# Patient Record
Sex: MALE | Race: WHITE | NOT HISPANIC OR LATINO | Employment: FULL TIME | URBAN - METROPOLITAN AREA
[De-identification: names, ages, dates, MRNs, and addresses within clinical notes are randomized per-mention and may not be internally consistent; named-entity substitution may affect disease eponyms.]

---

## 2023-10-10 ENCOUNTER — HOSPITAL ENCOUNTER (INPATIENT)
Facility: HOSPITAL | Age: 43
LOS: 3 days | Discharge: HOME | DRG: 683 | End: 2023-10-13
Attending: INTERNAL MEDICINE | Admitting: INTERNAL MEDICINE
Payer: COMMERCIAL

## 2023-10-10 DIAGNOSIS — N17.9 AKI (ACUTE KIDNEY INJURY): ICD-10-CM

## 2023-10-10 DIAGNOSIS — R07.9 CHEST PAIN: ICD-10-CM

## 2023-10-10 DIAGNOSIS — R10.11 ABDOMINAL PAIN, RIGHT UPPER QUADRANT: Primary | ICD-10-CM

## 2023-10-10 PROBLEM — R74.01 TRANSAMINITIS: Status: ACTIVE | Noted: 2023-10-10

## 2023-10-10 PROBLEM — E83.52 HYPERCALCEMIA: Status: ACTIVE | Noted: 2023-10-10

## 2023-10-10 LAB
ALBUMIN SERPL-MCNC: 3.5 G/DL (ref 3.5–5)
ALBUMIN/GLOB SERPL: 1 RATIO (ref 1.1–2)
ALP SERPL-CCNC: 86 UNIT/L (ref 40–150)
ALT SERPL-CCNC: 79 UNIT/L (ref 0–55)
AMYLASE SERPL-CCNC: 30 UNIT/L (ref 25–125)
APPEARANCE UR: CLEAR
AST SERPL-CCNC: 43 UNIT/L (ref 5–34)
BACTERIA #/AREA URNS AUTO: NORMAL /HPF
BASOPHILS # BLD AUTO: 0.06 X10(3)/MCL
BASOPHILS NFR BLD AUTO: 0.5 %
BILIRUB SERPL-MCNC: 1.2 MG/DL
BILIRUB UR QL STRIP.AUTO: NEGATIVE
BUN SERPL-MCNC: 47 MG/DL (ref 8.9–20.6)
CALCIUM SERPL-MCNC: 18.3 MG/DL (ref 8.4–10.2)
CHLORIDE SERPL-SCNC: 88 MMOL/L (ref 98–107)
CO2 SERPL-SCNC: 34 MMOL/L (ref 22–29)
COLOR UR AUTO: YELLOW
CREAT SERPL-MCNC: 3.36 MG/DL (ref 0.73–1.18)
EOSINOPHIL # BLD AUTO: 1.6 X10(3)/MCL (ref 0–0.9)
EOSINOPHIL NFR BLD AUTO: 13.1 %
ERYTHROCYTE [DISTWIDTH] IN BLOOD BY AUTOMATED COUNT: 13 % (ref 11.5–17)
GFR SERPLBLD CREATININE-BSD FMLA CKD-EPI: 22 MLS/MIN/1.73/M2
GLOBULIN SER-MCNC: 3.4 GM/DL (ref 2.4–3.5)
GLUCOSE SERPL-MCNC: 154 MG/DL (ref 74–100)
GLUCOSE UR QL STRIP.AUTO: NEGATIVE
HCT VFR BLD AUTO: 38.4 % (ref 42–52)
HGB BLD-MCNC: 13.6 G/DL (ref 14–18)
IMM GRANULOCYTES # BLD AUTO: 0.21 X10(3)/MCL (ref 0–0.04)
IMM GRANULOCYTES NFR BLD AUTO: 1.7 %
KETONES UR QL STRIP.AUTO: NEGATIVE
LEUKOCYTE ESTERASE UR QL STRIP.AUTO: NEGATIVE
LIPASE SERPL-CCNC: 15 U/L
LYMPHOCYTES # BLD AUTO: 1.91 X10(3)/MCL (ref 0.6–4.6)
LYMPHOCYTES NFR BLD AUTO: 15.7 %
MAGNESIUM SERPL-MCNC: 2.2 MG/DL (ref 1.6–2.6)
MCH RBC QN AUTO: 31.8 PG (ref 27–31)
MCHC RBC AUTO-ENTMCNC: 35.4 G/DL (ref 33–36)
MCV RBC AUTO: 89.7 FL (ref 80–94)
MONOCYTES # BLD AUTO: 0.97 X10(3)/MCL (ref 0.1–1.3)
MONOCYTES NFR BLD AUTO: 8 %
NEUTROPHILS # BLD AUTO: 7.44 X10(3)/MCL (ref 2.1–9.2)
NEUTROPHILS NFR BLD AUTO: 61 %
NITRITE UR QL STRIP.AUTO: NEGATIVE
PH UR STRIP.AUTO: 6 [PH]
PHOSPHATE SERPL-MCNC: 5.3 MG/DL (ref 2.3–4.7)
PLATELET # BLD AUTO: 174 X10(3)/MCL (ref 130–400)
PMV BLD AUTO: 9.6 FL (ref 7.4–10.4)
POTASSIUM SERPL-SCNC: 3.9 MMOL/L (ref 3.5–5.1)
PROT SERPL-MCNC: 6.9 GM/DL (ref 6.4–8.3)
PROT UR QL STRIP.AUTO: ABNORMAL
RBC # BLD AUTO: 4.28 X10(6)/MCL (ref 4.7–6.1)
RBC #/AREA URNS AUTO: NORMAL /HPF
RBC UR QL AUTO: ABNORMAL
SODIUM SERPL-SCNC: 136 MMOL/L (ref 136–145)
SP GR UR STRIP.AUTO: 1.01 (ref 1–1.03)
SQUAMOUS #/AREA URNS AUTO: NORMAL /HPF
TSH SERPL-ACNC: 0.72 UIU/ML (ref 0.35–4.94)
UROBILINOGEN UR STRIP-ACNC: 0.2
WBC # SPEC AUTO: 12.19 X10(3)/MCL (ref 4.5–11.5)
WBC #/AREA URNS AUTO: NORMAL /HPF

## 2023-10-10 PROCEDURE — 93005 ELECTROCARDIOGRAM TRACING: CPT

## 2023-10-10 PROCEDURE — 80053 COMPREHEN METABOLIC PANEL: CPT | Performed by: INTERNAL MEDICINE

## 2023-10-10 PROCEDURE — 85025 COMPLETE CBC W/AUTO DIFF WBC: CPT | Performed by: INTERNAL MEDICINE

## 2023-10-10 PROCEDURE — 86334 IMMUNOFIX E-PHORESIS SERUM: CPT | Performed by: INTERNAL MEDICINE

## 2023-10-10 PROCEDURE — 83970 ASSAY OF PARATHORMONE: CPT | Performed by: INTERNAL MEDICINE

## 2023-10-10 PROCEDURE — 84443 ASSAY THYROID STIM HORMONE: CPT | Performed by: INTERNAL MEDICINE

## 2023-10-10 PROCEDURE — 25000003 PHARM REV CODE 250: Performed by: INTERNAL MEDICINE

## 2023-10-10 PROCEDURE — 82330 ASSAY OF CALCIUM: CPT | Performed by: INTERNAL MEDICINE

## 2023-10-10 PROCEDURE — 84100 ASSAY OF PHOSPHORUS: CPT | Performed by: INTERNAL MEDICINE

## 2023-10-10 PROCEDURE — 83690 ASSAY OF LIPASE: CPT | Performed by: INTERNAL MEDICINE

## 2023-10-10 PROCEDURE — 0077U IG PARAPROTEIN QUAL BLD/UR: CPT | Performed by: INTERNAL MEDICINE

## 2023-10-10 PROCEDURE — 93010 ELECTROCARDIOGRAM REPORT: CPT | Mod: ,,, | Performed by: INTERNAL MEDICINE

## 2023-10-10 PROCEDURE — 11000001 HC ACUTE MED/SURG PRIVATE ROOM

## 2023-10-10 PROCEDURE — 81001 URINALYSIS AUTO W/SCOPE: CPT | Performed by: INTERNAL MEDICINE

## 2023-10-10 PROCEDURE — 82150 ASSAY OF AMYLASE: CPT | Performed by: INTERNAL MEDICINE

## 2023-10-10 PROCEDURE — 63600175 PHARM REV CODE 636 W HCPCS: Performed by: INTERNAL MEDICINE

## 2023-10-10 PROCEDURE — 83735 ASSAY OF MAGNESIUM: CPT | Performed by: INTERNAL MEDICINE

## 2023-10-10 PROCEDURE — 93010 EKG 12-LEAD: ICD-10-PCS | Mod: ,,, | Performed by: INTERNAL MEDICINE

## 2023-10-10 PROCEDURE — 84165 PROTEIN E-PHORESIS SERUM: CPT | Performed by: INTERNAL MEDICINE

## 2023-10-10 RX ORDER — NALOXONE HCL 0.4 MG/ML
0.02 VIAL (ML) INJECTION
Status: DISCONTINUED | OUTPATIENT
Start: 2023-10-10 | End: 2023-10-13 | Stop reason: HOSPADM

## 2023-10-10 RX ORDER — ONDANSETRON 2 MG/ML
4 INJECTION INTRAMUSCULAR; INTRAVENOUS EVERY 8 HOURS PRN
Status: DISCONTINUED | OUTPATIENT
Start: 2023-10-10 | End: 2023-10-13 | Stop reason: HOSPADM

## 2023-10-10 RX ORDER — SODIUM CHLORIDE 9 MG/ML
INJECTION, SOLUTION INTRAVENOUS CONTINUOUS
Status: DISCONTINUED | OUTPATIENT
Start: 2023-10-11 | End: 2023-10-13 | Stop reason: HOSPADM

## 2023-10-10 RX ORDER — IBUPROFEN 200 MG
16 TABLET ORAL
Status: DISCONTINUED | OUTPATIENT
Start: 2023-10-10 | End: 2023-10-13 | Stop reason: HOSPADM

## 2023-10-10 RX ORDER — SODIUM CHLORIDE 0.9 % (FLUSH) 0.9 %
10 SYRINGE (ML) INJECTION EVERY 12 HOURS PRN
Status: DISCONTINUED | OUTPATIENT
Start: 2023-10-10 | End: 2023-10-13 | Stop reason: HOSPADM

## 2023-10-10 RX ORDER — LORAZEPAM 2 MG/ML
0.5 INJECTION INTRAMUSCULAR EVERY 6 HOURS PRN
Status: DISCONTINUED | OUTPATIENT
Start: 2023-10-10 | End: 2023-10-11

## 2023-10-10 RX ORDER — SODIUM CHLORIDE 9 MG/ML
INJECTION, SOLUTION INTRAVENOUS ONCE
Status: COMPLETED | OUTPATIENT
Start: 2023-10-10 | End: 2023-10-10

## 2023-10-10 RX ORDER — DOCUSATE SODIUM 100 MG/1
100 CAPSULE, LIQUID FILLED ORAL 2 TIMES DAILY
Status: ON HOLD | COMMUNITY
End: 2023-10-13 | Stop reason: HOSPADM

## 2023-10-10 RX ORDER — ENOXAPARIN SODIUM 100 MG/ML
30 INJECTION SUBCUTANEOUS EVERY 24 HOURS
Status: DISCONTINUED | OUTPATIENT
Start: 2023-10-11 | End: 2023-10-13 | Stop reason: HOSPADM

## 2023-10-10 RX ORDER — CLONIDINE HYDROCHLORIDE 0.1 MG/1
0.1 TABLET ORAL EVERY 8 HOURS PRN
Status: DISCONTINUED | OUTPATIENT
Start: 2023-10-11 | End: 2023-10-13 | Stop reason: HOSPADM

## 2023-10-10 RX ORDER — GLUCAGON 1 MG
1 KIT INJECTION
Status: DISCONTINUED | OUTPATIENT
Start: 2023-10-10 | End: 2023-10-13 | Stop reason: HOSPADM

## 2023-10-10 RX ORDER — DEXTROAMPHETAMINE SACCHARATE, AMPHETAMINE ASPARTATE, DEXTROAMPHETAMINE SULFATE AND AMPHETAMINE SULFATE 5; 5; 5; 5 MG/1; MG/1; MG/1; MG/1
20 TABLET ORAL 3 TIMES DAILY PRN
Status: ON HOLD | COMMUNITY
Start: 2023-09-09 | End: 2023-10-13 | Stop reason: HOSPADM

## 2023-10-10 RX ORDER — DESVENLAFAXINE SUCCINATE 50 MG/1
50 TABLET, EXTENDED RELEASE ORAL DAILY
Status: ON HOLD | COMMUNITY
End: 2023-10-13 | Stop reason: HOSPADM

## 2023-10-10 RX ORDER — SODIUM CHLORIDE 9 MG/ML
INJECTION, SOLUTION INTRAVENOUS CONTINUOUS
Status: DISCONTINUED | OUTPATIENT
Start: 2023-10-10 | End: 2023-10-10

## 2023-10-10 RX ORDER — HYDRALAZINE HYDROCHLORIDE 20 MG/ML
10 INJECTION INTRAMUSCULAR; INTRAVENOUS EVERY 6 HOURS PRN
Status: DISCONTINUED | OUTPATIENT
Start: 2023-10-11 | End: 2023-10-13 | Stop reason: HOSPADM

## 2023-10-10 RX ORDER — IBUPROFEN 200 MG
24 TABLET ORAL
Status: DISCONTINUED | OUTPATIENT
Start: 2023-10-10 | End: 2023-10-13 | Stop reason: HOSPADM

## 2023-10-10 RX ORDER — SULFAMETHOXAZOLE AND TRIMETHOPRIM 800; 160 MG/1; MG/1
TABLET ORAL 2 TIMES DAILY
Status: ON HOLD | COMMUNITY
Start: 2023-10-09 | End: 2023-10-13 | Stop reason: HOSPADM

## 2023-10-10 RX ORDER — PANTOPRAZOLE SODIUM 40 MG/1
40 TABLET, DELAYED RELEASE ORAL DAILY
Status: DISCONTINUED | OUTPATIENT
Start: 2023-10-11 | End: 2023-10-13 | Stop reason: HOSPADM

## 2023-10-10 RX ADMIN — SODIUM CHLORIDE 1000 ML: 9 INJECTION, SOLUTION INTRAVENOUS at 10:10

## 2023-10-10 RX ADMIN — CLONIDINE HYDROCHLORIDE 0.1 MG: 0.1 TABLET ORAL at 11:10

## 2023-10-10 RX ADMIN — SODIUM CHLORIDE: 9 INJECTION, SOLUTION INTRAVENOUS at 11:10

## 2023-10-10 RX ADMIN — LORAZEPAM 0.5 MG: 2 INJECTION INTRAMUSCULAR; INTRAVENOUS at 11:10

## 2023-10-11 LAB
ANION GAP SERPL CALC-SCNC: 8 MEQ/L
BUN SERPL-MCNC: 48 MG/DL (ref 8.9–20.6)
CALCIUM SERPL-MCNC: 17 MG/DL (ref 8.4–10.2)
CHLORIDE SERPL-SCNC: 91 MMOL/L (ref 98–107)
CO2 SERPL-SCNC: 35 MMOL/L (ref 22–29)
CREAT SERPL-MCNC: 3.42 MG/DL (ref 0.73–1.18)
CREAT/UREA NIT SERPL: 14
GFR SERPLBLD CREATININE-BSD FMLA CKD-EPI: 22 MLS/MIN/1.73/M2
GLUCOSE SERPL-MCNC: 101 MG/DL (ref 74–100)
MAGNESIUM SERPL-MCNC: 2 MG/DL (ref 1.6–2.6)
PHOSPHATE SERPL-MCNC: 5.6 MG/DL (ref 2.3–4.7)
POTASSIUM SERPL-SCNC: 3.9 MMOL/L (ref 3.5–5.1)
PTH-INTACT SERPL-MCNC: 8.2 PG/ML (ref 8.7–77)
SODIUM SERPL-SCNC: 134 MMOL/L (ref 136–145)

## 2023-10-11 PROCEDURE — 25000003 PHARM REV CODE 250: Performed by: INTERNAL MEDICINE

## 2023-10-11 PROCEDURE — 63600175 PHARM REV CODE 636 W HCPCS: Performed by: INTERNAL MEDICINE

## 2023-10-11 PROCEDURE — 83735 ASSAY OF MAGNESIUM: CPT | Performed by: INTERNAL MEDICINE

## 2023-10-11 PROCEDURE — 82570 ASSAY OF URINE CREATININE: CPT | Performed by: INTERNAL MEDICINE

## 2023-10-11 PROCEDURE — 11000001 HC ACUTE MED/SURG PRIVATE ROOM

## 2023-10-11 PROCEDURE — 94761 N-INVAS EAR/PLS OXIMETRY MLT: CPT

## 2023-10-11 PROCEDURE — 84156 ASSAY OF PROTEIN URINE: CPT | Performed by: INTERNAL MEDICINE

## 2023-10-11 PROCEDURE — 80048 BASIC METABOLIC PNL TOTAL CA: CPT | Performed by: INTERNAL MEDICINE

## 2023-10-11 PROCEDURE — 84100 ASSAY OF PHOSPHORUS: CPT | Performed by: INTERNAL MEDICINE

## 2023-10-11 RX ORDER — ALPRAZOLAM 0.5 MG/1
0.5 TABLET ORAL EVERY 6 HOURS PRN
Status: DISCONTINUED | OUTPATIENT
Start: 2023-10-11 | End: 2023-10-13 | Stop reason: HOSPADM

## 2023-10-11 RX ORDER — ZOLPIDEM TARTRATE 5 MG/1
10 TABLET ORAL NIGHTLY
Status: DISCONTINUED | OUTPATIENT
Start: 2023-10-11 | End: 2023-10-13 | Stop reason: HOSPADM

## 2023-10-11 RX ORDER — ACETAMINOPHEN 325 MG/1
650 TABLET ORAL EVERY 6 HOURS PRN
Status: DISCONTINUED | OUTPATIENT
Start: 2023-10-11 | End: 2023-10-13 | Stop reason: HOSPADM

## 2023-10-11 RX ADMIN — PAMIDRONATE DISODIUM 60 MG: 3 INJECTION INTRAVENOUS at 10:10

## 2023-10-11 RX ADMIN — ZOLPIDEM TARTRATE 10 MG: 5 TABLET ORAL at 09:10

## 2023-10-11 RX ADMIN — PANTOPRAZOLE SODIUM 40 MG: 40 TABLET, DELAYED RELEASE ORAL at 10:10

## 2023-10-11 RX ADMIN — HYDRALAZINE HYDROCHLORIDE 10 MG: 20 INJECTION, SOLUTION INTRAMUSCULAR; INTRAVENOUS at 04:10

## 2023-10-11 RX ADMIN — SODIUM CHLORIDE: 9 INJECTION, SOLUTION INTRAVENOUS at 09:10

## 2023-10-11 RX ADMIN — SODIUM CHLORIDE: 9 INJECTION, SOLUTION INTRAVENOUS at 04:10

## 2023-10-11 RX ADMIN — CLONIDINE HYDROCHLORIDE 0.1 MG: 0.1 TABLET ORAL at 10:10

## 2023-10-11 RX ADMIN — ENOXAPARIN SODIUM 30 MG: 30 INJECTION SUBCUTANEOUS at 04:10

## 2023-10-11 RX ADMIN — ACETAMINOPHEN 650 MG: 325 TABLET, FILM COATED ORAL at 12:10

## 2023-10-11 RX ADMIN — SODIUM CHLORIDE: 9 INJECTION, SOLUTION INTRAVENOUS at 02:10

## 2023-10-11 NOTE — PLAN OF CARE
Problem: Adult Inpatient Plan of Care  Goal: Plan of Care Review  Outcome: Ongoing, Progressing  Goal: Patient-Specific Goal (Individualized)  Outcome: Ongoing, Progressing  Goal: Absence of Hospital-Acquired Illness or Injury  Outcome: Ongoing, Progressing  Goal: Optimal Comfort and Wellbeing  Outcome: Ongoing, Progressing  Goal: Readiness for Transition of Care  Outcome: Ongoing, Progressing     Problem: Fluid and Electrolyte Imbalance (Acute Kidney Injury/Impairment)  Goal: Fluid and Electrolyte Balance  Outcome: Ongoing, Progressing     Problem: Oral Intake Inadequate (Acute Kidney Injury/Impairment)  Goal: Optimal Nutrition Intake  Outcome: Ongoing, Progressing     Problem: Renal Function Impairment (Acute Kidney Injury/Impairment)  Goal: Effective Renal Function  Outcome: Ongoing, Progressing     Problem: Confusion Acute  Goal: Optimal Cognitive Function  Outcome: Ongoing, Progressing     Problem: Electrolyte Imbalance  Goal: Electrolyte Balance  Outcome: Ongoing, Progressing     Problem: Activity and Energy Impairment (Anxiety Signs/Symptoms)  Goal: Optimized Energy Level (Anxiety Signs/Symptoms)  Outcome: Ongoing, Progressing     Problem: Cognitive Impairment (Anxiety Signs/Symptoms)  Goal: Optimized Cognitive Function (Anxiety Signs/Symptoms)  Outcome: Ongoing, Progressing     Problem: Mood Impairment (Anxiety Signs/Symptoms)  Goal: Improved Mood Symptoms (Anxiety Signs/Symptoms)  Outcome: Ongoing, Progressing     Problem: Sleep Impairment (Anxiety Signs/Symptoms)  Goal: Improved Sleep (Anxiety Signs/Symptoms)  Outcome: Ongoing, Progressing     Problem: Social, Occupational or Functional Impairment (Anxiety Signs/Symptoms)  Goal: Enhanced Social, Occupational or Functional Skills (Anxiety Signs/Symptoms)  Outcome: Ongoing, Progressing     Problem: Somatic Disturbance (Anxiety Signs/Symptoms)  Goal: Improved Somatic Symptoms (Anxiety Signs/Symptoms)  Outcome: Ongoing, Progressing     Problem: Behavioral  Health Comorbidity  Goal: Maintenance of Behavioral Health Symptom Control  Outcome: Ongoing, Progressing

## 2023-10-11 NOTE — PLAN OF CARE
Problem: Adult Inpatient Plan of Care  Goal: Plan of Care Review  10/11/2023 1152 by Julissa Kenney RN  Outcome: Ongoing, Progressing  10/11/2023 0734 by Julissa Kenney RN  Outcome: Ongoing, Progressing  Goal: Patient-Specific Goal (Individualized)  10/11/2023 1152 by Julissa Kenney RN  Outcome: Ongoing, Progressing  10/11/2023 0734 by Julissa Kenney RN  Outcome: Ongoing, Progressing  Goal: Absence of Hospital-Acquired Illness or Injury  10/11/2023 1152 by Julissa Kenney RN  Outcome: Ongoing, Progressing  10/11/2023 0734 by Julissa Kenney RN  Outcome: Ongoing, Progressing  Goal: Optimal Comfort and Wellbeing  10/11/2023 1152 by Julissa Kenney RN  Outcome: Ongoing, Progressing  10/11/2023 0734 by Julissa Kenney RN  Outcome: Ongoing, Progressing  Goal: Readiness for Transition of Care  10/11/2023 1152 by Julissa Kenney RN  Outcome: Ongoing, Progressing  10/11/2023 0734 by Julissa Kenney RN  Outcome: Ongoing, Progressing     Problem: Fluid and Electrolyte Imbalance (Acute Kidney Injury/Impairment)  Goal: Fluid and Electrolyte Balance  10/11/2023 1152 by Julissa Kenney RN  Outcome: Ongoing, Progressing  10/11/2023 0734 by Julissa Kenney RN  Outcome: Ongoing, Progressing     Problem: Oral Intake Inadequate (Acute Kidney Injury/Impairment)  Goal: Optimal Nutrition Intake  10/11/2023 1152 by Julissa Kenney RN  Outcome: Ongoing, Progressing  10/11/2023 0734 by Julissa Kenney RN  Outcome: Ongoing, Progressing     Problem: Renal Function Impairment (Acute Kidney Injury/Impairment)  Goal: Effective Renal Function  10/11/2023 1152 by Julissa Kenney RN  Outcome: Ongoing, Progressing  10/11/2023 0734 by Julissa Kenney RN  Outcome: Ongoing, Progressing     Problem: Confusion Acute  Goal: Optimal Cognitive Function  10/11/2023 1152 by Julissa Kenney RN  Outcome: Ongoing, Progressing  10/11/2023 0734 by Julissa Kenney RN  Outcome: Ongoing, Progressing      Problem: Electrolyte Imbalance  Goal: Electrolyte Balance  10/11/2023 1152 by Julissa Kenney RN  Outcome: Ongoing, Progressing  10/11/2023 0734 by Julissa Kenney RN  Outcome: Ongoing, Progressing     Problem: Activity and Energy Impairment (Anxiety Signs/Symptoms)  Goal: Optimized Energy Level (Anxiety Signs/Symptoms)  10/11/2023 1152 by Julissa Kenney RN  Outcome: Ongoing, Progressing  10/11/2023 0734 by Julissa Kenney RN  Outcome: Ongoing, Progressing     Problem: Cognitive Impairment (Anxiety Signs/Symptoms)  Goal: Optimized Cognitive Function (Anxiety Signs/Symptoms)  10/11/2023 1152 by Julissa Kenney RN  Outcome: Ongoing, Progressing  10/11/2023 0734 by Julissa Kenney RN  Outcome: Ongoing, Progressing     Problem: Mood Impairment (Anxiety Signs/Symptoms)  Goal: Improved Mood Symptoms (Anxiety Signs/Symptoms)  10/11/2023 1152 by Julissa Kenney RN  Outcome: Ongoing, Progressing  10/11/2023 0734 by Julissa Kenney RN  Outcome: Ongoing, Progressing     Problem: Sleep Impairment (Anxiety Signs/Symptoms)  Goal: Improved Sleep (Anxiety Signs/Symptoms)  10/11/2023 1152 by Julissa Kenney RN  Outcome: Ongoing, Progressing  10/11/2023 0734 by Julissa Kenney RN  Outcome: Ongoing, Progressing     Problem: Social, Occupational or Functional Impairment (Anxiety Signs/Symptoms)  Goal: Enhanced Social, Occupational or Functional Skills (Anxiety Signs/Symptoms)  10/11/2023 1152 by Julissa Kenney RN  Outcome: Ongoing, Progressing  10/11/2023 0734 by Julissa Kenney RN  Outcome: Ongoing, Progressing     Problem: Somatic Disturbance (Anxiety Signs/Symptoms)  Goal: Improved Somatic Symptoms (Anxiety Signs/Symptoms)  10/11/2023 1152 by Julissa Kenney RN  Outcome: Ongoing, Progressing  10/11/2023 0734 by Julissa Kenney RN  Outcome: Ongoing, Progressing     Problem: Behavioral Health Comorbidity  Goal: Maintenance of Behavioral Health Symptom Control  10/11/2023 1152 by Anne Marie  BETSY Costa  Outcome: Ongoing, Progressing  10/11/2023 0734 by Julissa Kenney RN  Outcome: Ongoing, Progressing

## 2023-10-11 NOTE — H&P
[facility]                     [address] [address]    PATIENT NAME:       LAMBERTO MO   YOB: 1980  CSN:                [id number]   MRN:                [id number]  ADMIT DATE:         10/10/2023 21:18:00  PHYSICIAN:          Eric Kohli MD                        HISTORY AND PHYSICAL      CODE STATUS:  Full code.    CHIEF COMPLAINT:  The patient was made a direct admit for abnormal labs of acute   kidney injury along with nausea and severe hypercalcemia.    HISTORY OF PRESENT ILLNESS:  Mr. Jim Freeman is a very pleasant 43-year-old   gentleman, who has been in excellent state of health.  In fact, he did his   annual blood work in August and it was absolutely normal.  There were no   abnormalities.  He was not started on any medications and he does his blood work   annually for me.  The only medication he is on for his ADHD is Adderall p.r.n.   when he is at work and he was recently started on Cymbalta for anxiety, which   has been battling ever since he was an adolescent, but the Cymbalta did not suit   him, so he stopped that and was not on any other medication.  The patient had   called the office on Wednesday when I was out of the nursing homes that he was   not feeling well and he went to the urgent care clinic in his town yesterday and   labs were drawn because he was having some nausea, extreme tiredness and   weakness, getting worse over the last 7 days, but for last 3 days, it has been   accelerating.  Some numbness in the lips.  Some visual scotomas, which were   colorful basically red and green.  He also noted that he was increasingly   thirsty.  Also noted that he was having increased amount of urination, but had   no fever.  He has been having ongoing pain in his right upper quadrant, which he   thinks is a pulled muscle, which has been there for last 1 month or so, but his   main complaint,  which brought him to the urgent care was that he was extremely   weak, could not get out of his bed, could not think of going to work and could   not pinpoint the cause.  They had arranged ultrasound of the gallbladder,   thinking it is the right upper quadrant pathology, but also had drawn a blood   work, which the report was faxed to me upon wife's request, which I saw late in   the evening today and his calcium was 18.  His BUN and creatinine have tripled   and as a result, I called the patient, there is only 1 bed available, secured   the bed for his treatment.  He also said that he is not feeling well and is   having nausea.  On repeated history, the patient cannot add of any other   specific thing.  He has not vomited.  He has nor diarrhea.  No fever.  No other   system specific complaints.    PAST MEDICAL HISTORY:  Only significant for ADHD and anxiety.  For ADHD, he   takes Adderall for anxiety.  He does not take anything yet.    PAST SURGICAL HISTORY:  Tonsillectomy.    SOCIAL HISTORY:  He is .  He has 2 children.  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  Does not use any illicit drugs.  Does   occasionally drink when he is off.    FAMILY HISTORY:  Pertinent for mother and father being 65 or 66.  They are fine.    He has 1 brother, who is fine.    ALLERGIES:  NO KNOWN DRUG ALLERGIES.     MEDICATIONS:  As mentioned Adderall.    REVIEW OF SYSTEMS:  As mentioned in history of present illness.    PHYSICAL EXAMINATION:  GENERAL:  The patient is alert and oriented x3.  VITAL SIGNS:  Are as follows; blood pressure 187/108, temperature 98.4, O2 sat   95%, pulse is 101.  HEENT:  Normocephalic.  Pupils bilaterally react to light, equal size.  Mild   conjunctival pallor.  No scleral icterus.  Trachea is in midline.  CHEST:  Good bilateral air entry.  CVS:  First and second heart sounds are heard normally without any murmurs.   ABDOMEN:  He has tenderness in the right upper quadrant  and there is some   guarding, so the liver cannot be palpated properly.  EXTREMITIES:  Devoid of any edema, cyanosis, or clubbing.    LABS AND INVESTIGATIONS:  The labs investigations, which are done and just   partially available are as follows:  WBC 12.19, hemoglobin 13.6, hematocrit   38.4, platelet count 174.  Eosinophilia is 1.60.  Sodium 136, potassium 3.9,   chloride 88, bicarb 34, BUN 47, creatinine 3.36, GFR of 22, glucose 154, calcium   18.3, phosphorus 5.3, magnesium 2.2, total protein 6.9, albumin 3.5,   albumin/globulin ratio 1.0, AST 43, ALT 79, globulin of 7.3.  UA, protein 1+,   occult blood 2+.  No signs of infection.  Chest x-ray looks essentially devoid   of any infiltrate.  Costophrenic angles are well seen.  Cardiac silhouette seems   to be normal.  Bony thorax seems to be intact.  Gastric bubble under the   diaphragm.  EKG normal sinus rhythm.    IMPRESSION:    1. Generalized weakness and fatigue, polydipsia and polyuria.  2. New onset hypercalcemia.  3. Acute kidney injury with a GFR of 22.  4. Mild transaminitis.  5. Right upper quadrant pain under investigation.  6. History of attention deficit hyperactivity disorder.  7. Nausea.  8. Diastolic and systolic hypertension reactionary versus episodic.    PLAN:  Admit the patient for a bolus of normal saline.  Continue with IV fluids   with normal saline.  Ultrasound of the abdomen in the morning of  liver and   gallbladder.  Order amylase and lipase.  Order serum parathyroid hormone,   ionized calcium, total calcium.  Consult Dr. Brown.  Repeat the labs in the   morning.  Avoid any nephrotoxins drugs for isolated hypertension.  Chest x-ray   official report to be followed urine and serum electrophoresis.  24-hour urine   for protein, creatinine, renal ultrasound, skeletal survey x-ray.  Zofran for   nausea and vomiting.  Labetalol or hydralazine for blood pressure.  Mild   benzodiazepine for restless sleep.  DVT prophylaxis with bilateral  with low-dose   Lovenox.  GI prophylaxis with proton pump inhibitors.    Pleasure taking care of Mr. Jim Freeman during his stay at [facility].        ______________________________  Eric Kohli MD    SS/AQS  DD:  10/10/2023  Time:  10:56PM  DT:  10/10/2023  Time:  11:55PM  Job #:  [id number]    cc:   Ty Brown MD        HISTORY AND PHYSICAL

## 2023-10-11 NOTE — NURSING
Spoke with Johnathan  in lab via phone about ordering a Bence-Ji Protein (Urine), I was unable to order it on the floor and she will look into finding the order code for me.  
none

## 2023-10-12 LAB
ALBUMIN SERPL-MCNC: 2.9 G/DL (ref 3.5–5)
ALBUMIN/GLOB SERPL: 0.9 RATIO (ref 1.1–2)
ALP SERPL-CCNC: 69 UNIT/L (ref 40–150)
ALT SERPL-CCNC: 78 UNIT/L (ref 0–55)
AST SERPL-CCNC: 57 UNIT/L (ref 5–34)
BILIRUB SERPL-MCNC: 0.9 MG/DL
BUN SERPL-MCNC: 64 MG/DL (ref 8.9–20.6)
CALCIUM SERPL-MCNC: 16.4 MG/DL (ref 8.4–10.2)
CHLORIDE SERPL-SCNC: 99 MMOL/L (ref 98–107)
CO2 SERPL-SCNC: 31 MMOL/L (ref 22–29)
CREAT 24H UR-MCNC: 1424.8 MG/DAY (ref 710–1650)
CREAT SERPL-MCNC: 4.59 MG/DL (ref 0.73–1.18)
CREAT UR-MCNC: 27.4 MG/DL (ref 63–166)
DEPRECATED CALCIDIOL+CALCIFEROL SERPL-MC: 53.5 NG/ML (ref 30–80)
GFR SERPLBLD CREATININE-BSD FMLA CKD-EPI: 15 MLS/MIN/1.73/M2
GLOBULIN SER-MCNC: 3.2 GM/DL (ref 2.4–3.5)
GLUCOSE SERPL-MCNC: 98 MG/DL (ref 74–100)
POTASSIUM SERPL-SCNC: 4.4 MMOL/L (ref 3.5–5.1)
PROT 24H UR-MCNC: 5798 MG/24 H (ref 0–165)
PROT SERPL-MCNC: 6.1 GM/DL (ref 6.4–8.3)
PROT UR STRIP-MCNC: 111.5 MG/DL
SODIUM SERPL-SCNC: 139 MMOL/L (ref 136–145)
TOTAL VOLUME  (OHS): 5200 ML
TOTAL VOLUME  (OHS): 5200 ML

## 2023-10-12 PROCEDURE — 94761 N-INVAS EAR/PLS OXIMETRY MLT: CPT

## 2023-10-12 PROCEDURE — 86146 BETA-2 GLYCOPROTEIN ANTIBODY: CPT | Performed by: STUDENT IN AN ORGANIZED HEALTH CARE EDUCATION/TRAINING PROGRAM

## 2023-10-12 PROCEDURE — 82652 VIT D 1 25-DIHYDROXY: CPT | Performed by: INTERNAL MEDICINE

## 2023-10-12 PROCEDURE — 82397 CHEMILUMINESCENT ASSAY: CPT | Performed by: INTERNAL MEDICINE

## 2023-10-12 PROCEDURE — 82306 VITAMIN D 25 HYDROXY: CPT | Performed by: INTERNAL MEDICINE

## 2023-10-12 PROCEDURE — 99223 1ST HOSP IP/OBS HIGH 75: CPT | Mod: ,,, | Performed by: STUDENT IN AN ORGANIZED HEALTH CARE EDUCATION/TRAINING PROGRAM

## 2023-10-12 PROCEDURE — 80053 COMPREHEN METABOLIC PANEL: CPT | Performed by: INTERNAL MEDICINE

## 2023-10-12 PROCEDURE — 25000003 PHARM REV CODE 250: Performed by: INTERNAL MEDICINE

## 2023-10-12 PROCEDURE — 99223 PR INITIAL HOSPITAL CARE,LEVL III: ICD-10-PCS | Mod: ,,, | Performed by: STUDENT IN AN ORGANIZED HEALTH CARE EDUCATION/TRAINING PROGRAM

## 2023-10-12 PROCEDURE — 11000001 HC ACUTE MED/SURG PRIVATE ROOM

## 2023-10-12 PROCEDURE — 63600175 PHARM REV CODE 636 W HCPCS: Performed by: INTERNAL MEDICINE

## 2023-10-12 RX ADMIN — SODIUM CHLORIDE: 9 INJECTION, SOLUTION INTRAVENOUS at 08:10

## 2023-10-12 RX ADMIN — ENOXAPARIN SODIUM 30 MG: 30 INJECTION SUBCUTANEOUS at 05:10

## 2023-10-12 RX ADMIN — SODIUM CHLORIDE: 9 INJECTION, SOLUTION INTRAVENOUS at 09:10

## 2023-10-12 RX ADMIN — PANTOPRAZOLE SODIUM 40 MG: 40 TABLET, DELAYED RELEASE ORAL at 09:10

## 2023-10-12 RX ADMIN — ACETAMINOPHEN 650 MG: 325 TABLET, FILM COATED ORAL at 08:10

## 2023-10-12 RX ADMIN — ALPRAZOLAM 0.5 MG: 0.5 TABLET ORAL at 12:10

## 2023-10-12 RX ADMIN — ALPRAZOLAM 0.5 MG: 0.5 TABLET ORAL at 08:10

## 2023-10-12 NOTE — PROGRESS NOTES
[facility]  [address] [place] [address]  Phone: [phone]    Department of Nephrology  Progress Note      PATIENT NAME: Pete Fernandez  MRN: [id number]  TODAY'S DATE: 10/12/2023  ADMIT DATE: 10/10/2023    SUBJECTIVE     PRINCIPLE PROBLEM: DOUG (acute kidney injury)    INTERVAL HISTORY:    10/12/2023  Interval history reviewed.  Patient is right upper quadrant ultrasound was abnormal with a positive Arteaga sign as as predicted.  He is undergoing a HIDA scan today.  It seems at the other plan is a whole-body nuclear med study.  It was noted that he is multiple 5 mm deficits in the calvarium.  In addition, he has nephrotic range proteinuria.  The protein electrophoresis of his blood in his urine is pending.  He did receive pamidronate yesterday his calcium is trending down.  However, significant renal dysfunction present nonoliguric at this point, we will need to monitor this closely.    Review of patient's allergies indicates:  No Known Allergies    ROS    OBJECTIVE     VITAL SIGNS (Most Recent)  Temp: 98.3 °F (36.8 °C) (10/12/23 1158)  Pulse: (!) 112 (10/12/23 1158)  Resp: 20 (10/12/23 1158)  BP: (!) 149/87 (10/12/23 1158)  SpO2: (!) 94 % (10/12/23 1158)    VENTILATION STATUS  Resp: 20 (10/12/23 1158)  SpO2: (!) 94 % (10/12/23 1158)           I & O (Last 24H):  Intake/Output Summary (Last 24 hours) at 10/12/2023 1242  Last data filed at 10/12/2023 1000  Gross per 24 hour   Intake 1620 ml   Output 2200 ml   Net -580 ml       WEIGHTS  Wt Readings from Last 3 Encounters:   No data found for Wt       Physical Exam    SCHEDULED MEDS:   enoxparin  30 mg Subcutaneous Q24H (prophylaxis, 1700)    pantoprazole  40 mg Oral Daily    zolpidem  10 mg Oral QHS       CONTINUOUS INFUSIONS:   sodium chloride 0.9% 150 mL/hr at 10/12/23 0807       PRN MEDS:acetaminophen, ALPRAZolam, cloNIDine, dextrose 10%, dextrose 10%, glucagon (human recombinant), glucose, glucose, hydrALAZINE, naloxone,  "ondansetron, sodium chloride 0.9%    LABS AND DIAGNOSTICS     CBC LAST 3 DAYS  Recent Labs   Lab 10/10/23  2149   WBC 12.19*   RBC 4.28*   HGB 13.6*   HCT 38.4*   MCV 89.7   MCH 31.8*   MCHC 35.4   RDW 13.0      MPV 9.6       COAGULATION LAST 3 DAYS  No results for input(s): "LABPT", "INR", "APTT" in the last 168 hours.    CHEMISTRY LAST 3 DAYS  Recent Labs   Lab 10/10/23  2149 10/11/23  0415 10/12/23  0940    134* 139   K 3.9 3.9 4.4   CO2 34* 35* 31*   BUN 47.0* 48.0* 64.0*   CREATININE 3.36* 3.42* 4.59*   CALCIUM 18.3* 17.0* 16.4*   MG 2.20 2.00  --    ALBUMIN 3.5  --  2.9*   ALKPHOS 86  --  69   ALT 79*  --  78*   AST 43*  --  57*   BILITOT 1.2  --  0.9       CARDIAC PROFILE LAST 3 DAYS  No results for input(s): "BNP", "CPK", "CPKMB", "LDH", "TROPONINI" in the last 168 hours.    ENDOCRINE LAST 3 DAYS  Recent Labs   Lab 10/10/23  2149   TSH 0.723       LAST ARTERIAL BLOOD GAS  ABG  No results for input(s): "PH", "PO2", "PCO2", "HCO3", "BE" in the last 168 hours.    LAST 7 DAYS MICROBIOLOGY   Microbiology Results (last 7 days)       ** No results found for the last 168 hours. **            MOST RECENT IMAGING  US Abdomen Complete  Narrative: EXAMINATION:  US ABDOMEN COMPLETE    CLINICAL HISTORY:  nausea michele;, .    COMPARISON:  None available    FINDINGS:  Multiple sonographic images reveal the pancreas to be suboptimally visualized. The visualized portions of the aorta and inferior vena cava are normal in size. The gallbladder demonstrates a prominent amount of biliary sludge.  The gallbladder wall is upper normal in thickness..  Arteaga sign is positive.  The  common bile duct is not dilated. The liver is normal in size. Hepatic parenchyma is grossly within normal limits. No free fluid collection is seen. The kidneys are relatively symmetric in size. There is increased echogenicity of the kidneys bilaterally.  No hydronephrosis is identified. The spleen is normal in size.  Impression: 1. Suboptimal " visualization of the pancreas  2. Biliary sludge gallbladder wall upper normal in thickness and positive sonographic Arteaga sign.  A hepatobiliary scan would allow further evaluation  3. Increased echogenicity of the kidneys bilaterally which is a nonspecific finding.  Clinical correlation is indicated    Electronically signed by: Isidro Watts  Date:    10/11/2023  Time:    14:59  XR Metastatic Survey  Narrative: EXAMINATION:  SKELETAL SURVEY:    CLINICAL HISTORY:  , hypercalciemia;    TECHNIQUE:  AP and lateral views were obtained of the axial skeleton as well as limited views of the left lower extremity and right upper extremity.    COMPARISON:  None available    FINDINGS:  Multiple images obtained of the axial skeleton and extremities.  Multiple small round low-attenuation foci are seen within the skull measuring up the 5 mm a too numerous to count.  Mild degenerative changes are noted to the spine.  Mild arthritic changes are evident at the acromioclavicular joints bilaterally.  No fracture or dislocation is seen.  Bony structures are mildly osteopenic.  Impression: 1. Scattered small defects at the calvarium measuring up to 5 mm too numerous to count.  2. Degene mild rative changes at the spine and extremities  3. MR exam and bone scan would allow further evaluation.    Electronically signed by: Isidro Watts  Date:    10/11/2023  Time:    10:44  CT Head Without Contrast  Narrative: EXAMINATION:  CT HEAD WITHOUT CONTRAST    CLINICAL HISTORY:  Mental status change, unknown cause;, .    TECHNIQUE:  PATIENT RADIATION DOSE: DLP(mGycm) 857    As per PQRS measures, all CT scans at this facility used dose modulation, iterative reconstruction, and/or weight based dose adjustment when appropriate to reduce radiation dose to as low as reasonably achievable.    COMPARISON:  None available.    FINDINGS:  Serial axial images were obtained of the head without the administration of IV contrast. Both brain and bone  parenchymal windows were obtained.  Additional coronal and sagittal reconstructions were obtained.  Ventricles, cisterns, and sulci are within normal limits.  There is no evidence of intracranial hemorrhage, midline shift, mass effect, or abnormal extra-axial fluid collections.  Cerebellar tonsils extend caudally to the level of foramen magnum.  There is mild scattered mucosal thickening at the ethmoid sinuses.  Mastoid air cells are aerated bilaterally.  External auditory canals are grossly patent  Impression: 1. No acute intracranial abnormality identified  2. Mild mucosal thickening at the ethmoid sinuses.    Electronically signed by: Isidro Watts  Date:    10/11/2023  Time:    10:33  X-Ray Chest 1 View  Narrative: EXAMINATION:  XR CHEST 1 VIEW    CLINICAL HISTORY:  doug;, .    COMPARISON:  None available    FINDINGS:  An AP view or more reveals the heart to be normal in size.  The trachea is midline.  No infiltrate or effusion is seen.  Bony structures appear grossly intact.  Impression: 1. No active cardiopulmonary disease identified    Electronically signed by: Isidro Watts  Date:    10/11/2023  Time:    08:40      LASTECHO  No results found for this or any previous visit.      CURRENT/PREVIOUS VISIT EKG  Results for orders placed or performed during the hospital encounter of 10/10/23   EKG 12-lead    Collection Time: 10/10/23 10:04 PM    Narrative    Test Reason : R07.9,    Vent. Rate : 093 BPM     Atrial Rate : 093 BPM     P-R Int : 182 ms          QRS Dur : 094 ms      QT Int : 334 ms       P-R-T Axes : 072 014 049 degrees     QTc Int : 415 ms    Normal sinus rhythm  Nonspecific T wave abnormality  Abnormal ECG  No previous ECGs available  Confirmed by Dougie Wylie MD ([id number]) on 10/11/2023 9:01:59 PM    Referred By: HEVER DIAZ           Confirmed By:Dougie Wylie MD       ASSESSMENT/PLAN:     Active Hospital Problems    Diagnosis    *DOUG (acute kidney injury)    Hypercalcemia    Transaminitis        ASSESSMENT & PLAN:   Abdominal pain probable caused by acute cholecystitis.  Severe hypercalcemia, most of the workup is pending and as stated before he does have a normal globulin fraction, total protein level fraction but a low albumin to globulin ratio.  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      RECOMMENDATIONS:  Continued avoidance of nephrotoxins ACEs ARB use.  We will need to obtain the results of the quantitative and qualitative studies of the blood in the urine and go from there.        Ty Brown MD  Department of Nephrology  Date of Service: 10/12/2023  12:42 PM

## 2023-10-12 NOTE — CONSULTS
[facility]  [address] [place] [address]  Phone: [phone]    Department of Nephrology  Consult Note      PATIENT NAME: Pete Fernandez    MRN: [id number]  TODAY'S DATE: 10/11/2023  ADMIT DATE: 10/10/2023                          CONSULT REQUESTED BY: Eric Kohli MD    SUBJECTIVE     PRINCIPAL PROBLEM: DOUG (acute kidney injury)      REASON FOR CONSULT:  For DOUG and hypercalcemia.      HPI:  Patient is a 43-year-old  male who began with symptoms proximally 2 weeks ago which worsened and eventually presented to the emergency room where it was found that he had a serum calcium level of close to 19.  In addition he had been having some nausea and vomiting and some right upper quadrant pain worse with fatty foods.  He denies use hydrochlorothiazide or any calcium supplements.  It is noted that he has a suppressed parathyroid hormone.        Review of patient's allergies indicates:  No Known Allergies    Past Medical History:   Diagnosis Date    Anxiety disorder, unspecified      Past Surgical History:   Procedure Laterality Date    TONSILLECTOMY       Social History     Tobacco Use    Smoking status: Never    Smokeless tobacco: Never   Substance Use Topics    Alcohol use: Yes    Drug use: Never        Review of Systems   Constitutional:  Negative for fever.   HENT: Negative.     Respiratory: Negative.     Gastrointestinal:  Positive for abdominal pain, nausea and vomiting.   Endocrine: Positive for polydipsia and polyuria.   All other systems reviewed and are negative.      OBJECTIVE     VITAL SIGNS (Most Recent)  Temp: 98.3 °F (36.8 °C) (10/12/23 1158)  Pulse: (!) 112 (10/12/23 1158)  Resp: 20 (10/12/23 1158)  BP: (!) 149/87 (10/12/23 1158)  SpO2: (!) 94 % (10/12/23 1158)    VENTILATION STATUS  Resp: 20 (10/12/23 1158)  SpO2: (!) 94 % (10/12/23 1158)           I & O (Last 24H):  Intake/Output Summary (Last 24 hours) at 10/12/2023 1205  Last data filed at 10/12/2023  1000  Gross per 24 hour   Intake 1860 ml   Output 2200 ml   Net -340 ml       WEIGHTS  Wt Readings from Last 3 Encounters:   No data found for Wt       Physical Exam  Constitutional:       Appearance: He is ill-appearing. He is not toxic-appearing.   HENT:      Head: Normocephalic and atraumatic.      Mouth/Throat:      Mouth: Mucous membranes are moist.   Eyes:      Extraocular Movements: Extraocular movements intact.      Pupils: Pupils are equal, round, and reactive to light.      Comments: Conjunctiva heavily injected bilaterally   Neck:      Vascular: No carotid bruit.   Cardiovascular:      Rate and Rhythm: Normal rate and regular rhythm.      Heart sounds: No murmur heard.     No gallop.   Pulmonary:      Effort: Pulmonary effort is normal.      Breath sounds: Normal breath sounds.   Abdominal:      Tenderness: There is guarding.      Comments: Right upper quadrant pain present   Musculoskeletal:      Cervical back: No rigidity.      Right lower leg: No edema.      Left lower leg: No edema.   Skin:     Capillary Refill: Capillary refill takes less than 2 seconds.   Neurological:      General: No focal deficit present.      Mental Status: He is alert.   Psychiatric:         Mood and Affect: Mood normal.         HOME MEDICATIONS:  No current facility-administered medications on file prior to encounter.     Current Outpatient Medications on File Prior to Encounter   Medication Sig Dispense Refill    desvenlafaxine succinate (PRISTIQ) 50 MG Tb24 Take 50 mg by mouth once daily.      dextroamphetamine-amphetamine (ADDERALL) 20 mg tablet Take 20 mg by mouth 3 (three) times daily as needed.      docusate sodium (COLACE) 100 MG capsule Take 100 mg by mouth 2 (two) times daily.      sulfamethoxazole-trimethoprim 800-160mg (BACTRIM DS) 800-160 mg Tab Take by mouth 2 (two) times daily.         SCHEDULED MEDS:   enoxparin  30 mg Subcutaneous Q24H (prophylaxis, 1700)    pantoprazole  40 mg Oral Daily    zolpidem  10 mg  "Oral QHS       CONTINUOUS INFUSIONS:   sodium chloride 0.9% 150 mL/hr at 10/12/23 0807       PRN MEDS:acetaminophen, ALPRAZolam, cloNIDine, dextrose 10%, dextrose 10%, glucagon (human recombinant), glucose, glucose, hydrALAZINE, naloxone, ondansetron, sodium chloride 0.9%    LABS AND DIAGNOSTICS     CBC LAST 3 DAYS  Recent Labs   Lab 10/10/23  2149   WBC 12.19*   RBC 4.28*   HGB 13.6*   HCT 38.4*   MCV 89.7   MCH 31.8*   MCHC 35.4   RDW 13.0      MPV 9.6       COAGULATION LAST 3 DAYS  No results for input(s): "LABPT", "INR", "APTT" in the last 168 hours.    CHEMISTRY LAST 3 DAYS  Recent Labs   Lab 10/10/23  2149 10/11/23  0415 10/12/23  0940    134* 139   K 3.9 3.9 4.4   CO2 34* 35* 31*   BUN 47.0* 48.0* 64.0*   CREATININE 3.36* 3.42* 4.59*   CALCIUM 18.3* 17.0* 16.4*   MG 2.20 2.00  --    ALBUMIN 3.5  --  2.9*   ALKPHOS 86  --  69   ALT 79*  --  78*   AST 43*  --  57*   BILITOT 1.2  --  0.9       CARDIAC PROFILE LAST 3 DAYS  No results for input(s): "BNP", "CPK", "CPKMB", "LDH", "TROPONINI" in the last 168 hours.    ENDOCRINE LAST 3 DAYS  Recent Labs   Lab 10/10/23  2149   TSH 0.723       LAST ARTERIAL BLOOD GAS  ABG  No results for input(s): "PH", "PO2", "PCO2", "HCO3", "BE" in the last 168 hours.    LAST 7 DAYS MICROBIOLOGY   Microbiology Results (last 7 days)       ** No results found for the last 168 hours. **            MOST RECENT IMAGING  US Abdomen Complete  Narrative: EXAMINATION:  US ABDOMEN COMPLETE    CLINICAL HISTORY:  nausea michele;, .    COMPARISON:  None available    FINDINGS:  Multiple sonographic images reveal the pancreas to be suboptimally visualized. The visualized portions of the aorta and inferior vena cava are normal in size. The gallbladder demonstrates a prominent amount of biliary sludge.  The gallbladder wall is upper normal in thickness..  Arteaga sign is positive.  The  common bile duct is not dilated. The liver is normal in size. Hepatic parenchyma is grossly within normal " limits. No free fluid collection is seen. The kidneys are relatively symmetric in size. There is increased echogenicity of the kidneys bilaterally.  No hydronephrosis is identified. The spleen is normal in size.  Impression: 1. Suboptimal visualization of the pancreas  2. Biliary sludge gallbladder wall upper normal in thickness and positive sonographic Arteaga sign.  A hepatobiliary scan would allow further evaluation  3. Increased echogenicity of the kidneys bilaterally which is a nonspecific finding.  Clinical correlation is indicated    Electronically signed by: Isidro Watts  Date:    10/11/2023  Time:    14:59  XR Metastatic Survey  Narrative: EXAMINATION:  SKELETAL SURVEY:    CLINICAL HISTORY:  , hypercalciemia;    TECHNIQUE:  AP and lateral views were obtained of the axial skeleton as well as limited views of the left lower extremity and right upper extremity.    COMPARISON:  None available    FINDINGS:  Multiple images obtained of the axial skeleton and extremities.  Multiple small round low-attenuation foci are seen within the skull measuring up the 5 mm a too numerous to count.  Mild degenerative changes are noted to the spine.  Mild arthritic changes are evident at the acromioclavicular joints bilaterally.  No fracture or dislocation is seen.  Bony structures are mildly osteopenic.  Impression: 1. Scattered small defects at the calvarium measuring up to 5 mm too numerous to count.  2. Degene mild rative changes at the spine and extremities  3. MR exam and bone scan would allow further evaluation.    Electronically signed by: Isidro Watts  Date:    10/11/2023  Time:    10:44  CT Head Without Contrast  Narrative: EXAMINATION:  CT HEAD WITHOUT CONTRAST    CLINICAL HISTORY:  Mental status change, unknown cause;, .    TECHNIQUE:  PATIENT RADIATION DOSE: DLP(mGycm) 857    As per PQRS measures, all CT scans at this facility used dose modulation, iterative reconstruction, and/or weight based dose adjustment when  appropriate to reduce radiation dose to as low as reasonably achievable.    COMPARISON:  None available.    FINDINGS:  Serial axial images were obtained of the head without the administration of IV contrast. Both brain and bone parenchymal windows were obtained.  Additional coronal and sagittal reconstructions were obtained.  Ventricles, cisterns, and sulci are within normal limits.  There is no evidence of intracranial hemorrhage, midline shift, mass effect, or abnormal extra-axial fluid collections.  Cerebellar tonsils extend caudally to the level of foramen magnum.  There is mild scattered mucosal thickening at the ethmoid sinuses.  Mastoid air cells are aerated bilaterally.  External auditory canals are grossly patent  Impression: 1. No acute intracranial abnormality identified  2. Mild mucosal thickening at the ethmoid sinuses.    Electronically signed by: Isidro Watts  Date:    10/11/2023  Time:    10:33  X-Ray Chest 1 View  Narrative: EXAMINATION:  XR CHEST 1 VIEW    CLINICAL HISTORY:  michele;, .    COMPARISON:  None available    FINDINGS:  An AP view or more reveals the heart to be normal in size.  The trachea is midline.  No infiltrate or effusion is seen.  Bony structures appear grossly intact.  Impression: 1. No active cardiopulmonary disease identified    Electronically signed by: Isidro Watts  Date:    10/11/2023  Time:    08:40      ECHOCARDIOGRAM RESULTS (last 5)  No results found for this or any previous visit.      CURRENT/PREVIOUS VISIT EKG  Results for orders placed or performed during the hospital encounter of 10/10/23   EKG 12-lead    Collection Time: 10/10/23 10:04 PM    Narrative    Test Reason : R07.9,    Vent. Rate : 093 BPM     Atrial Rate : 093 BPM     P-R Int : 182 ms          QRS Dur : 094 ms      QT Int : 334 ms       P-R-T Axes : 072 014 049 degrees     QTc Int : 415 ms    Normal sinus rhythm  Nonspecific T wave abnormality  Abnormal ECG  No previous ECGs available  Confirmed by Inessa  Dougie MAYA ([id number]) on 10/11/2023 9:01:59 PM    Referred By: HEVER DIAZ           Confirmed By:Dougie Wylie MD           ASSESSMENT/PLAN:     Active Hospital Problems    Diagnosis    *DOUG (acute kidney injury)    Hypercalcemia    Transaminitis       ASSESSMENT & PLAN:   43-year-old admitted with nausea vomiting severe hypercalcemia and DOUG.  He is not anemic but he might be volume contracted he does not have an elevated globulin level.  However, we should do a blood cell dyscrasia workup.  It is noted that he has a skeletal series ordered and some labs are pending.  Would like to do quantitative immunoglobulins as well as serum kappa and lambda levels urine protein electrophoresis for the same      RECOMMENDATIONS:  .  Avoidance of nephrotoxins given the elevation of calcium would like to give him pamidronate 60 mg over 2 hours time will and begin entire workup.        Ty Brown MD  Department of Nephrology  Date of Service: 10/11/2023  10:AM

## 2023-10-12 NOTE — PROGRESS NOTES
Inpatient Nutrition Assessment    Admit Date: 10/10/2023   Total duration of encounter: 2 days     Nutrition Recommendation/Prescription     Pt will likely benefit from a Renal, Non-Dialysis Diet when able to lift NPO status and advance diet secondary to abnormal Renal indices.   Pt will likely need an Oral Nutrition supplement when able to lift NPO status.   Obtain recent weight. No recent weight in chart. Notified nursing.   Monitor NPO status, weight, and labs.       RD following and available as needed. Thank you.     Communication of Recommendations: reviewed with nurse and EMR.    Nutrition Assessment     Malnutrition Assessment/Nutrition-Focused Physical Exam    Malnutrition in the context of acute illness or injury  Degree of Malnutrition: unable to complete  Energy Intake: unable to obtain  Interpretation of Weight Loss: unable to obtain No weights in chart. Pt states UBW of 240# (109 kg)  Body Fat:does not meet criteria  Area of Body Fat Loss: orbital region   Muscle Mass Loss: mild depletion  Area of Muscle Mass Loss: unable to obtain  Fluid Accumulation: unable to obtain  Edema: unable to obtain   Reduced  Strength: unable to obtain  A minimum of two characteristics is recommended for diagnosis of either severe or non-severe malnutrition.    Chart Review    Reason Seen: continuous nutrition monitoring and malnutrition screening tool (MST) for decreased appetite.     Malnutrition Screening Tool Results   Have you recently lost weight without trying?: No  Have you been eating poorly because of a decreased appetite?: Yes   MST Score: 1   Diagnosis:   *DOUG (acute kidney injury)    Hypercalcemia    Transaminitis       Relevant Medical History:   Anxiety.     Nutrition-Related Medications:   IV Fluids: 0.9%NaCl@150mL/hr.   Protonix.       Calorie Containing IV Medications: no significant kcals from medications at this time    Nutrition-Related Labs:  10/12: CO2 31(H); BUN/Crea 64.0/4.59(H); GFR 15(L); Ca+  "16.4(H); Alb 2.9(L); AST/ALT 57/78(H);   10/11: Phos 5.6(L).   10/10: WBC 12.19(H); H/H 13.6/38.4(L).     Diet/PN Order: Diet NPO  Oral Supplement Order: none  Tube Feeding Order: none  Appetite/Oral Intake: NPO/NPO  Factors Affecting Nutritional Intake: abdominal pain, decreased appetite, nausea, and NPO  Food/Mu-ism/Cultural Preferences: none reported  Food Allergies: none reported    Wound(s):       Last Bowel Movement: 10/11/23    Comments    10/12: Pt admitted for abnormal labs with a calcium which had doubled within the last 2 months. Also reports decreased appetite, along with polyuria, polydipsia, and extreme fatigue and malaise. Currently NPO for testing. HIDA Scan planned for tomorrow. No weight in EMR. Notified nursing and nursing will obtain recent weight. Pt is very ill appearing. He tells me he usually weighs around 240# (109 kg). C/O thirst and asking for water. States he is still nauseated and has been urinating a lot today. Nursing will see if pt is allowed to have water. Labs and meds reviewed. Ca+ remains elevated. Renal indices worsening from admit. Nephrologist following. Will continue to monitor during stay.     Anthropometrics    Height: 5' 9.02" (175.3 cm) Height Method: Stated  Last         BMI Classification: not applicable        Ideal Body Weight (IBW), Male: 160.12 lb                       Usual Body Weight (UBW), k kg  % Usual Body Weight: 0     Usual Weight Provided By: patient    Wt Readings from Last 5 Encounters:   No data found for Wt     Weight Change(s) Since Admission:  Admit    10/12: No recorded weight in chart. Pt reports UBW of 240# (109 kg).     Estimated Needs    Weight Used For Calorie Calculations: 73 kg (160 lb 15 oz) (No recent weight in chart at this time. IBW used to estimate needs.)  Energy Calorie Requirements (kcal): 1900 kcals/day (SF 1.2).  Energy Need Method: [facility]  Weight Used For Protein Calculations: 73 kg (160 lb 15 oz)  Protein " Requirements: 50 g/day (0.7 g/kg/IBW).  Fluid Requirements (mL): 1900 mL/day (1mL/kcal).  Temp (24hrs), Av.8 °F (37.1 °C), Min:97.4 °F (36.3 °C), Max:102.2 °F (39 °C)       Enteral Nutrition    Patient not receiving enteral nutrition at this time.    Parenteral Nutrition    Patient not receiving parenteral nutrition support at this time.    Evaluation of Received Nutrient Intake    Calories: not meeting estimated needs  Protein: not meeting estimated needs    Patient Education    Not applicable.    Nutrition Diagnosis     PES: Altered nutrition-related laboratory values related to unknown etiology as evidenced by Ca+ 16.4(H); BUN/Crea 64.0/4.59(H); GFR15(L);Phos 5.6(L). (new)    Interventions/Goals     Intervention(s): collaboration with other providers  Goal:  Meet >/=75% Estimated Energy Needs by D/C.  (new)    Monitoring & Evaluation     Dietitian will monitor energy intake, weight, weight change, electrolyte/renal panel, glucose/endocrine profile, gastrointestinal profile, and NPO status .  Nutrition Risk/Follow-Up: high (follow-up in 1-4 days)   Please consult if re-assessment needed sooner.

## 2023-10-12 NOTE — PROGRESS NOTES
[facility]                     [address] [address]    PATIENT NAME:       LAMBERTO MO   YOB: 1980  CSN:                [id number]   MRN:                [id number]  ADMIT DATE:         10/10/2023 21:18:00  PHYSICIAN:          Eric Kohli MD                            PROGRESS NOTE    DATE:      CODE STATUS:  Full code.    SUBJECTIVE:  The patient was admitted yesterday for abnormal lab with a calcium   which had doubled within the last 2 months from normal of 8-19.  He also had   polyuria, polydipsia, and extreme fatigue and malaise.  Dr. Brown has been   consulted, and he got a dose of 60 mg of pamidronate, which is Aredia.  The   parathyroid level was normal, so it is not hyperparathyroidism which is the   cause.  Of all the radioimaging done, the right upper quadrant pain was   pertinent for Arteaga sign being positive on the ultrasound, but no   cholelithiasis or signs of cholecystitis.  HIDA scan is due tomorrow.  The   patient's calcaneum showed that he has small less than 5-mm multiple translucent   areas for which an MRI of the head will be done tomorrow.  Urine and serum   electrophoresis is in progress.  Bence-Ji protein has been ordered.  I have   also ordered an MRI of the head as recommended by the radiologist, as well as a   nuclear bone scan of the whole body.  The patient had complaints of insomnia.    Zolpidem 10 mg will be started.  Xanax 0.5 mg p.r.n. q.6 hours has been added   for anxiety.    OBJECTIVE:  VITAL SIGNS:  As far as patient's vitals are concerned, they are as   follows; 160/87 blood pressure, 98.3 temperature, 97% oxygen saturation, 86 is   the pulse.  HEENT:  Head is normocephalic.  Pupils are of equal size.  Mild conjunctival   pallor.  No scleral icterus.  Trachea is midline.  CHEST:  Good bilateral air entry.  CVS:  First and second heart sounds are heard normally  without any murmurs.  ABDOMEN:  Soft, nontender.  EXTREMITIES:  Devoid of edema, cyanosis, or clubbing.    LABS AND INVESTIGATIONS:  Sodium 134, potassium 3.9, chloride 91, bicarb 48,   creatinine for 3.42, calcium 17.0, phosphorus 5.6, magnesium 2.0.  Yesterday,   the calcium was 18.3, parathyroid level is low at 8.2.  TSH is normal at 0.723.    CAT scan of the head without contrast:  No acute intracranial abnormality.  Mild   mucosal thickening of the ethmoid sinuses.    Chest x-ray:  Showed no active cardiopulmonary disease.    Metastatic bone survey showed scattered small defects at the calvarium measuring   up to 5 mm, too numerous to count degenerative mild native changes of the spine   and extremities.  MR examination and bone scan would allow further evaluation.    Ultrasound of the abdomen showed biliary sludge, gallbladder wall upper normal   thickness and positive sonographic Arteaga sign.  A HIDA scan would be of further   evaluation.  Increased echogenicity of the kidneys bilaterally which is   nonspecific.    IMPRESSION:    1. Hypercalcemia, not secondary to hyperparathyroidism, and an abnormal   calvarium for scattered small defects up to 5 mm.  MRI of the head awaited.  2. Multiple myeloma, strong differential diagnosis.  3. Mild hypertension, most likely secondary to the IV infusion of normal saline.  4. Generalized fatigue and weakness, slightly better but still continues.  5. Abnormal ultrasound for positive Arteaga's sign.  HIDA scan awaited tomorrow.    PLAN:  To continue the present line of treatment.  Follow the levels of calcium.    The patient got a dose of pamidronate.  We will continue to monitor the blood   pressure.  If needed, we will add the calcium-channel blocker.  Follow the   results of the MRI of the skull, whole-body nuclear scan, as well as HIDA scan   tomorrow.  I will also place a Hematology/Oncology consult.  Bence-Ji protein   has been ordered.  Await for the results of  protein and urine electrophoresis.    Pleasure taking care of Pete Fernandez during his stay at [facility] on the 3rd floor.        ______________________________  MD LAZARO Weller/MARKUS  DD:  10/11/2023  Time:  08:42PM  DT:  10/11/2023  Time:  10:05PM  Job #:  [id number]      PROGRESS NOTE

## 2023-10-12 NOTE — CONSULTS
[facility]- Oncology Acute  Hematology/Oncology  Consult Note    Patient Name: Pete Fernandez  MRN: [id number]  Admission Date: 10/10/2023  Hospital Length of Stay: 2 days  Attending Provider: Eric Kohli MD  Consulting Provider: Elizabeth K Lejeune, MD  Principal Problem:DOUG (acute kidney injury)    Inpatient consult to Hematology/Oncology  Consult performed by: Lejeune, Elizabeth Kennedy, MD  Consult ordered by: Eric Kohli MD        Subjective:     HPI:   44yo M presented to [facility] on 10/11 for hypercalcemia and extreme weakness. Labs notable for new severe DOUG, transaminitis, and Ca of 18. He was admitted and started on IVF resuscitation.  Hypercalcemia workup was initiated, notable for PTH suppressed at 8.2.   Xray Survey was done which showed small 5mm calvarium defects. Follow up NM Bone scan and MRI Brain with no evidence of lytic lesions. Xray survey is known for high false positive rates, especially to the calvarium. Oncology has been consulted for further evaluation. An SPEP in currently in process.     Oncology Treatment Plan:   [No matching plan found]    Medications:  Continuous Infusions:   sodium chloride 0.9% 150 mL/hr at 10/12/23 0807     Scheduled Meds:   enoxparin  30 mg Subcutaneous Q24H (prophylaxis, 1700)    pantoprazole  40 mg Oral Daily    zolpidem  10 mg Oral QHS     PRN Meds:acetaminophen, ALPRAZolam, cloNIDine, dextrose 10%, dextrose 10%, glucagon (human recombinant), glucose, glucose, hydrALAZINE, naloxone, ondansetron, sodium chloride 0.9%     Review of patient's allergies indicates:  No Known Allergies     Past Medical History:   Diagnosis Date    Anxiety disorder, unspecified      Past Surgical History:   Procedure Laterality Date    TONSILLECTOMY       Family History       Problem Relation (Age of Onset)    Breast cancer Mother    Pancreatic cancer Father          Tobacco Use    Smoking status: Never    Smokeless tobacco: Never   Substance and Sexual Activity     Alcohol use: Yes    Drug use: Never    Sexual activity: Not on file       Review of Systems   Constitutional:  Positive for activity change and fatigue. Negative for chills.   HENT:  Negative for sore throat.    Eyes:  Negative for visual disturbance.   Respiratory:  Negative for shortness of breath.    Cardiovascular:  Negative for chest pain.   Gastrointestinal:  Positive for abdominal pain, constipation and nausea. Negative for diarrhea and vomiting.   Endocrine: Negative for polyuria.   Genitourinary:  Negative for dysuria.   Musculoskeletal:  Negative for back pain.   Skin:  Negative for wound.   Neurological:  Negative for headaches.   Hematological:  Does not bruise/bleed easily.   Psychiatric/Behavioral:  Negative for confusion.      Objective:     Vital Signs (Most Recent):  Temp: 98.3 °F (36.8 °C) (10/12/23 1158)  Pulse: (!) 112 (10/12/23 1158)  Resp: 20 (10/12/23 1158)  BP: (!) 149/87 (10/12/23 1158)  SpO2: (!) 94 % (10/12/23 1158) Vital Signs (24h Range):  Temp:  [97.4 °F (36.3 °C)-102.2 °F (39 °C)] 98.3 °F (36.8 °C)  Pulse:  [] 112  Resp:  [18-20] 20  SpO2:  [92 %-97 %] 94 %  BP: (149-163)/(85-93) 149/87        There is no height or weight on file to calculate BMI.  There is no height or weight on file to calculate BSA.      Intake/Output Summary (Last 24 hours) at 10/12/2023 1538  Last data filed at 10/12/2023 1000  Gross per 24 hour   Intake 1620 ml   Output 2200 ml   Net -580 ml       Physical Exam  Constitutional:       General: He is not in acute distress.     Appearance: Normal appearance. He is not ill-appearing.   HENT:      Head: Normocephalic and atraumatic.      Nose: Nose normal.      Mouth/Throat:      Mouth: Mucous membranes are moist.      Pharynx: Oropharynx is clear.   Eyes:      Extraocular Movements: Extraocular movements intact.      Conjunctiva/sclera: Conjunctivae normal.      Pupils: Pupils are equal, round, and reactive to light.   Cardiovascular:      Rate and Rhythm:  Normal rate and regular rhythm.      Pulses: Normal pulses.      Heart sounds: Normal heart sounds. No murmur heard.  Pulmonary:      Effort: Pulmonary effort is normal. No respiratory distress.      Breath sounds: Normal breath sounds.   Abdominal:      General: There is no distension.      Palpations: Abdomen is soft.      Tenderness: There is no abdominal tenderness.   Musculoskeletal:         General: Normal range of motion.      Cervical back: Normal range of motion and neck supple.      Right lower leg: No edema.      Left lower leg: No edema.   Lymphadenopathy:      Cervical: No cervical adenopathy.   Skin:     General: Skin is warm and dry.   Neurological:      General: No focal deficit present.      Mental Status: He is alert and oriented to person, place, and time.         Significant Labs:   CBC:   Recent Labs   Lab 10/10/23  2149   WBC 12.19*   HGB 13.6*   HCT 38.4*       and CMP:   Recent Labs   Lab 10/10/23  2149 10/11/23  0415 10/12/23  0940    134* 139   K 3.9 3.9 4.4   CO2 34* 35* 31*   BUN 47.0* 48.0* 64.0*   CREATININE 3.36* 3.42* 4.59*   CALCIUM 18.3* 17.0* 16.4*   ALBUMIN 3.5  --  2.9*   BILITOT 1.2  --  0.9   ALKPHOS 86  --  69   AST 43*  --  57*   ALT 79*  --  78*       Diagnostic Results:    10/11/23 Xray Metastatic Survey  1. Scattered small defects at the calvarium measuring up to 5 mm too numerous to count.  2. Degene mild rative changes at the spine and extremities  3. MR exam and bone scan would allow further evaluation.    10/12/23 MRI Brain  1. Motion artifact  2. Mild scattered mucosal thickening throughout the paranasal sinuses  3. Somewhat limited evaluation of brain metastases without the administration of IV contrast  4. Otherwise unremarkable MR exam of the head without the administration of IV contrast    10/12/23 NM Bone Scan  1. Abnormal intense increased activity throughout the lungs of uncertain etiology.  This may related to hyperparathyroidism, hematologic  malignancy, and or metabolic abnormality such as renal failure, vasculitis, or pulmonary infection.  Chest x-ray 10/10/2023 reveals the lungs to be relatively clear and well aerated.  2. Suspect scattered mild degenerative changes throughout the joint spaces  3. Increased activity nasal maxillary region suggesting mucoperiosteal disease  4. Mild activity at the stomach suggesting free pertechnetate  Assessment/Plan:     Hypercalcemia  Severe DOUG  Possible calvarium lesions on Xray    - Recommend calcitonin administration for Hyperca. Initial dose 4u/kg.   - Continue aggressive IVF administration, watching for signs of volume overload  - CT CAP w/contrast when renal function allows  - Follow up pending multiple myeloma workup, I have added further labs for completeness. It would be strange for such drastic change in labs in <2 months to be due to MM but will be still appropriate to rule this out.     Thank you for your consult. Should the patient need further oncology evaluation emergently he will need to be transferred to [facility]. Otherwise we are available for call recommendations throughout the weekend and my partner Dr. Villar will be here on Tuesday.     Elizabeth K Lejeune, MD  Hematology/Oncology  [facility] - Oncology Acute

## 2023-10-12 NOTE — PLAN OF CARE
Problem: Adult Inpatient Plan of Care  Goal: Plan of Care Review  Outcome: Ongoing, Progressing  Flowsheets (Taken 10/12/2023 1813)  Plan of Care Reviewed With: patient  Goal: Patient-Specific Goal (Individualized)  Outcome: Ongoing, Progressing  Goal: Absence of Hospital-Acquired Illness or Injury  Outcome: Ongoing, Progressing  Intervention: Identify and Manage Fall Risk  Flowsheets (Taken 10/12/2023 1813)  Safety Promotion/Fall Prevention:   assistive device/personal item within reach   in recliner, wheels locked   high risk medications identified   lighting adjusted   medications reviewed   muscle strengthening facilitated   nonskid shoes/socks when out of bed   side rails raised x 2  Intervention: Prevent Skin Injury  Flowsheets (Taken 10/12/2023 1813)  Body Position:   position changed independently   position maintained  Skin Protection:   adhesive use limited   incontinence pads utilized  Intervention: Prevent and Manage VTE (Venous Thromboembolism) Risk  Flowsheets (Taken 10/12/2023 1813)  Activity Management:   Ambulated in room - L4   Ambulated to bathroom - L4  VTE Prevention/Management:   remove, assess skin, and reapply sequential compression device   ambulation promoted   ROM (active) performed  Range of Motion: active ROM (range of motion) encouraged  Intervention: Prevent Infection  Flowsheets (Taken 10/12/2023 1813)  Infection Prevention:   cohorting utilized   environmental surveillance performed   equipment surfaces disinfected   hand hygiene promoted   personal protective equipment utilized   rest/sleep promoted   single patient room provided  Goal: Optimal Comfort and Wellbeing  Outcome: Ongoing, Progressing  Intervention: Monitor Pain and Promote Comfort  Flowsheets (Taken 10/12/2023 1813)  Pain Management Interventions:   care clustered   prescribed exercises encouraged   quiet environment facilitated   relaxation techniques promoted  Intervention: Provide Person-Centered Care  Flowsheets  (Taken 10/12/2023 1813)  Trust Relationship/Rapport:   care explained   questions encouraged   thoughts/feelings acknowledged  Goal: Readiness for Transition of Care  Outcome: Ongoing, Progressing     Problem: Fluid and Electrolyte Imbalance (Acute Kidney Injury/Impairment)  Goal: Fluid and Electrolyte Balance  Outcome: Ongoing, Progressing  Intervention: Monitor and Manage Fluid and Electrolyte Balance  Flowsheets (Taken 10/12/2023 1813)  Fluid/Electrolyte Management: fluids provided     Problem: Oral Intake Inadequate (Acute Kidney Injury/Impairment)  Goal: Optimal Nutrition Intake  Outcome: Ongoing, Progressing  Intervention: Promote and Optimize Nutrition  Flowsheets (Taken 10/12/2023 1813)  Oral Nutrition Promotion: rest periods promoted     Problem: Renal Function Impairment (Acute Kidney Injury/Impairment)  Goal: Effective Renal Function  Outcome: Ongoing, Progressing  Intervention: Monitor and Support Renal Function  Flowsheets (Taken 10/12/2023 1813)  Medication Review/Management: medications reviewed     Problem: Confusion Acute  Goal: Optimal Cognitive Function  Outcome: Ongoing, Progressing  Intervention: Minimize Contributing Factors  Flowsheets (Taken 10/12/2023 1813)  Sensory Stimulation Regulation:   care clustered   television on     Problem: Electrolyte Imbalance  Goal: Electrolyte Balance  Outcome: Ongoing, Progressing  Intervention: Monitor and Manage Electrolyte Imbalance  Flowsheets (Taken 10/12/2023 1813)  Fluid/Electrolyte Management: fluids provided     Problem: Activity and Energy Impairment (Anxiety Signs/Symptoms)  Goal: Optimized Energy Level (Anxiety Signs/Symptoms)  Outcome: Ongoing, Progressing  Flowsheets (Taken 10/12/2023 1813)  Mutually Determined Action Steps (Optimized Energy Level): grooms self without prompting  Intervention: Optimize Energy Level  Flowsheets (Taken 10/12/2023 1813)  Activity (Behavioral Health):   sitting, edge of bed   up ad scar  Diversional Activity:  television  Patient Performed Hygiene: bath, complete     Problem: Cognitive Impairment (Anxiety Signs/Symptoms)  Goal: Optimized Cognitive Function (Anxiety Signs/Symptoms)  Outcome: Ongoing, Progressing     Problem: Mood Impairment (Anxiety Signs/Symptoms)  Goal: Improved Mood Symptoms (Anxiety Signs/Symptoms)  Outcome: Ongoing, Progressing  Flowsheets (Taken 10/12/2023 1813)  Mutually Determined Action Steps (Improved Mood Symptoms): adheres to medication regimen  Intervention: Optimize Emotion and Mood  Flowsheets (Taken 10/12/2023 1813)  Supportive Measures:   relaxation techniques promoted   self-reflection promoted   self-responsibility promoted   self-care encouraged     Problem: Sleep Impairment (Anxiety Signs/Symptoms)  Goal: Improved Sleep (Anxiety Signs/Symptoms)  Outcome: Ongoing, Progressing  Intervention: Promote Healthy Sleep Hygiene  Flowsheets (Taken 10/12/2023 1813)  Sleep Hygiene Promotion:   noise level reduced   regular sleep pattern promoted   relaxation techniques promoted     Problem: Social, Occupational or Functional Impairment (Anxiety Signs/Symptoms)  Goal: Enhanced Social, Occupational or Functional Skills (Anxiety Signs/Symptoms)  Outcome: Ongoing, Progressing  Intervention: Promote Social, Occupational and Functional Ability  Flowsheets (Taken 10/12/2023 1813)  Social Functional Ability Promotion: self-expression encouraged  Trust Relationship/Rapport:   care explained   questions encouraged   thoughts/feelings acknowledged     Problem: Somatic Disturbance (Anxiety Signs/Symptoms)  Goal: Improved Somatic Symptoms (Anxiety Signs/Symptoms)  Outcome: Ongoing, Progressing  Flowsheets (Taken 10/12/2023 1813)  Mutually Determined Action Steps (Improved Somatic Symptoms): uses breathing techniques     Problem: Behavioral Health Comorbidity  Goal: Maintenance of Behavioral Health Symptom Control  Outcome: Ongoing, Progressing  Intervention: Maintain Behavioral Health Symptom  Control  Flowsheets (Taken 10/12/2023 1813)  Medication Review/Management: medications reviewed

## 2023-10-13 ENCOUNTER — HOSPITAL ENCOUNTER (INPATIENT)
Facility: HOSPITAL | Age: 43
LOS: 8 days | Discharge: HOME | DRG: 640 | End: 2023-10-21
Attending: INTERNAL MEDICINE | Admitting: INTERNAL MEDICINE
Payer: COMMERCIAL

## 2023-10-13 VITALS
HEART RATE: 97 BPM | BODY MASS INDEX: 35.64 KG/M2 | SYSTOLIC BLOOD PRESSURE: 142 MMHG | OXYGEN SATURATION: 91 % | WEIGHT: 240.63 LBS | RESPIRATION RATE: 18 BRPM | TEMPERATURE: 98 F | HEIGHT: 69 IN | DIASTOLIC BLOOD PRESSURE: 90 MMHG

## 2023-10-13 DIAGNOSIS — C90.00 MULTIPLE MYELOMA, REMISSION STATUS UNSPECIFIED: ICD-10-CM

## 2023-10-13 DIAGNOSIS — E83.52 HYPERCALCEMIA: Primary | ICD-10-CM

## 2023-10-13 LAB
ALBUMIN SERPL-MCNC: 2.6 G/DL (ref 3.5–5)
ALBUMIN/GLOB SERPL: 0.8 RATIO (ref 1.1–2)
ALP SERPL-CCNC: 60 UNIT/L (ref 40–150)
ALT SERPL-CCNC: 88 UNIT/L (ref 0–55)
AST SERPL-CCNC: 67 UNIT/L (ref 5–34)
B2 MICROGLOB SERPL-MCNC: 18.27 MG/L (ref 0.97–2.64)
BILIRUB SERPL-MCNC: 0.9 MG/DL
BUN SERPL-MCNC: 70 MG/DL (ref 8.9–20.6)
CA-I ADJ PH7.4 SERPL ISE-MCNC: NORMAL MG/DL
CALCIUM SERPL-MCNC: 14.3 MG/DL (ref 8.4–10.2)
CHLORIDE SERPL-SCNC: 96 MMOL/L (ref 98–107)
CO2 SERPL-SCNC: 29 MMOL/L (ref 22–29)
CREAT SERPL-MCNC: 4.93 MG/DL (ref 0.73–1.18)
GFR SERPLBLD CREATININE-BSD FMLA CKD-EPI: 14 MLS/MIN/1.73/M2
GLOBULIN SER-MCNC: 3.1 GM/DL (ref 2.4–3.5)
GLUCOSE SERPL-MCNC: 102 MG/DL (ref 74–100)
MAGNESIUM SERPL-MCNC: 2 MG/DL (ref 1.6–2.6)
PH SERPL: NORMAL [PH]
PHOSPHATE SERPL-MCNC: 4 MG/DL (ref 2.3–4.7)
POTASSIUM SERPL-SCNC: 4.1 MMOL/L (ref 3.5–5.1)
PROT SERPL-MCNC: 5.7 GM/DL (ref 6.4–8.3)
PTH RELATED PROT SERPL-SCNC: NORMAL PMOL/L
SODIUM SERPL-SCNC: 134 MMOL/L (ref 136–145)

## 2023-10-13 PROCEDURE — 94761 N-INVAS EAR/PLS OXIMETRY MLT: CPT

## 2023-10-13 PROCEDURE — 63600175 PHARM REV CODE 636 W HCPCS: Performed by: INTERNAL MEDICINE

## 2023-10-13 PROCEDURE — 80053 COMPREHEN METABOLIC PANEL: CPT | Performed by: INTERNAL MEDICINE

## 2023-10-13 PROCEDURE — 82330 ASSAY OF CALCIUM: CPT | Performed by: INTERNAL MEDICINE

## 2023-10-13 PROCEDURE — 21400001 HC TELEMETRY ROOM

## 2023-10-13 PROCEDURE — 83735 ASSAY OF MAGNESIUM: CPT | Performed by: INTERNAL MEDICINE

## 2023-10-13 PROCEDURE — 25000003 PHARM REV CODE 250: Performed by: INTERNAL MEDICINE

## 2023-10-13 PROCEDURE — 84100 ASSAY OF PHOSPHORUS: CPT | Performed by: INTERNAL MEDICINE

## 2023-10-13 PROCEDURE — 11000001 HC ACUTE MED/SURG PRIVATE ROOM

## 2023-10-13 PROCEDURE — 63600175 PHARM REV CODE 636 W HCPCS: Mod: JG | Performed by: INTERNAL MEDICINE

## 2023-10-13 RX ORDER — ALPRAZOLAM 0.5 MG/1
0.5 TABLET ORAL EVERY 6 HOURS PRN
Status: DISCONTINUED | OUTPATIENT
Start: 2023-10-13 | End: 2023-10-15

## 2023-10-13 RX ORDER — ENOXAPARIN SODIUM 100 MG/ML
30 INJECTION SUBCUTANEOUS EVERY 24 HOURS
Status: DISCONTINUED | OUTPATIENT
Start: 2023-10-14 | End: 2023-10-17

## 2023-10-13 RX ORDER — TALC
6 POWDER (GRAM) TOPICAL NIGHTLY PRN
Status: DISCONTINUED | OUTPATIENT
Start: 2023-10-13 | End: 2023-10-21 | Stop reason: HOSPADM

## 2023-10-13 RX ORDER — SODIUM CHLORIDE 0.9 % (FLUSH) 0.9 %
10 SYRINGE (ML) INJECTION
Status: DISCONTINUED | OUTPATIENT
Start: 2023-10-13 | End: 2023-10-21 | Stop reason: HOSPADM

## 2023-10-13 RX ORDER — SODIUM CHLORIDE 9 MG/ML
INJECTION, SOLUTION INTRAVENOUS CONTINUOUS
Status: DISCONTINUED | OUTPATIENT
Start: 2023-10-13 | End: 2023-10-21 | Stop reason: HOSPADM

## 2023-10-13 RX ORDER — PANTOPRAZOLE SODIUM 40 MG/1
40 TABLET, DELAYED RELEASE ORAL DAILY
Status: DISCONTINUED | OUTPATIENT
Start: 2023-10-14 | End: 2023-10-21 | Stop reason: HOSPADM

## 2023-10-13 RX ORDER — ZOLPIDEM TARTRATE 5 MG/1
10 TABLET ORAL NIGHTLY
Status: DISCONTINUED | OUTPATIENT
Start: 2023-10-13 | End: 2023-10-21 | Stop reason: HOSPADM

## 2023-10-13 RX ORDER — ACETAMINOPHEN 325 MG/1
650 TABLET ORAL EVERY 6 HOURS PRN
Status: DISCONTINUED | OUTPATIENT
Start: 2023-10-13 | End: 2023-10-21 | Stop reason: HOSPADM

## 2023-10-13 RX ORDER — CALCITONIN SALMON 200 [USP'U]/ML
4 INJECTION, SOLUTION INTRAMUSCULAR; SUBCUTANEOUS EVERY 12 HOURS
Status: COMPLETED | OUTPATIENT
Start: 2023-10-13 | End: 2023-10-14

## 2023-10-13 RX ORDER — CLONIDINE HYDROCHLORIDE 0.1 MG/1
0.1 TABLET ORAL EVERY 8 HOURS PRN
Status: DISCONTINUED | OUTPATIENT
Start: 2023-10-13 | End: 2023-10-21 | Stop reason: HOSPADM

## 2023-10-13 RX ADMIN — CALCITONIN SALMON 438 UNITS: 200 INJECTION, SOLUTION INTRAMUSCULAR; SUBCUTANEOUS at 11:10

## 2023-10-13 RX ADMIN — CLONIDINE HYDROCHLORIDE 0.1 MG: 0.1 TABLET ORAL at 10:10

## 2023-10-13 RX ADMIN — SODIUM CHLORIDE: 9 INJECTION, SOLUTION INTRAVENOUS at 10:10

## 2023-10-13 RX ADMIN — Medication 6 MG: at 10:10

## 2023-10-13 RX ADMIN — SODIUM CHLORIDE: 9 INJECTION, SOLUTION INTRAVENOUS at 06:10

## 2023-10-13 RX ADMIN — PANTOPRAZOLE SODIUM 40 MG: 40 TABLET, DELAYED RELEASE ORAL at 08:10

## 2023-10-13 RX ADMIN — ENOXAPARIN SODIUM 30 MG: 30 INJECTION SUBCUTANEOUS at 05:10

## 2023-10-13 RX ADMIN — ACETAMINOPHEN 650 MG: 325 TABLET, FILM COATED ORAL at 10:10

## 2023-10-13 RX ADMIN — SODIUM CHLORIDE: 9 INJECTION, SOLUTION INTRAVENOUS at 12:10

## 2023-10-13 RX ADMIN — ACETAMINOPHEN 650 MG: 325 TABLET, FILM COATED ORAL at 08:10

## 2023-10-13 RX ADMIN — ACETAMINOPHEN 650 MG: 325 TABLET, FILM COATED ORAL at 02:10

## 2023-10-13 NOTE — PLAN OF CARE
Problem: Adult Inpatient Plan of Care  Goal: Plan of Care Review  10/13/2023 0054 by Cherri Sparks RN  Outcome: Ongoing, Progressing  10/13/2023 0052 by Cherri Sparks RN  Outcome: Ongoing, Progressing  Goal: Patient-Specific Goal (Individualized)  10/13/2023 0054 by Cherri Sparks RN  Outcome: Ongoing, Progressing  10/13/2023 0052 by Cherri Sparks RN  Outcome: Ongoing, Progressing  Goal: Absence of Hospital-Acquired Illness or Injury  10/13/2023 0054 by Cherri Sparks RN  Outcome: Ongoing, Progressing  10/13/2023 0052 by Cherri Sparks RN  Outcome: Ongoing, Progressing  Goal: Optimal Comfort and Wellbeing  10/13/2023 0054 by Cherri Sparks RN  Outcome: Ongoing, Progressing  10/13/2023 0052 by Cherri Sparks RN  Outcome: Ongoing, Progressing  Goal: Readiness for Transition of Care  10/13/2023 0054 by Cherri Sparks RN  Outcome: Ongoing, Progressing  10/13/2023 0052 by Cherri Sparks RN  Outcome: Ongoing, Progressing     Problem: Fluid and Electrolyte Imbalance (Acute Kidney Injury/Impairment)  Goal: Fluid and Electrolyte Balance  10/13/2023 0054 by Cherri Sparks RN  Outcome: Ongoing, Progressing  10/13/2023 0052 by Cherri Sparks RN  Outcome: Ongoing, Progressing     Problem: Oral Intake Inadequate (Acute Kidney Injury/Impairment)  Goal: Optimal Nutrition Intake  10/13/2023 0054 by Cherri Sparks RN  Outcome: Ongoing, Progressing  10/13/2023 0052 by Cherri Sparks RN  Outcome: Ongoing, Progressing     Problem: Renal Function Impairment (Acute Kidney Injury/Impairment)  Goal: Effective Renal Function  10/13/2023 0054 by Cherri Sparks RN  Outcome: Ongoing, Progressing  10/13/2023 0052 by Cherri Sparks RN  Outcome: Ongoing, Progressing

## 2023-10-13 NOTE — PLAN OF CARE
Problem: Electrolyte Imbalance  Goal: Electrolyte Balance  Outcome: Ongoing, Progressing     Problem: Mood Impairment (Anxiety Signs/Symptoms)  Goal: Improved Mood Symptoms (Anxiety Signs/Symptoms)  Outcome: Ongoing, Progressing     Problem: Sleep Impairment (Anxiety Signs/Symptoms)  Goal: Improved Sleep (Anxiety Signs/Symptoms)  Outcome: Ongoing, Progressing

## 2023-10-13 NOTE — PLAN OF CARE
10/13/23 1239   Discharge Assessment   Assessment Type Discharge Planning Assessment   Source of Information patient   People in Home significant other   Do you expect to return to your current living situation? Yes   Do you have help at home or someone to help you manage your care at home? Yes   Prior to hospitilization cognitive status: Alert/Oriented;No Deficits   Current cognitive status: Alert/Oriented;No Deficits   Equipment Currently Used at Home none   Do you currently have service(s) that help you manage your care at home? No   Do you take prescription medications? Yes   Do you have prescription coverage? Yes   Do you have any problems affording any of your prescribed medications? No   Is the patient taking medications as prescribed? yes   How do you get to doctors appointments? family or friend will provide   Are you on dialysis? No   Do you take coumadin? No   DME Needed Upon Discharge  none   Discharge Plan discussed with: Patient   Transition of Care Barriers None   Discharge Plan A Home with family   Discharge Plan B Home with family   Physical Activity   On average, how many days per week do you engage in moderate to strenuous exercise (like a brisk walk)? Patient refu   On average, how many minutes do you engage in exercise at this level? Patient refu   Financial Resource Strain   How hard is it for you to pay for the very basics like food, housing, medical care, and heating? Patient refu   Housing Stability   In the last 12 months, was there a time when you were not able to pay the mortgage or rent on time? [place]   In the last 12 months, was there a time when you did not have a steady place to sleep or slept in a shelter (including now)? [place]   Transportation Needs   In the past 12 months, has lack of transportation kept you from medical appointments or from getting medications? Patient refu   In the past 12 months, has lack of transportation kept you from meetings, work, or from getting things needed  for daily living? Patient refu   Food Insecurity   Within the past 12 months, you worried that your food would run out before you got the money to buy more. Patient refu   Within the past 12 months, the food you bought just didn't last and you didn't have money to get more. Patient refu   Stress   Do you feel stress - tense, restless, nervous, or anxious, or unable to sleep at night because your mind is troubled all the time - these days? Patient refu   Social Connections   In a typical week, how many times do you talk on the phone with family, friends, or neighbors? Patient refu   How often do you get together with friends or relatives? Patient refu   How often do you attend Muslim or Rastafarian services? Patient refu   Do you belong to any clubs or organizations such as Muslim groups, unions, fraternal or athletic groups, or school groups? Patient refu   How often do you attend meetings of the clubs or organizations you belong to? Patient refu   Are you , , , , never , or living with a partner? Patient refu   Alcohol Use   Q1: How often do you have a drink containing alcohol? Patient refu   Q2: How many drinks containing alcohol do you have on a typical day when you are drinking? Patient refu   Q3: How often do you have six or more drinks on one occasion? Patient refu

## 2023-10-13 NOTE — PROGRESS NOTES
[facility]                     [address] [id number]    PATIENT NAME:       LAMBERTO MO   YOB: 1980  CSN:                [id number]   MRN:                [id number]  ADMIT DATE:         10/10/2023 21:18:00  PHYSICIAN:          Eric Kohli MD                            PROGRESS NOTE    DATE:      CODE STATUS:  Full code.    SUBJECTIVE:  The patient was visited in the room and wife did complain to me   that the patient has some discoloration of the upper teeth.  They are yellowish   in color, not all of them, but 1 or 2, but both the times when I have gone, the   patient was sleeping.  I got most of the information from the patient's wife.    He has not had any specific complaint.  Dr. LeJeune, the heme-oncologist has   seen the patient, so also Dr. Brown and recommendations are being followed.    They have added some more blood tests to be done to complete the workup for   multiple myeloma.  The results of the electrophoresis are awaited.  The MRI of   the head was negative for the lesion, which was seen on the skeletal survey in   the calvarium, which is a welcome news, but his protein in the urine was more   than 5 g.  The calcitonin has been added for the hypercalcemia and besides that   there have been no changes.  I have informed about all this to the patient's   wife, Ms. Tellez.    OBJECTIVE:  VITAL SIGNS:  As far as his vitals are concerned, they are as   follows:  Blood pressure 132/77, 97.6 temperature, 93% O2 sat, 94 pulse.  HEENT:  Head normocephalic.  Pupils bilaterally by in size.  Mild conjunctival   pallor.  No scleral icterus.  Trachea is midline.  CHEST:  Good bilateral air entry.  CVS:  First and second heart sounds are heard normally without any murmurs.  ABDOMEN:  Soft.  There is mild tenderness in the right upper quadrant.  EXTREMITIES:  Devoid of edema, cyanosis, or  clubbing.    LABS AND INVESTIGATIONS:  Sodium 139, potassium 99, bicarb 31, BUN 64,   creatinine 4.59, calcium 16.4, total protein 6.1, albumin 2.9, AST 57, ALT 78,   globulin 3.2, vitamin D 53.5.  GFR of 15.    IMPRESSION:    1. Hypercalcemia under investigation, till now workup revealing massive   proteinuria.  2. Acute kidney injury secondary to above.  3. Transaminitis with ultrasound of the gallbladder, positive for sludge and   positive Arteaga sign.    PLAN:  To continue to follow the Oncology and Nephrology recommendations.  The   patient's creatinine has gone up from 3.36 to 4.59, BUN from 47 to 64 in spite   of hydration.  Calcitonin has been added for hypercalcemia.  We will wait for   the results of the electrophoresis and immunofixation.    Pleasure taking care of Mr. Pete Fernandez during his stay at [facility] on the 3rd floor.  I thank Dr. Elizabeth LeJeune, as well as   Dr. Ty Brown.        ______________________________  Eric Kohli MD    SS/AQS  DD:  10/12/2023  Time:  08:52PM  DT:  10/12/2023  Time:  10:08PM  Job #:  [id number]      PROGRESS NOTE

## 2023-10-13 NOTE — PROGRESS NOTES
[facility]  [address] [place] [address]  Phone: [phone]    Department of Nephrology  Progress Note      PATIENT NAME: Pete Fernandez  MRN: [id number]  TODAY'S DATE: 10/13/2023  ADMIT DATE: 10/10/2023    SUBJECTIVE     PRINCIPLE PROBLEM: DOUG (acute kidney injury)    INTERVAL HISTORY:    10/13/2023  Interval history noted, this is a 43-year-old  male who was admitted with abdominal pain and was noted to have severe hypercalcemia with a low parathyroid hormone.  Therefore, workup began with showing nephrotic range proteinuria with normal total protein in his serum as well as normal globulins.  It was still felt that patient can have a paraproteinemia.  His hypercalcemia is improving with a single dose of 60 mg per midnight, calcitonin is not available here it would be adjunctive therapy as ongoing doses of calcitonin do not do much to further lower serum calcium levels.  Apparently patient is being transferred ; however, I am unclear on the reason why he is being transferred    Review of patient's allergies indicates:  No Known Allergies    Review of Systems   Respiratory: Negative.     Cardiovascular: Negative.    Gastrointestinal:  Positive for abdominal pain and diarrhea.   Genitourinary:         Patient has polyuria   Musculoskeletal: Negative.    Neurological: Negative.    Psychiatric/Behavioral: Negative.         OBJECTIVE     VITAL SIGNS (Most Recent)  Temp: 98.4 °F (36.9 °C) (10/13/23 1226)  Pulse: 92 (10/13/23 1226)  Resp: 18 (10/13/23 1226)  BP: (!) 142/84 (10/13/23 1226)  SpO2: (!) 92 % (10/13/23 1226)    VENTILATION STATUS  Resp: 18 (10/13/23 1226)  SpO2: (!) 92 % (10/13/23 1226)           I & O (Last 24H):  Intake/Output Summary (Last 24 hours) at 10/13/2023 1427  Last data filed at 10/13/2023 1232  Gross per 24 hour   Intake 600 ml   Output 3125 ml   Net -2525 ml       WEIGHTS  Wt Readings from Last 3 Encounters:   10/12/23 2100 109.1 kg (240 lb 9.6 oz)  "      Physical Exam  Cardiovascular:      Rate and Rhythm: Normal rate and regular rhythm.      Heart sounds: Normal heart sounds. No murmur heard.  Pulmonary:      Effort: Pulmonary effort is normal.      Breath sounds: Normal breath sounds.   Abdominal:      General: There is no distension.      Tenderness: There is abdominal tenderness.   Musculoskeletal:      Right lower leg: No edema.      Left lower leg: No edema.   Skin:     Capillary Refill: Capillary refill takes less than 2 seconds.   Neurological:      Mental Status: He is alert.   Psychiatric:         Mood and Affect: Mood normal.         SCHEDULED MEDS:   enoxparin  30 mg Subcutaneous Q24H (prophylaxis, 1700)    pantoprazole  40 mg Oral Daily    zolpidem  10 mg Oral QHS       CONTINUOUS INFUSIONS:   sodium chloride 0.9% 150 mL/hr at 10/13/23 1257       PRN MEDS:acetaminophen, ALPRAZolam, cloNIDine, dextrose 10%, dextrose 10%, glucagon (human recombinant), glucose, glucose, hydrALAZINE, naloxone, ondansetron, sodium chloride 0.9%    LABS AND DIAGNOSTICS     CBC LAST 3 DAYS  Recent Labs   Lab 10/10/23  2149   WBC 12.19*   RBC 4.28*   HGB 13.6*   HCT 38.4*   MCV 89.7   MCH 31.8*   MCHC 35.4   RDW 13.0      MPV 9.6       COAGULATION LAST 3 DAYS  No results for input(s): "LABPT", "INR", "APTT" in the last 168 hours.    CHEMISTRY LAST 3 DAYS  Recent Labs   Lab 10/10/23  2149 10/11/23  0415 10/12/23  0940 10/13/23  0443    134* 139 134*   K 3.9 3.9 4.4 4.1   CO2 34* 35* 31* 29   BUN 47.0* 48.0* 64.0* 70.0*   CREATININE 3.36* 3.42* 4.59* 4.93*   CALCIUM 18.3* 17.0* 16.4* 14.3*   PH TNP  --   --   --    MG 2.20 2.00  --  2.00   ALBUMIN 3.5  --  2.9* 2.6*   ALKPHOS 86  --  69 60   ALT 79*  --  78* 88*   AST 43*  --  57* 67*   BILITOT 1.2  --  0.9 0.9       CARDIAC PROFILE LAST 3 DAYS  No results for input(s): "BNP", "CPK", "CPKMB", "LDH", "TROPONINI" in the last 168 hours.    ENDOCRINE LAST 3 DAYS  Recent Labs   Lab 10/10/23  2149   TSH 0.723 "       LAST ARTERIAL BLOOD GAS  ABG  Recent Labs   Lab 10/10/23  2149   PH TNP       LAST 7 DAYS MICROBIOLOGY   Microbiology Results (last 7 days)       ** No results found for the last 168 hours. **            MOST RECENT IMAGING  MRI Brain Without Contrast  Narrative: EXAMINATION:  MRI BRAIN WITH CONTRAST:    CLINICAL HISTORY:  , Brain metastases suspected;    COMPARISON:  None available    FINDINGS:  Serial axial,coronal, and sagittal 1.5 Fide images were obtained of the brain using T1 and T2- weighted techniques the administration of IV contrast. Ventricles, cisterns, and sulci are within normal limits.  There is no evidence of midline shift, mass effect, or abnormal extra-axial fluid collections.  Mild motion artifact is seen.  Flow void is noted to the superior sagittal sinus and visualized intracranial vessels on T2 sequence imaging.  There is mild scattered mucosal thickening at the paranasal sinuses.  Mastoid air cells demonstrate mild fluid signal intensity bilaterally.  Orbital globes are symmetric in size and shape.  Cerebellar tonsils extend caudally to the level of foramen magnum.  No significant abnormal signal intensity is identified on diffusion weighted imaging to indicate acute ischemic changes no significant abnormal signal intensity is identified on FLAIR sequence imaging.  Impression: 1. Motion artifact  2. Mild scattered mucosal thickening throughout the paranasal sinuses  3. Somewhat limited evaluation of brain metastases without the administration of IV contrast  4. Otherwise unremarkable MR exam of the head without the administration of IV contrast    Electronically signed by: Isidro Watts  Date:    10/12/2023  Time:    15:11  NM Bone Scan Whole Body  Narrative: EXAMINATION:  NM BONE SCAN WHOLE BODY    CLINICAL HISTORY:  Bone lesion, skull, incidental;, .    TECHNIQUE:  22.8 mCi of Tc-99m MDP was administered intravenously. After appropriate delay, whole body bone scan images were  obtained.    COMPARISON:  None available    FINDINGS:  Activity is identified within the kidneys bilaterally and within the bladder. There is diffuse intense increased activity throughout the lungs bilaterally.  There is mild increased activity evident at the shoulders, sternoclavicular joints, hips, knees, sacroiliac joints, ankles, and elbows bilaterally.  Increased activity is evident the nasal maxillary region.  There is faint activity identified at the presumed stomach.  Impression: 1. Abnormal intense increased activity throughout the lungs of uncertain etiology.  This may related to hyperparathyroidism, hematologic malignancy, and or metabolic abnormality such as renal failure, vasculitis, or pulmonary infection.  Chest x-ray 10/10/2023 reveals the lungs to be relatively clear and well aerated.  2. Suspect scattered mild degenerative changes throughout the joint spaces  3. Increased activity nasal maxillary region suggesting mucoperiosteal disease  4. Mild activity at the stomach suggesting free pertechnetate    Electronically signed by: Isidro Watts  Date:    10/12/2023  Time:    13:52      [facility]  No results found for this or any previous visit.      CURRENT/PREVIOUS VISIT EKG  Results for orders placed or performed during the hospital encounter of 10/10/23   EKG 12-lead    Collection Time: 10/10/23 10:04 PM    Narrative    Test Reason : R07.9,    Vent. Rate : 093 BPM     Atrial Rate : 093 BPM     P-R Int : 182 ms          QRS Dur : 094 ms      QT Int : 334 ms       P-R-T Axes : 072 014 049 degrees     QTc Int : 415 ms    Normal sinus rhythm  Nonspecific T wave abnormality  Abnormal ECG  No previous ECGs available  Confirmed by Dougie Wylie MD ([id number]) on 10/11/2023 9:01:59 PM    Referred By: HEVER DIAZ           Confirmed By:Dougie Wylie MD       ASSESSMENT/PLAN:     Active Hospital Problems    Diagnosis    *DOUG (acute kidney injury)    Hypercalcemia    Transaminitis       ASSESSMENT & PLAN:    43-year-old  male admitted with abdominal pain with a likely related to acute cholecystitis as well as severe hypercalcemia which is treated with pamidronate and IV fluids which is improving.  DOUG related to a possible paraproteinemia into cholecystitis.  Apparently patient is being transferred once a bed is open.      RECOMMENDATIONS:  We will continue to monitor serial renal panels obtain a UA today and tomorrow.  Recommend continued avoidance of nephrotoxins ACE inhibitors ARB use IV contrast and NSAIDs etc..  Renally dose all medicines.        Ty Brown MD  Department of Nephrology  Date of Service: 10/13/2023  2:27 PM

## 2023-10-14 LAB
1,25(OH)2D SERPL-MCNC: 10 PG/ML (ref 18–64)
ALBUMIN SERPL-MCNC: 2.7 G/DL (ref 3.5–5)
ALBUMIN/GLOB SERPL: 1 RATIO (ref 1.1–2)
ALP SERPL-CCNC: 68 UNIT/L (ref 40–150)
ALT SERPL-CCNC: 79 UNIT/L (ref 0–55)
AST SERPL-CCNC: 44 UNIT/L (ref 5–34)
BASOPHILS # BLD AUTO: 0.04 X10(3)/MCL
BASOPHILS NFR BLD AUTO: 0.4 %
BILIRUB SERPL-MCNC: 0.7 MG/DL
BUN SERPL-MCNC: 61.6 MG/DL (ref 8.9–20.6)
CA-I ADJ PH7.4 SERPL ISE-MCNC: 8 MG/DL (ref 4.57–5.43)
CALCIUM SERPL-MCNC: 12.4 MG/DL (ref 8.4–10.2)
CHLORIDE SERPL-SCNC: 103 MMOL/L (ref 98–107)
CK SERPL-CCNC: 60 U/L (ref 30–200)
CO2 SERPL-SCNC: 23 MMOL/L (ref 22–29)
CREAT SERPL-MCNC: 4.75 MG/DL (ref 0.73–1.18)
EOSINOPHIL # BLD AUTO: 0.67 X10(3)/MCL (ref 0–0.9)
EOSINOPHIL NFR BLD AUTO: 7.4 %
ERYTHROCYTE [DISTWIDTH] IN BLOOD BY AUTOMATED COUNT: 13.7 % (ref 11.5–17)
GFR SERPLBLD CREATININE-BSD FMLA CKD-EPI: 15 MLS/MIN/1.73/M2
GLOBULIN SER-MCNC: 2.6 GM/DL (ref 2.4–3.5)
GLUCOSE SERPL-MCNC: 116 MG/DL (ref 74–100)
HCT VFR BLD AUTO: 30.3 % (ref 42–52)
HGB BLD-MCNC: 10.6 G/DL (ref 14–18)
IMM GRANULOCYTES # BLD AUTO: 0.16 X10(3)/MCL (ref 0–0.04)
IMM GRANULOCYTES NFR BLD AUTO: 1.8 %
LYMPHOCYTES # BLD AUTO: 1.52 X10(3)/MCL (ref 0.6–4.6)
LYMPHOCYTES NFR BLD AUTO: 16.9 %
MAGNESIUM SERPL-MCNC: 2.1 MG/DL (ref 1.6–2.6)
MCH RBC QN AUTO: 31.9 PG (ref 27–31)
MCHC RBC AUTO-ENTMCNC: 35 G/DL (ref 33–36)
MCV RBC AUTO: 91.3 FL (ref 80–94)
MONOCYTES # BLD AUTO: 0.61 X10(3)/MCL (ref 0.1–1.3)
MONOCYTES NFR BLD AUTO: 6.8 %
NEUTROPHILS # BLD AUTO: 6 X10(3)/MCL (ref 2.1–9.2)
NEUTROPHILS NFR BLD AUTO: 66.7 %
NRBC BLD AUTO-RTO: 0 %
PHOSPHATE SERPL-MCNC: 3.7 MG/DL (ref 2.3–4.7)
PLATELET # BLD AUTO: 131 X10(3)/MCL (ref 130–400)
PMV BLD AUTO: 10 FL (ref 7.4–10.4)
POTASSIUM SERPL-SCNC: 3.7 MMOL/L (ref 3.5–5.1)
PROT SERPL-MCNC: 5.3 GM/DL (ref 6.4–8.3)
RBC # BLD AUTO: 3.32 X10(6)/MCL (ref 4.7–6.1)
SODIUM SERPL-SCNC: 137 MMOL/L (ref 136–145)
WBC # SPEC AUTO: 9 X10(3)/MCL (ref 4.5–11.5)

## 2023-10-14 PROCEDURE — 85025 COMPLETE CBC W/AUTO DIFF WBC: CPT | Performed by: INTERNAL MEDICINE

## 2023-10-14 PROCEDURE — 63600175 PHARM REV CODE 636 W HCPCS: Performed by: INTERNAL MEDICINE

## 2023-10-14 PROCEDURE — 99222 1ST HOSP IP/OBS MODERATE 55: CPT | Mod: ,,, | Performed by: STUDENT IN AN ORGANIZED HEALTH CARE EDUCATION/TRAINING PROGRAM

## 2023-10-14 PROCEDURE — 83735 ASSAY OF MAGNESIUM: CPT | Performed by: INTERNAL MEDICINE

## 2023-10-14 PROCEDURE — 84100 ASSAY OF PHOSPHORUS: CPT | Performed by: INTERNAL MEDICINE

## 2023-10-14 PROCEDURE — 80053 COMPREHEN METABOLIC PANEL: CPT | Performed by: INTERNAL MEDICINE

## 2023-10-14 PROCEDURE — 21400001 HC TELEMETRY ROOM

## 2023-10-14 PROCEDURE — 99222 PR INITIAL HOSPITAL CARE,LEVL II: ICD-10-PCS | Mod: ,,, | Performed by: STUDENT IN AN ORGANIZED HEALTH CARE EDUCATION/TRAINING PROGRAM

## 2023-10-14 PROCEDURE — 25000003 PHARM REV CODE 250: Performed by: INTERNAL MEDICINE

## 2023-10-14 PROCEDURE — 11000001 HC ACUTE MED/SURG PRIVATE ROOM

## 2023-10-14 PROCEDURE — 82550 ASSAY OF CK (CPK): CPT | Performed by: INTERNAL MEDICINE

## 2023-10-14 PROCEDURE — 84166 PROTEIN E-PHORESIS/URINE/CSF: CPT | Performed by: INTERNAL MEDICINE

## 2023-10-14 RX ORDER — FUROSEMIDE 10 MG/ML
20 INJECTION INTRAMUSCULAR; INTRAVENOUS DAILY
Status: DISCONTINUED | OUTPATIENT
Start: 2023-10-14 | End: 2023-10-17

## 2023-10-14 RX ADMIN — ACETAMINOPHEN 650 MG: 325 TABLET, FILM COATED ORAL at 05:10

## 2023-10-14 RX ADMIN — ACETAMINOPHEN 650 MG: 325 TABLET, FILM COATED ORAL at 10:10

## 2023-10-14 RX ADMIN — Medication 6 MG: at 09:10

## 2023-10-14 RX ADMIN — FUROSEMIDE 20 MG: 10 INJECTION, SOLUTION INTRAMUSCULAR; INTRAVENOUS at 01:10

## 2023-10-14 RX ADMIN — ENOXAPARIN SODIUM 30 MG: 30 INJECTION SUBCUTANEOUS at 04:10

## 2023-10-14 RX ADMIN — SODIUM CHLORIDE: 9 INJECTION, SOLUTION INTRAVENOUS at 12:10

## 2023-10-14 RX ADMIN — CALCITONIN SALMON 438 UNITS: 200 INJECTION, SOLUTION INTRAMUSCULAR; SUBCUTANEOUS at 10:10

## 2023-10-14 RX ADMIN — SODIUM CHLORIDE: 9 INJECTION, SOLUTION INTRAVENOUS at 04:10

## 2023-10-14 RX ADMIN — PANTOPRAZOLE SODIUM 40 MG: 40 TABLET, DELAYED RELEASE ORAL at 10:10

## 2023-10-14 RX ADMIN — SODIUM CHLORIDE: 9 INJECTION, SOLUTION INTRAVENOUS at 09:10

## 2023-10-14 NOTE — H&P
[facility] Hospital Medicine History & Physical Examination       Patient Name: Pete Fernandez  MRN: [id number]  Patient Class: IP- Inpatient   Admission Date: 10/13/2023  7:31 PM  Length of Stay: 0  Admitting Service: Hospital Medicine   Attending Physician: Aristides Ricci MD   Primary Care Provider: No, Primary Doctor  History source: EMR, patient and/or patient's family    CHIEF COMPLAINT   Weakness    HISTORY OF PRESENT ILLNESS:   Patient is a 43-year-old male with a history of ADHD who presented to [facility] on 10/10/2023 with complaints of severe weakness, nausea that was progressively worsening over a week.  He also reported numbness around his lips and urinary frequency.  He was found to have severe hypercalcemia and acute renal failure on arrival with reported labs being normal prior in August, and was given a dose of pamidronate with a steady down trend of his serum calcium since.  PTH was appropriately suppressed, vitamin-D with within normal limits.    He was seen by both Nephrology and Oncology and ultimately found to have calvarium lesions on x-ray, diagnosed with a paraproteinemia and a workup for multiple myeloma was initiated.  His serum beta 2 microglobulin has come back elevated.  He was found to have nephrotic range proteinuria during that admission as well but remained nonoliguric with close monitoring of his renal function by Nephrology. Because there was no calcitonin available at the prior facility he is transferred here to receive calcitonin for hypercalcemia.  Of note he had an ultrasound done which showed biliary sludge upper limits of normal thickness with a positive Arteaga's so there was a tentative plan to get a HIDA scan to exclude cholecystitis.    PAST MEDICAL HISTORY:   ADHD    PAST SURGICAL HISTORY:   Tonsillectomy    ALLERGIES:   Patient has no known allergies.    FAMILY HISTORY:   Reviewed and non-contributory     SOCIAL HISTORY:  "    Social History     Tobacco Use    Smoking status: Never    Smokeless tobacco: Never   Substance Use Topics    Alcohol use: Yes        HOME MEDICATIONS:     Prior to Admission medications    Medication Sig Start Date End Date Taking? Authorizing Provider   desvenlafaxine succinate (PRISTIQ) 50 MG Tb24 Take 50 mg by mouth once daily.  10/13/23  Provider, Historical   dextroamphetamine-amphetamine (ADDERALL) 20 mg tablet Take 20 mg by mouth 3 (three) times daily as needed. 9/9/23 10/13/23  Provider, Historical   docusate sodium (COLACE) 100 MG capsule Take 100 mg by mouth 2 (two) times daily.  10/13/23  Provider, Historical   sulfamethoxazole-trimethoprim 800-160mg (BACTRIM DS) 800-160 mg Tab Take by mouth 2 (two) times daily. 10/9/23 10/13/23  Provider, Historical       REVIEW OF SYSTEMS:   Except as documented, all other systems reviewed and negative     PHYSICAL EXAM:   T     BP     P     RR     O2    GENERAL: awake, alert, oriented and in no acute distress, non-toxic appearing   HEENT: normocephalic atraumatic   NECK: supple   LUNGS: Clear bilaterally, no wheezing or rales, no accessory muscle use   CVS: Regular rate and rhythm, normal peripheral perfusion  ABD: Soft, non-tender, non-distended, bowel sounds present  EXTREMITIES: no clubbing or cyanosis  SKIN: Warm, dry.   NEURO: alert and oriented, grossly without focal deficits   PSYCHIATRIC: Cooperative    LABS AND IMAGING:     Recent Labs     10/10/23  2149   WBC 12.19*   RBC 4.28*   HGB 13.6*   HCT 38.4*   MCV 89.7   MCH 31.8*   MCHC 35.4   RDW 13.0        No results for input(s): "LACTIC" in the last 72 hours.  No results for input(s): "INR", "APTT", "D-DIMER" in the last 72 hours.  No results for input(s): "HGBA1C", "CHOL", "TRIG", "LDL", "VLDL", "HDL" in the last 72 hours.   Recent Labs     10/10/23  2149 10/10/23  2252 10/11/23  0415 10/12/23  0940 10/13/23  0443     --  134* 139 134*   K 3.9  --  3.9 4.4 4.1   CHLORIDE 88*  --  91* 99 96* " "  CO2 34*  --  35* 31* 29   BUN 47.0*  --  48.0* 64.0* 70.0*   CREATININE 3.36*  --  3.42* 4.59* 4.93*   GLUCOSE 154*  --  101* 98 102*   CALCIUM 18.3*  --  17.0* 16.4* 14.3*   MG 2.20  --  2.00  --  2.00   PHOS 5.3*  --  5.6*  --  4.0   ALBUMIN 3.5  --   --  2.9* 2.6*   GLOBULIN 3.4  --   --  3.2 3.1   ALKPHOS 86  --   --  69 60   ALT 79*  --   --  78* 88*   AST 43*  --   --  57* 67*   BILITOT 1.2  --   --  0.9 0.9   LIPASE  --  15  --   --   --    TSH 0.723  --   --   --   --      No results for input(s): "BNP", "CPK", "TROPONINI" in the last 72 hours.       MRI Brain Without Contrast  Narrative: EXAMINATION:  MRI BRAIN WITH CONTRAST:    CLINICAL HISTORY:  , Brain metastases suspected;    COMPARISON:  None available    FINDINGS:  Serial axial,coronal, and sagittal 1.5 Fide images were obtained of the brain using T1 and T2- weighted techniques the administration of IV contrast. Ventricles, cisterns, and sulci are within normal limits.  There is no evidence of midline shift, mass effect, or abnormal extra-axial fluid collections.  Mild motion artifact is seen.  Flow void is noted to the superior sagittal sinus and visualized intracranial vessels on T2 sequence imaging.  There is mild scattered mucosal thickening at the paranasal sinuses.  Mastoid air cells demonstrate mild fluid signal intensity bilaterally.  Orbital globes are symmetric in size and shape.  Cerebellar tonsils extend caudally to the level of foramen magnum.  No significant abnormal signal intensity is identified on diffusion weighted imaging to indicate acute ischemic changes no significant abnormal signal intensity is identified on FLAIR sequence imaging.  Impression: 1. Motion artifact  2. Mild scattered mucosal thickening throughout the paranasal sinuses  3. Somewhat limited evaluation of brain metastases without the administration of IV contrast  4. Otherwise unremarkable MR exam of the head without the administration of IV " contrast    Electronically signed by: Isidro Watts  Date:    10/12/2023  Time:    15:11  NM Bone Scan Whole Body  Narrative: EXAMINATION:  NM BONE SCAN WHOLE BODY    CLINICAL HISTORY:  Bone lesion, skull, incidental;, .    TECHNIQUE:  22.8 mCi of Tc-99m MDP was administered intravenously. After appropriate delay, whole body bone scan images were obtained.    COMPARISON:  None available    FINDINGS:  Activity is identified within the kidneys bilaterally and within the bladder. There is diffuse intense increased activity throughout the lungs bilaterally.  There is mild increased activity evident at the shoulders, sternoclavicular joints, hips, knees, sacroiliac joints, ankles, and elbows bilaterally.  Increased activity is evident the nasal maxillary region.  There is faint activity identified at the presumed stomach.  Impression: 1. Abnormal intense increased activity throughout the lungs of uncertain etiology.  This may related to hyperparathyroidism, hematologic malignancy, and or metabolic abnormality such as renal failure, vasculitis, or pulmonary infection.  Chest x-ray 10/10/2023 reveals the lungs to be relatively clear and well aerated.  2. Suspect scattered mild degenerative changes throughout the joint spaces  3. Increased activity nasal maxillary region suggesting mucoperiosteal disease  4. Mild activity at the stomach suggesting free pertechnetate    Electronically signed by: Isidro Watts  Date:    10/12/2023  Time:    13:52      ASSESSMENT & PLAN:   Hypercalcemia likely from multiple myeloma   Acute renal failure with nephrotic range proteinuria, nonoliguric  ?Cholecystitis on ultrasound    - administer calcitonin, monitor calcium levels  - continue volume expansion with normal saline  - consultation to Nephrology and Oncology  - HIDA scan to exclude cholecystitis (clinically absent)   - CT chest abdomen pelvis, unfortunately this will be without contrast    DVT prophylaxis: lovenox  Code status: full      If patient was admitted under observational status it is with my approval/permission.     At least 55 min was spent on this history and physical.  Time seen: 11PM 10/13/23  Aristides Ricci MD

## 2023-10-14 NOTE — PLAN OF CARE
Problem: Adult Inpatient Plan of Care  Goal: Plan of Care Review  Outcome: Ongoing, Progressing  Flowsheets (Taken 10/14/2023 0254)  Plan of Care Reviewed With:   patient   spouse  Goal: Patient-Specific Goal (Individualized)  Outcome: Ongoing, Progressing  Goal: Absence of Hospital-Acquired Illness or Injury  Outcome: Ongoing, Progressing  Intervention: Identify and Manage Fall Risk  Flowsheets (Taken 10/14/2023 0254)  Safety Promotion/Fall Prevention:   assistive device/personal item within reach   medications reviewed   nonskid shoes/socks when out of bed  Intervention: Prevent Skin Injury  Flowsheets (Taken 10/14/2023 0254)  Body Position:   position changed independently   supine  Skin Protection:   adhesive use limited   transparent dressing maintained   tubing/devices free from skin contact  Intervention: Prevent and Manage VTE (Venous Thromboembolism) Risk  Flowsheets (Taken 10/14/2023 0254)  Activity Management: Ambulated to bathroom - L4  VTE Prevention/Management:   bleeding risk assessed   dorsiflexion/plantar flexion performed   fluids promoted   ROM (active) performed   ROM (passive) performed  Range of Motion:   active ROM (range of motion) encouraged   ROM (range of motion) performed  Intervention: Prevent Infection  Flowsheets (Taken 10/14/2023 0254)  Infection Prevention:   rest/sleep promoted   single patient room provided   hand hygiene promoted  Goal: Optimal Comfort and Wellbeing  Outcome: Ongoing, Progressing  Intervention: Monitor Pain and Promote Comfort  Flowsheets (Taken 10/14/2023 0254)  Pain Management Interventions:   pain management plan reviewed with patient/caregiver   medication offered   care clustered  Intervention: Provide Person-Centered Care  Flowsheets (Taken 10/14/2023 0254)  Trust Relationship/Rapport:   questions encouraged   care explained   choices provided   reassurance provided   thoughts/feelings acknowledged   emotional support provided   empathic listening  provided   questions answered  Goal: Readiness for Transition of Care  Outcome: Ongoing, Progressing     Problem: Fluid and Electrolyte Imbalance (Acute Kidney Injury/Impairment)  Goal: Fluid and Electrolyte Balance  Outcome: Ongoing, Progressing

## 2023-10-14 NOTE — CONSULTS
[facility] Acute  Hematology/Oncology  Consult Note    Patient Name: Pete Fernandez  MRN: [id number]  Admission Date: 10/13/2023  Hospital Length of Stay: 1 days  Attending Provider: Conor Patel MD  Consulting Provider: Elizabeth K Lejeune, MD  Principal Problem:Hypercalcemia    Consults  Subjective:     HPI:   42yo M presented to [facility] on 10/11 for hypercalcemia and extreme weakness. Labs notable for new severe DUOG, transaminitis, and Ca of 18. He was admitted and started on IVF resuscitation.  Hypercalcemia workup was initiated, notable for PTH suppressed at 8.2.   Xray Survey was done which showed small 5mm calvarium defects. Follow up NM Bone scan and MRI Brain with no evidence of lytic lesions. Xray survey is known for high false positive rates, especially to the calvarium. Oncology was been consulted for further evaluation. An SPEP in currently in process.     Interval History  I recommended transfer of patient to [place] due to higher level of care for his hyperCa. He is currently waiting on calcitonin injection. SPEP remains in process.     Oncology Treatment Plan:   [No matching plan found]    Medications:  Continuous Infusions:   sodium chloride 0.9% 150 mL/hr at 10/14/23 0803     Scheduled Meds:   enoxparin  30 mg Subcutaneous Q24H (prophylaxis, 1700)    pantoprazole  40 mg Oral Daily    zolpidem  10 mg Oral QHS     PRN Meds:acetaminophen, ALPRAZolam, cloNIDine, melatonin, sodium chloride 0.9%     Review of patient's allergies indicates:  Not on File     Past Medical History:   Diagnosis Date    Anxiety disorder, unspecified     Hypercalcemia      Past Surgical History:   Procedure Laterality Date    TONSILLECTOMY       Family History       Problem Relation (Age of Onset)    Breast cancer Mother    Pancreatic cancer Father          Tobacco Use    Smoking status: Never    Smokeless tobacco: Never   Substance and Sexual Activity    Alcohol use: Yes    Drug use: Never    Sexual  activity: Not on file       Review of Systems   Constitutional:  Positive for activity change and fatigue. Negative for chills.   HENT:  Negative for sore throat.    Eyes:  Negative for visual disturbance.   Respiratory:  Negative for shortness of breath.    Cardiovascular:  Negative for chest pain.   Gastrointestinal:  Positive for abdominal pain, constipation and nausea. Negative for diarrhea and vomiting.   Endocrine: Negative for polyuria.   Genitourinary:  Negative for dysuria.   Musculoskeletal:  Negative for back pain.   Skin:  Negative for wound.   Neurological:  Negative for headaches.   Hematological:  Does not bruise/bleed easily.   Psychiatric/Behavioral:  Negative for confusion.      Objective:     Vital Signs (Most Recent):  Temp: 98.5 °F (36.9 °C) (10/14/23 0806)  Pulse: 96 (10/14/23 0806)  Resp: 18 (10/14/23 0641)  BP: (!) 148/88 (10/14/23 0806)  SpO2: (!) 90 % (10/14/23 0806) Vital Signs (24h Range):  Temp:  [97.8 °F (36.6 °C)-98.7 °F (37.1 °C)] 98.5 °F (36.9 °C)  Pulse:  [] 96  Resp:  [18-20] 18  SpO2:  [90 %-94 %] 90 %  BP: (142-152)/(84-90) 148/88     Weight: 109.7 kg (241 lb 12.8 oz)  Body mass index is 35.71 kg/m².  Body surface area is 2.31 meters squared.      Intake/Output Summary (Last 24 hours) at 10/14/2023 1149  Last data filed at 10/14/2023 0803  Gross per 24 hour   Intake 1444.56 ml   Output --   Net 1444.56 ml         Physical Exam  Constitutional:       General: He is not in acute distress.     Appearance: Normal appearance. He is not ill-appearing.   HENT:      Head: Normocephalic and atraumatic.      Nose: Nose normal.      Mouth/Throat:      Mouth: Mucous membranes are moist.      Pharynx: Oropharynx is clear.   Eyes:      Extraocular Movements: Extraocular movements intact.      Conjunctiva/sclera: Conjunctivae normal.      Pupils: Pupils are equal, round, and reactive to light.   Cardiovascular:      Rate and Rhythm: Normal rate and regular rhythm.      Pulses: Normal  pulses.      Heart sounds: Normal heart sounds. No murmur heard.  Pulmonary:      Effort: Pulmonary effort is normal. No respiratory distress.      Breath sounds: Normal breath sounds.   Abdominal:      General: There is no distension.      Palpations: Abdomen is soft.      Tenderness: There is no abdominal tenderness.   Musculoskeletal:         General: Normal range of motion.      Cervical back: Normal range of motion and neck supple.      Right lower leg: No edema.      Left lower leg: No edema.   Lymphadenopathy:      Cervical: No cervical adenopathy.   Skin:     General: Skin is warm and dry.   Neurological:      General: No focal deficit present.      Mental Status: He is alert and oriented to person, place, and time.         Significant Labs:   CBC:   Recent Labs   Lab 10/14/23  0452   WBC 9.00   HGB 10.6*   HCT 30.3*         and CMP:   Recent Labs   Lab 10/13/23  0443 10/14/23  0452   * 137   K 4.1 3.7   CO2 29 23   BUN 70.0* 61.6*   CREATININE 4.93* 4.75*   CALCIUM 14.3* 12.4*   ALBUMIN 2.6* 2.7*   BILITOT 0.9 0.7   ALKPHOS 60 68   AST 67* 44*   ALT 88* 79*         Diagnostic Results:    10/11/23 Xray Metastatic Survey  1. Scattered small defects at the calvarium measuring up to 5 mm too numerous to count.  2. Degene mild rative changes at the spine and extremities  3. MR exam and bone scan would allow further evaluation.    10/12/23 MRI Brain  1. Motion artifact  2. Mild scattered mucosal thickening throughout the paranasal sinuses  3. Somewhat limited evaluation of brain metastases without the administration of IV contrast  4. Otherwise unremarkable MR exam of the head without the administration of IV contrast    10/12/23 NM Bone Scan  1. Abnormal intense increased activity throughout the lungs of uncertain etiology.  This may related to hyperparathyroidism, hematologic malignancy, and or metabolic abnormality such as renal failure, vasculitis, or pulmonary infection.  Chest x-ray 10/10/2023  reveals the lungs to be relatively clear and well aerated.  2. Suspect scattered mild degenerative changes throughout the joint spaces  3. Increased activity nasal maxillary region suggesting mucoperiosteal disease  4. Mild activity at the stomach suggesting free pertechnetate  Assessment/Plan:     Hypercalcemia  Severe DOUG  Possible calvarium lesions on Xray    - Recommend calcitonin administration for Hyperca. Initial dose 4u/kg.   - Continue aggressive IVF administration, watching for signs of volume overload  - CT CAP w/contrast when renal function allows  - Follow up pending multiple myeloma workup, I have added further labs for completeness. It would be strange for such drastic change in labs in <2 months to be due to MM but will be still appropriate to rule this out. Again I do not suspect he has MM but spep is in process.     Nothing further to add at this time. HyperCa management per [facility]. Will follow up once SPEP is done.      Elizabeth K Lejeune, MD  Hematology/Oncology  [facility]

## 2023-10-14 NOTE — DISCHARGE SUMMARY
[facility]                     [address] [address]    PATIENT NAME:       LAMBERTO MO   YOB: 1980  CSN:                [id number]   MRN:                [id number]  ADMIT DATE:         10/10/2023 21:18:00  PHYSICIAN:          Eric Kohli MD                          DISCHARGE SUMMARY    DATE OF DISCHARGE:  10/13/2023 18:41:00    CODE STATUS:  Full code.    The patient is being discharged and transferred to [facility] for further treatment of his hypercalcemia after I have spoken to   Dr. Elizabeth LeJeune, the Heme-oncologist of more resources and consults   possible at [facility].    DISCHARGE DIAGNOSIS:    1. Generalized weakness, fatigue, polydipsia and polyuria secondary to new onset   hypercalcemia under extensive workup, whichever was done in the hospital here   revealing cause not to be hyperparathyroidism as parathyroid levels are low,   suspicious for other causes of hypercalcemia mainly pertaining to multiple   myeloma being one of the differential diagnosis or other etiologies.  2. Acute kidney injury secondary to above.  3. Mild transaminitis with ultrasound of the gallbladder showing sludge.  HIDA   scan awaited to be done on Monday as patient had a complete whole body bone scan   and have to wait till Monday for valid HIDA scan.  Right upper quadrant pain   under investigation as mentioned above.  4. History of attention deficit hyperactivity disorder.  5. Nausea now resolved.  6. Systolic hypertension, most likely secondary to rapid and voluminous   hydration.  7. Acute kidney injury secondary to hypercalcemia status post nephrology   consult.  The patient given a dose of pamidronate, hypercalcemia responding   after 72 hours.  Serum calcium from 19 down to 16.  8. Massive proteinuria with 24-hour urine protein more than 5 g.  Further   investigations of  proteinuria in the form of serum and serum electrophoresis,   immunofixation as well as urine electrophoresis and immunofixation awaited.  9. Abnormal metastatic skeletal x-ray showing calvarium having multiple less   than 5 mm lesions, but CAT scan of the head and MRI not confirming it most   likely a false reading.    HOSPITAL COURSE:  Mr. Pete Fernandez is a very pleasant 43-year-old gentleman who   is very healthy.  He is only on Adderall on and off at work for his ADHD and was   recently seeking some medication for anxiety, was admitted as a direct admit   when on a clinic in the [facility], his calcium was found to be   at 18.  He had a normal calcium value of 8 in August for his annual blood   workup.  The patient was contacted.  He was feeling bad, was unable to walk for   last couple of days, and I called him and was having extreme weakness, lethargy,   malaise, nausea along with polyuria, polydipsia and some numbness of the lower   lips along with some scotomas.  Immediately, the patient was started hydration.    Nephrology consult was placed.  24-hour urine studies were initiated as well as   the electrophoresis.  Metastatic skeletal x-ray was also ordered and for the   right upper quadrant pain, we also ordered an ultrasound of the gallbladder.    CAT scan of the head was negative for any acute pathology.  The skeletal x-ray   showed that the patient might have some less than 5 mm lesions in the scalp,   which might have given the appearance of a mottled calvarium, but CAT scan and   MRI did not confirm it.  CAT scan of the head and MRI of the head were negative   for any acute pathology.  The patient's gallbladder ultrasound showed that he   had sludge, but no stones.  HIDA scan is awaited and it will be done on Monday.    Because of the hypercalcemia, which was still rising in spite of the patient   getting one dose of pamidronate and impressive hydration in the face of massive    proteinuria and the serum parathyroid level to be suppressed because of the   hypercalcemia from some other source or etiology.  Heme-Oncology consult was   placed.  Dr. Elizabeth LeJeune saw the patient and today she wanted to do   calcitonin at 4 units/kg body weight.  Unfortunately, it is not available.  The   transfer was arranged to [facility].  She was of the opinion that his condition   as well as the investigations will be better taken care of in a tertiary center   for as there are more resources.  I spoke to the patient's wife and patient and   they are in agreement with it.  Transport has been arranged.  Dr. Jesus Lopez is   the accepting physician at [facility].  I presented   the case to two physicians there in the [facility] and the   patient was transferred today in the evening to be at the [facility] for   further management of his hypercalcemia, which is not due to   hyperparathyroidism.  During the hospital stay, DVT prophylaxis was given with   Lovenox, GI prophylaxis with proton pump inhibitors.  Ambien was given for   sleep.  Nausea was controlled with Zofran.  Tylenol was given was given for   headache.    LABS AND INVESTIGATIONS:  During the hospital stay are as follows.  The urine   study shows creatinine of urine to be at 27.4, total urine protein 111.5,   24-hour urine protein 5798 nearly 6000.  UA was unremarkable other than for   protein 2+, and occult blood 2+.  WBC 12.9, hemoglobin 13.6, hematocrit 38.4,   MCV 89.7, eosinophils 1.60.  Sodium 134, potassium 4.1, chloride 96, bicarb 29,   BUN 70, creatinine 4.93, GFR of 14, glucose 102, calcium is down from 18.3 to   14.3, albumin 2.6, AST 67, ALT 88.  Beta 2 microglobulin 18.270, the normal   range is 0.9 to 2.6.  Vitamin D 53.5, PTH low at 8.2.  TSH appropriate at 0.723.    EKG showed normal sinus rhythm.  Ultrasound of the gallbladder showed Aretaga   sign is positive.  Biliary sludge is  present.  Gallbladder wall upper normal in   thickness with positive Arteaga sign.  HIDA scan is recommended.  Nuclear body   scan showed abnormal intense increased activity throughout the lungs of   uncertain etiology.  This may be related to hyperparathyroidism, hematological   malignancy and metabolic abnormality such as renal failure, vasculitis or   pulmonary infection.  Suspect scattered mild degenerative changes throughout the   joint spaces.  Increase activity in the nasal maxillary region suggesting   mucoperiosteal disease.  Chest x-ray unremarkable.  Metastatic bone survey   scattered small defects at the calvarium measuring up to 5 mm, too numerous to   count.  CAT scan of the head negative.  MRI of the head negative.    It was indeed a pleasure taking care of Mr. Pete Fernandez during his stay at   [facility].        ______________________________  Eric Kohli MD    SS/AQS  DD:  10/13/2023  Time:  07:54PM  DT:  10/13/2023  Time:  08:59PM  Job #:  [id number]    cc:   Dwayne J Bergeaux, MD Jay Patel, MD Elizabeth LeJeune, MD        DISCHARGE SUMMARY

## 2023-10-14 NOTE — NURSING
Nurses Note -- 4 Eyes      10/13/2023  2030      Skin assessed during: Transfer      [x] No Altered Skin Integrity Present    []Prevention Measures Documented      [] Yes- Altered Skin Integrity Present or Discovered   [] LDA Added if Not in Epic (Describe Wound)   [] New Altered Skin Integrity was Present on Admit and Documented in LDA   [] Wound Image Taken    Wound Care Consulted? No    Attending Nurse:  Eboni Rhodes RN    Second RN/Staff Member:     Tanya Connell RN

## 2023-10-14 NOTE — PROGRESS NOTES
[facility]  Hospital Medicine Progress Note        Chief Complaint: Inpatient follow-up on acute kidney injury and hypercalcemia    HPI:   Patient is a 43-year-old male with a history of ADHD who presented to [facility] on 10/10/2023 with complaints of severe weakness nausea that was progressively worsening over a week.  He also reported numbness around his lips and urinary frequency.  He was found to have severe hypercalcemia and acute renal failure on arrival with reported labs being normal prior in August, and was given a dose of pamidronate with a steady down trend of his serum calcium since.  Parathyroid hormone was appropriately suppressed, vitamin-D with within normal limits.     He was seen by both Nephrology and Oncology and ultimately found to have calvarium lesions on x-ray patient was diagnosed with a paraproteinemia and a workup for multiple myeloma was initiated.  His serum beta 2 microglobulin has come back elevated.  He was found to have nephrotic range proteinuria during that admission as well but remained nonoliguric with close monitoring of his renal function by Nephrology. Because there was no calcitonin available at the prior facility he is transferred here to receive calcitonin for hypercalcemia.  Of note he had an ultrasound done which showed biliary sludge upper limits of normal thickness with a positive Arteaga's so there was a tentative plan to get a HIDA scan to exclude cholecystitis.    Interval Hx:   Patient is resting comfortably no acute complaints.  Patient is afebrile, on room air, and hemodynamically stable.  Case was discussed with patient's wife at bedside.    Objective/physical exam:  General: in no acute distress  HENT: normocephalic, atraumatic  Eye: PERRL, EOMI, clear conjunctiva  Neck: full ROM, no thyromegaly, no JVD  Respiratory: clear to auscultation bilaterally  Cardiovascular: regular rate and rhythm  Gastrointestinal:  non-distended, positive bowel sounds, non-tender  Musculoskeletal: no gross deformity  Integumentary: warm, dry, intact, no rashes  Neurological: cranial nerves grossly intact, no focal neurological deficit  Psychiatric: cooperative, flat affect, anxious appearing      VITAL SIGNS: 24 HRS MIN & MAX LAST   Temp  Min: 97.8 °F (36.6 °C)  Max: 98.9 °F (37.2 °C) 98.9 °F (37.2 °C)   BP  Min: 142/90  Max: 152/88 (!) 148/84   Pulse  Min: 91  Max: 100  91   Resp  Min: 18  Max: 20 18   SpO2  Min: 90 %  Max: 94 % (!) 93 %     I have reviewed the following labs:  Recent Labs   Lab 10/10/23  2149 10/14/23  0452   WBC 12.19* 9.00   RBC 4.28* 3.32*   HGB 13.6* 10.6*   HCT 38.4* 30.3*   MCV 89.7 91.3   MCH 31.8* 31.9*   MCHC 35.4 35.0   RDW 13.0 13.7    131   MPV 9.6 10.0     Recent Labs   Lab 10/10/23  2149 10/11/23  0415 10/12/23  0940 10/13/23  0443 10/14/23  0452    134* 139 134* 137   K 3.9 3.9 4.4 4.1 3.7   CO2 34* 35* 31* 29 23   BUN 47.0* 48.0* 64.0* 70.0* 61.6*   CREATININE 3.36* 3.42* 4.59* 4.93* 4.75*   CALCIUM 18.3* 17.0* 16.4* 14.3* 12.4*   PH TNP  --   --   --   --    MG 2.20 2.00  --  2.00 2.10   ALBUMIN 3.5  --  2.9* 2.6* 2.7*   ALKPHOS 86  --  69 60 68   ALT 79*  --  78* 88* 79*   AST 43*  --  57* 67* 44*   BILITOT 1.2  --  0.9 0.9 0.7     Microbiology Results (last 7 days)       ** No results found for the last 168 hours. **             See below for Radiology    Scheduled Med:   enoxparin  30 mg Subcutaneous Q24H (prophylaxis, 1700)    furosemide (LASIX) injection  20 mg Intravenous Daily    pantoprazole  40 mg Oral Daily    zolpidem  10 mg Oral QHS      Continuous Infusions:   sodium chloride 0.9% 150 mL/hr at 10/14/23 1244      PRN Meds:  acetaminophen, ALPRAZolam, cloNIDine, melatonin, sodium chloride 0.9%     Assessment/Plan:  Critical hypercalcemia  Acute kidney injury with nephrotic range proteinuria  Widespread osseous  Anemia of chronic disease  Anxiety disorder      Plan:   Nephrology  recommended intravenous hydration and Lasix  Medical Oncology recommended calcitonin and intravenous hydration.  They also recommended a CT of the thorax, abdomen and pelvis which was performed today and revealed widespread osseous findings suggestive of extensive metastatic skeletal to include expansile destructive soft tissue component of the anterior right seventh rib.  Enlarged intrathoracic lymph nodes could also be metastatic etiology  Patient has a HIDA scan slated for Monday      Critical Care Diagnosis:  Critical hypercalcemia  Critical Care Interventions: Hands-on evaluation, review of labs, radiographs, medical records, and discussion with the patient and medical staff in order to assess and manage the high probability of imminent or life-threatening deterioration of cardio-respiratory status requiring vasopressor support and/or intubation and mechanical ventilation.  Critical Care Time Spent: 35 minutes      VTE prophylaxis:  Lovenox    Patient condition:  Stable    Anticipated discharge and Disposition:  TBD      All diagnosis and differential diagnosis have been reviewed; assessment and plan has been documented; I have personally reviewed the labs and test results that are presently available; I have reviewed the patients medication list; I have reviewed the consulting providers response and recommendations. I have reviewed or attempted to review medical records based upon their availability    All of the patient's questions have been  addressed and answered. Patient's is agreeable to the above stated plan. I will continue to monitor closely and make adjustments to medical management as needed.  _____________________________________________________________________      Radiology:  I have personally reviewed the following imaging and agree with the radiologist.     CT Chest Abdomen Pelvis Without Contrast (XPD)  EXAMINATION  CT CHEST ABDOMEN PELVIS WITHOUT CONTRAST(XPD)    CLINICAL HISTORY  Metastatic  disease evaluation;  hypercalcemia    TECHNIQUE  Non-contrast helical-acquisition CT images were obtained and multiplanar reformats accomplished by a CT technologist at a separate workstation, pushed to PACS for physician review.  Enteric contrast was not utilized.    COMPARISON  None available at the time of initial interpretation.    FINDINGS  Images were reviewed in soft tissue, lung, and bone windows.    Exam quality: Inherently limited vascular and soft tissue assessment due to utilization of non-contrast protocol.  Quality further limited by patient movement throughout image acquisition with resulting widespread artifact particularly through the abdomen.    Lines/tubes: none visualized    Cardiovascular: Normal heart chamber size. No pericardial effusion.  Normal vessel caliber and contour through the chest, abdomen, and pelvis.    Lungs/Pleura: Central airways are widely patent.  No organized airspace consolidation or focal pulmonary lesion is identified.  However, there is diffuse ill-defined ground-glass attenuation throughout both lungs of overall nonspecific appearance.  Small layering bilateral pleural effusions are also evident.  No evidence of loculated component or pleural thickening identified.  There is no pneumothorax.    Abdominal Solid Organs: Unremarkable appearance for limited non-contrast CT assessment.  No evidence of acute abnormality appreciated.    Gallbladder/Biliary: No acute process, calcified stone, or evidence of biliary obstruction.    Gastrointestinal: No evidence of acute esophageal or abdominal hollow viscus injury. No active inflammatory process.    Pelvic Organs: No focal abnormality of the urinary bladder or adjacent structures.    Peritoneal Cavity/Retroperitoneum: No free fluid or air, and no drainable collections.    Musculoskeletal: No acute or focal subcutaneous or muscular abnormality.  There is no evidence of acute osseous displacement.  However, widespread heterogeneity  and innumerable lucent foci are appreciated throughout the spinal column, as well as the bony thorax and pelvis.  A more pronounced lytic lesion that is poorly marginated is noted at the inferior right posterolateral T11 vertebral body measuring 2.3 cm x 1.3 cm with overlying cortical disruption (series 3, image 96).  An additional focal expansile soft tissue density lesion is present at the right anterolateral 7th ribs with overlying cortical destruction and dimensions of approximately 3.6 cm x 2.3 cm (series 2, image 89).  Multiple nondisplaced, likely subacute/chronic right rib fractures also incidentally identified.    Other findings: Scattered mediastinal lymph nodes are enlarged.  For example, right posterolateral upper paratracheal node measures 1.4 cm short axis (series 2, image 19).  Subcarinal node short axis dimension 1.4 cm (image 53).  No necrotic adenopathy is identified.  The thyroid is unremarkable.    IMPRESSION  1. Widespread osseous findings suggestive of extensive metastatic skeletal disease, to include expansile destructive soft tissue component at the anterior right 7th rib.  2. Enlarged intrathoracic lymph nodes could also be of metastatic etiology.  3. Diffuse ground-glass attenuation through both lungs is nonspecific, could be secondary to atypical infectious or inflammatory process.  However, possibility of metastatic involvement cannot be excluded.  4. Additional details discussed above.  ==========    This report was flagged in Epic as abnormal.    RADIATION DOSE  Automated tube current modulation, weight-based exposure dosing, and/or iterative reconstruction technique utilized to reach lowest reasonably achievable exposure rate.    DLP: 2209 mGy*cm    Electronically signed by: Aly Russell  Date:    10/14/2023  Time:    11:37      Conor Patel MD   10/14/2023

## 2023-10-15 LAB
ALBUMIN SERPL-MCNC: 2.6 G/DL (ref 3.5–5)
BUN SERPL-MCNC: 54.8 MG/DL (ref 8.9–20.6)
CALCIUM SERPL-MCNC: 12.2 MG/DL (ref 8.4–10.2)
CHLORIDE SERPL-SCNC: 107 MMOL/L (ref 98–107)
CO2 SERPL-SCNC: 22 MMOL/L (ref 22–29)
CORTIS SERPL-SCNC: 13.7 UG/DL
CREAT SERPL-MCNC: 4.23 MG/DL (ref 0.73–1.18)
GFR SERPLBLD CREATININE-BSD FMLA CKD-EPI: 17 MLS/MIN/1.73/M2
GLUCOSE SERPL-MCNC: 102 MG/DL (ref 74–100)
PHOSPHATE SERPL-MCNC: 3.3 MG/DL (ref 2.3–4.7)
POTASSIUM SERPL-SCNC: 3.3 MMOL/L (ref 3.5–5.1)
PSA SERPL-MCNC: 0.47 NG/ML
SODIUM SERPL-SCNC: 139 MMOL/L (ref 136–145)

## 2023-10-15 PROCEDURE — 80069 RENAL FUNCTION PANEL: CPT | Performed by: INTERNAL MEDICINE

## 2023-10-15 PROCEDURE — 63600175 PHARM REV CODE 636 W HCPCS: Performed by: INTERNAL MEDICINE

## 2023-10-15 PROCEDURE — 82533 TOTAL CORTISOL: CPT | Performed by: INTERNAL MEDICINE

## 2023-10-15 PROCEDURE — 63600175 PHARM REV CODE 636 W HCPCS: Mod: JG | Performed by: INTERNAL MEDICINE

## 2023-10-15 PROCEDURE — 21400001 HC TELEMETRY ROOM

## 2023-10-15 PROCEDURE — 84153 ASSAY OF PSA TOTAL: CPT | Performed by: STUDENT IN AN ORGANIZED HEALTH CARE EDUCATION/TRAINING PROGRAM

## 2023-10-15 PROCEDURE — 25000003 PHARM REV CODE 250: Performed by: INTERNAL MEDICINE

## 2023-10-15 PROCEDURE — 99232 PR SUBSEQUENT HOSPITAL CARE,LEVL II: ICD-10-PCS | Mod: ,,, | Performed by: STUDENT IN AN ORGANIZED HEALTH CARE EDUCATION/TRAINING PROGRAM

## 2023-10-15 PROCEDURE — 99232 SBSQ HOSP IP/OBS MODERATE 35: CPT | Mod: ,,, | Performed by: STUDENT IN AN ORGANIZED HEALTH CARE EDUCATION/TRAINING PROGRAM

## 2023-10-15 RX ORDER — CALCITONIN SALMON 200 [USP'U]/ML
400 INJECTION, SOLUTION INTRAMUSCULAR; SUBCUTANEOUS 2 TIMES DAILY
Status: COMPLETED | OUTPATIENT
Start: 2023-10-15 | End: 2023-10-16

## 2023-10-15 RX ORDER — BUTALBITAL, ACETAMINOPHEN AND CAFFEINE 50; 325; 40 MG/1; MG/1; MG/1
1 TABLET ORAL EVERY 4 HOURS PRN
Status: DISCONTINUED | OUTPATIENT
Start: 2023-10-15 | End: 2023-10-21 | Stop reason: HOSPADM

## 2023-10-15 RX ORDER — POTASSIUM CHLORIDE 20 MEQ/1
40 TABLET, EXTENDED RELEASE ORAL ONCE
Status: COMPLETED | OUTPATIENT
Start: 2023-10-15 | End: 2023-10-15

## 2023-10-15 RX ORDER — FOLIC ACID 1 MG/1
1 TABLET ORAL DAILY
Status: DISCONTINUED | OUTPATIENT
Start: 2023-10-16 | End: 2023-10-21 | Stop reason: HOSPADM

## 2023-10-15 RX ORDER — LORAZEPAM 1 MG/1
1 TABLET ORAL EVERY 4 HOURS PRN
Status: DISCONTINUED | OUTPATIENT
Start: 2023-10-15 | End: 2023-10-21 | Stop reason: HOSPADM

## 2023-10-15 RX ORDER — CHLORDIAZEPOXIDE HYDROCHLORIDE 5 MG/1
5 CAPSULE, GELATIN COATED ORAL 2 TIMES DAILY
Status: DISCONTINUED | OUTPATIENT
Start: 2023-10-15 | End: 2023-10-21

## 2023-10-15 RX ORDER — ONDANSETRON 2 MG/ML
4 INJECTION INTRAMUSCULAR; INTRAVENOUS EVERY 4 HOURS PRN
Status: DISCONTINUED | OUTPATIENT
Start: 2023-10-15 | End: 2023-10-21 | Stop reason: HOSPADM

## 2023-10-15 RX ORDER — THIAMINE HCL 100 MG
100 TABLET ORAL DAILY
Status: DISCONTINUED | OUTPATIENT
Start: 2023-10-15 | End: 2023-10-21 | Stop reason: HOSPADM

## 2023-10-15 RX ADMIN — POTASSIUM CHLORIDE 40 MEQ: 1500 TABLET, EXTENDED RELEASE ORAL at 08:10

## 2023-10-15 RX ADMIN — BUTALBITAL, ACETAMINOPHEN, AND CAFFEINE 1 TABLET: 325; 50; 40 TABLET ORAL at 07:10

## 2023-10-15 RX ADMIN — ONDANSETRON 4 MG: 2 INJECTION INTRAMUSCULAR; INTRAVENOUS at 01:10

## 2023-10-15 RX ADMIN — ACETAMINOPHEN 650 MG: 325 TABLET, FILM COATED ORAL at 08:10

## 2023-10-15 RX ADMIN — CALCITONIN SALMON 400 UNITS: 200 INJECTION, SOLUTION INTRAMUSCULAR; SUBCUTANEOUS at 09:10

## 2023-10-15 RX ADMIN — ENOXAPARIN SODIUM 30 MG: 30 INJECTION SUBCUTANEOUS at 05:10

## 2023-10-15 RX ADMIN — SODIUM CHLORIDE: 9 INJECTION, SOLUTION INTRAVENOUS at 04:10

## 2023-10-15 RX ADMIN — PANTOPRAZOLE SODIUM 40 MG: 40 TABLET, DELAYED RELEASE ORAL at 08:10

## 2023-10-15 RX ADMIN — FUROSEMIDE 20 MG: 10 INJECTION, SOLUTION INTRAMUSCULAR; INTRAVENOUS at 08:10

## 2023-10-15 RX ADMIN — SODIUM CHLORIDE: 9 INJECTION, SOLUTION INTRAVENOUS at 03:10

## 2023-10-15 RX ADMIN — ZOLPIDEM TARTRATE 10 MG: 5 TABLET ORAL at 08:10

## 2023-10-15 RX ADMIN — SODIUM CHLORIDE: 9 INJECTION, SOLUTION INTRAVENOUS at 10:10

## 2023-10-15 RX ADMIN — THIAMINE HCL TAB 100 MG 100 MG: 100 TAB at 11:10

## 2023-10-15 RX ADMIN — CHLORDIAZEPOXIDE HYDROCHLORIDE 5 MG: 5 CAPSULE ORAL at 11:10

## 2023-10-15 RX ADMIN — SODIUM CHLORIDE: 9 INJECTION, SOLUTION INTRAVENOUS at 11:10

## 2023-10-15 RX ADMIN — BUTALBITAL, ACETAMINOPHEN, AND CAFFEINE 1 TABLET: 325; 50; 40 TABLET ORAL at 01:10

## 2023-10-15 NOTE — PLAN OF CARE
Problem: Adult Inpatient Plan of Care  Goal: Plan of Care Review  Outcome: Ongoing, Progressing  Goal: Patient-Specific Goal (Individualized)  Outcome: Ongoing, Progressing  Goal: Absence of Hospital-Acquired Illness or Injury  Outcome: Ongoing, Progressing  Goal: Optimal Comfort and Wellbeing  Outcome: Ongoing, Progressing  Goal: Readiness for Transition of Care  Outcome: Ongoing, Progressing     Problem: Fluid and Electrolyte Imbalance (Acute Kidney Injury/Impairment)  Goal: Fluid and Electrolyte Balance  Outcome: Ongoing, Progressing     Problem: Oral Intake Inadequate (Acute Kidney Injury/Impairment)  Goal: Optimal Nutrition Intake  Outcome: Ongoing, Progressing     Problem: Renal Function Impairment (Acute Kidney Injury/Impairment)  Goal: Effective Renal Function  Outcome: Ongoing, Progressing     Problem: Pain Acute  Goal: Acceptable Pain Control and Functional Ability  Outcome: Ongoing, Progressing     Problem: Fatigue  Goal: Improved Activity Tolerance  Outcome: Ongoing, Progressing

## 2023-10-15 NOTE — PROGRESS NOTES
[facility]  Hematology/Oncology  Consult Note    Patient Name: Pete Fernandez  MRN: [id number]  Admission Date: 10/13/2023  Hospital Length of Stay: 2 days  Attending Provider: Conor Patel MD  Consulting Provider: Elizabeth K Lejeune, MD  Principal Problem:Hypercalcemia    Consults  Subjective:     HPI:   44yo M presented to [facility] on 10/11 for hypercalcemia and extreme weakness. Labs notable for new severe DOUG, transaminitis, and Ca of 18. He was admitted and started on IVF resuscitation.  Hypercalcemia workup was initiated, notable for PTH suppressed at 8.2.   Xray Survey was done which showed small 5mm calvarium defects. Follow up NM Bone scan and MRI Brain with no evidence of lytic lesions. Xray survey is known for high false positive rates, especially to the calvarium. Oncology was been consulted for further evaluation. An SPEP in currently in process.     Interval History  Patient seen and examined this morning.   I discussed his CT findings and the concern for metastatic osseous disease.   He has a family history of his dad with prostate cancer in his 40's/50's and mom with breast cancer in her 40/50's.   He is unable to feel comfortable in hospital bed.   He has no smoking history    Oncology Treatment Plan:   [No matching plan found]    Medications:  Continuous Infusions:   sodium chloride 0.9% 150 mL/hr at 10/15/23 0356     Scheduled Meds:   enoxparin  30 mg Subcutaneous Q24H (prophylaxis, 1700)    furosemide (LASIX) injection  20 mg Intravenous Daily    pantoprazole  40 mg Oral Daily    zolpidem  10 mg Oral QHS     PRN Meds:acetaminophen, ALPRAZolam, cloNIDine, melatonin, sodium chloride 0.9%     Review of patient's allergies indicates:  Not on File     Past Medical History:   Diagnosis Date    Anxiety disorder, unspecified     Hypercalcemia      Past Surgical History:   Procedure Laterality Date    TONSILLECTOMY       Family History       Problem Relation (Age of Onset)     Breast cancer Mother    Pancreatic cancer Father          Tobacco Use    Smoking status: Never    Smokeless tobacco: Never   Substance and Sexual Activity    Alcohol use: Yes    Drug use: Never    Sexual activity: Not on file       Review of Systems   Constitutional:  Positive for activity change and fatigue. Negative for chills.   HENT:  Negative for sore throat.    Eyes:  Negative for visual disturbance.   Respiratory:  Negative for shortness of breath.    Cardiovascular:  Negative for chest pain.   Gastrointestinal:  Positive for abdominal pain, constipation and nausea. Negative for diarrhea and vomiting.   Endocrine: Negative for polyuria.   Genitourinary:  Negative for dysuria.   Musculoskeletal:  Negative for back pain.   Skin:  Negative for wound.   Neurological:  Negative for headaches.   Hematological:  Does not bruise/bleed easily.   Psychiatric/Behavioral:  Negative for confusion.      Objective:     Vital Signs (Most Recent):  Temp: 97.7 °F (36.5 °C) (10/15/23 0359)  Pulse: 86 (10/15/23 0359)  Resp: 20 (10/15/23 0359)  BP: (!) 144/87 (10/15/23 0359)  SpO2: (!) 93 % (10/15/23 0359) Vital Signs (24h Range):  Temp:  [97.7 °F (36.5 °C)-98.9 °F (37.2 °C)] 97.7 °F (36.5 °C)  Pulse:  [84-91] 86  Resp:  [18-20] 20  SpO2:  [93 %-95 %] 93 %  BP: (139-150)/(77-90) 144/87     Weight: 109.7 kg (241 lb 12.8 oz)  Body mass index is 35.71 kg/m².  Body surface area is 2.31 meters squared.      Intake/Output Summary (Last 24 hours) at 10/15/2023 0835  Last data filed at 10/14/2023 1819  Gross per 24 hour   Intake 1161.29 ml   Output --   Net 1161.29 ml         Physical Exam  Constitutional:       General: He is not in acute distress.     Appearance: Normal appearance. He is not ill-appearing.   HENT:      Head: Normocephalic and atraumatic.      Nose: Nose normal.      Mouth/Throat:      Mouth: Mucous membranes are moist.      Pharynx: Oropharynx is clear.   Eyes:      Extraocular Movements: Extraocular movements  intact.      Conjunctiva/sclera: Conjunctivae normal.      Pupils: Pupils are equal, round, and reactive to light.   Cardiovascular:      Rate and Rhythm: Normal rate and regular rhythm.      Pulses: Normal pulses.      Heart sounds: Normal heart sounds. No murmur heard.  Pulmonary:      Effort: Pulmonary effort is normal. No respiratory distress.      Breath sounds: Normal breath sounds.   Abdominal:      General: There is no distension.      Palpations: Abdomen is soft.      Tenderness: There is no abdominal tenderness.   Musculoskeletal:         General: Normal range of motion.      Cervical back: Normal range of motion and neck supple.      Right lower leg: No edema.      Left lower leg: No edema.   Lymphadenopathy:      Cervical: No cervical adenopathy.   Skin:     General: Skin is warm and dry.   Neurological:      General: No focal deficit present.      Mental Status: He is alert and oriented to person, place, and time.         Significant Labs:   CBC:   Recent Labs   Lab 10/14/23  0452   WBC 9.00   HGB 10.6*   HCT 30.3*         and CMP:   Recent Labs   Lab 10/14/23  0452 10/15/23  0407    139   K 3.7 3.3*   CO2 23 22   BUN 61.6* 54.8*   CREATININE 4.75* 4.23*   CALCIUM 12.4* 12.2*   ALBUMIN 2.7* 2.6*   BILITOT 0.7  --    ALKPHOS 68  --    AST 44*  --    ALT 79*  --          Diagnostic Results:    10/11/23 Xray Metastatic Survey  1. Scattered small defects at the calvarium measuring up to 5 mm too numerous to count.  2. Degene mild rative changes at the spine and extremities  3. MR exam and bone scan would allow further evaluation.    10/12/23 MRI Brain  1. Motion artifact  2. Mild scattered mucosal thickening throughout the paranasal sinuses  3. Somewhat limited evaluation of brain metastases without the administration of IV contrast  4. Otherwise unremarkable MR exam of the head without the administration of IV contrast    10/12/23 NM Bone Scan  1. Abnormal intense increased activity throughout  the lungs of uncertain etiology.  This may related to hyperparathyroidism, hematologic malignancy, and or metabolic abnormality such as renal failure, vasculitis, or pulmonary infection.  Chest x-ray 10/10/2023 reveals the lungs to be relatively clear and well aerated.  2. Suspect scattered mild degenerative changes throughout the joint spaces  3. Increased activity nasal maxillary region suggesting mucoperiosteal disease  4. Mild activity at the stomach suggesting free pertechnetate  Assessment/Plan:     Hypercalcemia  Severe DOUG  Possible calvarium lesions on Xray    - Recommend calcitonin administration for Hyperca. Second dose 8u/kg.   - Continue aggressive IVF administration, watching for signs of volume overload  - Follow up pending multiple myeloma workup, I have added further labs for completeness. It would be strange for such drastic change in labs in <2 months to be due to MM but will be still appropriate to rule this out.   - Order PSA to rule out metastatic prostate cancer given family history and osseous findings      Elizabeth K Lejeune, MD  Hematology/Oncology  [facility]

## 2023-10-15 NOTE — PLAN OF CARE
Problem: Adult Inpatient Plan of Care  Goal: Plan of Care Review  Outcome: Ongoing, Progressing  Flowsheets (Taken 10/14/2023 2009)  Plan of Care Reviewed With:   patient   spouse  Goal: Patient-Specific Goal (Individualized)  Outcome: Ongoing, Progressing  Goal: Absence of Hospital-Acquired Illness or Injury  Outcome: Ongoing, Progressing  Intervention: Identify and Manage Fall Risk  Flowsheets (Taken 10/14/2023 2009)  Safety Promotion/Fall Prevention:   assistive device/personal item within reach   medications reviewed   nonskid shoes/socks when out of bed   commode/urinal/bedpan at bedside  Intervention: Prevent Skin Injury  Flowsheets (Taken 10/14/2023 2009)  Body Position:   position changed independently   supine  Skin Protection:   adhesive use limited   transparent dressing maintained  Intervention: Prevent and Manage VTE (Venous Thromboembolism) Risk  Flowsheets (Taken 10/14/2023 2009)  Activity Management:   Arm raise - L1   Ambulated to bathroom - L4  VTE Prevention/Management:   remove, assess skin, and reapply sequential compression device   bleeding risk assessed   ROM (active) performed   ROM (passive) performed   dorsiflexion/plantar flexion performed   intravenous hydration   fluids promoted  Range of Motion:   ROM (range of motion) performed   active ROM (range of motion) encouraged  Intervention: Prevent Infection  Flowsheets (Taken 10/14/2023 2009)  Infection Prevention:   single patient room provided   rest/sleep promoted   hand hygiene promoted  Goal: Optimal Comfort and Wellbeing  Outcome: Ongoing, Progressing  Intervention: Monitor Pain and Promote Comfort  Flowsheets (Taken 10/14/2023 2009)  Pain Management Interventions:   pain management plan reviewed with patient/caregiver   care clustered   medication offered  Intervention: Provide Person-Centered Care  Flowsheets (Taken 10/14/2023 2009)  Trust Relationship/Rapport:   questions encouraged   care explained   choices provided    reassurance provided   thoughts/feelings acknowledged   emotional support provided   empathic listening provided   questions answered  Goal: Readiness for Transition of Care  Outcome: Ongoing, Progressing

## 2023-10-15 NOTE — PROGRESS NOTES
"Progress Note  Nephrology    Admit Date: 10/13/2023   LOS: 2 days     SUBJECTIVE:     Follow-up For:  DOUG / Hypercalcemia    Interval History:  44 Y/O male with history of Anxiety disorder , admitted to [facility] due to severe hypercalcemia   Received calcitonin and IV fluids and also pamiodronate    Serum calcium decreased from 18 to 12  Transferred here for oncology eval and threatment     Review of Systems:  Constitutional: No fever or chills  Respiratory: No cough or shortness of breath  Cardiovascular: No chest pain or palpitations  Gastrointestinal: No nausea or vomiting  Neurological: No confusion or weakness    OBJECTIVE:     Vital Signs Range (Last 24H):  Vitals:    10/15/23 1228   BP: (!) 153/90   Pulse: 84   Resp:    Temp: 97.9 °F (36.6 °C)       Temp:  [97.7 °F (36.5 °C)-98.3 °F (36.8 °C)]   Pulse:  [80-89]   Resp:  [18-20]   BP: (139-153)/(77-90)   SpO2:  [93 %-96 %]     I & O (Last 24H):  Intake/Output Summary (Last 24 hours) at 10/15/2023 1232  Last data filed at 10/15/2023 0843  Gross per 24 hour   Intake 3245.97 ml   Output 1900 ml   Net 1345.97 ml       Physical Exam:  Alert , oriented , in NAD  Head   normal   Neck   supple   Chest   symmetric   Lungs   clear   Heart   RRR  Abdomen   soft   Ext   no edema    Laboratory Data:    Recent Labs   Lab 10/15/23  0407      K 3.3*   CO2 22   BUN 54.8*   CREATININE 4.23*   GLUCOSE 102*   CALCIUM 12.2*   PHOS 3.3     Lab Results   Component Value Date    PTH 8.2 (L) 10/10/2023    CALCIUM 12.2 (HH) 10/15/2023    PHOS 3.3 10/15/2023     No results found for: "IRON", "TIBC", "FERRITIN", "SATURATEDIRO"    Medications:  Medication list was reviewed and changes noted under Assessment/Plan.    Diagnostic Results:    Reviewed most recent CT/US/Echo/MRI    ASSESSMENT/PLAN:   1   DOUG  creatinin slowely improving  2   Hypercalcemia of maluignancy  3   Bone mets  source  ?  4   proteinuria  5.3 grams  5   Anxiety     Plan   calcitonin again             May " need to add Zometa  if calcitonin not helped

## 2023-10-16 LAB
ANION GAP SERPL CALC-SCNC: 8 MEQ/L
BASOPHILS # BLD AUTO: 0.05 X10(3)/MCL
BASOPHILS NFR BLD AUTO: 0.4 %
BUN SERPL-MCNC: 46.4 MG/DL (ref 8.9–20.6)
CALCIUM SERPL-MCNC: 11.4 MG/DL (ref 8.4–10.2)
CHLORIDE SERPL-SCNC: 111 MMOL/L (ref 98–107)
CO2 SERPL-SCNC: 20 MMOL/L (ref 22–29)
CREAT SERPL-MCNC: 3.67 MG/DL (ref 0.73–1.18)
CREAT/UREA NIT SERPL: 13
EOSINOPHIL # BLD AUTO: 1 X10(3)/MCL (ref 0–0.9)
EOSINOPHIL NFR BLD AUTO: 8.6 %
ERYTHROCYTE [DISTWIDTH] IN BLOOD BY AUTOMATED COUNT: 13.7 % (ref 11.5–17)
GFR SERPLBLD CREATININE-BSD FMLA CKD-EPI: 20 MLS/MIN/1.73/M2
GLUCOSE SERPL-MCNC: 105 MG/DL (ref 74–100)
HCT VFR BLD AUTO: 30.1 % (ref 42–52)
HGB BLD-MCNC: 10.3 G/DL (ref 14–18)
IMM GRANULOCYTES # BLD AUTO: 0.31 X10(3)/MCL (ref 0–0.04)
IMM GRANULOCYTES NFR BLD AUTO: 2.7 %
LYMPHOCYTES # BLD AUTO: 1.79 X10(3)/MCL (ref 0.6–4.6)
LYMPHOCYTES NFR BLD AUTO: 15.3 %
MAGNESIUM SERPL-MCNC: 2 MG/DL (ref 1.6–2.6)
MAYO GENERIC ORDERABLE RESULT: ABNORMAL
MCH RBC QN AUTO: 31.7 PG (ref 27–31)
MCHC RBC AUTO-ENTMCNC: 34.2 G/DL (ref 33–36)
MCV RBC AUTO: 92.6 FL (ref 80–94)
MONOCYTES # BLD AUTO: 0.67 X10(3)/MCL (ref 0.1–1.3)
MONOCYTES NFR BLD AUTO: 5.7 %
NEUTROPHILS # BLD AUTO: 7.87 X10(3)/MCL (ref 2.1–9.2)
NEUTROPHILS NFR BLD AUTO: 67.3 %
NRBC BLD AUTO-RTO: 0 %
PHOSPHATE SERPL-MCNC: 3.6 MG/DL (ref 2.3–4.7)
PLATELET # BLD AUTO: 174 X10(3)/MCL (ref 130–400)
PMV BLD AUTO: 9.5 FL (ref 7.4–10.4)
POTASSIUM SERPL-SCNC: 3.6 MMOL/L (ref 3.5–5.1)
RBC # BLD AUTO: 3.25 X10(6)/MCL (ref 4.7–6.1)
SODIUM SERPL-SCNC: 139 MMOL/L (ref 136–145)
WBC # SPEC AUTO: 11.69 X10(3)/MCL (ref 4.5–11.5)

## 2023-10-16 PROCEDURE — 83735 ASSAY OF MAGNESIUM: CPT | Performed by: INTERNAL MEDICINE

## 2023-10-16 PROCEDURE — 84166 PROTEIN E-PHORESIS/URINE/CSF: CPT | Mod: 59

## 2023-10-16 PROCEDURE — 21400001 HC TELEMETRY ROOM

## 2023-10-16 PROCEDURE — 84166 PROTEIN E-PHORESIS/URINE/CSF: CPT | Mod: 59 | Performed by: STUDENT IN AN ORGANIZED HEALTH CARE EDUCATION/TRAINING PROGRAM

## 2023-10-16 PROCEDURE — 25000003 PHARM REV CODE 250: Performed by: INTERNAL MEDICINE

## 2023-10-16 PROCEDURE — 80048 BASIC METABOLIC PNL TOTAL CA: CPT | Performed by: INTERNAL MEDICINE

## 2023-10-16 PROCEDURE — 63600175 PHARM REV CODE 636 W HCPCS: Mod: JG | Performed by: INTERNAL MEDICINE

## 2023-10-16 PROCEDURE — 63600175 PHARM REV CODE 636 W HCPCS: Performed by: INTERNAL MEDICINE

## 2023-10-16 PROCEDURE — 84100 ASSAY OF PHOSPHORUS: CPT | Performed by: INTERNAL MEDICINE

## 2023-10-16 PROCEDURE — 85025 COMPLETE CBC W/AUTO DIFF WBC: CPT | Performed by: INTERNAL MEDICINE

## 2023-10-16 PROCEDURE — 84156 ASSAY OF PROTEIN URINE: CPT

## 2023-10-16 RX ADMIN — SODIUM CHLORIDE: 9 INJECTION, SOLUTION INTRAVENOUS at 06:10

## 2023-10-16 RX ADMIN — PANTOPRAZOLE SODIUM 40 MG: 40 TABLET, DELAYED RELEASE ORAL at 12:10

## 2023-10-16 RX ADMIN — ENOXAPARIN SODIUM 30 MG: 30 INJECTION SUBCUTANEOUS at 07:10

## 2023-10-16 RX ADMIN — BUTALBITAL, ACETAMINOPHEN, AND CAFFEINE 1 TABLET: 325; 50; 40 TABLET ORAL at 03:10

## 2023-10-16 RX ADMIN — SODIUM CHLORIDE: 9 INJECTION, SOLUTION INTRAVENOUS at 07:10

## 2023-10-16 RX ADMIN — CHLORDIAZEPOXIDE HYDROCHLORIDE 5 MG: 5 CAPSULE ORAL at 12:10

## 2023-10-16 RX ADMIN — THERA TABS 1 TABLET: TAB at 12:10

## 2023-10-16 RX ADMIN — THIAMINE HCL TAB 100 MG 100 MG: 100 TAB at 12:10

## 2023-10-16 RX ADMIN — FOLIC ACID 1 MG: 1 TABLET ORAL at 12:10

## 2023-10-16 RX ADMIN — CHLORDIAZEPOXIDE HYDROCHLORIDE 5 MG: 5 CAPSULE ORAL at 08:10

## 2023-10-16 RX ADMIN — FUROSEMIDE 20 MG: 10 INJECTION, SOLUTION INTRAMUSCULAR; INTRAVENOUS at 12:10

## 2023-10-16 RX ADMIN — CALCITONIN SALMON 400 UNITS: 200 INJECTION, SOLUTION INTRAMUSCULAR; SUBCUTANEOUS at 12:10

## 2023-10-16 RX ADMIN — ZOLPIDEM TARTRATE 10 MG: 5 TABLET ORAL at 08:10

## 2023-10-16 RX ADMIN — ONDANSETRON 4 MG: 2 INJECTION INTRAMUSCULAR; INTRAVENOUS at 03:10

## 2023-10-16 RX ADMIN — BUTALBITAL, ACETAMINOPHEN, AND CAFFEINE 1 TABLET: 325; 50; 40 TABLET ORAL at 08:10

## 2023-10-16 RX ADMIN — LORAZEPAM 1 MG: 1 TABLET ORAL at 08:10

## 2023-10-16 NOTE — PLAN OF CARE
10/16/23 1254   Discharge Assessment   Assessment Type Discharge Planning Assessment   Confirmed/corrected address, phone number and insurance Yes   Confirmed Demographics Correct on Facesheet   Source of Information patient;family   When was your last doctors appointment?   (Patient reports last week.)   Does patient/caregiver understand observation status Yes   Communicated DAVID with patient/caregiver Yes   Reason For Admission Hypercalcemia   People in Home spouse   Do you expect to return to your current living situation? Yes   Do you have help at home or someone to help you manage your care at home? Yes   Who are your caregiver(s) and their phone number(s)? Spouse: Rosalinda Fernandez: [phone]   Prior to hospitilization cognitive status: Unable to Assess   Current cognitive status: Alert/Oriented   Home Accessibility wheelchair accessible   Home Layout Able to live on 1st floor   Equipment Currently Used at Home none   Readmission within 30 days? No   Patient currently being followed by outpatient case management? No   Do you currently have service(s) that help you manage your care at home? No   Do you take prescription medications? Yes   Do you have prescription coverage? Yes   Coverage BCBS   Do you have any problems affording any of your prescribed medications? No   Is the patient taking medications as prescribed? yes   Who is going to help you get home at discharge? Spouse   How do you get to doctors appointments? car, drives self   Are you on dialysis? No   Do you take coumadin? No   DME Needed Upon Discharge  none   Discharge Plan discussed with: Patient   Transition of Care Barriers None   Discharge Plan A Home with family   Discharge Plan B Home   OTHER   Name(s) of People in Home Patient resides with his wife at home.       Patient's PCP is Kamilla Donaldson in [place], [place].  Patient is .  No barriers to discharge at this time.

## 2023-10-16 NOTE — PROGRESS NOTES
"Inpatient Nutrition Evaluation    Admit Date: 10/13/2023   Total duration of encounter: 2 days    Nutrition Recommendation/Prescription     Regular diet as tolerated    Boost TID. 240 kcals and 10 g protein per serving.     Consider thiamine supplementation and MVI.     Nutrition Assessment     Chart Review    Reason Seen: malnutrition screening tool (MST)    Malnutrition Screening Tool Results   Have you recently lost weight without trying?: Unsure  Have you been eating poorly because of a decreased appetite?: No   MST Score: 2     Diagnosis:  Active Problem List with Overview Notes    Diagnosis Date Noted    DOUG (acute kidney injury) 10/10/2023    Hypercalcemia 10/10/2023    Transaminitis 10/10/2023        Relevant Medical History:   Past Medical History:   Diagnosis Date    Anxiety disorder, unspecified     Hypercalcemia      Past Surgical History:   Procedure Laterality Date    TONSILLECTOMY       Review of patient's allergies indicates:  Not on File     Nutrition-Related Medications:    calcitonin  400 Units Subcutaneous BID    enoxparin  30 mg Subcutaneous Q24H (prophylaxis, 1700)    furosemide (LASIX) injection  20 mg Intravenous Daily    pantoprazole  40 mg Oral Daily    zolpidem  10 mg Oral QHS       sodium chloride 0.9% 150 mL/hr at 10/15/23 1650      Calorie Containing IV Medications: no significant kcals from medications at this time    Nutrition-Related Labs:  No results found for: "HGBA1C"  10/14/2023: Magnesium Level 2.10 mg/dL (Ref range: 1.60 - 2.60 mg/dL)  10/15/2023: Phosphorus Level 3.3 mg/dL (Ref range: 2.3 - 4.7 mg/dL); Potassium Level 3.3 mmol/L (L; Ref range: 3.5 - 5.1 mmol/L); Sodium Level 139 mmol/L (Ref range: 136 - 145 mmol/L)   Recent Labs   Lab 10/10/23  2149 10/14/23  0452   WBC 12.19* 9.00   RBC 4.28* 3.32*   HGB 13.6* 10.6*   HCT 38.4* 30.3*   MCV 89.7 91.3   MCH 31.8* 31.9*   MCHC 35.4 35.0     Recent Labs   Lab 10/10/23  2149 10/11/23  0415 10/12/23  0940 10/13/23  0443 " "10/14/23  0452 10/15/23  0407    134* 139 134* 137 139   K 3.9 3.9 4.4 4.1 3.7 3.3*   CO2 34* 35* 31* 29 23 22   BUN 47.0* 48.0* 64.0* 70.0* 61.6* 54.8*   CREATININE 3.36* 3.42* 4.59* 4.93* 4.75* 4.23*   CALCIUM 18.3* 17.0* 16.4* 14.3* 12.4* 12.2*   PH TNP  --   --   --   --   --    MG 2.20 2.00  --  2.00 2.10  --    ALBUMIN 3.5  --  2.9* 2.6* 2.7* 2.6*   ALKPHOS 86  --  69 60 68  --    ALT 79*  --  78* 88* 79*  --    AST 43*  --  57* 67* 44*  --    BILITOT 1.2  --  0.9 0.9 0.7  --        Diet Order: DIET RENAL NON-DIALYSIS  Oral Supplement Order: none  Appetite/Oral Intake: fair/50-75% of meals  Factors Affecting Nutritional Intake: decreased appetite  Food/Latter day/Cultural Preferences: none reported  Food Allergies: none reported       Wound(s):   n/a    Comments    10/15/23: Pt reports fair appetite, somewhat decreased compared to normal but still with pretty good po intake, weight is steady/consistent w/ UBW , experiencing mild intermittent nausea, no other GI complaints, normal BM today, pt divulged alcohol consumption habits - [place], discussed with physician in case medically relevant, cessation encouraged, pt amenable, sounds like he is in the contemplation stage. Encouraged healthy diet, good po intake, he is open to vanilla ONS.     Anthropometrics    Height: 5' 9" (175.3 cm) Height Method: Stated  Last Weight: 109.7 kg (241 lb 12.8 oz) (10/13/23 2100) Weight Method: Standard Scale  BMI (Calculated): 35.7  BMI Classification: obese grade II (BMI 35-39.9)        Ideal Body Weight (IBW), Male: 160 lb     % Ideal Body Weight, Male (lb): 151.13 %                          Usual Weight Provided By: patient 240 lbs    Wt Readings from Last 5 Encounters:   10/13/23 109.7 kg (241 lb 12.8 oz)   10/12/23 109.1 kg (240 lb 9.6 oz)     Weight Change(s) Since Admission:  Admit Weight: 109.7 kg (241 lb 12.8 oz) (10/13/23 2030)      Patient Education    Education Provided: general healthy " diet  Teaching Method: explanation  Comprehension: verbalizes understanding  Barriers to Learning: none evident  Expected Compliance: good  Comments: All questions were answered and dietitian's contact information was provided.     Monitoring & Evaluation     Dietitian will monitor food and beverage intake, weight, electrolyte/renal panel, glucose/endocrine profile, and gastrointestinal profile.  Nutrition Risk/Follow-Up: low (follow-up in 5-7 days)  Patients assigned 'low nutrition risk' status do not qualify for a full nutritional assessment but will be monitored and re-evaluated in a 5-7 day time period. Please consult if re-evaluation needed sooner.

## 2023-10-16 NOTE — PLAN OF CARE
Problem: Adult Inpatient Plan of Care  Goal: Plan of Care Review  Outcome: Ongoing, Progressing  Flowsheets (Taken 10/15/2023 1928)  Plan of Care Reviewed With: patient  Goal: Patient-Specific Goal (Individualized)  Outcome: Ongoing, Progressing  Flowsheets (Taken 10/15/2023 1928)  Individualized Care Needs: monitor labs., ivf, manage pain  Goal: Absence of Hospital-Acquired Illness or Injury  Outcome: Ongoing, Progressing  Intervention: Prevent and Manage VTE (Venous Thromboembolism) Risk  Flowsheets (Taken 10/15/2023 1928)  Activity Management: Ambulated to bathroom - L4  VTE Prevention/Management: (lovenox)   intravenous hydration   other (see comments)  Intervention: Prevent Infection  Flowsheets (Taken 10/15/2023 1928)  Infection Prevention:   rest/sleep promoted   hand hygiene promoted   single patient room provided  Goal: Optimal Comfort and Wellbeing  Outcome: Ongoing, Progressing  Intervention: Monitor Pain and Promote Comfort  Flowsheets (Taken 10/15/2023 1928)  Pain Management Interventions:   medication offered   care clustered   quiet environment facilitated  Intervention: Provide Person-Centered Care  Flowsheets (Taken 10/15/2023 1928)  Trust Relationship/Rapport:   care explained   questions encouraged   choices provided   questions answered   thoughts/feelings acknowledged

## 2023-10-16 NOTE — PROGRESS NOTES
[facility]  Hospital Medicine Progress Note        Chief Complaint: Inpatient Follow-up for     HPI:   Patient is a 43-year-old male with a history of ADHD who presented to [facility] on 10/10/2023 with complaints of severe weakness, nausea that was progressively worsening over a week.  He also reported numbness around his lips and urinary frequency without dysuria. He was found to have severe hypercalcemia and acute renal failure on arrival with reported labs being normal prior in August, and was given a dose of pamidronate with a steady down trend of his serum calcium since.  Parathyroid hormone was appropriately suppressed, vitamin-D with within normal limits.     He was seen by both Nephrology and Oncology and ultimately found to have calvarium lesions on x-ray patient was diagnosed with a paraproteinemia and a workup for multiple myeloma was initiated.  His serum beta 2 microglobulin has come back elevated.  He was found to have nephrotic range proteinuria during that admission as well but remained nonoliguric with close monitoring of his renal function by Nephrology. Because there was no calcitonin available at the prior facility he is transferred here to receive calcitonin for hypercalcemia.  Of note he had an ultrasound done which showed biliary sludge upper limits of normal thickness with a positive Arteaga's so there was a tentative plan to get a HIDA scan to exclude cholecystitis.     CT chest , abd, pelvis done in this facility showed widespread osseous findings suggestive of extensive metastatic skeletal disease including  expansile destructive soft tissue component at the anterior right 7th rib. Diffuse ground-glass attenuation through both lungs suggestive of infection vs inflammatory process vs metastatic involvement.  HIDA scan was unremarkable.    Interval Hx:   Blood pressure elevated.  Afebrile. Labs today showing improving calcium, elevated BUN and serum  creatinine with leukocytosis, stable platelets, metabolic acidosis. M spike noted in gamma fraction      Objective/physical exam:  Vitals:    10/15/23 2000 10/15/23 2338 10/16/23 0400 10/16/23 0812   BP: (!) 158/85 (!) 144/88 (!) 150/85 (!) 162/95   Pulse: 83 92 93 94   Resp: 18 18 18 20   Temp: 98.1 °F (36.7 °C) 98.3 °F (36.8 °C) 97.7 °F (36.5 °C) 98.5 °F (36.9 °C)   TempSrc:    Oral   SpO2: 97% 95% (!) 94% 95%   Weight:       Height:         General: In no acute distress, afebrile  Respiratory: Clear to auscultation bilaterally  Cardiovascular: S1, S2, no appreciable murmur  Abdomen: Soft, nontender, BS +  MSK: Warm, no lower extremity edema, no clubbing or cyanosis  Neurologic: Alert and oriented x4, moving all extremities with good strength     Lab Results   Component Value Date     10/16/2023    K 3.6 10/16/2023    CO2 20 (L) 10/16/2023    BUN 46.4 (H) 10/16/2023    CREATININE 3.67 (H) 10/16/2023    CALCIUM 11.4 (H) 10/16/2023      Lab Results   Component Value Date    ALT 79 (H) 10/14/2023    AST 44 (H) 10/14/2023    ALKPHOS 68 10/14/2023    BILITOT 0.7 10/14/2023      Lab Results   Component Value Date    WBC 11.69 (H) 10/16/2023    HGB 10.3 (L) 10/16/2023    HCT 30.1 (L) 10/16/2023    MCV 92.6 10/16/2023     10/16/2023           Medications:   calcitonin  400 Units Subcutaneous BID    chlordiazepoxide  5 mg Oral BID    enoxparin  30 mg Subcutaneous Q24H (prophylaxis, 1700)    folic acid  1 mg Oral Daily    furosemide (LASIX) injection  20 mg Intravenous Daily    multivitamin  1 tablet Oral Daily    pantoprazole  40 mg Oral Daily    thiamine  100 mg Oral Daily    zolpidem  10 mg Oral QHS      acetaminophen, butalbital-acetaminophen-caffeine -40 mg, cloNIDine, LORazepam, melatonin, ondansetron, sodium chloride 0.9%     Assessment/Plan:    Hypercalcemia malignancy -improving  DOUG due to above-improving  Possible multiple myeloma  Metastatic skeletal disease with  expansile destructive soft  tissue component at the anterior right 7th rib  Anemia of chronic disease   Regular alcohol use while off work ( work [place] 1 week on , 1 week off)  Obesity, BMI 35.7    Plan:  -calcium improving.  continue calcitonin with diuresis and IV hydration.  Nephrology following  -Oncology following  -counseled on alcohol cessation.  Continue vitamin supplementation for supportive care with librium    Lovenox        Michele Jurado MD

## 2023-10-16 NOTE — PROGRESS NOTES
[facility]  Hospital Medicine Progress Note        Chief Complaint: Inpatient Follow-up for acute kidney injury and hypercalcemia    HPI:   Patient is a 43-year-old male with a history of ADHD who presented to [facility] on 10/10/2023 with complaints of severe weakness, nausea that was progressively worsening over a week.  He also reported numbness around his lips and urinary frequency without dysuria. He was found to have severe hypercalcemia and acute renal failure on arrival with reported labs being normal prior in August, and was given a dose of pamidronate with a steady down trend of his serum calcium since.  Parathyroid hormone was appropriately suppressed, vitamin-D with within normal limits.     He was seen by both Nephrology and Oncology and ultimately found to have calvarium lesions on x-ray patient was diagnosed with a paraproteinemia and a workup for multiple myeloma was initiated.  His serum beta 2 microglobulin has come back elevated.  He was found to have nephrotic range proteinuria during that admission as well but remained nonoliguric with close monitoring of his renal function by Nephrology. Because there was no calcitonin available at the prior facility he is transferred here to receive calcitonin for hypercalcemia.  Of note he had an ultrasound done which showed biliary sludge upper limits of normal thickness with a positive Arteaga's so there was a tentative plan to get a HIDA scan to exclude cholecystitis.    CT chest , abd, pelvis done in this facility showed widespread osseous findings suggestive of extensive metastatic skeletal disease including  expansile destructive soft tissue component at the anterior right 7th rib. Diffuse ground-glass attenuation through both lungs suggestive of infection vs inflammatory process vs metastatic involvement.        Interval Hx:   Pt c/o headache today with nausea   No vomiting . Denies RUQ pain   Wife , step father  and brother at bedside     Labs today K 3.3 ( replaced), Cr 4.2>4.7, Ca 12.2 , PSA 0.4    Case was discussed with patient's nurse and  on the floor.    Objective/physical exam:  General: In no acute distress, afebrile  Chest: Clear to auscultation bilaterally  Heart: RRR, +S1, S2, no appreciable murmur  Abdomen: Soft, nontender, BS +  MSK: Warm, no lower extremity edema, no clubbing or cyanosis  Neurologic: Alert and oriented x4, Cranial nerve II-XII intact, Strength 5/5 in all 4 extremities    VITAL SIGNS: 24 HRS MIN & MAX LAST   Temp  Min: 97.5 °F (36.4 °C)  Max: 98.3 °F (36.8 °C) 98.3 °F (36.8 °C)   BP  Min: 144/88  Max: 158/85 (!) 144/88   Pulse  Min: 80  Max: 92  92   Resp  Min: 18  Max: 20 18   SpO2  Min: 93 %  Max: 97 % 95 %     I have reviewed the following labs:  Recent Labs   Lab 10/10/23  2149 10/14/23  0452   WBC 12.19* 9.00   RBC 4.28* 3.32*   HGB 13.6* 10.6*   HCT 38.4* 30.3*   MCV 89.7 91.3   MCH 31.8* 31.9*   MCHC 35.4 35.0   RDW 13.0 13.7    131   MPV 9.6 10.0     Recent Labs   Lab 10/10/23  2149 10/11/23  0415 10/12/23  0940 10/13/23  0443 10/14/23  0452 10/15/23  0407    134* 139 134* 137 139   K 3.9 3.9 4.4 4.1 3.7 3.3*   CO2 34* 35* 31* 29 23 22   BUN 47.0* 48.0* 64.0* 70.0* 61.6* 54.8*   CREATININE 3.36* 3.42* 4.59* 4.93* 4.75* 4.23*   CALCIUM 18.3* 17.0* 16.4* 14.3* 12.4* 12.2*   PH TNP  --   --   --   --   --    MG 2.20 2.00  --  2.00 2.10  --    ALBUMIN 3.5  --  2.9* 2.6* 2.7* 2.6*   ALKPHOS 86  --  69 60 68  --    ALT 79*  --  78* 88* 79*  --    AST 43*  --  57* 67* 44*  --    BILITOT 1.2  --  0.9 0.9 0.7  --      Microbiology Results (last 7 days)       ** No results found for the last 168 hours. **             See below for Radiology    Scheduled Med:   calcitonin  400 Units Subcutaneous BID    chlordiazepoxide  5 mg Oral BID    enoxparin  30 mg Subcutaneous Q24H (prophylaxis, 1700)    [START ON 10/16/2023] folic acid  1 mg Oral Daily    furosemide (LASIX)  injection  20 mg Intravenous Daily    [START ON 10/16/2023] multivitamin  1 tablet Oral Daily    pantoprazole  40 mg Oral Daily    thiamine  100 mg Oral Daily    zolpidem  10 mg Oral QHS      Continuous Infusions:   sodium chloride 0.9% 150 mL/hr at 10/15/23 2335      PRN Meds:  acetaminophen, butalbital-acetaminophen-caffeine -40 mg, cloNIDine, LORazepam, melatonin, ondansetron, sodium chloride 0.9%     Assessment/Plan:  Hypercalcemia of Malignancy   Acute kidney injury with nephrotic range proteinuria   Metastatic skeletal disease with  expansile destructive soft tissue component at the anterior right 7th rib  Anemia of chronic disease   Regular alcohol use while off work ( work [place] 1 week on , 1 week off)  Obesity, BMI 35.7    Plan-   Nephrology is assisting with management of hypercalcemia with IVF, Lasix, and Calcitonin   Oncology is assisting with workup for malignancy including multiple myeloma   Continue supportive care   Fioricet for headaches   Pt admits to drinking  12-pack of beer per day on days when he is in from [place] (he works 7 on and 7 off). He said sometimes he drinks a case (24 beers) in a day. Denies dependency   Will start CIWA scale , Thiamine, MVI, folic acid and Librium       VTE prophylaxis: Lovenox    Patient condition:  Fair    Anticipated discharge and Disposition:     Home with family     All diagnosis and differential diagnosis have been reviewed; assessment and plan has been documented; I have personally reviewed the labs and test results that are presently available; I have reviewed the patients medication list; I have reviewed the consulting providers response and recommendations. I have reviewed or attempted to review medical records based upon their availability    All of the patient's questions have been  addressed and answered. Patient's is agreeable to the above stated plan. I will continue to monitor closely and make adjustments to medical management as  needed.      Lily Coppola MD   10/15/2023

## 2023-10-17 ENCOUNTER — PATIENT OUTREACH (OUTPATIENT)
Dept: ADMINISTRATIVE | Facility: CLINIC | Age: 43
End: 2023-10-17
Payer: COMMERCIAL

## 2023-10-17 DIAGNOSIS — C90.00 MULTIPLE MYELOMA, REMISSION STATUS UNSPECIFIED: Primary | ICD-10-CM

## 2023-10-17 LAB
ALBUMIN % SPEP (OHS): 50.43 (ref 48.1–59.5)
ALBUMIN SERPL BCP-MCNC: 3.2 G/DL
ALBUMIN SERPL-MCNC: 2.8 G/DL (ref 3.5–5)
ALBUMIN/GLOB SERPL: 1 RATIO
ALBUMIN/GLOB SERPL: 1 RATIO (ref 1.1–2)
ALP SERPL-CCNC: 123 UNIT/L (ref 40–150)
ALPHA 1 GLOB (OHS): 0.4 GM/DL (ref 0–0.4)
ALPHA 1 GLOB% (OHS): 6.4 (ref 2.3–4.9)
ALPHA 2 GLOB % (OHS): 13.99 (ref 6.9–13)
ALPHA 2 GLOB (OHS): 0.88 GM/DL (ref 0.4–1)
ALT SERPL-CCNC: 124 UNIT/L (ref 0–55)
AST SERPL-CCNC: 93 UNIT/L (ref 5–34)
BASOPHILS # BLD AUTO: 0.06 X10(3)/MCL
BASOPHILS NFR BLD AUTO: 0.5 %
BETA GLOB (OHS): 1.2 GM/DL (ref 0.7–1.3)
BETA GLOB% (OHS): 19.05 (ref 13.8–19.7)
BILIRUB SERPL-MCNC: 0.6 MG/DL
BUN SERPL-MCNC: 40 MG/DL (ref 8.9–20.6)
CALCIUM SERPL-MCNC: 11 MG/DL (ref 8.4–10.2)
CHLORIDE SERPL-SCNC: 110 MMOL/L (ref 98–107)
CO2 SERPL-SCNC: 21 MMOL/L (ref 22–29)
CREAT SERPL-MCNC: 3.47 MG/DL (ref 0.73–1.18)
EOSINOPHIL # BLD AUTO: 0.86 X10(3)/MCL (ref 0–0.9)
EOSINOPHIL NFR BLD AUTO: 7.1 %
ERYTHROCYTE [DISTWIDTH] IN BLOOD BY AUTOMATED COUNT: 13.7 % (ref 11.5–17)
GAMMA GLOBULIN % (OHS): 10.13 (ref 10.1–21.9)
GAMMA GLOBULIN (OHS): 0.64 GM/DL (ref 0.4–1.8)
GFR SERPLBLD CREATININE-BSD FMLA CKD-EPI: 21 MLS/MIN/1.73/M2
GLOBULIN SER-MCNC: 2.8 GM/DL (ref 2.4–3.5)
GLOBULIN SER-MCNC: 3.1 GM/DL
GLUCOSE SERPL-MCNC: 97 MG/DL (ref 74–100)
HCT VFR BLD AUTO: 31.5 % (ref 42–52)
HGB BLD-MCNC: 10.7 G/DL (ref 14–18)
IGA SERPL-MCNC: 158 MG/DL (ref 63–484)
IGG SERPL-MCNC: 381 MG/DL (ref 540–1822)
IGM SERPL-MCNC: 16 MG/DL (ref 22–240)
IMM GRANULOCYTES # BLD AUTO: 0.35 X10(3)/MCL (ref 0–0.04)
IMM GRANULOCYTES NFR BLD AUTO: 2.9 %
LYMPHOCYTES # BLD AUTO: 2.41 X10(3)/MCL (ref 0.6–4.6)
LYMPHOCYTES NFR BLD AUTO: 19.9 %
M FLAG M-PROTEIN ISOTYPE MS, RANDOM, U: POSITIVE
M M-PROTEIN ISOTYPE MS, RANDOM, U: ABNORMAL
M SPIKE % (OHS): 1.47
M SPIKE (OHS): 0.09 GM/DL
MAGNESIUM SERPL-MCNC: 1.9 MG/DL (ref 1.6–2.6)
MCH RBC QN AUTO: 31.8 PG (ref 27–31)
MCHC RBC AUTO-ENTMCNC: 34 G/DL (ref 33–36)
MCV RBC AUTO: 93.5 FL (ref 80–94)
MONOCYTES # BLD AUTO: 0.68 X10(3)/MCL (ref 0.1–1.3)
MONOCYTES NFR BLD AUTO: 5.6 %
NEUTROPHILS # BLD AUTO: 7.76 X10(3)/MCL (ref 2.1–9.2)
NEUTROPHILS NFR BLD AUTO: 64 %
NRBC BLD AUTO-RTO: 0 %
PATH REV: NORMAL
PHOSPHATE SERPL-MCNC: 3.6 MG/DL (ref 2.3–4.7)
PLATELET # BLD AUTO: 206 X10(3)/MCL (ref 130–400)
PMV BLD AUTO: 9.9 FL (ref 7.4–10.4)
POTASSIUM SERPL-SCNC: 3.5 MMOL/L (ref 3.5–5.1)
PROT SERPL-MCNC: 5.6 GM/DL (ref 6.4–8.3)
PROT SERPL-MCNC: 6.3 GM/DL (ref 6.4–8.3)
RBC # BLD AUTO: 3.37 X10(6)/MCL (ref 4.7–6.1)
SODIUM SERPL-SCNC: 140 MMOL/L (ref 136–145)
WBC # SPEC AUTO: 12.12 X10(3)/MCL (ref 4.5–11.5)

## 2023-10-17 PROCEDURE — 63600175 PHARM REV CODE 636 W HCPCS: Performed by: INTERNAL MEDICINE

## 2023-10-17 PROCEDURE — 21400001 HC TELEMETRY ROOM

## 2023-10-17 PROCEDURE — 25000003 PHARM REV CODE 250: Performed by: INTERNAL MEDICINE

## 2023-10-17 PROCEDURE — 85025 COMPLETE CBC W/AUTO DIFF WBC: CPT | Performed by: INTERNAL MEDICINE

## 2023-10-17 PROCEDURE — 82784 ASSAY IGA/IGD/IGG/IGM EACH: CPT | Performed by: STUDENT IN AN ORGANIZED HEALTH CARE EDUCATION/TRAINING PROGRAM

## 2023-10-17 PROCEDURE — 99233 PR SUBSEQUENT HOSPITAL CARE,LEVL III: ICD-10-PCS | Mod: ,,, | Performed by: STUDENT IN AN ORGANIZED HEALTH CARE EDUCATION/TRAINING PROGRAM

## 2023-10-17 PROCEDURE — 99233 SBSQ HOSP IP/OBS HIGH 50: CPT | Mod: ,,, | Performed by: STUDENT IN AN ORGANIZED HEALTH CARE EDUCATION/TRAINING PROGRAM

## 2023-10-17 PROCEDURE — 86334 IMMUNOFIX E-PHORESIS SERUM: CPT | Performed by: STUDENT IN AN ORGANIZED HEALTH CARE EDUCATION/TRAINING PROGRAM

## 2023-10-17 PROCEDURE — 84165 PROTEIN E-PHORESIS SERUM: CPT | Performed by: STUDENT IN AN ORGANIZED HEALTH CARE EDUCATION/TRAINING PROGRAM

## 2023-10-17 PROCEDURE — 83521 IG LIGHT CHAINS FREE EACH: CPT | Performed by: STUDENT IN AN ORGANIZED HEALTH CARE EDUCATION/TRAINING PROGRAM

## 2023-10-17 PROCEDURE — 84100 ASSAY OF PHOSPHORUS: CPT | Performed by: INTERNAL MEDICINE

## 2023-10-17 PROCEDURE — 83735 ASSAY OF MAGNESIUM: CPT | Performed by: INTERNAL MEDICINE

## 2023-10-17 PROCEDURE — 80053 COMPREHEN METABOLIC PANEL: CPT | Performed by: INTERNAL MEDICINE

## 2023-10-17 RX ORDER — POLYETHYLENE GLYCOL 3350 17 G/17G
17 POWDER, FOR SOLUTION ORAL 2 TIMES DAILY PRN
Status: DISCONTINUED | OUTPATIENT
Start: 2023-10-17 | End: 2023-10-21 | Stop reason: HOSPADM

## 2023-10-17 RX ADMIN — BUTALBITAL, ACETAMINOPHEN, AND CAFFEINE 1 TABLET: 325; 50; 40 TABLET ORAL at 03:10

## 2023-10-17 RX ADMIN — SODIUM CHLORIDE: 9 INJECTION, SOLUTION INTRAVENOUS at 09:10

## 2023-10-17 RX ADMIN — THIAMINE HCL TAB 100 MG 100 MG: 100 TAB at 09:10

## 2023-10-17 RX ADMIN — SODIUM CHLORIDE: 9 INJECTION, SOLUTION INTRAVENOUS at 03:10

## 2023-10-17 RX ADMIN — BUTALBITAL, ACETAMINOPHEN, AND CAFFEINE 1 TABLET: 325; 50; 40 TABLET ORAL at 10:10

## 2023-10-17 RX ADMIN — FOLIC ACID 1 MG: 1 TABLET ORAL at 09:10

## 2023-10-17 RX ADMIN — ZOLPIDEM TARTRATE 10 MG: 5 TABLET ORAL at 08:10

## 2023-10-17 RX ADMIN — CHLORDIAZEPOXIDE HYDROCHLORIDE 5 MG: 5 CAPSULE ORAL at 08:10

## 2023-10-17 RX ADMIN — SODIUM CHLORIDE: 9 INJECTION, SOLUTION INTRAVENOUS at 11:10

## 2023-10-17 RX ADMIN — CHLORDIAZEPOXIDE HYDROCHLORIDE 5 MG: 5 CAPSULE ORAL at 09:10

## 2023-10-17 RX ADMIN — FUROSEMIDE 20 MG: 10 INJECTION, SOLUTION INTRAMUSCULAR; INTRAVENOUS at 09:10

## 2023-10-17 RX ADMIN — PANTOPRAZOLE SODIUM 40 MG: 40 TABLET, DELAYED RELEASE ORAL at 09:10

## 2023-10-17 RX ADMIN — POLYETHYLENE GLYCOL 3350 17 G: 17 POWDER, FOR SOLUTION ORAL at 02:10

## 2023-10-17 RX ADMIN — ZOLEDRONIC ACID 3 MG: 4 INJECTION, SOLUTION, CONCENTRATE INTRAVENOUS at 08:10

## 2023-10-17 RX ADMIN — SODIUM CHLORIDE: 9 INJECTION, SOLUTION INTRAVENOUS at 02:10

## 2023-10-17 NOTE — PROGRESS NOTES
Renal on call    Initial 10/14/23 HPI: 42 Y/O male admitted to outside facility due to severe hypercalcemia of 18 and DOUG with creatinin of 3.3. Work up for hypercalcemia started Tsh , vit D , PTH , were normal. Was started on calcitonin and Normal saline and also pamiodronate given and current ca is 12     Chief complaint:   OK    Interval History:  Has an occasional cough that seems to be progressing, some SAINZ as well.  Currently on Lasix, NS, calcitonin    ROS:  all else Neg     chlordiazepoxide  5 mg Oral BID    enoxparin  30 mg Subcutaneous Q24H (prophylaxis, 1700)    folic acid  1 mg Oral Daily    furosemide (LASIX) injection  20 mg Intravenous Daily    multivitamin  1 tablet Oral Daily    pantoprazole  40 mg Oral Daily    thiamine  100 mg Oral Daily    zolpidem  10 mg Oral QHS       VITAL SIGNS: 24 HR MIN & MAX LAST    Temp  Min: 97.7 °F (36.5 °C)  Max: 98.5 °F (36.9 °C)  98.2 °F (36.8 °C)        BP  Min: 144/88  Max: 162/95  (!) 152/87     Pulse  Min: 81  Max: 100  81     Resp  Min: 18  Max: 20  18    SpO2  Min: 94 %  Max: 97 %  96 %      Wt Readings from Last 3 Encounters:   10/13/23 109.7 kg (241 lb 12.8 oz)   10/12/23 109.1 kg (240 lb 9.6 oz)       Intake/Output Summary (Last 24 hours) at 10/16/2023 1925  Last data filed at 10/16/2023 0805  Gross per 24 hour   Intake 3320.79 ml   Output 3700 ml   Net -379.21 ml     Ill appearing  A&O x 4  EOMI  (B) symmetrical  Unlabored  Soft, NT, ND  Edema none    Recent Labs     10/14/23  0452 10/15/23  0407 10/16/23  0446    139 139   K 3.7 3.3* 3.6   CHLORIDE 103 107 111*   CO2 23 22 20*   BUN 61.6* 54.8* 46.4*   CREATININE 4.75* 4.23* 3.67*   GLUCOSE 116* 102* 105*   CALCIUM 12.4* 12.2* 11.4*   MG 2.10  --  2.00   PHOS 3.7 3.3 3.6   ALBUMIN 2.7* 2.6*  --       Recent Labs     10/14/23  0452 10/16/23  0446   WBC 9.00 11.69*   HGB 10.6* 10.3*   HCT 30.3* 30.1*    174       ASSESSMENT / PLAN    Hypercalcemia with metastatic osseous disease    DOUG from vol  depl and hyperCa, improving  HyperCa of malignancy, improving  Proteinuria, possibly related to malignancy  Anemia  Malignancy of unknown origin  Anxiety    D/c MVI (has Ca in it)    DOUG and Ca slowly improving with NS @ 150/hr, today is day # 3 of lasix 20 IV q day, and day #4 of calcitonin 400 units q day.  Continue with forced diuresis, but if Ca not significantly better tomorrow, the will give another dose of pamidronate or  zometa.    A little concerned about his resp complaints, will check CXR, may need to decr IVF rate              Ildefonso Braga M.D.  Nephrology

## 2023-10-17 NOTE — PROGRESS NOTES
Renal on call    Initial 10/14/23 HPI: 44 Y/O male admitted to outside facility due to severe hypercalcemia of 18 and DOUG with creatinin of 3.3. Work up for hypercalcemia started Tsh , vit D , PTH , were normal. Was started on calcitonin and Normal saline and also pamiodronate given and current ca is 12     Chief complaint:   OK    Interval History:  No events noted. Stated he walked the entire hallway w/o issue. Onc planning bone marrow Bx in AM    ROS:  all else Neg     chlordiazepoxide  5 mg Oral BID    folic acid  1 mg Oral Daily    furosemide (LASIX) injection  20 mg Intravenous Daily    pantoprazole  40 mg Oral Daily    thiamine  100 mg Oral Daily    zolpidem  10 mg Oral QHS       VITAL SIGNS: 24 HR MIN & MAX LAST    Temp  Min: 97.4 °F (36.3 °C)  Max: 99.2 °F (37.3 °C)  98.3 °F (36.8 °C)        BP  Min: 143/77  Max: 156/93  (!) 151/93     Pulse  Min: 84  Max: 92  84     Resp  Min: 16  Max: 18  16    SpO2  Min: 94 %  Max: 98 %  98 %      Wt Readings from Last 3 Encounters:   10/13/23 109.7 kg (241 lb 12.8 oz)   10/12/23 109.1 kg (240 lb 9.6 oz)       Intake/Output Summary (Last 24 hours) at 10/17/2023 1719  Last data filed at 10/17/2023 1100  Gross per 24 hour   Intake 2756.02 ml   Output 4200 ml   Net -1443.98 ml     Ill appearing  A&O x 4  EOMI  (B) symmetrical  Unlabored  Soft, NT, ND  Edema none    Recent Labs     10/15/23  0407 10/15/23  0407 10/16/23  0446 10/17/23  0412     --  139 140   K 3.3*  --  3.6 3.5   CHLORIDE 107  --  111* 110*   CO2 22  --  20* 21*   BUN 54.8*  --  46.4* 40.0*   CREATININE 4.23*  --  3.67* 3.47*   GLUCOSE 102*  --  105* 97   CALCIUM 12.2*  --  11.4* 11.0*   MG  --    < > 2.00 1.90   PHOS 3.3  --  3.6 3.6   ALBUMIN 2.6*  --   --  2.8*    < > = values in this interval not displayed.      Recent Labs     10/16/23  0446 10/17/23  0412   WBC 11.69* 12.12*   HGB 10.3* 10.7*   HCT 30.1* 31.5*    206       ASSESSMENT / PLAN    Hypercalcemia with metastatic osseous  disease    DOUG from vol depl and hyperCa, improving  HyperCa of malignancy, improving  Proteinuria, possibly related to malignancy  Anemia  Malignancy of unknown origin  Anxiety    DOUG and Ca slowly improving but not enough with NS @ 125/hr, 3 days of lasix 20 IV q day, and 4 days of calcitonin 400 units q day.    D/c lasix  D/c calcitonin  Give zometa 3 mg IV x 1today  Incr NS to 175  Bone Marrow biopsy in AM        Ildefonso Braga M.D.  Nephrology

## 2023-10-17 NOTE — PROGRESS NOTES
Patient discharged and transferred to [facility] (admitted 10/13/2023)

## 2023-10-17 NOTE — PLAN OF CARE
Problem: Adult Inpatient Plan of Care  Goal: Plan of Care Review  Outcome: Ongoing, Progressing  Flowsheets (Taken 10/16/2023 1907)  Plan of Care Reviewed With: patient  Goal: Patient-Specific Goal (Individualized)  Outcome: Ongoing, Progressing  Flowsheets (Taken 10/16/2023 1907)  Individualized Care Needs: monitor labs., ivf, manage pain, monitor for alcohol withdrawal  Goal: Absence of Hospital-Acquired Illness or Injury  Outcome: Ongoing, Progressing  Intervention: Prevent Skin Injury  Flowsheets (Taken 10/16/2023 1907)  Body Position:   weight shifting   position changed independently  Skin Protection:   adhesive use limited   tubing/devices free from skin contact  Intervention: Prevent and Manage VTE (Venous Thromboembolism) Risk  Flowsheets (Taken 10/16/2023 1907)  Activity Management: Ambulated to bathroom - L4  VTE Prevention/Management: (lovenox)   ambulation promoted   other (see comments)  Range of Motion: active ROM (range of motion) encouraged  Intervention: Prevent Infection  Flowsheets (Taken 10/16/2023 1907)  Infection Prevention:   hand hygiene promoted   single patient room provided   rest/sleep promoted  Goal: Optimal Comfort and Wellbeing  Outcome: Ongoing, Progressing  Intervention: Monitor Pain and Promote Comfort  Flowsheets (Taken 10/16/2023 1907)  Pain Management Interventions:   medication offered   quiet environment facilitated   care clustered  Intervention: Provide Person-Centered Care  Flowsheets (Taken 10/16/2023 1907)  Trust Relationship/Rapport:   care explained   questions encouraged   choices provided   emotional support provided   reassurance provided   thoughts/feelings acknowledged   questions answered

## 2023-10-17 NOTE — PROGRESS NOTES
[facility]  Hematology/Oncology  Consult Note    Patient Name: Pete Fernandez  MRN: [id number]  Admission Date: 10/13/2023  Hospital Length of Stay: 4 days  Attending Provider: Lily Coppola MD  Consulting Provider: Elizabeth K Lejeune, MD  Principal Problem:Hypercalcemia    Consults  Subjective:     HPI:   44yo M presented to [facility] on 10/11 for hypercalcemia and extreme weakness. Labs notable for new severe DOUG, transaminitis, and Ca of 18. He was admitted and started on IVF resuscitation.  Hypercalcemia workup was initiated, notable for PTH suppressed at 8.2.   Xray Survey was done which showed small 5mm calvarium defects. Follow up NM Bone scan and MRI Brain with no evidence of lytic lesions.   10/15/23 CT CAP with evidence of extensive osseous lesions.   Oncology was been consulted for further evaluation. An SPEP in currently in process. UPEP with evidence of Mspike, no quantification available at this time.     Interval History  Patient seen and examined this morning.   I discussed with him his most recent UPEP findings and the concern for multiple myeloma. I explained in general treatment possibilities as well as the prognosis, and overall possible treatment plans.   Discussed need for bone marrow biopsy, arranged for tomorrow.   Discussed disease in detail with patient and brother at bedside.     Oncology Treatment Plan:   [No matching plan found]    Medications:  Continuous Infusions:   sodium chloride 0.9% 125 mL/hr at 10/17/23 1514     Scheduled Meds:   chlordiazepoxide  5 mg Oral BID    enoxparin  30 mg Subcutaneous Q24H (prophylaxis, 1700)    folic acid  1 mg Oral Daily    furosemide (LASIX) injection  20 mg Intravenous Daily    pantoprazole  40 mg Oral Daily    thiamine  100 mg Oral Daily    zolpidem  10 mg Oral QHS     PRN Meds:acetaminophen, butalbital-acetaminophen-caffeine -40 mg, cloNIDine, LORazepam, melatonin, ondansetron, polyethylene glycol, sodium chloride  0.9%     Review of patient's allergies indicates:  Not on File     Past Medical History:   Diagnosis Date    Anxiety disorder, unspecified     Hypercalcemia      Past Surgical History:   Procedure Laterality Date    TONSILLECTOMY       Family History       Problem Relation (Age of Onset)    Breast cancer Mother    Pancreatic cancer Father          Tobacco Use    Smoking status: Never    Smokeless tobacco: Never   Substance and Sexual Activity    Alcohol use: Yes    Drug use: Never    Sexual activity: Not on file       Review of Systems   Constitutional:  Positive for activity change and fatigue. Negative for chills.   HENT:  Negative for sore throat.    Eyes:  Negative for visual disturbance.   Respiratory:  Negative for shortness of breath.    Cardiovascular:  Negative for chest pain.   Gastrointestinal:  Positive for abdominal pain, constipation and nausea. Negative for diarrhea and vomiting.   Endocrine: Negative for polyuria.   Genitourinary:  Negative for dysuria.   Musculoskeletal:  Negative for back pain.   Skin:  Negative for wound.   Neurological:  Negative for headaches.   Hematological:  Does not bruise/bleed easily.   Psychiatric/Behavioral:  Negative for confusion.      Objective:     Vital Signs (Most Recent):  Temp: 98.3 °F (36.8 °C) (10/17/23 1526)  Pulse: 84 (10/17/23 1526)  Resp: 16 (10/17/23 1526)  BP: (!) 151/93 (10/17/23 1526)  SpO2: 98 % (10/17/23 1526) Vital Signs (24h Range):  Temp:  [97.4 °F (36.3 °C)-99.2 °F (37.3 °C)] 98.3 °F (36.8 °C)  Pulse:  [84-92] 84  Resp:  [16-18] 16  SpO2:  [94 %-98 %] 98 %  BP: (143-156)/(77-93) 151/93     Weight: 109.7 kg (241 lb 12.8 oz)  Body mass index is 35.71 kg/m².  Body surface area is 2.31 meters squared.      Intake/Output Summary (Last 24 hours) at 10/17/2023 1710  Last data filed at 10/17/2023 1100  Gross per 24 hour   Intake 2756.02 ml   Output 4200 ml   Net -1443.98 ml         Physical Exam  Constitutional:       General: He is not in acute  distress.     Appearance: Normal appearance. He is not ill-appearing.   HENT:      Head: Normocephalic and atraumatic.      Nose: Nose normal.      Mouth/Throat:      Mouth: Mucous membranes are moist.      Pharynx: Oropharynx is clear.   Eyes:      Extraocular Movements: Extraocular movements intact.      Conjunctiva/sclera: Conjunctivae normal.      Pupils: Pupils are equal, round, and reactive to light.   Cardiovascular:      Rate and Rhythm: Normal rate and regular rhythm.      Pulses: Normal pulses.      Heart sounds: Normal heart sounds. No murmur heard.  Pulmonary:      Effort: Pulmonary effort is normal. No respiratory distress.      Breath sounds: Normal breath sounds.   Abdominal:      General: There is no distension.      Palpations: Abdomen is soft.      Tenderness: There is no abdominal tenderness.   Musculoskeletal:         General: Normal range of motion.      Cervical back: Normal range of motion and neck supple.      Right lower leg: No edema.      Left lower leg: No edema.   Lymphadenopathy:      Cervical: No cervical adenopathy.   Skin:     General: Skin is warm and dry.   Neurological:      General: No focal deficit present.      Mental Status: He is alert and oriented to person, place, and time.         Significant Labs:   CBC:   Recent Labs   Lab 10/16/23  0446 10/17/23  0412   WBC 11.69* 12.12*   HGB 10.3* 10.7*   HCT 30.1* 31.5*    206      and CMP:   Recent Labs   Lab 10/16/23  0446 10/17/23  0412    140   K 3.6 3.5   CO2 20* 21*   BUN 46.4* 40.0*   CREATININE 3.67* 3.47*   CALCIUM 11.4* 11.0*   ALBUMIN  --  2.8*   BILITOT  --  0.6   ALKPHOS  --  123   AST  --  93*   ALT  --  124*         Diagnostic Results:    10/11/23 Xray Metastatic Survey  1. Scattered small defects at the calvarium measuring up to 5 mm too numerous to count.  2. Degene mild rative changes at the spine and extremities  3. MR exam and bone scan would allow further evaluation.    10/12/23 MRI Brain  1. Motion  artifact  2. Mild scattered mucosal thickening throughout the paranasal sinuses  3. Somewhat limited evaluation of brain metastases without the administration of IV contrast  4. Otherwise unremarkable MR exam of the head without the administration of IV contrast    10/12/23 NM Bone Scan  1. Abnormal intense increased activity throughout the lungs of uncertain etiology.  This may related to hyperparathyroidism, hematologic malignancy, and or metabolic abnormality such as renal failure, vasculitis, or pulmonary infection.  Chest x-ray 10/10/2023 reveals the lungs to be relatively clear and well aerated.  2. Suspect scattered mild degenerative changes throughout the joint spaces  3. Increased activity nasal maxillary region suggesting mucoperiosteal disease  4. Mild activity at the stomach suggesting free pertechnetate  Assessment/Plan:     Hypercalcemia of malignancy  Severe DOUG  Osseous lytic lesions  Multiple myeloma     - Discussed with nephrology, agree with low dose zometa for hyperCa and osseous mets  - Plan for bone marrow biopsy tomorrow morning, NPO at midnight. Hold anticoagulation.   - Continue aggressive IVF administration, watching for signs of volume overload  - Okay to DC from oncology perspective once renal function and hyperca stabilizes.   - Will arrange for outpatient follow up with me to complete workup and begin myeloma therapy      Elizabeth K Lejeune, MD  Hematology/Oncology  [facility]

## 2023-10-17 NOTE — PROGRESS NOTES
[facility]  Hospital Medicine Progress Note        Chief Complaint: Inpatient Follow-up for     HPI:   Patient is a 43-year-old male with a history of ADHD who presented to [facility] on 10/10/2023 with complaints of severe weakness, nausea that was progressively worsening over a week.  He also reported numbness around his lips and urinary frequency without dysuria. He was found to have severe hypercalcemia and acute renal failure on arrival with reported labs being normal prior in August, and was given a dose of pamidronate with a steady down trend of his serum calcium since.  Parathyroid hormone was appropriately suppressed, vitamin-D with within normal limits.     He was seen by both Nephrology and Oncology and ultimately found to have calvarium lesions on x-ray patient was diagnosed with a paraproteinemia and a workup for multiple myeloma was initiated.  His serum beta 2 microglobulin has come back elevated.  He was found to have nephrotic range proteinuria during that admission as well but remained nonoliguric with close monitoring of his renal function by Nephrology. Because there was no calcitonin available at the prior facility he is transferred here to receive calcitonin for hypercalcemia.  Of note he had an ultrasound done which showed biliary sludge upper limits of normal thickness with a positive Arteaga's so there was a tentative plan to get a HIDA scan to exclude cholecystitis.     CT chest , abd, pelvis done in this facility showed widespread osseous findings suggestive of extensive metastatic skeletal disease including  expansile destructive soft tissue component at the anterior right 7th rib. Diffuse ground-glass attenuation through both lungs suggestive of infection vs inflammatory process vs metastatic involvement.  HIDA scan was unremarkable.  Electrophoresis revealed M spike in gamma fraction suspicious for multiple myeloma.          Interval Hx:    Afebrile.  Blood pressure elevated likely from aggressive IV hydration.  When seen at bedside he was alert, comfortable, resting in the bed.  Reports feeling better.  Brother was at bedside    Electrophoresis suggesting multiple myeloma for which bone marrow scheduled for tomorrow.      No significant improvement of calcium.  BUN and serum creatinine remain elevated.  Transaminitis is seen.    Objective/physical exam:  Vitals:    10/16/23 1929 10/16/23 2331 10/17/23 0429 10/17/23 0836   BP: (!) 150/84 (!) 143/77 (!) 153/80 (!) 151/90   Pulse: 84 88 92 89   Resp: 18 18 18    Temp: 98.8 °F (37.1 °C) 98.1 °F (36.7 °C) 98.1 °F (36.7 °C) 99.2 °F (37.3 °C)   TempSrc: Oral Oral  Oral   SpO2: 98% (!) 94% 96% (!) 94%   Weight:       Height:         General: In no acute distress, afebrile  Respiratory: Clear to auscultation bilaterally  Cardiovascular: S1, S2, no appreciable murmur  Abdomen: Soft, nontender, BS +  MSK: Warm, no lower extremity edema, no clubbing or cyanosis  Neurologic: Alert and oriented x4, moving all extremities with good strength     Lab Results   Component Value Date     10/17/2023    K 3.5 10/17/2023    CO2 21 (L) 10/17/2023    BUN 40.0 (H) 10/17/2023    CREATININE 3.47 (H) 10/17/2023    CALCIUM 11.0 (H) 10/17/2023      Lab Results   Component Value Date     (H) 10/17/2023    AST 93 (H) 10/17/2023    ALKPHOS 123 10/17/2023    BILITOT 0.6 10/17/2023      Lab Results   Component Value Date    WBC 12.12 (H) 10/17/2023    HGB 10.7 (L) 10/17/2023    HCT 31.5 (L) 10/17/2023    MCV 93.5 10/17/2023     10/17/2023           Medications:   chlordiazepoxide  5 mg Oral BID    enoxparin  30 mg Subcutaneous Q24H (prophylaxis, 1700)    folic acid  1 mg Oral Daily    furosemide (LASIX) injection  20 mg Intravenous Daily    pantoprazole  40 mg Oral Daily    thiamine  100 mg Oral Daily    zolpidem  10 mg Oral QHS      acetaminophen, butalbital-acetaminophen-caffeine -40 mg, cloNIDine,  LORazepam, melatonin, ondansetron, sodium chloride 0.9%     Assessment/Plan:    Multiple myeloma  Hypercalcemia of malignancy -improving  DOUG due to above-improving  Metastatic skeletal disease with  expansile destructive soft tissue component at the anterior right 7th rib  Anemia of chronic disease   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  Scheduled for bone marrow biopsy tomorrow  - Continue IV hydration.  Monitor calcium.  May be candidate for a dose of Zometa today.  Nephrology following.  Continue diuresis  -chest x-ray reviewed.  Encourage mobility.  Encourage p.o. intake  -counseled on alcohol cessation.  Monitor for withdrawal      Lovenox        Michele Jurado MD

## 2023-10-18 LAB
ALBUMIN SERPL-MCNC: 2.7 G/DL (ref 3.5–5)
ALBUMIN/GLOB SERPL: 1 RATIO (ref 1.1–2)
ALP SERPL-CCNC: 131 UNIT/L (ref 40–150)
ALT SERPL-CCNC: 100 UNIT/L (ref 0–55)
AST SERPL-CCNC: 50 UNIT/L (ref 5–34)
BASOPHILS # BLD AUTO: 0.06 X10(3)/MCL
BASOPHILS NFR BLD AUTO: 0.6 %
BILIRUB SERPL-MCNC: 0.5 MG/DL
BUN SERPL-MCNC: 38.9 MG/DL (ref 8.9–20.6)
CALCIUM SERPL-MCNC: 11 MG/DL (ref 8.4–10.2)
CHLORIDE SERPL-SCNC: 109 MMOL/L (ref 98–107)
CO2 SERPL-SCNC: 21 MMOL/L (ref 22–29)
CREAT SERPL-MCNC: 3.35 MG/DL (ref 0.73–1.18)
EOSINOPHIL # BLD AUTO: 0.78 X10(3)/MCL (ref 0–0.9)
EOSINOPHIL NFR BLD AUTO: 7.4 %
ERYTHROCYTE [DISTWIDTH] IN BLOOD BY AUTOMATED COUNT: 13.5 % (ref 11.5–17)
GFR SERPLBLD CREATININE-BSD FMLA CKD-EPI: 22 MLS/MIN/1.73/M2
GLOBULIN SER-MCNC: 2.6 GM/DL (ref 2.4–3.5)
GLUCOSE SERPL-MCNC: 96 MG/DL (ref 74–100)
HCT VFR BLD AUTO: 29.2 % (ref 42–52)
HEMATOLOGIST REVIEW: NORMAL
HGB BLD-MCNC: 10.1 G/DL (ref 14–18)
IMM GRANULOCYTES # BLD AUTO: 0.49 X10(3)/MCL (ref 0–0.04)
IMM GRANULOCYTES NFR BLD AUTO: 4.7 %
LYMPHOCYTES # BLD AUTO: 2.19 X10(3)/MCL (ref 0.6–4.6)
LYMPHOCYTES NFR BLD AUTO: 20.9 %
MAGNESIUM SERPL-MCNC: 2 MG/DL (ref 1.6–2.6)
MCH RBC QN AUTO: 32 PG (ref 27–31)
MCHC RBC AUTO-ENTMCNC: 34.6 G/DL (ref 33–36)
MCV RBC AUTO: 92.4 FL (ref 80–94)
MONOCYTES # BLD AUTO: 0.55 X10(3)/MCL (ref 0.1–1.3)
MONOCYTES NFR BLD AUTO: 5.3 %
NEUTROPHILS # BLD AUTO: 6.4 X10(3)/MCL (ref 2.1–9.2)
NEUTROPHILS NFR BLD AUTO: 61.1 %
NRBC BLD AUTO-RTO: 0 %
PHOSPHATE SERPL-MCNC: 4.1 MG/DL (ref 2.3–4.7)
PLATELET # BLD AUTO: 211 X10(3)/MCL (ref 130–400)
PMV BLD AUTO: 9.4 FL (ref 7.4–10.4)
POTASSIUM SERPL-SCNC: 3.5 MMOL/L (ref 3.5–5.1)
PROT SERPL-MCNC: 5.3 GM/DL (ref 6.4–8.3)
RBC # BLD AUTO: 3.16 X10(6)/MCL (ref 4.7–6.1)
SODIUM SERPL-SCNC: 141 MMOL/L (ref 136–145)
WBC # SPEC AUTO: 10.47 X10(3)/MCL (ref 4.5–11.5)

## 2023-10-18 PROCEDURE — 88185 FLOWCYTOMETRY/TC ADD-ON: CPT

## 2023-10-18 PROCEDURE — 21400001 HC TELEMETRY ROOM

## 2023-10-18 PROCEDURE — 85025 COMPLETE CBC W/AUTO DIFF WBC: CPT | Performed by: INTERNAL MEDICINE

## 2023-10-18 PROCEDURE — 88184 FLOWCYTOMETRY/ TC 1 MARKER: CPT

## 2023-10-18 PROCEDURE — 88274 CYTOGENETICS 25-99: CPT

## 2023-10-18 PROCEDURE — 88182 CELL MARKER STUDY: CPT

## 2023-10-18 PROCEDURE — 83735 ASSAY OF MAGNESIUM: CPT | Performed by: INTERNAL MEDICINE

## 2023-10-18 PROCEDURE — 88271 CYTOGENETICS DNA PROBE: CPT

## 2023-10-18 PROCEDURE — 25000003 PHARM REV CODE 250: Performed by: INTERNAL MEDICINE

## 2023-10-18 PROCEDURE — 38222 DX BONE MARROW BX & ASPIR: CPT | Performed by: PATHOLOGY

## 2023-10-18 PROCEDURE — 84100 ASSAY OF PHOSPHORUS: CPT | Performed by: INTERNAL MEDICINE

## 2023-10-18 PROCEDURE — 80053 COMPREHEN METABOLIC PANEL: CPT | Performed by: INTERNAL MEDICINE

## 2023-10-18 PROCEDURE — 63600175 PHARM REV CODE 636 W HCPCS

## 2023-10-18 RX ORDER — FLUMAZENIL 0.1 MG/ML
0.2 INJECTION INTRAVENOUS
Status: DISCONTINUED | OUTPATIENT
Start: 2023-10-18 | End: 2023-10-21 | Stop reason: HOSPADM

## 2023-10-18 RX ORDER — MIDAZOLAM HYDROCHLORIDE 1 MG/ML
1 INJECTION INTRAMUSCULAR; INTRAVENOUS
Status: DISCONTINUED | OUTPATIENT
Start: 2023-10-18 | End: 2023-10-21 | Stop reason: HOSPADM

## 2023-10-18 RX ORDER — SODIUM CHLORIDE 9 MG/ML
INJECTION, SOLUTION INTRAVENOUS CONTINUOUS
Status: DISCONTINUED | OUTPATIENT
Start: 2023-10-18 | End: 2023-10-19

## 2023-10-18 RX ORDER — MIDAZOLAM HYDROCHLORIDE 1 MG/ML
INJECTION INTRAMUSCULAR; INTRAVENOUS
Status: COMPLETED
Start: 2023-10-18 | End: 2023-10-18

## 2023-10-18 RX ADMIN — MIDAZOLAM HYDROCHLORIDE 5 MG: 1 INJECTION, SOLUTION INTRAMUSCULAR; INTRAVENOUS at 10:10

## 2023-10-18 RX ADMIN — BUTALBITAL, ACETAMINOPHEN, AND CAFFEINE 1 TABLET: 325; 50; 40 TABLET ORAL at 08:10

## 2023-10-18 RX ADMIN — SODIUM CHLORIDE: 9 INJECTION, SOLUTION INTRAVENOUS at 02:10

## 2023-10-18 RX ADMIN — BUTALBITAL, ACETAMINOPHEN, AND CAFFEINE 1 TABLET: 325; 50; 40 TABLET ORAL at 02:10

## 2023-10-18 RX ADMIN — SODIUM CHLORIDE: 9 INJECTION, SOLUTION INTRAVENOUS at 08:10

## 2023-10-18 RX ADMIN — ZOLPIDEM TARTRATE 10 MG: 5 TABLET ORAL at 08:10

## 2023-10-18 RX ADMIN — THIAMINE HCL TAB 100 MG 100 MG: 100 TAB at 12:10

## 2023-10-18 RX ADMIN — SODIUM CHLORIDE: 9 INJECTION, SOLUTION INTRAVENOUS at 05:10

## 2023-10-18 RX ADMIN — CHLORDIAZEPOXIDE HYDROCHLORIDE 5 MG: 5 CAPSULE ORAL at 12:10

## 2023-10-18 RX ADMIN — PANTOPRAZOLE SODIUM 40 MG: 40 TABLET, DELAYED RELEASE ORAL at 12:10

## 2023-10-18 RX ADMIN — FOLIC ACID 1 MG: 1 TABLET ORAL at 12:10

## 2023-10-18 RX ADMIN — MIDAZOLAM HYDROCHLORIDE 5 MG: 1 INJECTION INTRAMUSCULAR; INTRAVENOUS at 10:10

## 2023-10-18 RX ADMIN — CHLORDIAZEPOXIDE HYDROCHLORIDE 5 MG: 5 CAPSULE ORAL at 08:10

## 2023-10-18 NOTE — PROGRESS NOTES
[facility]  Hospital Medicine Progress Note        Chief Complaint: Inpatient Follow-up for     HPI:   Patient is a 43-year-old male with a history of ADHD who presented to [facility] on 10/10/2023 with complaints of severe weakness, nausea that was progressively worsening over a week.  He also reported numbness around his lips and urinary frequency without dysuria. He was found to have severe hypercalcemia and acute renal failure on arrival with reported labs being normal prior in August, and was given a dose of pamidronate with a steady down trend of his serum calcium since.  Parathyroid hormone was appropriately suppressed, vitamin-D with within normal limits.     He was seen by both Nephrology and Oncology and ultimately found to have calvarium lesions on x-ray patient was diagnosed with a paraproteinemia and a workup for multiple myeloma was initiated.  His serum beta 2 microglobulin has come back elevated.  He was found to have nephrotic range proteinuria during that admission as well but remained nonoliguric with close monitoring of his renal function by Nephrology. Because there was no calcitonin available at the prior facility he is transferred here to receive calcitonin for hypercalcemia.  Of note he had an ultrasound done which showed biliary sludge upper limits of normal thickness with a positive Arteaga's so there was a tentative plan to get a HIDA scan to exclude cholecystitis.     CT chest , abd, pelvis done in this facility showed widespread osseous findings suggestive of extensive metastatic skeletal disease including  expansile destructive soft tissue component at the anterior right 7th rib. Diffuse ground-glass attenuation through both lungs suggestive of infection vs inflammatory process vs metastatic involvement.  HIDA scan was unremarkable.  Electrophoresis revealed M spike in gamma fraction suspicious for multiple myeloma and bone marrow biopsy was  recommended        Interval Hx:   Afebrile.  Blood pressure elevated likely from aggressive IV hydration.  When seen at bedside he was alert, comfortably resting.  Except for generalized weakness he has no new complaints or concerns.  Family member at bedside.  Ambulating better.  Received dose of Zometa yesterday.      Morning labs revealed stable calcium from yesterday.  Scheduled for bone marrow biopsy today.  HGB stable, platelets within normal limits LFTs improving.  DOUG continues    Objective/physical exam:  Vitals:    10/18/23 1104 10/18/23 1107 10/18/23 1112 10/18/23 1114   BP: (!) 175/98 (!) 175/106 127/88 (!) 156/89   Patient Position:  Lying  Comment: lying on bp cuff arm during procedure Lying  Comment: flat on back. hob 15 degrees    Pulse: 104 99 105 102   Resp: (!) 29 (!) 24 (!) 21    Temp:       TempSrc:       SpO2: 96% 96% 95% (!) 94%   Weight:       Height:         General: In no acute distress, afebrile  Respiratory: Clear to auscultation bilaterally  Cardiovascular: S1, S2, no appreciable murmur  Abdomen: Soft, nontender, BS +  MSK: Warm, no lower extremity edema, no clubbing or cyanosis  Neurologic: Alert and oriented x4, moving all extremities with good strength     Lab Results   Component Value Date     10/18/2023    K 3.5 10/18/2023    CO2 21 (L) 10/18/2023    BUN 38.9 (H) 10/18/2023    CREATININE 3.35 (H) 10/18/2023    CALCIUM 11.0 (H) 10/18/2023      Lab Results   Component Value Date     (H) 10/18/2023    AST 50 (H) 10/18/2023    ALKPHOS 131 10/18/2023    BILITOT 0.5 10/18/2023      Lab Results   Component Value Date    WBC 10.47 10/18/2023    HGB 10.1 (L) 10/18/2023    HCT 29.2 (L) 10/18/2023    MCV 92.4 10/18/2023     10/18/2023           Medications:   chlordiazepoxide  5 mg Oral BID    folic acid  1 mg Oral Daily    pantoprazole  40 mg Oral Daily    thiamine  100 mg Oral Daily    zolpidem  10 mg Oral QHS      acetaminophen, butalbital-acetaminophen-caffeine  -40 mg, cloNIDine, flumazeniL, LORazepam, melatonin, midazolam, ondansetron, polyethylene glycol, sodium chloride 0.9%     Assessment/Plan:    Multiple myeloma  Hypercalcemia of malignancy -improving  DOUG due to above-improving  Metastatic skeletal disease with  expansile destructive soft tissue component at the anterior right 7th rib  Anemia of chronic disease   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  Needs outpatient oncology follow-up   -continue IV hydration.  Calcium stable.  Nephrology following.  Lasix discontinued.  -chest x-ray reviewed.  Encourage mobility.  Encourage p.o. intake  -counseled on alcohol cessation.  Monitor for withdrawal      Aliyahnox      Michele Jurado MD

## 2023-10-18 NOTE — PROCEDURES
"Pete Fernandez is a 43 y.o. male patient.    Temp: 99.9 °F (37.7 °C) (10/18/23 0700)  Pulse: 102 (10/18/23 1114)  Resp: (!) 21 (10/18/23 1112)  BP: (!) 141/95 (10/18/23 1120)  SpO2: (!) 94 % (10/18/23 1114)  Weight: 109.7 kg (241 lb 12.8 oz) (10/13/23 2100)  Height: 5' 9" (175.3 cm) (10/13/23 2030)  Mallampati Scale: Class II  ASA Classification: Class 2    Bone marrow    Date/Time: 10/18/2023 11:30 AM    Performed by: Nhan Marte MD  Authorized by: Nhan Marte MD    Consent Done?: Yes (Verbal) and Yes (Written)   Immediately prior to procedure a time out was called to verify the correct patient, procedure, equipment, support staff and site/side marked as required.   Patient was prepped and draped in the usual sterile fashion.      Assistants?: Yes    List of assistants: Betty Gaitan Christina I was present for the entire procedure.    Position: left lateral  Anesthesia: local infiltration and topical anesthetic  Local anesthetic: lidocaine 1% without epinephrine  Aspiration?: Yes   Biopsy?: Yes    Specimen source: right posterior iliac crest  Number of Specimens: 2  Estimated blood loss (cc):5  Patient tolerated the procedure well with no immediate complications.  Post-operative instructions were provided for the patient.  Patient was discharged and will follow up if any complications occur.       10/18/2023    " Patient information on fall and injury prevention

## 2023-10-18 NOTE — DISCHARGE SUMMARY
[facility]  Discharge Note  Short Stay    Procedure(s) (LRB):  Biopsy-bone marrow (N/A)      OUTCOME: Patient tolerated treatment/procedure well without complication and is now ready for discharge.    DISPOSITION: Home or Self Care    FINAL DIAGNOSIS:  Hypercalcemia    FOLLOWUP: In clinic    DISCHARGE INSTRUCTIONS:  No discharge procedures on file.     TIME SPENT ON DISCHARGE: 15 minutes

## 2023-10-19 LAB
ABS NEUT (OLG): 6.24 X10(3)/MCL (ref 2.1–9.2)
ALBUMIN % SPEP (OHS): 45.92
ALBUMIN SERPL-MCNC: 2.6 G/DL (ref 3.5–5)
ALBUMIN SERPL-MCNC: 2.6 G/DL (ref 3.5–5)
ALBUMIN/GLOB SERPL: 0.9 RATIO (ref 1.1–2)
ALBUMIN/GLOB SERPL: 1.1 RATIO (ref 1.1–2)
ALP SERPL-CCNC: 138 UNIT/L (ref 40–150)
ALPHA 1 GLOB (OHS): 0.45 GM/DL
ALPHA 1 GLOB% (OHS): 8.07
ALPHA 2 GLOB % (OHS): 18.03
ALPHA 2 GLOB (OHS): 1.01 GM/DL
ALT SERPL-CCNC: 72 UNIT/L (ref 0–55)
AST SERPL-CCNC: 38 UNIT/L (ref 5–34)
BASOPHILS NFR BLD MANUAL: 0.09 X10(3)/MCL (ref 0–0.2)
BASOPHILS NFR BLD MANUAL: 1 %
BETA GLOB (OHS): 0.94 GM/DL
BETA GLOB% (OHS): 16.83
BILIRUB SERPL-MCNC: 0.5 MG/DL
BUN SERPL-MCNC: 35.9 MG/DL (ref 8.9–20.6)
BURR CELLS (OLG): ABNORMAL
CALCIUM SERPL-MCNC: 9.5 MG/DL (ref 8.4–10.2)
CHLORIDE SERPL-SCNC: 110 MMOL/L (ref 98–107)
CO2 SERPL-SCNC: 20 MMOL/L (ref 22–29)
CREAT SERPL-MCNC: 3.32 MG/DL (ref 0.73–1.18)
CREAT UR-MCNC: 15.3 MG/DL (ref 63–166)
EOSINOPHIL NFR BLD MANUAL: 0.36 X10(3)/MCL (ref 0–0.9)
EOSINOPHIL NFR BLD MANUAL: 4 %
ERYTHROCYTE [DISTWIDTH] IN BLOOD BY AUTOMATED COUNT: 13.5 % (ref 11.5–17)
GAMMA GLOBULIN % (OHS): 11.15
GAMMA GLOBULIN (OHS): 0.62 GM/DL
GFR SERPLBLD CREATININE-BSD FMLA CKD-EPI: 22 MLS/MIN/1.73/M2
GLOBULIN SER-MCNC: 2.4 GM/DL (ref 2.4–3.5)
GLOBULIN SER-MCNC: 3 GM/DL (ref 2.4–3.5)
GLUCOSE SERPL-MCNC: 96 MG/DL (ref 74–100)
HCT VFR BLD AUTO: 27.4 % (ref 42–52)
HGB BLD-MCNC: 9.7 G/DL (ref 14–18)
INSTRUMENT WBC (OLG): 8.92 X10(3)/MCL
KAPPA LC FREE SER-MCNC: 3.29 MG/DL (ref 0.33–1.94)
KAPPA LC FREE/LAMBDA FREE SER: 0 {RATIO} (ref 0.26–1.65)
LAMBDA LC FREE SERPL-MCNC: 744 MG/DL (ref 0.57–2.63)
LYMPHOCYTES NFR BLD MANUAL: 1.87 X10(3)/MCL
LYMPHOCYTES NFR BLD MANUAL: 21 %
M SPIKE % (OHS): 3.04
M SPIKE (OHS): 0.17 GM/DL
MAGNESIUM SERPL-MCNC: 1.7 MG/DL (ref 1.6–2.6)
MAYO GENERIC ORDERABLE RESULT: ABNORMAL
MAYO GENERIC ORDERABLE RESULT: ABNORMAL
MCH RBC QN AUTO: 32.6 PG (ref 27–31)
MCHC RBC AUTO-ENTMCNC: 35.4 G/DL (ref 33–36)
MCV RBC AUTO: 91.9 FL (ref 80–94)
MONOCYTES NFR BLD MANUAL: 0.27 X10(3)/MCL (ref 0.1–1.3)
MONOCYTES NFR BLD MANUAL: 3 %
MYELOCYTES NFR BLD MANUAL: 2 %
NEUTROPHILS NFR BLD MANUAL: 70 %
NRBC BLD AUTO-RTO: 0 %
NRBC BLD MANUAL-RTO: 1 %
PATH REV: NORMAL
PHOSPHATE SERPL-MCNC: 3.1 MG/DL (ref 2.3–4.7)
PLATELET # BLD AUTO: 197 X10(3)/MCL (ref 130–400)
PLATELET # BLD EST: NORMAL 10*3/UL
PMV BLD AUTO: 9.3 FL (ref 7.4–10.4)
POIKILOCYTOSIS BLD QL SMEAR: ABNORMAL
POTASSIUM SERPL-SCNC: 3.4 MMOL/L (ref 3.5–5.1)
PROT SERPL-MCNC: 5 GM/DL (ref 6.4–8.3)
PROT SERPL-MCNC: 5.6 GM/DL (ref 6.4–8.3)
RBC # BLD AUTO: 2.98 X10(6)/MCL (ref 4.7–6.1)
RBC MORPH BLD: ABNORMAL
SODIUM SERPL-SCNC: 138 MMOL/L (ref 136–145)
SODIUM UR-SCNC: 52 MMOL/L
WBC # SPEC AUTO: 8.92 X10(3)/MCL (ref 4.5–11.5)

## 2023-10-19 PROCEDURE — 25000003 PHARM REV CODE 250: Performed by: INTERNAL MEDICINE

## 2023-10-19 PROCEDURE — 84300 ASSAY OF URINE SODIUM: CPT | Performed by: INTERNAL MEDICINE

## 2023-10-19 PROCEDURE — 83735 ASSAY OF MAGNESIUM: CPT | Performed by: INTERNAL MEDICINE

## 2023-10-19 PROCEDURE — 99233 SBSQ HOSP IP/OBS HIGH 50: CPT | Mod: ,,, | Performed by: INTERNAL MEDICINE

## 2023-10-19 PROCEDURE — 82570 ASSAY OF URINE CREATININE: CPT | Performed by: INTERNAL MEDICINE

## 2023-10-19 PROCEDURE — 80053 COMPREHEN METABOLIC PANEL: CPT | Performed by: INTERNAL MEDICINE

## 2023-10-19 PROCEDURE — 84100 ASSAY OF PHOSPHORUS: CPT | Performed by: INTERNAL MEDICINE

## 2023-10-19 PROCEDURE — 21400001 HC TELEMETRY ROOM

## 2023-10-19 PROCEDURE — 99233 PR SUBSEQUENT HOSPITAL CARE,LEVL III: ICD-10-PCS | Mod: ,,, | Performed by: INTERNAL MEDICINE

## 2023-10-19 PROCEDURE — 85027 COMPLETE CBC AUTOMATED: CPT | Performed by: INTERNAL MEDICINE

## 2023-10-19 PROCEDURE — 63600175 PHARM REV CODE 636 W HCPCS: Performed by: INTERNAL MEDICINE

## 2023-10-19 RX ORDER — GUAIFENESIN/DEXTROMETHORPHAN 100-10MG/5
5 SYRUP ORAL EVERY 4 HOURS PRN
Status: DISCONTINUED | OUTPATIENT
Start: 2023-10-19 | End: 2023-10-21 | Stop reason: HOSPADM

## 2023-10-19 RX ORDER — POLYETHYLENE GLYCOL 3350 17 G/17G
17 POWDER, FOR SOLUTION ORAL DAILY
Status: DISCONTINUED | OUTPATIENT
Start: 2023-10-19 | End: 2023-10-21 | Stop reason: HOSPADM

## 2023-10-19 RX ORDER — DOCUSATE SODIUM 100 MG/1
100 CAPSULE, LIQUID FILLED ORAL 2 TIMES DAILY
Status: DISCONTINUED | OUTPATIENT
Start: 2023-10-19 | End: 2023-10-21 | Stop reason: HOSPADM

## 2023-10-19 RX ORDER — POTASSIUM CHLORIDE 20 MEQ/1
20 TABLET, EXTENDED RELEASE ORAL ONCE
Status: COMPLETED | OUTPATIENT
Start: 2023-10-19 | End: 2023-10-19

## 2023-10-19 RX ORDER — MAGNESIUM SULFATE HEPTAHYDRATE 40 MG/ML
2 INJECTION, SOLUTION INTRAVENOUS ONCE
Status: COMPLETED | OUTPATIENT
Start: 2023-10-19 | End: 2023-10-19

## 2023-10-19 RX ADMIN — MAGNESIUM SULFATE HEPTAHYDRATE 2 G: 40 INJECTION, SOLUTION INTRAVENOUS at 11:10

## 2023-10-19 RX ADMIN — FOLIC ACID 1 MG: 1 TABLET ORAL at 09:10

## 2023-10-19 RX ADMIN — SODIUM CHLORIDE: 9 INJECTION, SOLUTION INTRAVENOUS at 11:10

## 2023-10-19 RX ADMIN — POTASSIUM BICARBONATE 25 MEQ: 977.5 TABLET, EFFERVESCENT ORAL at 01:10

## 2023-10-19 RX ADMIN — THIAMINE HCL TAB 100 MG 100 MG: 100 TAB at 09:10

## 2023-10-19 RX ADMIN — PANTOPRAZOLE SODIUM 40 MG: 40 TABLET, DELAYED RELEASE ORAL at 09:10

## 2023-10-19 RX ADMIN — SODIUM CHLORIDE: 9 INJECTION, SOLUTION INTRAVENOUS at 01:10

## 2023-10-19 RX ADMIN — POTASSIUM CHLORIDE 20 MEQ: 1500 TABLET, EXTENDED RELEASE ORAL at 09:10

## 2023-10-19 RX ADMIN — POLYETHYLENE GLYCOL 3350 17 G: 17 POWDER, FOR SOLUTION ORAL at 01:10

## 2023-10-19 RX ADMIN — SODIUM CHLORIDE: 9 INJECTION, SOLUTION INTRAVENOUS at 02:10

## 2023-10-19 RX ADMIN — CHLORDIAZEPOXIDE HYDROCHLORIDE 5 MG: 5 CAPSULE ORAL at 09:10

## 2023-10-19 RX ADMIN — ZOLPIDEM TARTRATE 10 MG: 5 TABLET ORAL at 11:10

## 2023-10-19 RX ADMIN — GUAIFENESIN AND DEXTROMETHORPHAN HYDROBROMIDE 1 TABLET: 30; 600 TABLET, EXTENDED RELEASE ORAL at 09:10

## 2023-10-19 RX ADMIN — BUTALBITAL, ACETAMINOPHEN, AND CAFFEINE 1 TABLET: 325; 50; 40 TABLET ORAL at 09:10

## 2023-10-19 RX ADMIN — BUTALBITAL, ACETAMINOPHEN, AND CAFFEINE 1 TABLET: 325; 50; 40 TABLET ORAL at 03:10

## 2023-10-19 RX ADMIN — DOCUSATE SODIUM 100 MG: 100 CAPSULE, LIQUID FILLED ORAL at 01:10

## 2023-10-19 RX ADMIN — BUTALBITAL, ACETAMINOPHEN, AND CAFFEINE 1 TABLET: 325; 50; 40 TABLET ORAL at 07:10

## 2023-10-19 RX ADMIN — SODIUM CHLORIDE: 9 INJECTION, SOLUTION INTRAVENOUS at 08:10

## 2023-10-19 RX ADMIN — DOCUSATE SODIUM 100 MG: 100 CAPSULE, LIQUID FILLED ORAL at 09:10

## 2023-10-19 RX ADMIN — SODIUM CHLORIDE: 9 INJECTION, SOLUTION INTRAVENOUS at 06:10

## 2023-10-19 NOTE — PLAN OF CARE
Problem: Adult Inpatient Plan of Care  Goal: Plan of Care Review  Outcome: Ongoing, Progressing  Flowsheets (Taken 10/18/2023 2140)  Plan of Care Reviewed With: patient  Goal: Patient-Specific Goal (Individualized)  Outcome: Ongoing, Progressing  Flowsheets (Taken 10/18/2023 2140)  Individualized Care Needs: monitor calcium level, ivf, manage pain, monitor for alcohol withdrawal  Goal: Absence of Hospital-Acquired Illness or Injury  Outcome: Ongoing, Progressing  Intervention: Prevent and Manage VTE (Venous Thromboembolism) Risk  Flowsheets (Taken 10/18/2023 2140)  Activity Management: Ambulated -L4  VTE Prevention/Management:   ambulation promoted   intravenous hydration   fluids promoted  Range of Motion: active ROM (range of motion) encouraged  Intervention: Prevent Infection  Flowsheets (Taken 10/18/2023 2140)  Infection Prevention:   hand hygiene promoted   single patient room provided   rest/sleep promoted  Goal: Optimal Comfort and Wellbeing  Outcome: Ongoing, Progressing  Intervention: Monitor Pain and Promote Comfort  Flowsheets (Taken 10/18/2023 2140)  Pain Management Interventions:   quiet environment facilitated   care clustered   medication offered  Intervention: Provide Person-Centered Care  Flowsheets (Taken 10/18/2023 2140)  Trust Relationship/Rapport:   care explained   questions encouraged   choices provided   emotional support provided   reassurance provided   thoughts/feelings acknowledged   empathic listening provided   questions answered

## 2023-10-19 NOTE — PLAN OF CARE
Problem: Adult Inpatient Plan of Care  Goal: Plan of Care Review  Outcome: Ongoing, Progressing  Flowsheets (Taken 10/19/2023 1501)  Plan of Care Reviewed With: patient  Goal: Patient-Specific Goal (Individualized)  Outcome: Ongoing, Progressing  Goal: Absence of Hospital-Acquired Illness or Injury  Outcome: Ongoing, Progressing  Intervention: Identify and Manage Fall Risk  Flowsheets (Taken 10/19/2023 1501)  Safety Promotion/Fall Prevention:   assistive device/personal item within reach   medications reviewed   nonskid shoes/socks when out of bed   side rails raised x 2  Intervention: Prevent Skin Injury  Flowsheets (Taken 10/19/2023 1501)  Body Position: position changed independently  Skin Protection:   adhesive use limited   incontinence pads utilized   tubing/devices free from skin contact  Intervention: Prevent and Manage VTE (Venous Thromboembolism) Risk  Flowsheets (Taken 10/19/2023 1501)  Activity Management: Ambulated in room - L4  VTE Prevention/Management:   ambulation promoted   bleeding risk assessed   ROM (active) performed  Range of Motion:   active ROM (range of motion) encouraged   ROM (range of motion) performed  Intervention: Prevent Infection  Flowsheets (Taken 10/19/2023 1501)  Infection Prevention:   rest/sleep promoted   single patient room provided  Goal: Optimal Comfort and Wellbeing  Outcome: Ongoing, Progressing  Intervention: Monitor Pain and Promote Comfort  Flowsheets (Taken 10/19/2023 1501)  Pain Management Interventions:   care clustered   medication offered   pain management plan reviewed with patient/caregiver   pillow support provided   position adjusted  Intervention: Provide Person-Centered Care  Flowsheets (Taken 10/19/2023 1501)  Trust Relationship/Rapport: care explained  Goal: Readiness for Transition of Care  Outcome: Ongoing, Progressing     Problem: Fluid and Electrolyte Imbalance (Acute Kidney Injury/Impairment)  Goal: Fluid and Electrolyte Balance  Outcome: Ongoing,  Progressing     Problem: Oral Intake Inadequate (Acute Kidney Injury/Impairment)  Goal: Optimal Nutrition Intake  Outcome: Ongoing, Progressing  Intervention: Promote and Optimize Nutrition  Flowsheets (Taken 10/19/2023 1501)  Oral Nutrition Promotion: rest periods promoted     Problem: Renal Function Impairment (Acute Kidney Injury/Impairment)  Goal: Effective Renal Function  Outcome: Ongoing, Progressing  Intervention: Monitor and Support Renal Function  Flowsheets (Taken 10/19/2023 1501)  Medication Review/Management: medications reviewed     Problem: Pain Acute  Goal: Acceptable Pain Control and Functional Ability  Outcome: Ongoing, Progressing  Intervention: Develop Pain Management Plan  Flowsheets (Taken 10/19/2023 1501)  Pain Management Interventions:   care clustered   medication offered   pain management plan reviewed with patient/caregiver   pillow support provided   position adjusted  Intervention: Prevent or Manage Pain  Flowsheets (Taken 10/19/2023 1501)  Sleep/Rest Enhancement: regular sleep/rest pattern promoted  Medication Review/Management: medications reviewed     Problem: Fatigue  Goal: Improved Activity Tolerance  Outcome: Ongoing, Progressing  Intervention: Promote Improved Energy  Flowsheets (Taken 10/19/2023 1501)  Sleep/Rest Enhancement: regular sleep/rest pattern promoted  Activity Management: Ambulated in room - L4

## 2023-10-19 NOTE — PROGRESS NOTES
Renal on call    Initial 10/14/23 HPI: 44 Y/O male admitted to outside facility due to severe hypercalcemia of 18 and DOUG with creatinin of 3.3. Work up for hypercalcemia started Tsh , vit D , PTH , were normal. Was started on calcitonin and Normal saline and also pamiodronate given and current ca is 12     Chief complaint:   Feeling better    Interval History:  Got zometa 3 mg IV x 1 two days ago, remains on NS @ 175. Has no complaints. Reports 4 L UOP overnight.      ROS:  all else Neg     chlordiazepoxide  5 mg Oral BID    folic acid  1 mg Oral Daily    pantoprazole  40 mg Oral Daily    thiamine  100 mg Oral Daily    zolpidem  10 mg Oral QHS       VITAL SIGNS: 24 HR MIN & MAX LAST    Temp  Min: 98.3 °F (36.8 °C)  Max: 99.6 °F (37.6 °C)  99.3 °F (37.4 °C)        BP  Min: 127/88  Max: 178/103  (!) 146/86     Pulse  Min: 89  Max: 105  97     Resp  Min: 18  Max: 30  18    SpO2  Min: 94 %  Max: 98 %  95 %      Wt Readings from Last 3 Encounters:   10/13/23 109.7 kg (241 lb 12.8 oz)   10/12/23 109.1 kg (240 lb 9.6 oz)       Intake/Output Summary (Last 24 hours) at 10/19/2023 0919  Last data filed at 10/19/2023 0609  Gross per 24 hour   Intake 4443.78 ml   Output 900 ml   Net 3543.78 ml     A&O x 4, appears more comfortable than before  EOMI  (B) symmetrical  Unlabored  Soft, NT, ND  Edema none    Recent Labs     10/17/23  0412 10/18/23  0405 10/19/23  0410    141 138   K 3.5 3.5 3.4*   CHLORIDE 110* 109* 110*   CO2 21* 21* 20*   BUN 40.0* 38.9* 35.9*   CREATININE 3.47* 3.35* 3.32*   GLUCOSE 97 96 96   CALCIUM 11.0* 11.0* 9.5   MG 1.90 2.00 1.70   PHOS 3.6 4.1 3.1   ALBUMIN 2.8* 2.7* 2.6*      Recent Labs     10/18/23  0405 10/19/23  0410   WBC 10.47 8.92  8.92   HGB 10.1* 9.7*   HCT 29.2* 27.4*    197       ASSESSMENT / PLAN    Hypercalcemia with metastatic osseous disease    DOUG from vol depl and hyperCa, improving  HyperCa of malignancy, improving - s/p pamidronate 10/11, calcitonin 10/13-10/16, zometa  3 mg 10/17  Proteinuria, possibly related to malignancy  Anemia  Malignancy of unknown origin - Bone marrow Bx 10/18  Anxiety      Calcium now normal, good response to zometa.  DOUG slowly improving, but unchanged past 24 hrs.  Suspect from vol depl during polyuric phase of ATN recovery.  Check FENa  Cont /hr  Mag 2 g IV x 1  Got Kcl 20 PO, will give another 25 PO          Ildefonso Braga M.D.  Nephrology

## 2023-10-19 NOTE — PROGRESS NOTES
[facility]  Hospital Medicine Progress Note        Chief Complaint: Inpatient Follow-up for     HPI:   Patient is a 43-year-old male with a history of ADHD who presented to [facility] on 10/10/2023 with complaints of severe weakness, nausea that was progressively worsening over a week.  He also reported numbness around his lips and urinary frequency without dysuria. He was found to have severe hypercalcemia and acute renal failure on arrival with reported labs being normal prior in August, and was given a dose of pamidronate with a steady down trend of his serum calcium since.  Parathyroid hormone was appropriately suppressed, vitamin-D with within normal limits.     He was seen by both Nephrology and Oncology and ultimately found to have calvarium lesions on x-ray patient was diagnosed with a paraproteinemia and a workup for multiple myeloma was initiated.  His serum beta 2 microglobulin has come back elevated.  He was found to have nephrotic range proteinuria during that admission as well but remained nonoliguric with close monitoring of his renal function by Nephrology. Because there was no calcitonin available at the prior facility he is transferred here to receive calcitonin for hypercalcemia.  Of note he had an ultrasound done which showed biliary sludge upper limits of normal thickness with a positive Arteaga's so there was a tentative plan to get a HIDA scan to exclude cholecystitis.     CT chest , abd, pelvis done in this facility showed widespread osseous findings suggestive of extensive metastatic skeletal disease including  expansile destructive soft tissue component at the anterior right 7th rib. Diffuse ground-glass attenuation through both lungs suggestive of infection vs inflammatory process vs metastatic involvement.  HIDA scan was unremarkable.  Electrophoresis revealed M spike in gamma fraction suspicious for multiple myeloma and bone marrow biopsy was  recommended.  Underwent bone marrow biopsy 10/18/2023 and also received a dose of Zometa 10/18/23.  Calcitonin and  Lasix was discontinued eventually        Interval Hx:   Feeling better.  Hemodynamically stable.  Ambulating well.  Blood pressure improving.  Brother was at bedside.  Calcium has improved to baseline.  DOUG persists.  Underwent bone marrow biopsy yesterday    Objective/physical exam:  Vitals:    10/18/23 1516 10/18/23 1938 10/18/23 2317 10/19/23 0600   BP: (!) 161/90 137/81 (!) 142/88 (!) 146/86   Patient Position:       Pulse: 95 91 89 97   Resp: 20 20  18   Temp: 98.7 °F (37.1 °C) 99.6 °F (37.6 °C) 98.3 °F (36.8 °C) 99.3 °F (37.4 °C)   TempSrc: Oral Oral Oral Oral   SpO2: 95% 98% 96% 95%   Weight:       Height:         General: In no acute distress, afebrile  Respiratory: Clear to auscultation bilaterally  Cardiovascular: S1, S2, no appreciable murmur  Abdomen: Soft, nontender, BS +  MSK: Warm, no lower extremity edema, no clubbing or cyanosis  Neurologic: Alert and oriented x4, moving all extremities with good strength     Lab Results   Component Value Date     10/19/2023    K 3.4 (L) 10/19/2023    CO2 20 (L) 10/19/2023    BUN 35.9 (H) 10/19/2023    CREATININE 3.32 (H) 10/19/2023    CALCIUM 9.5 10/19/2023      Lab Results   Component Value Date    ALT 72 (H) 10/19/2023    AST 38 (H) 10/19/2023    ALKPHOS 138 10/19/2023    BILITOT 0.5 10/19/2023      Lab Results   Component Value Date    WBC 8.92 10/19/2023    WBC 8.92 10/19/2023    HGB 9.7 (L) 10/19/2023    HCT 27.4 (L) 10/19/2023    MCV 91.9 10/19/2023     10/19/2023           Medications:   chlordiazepoxide  5 mg Oral BID    folic acid  1 mg Oral Daily    pantoprazole  40 mg Oral Daily    thiamine  100 mg Oral Daily    zolpidem  10 mg Oral QHS      acetaminophen, butalbital-acetaminophen-caffeine -40 mg, cloNIDine, flumazeniL, LORazepam, melatonin, midazolam, ondansetron, polyethylene glycol, sodium chloride 0.9%      Assessment/Plan:    Multiple myeloma s/p bone marrow biopsy 10/18/2023  Hypercalcemia of malignancy, s/p Pamidronate, calcitonin & Zometa - resolved  DOUG due to above-improving  Metastatic skeletal disease with  expansile destructive soft tissue component at the anterior right 7th rib  Anemia of chronic disease   Alcohol use   Obesity, BMI 35.7    Plan:    -calcium improving.  DOUG persists.  Continue IV hydration.  Received dose of Zometa yesterday.  Encourage p.o. intake.  Nephrology on board  -needs outpatient oncology follow up.  Follow bone marrow biopsy results  -chest x-ray reviewed.  Encourage mobility.  Encourage p.o. intake  -Continue thiamine and folate.  Counseled on alcohol cessation  -DC to home once cleared from Nephrology standpoint      Ravinder Jurado MD

## 2023-10-19 NOTE — PROGRESS NOTES
Renal on call    Initial 10/14/23 HPI: 42 Y/O male admitted to outside facility due to severe hypercalcemia of 18 and DOUG with creatinin of 3.3. Work up for hypercalcemia started Tsh , vit D , PTH , were normal. Was started on calcitonin and Normal saline and also pamiodronate given and current ca is 12     Chief complaint:   OK    Interval History:  D/c lasix and calcitonin, gave zometa 3 mg IV x 1 and incr NS to 175 yesterday evening.  Had Bone Marrow biopsy this AM    ROS:  all else Neg     chlordiazepoxide  5 mg Oral BID    folic acid  1 mg Oral Daily    pantoprazole  40 mg Oral Daily    thiamine  100 mg Oral Daily    zolpidem  10 mg Oral QHS       VITAL SIGNS: 24 HR MIN & MAX LAST    Temp  Min: 98.5 °F (36.9 °C)  Max: 99.9 °F (37.7 °C)  98.7 °F (37.1 °C)        BP  Min: 127/88  Max: 187/98  (!) 161/90     Pulse  Min: 87  Max: 105  95     Resp  Min: 18  Max: 30  20    SpO2  Min: 94 %  Max: 97 %  95 %      Wt Readings from Last 3 Encounters:   10/13/23 109.7 kg (241 lb 12.8 oz)   10/12/23 109.1 kg (240 lb 9.6 oz)       Intake/Output Summary (Last 24 hours) at 10/18/2023 1903  Last data filed at 10/18/2023 0559  Gross per 24 hour   Intake 3447.61 ml   Output 3701 ml   Net -253.39 ml     Ill appearing  A&O x 4  EOMI  (B) symmetrical  Unlabored  Soft, NT, ND  Edema none    Recent Labs     10/16/23  0446 10/16/23  0446 10/17/23  0412 10/18/23  0405     --  140 141   K 3.6  --  3.5 3.5   CHLORIDE 111*  --  110* 109*   CO2 20*  --  21* 21*   BUN 46.4*  --  40.0* 38.9*   CREATININE 3.67*  --  3.47* 3.35*   GLUCOSE 105*  --  97 96   CALCIUM 11.4*  --  11.0* 11.0*   MG 2.00  --  1.90 2.00   PHOS 3.6  --  3.6 4.1   ALBUMIN  --    < > 2.8* 2.7*    < > = values in this interval not displayed.      Recent Labs     10/16/23  0446 10/17/23  0412 10/18/23  0405   WBC 11.69* 12.12* 10.47   HGB 10.3* 10.7* 10.1*   HCT 30.1* 31.5* 29.2*    206 211       ASSESSMENT / PLAN    Hypercalcemia with metastatic osseous  disease    DOUG from vol depl and hyperCa, improving  HyperCa of malignancy, improving - s/p pamidronate 10/11, calcitonin  10/13-10/16, zometa 3 mg 10/17  Proteinuria, possibly related to malignancy  Anemia  Malignancy of unknown origin - Bone marrow Bx 10/18  Anxiety      DOUG slowly improving.  Calcium unchanged after zometa 3 mg IV 10/17/23  Cont /hr, monitor labs in AM          Ildefonso Braga M.D.  Nephrology

## 2023-10-20 ENCOUNTER — TELEPHONE (OUTPATIENT)
Dept: HEMATOLOGY/ONCOLOGY | Facility: CLINIC | Age: 43
End: 2023-10-20
Payer: COMMERCIAL

## 2023-10-20 DIAGNOSIS — C90.00 MULTIPLE MYELOMA, REMISSION STATUS UNSPECIFIED: Primary | ICD-10-CM

## 2023-10-20 DIAGNOSIS — R79.89 ELEVATED SERUM CREATININE: Primary | ICD-10-CM

## 2023-10-20 DIAGNOSIS — C90.00 MULTIPLE MYELOMA, REMISSION STATUS UNSPECIFIED: ICD-10-CM

## 2023-10-20 LAB
ALBUMIN SERPL-MCNC: 2.6 G/DL (ref 3.5–5)
BUN SERPL-MCNC: 32.4 MG/DL (ref 8.9–20.6)
CALCIUM SERPL-MCNC: 9.1 MG/DL (ref 8.4–10.2)
CHLORIDE SERPL-SCNC: 112 MMOL/L (ref 98–107)
CO2 SERPL-SCNC: 20 MMOL/L (ref 22–29)
CREAT SERPL-MCNC: 3.19 MG/DL (ref 0.73–1.18)
GFR SERPLBLD CREATININE-BSD FMLA CKD-EPI: 24 MLS/MIN/1.73/M2
GLUCOSE SERPL-MCNC: 91 MG/DL (ref 74–100)
PHOSPHATE SERPL-MCNC: 3.5 MG/DL (ref 2.3–4.7)
POTASSIUM SERPL-SCNC: 3.8 MMOL/L (ref 3.5–5.1)
SODIUM SERPL-SCNC: 139 MMOL/L (ref 136–145)

## 2023-10-20 PROCEDURE — 25000003 PHARM REV CODE 250: Performed by: INTERNAL MEDICINE

## 2023-10-20 PROCEDURE — 99232 PR SUBSEQUENT HOSPITAL CARE,LEVL II: ICD-10-PCS | Mod: ,,, | Performed by: STUDENT IN AN ORGANIZED HEALTH CARE EDUCATION/TRAINING PROGRAM

## 2023-10-20 PROCEDURE — 99232 SBSQ HOSP IP/OBS MODERATE 35: CPT | Mod: ,,, | Performed by: STUDENT IN AN ORGANIZED HEALTH CARE EDUCATION/TRAINING PROGRAM

## 2023-10-20 PROCEDURE — 80069 RENAL FUNCTION PANEL: CPT | Performed by: INTERNAL MEDICINE

## 2023-10-20 PROCEDURE — 21400001 HC TELEMETRY ROOM

## 2023-10-20 RX ORDER — ACYCLOVIR 400 MG/1
400 TABLET ORAL 2 TIMES DAILY
Qty: 60 TABLET | Refills: 11 | OUTPATIENT
Start: 2023-11-01 | End: 2023-10-21

## 2023-10-20 RX ORDER — PROCHLORPERAZINE MALEATE 5 MG
10 TABLET ORAL EVERY 6 HOURS PRN
Qty: 20 TABLET | Refills: 5 | OUTPATIENT
Start: 2023-11-01 | End: 2023-10-21

## 2023-10-20 RX ORDER — NAPROXEN SODIUM 220 MG/1
81 TABLET, FILM COATED ORAL DAILY
Qty: 30 TABLET | Refills: 11 | OUTPATIENT
Start: 2023-11-01 | End: 2023-10-21

## 2023-10-20 RX ORDER — DEXAMETHASONE 4 MG/1
40 TABLET ORAL
Qty: 40 TABLET | Refills: 5 | OUTPATIENT
Start: 2023-11-01 | End: 2023-10-21

## 2023-10-20 RX ORDER — LENALIDOMIDE 10 MG/1
10 CAPSULE ORAL DAILY
Qty: 21 EACH | Refills: 0 | Status: ACTIVE | OUTPATIENT
Start: 2023-11-01 | End: 2023-10-24 | Stop reason: SDUPTHER

## 2023-10-20 RX ADMIN — BUTALBITAL, ACETAMINOPHEN, AND CAFFEINE 1 TABLET: 325; 50; 40 TABLET ORAL at 09:10

## 2023-10-20 RX ADMIN — POTASSIUM BICARBONATE 25 MEQ: 977.5 TABLET, EFFERVESCENT ORAL at 02:10

## 2023-10-20 RX ADMIN — BUTALBITAL, ACETAMINOPHEN, AND CAFFEINE 1 TABLET: 325; 50; 40 TABLET ORAL at 07:10

## 2023-10-20 RX ADMIN — CHLORDIAZEPOXIDE HYDROCHLORIDE 5 MG: 5 CAPSULE ORAL at 09:10

## 2023-10-20 RX ADMIN — GUAIFENESIN AND DEXTROMETHORPHAN HYDROBROMIDE 1 TABLET: 30; 600 TABLET, EXTENDED RELEASE ORAL at 08:10

## 2023-10-20 RX ADMIN — CHLORDIAZEPOXIDE HYDROCHLORIDE 5 MG: 5 CAPSULE ORAL at 08:10

## 2023-10-20 RX ADMIN — FOLIC ACID 1 MG: 1 TABLET ORAL at 09:10

## 2023-10-20 RX ADMIN — BUTALBITAL, ACETAMINOPHEN, AND CAFFEINE 1 TABLET: 325; 50; 40 TABLET ORAL at 03:10

## 2023-10-20 RX ADMIN — ZOLPIDEM TARTRATE 10 MG: 5 TABLET ORAL at 08:10

## 2023-10-20 RX ADMIN — DOCUSATE SODIUM 100 MG: 100 CAPSULE, LIQUID FILLED ORAL at 08:10

## 2023-10-20 RX ADMIN — THIAMINE HCL TAB 100 MG 100 MG: 100 TAB at 09:10

## 2023-10-20 RX ADMIN — SODIUM CHLORIDE: 9 INJECTION, SOLUTION INTRAVENOUS at 03:10

## 2023-10-20 RX ADMIN — PANTOPRAZOLE SODIUM 40 MG: 40 TABLET, DELAYED RELEASE ORAL at 09:10

## 2023-10-20 RX ADMIN — POLYETHYLENE GLYCOL 3350 17 G: 17 POWDER, FOR SOLUTION ORAL at 09:10

## 2023-10-20 RX ADMIN — DOCUSATE SODIUM 100 MG: 100 CAPSULE, LIQUID FILLED ORAL at 09:10

## 2023-10-20 RX ADMIN — GUAIFENESIN AND DEXTROMETHORPHAN HYDROBROMIDE 1 TABLET: 30; 600 TABLET, EXTENDED RELEASE ORAL at 09:10

## 2023-10-20 NOTE — PROGRESS NOTES
[facility]  Hospital Medicine Progress Note        Chief Complaint: Inpatient Follow-up for Multiple myeloma     HPI:   Patient is a 43-year-old male with a history of ADHD who presented to [facility] on 10/10/2023 with complaints of severe weakness, nausea that was progressively worsening over a week.  He also reported numbness around his lips and urinary frequency without dysuria. He was found to have severe hypercalcemia and acute renal failure on arrival with reported labs being normal prior in August, and was given a dose of pamidronate with a steady down trend of his serum calcium since.  Parathyroid hormone was appropriately suppressed, vitamin-D with within normal limits.     He was seen by both Nephrology and Oncology and ultimately found to have calvarium lesions on x-ray patient was diagnosed with a paraproteinemia and a workup for multiple myeloma was initiated.  His serum beta 2 microglobulin has come back elevated.  He was found to have nephrotic range proteinuria during that admission as well but remained nonoliguric with close monitoring of his renal function by Nephrology. Because there was no calcitonin available at the prior facility he is transferred here to receive calcitonin for hypercalcemia.  Of note he had an ultrasound done which showed biliary sludge upper limits of normal thickness with a positive Arteaga's so there was a tentative plan to get a HIDA scan to exclude cholecystitis.     CT chest , abd, pelvis done in this facility showed widespread osseous findings suggestive of extensive metastatic skeletal disease including  expansile destructive soft tissue component at the anterior right 7th rib. Diffuse ground-glass attenuation through both lungs suggestive of infection vs inflammatory process vs metastatic involvement.  HIDA scan was unremarkable.  Electrophoresis revealed M spike in gamma fraction suspicious for multiple myeloma and bone  marrow biopsy was recommended.    Underwent bone marrow biopsy 10/18/2023 and also received a dose of Zometa 10/18/23.  Calcitonin and  Lasix was discontinued eventually. Patient was in ARF and needing iv fluids. He was also seen by nephrology team. His calcium levels was back to normal range. He was ambulating well and has no bone pain. He was seen by oncologist and recommended out patient f/u in their clinic. He was stable and had good urine out put. He will be discharged when cleared by renal team.      Interval Hx:   Patient today awake and comfortable. Ambulating to  rest room and has no new issues. Denies any bone pain, SOB, fever or chills. He has good urine out put.     Family at bedside, explained in detail about the patients condition, diagnosis, vitals, labs and treatment plan. They understand and agree with the plan. All their questions were answered.      Objective/physical exam:  General: In no acute distress  Chest: Clear to auscultation bilaterally  Heart: RRR, +S1, S2, no appreciable murmur  Abdomen: Soft, nontender, BS +  MSK: Warm, no lower extremity edema, no clubbing or cyanosis  Neurologic: Alert and oriented x4, Cranial nerve II-XII intact, Strength 5/5 in all 4 extremities    VITAL SIGNS: 24 HRS MIN & MAX LAST   Temp  Min: 97.6 °F (36.4 °C)  Max: 100.1 °F (37.8 °C) 98.1 °F (36.7 °C)   BP  Min: 126/71  Max: 163/92 (!) 150/88   Pulse  Min: 91  Max: 100  94   Resp  Min: 18  Max: 20 20   SpO2  Min: 95 %  Max: 98 % 96 %     I have reviewed the following labs:  Recent Labs   Lab 10/17/23  0412 10/18/23  0405 10/19/23  0410   WBC 12.12* 10.47 8.92  8.92   RBC 3.37* 3.16* 2.98*   HGB 10.7* 10.1* 9.7*   HCT 31.5* 29.2* 27.4*   MCV 93.5 92.4 91.9   MCH 31.8* 32.0* 32.6*   MCHC 34.0 34.6 35.4   RDW 13.7 13.5 13.5    211 197   MPV 9.9 9.4 9.3     Recent Labs   Lab 10/17/23  0412 10/17/23  1745 10/18/23  0405 10/19/23  0410 10/20/23  0649     --  141 138 139   K 3.5  --  3.5 3.4* 3.8   CO2  21*  --  21* 20* 20*   BUN 40.0*  --  38.9* 35.9* 32.4*   CREATININE 3.47*  --  3.35* 3.32* 3.19*   CALCIUM 11.0*  --  11.0* 9.5 9.1   MG 1.90  --  2.00 1.70  --    ALBUMIN 2.8*   < > 2.7* 2.6* 2.6*   ALKPHOS 123  --  131 138  --    *  --  100* 72*  --    AST 93*  --  50* 38*  --    BILITOT 0.6  --  0.5 0.5  --     < > = values in this interval not displayed.     Microbiology Results (last 7 days)       ** No results found for the last 168 hours. **             See below for Radiology    Scheduled Med:   chlordiazepoxide  5 mg Oral BID    dextromethorphan-guaiFENesin  mg  1 tablet Oral BID    docusate sodium  100 mg Oral BID    folic acid  1 mg Oral Daily    pantoprazole  40 mg Oral Daily    polyethylene glycol  17 g Oral Daily    thiamine  100 mg Oral Daily    zolpidem  10 mg Oral QHS      Continuous Infusions:   sodium chloride 0.9% 250 mL/hr at 10/20/23 0327      PRN Meds:  acetaminophen, butalbital-acetaminophen-caffeine -40 mg, cloNIDine, dextromethorphan-guaiFENesin  mg/5 ml, flumazeniL, LORazepam, melatonin, midazolam, ondansetron, polyethylene glycol, sodium chloride 0.9%     Assessment/Plan:  Multiple myeloma s/p bone marrow biopsy 10/18/2023  Hypercalcemia of malignancy, s/p Pamidronate, calcitonin & Zometa - resolved  DOUG/ ?CKD due to above-improving  Metastatic skeletal disease with  expansile destructive soft tissue component at the anterior right 7th rib  Anemia of chronic disease   Alcohol use   Obesity, BMI 35.7      Plan:  Patient doing well and has no new issues  He is ambulating well  He has good urine out put and tolerating po liquids   Renal functions better, Today BUN 32 and Crt 3.19  Will reduce iv fluids  Advised to avoid NSAIDs   Hypercalcemia resolved - today 9.1  Seen by oncologist and recommended out patient f/u     Patient will be discharged when cleared by renal team     VTE prophylaxis: Patient ambulating well    Patient condition:  Fair    Anticipated  discharge and Disposition:   Home       All diagnosis and differential diagnosis have been reviewed; assessment and plan has been documented; I have personally reviewed the labs and test results that are presently available; I have reviewed the patients medication list; I have reviewed the consulting providers response and recommendations. I have reviewed or attempted to review medical records based upon their availability    All of the patient's questions have been  addressed and answered. Patient's is agreeable to the above stated plan. I will continue to monitor closely and make adjustments to medical management as needed.  _____________________________________________________________________    Nutrition Status:    Radiology:  I have personally reviewed the following imaging and agree with the radiologist.     X-Ray Chest 1 View  Narrative: EXAMINATION:  XR CHEST 1 VIEW    CLINICAL HISTORY:  Cough;    TECHNIQUE:  Single frontal view of the chest was performed.    COMPARISON:  10/16/2023    FINDINGS:  There are mild diffuse interstitial infiltrates seen in the lungs bilaterally with associated punctate areas of nodularity.  Findings are similar to the prior examination.  No consolidation is seen.  No pleural effusion is seen.  The heart appears normal.  Aorta appears grossly unremarkable.  Bones and joints show no acute abnormality.  Impression: Stable diffuse interstitial infiltrates with associated areas of punctate nodularity.  No consolidation is seen.    Electronically signed by: Lucie Landaverde  Date:    10/19/2023  Time:    18:41      Ronal Starr MD   10/20/2023

## 2023-10-20 NOTE — PROGRESS NOTES
"Inpatient Nutrition Evaluation    Admit Date: 10/13/2023   Total duration of encounter: 7 days    Nutrition Recommendation/Prescription     Regular diet as tolerated    Discontinue Boost TID. 240 kcals and 10 g protein per serving.     Consider thiamine supplementation and MVI.     Nutrition Assessment     Chart Review    Reason Seen: malnutrition screening tool (MST) and follow-up    Malnutrition Screening Tool Results   Have you recently lost weight without trying?: Unsure  Have you been eating poorly because of a decreased appetite?: No   MST Score: 2     Diagnosis:  Active Problem List with Overview Notes    Diagnosis Date Noted    Multiple myeloma 10/20/2023    DOUG (acute kidney injury) 10/10/2023    Hypercalcemia 10/10/2023    Transaminitis 10/10/2023        Relevant Medical History:   Past Medical History:   Diagnosis Date    Anxiety disorder, unspecified     Hypercalcemia      Past Surgical History:   Procedure Laterality Date    BONE MARROW BIOPSY N/A 10/18/2023    Procedure: Biopsy-bone marrow;  Surgeon: Nhan Marte MD;  Location: [facility];  Service: General;  Laterality: N/A;    TONSILLECTOMY       Review of patient's allergies indicates:  Not on File     Nutrition-Related Medications:    chlordiazepoxide  5 mg Oral BID    dextromethorphan-guaiFENesin  mg  1 tablet Oral BID    docusate sodium  100 mg Oral BID    folic acid  1 mg Oral Daily    pantoprazole  40 mg Oral Daily    polyethylene glycol  17 g Oral Daily    potassium bicarbonate  25 mEq Oral Once    thiamine  100 mg Oral Daily    zolpidem  10 mg Oral QHS       sodium chloride 0.9% 75 mL/hr at 10/20/23 0930      Calorie Containing IV Medications: no significant kcals from medications at this time    Nutrition-Related Labs:  No results found for: "HGBA1C"  10/19/2023: Magnesium Level 1.70 mg/dL (Ref range: 1.60 - 2.60 mg/dL)  10/20/2023: Phosphorus Level 3.5 mg/dL (Ref range: 2.3 - 4.7 mg/dL); Potassium Level 3.8 mmol/L (Ref range: 3.5 - " "5.1 mmol/L); Sodium Level 139 mmol/L (Ref range: 136 - 145 mmol/L)   Recent Labs   Lab 10/17/23  0412 10/18/23  0405 10/19/23  0410   WBC 12.12* 10.47 8.92  8.92   RBC 3.37* 3.16* 2.98*   HGB 10.7* 10.1* 9.7*   HCT 31.5* 29.2* 27.4*   MCV 93.5 92.4 91.9   MCH 31.8* 32.0* 32.6*   MCHC 34.0 34.6 35.4     Recent Labs   Lab 10/17/23  0412 10/17/23  1745 10/18/23  0405 10/19/23  0410 10/20/23  0649     --  141 138 139   K 3.5  --  3.5 3.4* 3.8   CO2 21*  --  21* 20* 20*   BUN 40.0*  --  38.9* 35.9* 32.4*   CREATININE 3.47*  --  3.35* 3.32* 3.19*   CALCIUM 11.0*  --  11.0* 9.5 9.1   MG 1.90  --  2.00 1.70  --    ALBUMIN 2.8*   < > 2.7* 2.6* 2.6*   ALKPHOS 123  --  131 138  --    *  --  100* 72*  --    AST 93*  --  50* 38*  --    BILITOT 0.6  --  0.5 0.5  --     < > = values in this interval not displayed.       Diet Order: DIET RENAL NON-DIALYSIS  Oral Supplement Order: none  Appetite/Oral Intake: good/% of meals  Factors Affecting Nutritional Intake: decreased appetite (previous)  Food/Denominational/Cultural Preferences: none reported  Food Allergies: none reported       Wound(s):   n/a    Comments    10/15/23: Pt reports fair appetite, somewhat decreased compared to normal but still with pretty good po intake, weight is steady/consistent w/ UBW , experiencing mild intermittent nausea, no other GI complaints, normal BM today, pt divulged alcohol consumption habits - [place], discussed with physician in case medically relevant, cessation encouraged, pt amenable, sounds like he is in the contemplation stage. Encouraged healthy diet, good po intake, he is open to vanilla ONS.     10/20/2023: Pt reports a good appetite/PO intake and denies N/V/D/C. Pt requested to discontinue Boost. Last BM documented as 10/19/2023. Encouraged adequate PO intake. Will monitor.    Anthropometrics    Height: 5' 9" (175.3 cm) Height Method: Stated  Last Weight: 109.7 kg (241 lb 12.8 oz) (10/13/23 2100) " Weight Method: Standard Scale  BMI (Calculated): 35.7  BMI Classification: obese grade II (BMI 35-39.9)        Ideal Body Weight (IBW), Male: 160 lb     % Ideal Body Weight, Male (lb): 151.13 %                          Usual Weight Provided By: patient 240 lbs    Wt Readings from Last 5 Encounters:   10/13/23 109.7 kg (241 lb 12.8 oz)   10/12/23 109.1 kg (240 lb 9.6 oz)     Weight Change(s) Since Admission:  Admit Weight: 109.7 kg (241 lb 12.8 oz) (10/13/23 2030)  10/20/2023: no new wts      Patient Education    Education Provided: general healthy diet  Teaching Method: explanation  Comprehension: verbalizes understanding  Barriers to Learning: none evident  Expected Compliance: good  Comments: All questions were answered and dietitian's contact information was provided.     Monitoring & Evaluation     Dietitian will monitor food and beverage intake, weight, electrolyte/renal panel, glucose/endocrine profile, and gastrointestinal profile.  Nutrition Risk/Follow-Up: low (follow-up in 5-7 days)  Patients assigned 'low nutrition risk' status do not qualify for a full nutritional assessment but will be monitored and re-evaluated in a 5-7 day time period. Please consult if re-evaluation needed sooner.

## 2023-10-20 NOTE — PROGRESS NOTES
Renal on call    Initial 10/14/23 HPI: 44 Y/O male admitted to outside facility due to severe hypercalcemia of 18 and DOUG with creatinin of 3.3. Work up for hypercalcemia started Tsh , vit D , PTH , were normal. Was started on calcitonin and Normal saline and also pamiodronate given and current ca is 12     Chief complaint:   Feeling better    Interval History:  No issue overnight, prelim biopsy c/w MM     ROS:  all else Neg     chlordiazepoxide  5 mg Oral BID    dextromethorphan-guaiFENesin  mg  1 tablet Oral BID    docusate sodium  100 mg Oral BID    folic acid  1 mg Oral Daily    pantoprazole  40 mg Oral Daily    polyethylene glycol  17 g Oral Daily    thiamine  100 mg Oral Daily    zolpidem  10 mg Oral QHS       VITAL SIGNS: 24 HR MIN & MAX LAST    Temp  Min: 97.6 °F (36.4 °C)  Max: 100.1 °F (37.8 °C)  98.1 °F (36.7 °C)        BP  Min: 126/71  Max: 163/92  (!) 150/88     Pulse  Min: 91  Max: 100  94     Resp  Min: 18  Max: 20  20    SpO2  Min: 95 %  Max: 98 %  96 %      Wt Readings from Last 3 Encounters:   10/13/23 109.7 kg (241 lb 12.8 oz)   10/12/23 109.1 kg (240 lb 9.6 oz)       Intake/Output Summary (Last 24 hours) at 10/20/2023 1113  Last data filed at 10/20/2023 0600  Gross per 24 hour   Intake 3078 ml   Output 8950 ml   Net -5872 ml     A&O x 4,  EOMI  (B) symmetrical  Unlabored  Soft, NT, ND  Edema none    Recent Labs     10/18/23  0405 10/19/23  0410 10/20/23  0649    138 139   K 3.5 3.4* 3.8   CHLORIDE 109* 110* 112*   CO2 21* 20* 20*   BUN 38.9* 35.9* 32.4*   CREATININE 3.35* 3.32* 3.19*   GLUCOSE 96 96 91   CALCIUM 11.0* 9.5 9.1   MG 2.00 1.70  --    PHOS 4.1 3.1 3.5   ALBUMIN 2.7* 2.6* 2.6*      Recent Labs     10/18/23  0405 10/19/23  0410   WBC 10.47 8.92  8.92   HGB 10.1* 9.7*   HCT 29.2* 27.4*    197       ASSESSMENT / PLAN    Hypercalcemia with metastatic osseous disease from new dx multiple myeloma    DOUG from vol depl and hyperCa, improving  HyperCa of malignancy (MM),  resolved - s/p pamidronate 10/11, calcitonin 10/13-10/16, zometa 3 mg 10/17  Proteinuria, likely from MM  Anemia  Multiple myeloma on Bone marrow Bx 10/18  Anxiety    Give K-Bicarb 25 PO x 1 today  GFR picking up  Polyuric phase of ATN recovery maybe slowing down  Decr /hr to 75/hr  Encourage PO water intake  Can go home tomorrow if GFR continues to improve and recommend he f/u with Dr. Brown, Nephrology in [place]        Ildefonso Braga M.D.  Nephrology

## 2023-10-20 NOTE — PROGRESS NOTES
[facility]- Oncology Acute  Hematology/Oncology  Consult Note    Patient Name: Pete Fernandez  MRN: [id number]  Admission Date: 10/13/2023  Hospital Length of Stay: 7 days  Attending Provider: Ronal Starr MD  Consulting Provider: Elizabeth K Lejeune, MD  Principal Problem:Hypercalcemia    Consults  Subjective:     HPI:   42yo M presented to [facility] on 10/11 for hypercalcemia and extreme weakness. Labs notable for new severe DOUG, transaminitis, and Ca of 18. He was admitted and started on IVF resuscitation.  Hypercalcemia workup was initiated, notable for PTH suppressed at 8.2. Xray Survey was done which showed small 5mm calvarium defects. Follow up NM Bone scan and MRI Brain with no evidence of lytic lesions.   10/15/23 CT CAP with evidence of extensive osseous lesions.   FLC - Kappa 3.29, Lambda 744, Ratio 226   SPEP with Mspike 0.17 IgG lambda.   Osseous disease, hyperca, renal dysfunction, and anemia present at the time of diagnosis.   BMBx done 10/18 in process.     Interval History  Patient seen and examined this morning.   He has no complaints. Denies any pain, confusion, fatigue.   He does seem a little more short of breath with prolonged talking. Crackles on LL.   Discussed treatment plan moving forward. Answered all questions.     Oncology Treatment Plan:   [No matching plan found]    Medications:  Continuous Infusions:   sodium chloride 0.9% 250 mL/hr at 10/20/23 0327     Scheduled Meds:   chlordiazepoxide  5 mg Oral BID    dextromethorphan-guaiFENesin  mg  1 tablet Oral BID    docusate sodium  100 mg Oral BID    folic acid  1 mg Oral Daily    pantoprazole  40 mg Oral Daily    polyethylene glycol  17 g Oral Daily    thiamine  100 mg Oral Daily    zolpidem  10 mg Oral QHS     PRN Meds:acetaminophen, butalbital-acetaminophen-caffeine -40 mg, cloNIDine, dextromethorphan-guaiFENesin  mg/5 ml, flumazeniL, LORazepam, melatonin, midazolam, ondansetron, polyethylene glycol,  sodium chloride 0.9%     Review of patient's allergies indicates:  Not on File     Past Medical History:   Diagnosis Date    Anxiety disorder, unspecified     Hypercalcemia      Past Surgical History:   Procedure Laterality Date    BONE MARROW BIOPSY N/A 10/18/2023    Procedure: Biopsy-bone marrow;  Surgeon: Nhan Marte MD;  Location: [facility];  Service: General;  Laterality: N/A;    TONSILLECTOMY       Family History       Problem Relation (Age of Onset)    Breast cancer Mother    Pancreatic cancer Father          Tobacco Use    Smoking status: Never    Smokeless tobacco: Never   Substance and Sexual Activity    Alcohol use: Yes    Drug use: Never    Sexual activity: Not on file       Review of Systems   Constitutional:  Positive for activity change and fatigue. Negative for chills.   HENT:  Negative for sore throat.    Eyes:  Negative for visual disturbance.   Respiratory:  Negative for shortness of breath.    Cardiovascular:  Negative for chest pain.   Gastrointestinal:  Positive for abdominal pain, constipation and nausea. Negative for diarrhea and vomiting.   Endocrine: Negative for polyuria.   Genitourinary:  Negative for dysuria.   Musculoskeletal:  Negative for back pain.   Skin:  Negative for wound.   Neurological:  Negative for headaches.   Hematological:  Does not bruise/bleed easily.   Psychiatric/Behavioral:  Negative for confusion.      Objective:     Vital Signs (Most Recent):  Temp: 98.1 °F (36.7 °C) (10/20/23 0803)  Pulse: 94 (10/20/23 0803)  Resp: 20 (10/20/23 0803)  BP: (!) 150/88 (10/20/23 0803)  SpO2: 96 % (10/20/23 0803) Vital Signs (24h Range):  Temp:  [97.6 °F (36.4 °C)-100.1 °F (37.8 °C)] 98.1 °F (36.7 °C)  Pulse:  [] 94  Resp:  [18-20] 20  SpO2:  [95 %-98 %] 96 %  BP: (126-163)/(71-92) 150/88     Weight: 109.7 kg (241 lb 12.8 oz)  Body mass index is 35.71 kg/m².  Body surface area is 2.31 meters squared.      Intake/Output Summary (Last 24 hours) at 10/20/2023 0905  Last data filed  at 10/20/2023 0600  Gross per 24 hour   Intake 3558 ml   Output 8950 ml   Net -5392 ml         Physical Exam  Constitutional:       General: He is not in acute distress.     Appearance: Normal appearance. He is not ill-appearing.   HENT:      Head: Normocephalic and atraumatic.      Nose: Nose normal.      Mouth/Throat:      Mouth: Mucous membranes are moist.      Pharynx: Oropharynx is clear.   Eyes:      Extraocular Movements: Extraocular movements intact.      Conjunctiva/sclera: Conjunctivae normal.      Pupils: Pupils are equal, round, and reactive to light.   Cardiovascular:      Rate and Rhythm: Normal rate and regular rhythm.      Pulses: Normal pulses.      Heart sounds: Normal heart sounds. No murmur heard.  Pulmonary:      Effort: Pulmonary effort is normal. No respiratory distress.      Breath sounds: Normal breath sounds.   Abdominal:      General: There is no distension.      Palpations: Abdomen is soft.      Tenderness: There is no abdominal tenderness.   Musculoskeletal:         General: Normal range of motion.      Cervical back: Normal range of motion and neck supple.      Right lower leg: No edema.      Left lower leg: No edema.   Lymphadenopathy:      Cervical: No cervical adenopathy.   Skin:     General: Skin is warm and dry.   Neurological:      General: No focal deficit present.      Mental Status: He is alert and oriented to person, place, and time.         Significant Labs:   CBC:   Recent Labs   Lab 10/19/23  0410   WBC 8.92  8.92   HGB 9.7*   HCT 27.4*         and CMP:   Recent Labs   Lab 10/19/23  0410 10/20/23  0649    139   K 3.4* 3.8   CO2 20* 20*   BUN 35.9* 32.4*   CREATININE 3.32* 3.19*   CALCIUM 9.5 9.1   ALBUMIN 2.6* 2.6*   BILITOT 0.5  --    ALKPHOS 138  --    AST 38*  --    ALT 72*  --          Diagnostic Results:    10/11/23 Xray Metastatic Survey  1. Scattered small defects at the calvarium measuring up to 5 mm too numerous to count.  2. Degene mild rative  changes at the spine and extremities  3. MR exam and bone scan would allow further evaluation.    10/12/23 MRI Brain  1. Motion artifact  2. Mild scattered mucosal thickening throughout the paranasal sinuses  3. Somewhat limited evaluation of brain metastases without the administration of IV contrast  4. Otherwise unremarkable MR exam of the head without the administration of IV contrast    10/12/23 NM Bone Scan  1. Abnormal intense increased activity throughout the lungs of uncertain etiology.  This may related to hyperparathyroidism, hematologic malignancy, and or metabolic abnormality such as renal failure, vasculitis, or pulmonary infection.  Chest x-ray 10/10/2023 reveals the lungs to be relatively clear and well aerated.  2. Suspect scattered mild degenerative changes throughout the joint spaces  3. Increased activity nasal maxillary region suggesting mucoperiosteal disease  4. Mild activity at the stomach suggesting free pertechnetate  Assessment/Plan:     Hypercalcemia of malignancy  Severe DOUG  Osseous lytic lesions  IgG Lambda FLC Multiple myeloma     - Will plan to start with RvD + Daratumumab for 1st line therapy in the outpatient setting  - Will arrange for outpatient chemotherapy education and follow up with me.   - All discussed with patient in detail  - Recommend holding further fluids given lower lobe crackles - likely due to his extreme volume resuscitation over the last week.   - Okay to DC home from oncology perspective    Elizabeth K Lejeune, MD  Hematology/Oncology  [facility] - Oncology [facility]

## 2023-10-21 VITALS
TEMPERATURE: 99 F | HEART RATE: 90 BPM | BODY MASS INDEX: 35.81 KG/M2 | OXYGEN SATURATION: 98 % | DIASTOLIC BLOOD PRESSURE: 80 MMHG | WEIGHT: 241.81 LBS | SYSTOLIC BLOOD PRESSURE: 129 MMHG | HEIGHT: 69 IN | RESPIRATION RATE: 18 BRPM

## 2023-10-21 LAB
ALBUMIN SERPL-MCNC: 2.7 G/DL (ref 3.5–5)
BUN SERPL-MCNC: 30 MG/DL (ref 8.9–20.6)
CALCIUM SERPL-MCNC: 8.6 MG/DL (ref 8.4–10.2)
CHLORIDE SERPL-SCNC: 112 MMOL/L (ref 98–107)
CO2 SERPL-SCNC: 19 MMOL/L (ref 22–29)
CREAT SERPL-MCNC: 3.4 MG/DL (ref 0.73–1.18)
GFR SERPLBLD CREATININE-BSD FMLA CKD-EPI: 22 MLS/MIN/1.73/M2
GLUCOSE SERPL-MCNC: 96 MG/DL (ref 74–100)
PHOSPHATE SERPL-MCNC: 4 MG/DL (ref 2.3–4.7)
POTASSIUM SERPL-SCNC: 3.7 MMOL/L (ref 3.5–5.1)
SODIUM SERPL-SCNC: 142 MMOL/L (ref 136–145)

## 2023-10-21 PROCEDURE — 25000003 PHARM REV CODE 250: Performed by: INTERNAL MEDICINE

## 2023-10-21 PROCEDURE — 80069 RENAL FUNCTION PANEL: CPT | Performed by: INTERNAL MEDICINE

## 2023-10-21 PROCEDURE — 94761 N-INVAS EAR/PLS OXIMETRY MLT: CPT

## 2023-10-21 RX ORDER — SODIUM BICARBONATE 650 MG/1
650 TABLET ORAL DAILY
Qty: 10 TABLET | Refills: 0 | Status: SHIPPED | OUTPATIENT
Start: 2023-10-21 | End: 2023-10-31

## 2023-10-21 RX ORDER — SODIUM BICARBONATE 650 MG/1
650 TABLET ORAL DAILY
Status: DISCONTINUED | OUTPATIENT
Start: 2023-10-21 | End: 2023-10-21 | Stop reason: HOSPADM

## 2023-10-21 RX ORDER — LANOLIN ALCOHOL/MO/W.PET/CERES
100 CREAM (GRAM) TOPICAL DAILY
Qty: 30 TABLET | Refills: 0 | Status: SHIPPED | OUTPATIENT
Start: 2023-10-21 | End: 2023-11-20

## 2023-10-21 RX ORDER — FOLIC ACID 1 MG/1
1 TABLET ORAL DAILY
Qty: 30 TABLET | Refills: 0 | Status: SHIPPED | OUTPATIENT
Start: 2023-10-21 | End: 2023-11-22

## 2023-10-21 RX ADMIN — THIAMINE HCL TAB 100 MG 100 MG: 100 TAB at 09:10

## 2023-10-21 RX ADMIN — BUTALBITAL, ACETAMINOPHEN, AND CAFFEINE 1 TABLET: 325; 50; 40 TABLET ORAL at 04:10

## 2023-10-21 RX ADMIN — FOLIC ACID 1 MG: 1 TABLET ORAL at 09:10

## 2023-10-21 RX ADMIN — BUTALBITAL, ACETAMINOPHEN, AND CAFFEINE 1 TABLET: 325; 50; 40 TABLET ORAL at 11:10

## 2023-10-21 RX ADMIN — PANTOPRAZOLE SODIUM 40 MG: 40 TABLET, DELAYED RELEASE ORAL at 09:10

## 2023-10-21 RX ADMIN — GUAIFENESIN AND DEXTROMETHORPHAN HYDROBROMIDE 1 TABLET: 30; 600 TABLET, EXTENDED RELEASE ORAL at 09:10

## 2023-10-21 RX ADMIN — SODIUM CHLORIDE: 9 INJECTION, SOLUTION INTRAVENOUS at 04:10

## 2023-10-21 RX ADMIN — SODIUM BICARBONATE 650 MG TABLET 650 MG: at 09:10

## 2023-10-21 NOTE — PLAN OF CARE
Problem: Adult Inpatient Plan of Care  Goal: Plan of Care Review  Outcome: Ongoing, Progressing  Flowsheets (Taken 10/21/2023 0829)  Plan of Care Reviewed With: patient  Goal: Patient-Specific Goal (Individualized)  Outcome: Ongoing, Progressing  Flowsheets (Taken 10/21/2023 0829)  Anxieties, Fears or Concerns: denies  Individualized Care Needs: monitor calcium level, ivf, manage pain, monitor for alchol withdrawal  Goal: Absence of Hospital-Acquired Illness or Injury  Outcome: Ongoing, Progressing  Intervention: Identify and Manage Fall Risk  Flowsheets (Taken 10/21/2023 0829)  Safety Promotion/Fall Prevention:   assistive device/personal item within reach   side rails raised x 3   Fall Risk reviewed with patient/family   Fall Risk signage in place   nonskid shoes/socks when out of bed   lighting adjusted  Intervention: Prevent Skin Injury  Flowsheets (Taken 10/21/2023 0829)  Body Position:   position changed independently   weight shifting  Skin Protection:   adhesive use limited   tubing/devices free from skin contact   transparent dressing maintained  Intervention: Prevent and Manage VTE (Venous Thromboembolism) Risk  Flowsheets (Taken 10/21/2023 0829)  Activity Management:   Rolling - L1   Heel slide - L1  VTE Prevention/Management:   ambulation promoted   ROM (passive) performed   ROM (active) performed   dorsiflexion/plantar flexion performed   fluids promoted  Range of Motion:   active ROM (range of motion) encouraged   ROM (range of motion) performed  Intervention: Prevent Infection  Flowsheets (Taken 10/21/2023 0829)  Infection Prevention:   equipment surfaces disinfected   hand hygiene promoted   rest/sleep promoted   single patient room provided  Goal: Optimal Comfort and Wellbeing  Outcome: Ongoing, Progressing  Intervention: Monitor Pain and Promote Comfort  Flowsheets (Taken 10/21/2023 0829)  Pain Management Interventions:   care clustered   relaxation techniques promoted   quiet environment  Care Management Follow Up    Length of Stay (days): 4    Expected Discharge Date: 03/03/2023     Concerns to be Addressed:  Discharge planning     Patient plan of care discussed at interdisciplinary rounds: Yes    Anticipated Discharge Disposition:  TCU     Anticipated Discharge Services:  Post acute facilities  Anticipated Discharge DME:  None    Patient/family educated on Medicare website which has current facility and service quality ratings:  Yes  Education Provided on the Discharge Plan:  Yes  Patient/Family in Agreement with the Plan:  Yes    Private pay costs discussed: transportation costs    Additional Information:  SW received VM from pt's daughter, Alea, providing additional TCU preferences: [facility], and [facility] in [place].    GILDA received VM from Montserrat at [facility], stating that they could accept pt today; pt would have a private room w/out extra OOP costs d/t O2 needs.    Per IDT rounds, pt will be medically ready to discharge tomorrow (Friday 3/3/23).    0935: GILDA called back Montserrat at [facility], provided update on pt's status. Montserrat stated that next private room would be available on Saturday (3/4/23). Montserrat agreeable to GILDA updating her tomorrow if pt will take Saturday TCU bed at [facility], as pt and family's first choice was [facility].    1315: GILDA spoke to bedside nurse, requested to be updated as soon and Covid results are known. Bedside nurse reported that sample was being sent to lab now.    1430: Bedside nurse updated RNCC that pt is Covid-negative.    1435: Montserrat from [facility] left  a VM, stating that they would have a private room available for pt tomorrow (Friday 3/3/23), requested a call back with pt's preference.    1600: GILDA met with pt at bedside, with children, Lisa and Jono, present via phone to discuss discharge plan. Pt reported she did not want to pay for a private room, gave permission for GILDA to proceed with [facility]    1630: GILDA left VM for  Montserrat in Admissions at [facility], provided update on pt deciding to go to [facility] TCU; requested a call back to confirm if bed is still available/can pt still accept tomorrow.      Referrals Placed by CM/SW:  Post acute facilities      ACCEPT  [facility]  [address] [id number]  Admissions: [phone]  Fax: [phone]    [address] [address]  PH: [phone]  Admissions: [phone]  Fax: [phone]  3/2: [facility] in  Admissions stated that they would have a female bed available tomorrow. SW provided update on pt's status. [facility] agreeable to reviewing referral and f/u with SW.  1530: SW spoke to Ana María in Admissions, who confirmed that pt can admit to TCU tomorrow (Friday 3/3/23), agreeable to a 3:00 p.m. discharge; also stated TCU would be able to accept up until 8:00 p.m. tomorrow evening; provided fax # for orders to be sent to (documented above).  1545: GILDA called back Ana María to confirm OOP private room costs, per pt's request. Ana María stated a private room would be available for pt on Saturday at $40/day; otherwise, shared room is what's available on Friday.    [facility]  [address] [place] [address] [phone]  PH: [phone]  Admissions: [phone] ([facility])  3/2: [facility] called SW, stated that they would be able to accept pt if Covid-negative. [facility] is working from home, will have a better idea this afternoon of when bed is available. SW to update [facility] of pt's Covid status and TCU preference.      PENDING  [facility]  [address] [place] [address]  PH: [phone]          DEWAYNE Galan, LSW  [address] [facility]   PHONE: [phone]  Pager: [phone]     facilitated  Intervention: Provide Person-Centered Care  Flowsheets (Taken 10/21/2023 0829)  Trust Relationship/Rapport:   care explained   choices provided   emotional support provided   empathic listening provided   questions answered   questions encouraged   reassurance provided   thoughts/feelings acknowledged  Goal: Readiness for Transition of Care  Outcome: Ongoing, Progressing     Problem: Fluid and Electrolyte Imbalance (Acute Kidney Injury/Impairment)  Goal: Fluid and Electrolyte Balance  Outcome: Ongoing, Progressing  Intervention: Monitor and Manage Fluid and Electrolyte Balance  Flowsheets (Taken 10/21/2023 0829)  Fluid/Electrolyte Management: intravenous fluid replacement initiated     Problem: Oral Intake Inadequate (Acute Kidney Injury/Impairment)  Goal: Optimal Nutrition Intake  Outcome: Ongoing, Progressing  Intervention: Promote and Optimize Nutrition  Flowsheets (Taken 10/21/2023 0829)  Oral Nutrition Promotion: rest periods promoted     Problem: Renal Function Impairment (Acute Kidney Injury/Impairment)  Goal: Effective Renal Function  Outcome: Ongoing, Progressing  Intervention: Monitor and Support Renal Function  Flowsheets (Taken 10/21/2023 0829)  Medication Review/Management: medications reviewed     Problem: Pain Acute  Goal: Acceptable Pain Control and Functional Ability  Outcome: Ongoing, Progressing  Intervention: Develop Pain Management Plan  Flowsheets (Taken 10/21/2023 0829)  Pain Management Interventions:   care clustered   relaxation techniques promoted   quiet environment facilitated  Intervention: Prevent or Manage Pain  Flowsheets (Taken 10/21/2023 0829)  Sleep/Rest Enhancement: awakenings minimized  Sensory Stimulation Regulation: care clustered  Bowel Elimination Promotion:   ambulation promoted   adequate fluid intake promoted  Medication Review/Management: medications reviewed  Intervention: Optimize Psychosocial Wellbeing  Flowsheets (Taken 10/21/2023 0829)  Supportive  Measures: active listening utilized  Diversional Activities: television     Problem: Fatigue  Goal: Improved Activity Tolerance  Outcome: Ongoing, Progressing  Intervention: Promote Improved Energy  Flowsheets (Taken 10/21/2023 3571)  Sleep/Rest Enhancement: awakenings minimized  Activity Management:   Rolling - L1   Heel slide - L1

## 2023-10-21 NOTE — PLAN OF CARE
Problem: Adult Inpatient Plan of Care  Goal: Plan of Care Review  Outcome: Met  Goal: Patient-Specific Goal (Individualized)  Outcome: Met  Goal: Absence of Hospital-Acquired Illness or Injury  Outcome: Met  Goal: Optimal Comfort and Wellbeing  Outcome: Met  Goal: Readiness for Transition of Care  Outcome: Met     Problem: Fluid and Electrolyte Imbalance (Acute Kidney Injury/Impairment)  Goal: Fluid and Electrolyte Balance  Outcome: Met     Problem: Oral Intake Inadequate (Acute Kidney Injury/Impairment)  Goal: Optimal Nutrition Intake  Outcome: Met     Problem: Renal Function Impairment (Acute Kidney Injury/Impairment)  Goal: Effective Renal Function  Outcome: Met     Problem: Pain Acute  Goal: Acceptable Pain Control and Functional Ability  Outcome: Met     Problem: Fatigue  Goal: Improved Activity Tolerance  Outcome: Met

## 2023-10-21 NOTE — PROGRESS NOTES
[facility]  Hospital Medicine Progress Note        Chief Complaint: Inpatient Follow-up for Multiple myeloma     HPI:   Patient is a 43-year-old male with a history of ADHD who presented to [facility] on 10/10/2023 with complaints of severe weakness, nausea that was progressively worsening over a week.  He also reported numbness around his lips and urinary frequency without dysuria. He was found to have severe hypercalcemia and acute renal failure on arrival with reported labs being normal prior in August, and was given a dose of pamidronate with a steady down trend of his serum calcium since.  Parathyroid hormone was appropriately suppressed, vitamin-D with within normal limits.     He was seen by both Nephrology and Oncology and ultimately found to have calvarium lesions on x-ray patient was diagnosed with a paraproteinemia and a workup for multiple myeloma was initiated.  His serum beta 2 microglobulin has come back elevated.  He was found to have nephrotic range proteinuria during that admission as well but remained nonoliguric with close monitoring of his renal function by Nephrology. Because there was no calcitonin available at the prior facility he is transferred here to receive calcitonin for hypercalcemia.  Of note he had an ultrasound done which showed biliary sludge upper limits of normal thickness with a positive Arteaga's so there was a tentative plan to get a HIDA scan to exclude cholecystitis.     CT chest , abd, pelvis done in this facility showed widespread osseous findings suggestive of extensive metastatic skeletal disease including  expansile destructive soft tissue component at the anterior right 7th rib. Diffuse ground-glass attenuation through both lungs suggestive of infection vs inflammatory process vs metastatic involvement.  HIDA scan was unremarkable.  Electrophoresis revealed M spike in gamma fraction suspicious for multiple myeloma and bone  marrow biopsy was recommended.    Underwent bone marrow biopsy 10/18/2023 and also received a dose of Zometa 10/18/23.  Calcitonin and  Lasix was discontinued eventually. Patient was in ARF and needing iv fluids. He was also seen by nephrology team. His calcium levels was back to normal range. He was ambulating well and has no bone pain. He was seen by oncologist and recommended out patient f/u in their clinic. He was stable and had good urine out put. Patients renal functions and calcium level was closely monitored. Calcium was back at normal range and renal functions was elevated by stable. He was having good urine out put.     He will be discharged when cleared by renal team.      Interval Hx:   Patient today awake and comfortable. Had no acute issues overnight. Has been afebrile. Has good urine out put. He is anxious and ready to go home. Informed him about his renal functions and that he can go home if cleared by renal team today.     Objective/physical exam:  General: In no acute distress  Chest: Clear to auscultation bilaterally  Heart: RRR, +S1, S2, no appreciable murmur  Abdomen: Soft, nontender, BS +  MSK: Warm, no lower extremity edema, no clubbing or cyanosis  Neurologic: Alert and oriented x4, Cranial nerve II-XII intact, Strength 5/5 in all 4 extremities    VITAL SIGNS: 24 HRS MIN & MAX LAST   Temp  Min: 97.9 °F (36.6 °C)  Max: 99.2 °F (37.3 °C) 99 °F (37.2 °C)   BP  Min: 129/80  Max: 147/82 129/80   Pulse  Min: 87  Max: 94  90   Resp  Min: 16  Max: 20 18   SpO2  Min: 95 %  Max: 98 % 98 %     I have reviewed the following labs:  Recent Labs   Lab 10/17/23  0412 10/18/23  0405 10/19/23  0410   WBC 12.12* 10.47 8.92  8.92   RBC 3.37* 3.16* 2.98*   HGB 10.7* 10.1* 9.7*   HCT 31.5* 29.2* 27.4*   MCV 93.5 92.4 91.9   MCH 31.8* 32.0* 32.6*   MCHC 34.0 34.6 35.4   RDW 13.7 13.5 13.5    211 197   MPV 9.9 9.4 9.3     Recent Labs   Lab 10/17/23  0412 10/17/23  1745 10/18/23  0405 10/19/23  0410  10/20/23  0649 10/21/23  0343     --  141 138 139 142   K 3.5  --  3.5 3.4* 3.8 3.7   CO2 21*  --  21* 20* 20* 19*   BUN 40.0*  --  38.9* 35.9* 32.4* 30.0*   CREATININE 3.47*  --  3.35* 3.32* 3.19* 3.40*   CALCIUM 11.0*  --  11.0* 9.5 9.1 8.6   MG 1.90  --  2.00 1.70  --   --    ALBUMIN 2.8*   < > 2.7* 2.6* 2.6* 2.7*   ALKPHOS 123  --  131 138  --   --    *  --  100* 72*  --   --    AST 93*  --  50* 38*  --   --    BILITOT 0.6  --  0.5 0.5  --   --     < > = values in this interval not displayed.     Microbiology Results (last 7 days)       ** No results found for the last 168 hours. **             See below for Radiology    Scheduled Med:   dextromethorphan-guaiFENesin  mg  1 tablet Oral BID    docusate sodium  100 mg Oral BID    folic acid  1 mg Oral Daily    pantoprazole  40 mg Oral Daily    polyethylene glycol  17 g Oral Daily    sodium bicarbonate  650 mg Oral Daily    thiamine  100 mg Oral Daily    zolpidem  10 mg Oral QHS      Continuous Infusions:   sodium chloride 0.9% 75 mL/hr at 10/21/23 0410      PRN Meds:  acetaminophen, butalbital-acetaminophen-caffeine -40 mg, cloNIDine, dextromethorphan-guaiFENesin  mg/5 ml, flumazeniL, LORazepam, melatonin, midazolam, ondansetron, polyethylene glycol, sodium chloride 0.9%     Assessment/Plan:  Multiple myeloma s/p bone marrow biopsy 10/18/2023  Hypercalcemia of malignancy, s/p Pamidronate, calcitonin & Zometa - resolved  DOUG/ ?CKD   Mild metabolic acidosis   Metastatic skeletal disease with  expansile destructive soft tissue component at the anterior right 7th rib  Anemia of chronic disease   Alcohol use   Obesity, BMI 35.7      Plan:  Patient clinically doing well with no acute issues  Has been afebrile.   He has good urine out put and tolerating po liquids   Renal functions Today BUN 30 and Crt 3.40  Added sodium bicarb 650 mg tabs daily x 7 days   Advised to avoid NSAIDs   Hypercalcemia resolved - today 8.6  Seen by oncologist and  recommended out patient f/u     Patient will be discharged when cleared by renal team     VTE prophylaxis: Patient ambulating well    Patient condition:  Fair    Anticipated discharge and Disposition:   Home       All diagnosis and differential diagnosis have been reviewed; assessment and plan has been documented; I have personally reviewed the labs and test results that are presently available; I have reviewed the patients medication list; I have reviewed the consulting providers response and recommendations. I have reviewed or attempted to review medical records based upon their availability    All of the patient's questions have been  addressed and answered. Patient's is agreeable to the above stated plan. I will continue to monitor closely and make adjustments to medical management as needed.  _____________________________________________________________________    Nutrition Status:    Radiology:  I have personally reviewed the following imaging and agree with the radiologist.     X-Ray Chest 1 View  Narrative: EXAMINATION:  XR CHEST 1 VIEW    CLINICAL HISTORY:  Cough;    TECHNIQUE:  Single frontal view of the chest was performed.    COMPARISON:  10/16/2023    FINDINGS:  There are mild diffuse interstitial infiltrates seen in the lungs bilaterally with associated punctate areas of nodularity.  Findings are similar to the prior examination.  No consolidation is seen.  No pleural effusion is seen.  The heart appears normal.  Aorta appears grossly unremarkable.  Bones and joints show no acute abnormality.  Impression: Stable diffuse interstitial infiltrates with associated areas of punctate nodularity.  No consolidation is seen.    Electronically signed by: Lucie Landaverde  Date:    10/19/2023  Time:    18:41      Ronal Starr MD   10/21/2023

## 2023-10-21 NOTE — DISCHARGE SUMMARY
HPI:   Patient is a 43-year-old male with a history of ADHD who presented to [facility] on 10/10/2023 with complaints of severe weakness, nausea that was progressively worsening over a week.  He also reported numbness around his lips and urinary frequency without dysuria. He was found to have severe hypercalcemia and acute renal failure on arrival with reported labs being normal prior in August, and was given a dose of pamidronate with a steady down trend of his serum calcium since.  Parathyroid hormone was appropriately suppressed, vitamin-D with within normal limits.     He was seen by both Nephrology and Oncology and ultimately found to have calvarium lesions on x-ray patient was diagnosed with a paraproteinemia and a workup for multiple myeloma was initiated.  His serum beta 2 microglobulin has come back elevated.  He was found to have nephrotic range proteinuria during that admission as well but remained nonoliguric with close monitoring of his renal function by Nephrology. Because there was no calcitonin available at the prior facility he is transferred here to receive calcitonin for hypercalcemia.  Of note he had an ultrasound done which showed biliary sludge upper limits of normal thickness with a positive Arteaga's so there was a tentative plan to get a HIDA scan to exclude cholecystitis.     CT chest , abd, pelvis done in this facility showed widespread osseous findings suggestive of extensive metastatic skeletal disease including  expansile destructive soft tissue component at the anterior right 7th rib. Diffuse ground-glass attenuation through both lungs suggestive of infection vs inflammatory process vs metastatic involvement.  HIDA scan was unremarkable.  Electrophoresis revealed M spike in gamma fraction suspicious for multiple myeloma and bone marrow biopsy was recommended.    Underwent bone marrow biopsy 10/18/2023 and also received a dose of Zometa 10/18/23.  Calcitonin and  Lasix was  discontinued eventually. Patient was in ARF and needing iv fluids. He was also seen by nephrology team. His calcium levels was back to normal range. He was ambulating well and has no bone pain. He was seen by oncologist and recommended out patient f/u in their clinic. He was stable and had good urine out put. Patients renal functions and calcium level was closely monitored. Calcium was back at normal range and renal functions was elevated by stable. He was having good urine out put.      He will be discharged when cleared by renal team.      Interval Hx:   Patient today awake and comfortable. Had no acute issues overnight. Has been afebrile. Has good urine out put. He is anxious and ready to go home. Informed him about his renal functions and that he can go home if cleared by renal team today.      Objective/physical exam:  General: In no acute distress  Chest: Clear to auscultation bilaterally  Heart: RRR, +S1, S2, no appreciable murmur  Abdomen: Soft, nontender, BS +  MSK: Warm, no lower extremity edema, no clubbing or cyanosis  Neurologic: Alert and oriented x4, Cranial nerve II-XII intact, Strength 5/5 in all 4 extremities     VITAL SIGNS: 24 HRS MIN & MAX LAST   Temp  Min: 97.9 °F (36.6 °C)  Max: 99.2 °F (37.3 °C) 99 °F (37.2 °C)   BP  Min: 129/80  Max: 147/82 129/80   Pulse  Min: 87  Max: 94  90   Resp  Min: 16  Max: 20 18   SpO2  Min: 95 %  Max: 98 % 98 %      I have reviewed the following labs:        Recent Labs   Lab 10/17/23  0412 10/18/23  0405 10/19/23  0410   WBC 12.12* 10.47 8.92  8.92   RBC 3.37* 3.16* 2.98*   HGB 10.7* 10.1* 9.7*   HCT 31.5* 29.2* 27.4*   MCV 93.5 92.4 91.9   MCH 31.8* 32.0* 32.6*   MCHC 34.0 34.6 35.4   RDW 13.7 13.5 13.5    211 197   MPV 9.9 9.4 9.3               Recent Labs   Lab 10/17/23  0412 10/17/23  1745 10/18/23  0405 10/19/23  0410 10/20/23  0649 10/21/23  0343     --  141 138 139 142   K 3.5  --  3.5 3.4* 3.8 3.7   CO2 21*  --  21* 20* 20* 19*   BUN 40.0*   --  38.9* 35.9* 32.4* 30.0*   CREATININE 3.47*  --  3.35* 3.32* 3.19* 3.40*   CALCIUM 11.0*  --  11.0* 9.5 9.1 8.6   MG 1.90  --  2.00 1.70  --   --    ALBUMIN 2.8*   < > 2.7* 2.6* 2.6* 2.7*   ALKPHOS 123  --  131 138  --   --    *  --  100* 72*  --   --    AST 93*  --  50* 38*  --   --    BILITOT 0.6  --  0.5 0.5  --   --     < > = values in this interval not displayed.      Microbiology Results (last 7 days)         ** No results found for the last 168 hours. **                See below for Radiology     Scheduled Med:   dextromethorphan-guaiFENesin  mg  1 tablet Oral BID    docusate sodium  100 mg Oral BID    folic acid  1 mg Oral Daily    pantoprazole  40 mg Oral Daily    polyethylene glycol  17 g Oral Daily    sodium bicarbonate  650 mg Oral Daily    thiamine  100 mg Oral Daily    zolpidem  10 mg Oral QHS      Continuous Infusions:   sodium chloride 0.9% 75 mL/hr at 10/21/23 0410      PRN Meds:  acetaminophen, butalbital-acetaminophen-caffeine -40 mg, cloNIDine, dextromethorphan-guaiFENesin  mg/5 ml, flumazeniL, LORazepam, melatonin, midazolam, ondansetron, polyethylene glycol, sodium chloride 0.9%      Assessment/Plan:  Multiple myeloma s/p bone marrow biopsy 10/18/2023  Hypercalcemia of malignancy, s/p Pamidronate, calcitonin & Zometa - resolved  DOUG/ ?CKD   Mild metabolic acidosis   Metastatic skeletal disease with  expansile destructive soft tissue component at the anterior right 7th rib  Anemia of chronic disease   Alcohol use   Obesity, BMI 35.7        Plan:  Patient clinically doing well with no acute issues  Has been afebrile.   He has good urine out put and tolerating po liquids   Renal functions Today BUN 30 and Crt 3.40  Added sodium bicarb 650 mg tabs daily x 7 days   Advised to avoid NSAIDs   Hypercalcemia resolved - today 8.6  Seen by oncologist and recommended out patient f/u      Patient will be discharged when cleared by renal team      VTE prophylaxis: Patient  ambulating well     Patient condition:  Fair     Anticipated discharge and Disposition:   Home         All diagnosis and differential diagnosis have been reviewed; assessment and plan has been documented; I have personally reviewed the labs and test results that are presently available; I have reviewed the patients medication list; I have reviewed the consulting providers response and recommendations. I have reviewed or attempted to review medical records based upon their availability     All of the patient's questions have been  addressed and answered. Patient's is agreeable to the above stated plan. I will continue to monitor closely and make adjustments to medical management as needed.  _____________________________________________________________________     Nutrition Status:     Radiology:  I have personally reviewed the following imaging and agree with the radiologist.      X-Ray Chest 1 View  Narrative: EXAMINATION:  XR CHEST 1 VIEW     CLINICAL HISTORY:  Cough;     TECHNIQUE:  Single frontal view of the chest was performed.     COMPARISON:  10/16/2023     FINDINGS:  There are mild diffuse interstitial infiltrates seen in the lungs bilaterally with associated punctate areas of nodularity.  Findings are similar to the prior examination.  No consolidation is seen.  No pleural effusion is seen.  The heart appears normal.  Aorta appears grossly unremarkable.  Bones and joints show no acute abnormality.  Impression: Stable diffuse interstitial infiltrates with associated areas of punctate nodularity.  No consolidation is seen.     Electronically signed by:         Lucie Landaverde  Date:                                        10/19/2023  Time:                                       18:41        Ronal Starr MD   10/21/2023                         Revision History                          Per nurse, renal team cleared patient for discharge     Dc to home today     DC 31 minutes

## 2023-10-23 ENCOUNTER — PATIENT OUTREACH (OUTPATIENT)
Dept: ADMINISTRATIVE | Facility: CLINIC | Age: 43
End: 2023-10-23
Payer: COMMERCIAL

## 2023-10-23 ENCOUNTER — PATIENT MESSAGE (OUTPATIENT)
Dept: ADMINISTRATIVE | Facility: CLINIC | Age: 43
End: 2023-10-23
Payer: COMMERCIAL

## 2023-10-23 NOTE — PROGRESS NOTES
C3 nurse attempted to contact ePte Fernandez  for a TCC post hospital discharge follow up call. No answer. Left voicemail with callback information. The patient does not have a scheduled HOSFU appointment noted.  Message sent via patient's portal regarding follow up call.

## 2023-10-24 ENCOUNTER — CLINICAL SUPPORT (OUTPATIENT)
Dept: HEMATOLOGY/ONCOLOGY | Facility: CLINIC | Age: 43
End: 2023-10-24
Payer: COMMERCIAL

## 2023-10-24 ENCOUNTER — OFFICE VISIT (OUTPATIENT)
Dept: HEMATOLOGY/ONCOLOGY | Facility: CLINIC | Age: 43
End: 2023-10-24
Payer: COMMERCIAL

## 2023-10-24 VITALS
HEART RATE: 112 BPM | BODY MASS INDEX: 33.47 KG/M2 | TEMPERATURE: 98 F | WEIGHT: 226 LBS | OXYGEN SATURATION: 97 % | SYSTOLIC BLOOD PRESSURE: 148 MMHG | HEIGHT: 69 IN | DIASTOLIC BLOOD PRESSURE: 93 MMHG

## 2023-10-24 DIAGNOSIS — C90.00 MULTIPLE MYELOMA, REMISSION STATUS UNSPECIFIED: Primary | ICD-10-CM

## 2023-10-24 PROCEDURE — 99999 PR PBB SHADOW E&M-EST. PATIENT-LVL II: ICD-10-PCS | Mod: PBBFAC,,,

## 2023-10-24 PROCEDURE — 99999 PR PBB SHADOW E&M-EST. PATIENT-LVL III: ICD-10-PCS | Mod: PBBFAC,,,

## 2023-10-24 PROCEDURE — 1159F MED LIST DOCD IN RCRD: CPT | Mod: CPTII,S$GLB,,

## 2023-10-24 PROCEDURE — 1111F PR DISCHARGE MEDS RECONCILED W/ CURRENT OUTPATIENT MED LIST: ICD-10-PCS | Mod: CPTII,S$GLB,,

## 2023-10-24 PROCEDURE — 3080F PR MOST RECENT DIASTOLIC BLOOD PRESSURE >= 90 MM HG: ICD-10-PCS | Mod: CPTII,S$GLB,,

## 2023-10-24 PROCEDURE — 3008F PR BODY MASS INDEX (BMI) DOCUMENTED: ICD-10-PCS | Mod: CPTII,S$GLB,,

## 2023-10-24 PROCEDURE — 3080F DIAST BP >= 90 MM HG: CPT | Mod: CPTII,S$GLB,,

## 2023-10-24 PROCEDURE — 1159F PR MEDICATION LIST DOCUMENTED IN MEDICAL RECORD: ICD-10-PCS | Mod: CPTII,S$GLB,,

## 2023-10-24 PROCEDURE — 99215 PR OFFICE/OUTPT VISIT, EST, LEVL V, 40-54 MIN: ICD-10-PCS | Mod: S$GLB,,,

## 2023-10-24 PROCEDURE — 3077F SYST BP >= 140 MM HG: CPT | Mod: CPTII,S$GLB,,

## 2023-10-24 PROCEDURE — 99999 PR PBB SHADOW E&M-EST. PATIENT-LVL II: CPT | Mod: PBBFAC,,,

## 2023-10-24 PROCEDURE — 99215 OFFICE O/P EST HI 40 MIN: CPT | Mod: S$GLB,,,

## 2023-10-24 PROCEDURE — 3077F PR MOST RECENT SYSTOLIC BLOOD PRESSURE >= 140 MM HG: ICD-10-PCS | Mod: CPTII,S$GLB,,

## 2023-10-24 PROCEDURE — 1111F DSCHRG MED/CURRENT MED MERGE: CPT | Mod: CPTII,S$GLB,,

## 2023-10-24 PROCEDURE — 99999 PR PBB SHADOW E&M-EST. PATIENT-LVL III: CPT | Mod: PBBFAC,,,

## 2023-10-24 PROCEDURE — 3008F BODY MASS INDEX DOCD: CPT | Mod: CPTII,S$GLB,,

## 2023-10-24 RX ORDER — LENALIDOMIDE 10 MG/1
10 CAPSULE ORAL DAILY
Qty: 21 EACH | Refills: 0 | Status: ACTIVE | OUTPATIENT
Start: 2023-10-24 | End: 2023-11-28 | Stop reason: SDUPTHER

## 2023-10-24 RX ORDER — LENALIDOMIDE 10 MG/1
10 CAPSULE ORAL DAILY
COMMUNITY
End: 2023-10-24 | Stop reason: SDUPTHER

## 2023-10-24 NOTE — PROGRESS NOTES
THERAPY EDUCATION: AYDE-VRD (DARATUMUMAB+ BORTEZOMIB+ LENALIDOMIDE + DEXAMETHASONE)    Chief Complaint: Therapy Education    Diagnosis:  Multiple Myeloma    Current Treatment: AYDE-VRD to start tentatively on 11/2/23.    Interval History     10/24/23: Mr. Fernandez presents to the clinic accompanied by his wife for therapy education. Patient anxious and nervous about starting treatment but denies any other major complaints. Denies fever, chills, SOB, N/V/D, constipation, recent infection, chest pain or unexplained bleeding or bruising.      Plan    -Therapy education completed on 10/24/23.  -Plans to start AYDE-VRD tentatively on 11/2/23. Patient given instructions on when and how to take his medications at home concurrently with when to come into the infusion suite to receive to the SQ injections. Patient given a calendar to follow to help guide through each treatment.   -Compazine PRN prescribed for at home with instructions to be taken by mouth every 8 hours as needed for nausea.   -OTC Imodium AD recommended for diarrhea (4-6 BMs a day). Take 2 tablets after the first loose bowel movement, and 1 tablet after each loose bowel movement after the first dose has been taken. No more than 4 tablets should be taken in any 24-hour period. If not working, Lomotil can be prescribed as 2nd choice.    -OTC Miralax PRN constipation   -Mouth sore prevention with 1-quart warm water with 1 tsp of baking soda and salt and alcohol-free mouthwash.    -Emphasized adequate hand-hygiene and limited contact with people who are sick.   -Monitor and notify any bleeding in urine, stool, or sputum.  As well as unusual bleeding or bruising and stomach pain.   - Emphasized hydration with 4 16 oz bottles of water a day.    -Importance of moisturizing with fragrance free lotion to prevent skin rash.  -Call clinic if fever >100.4, shakes or chills, shortness of breath, chest pain, uncontrolled vomiting or diarrhea, pain and tingling in the chest  or arm, or just not feeling well.      DISCUSSION:    1.  A total of 60 minutes were spent in counseling today, in which 100% were face-to-face.  At today's therapy teaching session, we discussed the patient's cancer diagnosis as well as planned therapy regimen, protocol, side effects and toxicities.  A handout of each therapeutic agent in the regimen was provided and reviewed in detail.    2.  The following side effects were discussed but not limited to:    Daratumumab Side Effects:  Allergic Reactions  Bruising  Chest Pain  Chills  Constipation  Cough  Diarrhea  Dizziness  Feeling Faint  Fever  Headache  Infection  Itching  Low Red Blood Cell Count  Nausea  Numbness  Pain  Rash  Shortness of Breath  Swelling  Tingling  Trouble Sleeping  Vomiting    Bortezomib Side Effects:  Allergic Reactions  Bruising  Chills  Confusion  Constipation  Cough  Diarrhea  Dizziness  Feeling Faint  Fever  Headache  Heart Failure  Infection  Injury  Itching  Low Blood Pressure  Nausea  Numbness  Pain  Rash  Seizures  Swelling  Tingling  Trouble Sleeping  Vomiting  Weight Gain    Dexamethasone (Oral) Side Effects:  Allergic Reactions  Bruising  Chills  Confusion  Cough  Fever  Headache  Infection  Itching  Nausea  Pain  Rash  Restlessness  Swelling  Trouble Sleeping  Vomiting  Weakness  Weight Gain    Lenalidomide Side Effects:  Allergic Reactions  Breathing Problems  Bruising  Chest Pain  Confusion  Constipation  Cough  Diarrhea  Dizziness  Feeling Faint  Fever  Headache  Infection  Injury  Itching  Joint Pain  Low Blood Counts  Nausea  Numbness  Pain  Rash  Seizures  Swelling  Trouble Sleeping  Vomiting  Weakness                a.  Discussed the risk of infection while on therapy related to pancytopenia, specifically a decrease in their white blood cell count.  Instructed to contact our office for temperature >100.4 F, chills, sudden onset cough or shortness of breath, symptoms of a urinary tract infection.                b.   Discussed the risk of anemia. Instructed to contact our office for dizziness, heart palpitations, or extreme or sudden changes in weakness.                c.  Discussed the risk of thrombocytopenia, which increases the risk of bruising or bleeding.  Instructed the patient to contact our office for spontaneous signs of bleeding, including nose bleeds, bleeding from the gums or mouth, blood in sputum, urine or stool and unusual or excessive bruising or rash.                d.  Discussed GI side effects including weight changes, changes in appetite, altered sense of taste, stomatitis, nausea, vomiting, diarrhea, constipation, and heartburn.                e.  Discussed  side effects including painful urination, changes in the amount of urination, possible urine color changes.  Discussed fertility issues and to prevent  pregnancy if of child bearing age.                f.  Discussed neurological side effects including the risk of peripheral neuropathy, either temporary or permanent.                g.  Discussed the potential for skin, hair, and nail changes.       3.  Instructed to contact our office for discussion of medication changes, the addition of vitamin and/or herbal supplementation as they may interact with some chemotherapy agents.    4.  Discussed dietary modifications and the need to maintain adequate caloric intake and proper oral hydration.  Recommended 64 ounces of fluid per day.    5.  Discussed anti-emetic protocol and bowel regimen protocol.    6.  Office contact information given including after hours number.  Discussed there is an oncologist on call 24/7, 365 days including weekends.  Provided primary nurse's information .    7.  In summary, the patient is in agreement with the plan of care.  Questions appeared to be answered to their satisfaction. Consented the patient to the treatment plan and the patient was educated on the planned duration of the treatment and schedule of the treatment  administration. Copy to be scanned into the chart.    All questions answered to the satisfaction of the patient and family.     Follow up appointments given to dre.      IRMA LUNSFORD-ERIC  PATIENT EDUCATOR  [facility]

## 2023-10-24 NOTE — PROGRESS NOTES
C3 nurse attempted to contact Pete Fernandez  for a TCC post hospital discharge follow up call. No answer. Left voicemail with callback information. The patient does not have a scheduled HOSFU appointment noted.

## 2023-10-24 NOTE — NURSING
Carlota and I met with Pete and his wife for patient education. It appeared upon our entry to the library they were emotional and teary eyed. We introduced ourselves and explained our role. His wife started crying. They both stated they are feeling overwhelmed and shocked. We referred him to the [facility] and [facility]. We offered support and encouragement.

## 2023-10-25 ENCOUNTER — PATIENT MESSAGE (OUTPATIENT)
Dept: HEMATOLOGY/ONCOLOGY | Facility: CLINIC | Age: 43
End: 2023-10-25
Payer: COMMERCIAL

## 2023-10-25 DIAGNOSIS — C90.00 MULTIPLE MYELOMA, REMISSION STATUS UNSPECIFIED: Primary | ICD-10-CM

## 2023-10-25 LAB
ALBUMIN UR ELPH-MCNC: 2.9 MG/DL
ALBUMIN/GLOB UR ELPH: 0.03 {RATIO}
ALPHA1 GLOB UR ELPH-MCNC: 1 MG/DL
ALPHA2 GLOB UR ELPH-MCNC: 1.9 MG/DL
B-GLOBULIN UR ELPH-MCNC: 1.9 MG/DL
GAMMA GLOB UR ELPH-MCNC: 89.3 MG/DL
M PROTEIN UR ELPH-MCNC: NORMAL G/L
M PROTEIN UR ELPH-MCNC: NORMAL MG/DL
PROT PATTERN UR ELPH-IMP: NORMAL MG/DL
PROT UR-MCNC: NORMAL MG/DL

## 2023-10-25 NOTE — PROGRESS NOTES
C3 nurse spoke with Pete Fernandez  for a TCC post hospital discharge follow up call. The patient had a scheduled [facility] appointment with JONN De León on 10/24/2023. Stated called Dr. Ty Brown's office Monday; awaiting a call back.

## 2023-11-01 DIAGNOSIS — C90.00 MULTIPLE MYELOMA, REMISSION STATUS UNSPECIFIED: Primary | ICD-10-CM

## 2023-11-01 LAB — PSYCHE PATHOLOGY RESULT: NORMAL

## 2023-11-01 RX ORDER — ACYCLOVIR 400 MG/1
400 TABLET ORAL 2 TIMES DAILY
Qty: 60 TABLET | Refills: 11 | Status: CANCELLED | OUTPATIENT
Start: 2023-11-01 | End: 2024-10-31

## 2023-11-01 RX ORDER — ONDANSETRON HYDROCHLORIDE 8 MG/1
8 TABLET, FILM COATED ORAL EVERY 8 HOURS PRN
Qty: 30 TABLET | Refills: 2 | Status: CANCELLED | OUTPATIENT
Start: 2023-11-01 | End: 2024-10-31

## 2023-11-01 RX ORDER — ASPIRIN 81 MG/1
81 TABLET ORAL DAILY
Qty: 30 TABLET | Refills: 11 | Status: CANCELLED | OUTPATIENT
Start: 2023-11-01 | End: 2024-10-31

## 2023-11-01 RX ORDER — DEXAMETHASONE 4 MG/1
40 TABLET ORAL WEEKLY
Qty: 40 TABLET | Refills: 11 | Status: CANCELLED | OUTPATIENT
Start: 2023-11-01 | End: 2024-10-31

## 2023-11-01 RX ORDER — PROCHLORPERAZINE MALEATE 10 MG
10 TABLET ORAL EVERY 6 HOURS PRN
Qty: 30 TABLET | Refills: 1 | Status: CANCELLED | OUTPATIENT
Start: 2023-11-01 | End: 2024-10-31

## 2023-11-01 NOTE — PROGRESS NOTES
Subjective:       Patient ID: Pete Fernandez is a 43 y.o. male.    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 At the time of his presentation, his labs which had been normal with PCP in 8/2023 were notable for new severe DOUG, transaminitis, and Ca of 18. He was admitted and started on IVF resuscitation.  Hypercalcemia workup was initiated and he was ultimately found to have numerous osseous lesions on 10/15/23 CT CAP, FLC - Kappa 3.29, Lambda 744, Ratio 226 , SPEP with Mspike 0.17 IgG lambda. Bone marrow biopsy done 10/18/23 revealed 95% plasma cells and FISH with TP53 deletion, FGFR3/IGH (or NSD2/IGH) fusion, usually representing a t(4;14), and a tetraploid subclone was observed. After correction of his hypercalcemia and improvement in his renal function he was discharged on 10/22/23.   He will be started on Beatriz + RVD + Zometa on 11/2/23 and has been referred to [facility] for consideration of bone marrow transplant.     Interval History:   Patient presents to clinic for treatment clearance for cycle 1 of RVD + Beatriz.   He feels okay, occasional generalized pains that is controlled with NSAIDs or tylenol.   Significant fatigue and is not able to work outside and do activities he previously could.   Denies any fevers, chills, NS, headaches, bleeding, bruising, or abdominal pain.   Reviewed treatment plan in detail with patient and his wife. He is agreeable to proceed.     Past Medical History:   Diagnosis Date    Anxiety disorder, unspecified     Hypercalcemia       Past Surgical History:   Procedure Laterality Date    BONE MARROW BIOPSY N/A 10/18/2023    Procedure: Biopsy-bone marrow;  Surgeon: Nhan Marte MD;   Location: [facility] OR;  Service: General;  Laterality: N/A;    TONSILLECTOMY       Social History     Socioeconomic History    Marital status:    Tobacco Use    Smoking status: Never    Smokeless tobacco: Never   Substance and Sexual Activity    Alcohol use: Yes    Drug use: Never    Sexual activity: Yes     Partners: Female     Social Determinants of Health     Financial Resource Strain: Unknown (10/13/2023)    Overall Financial Resource Strain (CARDIA)     Difficulty of Paying Living Expenses: Patient refused   Food Insecurity: Unknown (10/13/2023)    Hunger Vital Sign     Worried About Running Out of Food in the Last Year: Patient refused     Ran Out of Food in the Last Year: Patient refused   Transportation Needs: Unknown (10/13/2023)    PRAPARE - Transportation     Lack of Transportation (Medical): Patient refused     Lack of Transportation (Non-Medical): Patient refused   Physical Activity: Unknown (10/13/2023)    Exercise Vital Sign     Days of Exercise per Week: Patient refused     Minutes of Exercise per Session: Patient refused   Stress: Unknown (10/13/2023)    [employer] [place] [employer] - Occupational Stress Questionnaire     Feeling of Stress : Patient refused   Social Connections: Unknown (10/13/2023)    Social Connection and Isolation Panel [NHANES]     Frequency of Communication with Friends and Family: Patient refused     Frequency of Social Gatherings with Friends and Family: Patient refused     Attends Alevism Services: Patient refused     Active Member of Clubs or Organizations: Patient refused     Attends Club or Organization Meetings: Patient refused     Marital Status: Patient refused   Housing Stability: Unknown (10/13/2023)    Housing Stability Vital Sign     Unable to Pay for Housing in the Last Year: Patient refused     Unstable Housing in the Last Year: Patient refused      Family History   Problem Relation Age of Onset    Breast cancer Mother     Pancreatic cancer  Father       Review of patient's allergies indicates:  No Known Allergies   Review of Systems   Constitutional:  Positive for fatigue. Negative for activity change, appetite change, chills and fever.   HENT:  Negative for sore throat.    Eyes:  Negative for visual disturbance.   Respiratory:  Negative for cough and shortness of breath.    Cardiovascular:  Negative for chest pain.   Gastrointestinal:  Negative for abdominal pain, constipation, diarrhea, nausea and vomiting.   Endocrine: Negative for polyuria.   Genitourinary:  Negative for dysuria.   Musculoskeletal:  Negative for back pain.   Integumentary:  Negative for rash.   Allergic/Immunologic: Negative for frequent infections.   Neurological:  Negative for weakness and headaches.   Hematological:  Negative for adenopathy. Does not bruise/bleed easily.         Objective:      Vitals:    11/02/23 1137   BP: (!) 164/99   Pulse: 103   Resp: 18   Temp: 98.9 °F (37.2 °C)       Physical Exam  Constitutional:       General: He is not in acute distress.     Appearance: Normal appearance. He is not ill-appearing.   HENT:      Head: Normocephalic and atraumatic.      Nose: Nose normal.      Mouth/Throat:      Mouth: Mucous membranes are moist.      Pharynx: Oropharynx is clear.   Eyes:      Extraocular Movements: Extraocular movements intact.      Conjunctiva/sclera: Conjunctivae normal.      Pupils: Pupils are equal, round, and reactive to light.   Cardiovascular:      Rate and Rhythm: Normal rate and regular rhythm.      Pulses: Normal pulses.      Heart sounds: Normal heart sounds. No murmur heard.  Pulmonary:      Effort: Pulmonary effort is normal. No respiratory distress.      Breath sounds: Normal breath sounds.   Abdominal:      General: There is no distension.      Palpations: Abdomen is soft.      Tenderness: There is no abdominal tenderness.   Musculoskeletal:         General: Normal range of motion.      Cervical back: Normal range of motion and neck supple.       Right lower leg: No edema.      Left lower leg: No edema.   Lymphadenopathy:      Cervical: No cervical adenopathy.   Skin:     General: Skin is warm and dry.   Neurological:      General: No focal deficit present.      Mental Status: He is alert and oriented to person, place, and time.         LABS AND IMAGING REVIEWED IN EPIC  10/11/23 Xray Metastatic Survey  1. Scattered small defects at the calvarium measuring up to 5 mm too numerous to count.  2. Degene mild rative changes at the spine and extremities  3. MR exam and bone scan would allow further evaluation.  10/12/23 MRI Brain  1. Motion artifact  2. Mild scattered mucosal thickening throughout the paranasal sinuses  3. Somewhat limited evaluation of brain metastases without the administration of IV contrast  4. Otherwise unremarkable MR exam of the head without the administration of IV contrast  10/12/23 NM Bone Scan  1. Abnormal intense increased activity throughout the lungs of uncertain etiology.  This may related to hyperparathyroidism, hematologic malignancy, and or metabolic abnormality such as renal failure, vasculitis, or pulmonary infection.  Chest x-ray 10/10/2023 reveals the lungs to be relatively clear and well aerated.  2. Suspect scattered mild degenerative changes throughout the joint spaces  3. Increased activity nasal maxillary region suggesting mucoperiosteal disease  4. Mild activity at the stomach suggesting free pertechnetate  10/14/23 CT CAP  1. Widespread osseous findings suggestive of extensive metastatic skeletal disease, to include expansile destructive soft tissue component at the anterior right 7th rib.  2. Enlarged intrathoracic lymph nodes could also be of metastatic etiology.  3. Diffuse ground-glass attenuation through both lungs is nonspecific, could be secondary to atypical infectious or inflammatory process.  However, possibility of metastatic involvement cannot be excluded.        Assessment:   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 Lambda  Light Chain Multiple Myeloma - TP53 mutated. Displayed all CRAB criteria at time of diagnosis in October. Undergoing 1st line Beatriz + RVD + Zometa with referral to tertiary center for transplant evaluation.     Treatment Plan   Cycle 1-2:   Dexamethasone 40mg PO Days 1, 8, 15, 22 (at home)   Lenalidomide PO Days 1-21 (at home)   Pre-medications PO Days 1, 8, 15 (cycle 1 only)   Bortezomib SubQ Days 1, 8, 15, 22   Daratumumab SubQ Days 1, 8, 15, 22         Cycle 3-6:   Dexamethasone 40mg PO Days 1, 8, 15, 22 (at home)   Lenalidomide PO Days 1-21 (at home)   Bortezomib SubQ Days 1, 8, 15, 22   Daratumumab SubQ Days 1, 15      Plan:       - Toxicity Reviewed, okay to proceed with C1D1 of RVD + Beatriz --> revlimid dose reduced due to renal function 10mg daily  - Plan for Zometa Q4w - Next due 11/16/23  - Continue Aspirin 81mg, Acyclovir, and Bactrim for prophylaxis  - Zofran and Compazine PRN for Nausea  - referral to BMT at [facility]  - Follow up Hep B labs  - Will need SPEP/OSVALDO and FLC with labs Q4w prior to every new cycle    RTC in 1 week with NP with labs  Cbc, cmp, mag, phos- 1 hr prior @ [facility]    I spent a total of 40 minutes on the day of the visit.This includes face to face time and non-face to face time preparing to see the patient (eg, review of tests), obtaining and/or reviewing separately obtained history, documenting clinical information in the electronic or other health record, independently interpreting results and communicating results to the patient/family/caregiver, or care coordinator.      Elizabeth Kennedy LeJeune, MD  Hematology/Oncology   [facility]          Claudia PATTERSON LPN, acted solely as a scribe for and in the presence of Dr. Elizabeth Lejeune, who performed these services.

## 2023-11-02 ENCOUNTER — OFFICE VISIT (OUTPATIENT)
Dept: HEMATOLOGY/ONCOLOGY | Facility: CLINIC | Age: 43
End: 2023-11-02
Payer: COMMERCIAL

## 2023-11-02 ENCOUNTER — INFUSION (OUTPATIENT)
Dept: INFUSION THERAPY | Facility: HOSPITAL | Age: 43
End: 2023-11-02
Attending: STUDENT IN AN ORGANIZED HEALTH CARE EDUCATION/TRAINING PROGRAM
Payer: COMMERCIAL

## 2023-11-02 VITALS
HEIGHT: 69 IN | TEMPERATURE: 99 F | BODY MASS INDEX: 32.71 KG/M2 | WEIGHT: 220.81 LBS | SYSTOLIC BLOOD PRESSURE: 164 MMHG | HEART RATE: 103 BPM | RESPIRATION RATE: 18 BRPM | DIASTOLIC BLOOD PRESSURE: 99 MMHG | OXYGEN SATURATION: 99 %

## 2023-11-02 DIAGNOSIS — C90.00 MULTIPLE MYELOMA, REMISSION STATUS UNSPECIFIED: Primary | ICD-10-CM

## 2023-11-02 DIAGNOSIS — D84.821 IMMUNODEFICIENCY DUE TO DRUG THERAPY: ICD-10-CM

## 2023-11-02 DIAGNOSIS — C90.00 METASTATIC MULTIPLE MYELOMA TO BONE: ICD-10-CM

## 2023-11-02 DIAGNOSIS — C90.00 MULTIPLE MYELOMA NOT HAVING ACHIEVED REMISSION: Primary | ICD-10-CM

## 2023-11-02 DIAGNOSIS — Z79.899 IMMUNODEFICIENCY DUE TO DRUG THERAPY: ICD-10-CM

## 2023-11-02 DIAGNOSIS — R11.0 NAUSEA: ICD-10-CM

## 2023-11-02 PROCEDURE — 1159F PR MEDICATION LIST DOCUMENTED IN MEDICAL RECORD: ICD-10-PCS | Mod: CPTII,S$GLB,, | Performed by: STUDENT IN AN ORGANIZED HEALTH CARE EDUCATION/TRAINING PROGRAM

## 2023-11-02 PROCEDURE — 99999 PR PBB SHADOW E&M-EST. PATIENT-LVL IV: ICD-10-PCS | Mod: PBBFAC,,, | Performed by: STUDENT IN AN ORGANIZED HEALTH CARE EDUCATION/TRAINING PROGRAM

## 2023-11-02 PROCEDURE — 3077F PR MOST RECENT SYSTOLIC BLOOD PRESSURE >= 140 MM HG: ICD-10-PCS | Mod: CPTII,S$GLB,, | Performed by: STUDENT IN AN ORGANIZED HEALTH CARE EDUCATION/TRAINING PROGRAM

## 2023-11-02 PROCEDURE — 25000003 PHARM REV CODE 250: Performed by: STUDENT IN AN ORGANIZED HEALTH CARE EDUCATION/TRAINING PROGRAM

## 2023-11-02 PROCEDURE — 99215 OFFICE O/P EST HI 40 MIN: CPT | Mod: S$GLB,,, | Performed by: STUDENT IN AN ORGANIZED HEALTH CARE EDUCATION/TRAINING PROGRAM

## 2023-11-02 PROCEDURE — 3080F PR MOST RECENT DIASTOLIC BLOOD PRESSURE >= 90 MM HG: ICD-10-PCS | Mod: CPTII,S$GLB,, | Performed by: STUDENT IN AN ORGANIZED HEALTH CARE EDUCATION/TRAINING PROGRAM

## 2023-11-02 PROCEDURE — 3077F SYST BP >= 140 MM HG: CPT | Mod: CPTII,S$GLB,, | Performed by: STUDENT IN AN ORGANIZED HEALTH CARE EDUCATION/TRAINING PROGRAM

## 2023-11-02 PROCEDURE — 3080F DIAST BP >= 90 MM HG: CPT | Mod: CPTII,S$GLB,, | Performed by: STUDENT IN AN ORGANIZED HEALTH CARE EDUCATION/TRAINING PROGRAM

## 2023-11-02 PROCEDURE — 63600175 PHARM REV CODE 636 W HCPCS: Mod: JZ,JG | Performed by: STUDENT IN AN ORGANIZED HEALTH CARE EDUCATION/TRAINING PROGRAM

## 2023-11-02 PROCEDURE — 3008F PR BODY MASS INDEX (BMI) DOCUMENTED: ICD-10-PCS | Mod: CPTII,S$GLB,, | Performed by: STUDENT IN AN ORGANIZED HEALTH CARE EDUCATION/TRAINING PROGRAM

## 2023-11-02 PROCEDURE — 96401 CHEMO ANTI-NEOPL SQ/IM: CPT

## 2023-11-02 PROCEDURE — 1160F RVW MEDS BY RX/DR IN RCRD: CPT | Mod: CPTII,S$GLB,, | Performed by: STUDENT IN AN ORGANIZED HEALTH CARE EDUCATION/TRAINING PROGRAM

## 2023-11-02 PROCEDURE — 1159F MED LIST DOCD IN RCRD: CPT | Mod: CPTII,S$GLB,, | Performed by: STUDENT IN AN ORGANIZED HEALTH CARE EDUCATION/TRAINING PROGRAM

## 2023-11-02 PROCEDURE — 99999 PR PBB SHADOW E&M-EST. PATIENT-LVL IV: CPT | Mod: PBBFAC,,, | Performed by: STUDENT IN AN ORGANIZED HEALTH CARE EDUCATION/TRAINING PROGRAM

## 2023-11-02 PROCEDURE — 1160F PR REVIEW ALL MEDS BY PRESCRIBER/CLIN PHARMACIST DOCUMENTED: ICD-10-PCS | Mod: CPTII,S$GLB,, | Performed by: STUDENT IN AN ORGANIZED HEALTH CARE EDUCATION/TRAINING PROGRAM

## 2023-11-02 PROCEDURE — 3008F BODY MASS INDEX DOCD: CPT | Mod: CPTII,S$GLB,, | Performed by: STUDENT IN AN ORGANIZED HEALTH CARE EDUCATION/TRAINING PROGRAM

## 2023-11-02 PROCEDURE — 99215 PR OFFICE/OUTPT VISIT, EST, LEVL V, 40-54 MIN: ICD-10-PCS | Mod: S$GLB,,, | Performed by: STUDENT IN AN ORGANIZED HEALTH CARE EDUCATION/TRAINING PROGRAM

## 2023-11-02 RX ORDER — SULFAMETHOXAZOLE AND TRIMETHOPRIM 800; 160 MG/1; MG/1
1 TABLET ORAL DAILY
Qty: 30 TABLET | Refills: 6 | Status: SHIPPED | OUTPATIENT
Start: 2023-11-02

## 2023-11-02 RX ORDER — NAPROXEN SODIUM 220 MG/1
81 TABLET, FILM COATED ORAL DAILY
COMMUNITY

## 2023-11-02 RX ORDER — DIPHENHYDRAMINE HYDROCHLORIDE 50 MG/ML
50 INJECTION INTRAMUSCULAR; INTRAVENOUS ONCE AS NEEDED
Status: DISCONTINUED | OUTPATIENT
Start: 2023-11-02 | End: 2023-11-03 | Stop reason: HOSPADM

## 2023-11-02 RX ORDER — DIPHENHYDRAMINE HCL 25 MG
25 CAPSULE ORAL
Status: CANCELLED | OUTPATIENT
Start: 2023-11-02

## 2023-11-02 RX ORDER — SODIUM CHLORIDE 0.9 % (FLUSH) 0.9 %
10 SYRINGE (ML) INJECTION
Status: DISCONTINUED | OUTPATIENT
Start: 2023-11-02 | End: 2023-11-03 | Stop reason: HOSPADM

## 2023-11-02 RX ORDER — HEPARIN 100 UNIT/ML
500 SYRINGE INTRAVENOUS
Status: CANCELLED | OUTPATIENT
Start: 2023-11-02

## 2023-11-02 RX ORDER — HEPARIN 100 UNIT/ML
500 SYRINGE INTRAVENOUS
Status: CANCELLED | OUTPATIENT
Start: 2023-11-16

## 2023-11-02 RX ORDER — OXYCODONE HYDROCHLORIDE 5 MG/1
5 TABLET ORAL EVERY 6 HOURS PRN
Qty: 90 TABLET | Refills: 0 | Status: SHIPPED | OUTPATIENT
Start: 2023-11-02 | End: 2023-11-27

## 2023-11-02 RX ORDER — ACYCLOVIR 400 MG/1
400 TABLET ORAL 2 TIMES DAILY
COMMUNITY

## 2023-11-02 RX ORDER — ACETAMINOPHEN 325 MG/1
650 TABLET ORAL
Status: CANCELLED | OUTPATIENT
Start: 2023-11-02

## 2023-11-02 RX ORDER — HEPARIN 100 UNIT/ML
500 SYRINGE INTRAVENOUS
Status: DISCONTINUED | OUTPATIENT
Start: 2023-11-02 | End: 2023-11-03 | Stop reason: HOSPADM

## 2023-11-02 RX ORDER — DIPHENHYDRAMINE HYDROCHLORIDE 50 MG/ML
50 INJECTION INTRAMUSCULAR; INTRAVENOUS ONCE AS NEEDED
Status: CANCELLED | OUTPATIENT
Start: 2023-11-02

## 2023-11-02 RX ORDER — EPINEPHRINE 0.3 MG/.3ML
0.3 INJECTION SUBCUTANEOUS ONCE AS NEEDED
Status: CANCELLED | OUTPATIENT
Start: 2023-11-02

## 2023-11-02 RX ORDER — PROCHLORPERAZINE EDISYLATE 5 MG/ML
10 INJECTION INTRAMUSCULAR; INTRAVENOUS ONCE AS NEEDED
Status: DISCONTINUED | OUTPATIENT
Start: 2023-11-02 | End: 2023-11-03 | Stop reason: HOSPADM

## 2023-11-02 RX ORDER — BORTEZOMIB 3.5 MG/1
1.3 INJECTION, POWDER, LYOPHILIZED, FOR SOLUTION INTRAVENOUS; SUBCUTANEOUS
Status: CANCELLED | OUTPATIENT
Start: 2023-11-02

## 2023-11-02 RX ORDER — ACETAMINOPHEN 325 MG/1
650 TABLET ORAL
Status: COMPLETED | OUTPATIENT
Start: 2023-11-02 | End: 2023-11-02

## 2023-11-02 RX ORDER — SODIUM CHLORIDE 0.9 % (FLUSH) 0.9 %
10 SYRINGE (ML) INJECTION
Status: CANCELLED | OUTPATIENT
Start: 2023-11-16

## 2023-11-02 RX ORDER — PROCHLORPERAZINE EDISYLATE 5 MG/ML
10 INJECTION INTRAMUSCULAR; INTRAVENOUS ONCE AS NEEDED
Status: CANCELLED | OUTPATIENT
Start: 2023-11-02

## 2023-11-02 RX ORDER — SODIUM CHLORIDE 0.9 % (FLUSH) 0.9 %
10 SYRINGE (ML) INJECTION
Status: CANCELLED | OUTPATIENT
Start: 2023-11-02

## 2023-11-02 RX ORDER — ONDANSETRON HYDROCHLORIDE 8 MG/1
8 TABLET, FILM COATED ORAL EVERY 8 HOURS PRN
Qty: 30 TABLET | Refills: 2 | Status: SHIPPED | OUTPATIENT
Start: 2023-11-02 | End: 2024-03-07 | Stop reason: SDUPTHER

## 2023-11-02 RX ORDER — DIPHENHYDRAMINE HCL 25 MG
25 CAPSULE ORAL
Status: COMPLETED | OUTPATIENT
Start: 2023-11-02 | End: 2023-11-02

## 2023-11-02 RX ORDER — PROCHLORPERAZINE MALEATE 5 MG
5 TABLET ORAL EVERY 6 HOURS PRN
COMMUNITY

## 2023-11-02 RX ORDER — EPINEPHRINE 0.3 MG/.3ML
0.3 INJECTION SUBCUTANEOUS ONCE AS NEEDED
Status: DISCONTINUED | OUTPATIENT
Start: 2023-11-02 | End: 2023-11-03 | Stop reason: HOSPADM

## 2023-11-02 RX ORDER — BORTEZOMIB 3.5 MG/1
3 INJECTION, POWDER, LYOPHILIZED, FOR SOLUTION INTRAVENOUS; SUBCUTANEOUS
Status: COMPLETED | OUTPATIENT
Start: 2023-11-02 | End: 2023-11-02

## 2023-11-02 RX ADMIN — BORTEZOMIB 3 MG: 3.5 INJECTION, POWDER, LYOPHILIZED, FOR SOLUTION INTRAVENOUS; SUBCUTANEOUS at 01:11

## 2023-11-02 RX ADMIN — ACETAMINOPHEN 650 MG: 325 TABLET ORAL at 12:11

## 2023-11-02 RX ADMIN — DARATUMUMAB AND HYALURONIDASE-FIHJ (HUMAN RECOMBINANT) 1800 MG: 1800; 30000 INJECTION SUBCUTANEOUS at 01:11

## 2023-11-02 RX ADMIN — DIPHENHYDRAMINE HYDROCHLORIDE 25 MG: 25 CAPSULE ORAL at 12:11

## 2023-11-03 VITALS — SYSTOLIC BLOOD PRESSURE: 159 MMHG | HEART RATE: 101 BPM | DIASTOLIC BLOOD PRESSURE: 93 MMHG

## 2023-11-03 DIAGNOSIS — C90.00 MULTIPLE MYELOMA, REMISSION STATUS UNSPECIFIED: Primary | ICD-10-CM

## 2023-11-06 ENCOUNTER — PATIENT MESSAGE (OUTPATIENT)
Dept: HEMATOLOGY/ONCOLOGY | Facility: CLINIC | Age: 43
End: 2023-11-06
Payer: COMMERCIAL

## 2023-11-08 ENCOUNTER — TELEPHONE (OUTPATIENT)
Dept: HEMATOLOGY/ONCOLOGY | Facility: CLINIC | Age: 43
End: 2023-11-08
Payer: COMMERCIAL

## 2023-11-08 NOTE — TELEPHONE ENCOUNTER
Received outside referral for pt to see BMT team, dx: myeloma. Sent to financial counselor to verify benefits & received the following response:  Sean Abdul Melissa A., RN  Caller: Unspecified (Yesterday, 10:44 AM)   Verified medical and transplant benefit for patient for Rohan Weber only.    Called pt & scheduled for virtual visit with Dr. Cook next week. Reviewed appt details & instructions for virtual visit. He is aware & agreeable.

## 2023-11-09 ENCOUNTER — OFFICE VISIT (OUTPATIENT)
Dept: HEMATOLOGY/ONCOLOGY | Facility: CLINIC | Age: 43
End: 2023-11-09
Payer: COMMERCIAL

## 2023-11-09 ENCOUNTER — CLINICAL SUPPORT (OUTPATIENT)
Dept: RESPIRATORY THERAPY | Facility: HOSPITAL | Age: 43
End: 2023-11-09
Payer: COMMERCIAL

## 2023-11-09 ENCOUNTER — INFUSION (OUTPATIENT)
Dept: INFUSION THERAPY | Facility: HOSPITAL | Age: 43
End: 2023-11-09
Attending: STUDENT IN AN ORGANIZED HEALTH CARE EDUCATION/TRAINING PROGRAM
Payer: COMMERCIAL

## 2023-11-09 VITALS
HEART RATE: 98 BPM | DIASTOLIC BLOOD PRESSURE: 87 MMHG | WEIGHT: 218.38 LBS | HEIGHT: 69 IN | OXYGEN SATURATION: 96 % | BODY MASS INDEX: 32.35 KG/M2 | SYSTOLIC BLOOD PRESSURE: 127 MMHG | TEMPERATURE: 99 F | RESPIRATION RATE: 18 BRPM

## 2023-11-09 VITALS
HEIGHT: 69 IN | HEART RATE: 98 BPM | WEIGHT: 218.38 LBS | RESPIRATION RATE: 18 BRPM | OXYGEN SATURATION: 96 % | SYSTOLIC BLOOD PRESSURE: 127 MMHG | DIASTOLIC BLOOD PRESSURE: 87 MMHG | TEMPERATURE: 99 F | BODY MASS INDEX: 32.35 KG/M2

## 2023-11-09 DIAGNOSIS — C90.00 MULTIPLE MYELOMA NOT HAVING ACHIEVED REMISSION: ICD-10-CM

## 2023-11-09 DIAGNOSIS — C90.00 MULTIPLE MYELOMA, REMISSION STATUS UNSPECIFIED: Primary | ICD-10-CM

## 2023-11-09 DIAGNOSIS — Z79.899 IMMUNODEFICIENCY DUE TO DRUG THERAPY: ICD-10-CM

## 2023-11-09 DIAGNOSIS — C90.00 METASTATIC MULTIPLE MYELOMA TO BONE: ICD-10-CM

## 2023-11-09 DIAGNOSIS — R07.9 CHEST PAIN, UNSPECIFIED TYPE: ICD-10-CM

## 2023-11-09 DIAGNOSIS — D64.9 LOW HEMOGLOBIN: Primary | ICD-10-CM

## 2023-11-09 DIAGNOSIS — D84.821 IMMUNODEFICIENCY DUE TO DRUG THERAPY: ICD-10-CM

## 2023-11-09 DIAGNOSIS — C90.00 MULTIPLE MYELOMA, REMISSION STATUS UNSPECIFIED: ICD-10-CM

## 2023-11-09 DIAGNOSIS — D64.9 ANEMIA, UNSPECIFIED TYPE: ICD-10-CM

## 2023-11-09 PROCEDURE — 25000003 PHARM REV CODE 250

## 2023-11-09 PROCEDURE — 3008F PR BODY MASS INDEX (BMI) DOCUMENTED: ICD-10-PCS | Mod: CPTII,S$GLB,,

## 2023-11-09 PROCEDURE — 1111F PR DISCHARGE MEDS RECONCILED W/ CURRENT OUTPATIENT MED LIST: ICD-10-PCS | Mod: CPTII,S$GLB,,

## 2023-11-09 PROCEDURE — 99999 PR PBB SHADOW E&M-EST. PATIENT-LVL V: ICD-10-PCS | Mod: PBBFAC,,,

## 2023-11-09 PROCEDURE — 96401 CHEMO ANTI-NEOPL SQ/IM: CPT

## 2023-11-09 PROCEDURE — 3074F SYST BP LT 130 MM HG: CPT | Mod: CPTII,S$GLB,,

## 2023-11-09 PROCEDURE — 99999 PR PBB SHADOW E&M-EST. PATIENT-LVL V: CPT | Mod: PBBFAC,,,

## 2023-11-09 PROCEDURE — 99215 PR OFFICE/OUTPT VISIT, EST, LEVL V, 40-54 MIN: ICD-10-PCS | Mod: S$GLB,,,

## 2023-11-09 PROCEDURE — 93005 ELECTROCARDIOGRAM TRACING: CPT

## 2023-11-09 PROCEDURE — 63600175 PHARM REV CODE 636 W HCPCS: Mod: JZ,JG

## 2023-11-09 PROCEDURE — 3008F BODY MASS INDEX DOCD: CPT | Mod: CPTII,S$GLB,,

## 2023-11-09 PROCEDURE — 3074F PR MOST RECENT SYSTOLIC BLOOD PRESSURE < 130 MM HG: ICD-10-PCS | Mod: CPTII,S$GLB,,

## 2023-11-09 PROCEDURE — 99215 OFFICE O/P EST HI 40 MIN: CPT | Mod: S$GLB,,,

## 2023-11-09 PROCEDURE — 3079F PR MOST RECENT DIASTOLIC BLOOD PRESSURE 80-89 MM HG: ICD-10-PCS | Mod: CPTII,S$GLB,,

## 2023-11-09 PROCEDURE — 3079F DIAST BP 80-89 MM HG: CPT | Mod: CPTII,S$GLB,,

## 2023-11-09 PROCEDURE — 1111F DSCHRG MED/CURRENT MED MERGE: CPT | Mod: CPTII,S$GLB,,

## 2023-11-09 RX ORDER — DIPHENHYDRAMINE HYDROCHLORIDE 50 MG/ML
50 INJECTION INTRAMUSCULAR; INTRAVENOUS ONCE AS NEEDED
Status: CANCELLED | OUTPATIENT
Start: 2023-11-09

## 2023-11-09 RX ORDER — SODIUM CHLORIDE 0.9 % (FLUSH) 0.9 %
10 SYRINGE (ML) INJECTION
Status: CANCELLED | OUTPATIENT
Start: 2023-11-09

## 2023-11-09 RX ORDER — PROCHLORPERAZINE EDISYLATE 5 MG/ML
10 INJECTION INTRAMUSCULAR; INTRAVENOUS ONCE AS NEEDED
Status: CANCELLED | OUTPATIENT
Start: 2023-11-09

## 2023-11-09 RX ORDER — DEXAMETHASONE 4 MG/1
40 TABLET ORAL
Qty: 40 TABLET | Refills: 5 | Status: SHIPPED | OUTPATIENT
Start: 2023-11-09

## 2023-11-09 RX ORDER — ACETAMINOPHEN 325 MG/1
650 TABLET ORAL
Status: CANCELLED | OUTPATIENT
Start: 2023-11-09

## 2023-11-09 RX ORDER — EPINEPHRINE 0.3 MG/.3ML
0.3 INJECTION SUBCUTANEOUS ONCE AS NEEDED
Status: CANCELLED | OUTPATIENT
Start: 2023-11-09

## 2023-11-09 RX ORDER — DIPHENHYDRAMINE HCL 25 MG
25 CAPSULE ORAL
Status: CANCELLED | OUTPATIENT
Start: 2023-11-09

## 2023-11-09 RX ORDER — BORTEZOMIB 3.5 MG/1
2.75 INJECTION, POWDER, LYOPHILIZED, FOR SOLUTION INTRAVENOUS; SUBCUTANEOUS
Status: COMPLETED | OUTPATIENT
Start: 2023-11-09 | End: 2023-11-09

## 2023-11-09 RX ORDER — HYDROCODONE BITARTRATE AND ACETAMINOPHEN 500; 5 MG/1; MG/1
TABLET ORAL ONCE
Status: CANCELLED | OUTPATIENT
Start: 2023-11-09 | End: 2023-11-09

## 2023-11-09 RX ORDER — BORTEZOMIB 3.5 MG/1
1.3 INJECTION, POWDER, LYOPHILIZED, FOR SOLUTION INTRAVENOUS; SUBCUTANEOUS
Status: CANCELLED | OUTPATIENT
Start: 2023-11-09

## 2023-11-09 RX ORDER — SODIUM BICARBONATE 650 MG/1
650 TABLET ORAL 2 TIMES DAILY
COMMUNITY

## 2023-11-09 RX ORDER — ACETAMINOPHEN 325 MG/1
650 TABLET ORAL
Status: COMPLETED | OUTPATIENT
Start: 2023-11-09 | End: 2023-11-09

## 2023-11-09 RX ORDER — HEPARIN 100 UNIT/ML
500 SYRINGE INTRAVENOUS
Status: CANCELLED | OUTPATIENT
Start: 2023-11-09

## 2023-11-09 RX ORDER — DIPHENHYDRAMINE HCL 25 MG
25 CAPSULE ORAL
Status: COMPLETED | OUTPATIENT
Start: 2023-11-09 | End: 2023-11-09

## 2023-11-09 RX ADMIN — DIPHENHYDRAMINE HYDROCHLORIDE 25 MG: 25 CAPSULE ORAL at 09:11

## 2023-11-09 RX ADMIN — BORTEZOMIB 2.75 MG: 3.5 INJECTION, POWDER, LYOPHILIZED, FOR SOLUTION INTRAVENOUS; SUBCUTANEOUS at 10:11

## 2023-11-09 RX ADMIN — DARATUMUMAB AND HYALURONIDASE-FIHJ (HUMAN RECOMBINANT) 1800 MG: 1800; 30000 INJECTION SUBCUTANEOUS at 10:11

## 2023-11-09 RX ADMIN — ACETAMINOPHEN 650 MG: 325 TABLET ORAL at 09:11

## 2023-11-09 NOTE — PLAN OF CARE
Patient will receive injection in a timely manor. Patient completed injection without difficulty Patient will have no S&S of nausea and instructed understanding of how to take antiemetics.

## 2023-11-09 NOTE — PROGRESS NOTES
Subjective:       Patient ID: Pete Fernandez is a 43 y.o. male.    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 At the time of his presentation, his labs which had been normal with PCP in 8/2023 were notable for new severe DOUG, transaminitis, and Ca of 18. He was admitted and started on IVF resuscitation.  Hypercalcemia workup was initiated and he was ultimately found to have numerous osseous lesions on 10/15/23 CT CAP, FLC - Kappa 3.29, Lambda 744, Ratio 226 , SPEP with Mspike 0.17 IgG lambda. Bone marrow biopsy done 10/18/23 revealed 95% plasma cells and FISH with TP53 deletion, FGFR3/IGH (or NSD2/IGH) fusion, usually representing a t(4;14), and a tetraploid subclone was observed. After correction of his hypercalcemia and improvement in his renal function he was discharged on 10/22/23.   He will be started on Beatriz + RVD + Zometa on 11/2/23 and has been referred to [facility] for consideration of bone marrow transplant.     Interval History:   Patient presents to clinic for a follow-up and treatment clearance for cycle 1 D8 of RVD + Beatriz. He feels well today, although he does report occasional left sided chest pains that are sharp and stabbing that worsen with movements. Pain is reproduced with palpation. EKG shows normal sinus rhythm today in clinic. He continues to have fatigue that is stable. He continues to have generalized pains controlled with current pain meds. Denies any fevers, chills, NS, headaches, bleeding, bruising, or abdominal pain.        Past Medical History:   Diagnosis Date    Anxiety disorder, unspecified     Hypercalcemia       Past Surgical History:   Procedure Laterality Date    BONE MARROW BIOPSY N/A  10/18/2023    Procedure: Biopsy-bone marrow;  Surgeon: Nhan Marte MD;  Location: [facility] OR;  Service: General;  Laterality: N/A;    TONSILLECTOMY       Social History     Socioeconomic History    Marital status:    Tobacco Use    Smoking status: Never    Smokeless tobacco: Never   Substance and Sexual Activity    Alcohol use: Yes    Drug use: Never    Sexual activity: Yes     Partners: Female     Social Determinants of Health     Financial Resource Strain: Unknown (10/13/2023)    Overall Financial Resource Strain (CARDIA)     Difficulty of Paying Living Expenses: Patient refused   Food Insecurity: Unknown (10/13/2023)    Hunger Vital Sign     Worried About Running Out of Food in the Last Year: Patient refused     Ran Out of Food in the Last Year: Patient refused   Transportation Needs: Unknown (10/13/2023)    PRAPARE - Transportation     Lack of Transportation (Medical): Patient refused     Lack of Transportation (Non-Medical): Patient refused   Physical Activity: Unknown (10/13/2023)    Exercise Vital Sign     Days of Exercise per Week: Patient refused     Minutes of Exercise per Session: Patient refused   Stress: Unknown (10/13/2023)    [place] [employer] - Occupational Stress Questionnaire     Feeling of Stress : Patient refused   Social Connections: Unknown (10/13/2023)    Social Connection and Isolation Panel [NHANES]     Frequency of Communication with Friends and Family: Patient refused     Frequency of Social Gatherings with Friends and Family: Patient refused     Attends Restoration Services: Patient refused     Active Member of Clubs or Organizations: Patient refused     Attends Club or Organization Meetings: Patient refused     Marital Status: Patient refused   Housing Stability: Unknown (10/13/2023)    Housing Stability Vital Sign     Unable to Pay for Housing in the Last Year: Patient refused     Unstable Housing in the Last Year: Patient refused      Family History    Problem Relation Age of Onset    Breast cancer Mother     Pancreatic cancer Father       Review of patient's allergies indicates:  No Known Allergies   Review of Systems   Constitutional:  Positive for fatigue. Negative for activity change, appetite change, chills and fever.   HENT:  Negative for sore throat.    Eyes:  Negative for visual disturbance.   Respiratory:  Negative for cough and shortness of breath.    Cardiovascular:  Positive for chest pain (to left pectoralis).   Gastrointestinal:  Negative for abdominal pain, constipation, diarrhea, nausea and vomiting.   Endocrine: Negative for polyuria.   Genitourinary:  Negative for dysuria.   Musculoskeletal:  Negative for back pain.   Integumentary:  Negative for rash.   Allergic/Immunologic: Negative for frequent infections.   Neurological:  Negative for weakness and headaches.   Hematological:  Negative for adenopathy. Does not bruise/bleed easily.         Objective:      Vitals:    11/09/23 0814   BP: 127/87   Pulse: 98   Resp: 18   Temp: 98.8 °F (37.1 °C)       Physical Exam  Constitutional:       General: He is not in acute distress.     Appearance: Normal appearance. He is not ill-appearing.   HENT:      Head: Normocephalic and atraumatic.      Nose: Nose normal.      Mouth/Throat:      Mouth: Mucous membranes are moist.      Pharynx: Oropharynx is clear.   Eyes:      Extraocular Movements: Extraocular movements intact.      Conjunctiva/sclera: Conjunctivae normal.      Pupils: Pupils are equal, round, and reactive to light.   Cardiovascular:      Rate and Rhythm: Normal rate and regular rhythm.      Pulses: Normal pulses.      Heart sounds: Normal heart sounds. No murmur heard.  Pulmonary:      Effort: Pulmonary effort is normal. No respiratory distress.      Breath sounds: Normal breath sounds.   Abdominal:      General: There is no distension.      Palpations: Abdomen is soft.      Tenderness: There is no abdominal tenderness.   Musculoskeletal:          General: Normal range of motion.      Cervical back: Normal range of motion and neck supple.      Right lower leg: No edema.      Left lower leg: No edema.   Lymphadenopathy:      Cervical: No cervical adenopathy.   Skin:     General: Skin is warm and dry.   Neurological:      General: No focal deficit present.      Mental Status: He is alert and oriented to person, place, and time.         LABS AND IMAGING REVIEWED IN EPIC  10/11/23 Xray Metastatic Survey  1. Scattered small defects at the calvarium measuring up to 5 mm too numerous to count.  2. Degene mild rative changes at the spine and extremities  3. MR exam and bone scan would allow further evaluation.  10/12/23 MRI Brain  1. Motion artifact  2. Mild scattered mucosal thickening throughout the paranasal sinuses  3. Somewhat limited evaluation of brain metastases without the administration of IV contrast  4. Otherwise unremarkable MR exam of the head without the administration of IV contrast  10/12/23 NM Bone Scan  1. Abnormal intense increased activity throughout the lungs of uncertain etiology.  This may related to hyperparathyroidism, hematologic malignancy, and or metabolic abnormality such as renal failure, vasculitis, or pulmonary infection.  Chest x-ray 10/10/2023 reveals the lungs to be relatively clear and well aerated.  2. Suspect scattered mild degenerative changes throughout the joint spaces  3. Increased activity nasal maxillary region suggesting mucoperiosteal disease  4. Mild activity at the stomach suggesting free pertechnetate  10/14/23 CT CAP  1. Widespread osseous findings suggestive of extensive metastatic skeletal disease, to include expansile destructive soft tissue component at the anterior right 7th rib.  2. Enlarged intrathoracic lymph nodes could also be of metastatic etiology.  3. Diffuse ground-glass attenuation through both lungs is nonspecific, could be secondary to atypical infectious or inflammatory process.  However,  possibility of metastatic involvement cannot be excluded.        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 Displayed all CRAB criteria at time of diagnosis in October. Undergoing 1st line Beatriz + RVD + Zometa with referral to tertiary center for transplant evaluation.   Treatment Plan   Cycle 1-2:   Dexamethasone 40mg PO Days 1, 8, 15, 22 (at home)   Lenalidomide PO Days 1-21 (at home)   Pre-medications PO Days 1, 8, 15 (cycle 1 only)   Bortezomib SubQ Days 1, 8, 15, 22   Daratumumab SubQ Days 1, 8, 15, 22         Cycle 3-6:   Dexamethasone 40mg PO Days 1, 8, 15, 22 (at home)   Lenalidomide PO Days 1-21 (at home)   Bortezomib SubQ Days 1, 8, 15, 22   Daratumumab SubQ Days 1, 15  BMT [facility]  Has appt scheduled 11/13/2023      Plan:   Toxicity Reviewed, okay to proceed with C1D8 of RVD + Beatriz --> revlimid dose reduced due to renal function 10mg daily    - Continue Zometa Q4w - Next due 11/16/23  - Continue Aspirin 81mg, Acyclovir, and Bactrim for prophylaxis  - Zofran and Compazine PRN for Nausea  - Follow-up with BMT at [facility] as scheduled.  - Will need SPEP/OSVALDO and FLC with labs Q4w prior to every new cycle    RTC in 1 week with NP with labs  Cbc, cmp, mag, phos- 1 hr prior @ [facility]    I spent a total of 40 minutes on the day of the visit.This includes face to face time and non-face to face time preparing to see the patient (eg, review of tests), obtaining and/or reviewing separately obtained history, documenting clinical information in the electronic or other health record, independently interpreting results and communicating results to the patient/family/caregiver, or care coordinator.      JONN Stephenson-C  Oncology/Hematology   [facility]      **All blood products must be irradiated and CMV negative**

## 2023-11-13 ENCOUNTER — INFUSION (OUTPATIENT)
Dept: INFUSION THERAPY | Facility: HOSPITAL | Age: 43
End: 2023-11-13
Attending: STUDENT IN AN ORGANIZED HEALTH CARE EDUCATION/TRAINING PROGRAM
Payer: COMMERCIAL

## 2023-11-13 VITALS
SYSTOLIC BLOOD PRESSURE: 150 MMHG | OXYGEN SATURATION: 100 % | TEMPERATURE: 99 F | HEART RATE: 76 BPM | WEIGHT: 220 LBS | HEIGHT: 69 IN | RESPIRATION RATE: 20 BRPM | BODY MASS INDEX: 32.58 KG/M2 | DIASTOLIC BLOOD PRESSURE: 89 MMHG

## 2023-11-13 DIAGNOSIS — B36.9 FUNGAL INFECTION OF SKIN: Primary | ICD-10-CM

## 2023-11-13 DIAGNOSIS — D64.9 LOW HEMOGLOBIN: ICD-10-CM

## 2023-11-13 LAB
ABO + RH BLD: NORMAL
ABO + RH BLD: NORMAL
BLD PROD TYP BPU: NORMAL
BLD PROD TYP BPU: NORMAL
BLOOD UNIT EXPIRATION DATE: NORMAL
BLOOD UNIT EXPIRATION DATE: NORMAL
BLOOD UNIT TYPE CODE: 6200
BLOOD UNIT TYPE CODE: 6200
CROSSMATCH INTERPRETATION: NORMAL
CROSSMATCH INTERPRETATION: NORMAL
DISPENSE STATUS: NORMAL
DISPENSE STATUS: NORMAL
UNIT NUMBER: NORMAL
UNIT NUMBER: NORMAL

## 2023-11-13 PROCEDURE — P9040 RBC LEUKOREDUCED IRRADIATED: HCPCS

## 2023-11-13 PROCEDURE — 86870 RBC ANTIBODY IDENTIFICATION: CPT

## 2023-11-13 PROCEDURE — 36430 TRANSFUSION BLD/BLD COMPNT: CPT

## 2023-11-13 PROCEDURE — 25000003 PHARM REV CODE 250

## 2023-11-13 PROCEDURE — 86922 COMPATIBILITY TEST ANTIGLOB: CPT

## 2023-11-13 RX ORDER — NYSTATIN 100000 U/G
CREAM TOPICAL 2 TIMES DAILY
Qty: 30 G | Refills: 0 | Status: SHIPPED | OUTPATIENT
Start: 2023-11-13 | End: 2024-01-11

## 2023-11-13 RX ORDER — HYDROCODONE BITARTRATE AND ACETAMINOPHEN 500; 5 MG/1; MG/1
TABLET ORAL ONCE
Status: COMPLETED | OUTPATIENT
Start: 2023-11-13 | End: 2023-11-13

## 2023-11-13 RX ADMIN — SODIUM CHLORIDE: 9 INJECTION, SOLUTION INTRAVENOUS at 08:11

## 2023-11-15 ENCOUNTER — TELEPHONE (OUTPATIENT)
Dept: HEMATOLOGY/ONCOLOGY | Facility: CLINIC | Age: 43
End: 2023-11-15
Payer: COMMERCIAL

## 2023-11-16 ENCOUNTER — OFFICE VISIT (OUTPATIENT)
Dept: HEMATOLOGY/ONCOLOGY | Facility: CLINIC | Age: 43
End: 2023-11-16
Payer: COMMERCIAL

## 2023-11-16 ENCOUNTER — LAB VISIT (OUTPATIENT)
Dept: LAB | Facility: HOSPITAL | Age: 43
End: 2023-11-16
Payer: COMMERCIAL

## 2023-11-16 ENCOUNTER — INFUSION (OUTPATIENT)
Dept: INFUSION THERAPY | Facility: HOSPITAL | Age: 43
End: 2023-11-16
Attending: STUDENT IN AN ORGANIZED HEALTH CARE EDUCATION/TRAINING PROGRAM
Payer: COMMERCIAL

## 2023-11-16 VITALS
SYSTOLIC BLOOD PRESSURE: 153 MMHG | BODY MASS INDEX: 33.23 KG/M2 | HEART RATE: 80 BPM | RESPIRATION RATE: 18 BRPM | WEIGHT: 224.38 LBS | DIASTOLIC BLOOD PRESSURE: 92 MMHG | HEIGHT: 69 IN | OXYGEN SATURATION: 99 % | TEMPERATURE: 98 F

## 2023-11-16 DIAGNOSIS — C90.00 MULTIPLE MYELOMA, REMISSION STATUS UNSPECIFIED: Primary | ICD-10-CM

## 2023-11-16 DIAGNOSIS — C90.00 MULTIPLE MYELOMA, REMISSION STATUS UNSPECIFIED: ICD-10-CM

## 2023-11-16 DIAGNOSIS — C90.00 METASTATIC MULTIPLE MYELOMA TO BONE: ICD-10-CM

## 2023-11-16 DIAGNOSIS — D64.9 ANEMIA, UNSPECIFIED TYPE: ICD-10-CM

## 2023-11-16 DIAGNOSIS — Z79.899 IMMUNODEFICIENCY DUE TO DRUG THERAPY: ICD-10-CM

## 2023-11-16 DIAGNOSIS — C90.00 MULTIPLE MYELOMA NOT HAVING ACHIEVED REMISSION: ICD-10-CM

## 2023-11-16 DIAGNOSIS — D84.821 IMMUNODEFICIENCY DUE TO DRUG THERAPY: ICD-10-CM

## 2023-11-16 LAB
ALBUMIN SERPL-MCNC: 3.4 G/DL (ref 3.5–5)
ALBUMIN/GLOB SERPL: 1.4 RATIO (ref 1.1–2)
ALP SERPL-CCNC: 292 UNIT/L (ref 40–150)
ALT SERPL-CCNC: 13 UNIT/L (ref 0–55)
AST SERPL-CCNC: 13 UNIT/L (ref 5–34)
BASOPHILS # BLD AUTO: 0.01 X10(3)/MCL
BASOPHILS NFR BLD AUTO: 0.4 %
BILIRUB SERPL-MCNC: 0.7 MG/DL
BUN SERPL-MCNC: 14 MG/DL (ref 8.9–20.6)
CALCIUM SERPL-MCNC: 7.9 MG/DL (ref 8.4–10.2)
CHLORIDE SERPL-SCNC: 104 MMOL/L (ref 98–107)
CO2 SERPL-SCNC: 26 MMOL/L (ref 22–29)
CREAT SERPL-MCNC: 1.87 MG/DL (ref 0.73–1.18)
EOSINOPHIL # BLD AUTO: 0.3 X10(3)/MCL (ref 0–0.9)
EOSINOPHIL NFR BLD AUTO: 12.1 %
ERYTHROCYTE [DISTWIDTH] IN BLOOD BY AUTOMATED COUNT: 14.6 % (ref 11.5–17)
GFR SERPLBLD CREATININE-BSD FMLA CKD-EPI: 45 MLS/MIN/1.73/M2
GLOBULIN SER-MCNC: 2.5 GM/DL (ref 2.4–3.5)
GLUCOSE SERPL-MCNC: 79 MG/DL (ref 74–100)
HCT VFR BLD AUTO: 24.5 % (ref 42–52)
HGB BLD-MCNC: 8.2 G/DL (ref 14–18)
IMM GRANULOCYTES # BLD AUTO: 0.01 X10(3)/MCL (ref 0–0.04)
IMM GRANULOCYTES NFR BLD AUTO: 0.4 %
LYMPHOCYTES # BLD AUTO: 0.66 X10(3)/MCL (ref 0.6–4.6)
LYMPHOCYTES NFR BLD AUTO: 26.7 %
MAGNESIUM SERPL-MCNC: 2 MG/DL (ref 1.6–2.6)
MCH RBC QN AUTO: 30.5 PG (ref 27–31)
MCHC RBC AUTO-ENTMCNC: 33.5 G/DL (ref 33–36)
MCV RBC AUTO: 91.1 FL (ref 80–94)
MONOCYTES # BLD AUTO: 0.25 X10(3)/MCL (ref 0.1–1.3)
MONOCYTES NFR BLD AUTO: 10.1 %
NEUTROPHILS # BLD AUTO: 1.24 X10(3)/MCL (ref 2.1–9.2)
NEUTROPHILS NFR BLD AUTO: 50.3 %
PHOSPHATE SERPL-MCNC: 3 MG/DL (ref 2.3–4.7)
PLATELET # BLD AUTO: 112 X10(3)/MCL (ref 130–400)
PMV BLD AUTO: 10 FL (ref 7.4–10.4)
POTASSIUM SERPL-SCNC: 3.5 MMOL/L (ref 3.5–5.1)
PROT SERPL-MCNC: 5.9 GM/DL (ref 6.4–8.3)
RBC # BLD AUTO: 2.69 X10(6)/MCL (ref 4.7–6.1)
SODIUM SERPL-SCNC: 138 MMOL/L (ref 136–145)
WBC # SPEC AUTO: 2.47 X10(3)/MCL (ref 4.5–11.5)

## 2023-11-16 PROCEDURE — 3008F PR BODY MASS INDEX (BMI) DOCUMENTED: ICD-10-PCS | Mod: CPTII,S$GLB,,

## 2023-11-16 PROCEDURE — 84100 ASSAY OF PHOSPHORUS: CPT

## 2023-11-16 PROCEDURE — 63600175 PHARM REV CODE 636 W HCPCS: Mod: JW,JG

## 2023-11-16 PROCEDURE — 3080F PR MOST RECENT DIASTOLIC BLOOD PRESSURE >= 90 MM HG: ICD-10-PCS | Mod: CPTII,S$GLB,,

## 2023-11-16 PROCEDURE — 3008F BODY MASS INDEX DOCD: CPT | Mod: CPTII,S$GLB,,

## 2023-11-16 PROCEDURE — 99215 PR OFFICE/OUTPT VISIT, EST, LEVL V, 40-54 MIN: ICD-10-PCS | Mod: S$GLB,,,

## 2023-11-16 PROCEDURE — 1159F MED LIST DOCD IN RCRD: CPT | Mod: CPTII,S$GLB,,

## 2023-11-16 PROCEDURE — 1159F PR MEDICATION LIST DOCUMENTED IN MEDICAL RECORD: ICD-10-PCS | Mod: CPTII,S$GLB,,

## 2023-11-16 PROCEDURE — 85025 COMPLETE CBC W/AUTO DIFF WBC: CPT

## 2023-11-16 PROCEDURE — 1160F RVW MEDS BY RX/DR IN RCRD: CPT | Mod: CPTII,S$GLB,,

## 2023-11-16 PROCEDURE — 1111F PR DISCHARGE MEDS RECONCILED W/ CURRENT OUTPATIENT MED LIST: ICD-10-PCS | Mod: CPTII,S$GLB,,

## 2023-11-16 PROCEDURE — 96401 CHEMO ANTI-NEOPL SQ/IM: CPT

## 2023-11-16 PROCEDURE — 99999 PR PBB SHADOW E&M-EST. PATIENT-LVL IV: ICD-10-PCS | Mod: PBBFAC,,,

## 2023-11-16 PROCEDURE — 83735 ASSAY OF MAGNESIUM: CPT

## 2023-11-16 PROCEDURE — 80053 COMPREHEN METABOLIC PANEL: CPT

## 2023-11-16 PROCEDURE — 25000003 PHARM REV CODE 250

## 2023-11-16 PROCEDURE — 3077F SYST BP >= 140 MM HG: CPT | Mod: CPTII,S$GLB,,

## 2023-11-16 PROCEDURE — 36415 COLL VENOUS BLD VENIPUNCTURE: CPT

## 2023-11-16 PROCEDURE — 99215 OFFICE O/P EST HI 40 MIN: CPT | Mod: S$GLB,,,

## 2023-11-16 PROCEDURE — 99999 PR PBB SHADOW E&M-EST. PATIENT-LVL IV: CPT | Mod: PBBFAC,,,

## 2023-11-16 PROCEDURE — 3080F DIAST BP >= 90 MM HG: CPT | Mod: CPTII,S$GLB,,

## 2023-11-16 PROCEDURE — 1111F DSCHRG MED/CURRENT MED MERGE: CPT | Mod: CPTII,S$GLB,,

## 2023-11-16 PROCEDURE — 1160F PR REVIEW ALL MEDS BY PRESCRIBER/CLIN PHARMACIST DOCUMENTED: ICD-10-PCS | Mod: CPTII,S$GLB,,

## 2023-11-16 PROCEDURE — 3077F PR MOST RECENT SYSTOLIC BLOOD PRESSURE >= 140 MM HG: ICD-10-PCS | Mod: CPTII,S$GLB,,

## 2023-11-16 RX ORDER — EPINEPHRINE 0.3 MG/.3ML
0.3 INJECTION SUBCUTANEOUS ONCE AS NEEDED
Status: DISCONTINUED | OUTPATIENT
Start: 2023-11-16 | End: 2023-11-16 | Stop reason: HOSPADM

## 2023-11-16 RX ORDER — ACETAMINOPHEN 325 MG/1
650 TABLET ORAL
Status: CANCELLED | OUTPATIENT
Start: 2023-11-16

## 2023-11-16 RX ORDER — ACETAMINOPHEN 325 MG/1
650 TABLET ORAL
Status: COMPLETED | OUTPATIENT
Start: 2023-11-16 | End: 2023-11-16

## 2023-11-16 RX ORDER — DIPHENHYDRAMINE HYDROCHLORIDE 50 MG/ML
50 INJECTION INTRAMUSCULAR; INTRAVENOUS ONCE AS NEEDED
Status: CANCELLED | OUTPATIENT
Start: 2023-11-16

## 2023-11-16 RX ORDER — BORTEZOMIB 3.5 MG/1
2.75 INJECTION, POWDER, LYOPHILIZED, FOR SOLUTION INTRAVENOUS; SUBCUTANEOUS
Status: COMPLETED | OUTPATIENT
Start: 2023-11-16 | End: 2023-11-16

## 2023-11-16 RX ORDER — PROCHLORPERAZINE EDISYLATE 5 MG/ML
10 INJECTION INTRAMUSCULAR; INTRAVENOUS ONCE AS NEEDED
Status: CANCELLED | OUTPATIENT
Start: 2023-11-16

## 2023-11-16 RX ORDER — PROCHLORPERAZINE EDISYLATE 5 MG/ML
10 INJECTION INTRAMUSCULAR; INTRAVENOUS ONCE AS NEEDED
Status: DISCONTINUED | OUTPATIENT
Start: 2023-11-16 | End: 2023-11-16 | Stop reason: HOSPADM

## 2023-11-16 RX ORDER — BORTEZOMIB 3.5 MG/1
1.3 INJECTION, POWDER, LYOPHILIZED, FOR SOLUTION INTRAVENOUS; SUBCUTANEOUS
Status: CANCELLED | OUTPATIENT
Start: 2023-11-16

## 2023-11-16 RX ORDER — HEPARIN 100 UNIT/ML
500 SYRINGE INTRAVENOUS
Status: CANCELLED | OUTPATIENT
Start: 2023-11-16

## 2023-11-16 RX ORDER — HEPARIN 100 UNIT/ML
500 SYRINGE INTRAVENOUS
Status: DISCONTINUED | OUTPATIENT
Start: 2023-11-16 | End: 2023-11-16 | Stop reason: HOSPADM

## 2023-11-16 RX ORDER — SODIUM CHLORIDE 0.9 % (FLUSH) 0.9 %
10 SYRINGE (ML) INJECTION
Status: CANCELLED | OUTPATIENT
Start: 2023-11-16

## 2023-11-16 RX ORDER — EPINEPHRINE 0.3 MG/.3ML
0.3 INJECTION SUBCUTANEOUS ONCE AS NEEDED
Status: CANCELLED | OUTPATIENT
Start: 2023-11-16

## 2023-11-16 RX ORDER — SODIUM CHLORIDE 0.9 % (FLUSH) 0.9 %
10 SYRINGE (ML) INJECTION
Status: DISCONTINUED | OUTPATIENT
Start: 2023-11-16 | End: 2023-11-16 | Stop reason: HOSPADM

## 2023-11-16 RX ORDER — DIPHENHYDRAMINE HYDROCHLORIDE 50 MG/ML
50 INJECTION INTRAMUSCULAR; INTRAVENOUS ONCE AS NEEDED
Status: DISCONTINUED | OUTPATIENT
Start: 2023-11-16 | End: 2023-11-16 | Stop reason: HOSPADM

## 2023-11-16 RX ORDER — DIPHENHYDRAMINE HCL 25 MG
25 CAPSULE ORAL
Status: COMPLETED | OUTPATIENT
Start: 2023-11-16 | End: 2023-11-16

## 2023-11-16 RX ORDER — DIPHENHYDRAMINE HCL 25 MG
25 CAPSULE ORAL
Status: CANCELLED | OUTPATIENT
Start: 2023-11-16

## 2023-11-16 RX ADMIN — BORTEZOMIB 2.75 MG: 3.5 INJECTION, POWDER, LYOPHILIZED, FOR SOLUTION INTRAVENOUS; SUBCUTANEOUS at 12:11

## 2023-11-16 RX ADMIN — DIPHENHYDRAMINE HYDROCHLORIDE 25 MG: 25 CAPSULE ORAL at 12:11

## 2023-11-16 RX ADMIN — DARATUMUMAB AND HYALURONIDASE-FIHJ (HUMAN RECOMBINANT) 1800 MG: 1800; 30000 INJECTION SUBCUTANEOUS at 12:11

## 2023-11-16 RX ADMIN — ACETAMINOPHEN 650 MG: 325 TABLET ORAL at 12:11

## 2023-11-16 NOTE — PROGRESS NOTES
Subjective:       Patient ID: Pete Fernandez is a 43 y.o. male.    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 At the time of his presentation, his labs which had been normal with PCP in 8/2023 were notable for new severe DOUG, transaminitis, and Ca of 18. He was admitted and started on IVF resuscitation.  Hypercalcemia workup was initiated and he was ultimately found to have numerous osseous lesions on 10/15/23 CT CAP, FLC - Kappa 3.29, Lambda 744, Ratio 226 , SPEP with Mspike 0.17 IgG lambda. Bone marrow biopsy done 10/18/23 revealed 95% plasma cells and FISH with TP53 deletion, FGFR3/IGH (or NSD2/IGH) fusion, usually representing a t(4;14), and a tetraploid subclone was observed. After correction of his hypercalcemia and improvement in his renal function he was discharged on 10/22/23.   He will be started on Beatriz + RVD + Zometa on 11/2/23 and has been referred to [facility] for consideration of bone marrow transplant.     Interval History:   Patient presents to clinic for a follow-up and treatment clearance for cycle 1 D15 of RVD + Beatriz. He feels well today. He continues to have fatigue that is stable. He continues to have generalized pains controlled with current pain meds. He does have to areas to abdomen that have presented since beginning treatment. Nystatin cream has helped itching and redness. Continue to monitor for now.  Denies any fevers, chills, NS, headaches, bleeding, bruising, or abdominal pain.        Past Medical History:   Diagnosis Date    Anxiety disorder, unspecified     Hypercalcemia       Past Surgical History:   Procedure Laterality Date    BONE MARROW BIOPSY N/A 10/18/2023    Procedure: Biopsy-bone marrow;   Surgeon: Nhan Marte MD;  Location: [facility] OR;  Service: General;  Laterality: N/A;    TONSILLECTOMY       Social History     Socioeconomic History    Marital status:    Tobacco Use    Smoking status: Never    Smokeless tobacco: Never   Substance and Sexual Activity    Alcohol use: Yes    Drug use: Never    Sexual activity: Yes     Partners: Female     Social Determinants of Health     Financial Resource Strain: Medium Risk (11/15/2023)    Overall Financial Resource Strain (CARDIA)     Difficulty of Paying Living Expenses: Somewhat hard   Food Insecurity: Food Insecurity Present (11/15/2023)    Hunger Vital Sign     Worried About Running Out of Food in the Last Year: Sometimes true     Ran Out of Food in the Last Year: Never true   Transportation Needs: No Transportation Needs (11/15/2023)    PRAPARE - Transportation     Lack of Transportation (Medical): No     Lack of Transportation (Non-Medical): No   Physical Activity: Insufficiently Active (11/15/2023)    Exercise Vital Sign     Days of Exercise per Week: 3 days     Minutes of Exercise per Session: 30 min   Stress: Stress Concern Present (11/15/2023)    [employer] - Occupational Stress Questionnaire     Feeling of Stress : To some extent   Social Connections: Unknown (11/15/2023)    Social Connection and Isolation Panel [NHANES]     Frequency of Communication with Friends and Family: More than three times a week     Frequency of Social Gatherings with Friends and Family: More than three times a week     Attends [employer] Services: Patient refused     Active Member of Clubs or Organizations: No     Attends Club or Organization Meetings: Never     Marital Status:    Housing Stability: Low Risk  (11/15/2023)    Housing Stability Vital Sign     Unable to Pay for Housing in the Last Year: No     Number of Places Lived in the Last Year: 1     Unstable Housing in the Last Year: No      Family History   Problem Relation Age  of Onset    Breast cancer Mother     Pancreatic cancer Father       Review of patient's allergies indicates:  No Known Allergies   Review of Systems   Constitutional:  Positive for fatigue. Negative for activity change, appetite change, chills and fever.   HENT:  Negative for sore throat.    Eyes:  Negative for visual disturbance.   Respiratory:  Negative for cough and shortness of breath.    Cardiovascular:  Negative for chest pain.   Gastrointestinal:  Negative for abdominal pain, constipation, diarrhea, nausea and vomiting.   Endocrine: Negative for polyuria.   Genitourinary:  Negative for dysuria.   Musculoskeletal:  Negative for back pain.   Integumentary:  Positive for rash.   Allergic/Immunologic: Negative for frequent infections.   Neurological:  Negative for weakness and headaches.   Hematological:  Negative for adenopathy. Does not bruise/bleed easily.         Objective:      Vitals:    11/16/23 1102   BP: (!) 153/92   Pulse: 80   Resp: 18   Temp: 97.6 °F (36.4 °C)       Physical Exam  Constitutional:       General: He is not in acute distress.     Appearance: Normal appearance. He is not ill-appearing.   HENT:      Head: Normocephalic and atraumatic.      Nose: Nose normal.      Mouth/Throat:      Mouth: Mucous membranes are moist.      Pharynx: Oropharynx is clear.   Eyes:      Extraocular Movements: Extraocular movements intact.      Conjunctiva/sclera: Conjunctivae normal.      Pupils: Pupils are equal, round, and reactive to light.   Cardiovascular:      Rate and Rhythm: Normal rate and regular rhythm.      Pulses: Normal pulses.      Heart sounds: Normal heart sounds. No murmur heard.  Pulmonary:      Effort: Pulmonary effort is normal. No respiratory distress.      Breath sounds: Normal breath sounds.   Abdominal:      General: There is no distension.      Palpations: Abdomen is soft.      Tenderness: There is no abdominal tenderness.   Musculoskeletal:         General: Normal range of motion.       Cervical back: Normal range of motion and neck supple.      Right lower leg: No edema.      Left lower leg: No edema.   Lymphadenopathy:      Cervical: No cervical adenopathy.   Skin:     General: Skin is warm and dry.      Findings: Rash (to abdomen x 2) present.   Neurological:      General: No focal deficit present.      Mental Status: He is alert and oriented to person, place, and time.         LABS AND IMAGING REVIEWED IN EPIC  10/11/23 Xray Metastatic Survey  1. Scattered small defects at the calvarium measuring up to 5 mm too numerous to count.  2. Degene mild rative changes at the spine and extremities  3. MR exam and bone scan would allow further evaluation.  10/12/23 MRI Brain  1. Motion artifact  2. Mild scattered mucosal thickening throughout the paranasal sinuses  3. Somewhat limited evaluation of brain metastases without the administration of IV contrast  4. Otherwise unremarkable MR exam of the head without the administration of IV contrast  10/12/23 NM Bone Scan  1. Abnormal intense increased activity throughout the lungs of uncertain etiology.  This may related to hyperparathyroidism, hematologic malignancy, and or metabolic abnormality such as renal failure, vasculitis, or pulmonary infection.  Chest x-ray 10/10/2023 reveals the lungs to be relatively clear and well aerated.  2. Suspect scattered mild degenerative changes throughout the joint spaces  3. Increased activity nasal maxillary region suggesting mucoperiosteal disease  4. Mild activity at the stomach suggesting free pertechnetate  10/14/23 CT CAP  1. Widespread osseous findings suggestive of extensive metastatic skeletal disease, to include expansile destructive soft tissue component at the anterior right 7th rib.  2. Enlarged intrathoracic lymph nodes could also be of metastatic etiology.  3. Diffuse ground-glass attenuation through both lungs is nonspecific, could be secondary to atypical infectious or inflammatory process.  However,  possibility of metastatic involvement cannot be excluded.        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 Displayed all CRAB criteria at time of diagnosis in October. Undergoing 1st line Beatriz + RVD + Zometa with referral to tertiary center for transplant evaluation.   Treatment Plan   Cycle 1-2:   Dexamethasone 40mg PO Days 1, 8, 15, 22 (at home)   Lenalidomide PO Days 1-21 (at home)   Pre-medications PO Days 1, 8, 15 (cycle 1 only)   Bortezomib SubQ Days 1, 8, 15, 22   Daratumumab SubQ Days 1, 8, 15, 22         Cycle 3-6:   Dexamethasone 40mg PO Days 1, 8, 15, 22 (at home)   Lenalidomide PO Days 1-21 (at home)   Bortezomib SubQ Days 1, 8, 15, 22   Daratumumab SubQ Days 1, 15  BMT DESIREE  Has appt scheduled 11/20/2023      Plan:   Toxicity Reviewed, okay to proceed with C1D15 of RVD + Beatriz --> revlimid dose reduced due to renal function 10mg daily. Will plan to increase with next cycle if renal function improves.     - Continue Zometa Q4w - Next due 11/16/23. Hold today due to hypocalcemia. Begin calcium supplement and re-eval next week.   - Continue Aspirin 81mg, Acyclovir, and Bactrim for prophylaxis  - Zofran and Compazine PRN for Nausea  - Follow-up with BMT at [facility] as scheduled.  - Will need SPEP/OSVALDO and FLC with labs Q4w prior to every new cycle    RTC in 1 week with NP with labs  Cbc, cmp, mag, phos- 1 hr prior @ [facility]    I spent a total of 40 minutes on the day of the visit.This includes face to face time and non-face to face time preparing to see the patient (eg, review of tests), obtaining and/or reviewing separately obtained history, documenting clinical information in the electronic or other health record, independently interpreting results and communicating results to the patient/family/caregiver, or care coordinator.      Janet Alegria, YOVANNYP-C  Oncology/Hematology   [facility]      **All blood products must be irradiated and CMV  negative**

## 2023-11-17 NOTE — PROGRESS NOTES
The patient location is: home  The chief complaint leading to consultation is: Multiple myeloma, stem cell transplant consultation    Visit type: audiovisual    Face to Face time with patient: 30 minutes  60 minutes of total time spent on the encounter, which includes face to face time and non-face to face time preparing to see the patient (eg, review of tests), Obtaining and/or reviewing separately obtained history, Documenting clinical information in the electronic or other health record, Independently interpreting results (not separately reported) and communicating results to the patient/family/caregiver, or Care coordination (not separately reported).         Each patient to whom he or she provides medical services by telemedicine is:  (1) informed of the relationship between the physician and patient and the respective role of any other health care provider with respect to management of the patient; and (2) notified that he or she may decline to receive medical services by telemedicine and may withdraw from such care at any time.    Notes:       CC: 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, R-ISS III, stem cell transplant consultation     Current Treatment:   Beatriz+ RVd - 11/2/23 - Present  Zometa 10/17/23 - Present          HPI  is a 44y/o M with no significant medical problems. He has been diagnosed with multiple myeloma. He is here for a virtual stem cell transplant consultation.   Per records,  presented to [facility] on 10/11/23 after 1 month of progressive weakness, abdominal pain, and intermittent confusion. At the time of his presentation, his labs, which had been normal  in 8/2023, were notable for new severe DOUG, transaminitis, and Ca of 18. He was admitted and started on IVF .  Hypercalcemia workup was initiated and he was ultimately found to have numerous osseous lesions on 10/15/23 CT CAP, FLC - Kappa 3.29, Lambda 744, Ratio 226 , SPEP with M spike 0.17, IgG lambda on sIFE.  Bone marrow biopsy done 10/18/23 revealed 95% plasma cells and FISH with TP53 deletion, FGFR3/IGH (or NSD2/IGH) fusion, usually representing a t(4;14), and a tetraploid subclone was observed. After correction of his hypercalcemia and improvement in his renal function he was discharged on 10/22/23.   He was started on Beatriz + RVd on 11/2/23.      Interval History:   Patient presents to clinic for a follow-up and treatment clearance for cycle 1 D15 of RVD + Beatriz. He feels well today. He continues to have fatigue that is stable. He continues to have generalized pains controlled with current pain meds. He does have to areas to abdomen that have presented since beginning treatment. Nystatin cream has helped itching and redness. Continue to monitor for now.  Denies any fevers, chills, NS, headaches, bleeding, bruising, or abdominal pain.        Past Medical History:   Diagnosis Date    Anxiety disorder, unspecified     Hypercalcemia            Past Surgical History:   Procedure Laterality Date    BONE MARROW BIOPSY N/A 10/18/2023    Procedure: Biopsy-bone marrow;  Surgeon: Nhan Marte MD;  [facility];  Service: General;  Laterality: N/A;    TONSILLECTOMY           Social History     Socioeconomic History    Marital status:    Tobacco Use    Smoking status: Never    Smokeless tobacco: Never   Substance and Sexual Activity    Alcohol use: Yes    Drug use: Never    Sexual activity: Yes     Partners: Female     Social Determinants of Health     Financial Resource Strain: Medium Risk (11/15/2023)    Overall Financial Resource Strain (CARDIA)     Difficulty of Paying Living Expenses: Somewhat hard   Food Insecurity: Food Insecurity Present (11/15/2023)    Hunger Vital Sign     Worried About Running Out of Food in the Last Year: Sometimes true     Ran Out of Food in the Last Year: Never true   Transportation Needs: No Transportation Needs (11/15/2023)    PRAPARE - Transportation     Lack of  Transportation (Medical): No     Lack of Transportation (Non-Medical): No   Physical Activity: Insufficiently Active (11/15/2023)    Exercise Vital Sign     Days of Exercise per Week: 3 days     Minutes of Exercise per Session: 30 min   Stress: Stress Concern Present (11/15/2023)    [place] [employer] - Occupational Stress Questionnaire     Feeling of Stress : To some extent   Social Connections: Unknown (11/15/2023)    Social Connection and Isolation Panel [NHANES]     Frequency of Communication with Friends and Family: More than three times a week     Frequency of Social Gatherings with Friends and Family: More than three times a week     Attends Yarsanism Services: Patient refused     Active Member of Clubs or Organizations: No     Attends Club or Organization Meetings: Never     Marital Status:    Housing Stability: Low Risk  (11/15/2023)    Housing Stability Vital Sign     Unable to Pay for Housing in the Last Year: No     Number of Places Lived in the Last Year: 1     Unstable Housing in the Last Year: No         Review of patient's allergies indicates:  No Known Allergies      Current Outpatient Medications   Medication Sig    acyclovir (ZOVIRAX) 400 MG tablet Take 400 mg by mouth 2 (two) times daily.    aspirin 81 MG Chew Take 81 mg by mouth once daily.    dexAMETHasone (DECADRON) 4 MG Tab Take 10 tablets (40 mg total) by mouth every 7 days. Take with food.    folic acid (FOLVITE) 1 MG tablet Take 1 tablet (1 mg total) by mouth once daily.    nystatin (MYCOSTATIN) cream Apply topically 2 (two) times daily. To affected area until resolved    ondansetron (ZOFRAN) 8 MG tablet Take 1 tablet (8 mg total) by mouth every 8 (eight) hours as needed for Nausea.    oxyCODONE (ROXICODONE) 5 MG immediate release tablet Take 1 tablet (5 mg total) by mouth every 6 (six) hours as needed for Pain.    prochlorperazine (COMPAZINE) 5 MG tablet Take 5 mg by mouth every 6 (six)  hours as needed for Nausea.    REVLIMID 10 mg Cap Take 10 mg by mouth once daily on days 1 - 21 of each 28 day cycle.    sodium bicarbonate 650 MG tablet Take 650 mg by mouth 2 (two) times daily.    sulfamethoxazole-trimethoprim 800-160mg (BACTRIM DS) 800-160 mg Tab Take 1 tablet by mouth once daily. (Patient not taking: Reported on 11/16/2023)    thiamine 100 MG tablet Take 1 tablet (100 mg total) by mouth once daily.     No current facility-administered medications for this visit.         Review of Systems   Constitutional:  Positive for malaise/fatigue. Negative for chills, fever and weight loss.   HENT:  Negative for congestion, ear discharge, ear pain, hearing loss, nosebleeds, sinus pain and tinnitus.    Eyes:  Negative for blurred vision, photophobia, pain, discharge and redness.   Respiratory:  Negative for sputum production.    Cardiovascular:  Negative for chest pain, palpitations, orthopnea and claudication.   Gastrointestinal:  Negative for blood in stool, constipation, diarrhea, heartburn, melena and vomiting.   Genitourinary:  Negative for dysuria, frequency, hematuria and urgency.   Musculoskeletal:  Positive for back pain and myalgias. Negative for joint pain and neck pain.   Neurological:  Negative for tremors, sensory change, speech change, focal weakness, weakness and headaches.   Endo/Heme/Allergies:  Negative for environmental allergies and polydipsia. Does not bruise/bleed easily.   Psychiatric/Behavioral:  Negative for depression, hallucinations and substance abuse. The patient is not nervous/anxious.             Physical exam      PS: ECOG 1      Physical exam deferred due to nature of visit today  Component      Latest Ref Rng 10/17/2023 11/2/2023 11/16/2023   Sodium      136 - 145 mmol/L   138    Potassium      3.5 - 5.1 mmol/L   3.5    Chloride      98 - 107 mmol/L   104    CO2      22 - 29 mmol/L   26    Glucose      74 - 100 mg/dL   79    BUN      8.9 - 20.6 mg/dL   14.0     Creatinine      0.73 - 1.18 mg/dL   1.87 (H)    Calcium      8.4 - 10.2 mg/dL   7.9 (L)    PROTEIN TOTAL      6.4 - 8.3 gm/dL   5.9 (L)    Albumin      3.5 - 5.0 g/dL   3.4 (L)    Globulin, Total      2.4 - 3.5 gm/dL   2.5    Albumin/Globulin Ratio      1.1 - 2.0 ratio   1.4    BILIRUBIN TOTAL      <=1.5 mg/dL   0.7    ALP      40 - 150 unit/L   292 (H)    ALT      0 - 55 unit/L   13    AST      5 - 34 unit/L   13    eGFR      mls/min/1.73/m2   45    IgG      540.00 - 1,822.00 mg/dL 381.00 (L)      IgA      63.0 - 484.0 mg/dL 158.0      IgM      22.0 - 240.0 mg/dL 16.0 (L)      [place] Free Light Chain      0.3300 - 1.94 mg/dL 3.29 (H)      Lambda Free Light Chain      0.5700 - 2.63 mg/dL 744 (H)      Kappa/Lambda FLC Ratio      0.2600 - 1.65  0.0044 (L)      Hepatitis B Surface Antigen      Nonreactive   Nonreactive     Hep B Core Total Ab      Nonreactive   Nonreactive     Magnesium       1.60 - 2.60 mg/dL   2.00    Phosphorus Level      2.3 - 4.7 mg/dL   3.0           10/14/23 CT C/A/P    COMPARISON  None available at the time of initial interpretation.     FINDINGS  Images were reviewed in soft tissue, lung, and bone windows.     Exam quality: Inherently limited vascular and soft tissue assessment due to utilization of non-contrast protocol.  Quality further limited by patient movement throughout image acquisition with resulting widespread artifact particularly through the abdomen.     Lines/tubes: none visualized     Cardiovascular: Normal heart chamber size. No pericardial effusion.  Normal vessel caliber and contour through the chest, abdomen, and pelvis.     Lungs/Pleura: Central airways are widely patent.  No organized airspace consolidation or focal pulmonary lesion is identified.  However, there is diffuse ill-defined ground-glass attenuation throughout both lungs of overall nonspecific appearance.  Small layering bilateral pleural effusions are also evident.  No evidence of loculated component or pleural  thickening identified.  There is no pneumothorax.     Abdominal Solid Organs: Unremarkable appearance for limited non-contrast CT assessment.  No evidence of acute abnormality appreciated.     Gallbladder/Biliary: No acute process, calcified stone, or evidence of biliary obstruction.     Gastrointestinal: No evidence of acute esophageal or abdominal hollow viscus injury. No active inflammatory process.     Pelvic Organs: No focal abnormality of the urinary bladder or adjacent structures.     Peritoneal Cavity/Retroperitoneum: No free fluid or air, and no drainable collections.     Musculoskeletal: No acute or focal subcutaneous or muscular abnormality.  There is no evidence of acute osseous displacement.  However, widespread heterogeneity and innumerable lucent foci are appreciated throughout the spinal column, as well as the bony thorax and pelvis.  A more pronounced lytic lesion that is poorly marginated is noted at the inferior right posterolateral T11 vertebral body measuring 2.3 cm x 1.3 cm with overlying cortical disruption (series 3, image 96).  An additional focal expansile soft tissue density lesion is present at the right anterolateral 7th ribs with overlying cortical destruction and dimensions of approximately 3.6 cm x 2.3 cm (series 2, image 89).  Multiple nondisplaced, likely subacute/chronic right rib fractures also incidentally identified.     Other findings: Scattered mediastinal lymph nodes are enlarged.  For example, right posterolateral upper paratracheal node measures 1.4 cm short axis (series 2, image 19).  Subcarinal node short axis dimension 1.4 cm (image 53).  No necrotic adenopathy is identified.  The thyroid is unremarkable.     IMPRESSION  1. Widespread osseous findings suggestive of extensive metastatic skeletal disease, to include expansile destructive soft tissue component at the anterior right 7th rib.  2. Enlarged intrathoracic lymph nodes could also be of metastatic etiology.  3.  Diffuse ground-glass attenuation through both lungs is nonspecific, could be secondary to atypical infectious or inflammatory process.  However, possibility of metastatic involvement cannot be excluded.      10/18/23 bone marrow biopsy       1. BONE MARROW, RIGHT POSTERIOR ILIAC CREST, BIOPSY AND ASPIRATE:      PLASMA CELL MYELOMA, LAMBDA-RESTRICTED.      LAMBDA-RESTRICTED ATYPICAL PLASMA CELLS COMPRISE 95% OF MARROW NUCLEATED   CELLULARITY AND ARE ARRANGED IN DIFFUSE SHEETS.      REDUCED MARROW STORES.         PERIPHERAL BLOOD:   MILD NORMOCHROMIC NORMOCYTIC ANEMIA.      2. SEE DIAGNOSIS #1.       Comment   A. The patient's clinical history of hypercalcemia, weakness and acute kidney injury with CT showing extensive osseous lesions, SPEP showing monoclonal spike   0.17 g/dL, IgG lambda by OSVALDO is noted.      B. The current bone marrow is 100% cellular and the vast majority (95%) of the cellularity is composed of atypical plasma cells which are Lambda-restricted by   flow cytometry and immunohistochemical staining.      C. A representative sample of this patient's bone marrow was submitted for flow cytometry, which revealed a population of monotypic plasma cells (43.71%),   restricted to the production of Lambda light chains, with coexpression of CD38 and 138, negative for CD19 and CD45. No monotypic B cell population or increase   in blasts are identified. See below and separate scanned report.      D. A representative sample was also submitted for Myeloma FISH panel, which was abnormal, indicating a plasma cell clone with a TP53 deletion and FGFR3/IGH (or   NSD2/IGH) fusion, usually representing a t(4;14).  In addition, a tetraploid subclone was observed. In plasma cell dyscrasias, a TP53 deletion represents a   high risk cytogenetic abnormality.  See below and separate scanned report.           Assessment:      1. R-ISS stage III IgG lambda multiple myeloma not in remission  2. Stem cell transplant candidate  3.  DOUG  4. Fatigue  5. Cancer associated pain  6. Anemia in neoplastic disease  7. Leukopenia  8. Neutropenia  9. thrombocytopenia      Plan:    1,2. He has high risk features on bone marrow aspirate FISH,  including TP53 deletion and FGFR3/IGH (or NSD2/IGH) fusion, usually representing a t(4;14). He had presented with DOUG and hypercalcemia.    He has started mira VRd induction. In view of high risk feature son FISH and good performance status recommend switching bortezomib to carfilzomib.   I discussed the role, timing, risks and benefits of autologous stem cell transplant in multiple myeloma. I explained that although the autologous stem cell transplant is NOT curative, it has progression free survival and overall survival benefits, when the transplant is followed by maintenance therapy. I discussed that he will andi to be in  [place] for 4 weeks for the transplant , with approximately 2 weeks in the hospital, and 2 weeks post discharge locally and will need an adult care-giver for all four weeks.   He has good performance status with limited co-morbid conditions and will possibly be transplanted in March-April 2024 if he responds well to induction.  He has not had a baseline PET CT. CT C/A/P done on 10/14/23 demonstrated scattered mediastinal lymph nodes. Unclear if this is extramedullary involvement  by his myeloma.  He is on zometa.   He will be contacted by our transplant co-ordinator, and listed on our transplant calendar.         3. Secondary to multiple myeloma, Cr improving.       4. Secodnary to multiple myeloma    5. He has widespread bone involvement. He is on zometa, oxycodone.    6. Most recent Hgb 8.2g/dl    7, 8. Most recent WBC count 2.47, ANC 1.24. He is afebrile.    9,. Most recent platelet count 112k            BMT Chart Routing      Follow up with physician 2 months.   Follow up with ROMY    Provider visit type    Infusion scheduling note    Injection scheduling note    Labs    Imaging     Pharmacy appointment    Other referrals

## 2023-11-20 ENCOUNTER — OFFICE VISIT (OUTPATIENT)
Dept: HEMATOLOGY/ONCOLOGY | Facility: CLINIC | Age: 43
End: 2023-11-20
Payer: COMMERCIAL

## 2023-11-20 DIAGNOSIS — Z76.82 STEM CELL TRANSPLANT CANDIDATE: ICD-10-CM

## 2023-11-20 DIAGNOSIS — G89.3 CANCER ASSOCIATED PAIN: ICD-10-CM

## 2023-11-20 DIAGNOSIS — D84.821 IMMUNODEFICIENCY DUE TO DRUG THERAPY: ICD-10-CM

## 2023-11-20 DIAGNOSIS — C90.00 MULTIPLE MYELOMA NOT HAVING ACHIEVED REMISSION: Primary | ICD-10-CM

## 2023-11-20 DIAGNOSIS — D70.1 CHEMOTHERAPY-INDUCED NEUTROPENIA: ICD-10-CM

## 2023-11-20 DIAGNOSIS — D69.6 THROMBOCYTOPENIA: ICD-10-CM

## 2023-11-20 DIAGNOSIS — T45.1X5A CHEMOTHERAPY-INDUCED NEUTROPENIA: ICD-10-CM

## 2023-11-20 DIAGNOSIS — Z79.899 IMMUNODEFICIENCY DUE TO DRUG THERAPY: ICD-10-CM

## 2023-11-20 DIAGNOSIS — D63.0 ANEMIA IN NEOPLASTIC DISEASE: ICD-10-CM

## 2023-11-20 DIAGNOSIS — N17.9 AKI (ACUTE KIDNEY INJURY): ICD-10-CM

## 2023-11-20 DIAGNOSIS — R53.82 CHRONIC FATIGUE: ICD-10-CM

## 2023-11-20 PROCEDURE — 1111F PR DISCHARGE MEDS RECONCILED W/ CURRENT OUTPATIENT MED LIST: ICD-10-PCS | Mod: CPTII,95,, | Performed by: INTERNAL MEDICINE

## 2023-11-20 PROCEDURE — 99215 PR OFFICE/OUTPT VISIT, EST, LEVL V, 40-54 MIN: ICD-10-PCS | Mod: 95,,, | Performed by: INTERNAL MEDICINE

## 2023-11-20 PROCEDURE — 99215 OFFICE O/P EST HI 40 MIN: CPT | Mod: 95,,, | Performed by: INTERNAL MEDICINE

## 2023-11-20 PROCEDURE — 1111F DSCHRG MED/CURRENT MED MERGE: CPT | Mod: CPTII,95,, | Performed by: INTERNAL MEDICINE

## 2023-11-22 ENCOUNTER — OFFICE VISIT (OUTPATIENT)
Dept: HEMATOLOGY/ONCOLOGY | Facility: CLINIC | Age: 43
End: 2023-11-22
Payer: COMMERCIAL

## 2023-11-22 ENCOUNTER — DOCUMENTATION ONLY (OUTPATIENT)
Dept: HEMATOLOGY/ONCOLOGY | Facility: CLINIC | Age: 43
End: 2023-11-22
Payer: COMMERCIAL

## 2023-11-22 ENCOUNTER — INFUSION (OUTPATIENT)
Dept: INFUSION THERAPY | Facility: HOSPITAL | Age: 43
End: 2023-11-22
Attending: STUDENT IN AN ORGANIZED HEALTH CARE EDUCATION/TRAINING PROGRAM
Payer: COMMERCIAL

## 2023-11-22 VITALS
HEART RATE: 83 BPM | RESPIRATION RATE: 20 BRPM | SYSTOLIC BLOOD PRESSURE: 160 MMHG | BODY MASS INDEX: 33.27 KG/M2 | WEIGHT: 224.63 LBS | DIASTOLIC BLOOD PRESSURE: 94 MMHG | OXYGEN SATURATION: 99 % | HEIGHT: 69 IN | TEMPERATURE: 99 F

## 2023-11-22 VITALS
BODY MASS INDEX: 33.27 KG/M2 | DIASTOLIC BLOOD PRESSURE: 94 MMHG | HEART RATE: 83 BPM | RESPIRATION RATE: 20 BRPM | OXYGEN SATURATION: 99 % | TEMPERATURE: 99 F | WEIGHT: 224.63 LBS | HEIGHT: 69 IN | SYSTOLIC BLOOD PRESSURE: 160 MMHG

## 2023-11-22 DIAGNOSIS — C90.00 MULTIPLE MYELOMA, REMISSION STATUS UNSPECIFIED: Primary | ICD-10-CM

## 2023-11-22 DIAGNOSIS — N17.9 AKI (ACUTE KIDNEY INJURY): ICD-10-CM

## 2023-11-22 DIAGNOSIS — E83.51 HYPOCALCEMIA: ICD-10-CM

## 2023-11-22 DIAGNOSIS — G89.3 CANCER ASSOCIATED PAIN: ICD-10-CM

## 2023-11-22 DIAGNOSIS — Z76.82 STEM CELL TRANSPLANT CANDIDATE: ICD-10-CM

## 2023-11-22 DIAGNOSIS — R53.82 CHRONIC FATIGUE: ICD-10-CM

## 2023-11-22 DIAGNOSIS — D63.0 ANEMIA IN NEOPLASTIC DISEASE: ICD-10-CM

## 2023-11-22 DIAGNOSIS — D64.9 ANEMIA, UNSPECIFIED TYPE: Primary | ICD-10-CM

## 2023-11-22 DIAGNOSIS — D70.1 CHEMOTHERAPY-INDUCED NEUTROPENIA: ICD-10-CM

## 2023-11-22 DIAGNOSIS — D64.9 LOW HEMOGLOBIN: ICD-10-CM

## 2023-11-22 DIAGNOSIS — D84.821 IMMUNODEFICIENCY DUE TO DRUG THERAPY: ICD-10-CM

## 2023-11-22 DIAGNOSIS — Z79.899 IMMUNODEFICIENCY DUE TO DRUG THERAPY: ICD-10-CM

## 2023-11-22 DIAGNOSIS — T45.1X5A CHEMOTHERAPY-INDUCED NEUTROPENIA: ICD-10-CM

## 2023-11-22 DIAGNOSIS — D69.6 THROMBOCYTOPENIA: ICD-10-CM

## 2023-11-22 DIAGNOSIS — C90.00 MULTIPLE MYELOMA NOT HAVING ACHIEVED REMISSION: Primary | ICD-10-CM

## 2023-11-22 DIAGNOSIS — K21.9 GASTROESOPHAGEAL REFLUX DISEASE, UNSPECIFIED WHETHER ESOPHAGITIS PRESENT: ICD-10-CM

## 2023-11-22 PROCEDURE — 3008F PR BODY MASS INDEX (BMI) DOCUMENTED: ICD-10-PCS | Mod: CPTII,S$GLB,,

## 2023-11-22 PROCEDURE — 3008F BODY MASS INDEX DOCD: CPT | Mod: CPTII,S$GLB,,

## 2023-11-22 PROCEDURE — 96366 THER/PROPH/DIAG IV INF ADDON: CPT

## 2023-11-22 PROCEDURE — 25000003 PHARM REV CODE 250

## 2023-11-22 PROCEDURE — 99999 PR PBB SHADOW E&M-EST. PATIENT-LVL IV: CPT | Mod: PBBFAC,,,

## 2023-11-22 PROCEDURE — 3077F PR MOST RECENT SYSTOLIC BLOOD PRESSURE >= 140 MM HG: ICD-10-PCS | Mod: CPTII,S$GLB,,

## 2023-11-22 PROCEDURE — 3080F DIAST BP >= 90 MM HG: CPT | Mod: CPTII,S$GLB,,

## 2023-11-22 PROCEDURE — 3080F PR MOST RECENT DIASTOLIC BLOOD PRESSURE >= 90 MM HG: ICD-10-PCS | Mod: CPTII,S$GLB,,

## 2023-11-22 PROCEDURE — 99999 PR PBB SHADOW E&M-EST. PATIENT-LVL IV: ICD-10-PCS | Mod: PBBFAC,,,

## 2023-11-22 PROCEDURE — 99215 OFFICE O/P EST HI 40 MIN: CPT | Mod: S$GLB,,,

## 2023-11-22 PROCEDURE — 99215 PR OFFICE/OUTPT VISIT, EST, LEVL V, 40-54 MIN: ICD-10-PCS | Mod: S$GLB,,,

## 2023-11-22 PROCEDURE — 3077F SYST BP >= 140 MM HG: CPT | Mod: CPTII,S$GLB,,

## 2023-11-22 PROCEDURE — 96401 CHEMO ANTI-NEOPL SQ/IM: CPT

## 2023-11-22 PROCEDURE — 96367 TX/PROPH/DG ADDL SEQ IV INF: CPT

## 2023-11-22 PROCEDURE — 96365 THER/PROPH/DIAG IV INF INIT: CPT

## 2023-11-22 PROCEDURE — 63600175 PHARM REV CODE 636 W HCPCS

## 2023-11-22 RX ORDER — DEXTROAMPHETAMINE SACCHARATE, AMPHETAMINE ASPARTATE, DEXTROAMPHETAMINE SULFATE AND AMPHETAMINE SULFATE 5; 5; 5; 5 MG/1; MG/1; MG/1; MG/1
TABLET ORAL
COMMUNITY
Start: 2023-11-11

## 2023-11-22 RX ORDER — SODIUM CHLORIDE 0.9 % (FLUSH) 0.9 %
10 SYRINGE (ML) INJECTION
Status: DISCONTINUED | OUTPATIENT
Start: 2023-11-22 | End: 2023-11-22 | Stop reason: HOSPADM

## 2023-11-22 RX ORDER — SODIUM CHLORIDE 0.9 % (FLUSH) 0.9 %
10 SYRINGE (ML) INJECTION
Status: CANCELLED | OUTPATIENT
Start: 2023-11-22

## 2023-11-22 RX ORDER — DIPHENHYDRAMINE HYDROCHLORIDE 50 MG/ML
50 INJECTION INTRAMUSCULAR; INTRAVENOUS ONCE AS NEEDED
Status: DISCONTINUED | OUTPATIENT
Start: 2023-11-22 | End: 2023-11-22 | Stop reason: HOSPADM

## 2023-11-22 RX ORDER — HEPARIN 100 UNIT/ML
500 SYRINGE INTRAVENOUS
Status: CANCELLED | OUTPATIENT
Start: 2023-11-22

## 2023-11-22 RX ORDER — HYDROCODONE BITARTRATE AND ACETAMINOPHEN 500; 5 MG/1; MG/1
TABLET ORAL ONCE
Status: CANCELLED | OUTPATIENT
Start: 2023-11-22 | End: 2023-11-22

## 2023-11-22 RX ORDER — PROCHLORPERAZINE EDISYLATE 5 MG/ML
10 INJECTION INTRAMUSCULAR; INTRAVENOUS ONCE AS NEEDED
Status: DISCONTINUED | OUTPATIENT
Start: 2023-11-22 | End: 2023-11-22 | Stop reason: HOSPADM

## 2023-11-22 RX ORDER — HEPARIN 100 UNIT/ML
500 SYRINGE INTRAVENOUS
Status: DISCONTINUED | OUTPATIENT
Start: 2023-11-22 | End: 2023-11-22 | Stop reason: HOSPADM

## 2023-11-22 RX ORDER — DIPHENHYDRAMINE HYDROCHLORIDE 50 MG/ML
50 INJECTION INTRAMUSCULAR; INTRAVENOUS ONCE AS NEEDED
Status: CANCELLED | OUTPATIENT
Start: 2023-11-22

## 2023-11-22 RX ORDER — EPINEPHRINE 0.3 MG/.3ML
0.3 INJECTION SUBCUTANEOUS ONCE AS NEEDED
Status: DISCONTINUED | OUTPATIENT
Start: 2023-11-22 | End: 2023-11-22 | Stop reason: HOSPADM

## 2023-11-22 RX ORDER — EPINEPHRINE 0.3 MG/.3ML
0.3 INJECTION SUBCUTANEOUS ONCE AS NEEDED
Status: CANCELLED | OUTPATIENT
Start: 2023-11-22

## 2023-11-22 RX ORDER — BORTEZOMIB 3.5 MG/1
1.3 INJECTION, POWDER, LYOPHILIZED, FOR SOLUTION INTRAVENOUS; SUBCUTANEOUS
Status: CANCELLED | OUTPATIENT
Start: 2023-11-22

## 2023-11-22 RX ORDER — BORTEZOMIB 3.5 MG/1
1.3 INJECTION, POWDER, LYOPHILIZED, FOR SOLUTION INTRAVENOUS; SUBCUTANEOUS
Status: COMPLETED | OUTPATIENT
Start: 2023-11-22 | End: 2023-11-22

## 2023-11-22 RX ORDER — PROCHLORPERAZINE EDISYLATE 5 MG/ML
10 INJECTION INTRAMUSCULAR; INTRAVENOUS ONCE AS NEEDED
Status: CANCELLED | OUTPATIENT
Start: 2023-11-22

## 2023-11-22 RX ORDER — PANTOPRAZOLE SODIUM 20 MG/1
20 TABLET, DELAYED RELEASE ORAL DAILY
Qty: 30 TABLET | Refills: 1 | Status: SHIPPED | OUTPATIENT
Start: 2023-11-22 | End: 2023-12-20

## 2023-11-22 RX ADMIN — POTASSIUM BICARBONATE 20 MEQ: 782 TABLET, EFFERVESCENT ORAL at 09:11

## 2023-11-22 RX ADMIN — DARATUMUMAB AND HYALURONIDASE-FIHJ (HUMAN RECOMBINANT) 1800 MG: 1800; 30000 INJECTION SUBCUTANEOUS at 10:11

## 2023-11-22 RX ADMIN — SODIUM CHLORIDE: 9 INJECTION, SOLUTION INTRAVENOUS at 10:11

## 2023-11-22 RX ADMIN — BORTEZOMIB 3 MG: 3.5 INJECTION, POWDER, LYOPHILIZED, FOR SOLUTION INTRAVENOUS; SUBCUTANEOUS at 10:11

## 2023-11-22 RX ADMIN — CALCIUM GLUCONATE 1 G: 98 INJECTION, SOLUTION INTRAVENOUS at 09:11

## 2023-11-22 RX ADMIN — CALCIUM GLUCONATE 1 G: 98 INJECTION, SOLUTION INTRAVENOUS at 12:11

## 2023-11-22 NOTE — PLAN OF CARE
Patient will receive infusion in a timely manor. Patient completed infusion with out difficultyPatient will receive injection in a timely manor. Patient completed injection without difficulty

## 2023-11-22 NOTE — PROGRESS NOTES
Subjective:       Patient ID: Pete Fernandez is a 43 y.o. male.    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 At the time of his presentation, his labs which had been normal with PCP in 8/2023 were notable for new severe DOUG, transaminitis, and Ca of 18. He was admitted and started on IVF resuscitation.  Hypercalcemia workup was initiated and he was ultimately found to have numerous osseous lesions on 10/15/23 CT CAP, FLC - Kappa 3.29, Lambda 744, Ratio 226 , SPEP with Mspike 0.17 IgG lambda. Bone marrow biopsy done 10/18/23 revealed 95% plasma cells and FISH with TP53 deletion, FGFR3/IGH (or NSD2/IGH) fusion, usually representing a t(4;14), and a tetraploid subclone was observed. After correction of his hypercalcemia and improvement in his renal function he was discharged on 10/22/23.   He will be started on Beatriz + RVD + Zometa on 11/2/23 and has been referred to [facility] for consideration of bone marrow transplant.     Interval History:   Patient presents to clinic for a follow-up and treatment clearance for cycle 1 D22 of RVD + Beatriz. He feels well today. He continues to have fatigue that is stable. He continues to have generalized pains controlled with current pain meds. Redness continues to be noted to his abdomen in area that the Velcade inj was given. It does itch.  Occasional muscle aches to chest area continue to be intermittent. He did have visit with DESIREE transplant. He will be switching carfilzomib after completion of C1. Transplant will be apprx. 4/2023. Denies any fevers, chills, NS, headaches, bleeding, bruising, neuropathy, or abdominal pain.        Past Medical History:   Diagnosis Date    Anxiety disorder,  unspecified     Hypercalcemia       Past Surgical History:   Procedure Laterality Date    BONE MARROW BIOPSY N/A 10/18/2023    Procedure: Biopsy-bone marrow;  Surgeon: Nhan Marte MD;  Location: [facility] OR;  Service: General;  Laterality: N/A;    TONSILLECTOMY       Social History     Socioeconomic History    Marital status:    Tobacco Use    Smoking status: Never    Smokeless tobacco: Never   Substance and Sexual Activity    Alcohol use: Yes    Drug use: Never    Sexual activity: Yes     Partners: Female     Social Determinants of Health     Financial Resource Strain: Medium Risk (11/15/2023)    Overall Financial Resource Strain (CARDIA)     Difficulty of Paying Living Expenses: Somewhat hard   Food Insecurity: Food Insecurity Present (11/15/2023)    Hunger Vital Sign     Worried About Running Out of Food in the Last Year: Sometimes true     Ran Out of Food in the Last Year: Never true   Transportation Needs: No Transportation Needs (11/15/2023)    PRAPARE - Transportation     Lack of Transportation (Medical): No     Lack of Transportation (Non-Medical): No   Physical Activity: Insufficiently Active (11/15/2023)    Exercise Vital Sign     Days of Exercise per Week: 3 days     Minutes of Exercise per Session: 30 min   Stress: Stress Concern Present (11/15/2023)    [employer] [place] [employer] - Occupational Stress Questionnaire     Feeling of Stress : To some extent   Social Connections: Unknown (11/15/2023)    Social Connection and Isolation Panel [NHANES]     Frequency of Communication with Friends and Family: More than three times a week     Frequency of Social Gatherings with Friends and Family: More than three times a week     Attends Religion Services: Patient refused     Active Member of Clubs or Organizations: No     Attends Club or Organization Meetings: Never     Marital Status:    Housing Stability: Low Risk  (11/15/2023)    Housing Stability Vital Sign     Unable to Pay for  Housing in the Last Year: No     Number of Places Lived in the Last Year: 1     Unstable Housing in the Last Year: No      Family History   Problem Relation Age of Onset    Breast cancer Mother     Pancreatic cancer Father       Review of patient's allergies indicates:  No Known Allergies   Review of Systems   Constitutional:  Positive for fatigue. Negative for activity change, appetite change, chills and fever.   HENT:  Negative for sore throat.    Eyes:  Negative for visual disturbance.   Respiratory:  Negative for cough and shortness of breath.    Cardiovascular:  Negative for chest pain.   Gastrointestinal:  Negative for abdominal pain, constipation, diarrhea, nausea and vomiting.   Endocrine: Negative for polyuria.   Genitourinary:  Negative for dysuria.   Musculoskeletal:  Negative for back pain.   Integumentary:  Positive for rash.   Allergic/Immunologic: Negative for frequent infections.   Neurological:  Negative for weakness and headaches.   Hematological:  Negative for adenopathy. Does not bruise/bleed easily.         Objective:      Vitals:    11/22/23 0839   BP: (!) 160/94   Pulse: 83   Resp: 20   Temp: 99.4 °F (37.4 °C)       Physical Exam  Constitutional:       General: He is not in acute distress.     Appearance: Normal appearance. He is not ill-appearing.   HENT:      Head: Normocephalic and atraumatic.      Nose: Nose normal.      Mouth/Throat:      Mouth: Mucous membranes are moist.      Pharynx: Oropharynx is clear.   Eyes:      Extraocular Movements: Extraocular movements intact.      Conjunctiva/sclera: Conjunctivae normal.      Pupils: Pupils are equal, round, and reactive to light.   Cardiovascular:      Rate and Rhythm: Normal rate and regular rhythm.      Pulses: Normal pulses.      Heart sounds: Normal heart sounds. No murmur heard.  Pulmonary:      Effort: Pulmonary effort is normal. No respiratory distress.      Breath sounds: Normal breath sounds.   Abdominal:      General: There is no  distension.      Palpations: Abdomen is soft.      Tenderness: There is no abdominal tenderness.   Musculoskeletal:         General: Normal range of motion.      Cervical back: Normal range of motion and neck supple.      Right lower leg: No edema.      Left lower leg: No edema.   Lymphadenopathy:      Cervical: No cervical adenopathy.   Skin:     General: Skin is warm and dry.      Findings: Rash (to abdomen x 2) present.   Neurological:      General: No focal deficit present.      Mental Status: He is alert and oriented to person, place, and time.       LABS AND IMAGING REVIEWED IN EPIC  10/11/23 Xray Metastatic Survey  1. Scattered small defects at the calvarium measuring up to 5 mm too numerous to count.  2. Degene mild rative changes at the spine and extremities  3. MR exam and bone scan would allow further evaluation.  10/12/23 MRI Brain  1. Motion artifact  2. Mild scattered mucosal thickening throughout the paranasal sinuses  3. Somewhat limited evaluation of brain metastases without the administration of IV contrast  4. Otherwise unremarkable MR exam of the head without the administration of IV contrast  10/12/23 NM Bone Scan  1. Abnormal intense increased activity throughout the lungs of uncertain etiology.  This may related to hyperparathyroidism, hematologic malignancy, and or metabolic abnormality such as renal failure, vasculitis, or pulmonary infection.  Chest x-ray 10/10/2023 reveals the lungs to be relatively clear and well aerated.  2. Suspect scattered mild degenerative changes throughout the joint spaces  3. Increased activity nasal maxillary region suggesting mucoperiosteal disease  4. Mild activity at the stomach suggesting free pertechnetate  10/14/23 CT CAP  1. Widespread osseous findings suggestive of extensive metastatic skeletal disease, to include expansile destructive soft tissue component at the anterior right 7th rib.  2. Enlarged intrathoracic lymph nodes could also be of metastatic  etiology.  3. Diffuse ground-glass attenuation through both lungs is nonspecific, could be secondary to atypical infectious or inflammatory process.  However, possibility of metastatic involvement cannot be excluded.        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 Displayed all CRAB criteria at time of diagnosis in October. Undergoing 1st line Beatriz + RVD + Zometa with referral to tertiary center for transplant evaluation.   Treatment Plan   Cycle 1-2:   Dexamethasone 40mg PO Days 1, 8, 15, 22 (at home)   Lenalidomide PO Days 1-21 (at home)   Pre-medications PO Days 1, 8, 15 (cycle 1 only)   Bortezomib SubQ Days 1, 8, 15, 22   Daratumumab SubQ Days 1, 8, 15, 22         Cycle 3-6:   Dexamethasone 40mg PO Days 1, 8, 15, 22 (at home)   Lenalidomide PO Days 1-21 (at home)   Bortezomib SubQ Days 1, 8, 15, 22   Daratumumab SubQ Days 1, 15  BMT DESIREE  Will be switching to carfilzomib after completion of C1. Transplant will be approximately 4/2023.       Plan:   Toxicity Reviewed, okay to proceed with C1D22 of RVD + Beatriz --> revlimid dose reduced due to renal function 10mg daily.   IV calcium today for hypocalcemia. Will check BMP following admin.  Effer-k today x 1.   Plan for one unit of blood on Friday.   Continue to hold zometa due to hypocalcemia  Increase calcium to BID.  Continue Aspirin 81mg, Acyclovir, and Bactrim for prophylaxis  Zofran and Compazine PRN for Nausea  Follow-up with BMT at [facility] as scheduled.  Will need SPEP/OSVALDO and FLC with labs Q4w prior to every new cycle. Drawn today and pending.   Chemo ed as scheduled.    RTC in 1 week with NP with labs  Cbc, cmp, mag, phos- 1 hr prior @ [facility]    I spent a total of 40 minutes on the day of the visit.This includes face to face time and non-face to face time preparing to see the patient (eg, review of tests), obtaining and/or reviewing separately obtained history, documenting clinical information in the electronic  or other health record, independently interpreting results and communicating results to the patient/family/caregiver, or care coordinator.      YOVANNY StephensonP-C  Oncology/Hematology   [facility]      **All blood products must be irradiated and CMV negative**

## 2023-11-24 ENCOUNTER — INFUSION (OUTPATIENT)
Dept: INFUSION THERAPY | Facility: HOSPITAL | Age: 43
End: 2023-11-24
Attending: STUDENT IN AN ORGANIZED HEALTH CARE EDUCATION/TRAINING PROGRAM
Payer: COMMERCIAL

## 2023-11-24 VITALS
TEMPERATURE: 98 F | HEIGHT: 69 IN | DIASTOLIC BLOOD PRESSURE: 92 MMHG | RESPIRATION RATE: 18 BRPM | WEIGHT: 224.69 LBS | HEART RATE: 76 BPM | BODY MASS INDEX: 33.28 KG/M2 | SYSTOLIC BLOOD PRESSURE: 147 MMHG

## 2023-11-24 DIAGNOSIS — D64.9 LOW HEMOGLOBIN: ICD-10-CM

## 2023-11-24 LAB
ABO + RH BLD: NORMAL
BLD PROD TYP BPU: NORMAL
BLOOD UNIT EXPIRATION DATE: NORMAL
BLOOD UNIT TYPE CODE: 5100
CROSSMATCH INTERPRETATION: NORMAL
DISPENSE STATUS: NORMAL
UNIT NUMBER: NORMAL

## 2023-11-24 PROCEDURE — 86920 COMPATIBILITY TEST SPIN: CPT

## 2023-11-24 PROCEDURE — 36430 TRANSFUSION BLD/BLD COMPNT: CPT

## 2023-11-24 RX ORDER — HYDROCODONE BITARTRATE AND ACETAMINOPHEN 500; 5 MG/1; MG/1
TABLET ORAL ONCE
Status: ACTIVE | OUTPATIENT
Start: 2023-11-24

## 2023-11-24 NOTE — PLAN OF CARE
Encourage frequent hand washing, avoid raw seafood, report temperature > 100.4, wash fresh fruits and vegetables

## 2023-11-26 DIAGNOSIS — C90.00 MULTIPLE MYELOMA, REMISSION STATUS UNSPECIFIED: ICD-10-CM

## 2023-11-27 RX ORDER — OXYCODONE HYDROCHLORIDE 5 MG/1
5 TABLET ORAL EVERY 6 HOURS PRN
Qty: 90 TABLET | Refills: 0 | OUTPATIENT
Start: 2023-11-27

## 2023-11-27 RX ORDER — OXYCODONE HYDROCHLORIDE 10 MG/1
10 TABLET ORAL EVERY 4 HOURS PRN
Qty: 90 TABLET | Refills: 0 | Status: SHIPPED | OUTPATIENT
Start: 2023-11-27 | End: 2024-02-15 | Stop reason: SDUPTHER

## 2023-11-28 ENCOUNTER — DOCUMENTATION ONLY (OUTPATIENT)
Dept: HEMATOLOGY/ONCOLOGY | Facility: CLINIC | Age: 43
End: 2023-11-28

## 2023-11-28 ENCOUNTER — OFFICE VISIT (OUTPATIENT)
Dept: HEMATOLOGY/ONCOLOGY | Facility: CLINIC | Age: 43
End: 2023-11-28
Payer: COMMERCIAL

## 2023-11-28 DIAGNOSIS — C90.00 MULTIPLE MYELOMA, REMISSION STATUS UNSPECIFIED: Primary | ICD-10-CM

## 2023-11-28 PROCEDURE — 1159F PR MEDICATION LIST DOCUMENTED IN MEDICAL RECORD: ICD-10-PCS | Mod: CPTII,95,,

## 2023-11-28 PROCEDURE — 99215 PR OFFICE/OUTPT VISIT, EST, LEVL V, 40-54 MIN: ICD-10-PCS | Mod: 95,,,

## 2023-11-28 PROCEDURE — 1159F MED LIST DOCD IN RCRD: CPT | Mod: CPTII,95,,

## 2023-11-28 PROCEDURE — 99215 OFFICE O/P EST HI 40 MIN: CPT | Mod: 95,,,

## 2023-11-28 RX ORDER — LENALIDOMIDE 10 MG/1
10 CAPSULE ORAL DAILY
Qty: 21 EACH | Refills: 7 | Status: SHIPPED | OUTPATIENT
Start: 2023-11-28 | End: 2023-11-28 | Stop reason: SDUPTHER

## 2023-11-28 RX ORDER — LENALIDOMIDE 10 MG/1
10 CAPSULE ORAL DAILY
Qty: 21 EACH | Refills: 0 | Status: SHIPPED | OUTPATIENT
Start: 2023-11-28 | End: 2023-11-29 | Stop reason: SDUPTHER

## 2023-11-28 RX ORDER — DEXAMETHASONE 4 MG/1
40 TABLET ORAL
Qty: 40 TABLET | Refills: 3 | Status: SHIPPED | OUTPATIENT
Start: 2023-11-28

## 2023-11-28 NOTE — PROGRESS NOTES
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 Regular diet as tolerated    Nutrition Assessment    11/28/23: This is a 43 y.o.male with a medical diagnosis of MM. He reports fair to good appetite, no c/o n/v/c/d. Wt has fluctuated up and down over the past couple months which he states may be due to fluid/edema, but this has stabilized in the recent couple weeks. He tolerates a regular diet but notes that he is making an attempt to eat less processed foods.     Nutrition Factors Affecting Intake  none identified    PMHx: unremarkable    Allergies: Patient has no known allergies.    Current Medications:    Current Outpatient Medications:     acyclovir (ZOVIRAX) 400 MG tablet, Take 400 mg by mouth 2 (two) times daily., Disp: , Rfl:     aspirin 81 MG Chew, Take 81 mg by mouth once daily., Disp: , Rfl:     dexAMETHasone (DECADRON) 4 MG Tab, Take 10 tablets (40 mg total) by mouth every 7 days. Take with food., Disp: 40 tablet, Rfl: 5    dexAMETHasone (DECADRON) 4 MG Tab, Take 10 tablets (40 mg total) by mouth every 7 days. Take with food., Disp: 40 tablet, Rfl: 3    dextroamphetamine-amphetamine (ADDERALL) 20 mg tablet, , Disp: , Rfl:     nystatin (MYCOSTATIN) cream, Apply topically 2 (two) times daily. To affected area until resolved, Disp: 30 g, Rfl: 0    ondansetron (ZOFRAN) 8 MG tablet, Take 1 tablet (8 mg total) by mouth every 8 (eight) hours as needed for Nausea., Disp: 30 tablet, Rfl: 2    oxyCODONE (ROXICODONE) 10 mg Tab immediate release tablet, Take 1 tablet (10 mg total) by mouth every 4 (four) hours as needed for Pain., Disp: 90 tablet, Rfl: 0    pantoprazole (PROTONIX) 20 MG tablet, Take 1 tablet (20 mg total) by mouth once daily., Disp: 30 tablet,  "Rfl: 1    prochlorperazine (COMPAZINE) 5 MG tablet, Take 5 mg by mouth every 6 (six) hours as needed for Nausea., Disp: , Rfl:     REVLIMID 10 mg Cap, Take 10 mg by mouth once daily on days 1 - 21 of each 28 day cycle., Disp: 21 each, Rfl: 0    sodium bicarbonate 650 MG tablet, Take 650 mg by mouth 2 (two) times daily., Disp: , Rfl:     sulfamethoxazole-trimethoprim 800-160mg (BACTRIM DS) 800-160 mg Tab, Take 1 tablet by mouth once daily., Disp: 30 tablet, Rfl: 6    Current Facility-Administered Medications:     0.9%  NaCl infusion (for blood administration), , Intravenous, Once, Janet Alegria FNP    Labs: no new    Anthropometrics    Height:   Ht Readings from Last 1 Encounters:   11/24/23 5' 9" (1.753 m)      Weight:   Wt Readings from Last 1 Encounters:   11/24/23 101.9 kg (224 lb 11.1 oz)        Usual Body Weight: 101.9 kg (224 lb)  % Weight Change: 0    BMI: 33.1 (obese I)    Ideal Weight: 72.7 kg (160 lb)  % Ideal Weight: 140%      Nutrition Diagnosis    No nutrition diagnosis at this time.    Nutrition Risk  low    Nutrition Intervention    Interventions(treatment strategy):  none at this time      Nutrition Monitoring and Evaluation    Ongoing monitoring not warranted at this time. Please consult SARITA chavezn.        Michelle Campos, MS, RD, , LDN                                                                                                                                                                                                                                                                                  "

## 2023-11-28 NOTE — PROGRESS NOTES
THERAPY EDUCATION: BENITA Hung Patient - Video Telehealth Visit      The patient location is: Home  The chief complaint leading to consultation is: Therapy Education  Visit type: Virtual visit with Video Visual   Total time spent with patient: 60 mins        Each patient to whom I provide medical services by telemedicine is:  (1) informed of the relationship between the physician and patient and the respective role of any other health care provider with respect to management of the patient; and (2) notified that they may decline to receive medical services by telemedicine and may withdraw from such care at any time. Patient verbally consented to receive this Video service.     This service was not originating from a related E/M service provided within the previous 7 days nor will  to an E/M service or procedure within the next 24 hours or my soonest available appointment.  Prevailing standard of care was able to be met in this video visit.      Subjective:      Patient ID: Pete Fernandez is a 43 y.o. male.    Chief Complaint: Therapy Education     Diagnosis: 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    Current Treatment:   Beatriz+ RVD - 11/2/23 - Present  Zometa 10/17/23 - Present    Treatment History: None    HPI  42yo M presented to [facility] on 10/11 after 1 month of progressive weakness, abdominal pain, and intermittent confusion. At the time of his presentation, his labs which had been normal with PCP in 8/2023 were notable for new severe DOUG, transaminitis, and Ca of 18. He was admitted and started on IVF resuscitation.  Hypercalcemia workup was initiated and he was ultimately found to have numerous osseous lesions on 10/15/23 CT CAP, FLC - Kappa 3.29, Lambda 744, Ratio 226 , SPEP with Mspike 0.17 IgG lambda. Bone marrow biopsy done 10/18/23 revealed 95% plasma cells and FISH with TP53 deletion, FGFR3/IGH (or NSD2/IGH) fusion, usually representing a t(4;14), and a tetraploid subclone was  observed. After correction of his hypercalcemia and improvement in his renal function he was discharged on 10/22/23.   He will be started on Beatriz + RVD + Zometa on 11/2/23 and has been referred to [facility] for consideration of bone marrow transplant.     Interval History:   11/28/23: Mr. Fernandez presents to the clinic via a virtual visit for therapy education. Patient reports shortness of breath on exertion and subsides at rest. Denies fever, chills, N/V/D, constipation, recent infection, chest pain or unexplained bleeding or bruising.      11/22/23:Patient presents to clinic for a follow-up and treatment clearance for cycle 1 D22 of RVD + Beatriz. He feels well today. He continues to have fatigue that is stable. He continues to have generalized pains controlled with current pain meds. Redness continues to be noted to his abdomen in area that the Velcade inj was given. It does itch.  Occasional muscle aches to chest area continue to be intermittent. He did have visit with [facility]REE transplant. He will be switching carfilzomib after completion of C1. Transplant will be apprx. 4/2023. Denies any fevers, chills, NS, headaches, bleeding, bruising, neuropathy, or abdominal pain.        Past Medical History:   Diagnosis Date    Anxiety disorder, unspecified     Hypercalcemia       Past Surgical History:   Procedure Laterality Date    BONE MARROW BIOPSY N/A 10/18/2023    Procedure: Biopsy-bone marrow;  Surgeon: Nhan Marte MD;  Location: [facility];  Service: General;  Laterality: N/A;    TONSILLECTOMY       Social History     Socioeconomic History    Marital status:    Tobacco Use    Smoking status: Never    Smokeless tobacco: Never   Substance and Sexual Activity    Alcohol use: Yes    Drug use: Never    Sexual activity: Yes     Partners: Female     Social Determinants of Health     Financial Resource Strain: Medium Risk (11/15/2023)    Overall Financial Resource Strain (CARDIA)     Difficulty of Paying Living  Expenses: Somewhat hard   Food Insecurity: Food Insecurity Present (11/15/2023)    Hunger Vital Sign     Worried About Running Out of Food in the Last Year: Sometimes true     Ran Out of Food in the Last Year: Never true   Transportation Needs: No Transportation Needs (11/15/2023)    PRAPARE - Transportation     Lack of Transportation (Medical): No     Lack of Transportation (Non-Medical): No   Physical Activity: Insufficiently Active (11/15/2023)    Exercise Vital Sign     Days of Exercise per Week: 3 days     Minutes of Exercise per Session: 30 min   Stress: Stress Concern Present (11/15/2023)    [employer] - Occupational Stress Questionnaire     Feeling of Stress : To some extent   Social Connections: Unknown (11/15/2023)    Social Connection and Isolation Panel [NHANES]     Frequency of Communication with Friends and Family: More than three times a week     Frequency of Social Gatherings with Friends and Family: More than three times a week     Attends [employer] Services: Patient refused     Active Member of Clubs or Organizations: No     Attends Club or Organization Meetings: Never     Marital Status:    Housing Stability: Low Risk  (11/15/2023)    Housing Stability Vital Sign     Unable to Pay for Housing in the Last Year: No     Number of Places Lived in the Last Year: 1     Unstable Housing in the Last Year: No      Family History   Problem Relation Age of Onset    Breast cancer Mother     Pancreatic cancer Father       Review of patient's allergies indicates:  No Known Allergies   Review of Systems   Constitutional:  Negative for activity change, appetite change, chills, fatigue, fever and unexpected weight change.   HENT:  Negative for mouth sores and sore throat.    Eyes:  Negative for visual disturbance.   Respiratory:  Negative for cough and shortness of breath.    Cardiovascular:  Negative for chest pain.   Gastrointestinal:  Negative for abdominal pain,  constipation, diarrhea, nausea and vomiting.   Endocrine: Negative for polyuria.   Genitourinary:  Negative for dysuria and frequency.   Musculoskeletal:  Negative for back pain.   Integumentary:  Negative for rash.   Allergic/Immunologic: Negative for frequent infections.   Neurological:  Negative for weakness and headaches.   Hematological:  Negative for adenopathy. Does not bruise/bleed easily.   Psychiatric/Behavioral:  The patient is not nervous/anxious.        Objective:     There were no vitals filed for this visit.      LABS AND IMAGING REVIEWED IN EPIC  10/11/23 Xray Metastatic Survey  1. Scattered small defects at the calvarium measuring up to 5 mm too numerous to count.  2. Degene mild rative changes at the spine and extremities  3. MR exam and bone scan would allow further evaluation.  10/12/23 MRI Brain  1. Motion artifact  2. Mild scattered mucosal thickening throughout the paranasal sinuses  3. Somewhat limited evaluation of brain metastases without the administration of IV contrast  4. Otherwise unremarkable MR exam of the head without the administration of IV contrast  10/12/23 NM Bone Scan  1. Abnormal intense increased activity throughout the lungs of uncertain etiology.  This may related to hyperparathyroidism, hematologic malignancy, and or metabolic abnormality such as renal failure, vasculitis, or pulmonary infection.  Chest x-ray 10/10/2023 reveals the lungs to be relatively clear and well aerated.  2. Suspect scattered mild degenerative changes throughout the joint spaces  3. Increased activity nasal maxillary region suggesting mucoperiosteal disease  4. Mild activity at the stomach suggesting free pertechnetate  10/14/23 CT CAP  1. Widespread osseous findings suggestive of extensive metastatic skeletal disease, to include expansile destructive soft tissue component at the anterior right 7th rib.  2. Enlarged intrathoracic lymph nodes could also be of metastatic etiology.  3. Diffuse  ground-glass attenuation through both lungs is nonspecific, could be secondary to atypical infectious or inflammatory process.  However, possibility of metastatic involvement cannot be excluded.        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   BMT [facility]  Plan:   -Therapy education completed on 11/28/23.  -Plans to switch Bortezomib to Carfilzomib IV with Beatriz-KRD with C1D1 on 11/30/23. Patient understands that he will still receive premedications 30 minutes prior to each treatment to help prevent nausea.  -Zofran PRN prescribed for at home with instructions to be taken by mouth every 8 hours as needed for nausea. If not working, Compazine can be prescribed as 2nd choice.    -OTC Imodium AD recommended for diarrhea (4-6 BMs a day). Take 2 tablets after the first loose bowel movement, and 1 tablet after each loose bowel movement after the first dose has been taken. No more than 4 tablets should be taken in any 24-hour period. If not working, Lomotil can be prescribed as 2nd choice.    -Emphasized adequate hand-hygiene and limited contact with people who are sick.   -Monitor and notify any bleeding in urine, stool, or sputum.  As well as unusual bleeding or bruising and stomach pain.   - Emphasized hydration with 4 16 oz bottles of water a day.    -Importance of moisturizing with fragrance free lotion to prevent skin rash.  -Continue Aspirin 81mg, Acyclovir, and Bactrim for prophylaxis  -Zofran and Compazine PRN for Nausea  -Continue to hold zometa due to hypocalcemia--recent calcium on 11/22 of 7.3  -Call clinic if fever >100.4, shakes or chills, shortness of breath, chest pain, uncontrolled vomiting or diarrhea, pain and tingling in the chest or arm, or just not feeling well.    -Plans to RTC on 11/30/23 for same day labs and TD with NP.    DISCUSSION:    1.  A total of 60 minutes were spent in counseling today, in which 100% were face-to-face.  At today's therapy teaching  session, we discussed the patient's cancer diagnosis as well as planned therapy regimen, protocol, side effects and toxicities.  A handout of each therapeutic agent in the regimen was provided and reviewed in detail.    2.  The following side effects were discussed but not limited to:    Carfilzomib Side Effects:  Allergic Reactions  Breathing Problems  Bruising  Chest Pain  Chills  Confusion  Cough  Diarrhea  Dizziness  Feeling Faint  Fever  Headache  Injury  Itching  Nausea  Numbness  Pain  Rash  Seizures  Swelling  Trouble Sleeping  Vomiting  Weakness                a.  Discussed the risk of infection while on therapy related to pancytopenia, specifically a decrease in their white blood cell count.  Instructed to contact our office for temperature >100.4 F, chills, sudden onset cough or shortness of breath, symptoms of a urinary tract infection.                b.  Discussed the risk of anemia. Instructed to contact our office for dizziness, heart palpitations, or extreme or sudden changes in weakness.                c.  Discussed the risk of thrombocytopenia, which increases the risk of bruising or bleeding.  Instructed the patient to contact our office for spontaneous signs of bleeding, including nose bleeds, bleeding from the gums or mouth, blood in sputum, urine or stool and unusual or excessive bruising or rash.                d.  Discussed GI side effects including weight changes, changes in appetite, altered sense of taste, stomatitis, nausea, vomiting, diarrhea, constipation, and heartburn.                e.  Discussed  side effects including painful urination, changes in the amount of urination, possible urine color changes.  Discussed fertility issues and to prevent  pregnancy if of child bearing age.                f.  Discussed neurological side effects including the risk of peripheral neuropathy, either temporary or permanent.                g.  Discussed the potential for skin, hair, and nail  changes.       3.  Instructed to contact our office for discussion of medication changes, the addition of vitamin and/or herbal supplementation as they may interact with some chemotherapy agents.    4.  Discussed dietary modifications and the need to maintain adequate caloric intake and proper oral hydration.  Recommended 64 ounces of fluid per day.    5.  Discussed anti-emetic protocol and bowel regimen protocol.    6.  Office contact information given including after hours number.  Discussed there is an oncologist on call 24/7, 365 days including weekends.  Provided primary nurse's information .    7.  In summary, the patient is in agreement with the plan of care.  Questions appeared to be answered to their satisfaction. Consented the patient to the treatment plan and the patient was educated on the planned duration of the treatment and schedule of the treatment administration. Copy to be scanned into the chart.    All questions answered to the satisfaction of the patient and family.     Follow up appointments given to dre.      IRMA LUNSFORD-C  PATIENT EDUCATOR  [facility]

## 2023-11-29 ENCOUNTER — PATIENT MESSAGE (OUTPATIENT)
Dept: HEMATOLOGY/ONCOLOGY | Facility: CLINIC | Age: 43
End: 2023-11-29
Payer: COMMERCIAL

## 2023-11-29 DIAGNOSIS — C90.00 MULTIPLE MYELOMA, REMISSION STATUS UNSPECIFIED: ICD-10-CM

## 2023-11-29 RX ORDER — LENALIDOMIDE 10 MG/1
10 CAPSULE ORAL DAILY
Qty: 21 EACH | Refills: 0 | Status: SHIPPED | OUTPATIENT
Start: 2023-11-29 | End: 2024-01-29

## 2023-11-30 ENCOUNTER — OFFICE VISIT (OUTPATIENT)
Dept: HEMATOLOGY/ONCOLOGY | Facility: CLINIC | Age: 43
End: 2023-11-30
Payer: COMMERCIAL

## 2023-11-30 ENCOUNTER — INFUSION (OUTPATIENT)
Dept: INFUSION THERAPY | Facility: HOSPITAL | Age: 43
End: 2023-11-30
Attending: STUDENT IN AN ORGANIZED HEALTH CARE EDUCATION/TRAINING PROGRAM
Payer: COMMERCIAL

## 2023-11-30 VITALS
HEART RATE: 90 BPM | HEIGHT: 69 IN | RESPIRATION RATE: 18 BRPM | DIASTOLIC BLOOD PRESSURE: 97 MMHG | WEIGHT: 236.81 LBS | SYSTOLIC BLOOD PRESSURE: 162 MMHG | BODY MASS INDEX: 35.08 KG/M2 | OXYGEN SATURATION: 95 % | TEMPERATURE: 99 F

## 2023-11-30 VITALS
OXYGEN SATURATION: 95 % | HEART RATE: 90 BPM | SYSTOLIC BLOOD PRESSURE: 162 MMHG | WEIGHT: 236.81 LBS | DIASTOLIC BLOOD PRESSURE: 97 MMHG | TEMPERATURE: 99 F | BODY MASS INDEX: 35.08 KG/M2 | HEIGHT: 69 IN | RESPIRATION RATE: 18 BRPM

## 2023-11-30 DIAGNOSIS — C90.00 METASTATIC MULTIPLE MYELOMA TO BONE: ICD-10-CM

## 2023-11-30 DIAGNOSIS — C90.00 MULTIPLE MYELOMA, REMISSION STATUS UNSPECIFIED: Primary | ICD-10-CM

## 2023-11-30 PROCEDURE — 3077F PR MOST RECENT SYSTOLIC BLOOD PRESSURE >= 140 MM HG: ICD-10-PCS | Mod: CPTII,S$GLB,,

## 2023-11-30 PROCEDURE — 96375 TX/PRO/DX INJ NEW DRUG ADDON: CPT

## 2023-11-30 PROCEDURE — 3080F DIAST BP >= 90 MM HG: CPT | Mod: CPTII,S$GLB,,

## 2023-11-30 PROCEDURE — 3080F PR MOST RECENT DIASTOLIC BLOOD PRESSURE >= 90 MM HG: ICD-10-PCS | Mod: CPTII,S$GLB,,

## 2023-11-30 PROCEDURE — 99999 PR PBB SHADOW E&M-EST. PATIENT-LVL IV: CPT | Mod: PBBFAC,,,

## 2023-11-30 PROCEDURE — 25000003 PHARM REV CODE 250: Performed by: STUDENT IN AN ORGANIZED HEALTH CARE EDUCATION/TRAINING PROGRAM

## 2023-11-30 PROCEDURE — 96401 CHEMO ANTI-NEOPL SQ/IM: CPT

## 2023-11-30 PROCEDURE — 99215 PR OFFICE/OUTPT VISIT, EST, LEVL V, 40-54 MIN: ICD-10-PCS | Mod: S$GLB,,,

## 2023-11-30 PROCEDURE — 99215 OFFICE O/P EST HI 40 MIN: CPT | Mod: S$GLB,,,

## 2023-11-30 PROCEDURE — 63600175 PHARM REV CODE 636 W HCPCS: Performed by: STUDENT IN AN ORGANIZED HEALTH CARE EDUCATION/TRAINING PROGRAM

## 2023-11-30 PROCEDURE — 96366 THER/PROPH/DIAG IV INF ADDON: CPT

## 2023-11-30 PROCEDURE — 25000003 PHARM REV CODE 250

## 2023-11-30 PROCEDURE — 3008F PR BODY MASS INDEX (BMI) DOCUMENTED: ICD-10-PCS | Mod: CPTII,S$GLB,,

## 2023-11-30 PROCEDURE — 3077F SYST BP >= 140 MM HG: CPT | Mod: CPTII,S$GLB,,

## 2023-11-30 PROCEDURE — 96413 CHEMO IV INFUSION 1 HR: CPT

## 2023-11-30 PROCEDURE — 63600175 PHARM REV CODE 636 W HCPCS

## 2023-11-30 PROCEDURE — 3008F BODY MASS INDEX DOCD: CPT | Mod: CPTII,S$GLB,,

## 2023-11-30 PROCEDURE — 96367 TX/PROPH/DG ADDL SEQ IV INF: CPT

## 2023-11-30 PROCEDURE — 99999 PR PBB SHADOW E&M-EST. PATIENT-LVL IV: ICD-10-PCS | Mod: PBBFAC,,,

## 2023-11-30 RX ORDER — SODIUM CHLORIDE 0.9 % (FLUSH) 0.9 %
10 SYRINGE (ML) INJECTION
Status: CANCELLED | OUTPATIENT
Start: 2023-12-01

## 2023-11-30 RX ORDER — DIPHENHYDRAMINE HCL 25 MG
25 CAPSULE ORAL
Status: DISCONTINUED | OUTPATIENT
Start: 2023-11-30 | End: 2023-11-30 | Stop reason: HOSPADM

## 2023-11-30 RX ORDER — DIPHENHYDRAMINE HCL 25 MG
25 CAPSULE ORAL
Status: CANCELLED | OUTPATIENT
Start: 2023-12-01

## 2023-11-30 RX ORDER — PROCHLORPERAZINE EDISYLATE 5 MG/ML
10 INJECTION INTRAMUSCULAR; INTRAVENOUS ONCE AS NEEDED
Status: CANCELLED | OUTPATIENT
Start: 2023-12-01

## 2023-11-30 RX ORDER — HEPARIN 100 UNIT/ML
500 SYRINGE INTRAVENOUS
Status: CANCELLED | OUTPATIENT
Start: 2023-12-01

## 2023-11-30 RX ORDER — PROCHLORPERAZINE EDISYLATE 5 MG/ML
10 INJECTION INTRAMUSCULAR; INTRAVENOUS ONCE AS NEEDED
Status: DISCONTINUED | OUTPATIENT
Start: 2023-11-30 | End: 2023-11-30 | Stop reason: HOSPADM

## 2023-11-30 RX ORDER — ACETAMINOPHEN 325 MG/1
650 TABLET ORAL
Status: DISCONTINUED | OUTPATIENT
Start: 2023-11-30 | End: 2023-11-30 | Stop reason: HOSPADM

## 2023-11-30 RX ORDER — HEPARIN 100 UNIT/ML
500 SYRINGE INTRAVENOUS
Status: CANCELLED | OUTPATIENT
Start: 2023-12-28

## 2023-11-30 RX ORDER — SODIUM CHLORIDE 0.9 % (FLUSH) 0.9 %
10 SYRINGE (ML) INJECTION
Status: CANCELLED | OUTPATIENT
Start: 2023-12-28

## 2023-11-30 RX ORDER — EPINEPHRINE 0.3 MG/.3ML
0.3 INJECTION SUBCUTANEOUS ONCE AS NEEDED
Status: CANCELLED | OUTPATIENT
Start: 2023-12-01

## 2023-11-30 RX ORDER — ACETAMINOPHEN 325 MG/1
650 TABLET ORAL
Status: CANCELLED | OUTPATIENT
Start: 2023-12-01

## 2023-11-30 RX ORDER — FUROSEMIDE 10 MG/ML
40 INJECTION INTRAMUSCULAR; INTRAVENOUS
Status: COMPLETED | OUTPATIENT
Start: 2023-11-30 | End: 2023-11-30

## 2023-11-30 RX ORDER — DIPHENHYDRAMINE HYDROCHLORIDE 50 MG/ML
50 INJECTION INTRAMUSCULAR; INTRAVENOUS ONCE AS NEEDED
Status: CANCELLED | OUTPATIENT
Start: 2023-12-01

## 2023-11-30 RX ADMIN — DEXTROSE 2 G: 50 INJECTION, SOLUTION INTRAVENOUS at 10:11

## 2023-11-30 RX ADMIN — SODIUM CHLORIDE 250 ML: 9 INJECTION, SOLUTION INTRAVENOUS at 10:11

## 2023-11-30 RX ADMIN — DARATUMUMAB AND HYALURONIDASE-FIHJ (HUMAN RECOMBINANT) 1800 MG: 1800; 30000 INJECTION SUBCUTANEOUS at 10:11

## 2023-11-30 RX ADMIN — FUROSEMIDE 40 MG: 10 INJECTION, SOLUTION INTRAMUSCULAR; INTRAVENOUS at 10:11

## 2023-11-30 RX ADMIN — ZOLEDRONIC ACID 3 MG: 4 INJECTION INTRAVENOUS at 10:11

## 2023-11-30 RX ADMIN — SODIUM CHLORIDE 250 ML: 9 INJECTION, SOLUTION INTRAVENOUS at 12:11

## 2023-11-30 RX ADMIN — CARFILZOMIB 44 MG: 60 INJECTION, POWDER, LYOPHILIZED, FOR SOLUTION INTRAVENOUS at 12:11

## 2023-11-30 NOTE — PLAN OF CARE
Patient will have no s/s of Infusion reaction. Patient premedicated with Tylenol, Benadryl and Dexamethasone.

## 2023-11-30 NOTE — PROGRESS NOTES
Subjective:       Patient ID: Pete Fernandez is a 43 y.o. male.    Chief Complaint: Multiple Myeloma (Pt complaining of upper abdominal fullness and sob x 1 week during activity. )       Diagnosis: 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    Current Treatment:   Beatriz+ RVD - 11/2/23 - Present  Zometa 10/17/23 - Present    Treatment History: None    HPI  42yo M presented to [facility] on 10/11 after 1 month of progressive weakness, abdominal pain, and intermittent confusion. At the time of his presentation, his labs which had been normal with PCP in 8/2023 were notable for new severe DOUG, transaminitis, and Ca of 18. He was admitted and started on IVF resuscitation.  Hypercalcemia workup was initiated and he was ultimately found to have numerous osseous lesions on 10/15/23 CT CAP, FLC - Kappa 3.29, Lambda 744, Ratio 226 , SPEP with Mspike 0.17 IgG lambda. Bone marrow biopsy done 10/18/23 revealed 95% plasma cells and FISH with TP53 deletion, FGFR3/IGH (or NSD2/IGH) fusion, usually representing a t(4;14), and a tetraploid subclone was observed. After correction of his hypercalcemia and improvement in his renal function he was discharged on 10/22/23.   He will be started on Beatriz + RVD + Zometa on 11/2/23 and has been referred to [facility] for consideration of bone marrow transplant.     Interval History:   He returns to the clinic today for a follow-up and treatment clearance for C1 of Beatriz-KRD. He notes worsening SOB that began following his last blood transfusion. He is currently taking calcium BID. Labs reviewed in detail with him and are stable to continue with treatment. He is expected to receive his revelimd soon. Denies any fevers, chills, NS, headaches, bleeding, bruising, neuropathy.            Past Medical History:   Diagnosis Date    Anxiety disorder, unspecified     Hypercalcemia       Past Surgical History:   Procedure Laterality Date    BONE MARROW BIOPSY N/A 10/18/2023    Procedure: Biopsy-bone  marrow;  Surgeon: Nhan Marte MD;  Location: [facility] OR;  Service: General;  Laterality: N/A;    TONSILLECTOMY       Social History     Socioeconomic History    Marital status:    Tobacco Use    Smoking status: Never    Smokeless tobacco: Never   Substance and Sexual Activity    Alcohol use: Yes    Drug use: Never    Sexual activity: Yes     Partners: Female     Social Determinants of Health     Financial Resource Strain: Medium Risk (11/15/2023)    Overall Financial Resource Strain (CARDIA)     Difficulty of Paying Living Expenses: Somewhat hard   Food Insecurity: Food Insecurity Present (11/15/2023)    Hunger Vital Sign     Worried About Running Out of Food in the Last Year: Sometimes true     Ran Out of Food in the Last Year: Never true   Transportation Needs: No Transportation Needs (11/15/2023)    PRAPARE - Transportation     Lack of Transportation (Medical): No     Lack of Transportation (Non-Medical): No   Physical Activity: Insufficiently Active (11/15/2023)    Exercise Vital Sign     Days of Exercise per Week: 3 days     Minutes of Exercise per Session: 30 min   Stress: Stress Concern Present (11/15/2023)    [employer] [place] - Occupational Stress Questionnaire     Feeling of Stress : To some extent   Social Connections: Unknown (11/15/2023)    Social Connection and Isolation Panel [NHANES]     Frequency of Communication with Friends and Family: More than three times a week     Frequency of Social Gatherings with Friends and Family: More than three times a week     Attends [employer] Services: Patient refused     Active Member of Clubs or Organizations: No     Attends Club or Organization Meetings: Never     Marital Status:    Housing Stability: Low Risk  (11/15/2023)    Housing Stability Vital Sign     Unable to Pay for Housing in the Last Year: No     Number of Places Lived in the Last Year: 1     Unstable Housing in the Last Year: No      Family History   Problem  Relation Age of Onset    Breast cancer Mother     Pancreatic cancer Father       Review of patient's allergies indicates:  No Known Allergies   Review of Systems   Constitutional:  Positive for fatigue. Negative for activity change, appetite change, chills and fever.   HENT:  Negative for sore throat.    Eyes:  Negative for visual disturbance.   Respiratory:  Positive for shortness of breath. Negative for cough.    Cardiovascular:  Negative for chest pain.   Gastrointestinal:  Negative for abdominal pain, constipation, diarrhea, nausea and vomiting.   Endocrine: Negative for polyuria.   Genitourinary:  Negative for dysuria.   Musculoskeletal:  Negative for back pain.   Integumentary:  Negative for rash.   Allergic/Immunologic: Negative for frequent infections.   Neurological:  Negative for weakness and headaches.   Hematological:  Negative for adenopathy. Does not bruise/bleed easily.         Objective:      Vitals:    11/30/23 0836   BP: (!) 162/97   Pulse: 90   Resp: 18   Temp: 98.5 °F (36.9 °C)       Physical Exam  Constitutional:       General: He is not in acute distress.     Appearance: Normal appearance. He is not ill-appearing.   HENT:      Head: Normocephalic and atraumatic.      Nose: Nose normal.      Mouth/Throat:      Mouth: Mucous membranes are moist.      Pharynx: Oropharynx is clear.   Eyes:      Extraocular Movements: Extraocular movements intact.      Conjunctiva/sclera: Conjunctivae normal.      Pupils: Pupils are equal, round, and reactive to light.   Cardiovascular:      Rate and Rhythm: Normal rate and regular rhythm.      Pulses: Normal pulses.      Heart sounds: Normal heart sounds. No murmur heard.  Pulmonary:      Effort: Pulmonary effort is normal. No respiratory distress.      Breath sounds: Normal breath sounds.   Abdominal:      General: There is no distension.      Palpations: Abdomen is soft.      Tenderness: There is no abdominal tenderness.   Musculoskeletal:         General: Normal  range of motion.      Cervical back: Normal range of motion and neck supple.      Right lower leg: No edema.      Left lower leg: No edema.   Lymphadenopathy:      Cervical: No cervical adenopathy.   Skin:     General: Skin is warm and dry.      Findings: No rash.   Neurological:      General: No focal deficit present.      Mental Status: He is alert and oriented to person, place, and time.         LABS AND IMAGING REVIEWED IN EPIC  10/11/23 Xray Metastatic Survey  1. Scattered small defects at the calvarium measuring up to 5 mm too numerous to count.  2. Degene mild rative changes at the spine and extremities  3. MR exam and bone scan would allow further evaluation.  10/12/23 MRI Brain  1. Motion artifact  2. Mild scattered mucosal thickening throughout the paranasal sinuses  3. Somewhat limited evaluation of brain metastases without the administration of IV contrast  4. Otherwise unremarkable MR exam of the head without the administration of IV contrast  10/12/23 NM Bone Scan  1. Abnormal intense increased activity throughout the lungs of uncertain etiology.  This may related to hyperparathyroidism, hematologic malignancy, and or metabolic abnormality such as renal failure, vasculitis, or pulmonary infection.  Chest x-ray 10/10/2023 reveals the lungs to be relatively clear and well aerated.  2. Suspect scattered mild degenerative changes throughout the joint spaces  3. Increased activity nasal maxillary region suggesting mucoperiosteal disease  4. Mild activity at the stomach suggesting free pertechnetate  10/14/23 CT CAP  1. Widespread osseous findings suggestive of extensive metastatic skeletal disease, to include expansile destructive soft tissue component at the anterior right 7th rib.  2. Enlarged intrathoracic lymph nodes could also be of metastatic etiology.  3. Diffuse ground-glass attenuation through both lungs is nonspecific, could be secondary to atypical infectious or inflammatory process.  However,  possibility of metastatic involvement cannot be excluded.        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 Displayed all CRAB criteria at time of diagnosis in October. Undergoing 1st line Beatriz + RVD + Zometa with referral to tertiary center for transplant evaluation.   Treatment Plan  Cycle 1:  Daratumumab hyaluronidase 1800 mg subQ days 1, 8, 15, 22  Carfilzomib 20 mg/m2 IV on day 1 followed by 56 mg/m2 IV days 8, 15  Dexamethasone 40 mg PO weekly  Lenalidomide 25 mg PO on days 1-21 of each 28 day cycle    Cycle 2:  Daratumumab 1800 mg subQ days 1, 8, 15, 22  Carfilzomib 56 mg/m2 IV days 1, 8, 15  Dex 40 mg PO weekly  Lenalidomide 25 mg PO on days 1-21 of each 28 day cycle    Cycle 3-4:  Daratumumab  1800 mg subQ days 1, 15  Carfilzomib 56 mg/m2 IV days 1, 8, 15  Dex 40mg PO weekly  Lenalidomide 25 mg PO on days 1-21 of each 28 day cycle    Cycle 5-6:  Daratumumab  1800 mg subQ days 1, 15  Carfilzomib 56 mg/m2 IV days 1, 8, 15  Dex 20mg PO weekly  Lenalidomide 25 mg PO on days 1-21 of each 28 day cycle    Cycle 7-8:  Daratumumab  1800 mg subQ day 1;  Carfilzomib 56 mg/m2 IV days 1, 8, 15;  Dex 20 mg PO weekly  Lenalidomide 25 mg PO on days 1-21 of each 28 day cycle    BMT DESIREE   Transplant will be approximately 4/2023.       Plan:   Toxicity Reviewed, okay to proceed with C1D1 of KRD + Beatriz --> revlimid dose reduced due to renal function 10mg daily.   IV lasix today for fluid overload.  IV calcium today for hypocalcemia.Increase oral calcium to TID.  Continue to hold zometa due to hypocalcemia  Continue Aspirin 81mg, Acyclovir, and Bactrim for prophylaxis  Zofran and Compazine PRN for Nausea  Follow-up with BMT at [facility] as scheduled.  Will need SPEP/OSVALDO and FLC with labs Q4w prior to every new cycle.     RTC in 1 week with NP with labs  Cbc, cmp, mag, phos- 1 hr prior @ [facility]    I spent a total of 40 minutes on the day of the visit.This includes face to face time  and non-face to face time preparing to see the patient (eg, review of tests), obtaining and/or reviewing separately obtained history, documenting clinical information in the electronic or other health record, independently interpreting results and communicating results to the patient/family/caregiver, or care coordinator.      Janet Alegria, FNP-C  Oncology/Hematology   [facility]      **All blood products must be irradiated and CMV negative**

## 2023-12-02 ENCOUNTER — PATIENT MESSAGE (OUTPATIENT)
Dept: HEMATOLOGY/ONCOLOGY | Facility: CLINIC | Age: 43
End: 2023-12-02
Payer: COMMERCIAL

## 2023-12-02 ENCOUNTER — HOSPITAL ENCOUNTER (EMERGENCY)
Facility: HOSPITAL | Age: 43
Discharge: NURSING, REHAB OR LONG-TERM CARE | End: 2023-12-02
Attending: EMERGENCY MEDICINE
Payer: COMMERCIAL

## 2023-12-02 VITALS
TEMPERATURE: 98 F | HEART RATE: 100 BPM | HEIGHT: 70 IN | OXYGEN SATURATION: 95 % | BODY MASS INDEX: 31.5 KG/M2 | RESPIRATION RATE: 21 BRPM | WEIGHT: 220 LBS | SYSTOLIC BLOOD PRESSURE: 128 MMHG | DIASTOLIC BLOOD PRESSURE: 90 MMHG

## 2023-12-02 DIAGNOSIS — E87.70 HYPERVOLEMIA, UNSPECIFIED HYPERVOLEMIA TYPE: ICD-10-CM

## 2023-12-02 DIAGNOSIS — R06.02 SOB (SHORTNESS OF BREATH): ICD-10-CM

## 2023-12-02 DIAGNOSIS — C90.00 MULTIPLE MYELOMA, REMISSION STATUS UNSPECIFIED: Primary | ICD-10-CM

## 2023-12-02 DIAGNOSIS — I51.7 CARDIOMEGALY: ICD-10-CM

## 2023-12-02 LAB
ALBUMIN SERPL-MCNC: 3.4 G/DL (ref 3.5–5)
ALBUMIN/GLOB SERPL: 1.1 RATIO (ref 1.1–2)
ALP SERPL-CCNC: 420 UNIT/L (ref 40–150)
ALT SERPL-CCNC: 258 UNIT/L (ref 0–55)
APTT PPP: 33.1 SECONDS (ref 24.6–37.2)
AST SERPL-CCNC: 229 UNIT/L (ref 5–34)
BASOPHILS # BLD AUTO: 0.06 X10(3)/MCL
BASOPHILS NFR BLD AUTO: 0.4 %
BILIRUB SERPL-MCNC: 1.2 MG/DL
BNP BLD-MCNC: 153.7 PG/ML
BUN SERPL-MCNC: 27 MG/DL (ref 8.9–20.6)
CALCIUM SERPL-MCNC: 6.1 MG/DL (ref 8.4–10.2)
CHLORIDE SERPL-SCNC: 97 MMOL/L (ref 98–107)
CO2 SERPL-SCNC: 11 MMOL/L (ref 22–29)
CREAT SERPL-MCNC: 2.66 MG/DL (ref 0.73–1.18)
EOSINOPHIL # BLD AUTO: 0 X10(3)/MCL (ref 0–0.9)
EOSINOPHIL NFR BLD AUTO: 0 %
ERYTHROCYTE [DISTWIDTH] IN BLOOD BY AUTOMATED COUNT: 16.9 % (ref 11.5–17)
FLUAV AG UPPER RESP QL IA.RAPID: NOT DETECTED
FLUBV AG UPPER RESP QL IA.RAPID: NOT DETECTED
GFR SERPLBLD CREATININE-BSD FMLA CKD-EPI: 29 MLS/MIN/1.73/M2
GLOBULIN SER-MCNC: 3 GM/DL (ref 2.4–3.5)
GLUCOSE SERPL-MCNC: 175 MG/DL (ref 74–100)
HCT VFR BLD AUTO: 29 % (ref 42–52)
HGB BLD-MCNC: 9.2 G/DL (ref 14–18)
IMM GRANULOCYTES # BLD AUTO: 0.31 X10(3)/MCL (ref 0–0.04)
IMM GRANULOCYTES NFR BLD AUTO: 2.3 %
INR PPP: 1.7
LACTATE SERPL-SCNC: 2.5 MMOL/L (ref 0.5–2.2)
LACTATE SERPL-SCNC: 7.3 MMOL/L (ref 0.5–2.2)
LIPASE SERPL-CCNC: 23 U/L
LYMPHOCYTES # BLD AUTO: 1.62 X10(3)/MCL (ref 0.6–4.6)
LYMPHOCYTES NFR BLD AUTO: 12.1 %
MAGNESIUM SERPL-MCNC: 2.3 MG/DL (ref 1.6–2.6)
MCH RBC QN AUTO: 31.6 PG (ref 27–31)
MCHC RBC AUTO-ENTMCNC: 31.7 G/DL (ref 33–36)
MCV RBC AUTO: 99.7 FL (ref 80–94)
MONOCYTES # BLD AUTO: 2.23 X10(3)/MCL (ref 0.1–1.3)
MONOCYTES NFR BLD AUTO: 16.7 %
NEUTROPHILS # BLD AUTO: 9.14 X10(3)/MCL (ref 2.1–9.2)
NEUTROPHILS NFR BLD AUTO: 68.5 %
PLATELET # BLD AUTO: 781 X10(3)/MCL (ref 130–400)
PMV BLD AUTO: 9.7 FL (ref 7.4–10.4)
POTASSIUM SERPL-SCNC: 4.5 MMOL/L (ref 3.5–5.1)
PROT SERPL-MCNC: 6.4 GM/DL (ref 6.4–8.3)
PROTHROMBIN TIME: 20.5 SECONDS (ref 12.5–14.5)
RBC # BLD AUTO: 2.91 X10(6)/MCL (ref 4.7–6.1)
RSV A 5' UTR RNA NPH QL NAA+PROBE: NOT DETECTED
SARS-COV-2 RNA RESP QL NAA+PROBE: NOT DETECTED
SODIUM SERPL-SCNC: 134 MMOL/L (ref 136–145)
TROPONIN I SERPL-MCNC: <0.01 NG/ML (ref 0–0.04)
TSH SERPL-ACNC: 2.14 UIU/ML (ref 0.35–4.94)
WBC # SPEC AUTO: 13.36 X10(3)/MCL (ref 4.5–11.5)

## 2023-12-02 PROCEDURE — 80053 COMPREHEN METABOLIC PANEL: CPT | Performed by: EMERGENCY MEDICINE

## 2023-12-02 PROCEDURE — 93010 EKG 12-LEAD: ICD-10-PCS | Mod: ,,, | Performed by: INTERNAL MEDICINE

## 2023-12-02 PROCEDURE — 0241U COVID/RSV/FLU A&B PCR: CPT | Performed by: EMERGENCY MEDICINE

## 2023-12-02 PROCEDURE — 83690 ASSAY OF LIPASE: CPT | Performed by: EMERGENCY MEDICINE

## 2023-12-02 PROCEDURE — 25000003 PHARM REV CODE 250: Performed by: EMERGENCY MEDICINE

## 2023-12-02 PROCEDURE — 85730 THROMBOPLASTIN TIME PARTIAL: CPT | Performed by: EMERGENCY MEDICINE

## 2023-12-02 PROCEDURE — 85610 PROTHROMBIN TIME: CPT | Performed by: EMERGENCY MEDICINE

## 2023-12-02 PROCEDURE — 99285 EMERGENCY DEPT VISIT HI MDM: CPT | Mod: 25

## 2023-12-02 PROCEDURE — 85025 COMPLETE CBC W/AUTO DIFF WBC: CPT | Performed by: EMERGENCY MEDICINE

## 2023-12-02 PROCEDURE — 96375 TX/PRO/DX INJ NEW DRUG ADDON: CPT

## 2023-12-02 PROCEDURE — 83735 ASSAY OF MAGNESIUM: CPT | Performed by: EMERGENCY MEDICINE

## 2023-12-02 PROCEDURE — 84443 ASSAY THYROID STIM HORMONE: CPT | Performed by: EMERGENCY MEDICINE

## 2023-12-02 PROCEDURE — 63600175 PHARM REV CODE 636 W HCPCS: Performed by: EMERGENCY MEDICINE

## 2023-12-02 PROCEDURE — 96376 TX/PRO/DX INJ SAME DRUG ADON: CPT

## 2023-12-02 PROCEDURE — 83880 ASSAY OF NATRIURETIC PEPTIDE: CPT | Performed by: EMERGENCY MEDICINE

## 2023-12-02 PROCEDURE — 87040 BLOOD CULTURE FOR BACTERIA: CPT | Performed by: EMERGENCY MEDICINE

## 2023-12-02 PROCEDURE — 93005 ELECTROCARDIOGRAM TRACING: CPT

## 2023-12-02 PROCEDURE — 83605 ASSAY OF LACTIC ACID: CPT | Performed by: EMERGENCY MEDICINE

## 2023-12-02 PROCEDURE — 93010 ELECTROCARDIOGRAM REPORT: CPT | Mod: ,,, | Performed by: INTERNAL MEDICINE

## 2023-12-02 PROCEDURE — 96365 THER/PROPH/DIAG IV INF INIT: CPT

## 2023-12-02 PROCEDURE — 84484 ASSAY OF TROPONIN QUANT: CPT | Performed by: EMERGENCY MEDICINE

## 2023-12-02 RX ORDER — CALCIUM GLUCONATE 20 MG/ML
1 INJECTION, SOLUTION INTRAVENOUS
Status: COMPLETED | OUTPATIENT
Start: 2023-12-02 | End: 2023-12-02

## 2023-12-02 RX ORDER — FUROSEMIDE 10 MG/ML
40 INJECTION INTRAMUSCULAR; INTRAVENOUS
Status: COMPLETED | OUTPATIENT
Start: 2023-12-02 | End: 2023-12-02

## 2023-12-02 RX ADMIN — FUROSEMIDE 40 MG: 10 INJECTION, SOLUTION INTRAMUSCULAR; INTRAVENOUS at 11:12

## 2023-12-02 RX ADMIN — CALCIUM GLUCONATE 1 G: 20 INJECTION, SOLUTION INTRAVENOUS at 02:12

## 2023-12-02 RX ADMIN — NITROGLYCERIN 0.5 INCH: 20 OINTMENT TOPICAL at 02:12

## 2023-12-02 RX ADMIN — FUROSEMIDE 40 MG: 10 INJECTION, SOLUTION INTRAMUSCULAR; INTRAVENOUS at 01:12

## 2023-12-02 RX ADMIN — CALCIUM GLUCONATE 1 G: 20 INJECTION, SOLUTION INTRAVENOUS at 01:12

## 2023-12-02 NOTE — ED PROVIDER NOTES
Encounter Date: 12/2/2023       History     Chief Complaint   Patient presents with    Shortness of Breath     SOB and CP  started last night    on chemo for mult myeloma     43-year-old male diagnosed with multiple myeloma just a couple of months ago.  Maybe 3 months.  He is had an episode of fluid overload in the past he said that the Friday after Thanksgiving which would be 8 days ago.  He received a unit of blood he says since that time he is become progressively short of breath.  He said he is had an episode like this in the past where he was fluid overloaded.  He is not on any daily furosemide.  Her other diuretics none.  He does not smoke does not drink since he got this diagnosis of multiple myeloma does not use drugs.  Is  man lives home with his family nobody else has been acutely ill.  I did review the most recent lab work on the 22nd his blood count was 7.5 with a hemoglobin of 23 apparently got a unit of blood on the 30th his blood count was 8.1 with a hematocrit of 25.  Patient denies fever or chills denies coughing anything said any exertion he gets profoundly short of breath.  He has a history of the multiple myeloma normally healthy fellow before that.    Primary care doctor is Dr. Kohli  No COVID no pneumonia no flu no tetanus shots  Surgical history none  No tobacco no alcohol no drugs   lives home with the family currently on medical leave      Review of patient's allergies indicates:  No Known Allergies  Past Medical History:   Diagnosis Date    Anxiety disorder, unspecified     Hypercalcemia      Past Surgical History:   Procedure Laterality Date    BONE MARROW BIOPSY N/A 10/18/2023    Procedure: Biopsy-bone marrow;  Surgeon: Nhan Marte MD;  Location: [facility];  Service: General;  Laterality: N/A;    TONSILLECTOMY       Family History   Problem Relation Age of Onset    Breast cancer Mother     Pancreatic cancer Father      Social History     Tobacco Use    Smoking status:  Never    Smokeless tobacco: Never   Substance Use Topics    Alcohol use: Yes    Drug use: Never     Review of Systems   Constitutional:  Positive for fatigue.   HENT: Negative.     Eyes: Negative.    Respiratory:  Positive for shortness of breath.    Cardiovascular:  Positive for leg swelling (Small amount).   Gastrointestinal: Negative.    Endocrine: Negative.    Genitourinary: Negative.    Musculoskeletal: Negative.    Skin: Negative.    Allergic/Immunologic: Negative.    Neurological: Negative.    Hematological: Negative.    Psychiatric/Behavioral: Negative.     All other systems reviewed and are negative.      Physical Exam     Initial Vitals [12/02/23 1121]   BP Pulse Resp Temp SpO2   (!) 126/96 (!) 145 (!) 45 97.7 °F (36.5 °C) 97 %      MAP       --         Physical Exam    Nursing note and vitals reviewed.  Constitutional: He appears well-developed and well-nourished. He appears distressed.   ad time breathing except sitting straight upright Acutely ill-appearing man who is tachypneic and tachycardic appears to be struggling to breathe exertion makes it much much worse does not want to get in the bed because   HENT:   Head: Normocephalic and atraumatic.   Right Ear: Tympanic membrane and external ear normal.   Left Ear: Tympanic membrane and external ear normal.   Nose: Nose normal.   Mouth/Throat: Oropharynx is clear and moist and mucous membranes are normal. Oral lesions: moist muc memb.   Eyes: Conjunctivae and EOM are normal. Pupils are equal, round, and reactive to light.   Neck: Neck supple. No thyromegaly present. No tracheal deviation present. No JVD present.   Normal range of motion.  Cardiovascular:  Normal heart sounds and intact distal pulses.     Exam reveals no gallop and no friction rub.       No murmur heard.  Tachycardic heart rate with muffled heart sounds   Pulmonary/Chest: No stridor. No respiratory distress. He has no wheezes. He has rhonchi. He has rales. He exhibits no tenderness.    There crackles rales and rhonchi in both bases   Abdominal: Abdomen is soft. Bowel sounds are normal. He exhibits no distension and no mass. No signs of injury. There is no abdominal tenderness.   Genitourinary:    Genitourinary Comments: No CVA tenderness     Musculoskeletal:         General: Edema present. No tenderness. Normal range of motion.      Cervical back: Normal range of motion and neck supple.      Comments: Trace edema in the lower extremities not impressive amount     Lymphadenopathy:     He has no cervical adenopathy.   Neurological: He is alert and oriented to person, place, and time. He has normal strength and normal reflexes. No cranial nerve deficit or sensory deficit. GCS score is 15. GCS eye subscore is 4. GCS verbal subscore is 5. GCS motor subscore is 6.   Skin: Skin is warm and dry. Capillary refill takes less than 2 seconds. No rash noted.   Psychiatric:   Very anxious gentleman tachypnea         ED Course   Procedures  Labs Reviewed   B-TYPE NATRIURETIC PEPTIDE - Abnormal; Notable for the following components:       Result Value    Natriuretic Peptide 153.7 (*)     All other components within normal limits   COMPREHENSIVE METABOLIC PANEL - Abnormal; Notable for the following components:    Sodium Level 134 (*)     Chloride 97 (*)     Carbon Dioxide 11 (*)     Glucose Level 175 (*)     Blood Urea Nitrogen 27.0 (*)     Creatinine 2.66 (*)     Calcium Level Total 6.1 (*)     Albumin Level 3.4 (*)     Alkaline Phosphatase 420 (*)     Alanine Aminotransferase 258 (*)     Aspartate Aminotransferase 229 (*)     All other components within normal limits   LACTIC ACID, PLASMA - Abnormal; Notable for the following components:    Lactic Acid Level 2.5 (*)     All other components within normal limits   PROTIME-INR - Abnormal; Notable for the following components:    PT 20.5 (*)     INR 1.7 (*)     All other components within normal limits   CBC WITH DIFFERENTIAL - Abnormal; Notable for the following  components:    WBC 13.36 (*)     RBC 2.91 (*)     Hgb 9.2 (*)     Hct 29.0 (*)     MCV 99.7 (*)     MCH 31.6 (*)     MCHC 31.7 (*)     Platelet 781 (*)     Mono # 2.23 (*)     IG# 0.31 (*)     All other components within normal limits   LACTIC ACID, PLASMA - Abnormal; Notable for the following components:    Lactic Acid Level 7.3 (*)     All other components within normal limits   COVID/RSV/FLU A&B PCR - Normal    Narrative:     The Xpert Xpress SARS-CoV-2/FLU/RSV plus is a rapid, multiplexed real-time PCR test intended for the simultaneous qualitative detection and differentiation of SARS-CoV-2, Influenza A, Influenza B, and respiratory syncytial virus (RSV) viral RNA in either nasopharyngeal swab or nasal swab specimens.         LIPASE - Normal   MAGNESIUM - Normal   APTT - Normal   TROPONIN I - Normal   TSH - Normal   BLOOD CULTURE OLG   BLOOD CULTURE OLG   CBC W/ AUTO DIFFERENTIAL    Narrative:     The following orders were created for panel order CBC auto differential.  Procedure                               Abnormality         Status                     ---------                               -----------         ------                     CBC with Differential[[id number]]       Abnormal            Final result                 Please view results for these tests on the individual orders.   URINALYSIS, REFLEX TO URINE CULTURE     EKG Readings: (Independently Interpreted)   Initial Reading: No STEMI. Previous EKG: Compared with most recent EKG Previous EKG Date: EKG on 11/09/2023 88 beats per minute normal sinus rhythm. Rhythm: Normal Sinus Rhythm. Heart Rate: 107. Ectopy: Rare PACs. Conduction: Normal.   EKG shows a sinus tachycardia 107 per heart rate slightly tachycardic there is some premature supraventricular complexes infrequently there is no ST segment elevation or changes  Previous EKG heart rate is 88 beats per minute normal sinus rhythm     ECG Results              EKG 12-lead (In process)  Result time  12/02/23 13:40:51      In process by Interface, Lab In [facility] (12/02/23 13:40:51)                   Narrative:    Test Reason : R06.02,    Vent. Rate : 107 BPM     Atrial Rate : 107 BPM     P-R Int : 140 ms          QRS Dur : 066 ms      QT Int : 366 ms       P-R-T Axes : 050 018 031 degrees     QTc Int : 488 ms    Sinus tachycardia with Premature supraventricular complexes  Otherwise normal ECG  When compared with ECG of 09-NOV-2023 09:05,  Premature supraventricular complexes are now Present  Questionable change in QRS duration    Referred By: [email or web address]   SELF           Confirmed By:                                   Imaging Results              X-Ray Chest AP Portable (Final result)  Result time 12/02/23 14:25:42      Final result by George Overton MD (12/02/23 14:25:42)                   Impression:      Interstitial edema with bibasilar effusions.  Overall decreased aeration.      Electronically signed by: George Overton MD  Date:    12/02/2023  Time:    14:25               Narrative:    EXAMINATION:  XR CHEST AP PORTABLE    CLINICAL HISTORY:  Shortness of breath    TECHNIQUE:  Single frontal view of the chest was performed.    COMPARISON:  10/19/2023    FINDINGS:  Heart size enlarged the pulmonary vasculature is normal.    Coarse interstitial markings lungs with enlarging bibasilar effusions.                        Wet Read by Isidro De Los Santos MD (12/02/23 12:19:11, [facility] - Emergency Dept, Emergency Medicine)    Cardiomegaly that is worse than the other films increased vascular markings both sides consistent with pulmonary edema cardiomegaly that is                                     Medications   nitroGLYCERIN 2% TD oint ointment 0.5 inch (0.5 inches Topical (Top) Given 12/2/23 1415)   furosemide injection 40 mg (40 mg Intravenous Given 12/2/23 1125)   furosemide injection 40 mg (40 mg Intravenous Given 12/2/23 1313)   calcium gluconate 1 g in NS IVPB (premixed) (0 g Intravenous Stopped  12/2/23 1416)     Medical Decision Making  Amount and/or Complexity of Data Reviewed  Labs: ordered.  Radiology: ordered and independent interpretation performed.    Risk  Prescription drug management.               ED Course as of 12/02/23 1448   Sat Dec 02, 2023   1219 Patient's white cell count is 13.36 his hemoglobin is 9.2 his hematocrit is 29.  Patient has a resting tachycardia at 110 currently in the room he is not made any urine yet with the 40 mg of Lasix additional 40 is given in 1/2 inch of paste is placed.  Remainder of the blood work is pending.  My review of the chest x-ray shows cardiomegaly that looks like it is worsening is a lap large globular shaped heart.  I am worried about a  pericardial effusion and we can not do echocardiogram here over the weekend [DM]   1256 I discussed transfer to Cardiology with the patient he was willing.  We need cardiologist's and oncology.  He needs a stat echo in my opinion [DM]   1347 The original lactic acid was 2.5 the lab says maybe that was run on the wrong specimen.  The repeat a lactic acid it was 7.3.  Now they think that maybe the original specimen that they should run on the patient resulted at 9.6 under another patient's name this is nice information about prefer them to leave it just as it is in the computer.  Unless they can definitively say they know the specimens were switched [DM]   1349 Review of the old records indicate an episode of hyper calcemia perhaps in a October of this year.  Since then he is she is been running very low and now has dropped his lowest 6.1.    He is also had progressive increase of his creatinine each lab draw.  Now it is elevated the highest that it has been. [DM]   1350 Function tests are also elevated today when they were before.  Bicarb is down to 11 it was normal before chloride down to 97 sodium 134 lipase was 23 PT INR 20.51.7  magnesium 2.3 TSH 2.1 4 troponin less than 10- influenza COVID RSV [DM]   1351 I did  discuss this case with Dr. Duane villa as the patient is Dr. Conner patient.  He felt as I did patient would be best served by transfer to a facility that has echocardiogram capabilities cardiologist's capabilities and Hematology-Oncology capabilities [DM]   1354 Patient is still has not produced any urine.  He is not feeling 100% better at all but he is feeling slightly better he is stable as he is on the supplemental oxygen.  Patient definitely requires a higher level of care he is no urge to urinate he is not feel any discomfort in his bladder area [DM]   1430 Patient is going to be transferred to [facility] has accepted the patient in the emergency department \ patient is to go to the emergency department [DM]      ED Course User Index  [DM] Isidro De Los Santos MD                             Clinical Impression:  Final diagnoses:  [R06.02] SOB (shortness of breath)  [C90.00] Multiple myeloma, remission status unspecified - Currently on treatment new diagnosis approximally 2-1/2-3 months ago (Primary)  [I51.7] Cardiomegaly - Concerned about pleural effusion  [E87.70] Hypervolemia, unspecified hypervolemia type          ED Disposition Condition    Transfer to Another Facility Stable                Isidro De Los Santos MD  12/02/23 3272

## 2023-12-07 ENCOUNTER — OFFICE VISIT (OUTPATIENT)
Dept: HEMATOLOGY/ONCOLOGY | Facility: CLINIC | Age: 43
End: 2023-12-07
Payer: COMMERCIAL

## 2023-12-07 ENCOUNTER — HOSPITAL ENCOUNTER (OUTPATIENT)
Dept: RADIOLOGY | Facility: HOSPITAL | Age: 43
Discharge: HOME | End: 2023-12-07
Attending: STUDENT IN AN ORGANIZED HEALTH CARE EDUCATION/TRAINING PROGRAM
Payer: COMMERCIAL

## 2023-12-07 VITALS
WEIGHT: 236 LBS | DIASTOLIC BLOOD PRESSURE: 94 MMHG | OXYGEN SATURATION: 95 % | RESPIRATION RATE: 18 BRPM | SYSTOLIC BLOOD PRESSURE: 150 MMHG | HEIGHT: 69 IN | TEMPERATURE: 99 F | HEART RATE: 80 BPM | BODY MASS INDEX: 34.96 KG/M2

## 2023-12-07 DIAGNOSIS — C90.00 MULTIPLE MYELOMA NOT HAVING ACHIEVED REMISSION: Primary | ICD-10-CM

## 2023-12-07 DIAGNOSIS — C90.00 MULTIPLE MYELOMA, REMISSION STATUS UNSPECIFIED: ICD-10-CM

## 2023-12-07 DIAGNOSIS — Z79.899 IMMUNODEFICIENCY DUE TO DRUG THERAPY: ICD-10-CM

## 2023-12-07 DIAGNOSIS — C90.00 MULTIPLE MYELOMA, REMISSION STATUS UNSPECIFIED: Primary | ICD-10-CM

## 2023-12-07 DIAGNOSIS — D84.821 IMMUNODEFICIENCY DUE TO DRUG THERAPY: ICD-10-CM

## 2023-12-07 DIAGNOSIS — C90.00 METASTATIC MULTIPLE MYELOMA TO BONE: ICD-10-CM

## 2023-12-07 PROCEDURE — 1160F RVW MEDS BY RX/DR IN RCRD: CPT | Mod: CPTII,S$GLB,, | Performed by: STUDENT IN AN ORGANIZED HEALTH CARE EDUCATION/TRAINING PROGRAM

## 2023-12-07 PROCEDURE — 99215 PR OFFICE/OUTPT VISIT, EST, LEVL V, 40-54 MIN: ICD-10-PCS | Mod: S$GLB,,, | Performed by: STUDENT IN AN ORGANIZED HEALTH CARE EDUCATION/TRAINING PROGRAM

## 2023-12-07 PROCEDURE — 99999 PR PBB SHADOW E&M-EST. PATIENT-LVL IV: ICD-10-PCS | Mod: PBBFAC,,, | Performed by: STUDENT IN AN ORGANIZED HEALTH CARE EDUCATION/TRAINING PROGRAM

## 2023-12-07 PROCEDURE — 71046 X-RAY EXAM CHEST 2 VIEWS: CPT | Mod: TC

## 2023-12-07 PROCEDURE — 3080F DIAST BP >= 90 MM HG: CPT | Mod: CPTII,S$GLB,, | Performed by: STUDENT IN AN ORGANIZED HEALTH CARE EDUCATION/TRAINING PROGRAM

## 2023-12-07 PROCEDURE — 3080F PR MOST RECENT DIASTOLIC BLOOD PRESSURE >= 90 MM HG: ICD-10-PCS | Mod: CPTII,S$GLB,, | Performed by: STUDENT IN AN ORGANIZED HEALTH CARE EDUCATION/TRAINING PROGRAM

## 2023-12-07 PROCEDURE — 3077F PR MOST RECENT SYSTOLIC BLOOD PRESSURE >= 140 MM HG: ICD-10-PCS | Mod: CPTII,S$GLB,, | Performed by: STUDENT IN AN ORGANIZED HEALTH CARE EDUCATION/TRAINING PROGRAM

## 2023-12-07 PROCEDURE — 3077F SYST BP >= 140 MM HG: CPT | Mod: CPTII,S$GLB,, | Performed by: STUDENT IN AN ORGANIZED HEALTH CARE EDUCATION/TRAINING PROGRAM

## 2023-12-07 PROCEDURE — 99215 OFFICE O/P EST HI 40 MIN: CPT | Mod: S$GLB,,, | Performed by: STUDENT IN AN ORGANIZED HEALTH CARE EDUCATION/TRAINING PROGRAM

## 2023-12-07 PROCEDURE — 1159F MED LIST DOCD IN RCRD: CPT | Mod: CPTII,S$GLB,, | Performed by: STUDENT IN AN ORGANIZED HEALTH CARE EDUCATION/TRAINING PROGRAM

## 2023-12-07 PROCEDURE — 3008F BODY MASS INDEX DOCD: CPT | Mod: CPTII,S$GLB,, | Performed by: STUDENT IN AN ORGANIZED HEALTH CARE EDUCATION/TRAINING PROGRAM

## 2023-12-07 PROCEDURE — 3008F PR BODY MASS INDEX (BMI) DOCUMENTED: ICD-10-PCS | Mod: CPTII,S$GLB,, | Performed by: STUDENT IN AN ORGANIZED HEALTH CARE EDUCATION/TRAINING PROGRAM

## 2023-12-07 PROCEDURE — 1159F PR MEDICATION LIST DOCUMENTED IN MEDICAL RECORD: ICD-10-PCS | Mod: CPTII,S$GLB,, | Performed by: STUDENT IN AN ORGANIZED HEALTH CARE EDUCATION/TRAINING PROGRAM

## 2023-12-07 PROCEDURE — 99999 PR PBB SHADOW E&M-EST. PATIENT-LVL IV: CPT | Mod: PBBFAC,,, | Performed by: STUDENT IN AN ORGANIZED HEALTH CARE EDUCATION/TRAINING PROGRAM

## 2023-12-07 PROCEDURE — 1160F PR REVIEW ALL MEDS BY PRESCRIBER/CLIN PHARMACIST DOCUMENTED: ICD-10-PCS | Mod: CPTII,S$GLB,, | Performed by: STUDENT IN AN ORGANIZED HEALTH CARE EDUCATION/TRAINING PROGRAM

## 2023-12-07 RX ORDER — LENALIDOMIDE 25 MG/1
25 CAPSULE ORAL DAILY
Qty: 21 CAPSULE | Refills: 0 | Status: SHIPPED | OUTPATIENT
Start: 2023-12-07 | End: 2024-01-04 | Stop reason: SDUPTHER

## 2023-12-07 RX ORDER — CALCITRIOL 0.5 UG/1
0.5 CAPSULE ORAL DAILY
COMMUNITY
Start: 2023-12-06

## 2023-12-07 RX ORDER — FEBUXOSTAT 40 MG/1
40 TABLET, FILM COATED ORAL DAILY
COMMUNITY
Start: 2023-12-06

## 2023-12-07 RX ORDER — LENALIDOMIDE 25 MG/1
25 CAPSULE ORAL DAILY
COMMUNITY
End: 2023-12-07 | Stop reason: SDUPTHER

## 2023-12-07 RX ORDER — AMIODARONE HYDROCHLORIDE 200 MG/1
200 TABLET ORAL DAILY
COMMUNITY
Start: 2023-12-06

## 2023-12-07 RX ORDER — METOPROLOL TARTRATE 25 MG/1
25 TABLET, FILM COATED ORAL 2 TIMES DAILY
COMMUNITY
Start: 2023-12-06

## 2023-12-07 NOTE — PROGRESS NOTES
Subjective:       Patient ID: Pete Fernandez is a 43 y.o. male.    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 [place] on 10/11 after 1 month of progressive weakness, abdominal pain, and intermittent confusion. At the time of his presentation, his labs which had been normal with PCP in 8/2023 were notable for new severe DOUG, transaminitis, and Ca of 18. He was admitted and started on IVF resuscitation.  Hypercalcemia workup was initiated and he was ultimately found to have numerous osseous lesions on 10/15/23 CT CAP, FLC - Kappa 3.29, Lambda 744, Ratio 226 , SPEP with Mspike 0.17 IgG lambda. Bone marrow biopsy done 10/18/23 revealed 95% plasma cells and FISH with TP53 deletion, FGFR3/IGH (or NSD2/IGH) fusion, usually representing a t(4;14), and a tetraploid subclone was observed. After correction of his hypercalcemia and improvement in his renal function he was discharged on 10/22/23.   He started C1 Beatriz + RVD + Zometa on 11/2/23. He was referred to [facility] for consideration of bone marrow transplant, they recommended change to Beatriz + Krd, however patient developed life threatening pleural and pericardial edema several days after 1st infusion requiring hospitalization and placement of a pericardial window. Therefore he transitioned back to Beatriz - RVD for C2.     Interval History:   He returns to the clinic today for a follow-up and treatment clearance for C1D8 of Beatriz-KrD. He was recently admitted at [facility] in [place] for SOB and bilateral lower extremity swelling. Hospital records are not available at time of appointment but he was found to have severe pleural effusions and pericardial effusions requiring pericardial window placement. He also had a severe transaminitis that appears to slowly be improving. He was discharged yesterday 12/6/23. He is currently taking calcium BID. Labs reviewed in detail with him and are stable to continue with treatment. He started Revlimid on 12/1/23 and continues to take it now. Denies any fevers, chills, NS, headaches, bleeding, bruising, neuropathy.     Past Medical History:   Diagnosis Date    Anxiety disorder, unspecified     Hypercalcemia       Past Surgical History:   Procedure Laterality Date    BONE MARROW BIOPSY N/A 10/18/2023    Procedure: Biopsy-bone marrow;  Surgeon: Nhan Marte MD;  Location: [facility];  Service: General;  Laterality: N/A;    TONSILLECTOMY       Social History     Socioeconomic History    Marital status:    Tobacco Use    Smoking status: Never    Smokeless tobacco: Never   Substance and Sexual Activity    Alcohol use: Yes    Drug use: Never    Sexual activity: Yes     Partners: Female     Social Determinants of Health     Financial Resource Strain: Medium Risk (11/15/2023)    Overall Financial Resource Strain (CARDIA)     Difficulty of Paying Living Expenses: Somewhat hard   Food Insecurity: Food Insecurity Present (11/15/2023)    Hunger Vital Sign     Worried About Running Out of Food in the Last Year: Sometimes true     Ran Out of Food in the Last Year: Never true   Transportation Needs: No Transportation Needs (11/15/2023)    PRAPARE - Transportation     Lack of Transportation (Medical): No     Lack of Transportation (Non-Medical): No   Physical Activity: Insufficiently Active (11/15/2023)    Exercise Vital Sign     Days of Exercise per Week: 3 days     Minutes of Exercise per Session: 30 min   Stress: Stress Concern Present (11/15/2023)     [facility] of Occupational Health - Occupational Stress Questionnaire     Feeling of Stress : To some extent   Social Connections: Unknown (11/15/2023)    Social Connection and Isolation Panel [NHANES]     Frequency of Communication with Friends and Family: More than three times a week     Frequency of Social Gatherings with Friends and Family: More than three times a week     Attends Congregational Services: Patient refused     Active Member of Clubs or Organizations: No     Attends Club or Organization Meetings: Never     Marital Status:    Housing Stability: Low Risk  (11/15/2023)    Housing Stability Vital Sign     Unable to Pay for Housing in the Last Year: No     Number of Places Lived in the Last Year: 1     Unstable Housing in the Last Year: No      Family History   Problem Relation Age of Onset    Breast cancer Mother     Pancreatic cancer Father       Review of patient's allergies indicates:  No Known Allergies   Review of Systems   Constitutional:  Positive for fatigue. Negative for activity change, appetite change, chills and fever.   HENT:  Negative for sore throat.    Eyes:  Negative for visual disturbance.   Respiratory:  Positive for shortness of breath. Negative for cough.    Cardiovascular:  Negative for chest pain.   Gastrointestinal:  Negative for abdominal pain, constipation, diarrhea, nausea and vomiting.   Endocrine: Negative for polyuria.   Genitourinary:  Negative for dysuria.   Musculoskeletal:  Negative for back pain.   Integumentary:  Negative for rash.   Allergic/Immunologic: Negative for frequent infections.   Neurological:  Negative for weakness and headaches.   Hematological:  Negative for adenopathy. Does not bruise/bleed easily.         Objective:      Vitals:    12/07/23 1103   BP: (!) 150/94   Pulse: 80   Resp: 18   Temp: 99.1 °F (37.3 °C)       Physical Exam  Constitutional:       General: He is not in acute distress.     Appearance: Normal appearance. He is not  ill-appearing.   HENT:      Head: Normocephalic and atraumatic.      Nose: Nose normal.      Mouth/Throat:      Mouth: Mucous membranes are moist.      Pharynx: Oropharynx is clear.   Eyes:      Extraocular Movements: Extraocular movements intact.      Conjunctiva/sclera: Conjunctivae normal.      Pupils: Pupils are equal, round, and reactive to light.   Cardiovascular:      Rate and Rhythm: Normal rate and regular rhythm.      Pulses: Normal pulses.      Heart sounds: Normal heart sounds. No murmur heard.  Pulmonary:      Effort: Pulmonary effort is normal. No respiratory distress.      Breath sounds: Normal breath sounds.   Abdominal:      General: There is no distension.      Palpations: Abdomen is soft.      Tenderness: There is no abdominal tenderness.   Musculoskeletal:         General: Normal range of motion.      Cervical back: Normal range of motion and neck supple.      Right lower leg: No edema.      Left lower leg: No edema.   Lymphadenopathy:      Cervical: No cervical adenopathy.   Skin:     General: Skin is warm and dry.      Findings: No rash.   Neurological:      General: No focal deficit present.      Mental Status: He is alert and oriented to person, place, and time.       LABS AND IMAGING REVIEWED IN EPIC  10/11/23 Xray Metastatic Survey  1. Scattered small defects at the calvarium measuring up to 5 mm too numerous to count.  2. Degene mild rative changes at the spine and extremities  3. MR exam and bone scan would allow further evaluation.  10/12/23 MRI Brain  1. Motion artifact  2. Mild scattered mucosal thickening throughout the paranasal sinuses  3. Somewhat limited evaluation of brain metastases without the administration of IV contrast  4. Otherwise unremarkable MR exam of the head without the administration of IV contrast  10/12/23 NM Bone Scan  1. Abnormal intense increased activity throughout the lungs of uncertain etiology.  This may related to hyperparathyroidism, hematologic  malignancy, and or metabolic abnormality such as renal failure, vasculitis, or pulmonary infection.  Chest x-ray 10/10/2023 reveals the lungs to be relatively clear and well aerated.  2. Suspect scattered mild degenerative changes throughout the joint spaces  3. Increased activity nasal maxillary region suggesting mucoperiosteal disease  4. Mild activity at the stomach suggesting free pertechnetate  10/14/23 CT CAP  1. Widespread osseous findings suggestive of extensive metastatic skeletal disease, to include expansile destructive soft tissue component at the anterior right 7th rib.  2. Enlarged intrathoracic lymph nodes could also be of metastatic etiology.  3. Diffuse ground-glass attenuation through both lungs is nonspecific, could be secondary to atypical infectious or inflammatory process.  However, possibility of metastatic involvement cannot be excluded.    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 Displayed all CRAB criteria at time of diagnosis in October. Undergoing 1st line Beatriz + RVD + Zometa. [facility] for transplant consideration.      [facility]   Transplant will be approximately 4/2023.       Plan:     - Toxicity Reviewed, Stop Kyprolis due to severe toxicity  - Hold treatment x 1 week for patient to recover   - Will order ECHO for next week  - Will order chest x ray to be completed today  - Start back with Beatriz+ RVD--> Increase revlimid back to 25mg now that CrCl >60  - Continue to hold zometa due to hypocalcemia  - Continue Aspirin 81mg, Acyclovir, and Bactrim for prophylaxis  - Will need SPEP/OSVALDO and FLC with labs Q4w prior to every new cycle.   RTC in 1 week with MD with labs  Cbc, cmp, MM labs- 1 hr prior @ [facility] [place]    I spent a total of 40 minutes on the day of the visit.This includes face to face  time and non-face to face time preparing to see the patient (eg, review of tests), obtaining and/or reviewing separately obtained history, documenting clinical information in the electronic or other health record, independently interpreting results and communicating results to the patient/family/caregiver, or care coordinator.    Elizabeth LeJeune, MD  Hematology/Oncology       I, Claudia Howard LPN, acted solely as a scribe for and in the presence of Dr. Elizabeth Lejeune, who performed these services.       **All blood products must be irradiated and CMV negative**

## 2023-12-08 DIAGNOSIS — C90.00 MULTIPLE MYELOMA, REMISSION STATUS UNSPECIFIED: Primary | ICD-10-CM

## 2023-12-08 LAB
BACTERIA BLD CULT: NORMAL
BACTERIA BLD CULT: NORMAL

## 2023-12-08 RX ORDER — FUROSEMIDE 40 MG/1
40 TABLET ORAL DAILY
Qty: 3 TABLET | Refills: 0 | Status: SHIPPED | OUTPATIENT
Start: 2023-12-08 | End: 2023-12-11

## 2023-12-08 NOTE — TELEPHONE ENCOUNTER
----- Message from Elena Arthur sent at 12/8/2023  8:08 AM CST -----  Regarding: RE: echo  Echo scheduled 12/13/23 @ [facility]    Patient confirmed  ----- Message -----  From: Claudia Howard LPN  Sent: 12/7/2023  11:48 AM CST  To: Elena Arthur  Subject: echo                                             Pt needs echo next Wednesday please

## 2023-12-11 ENCOUNTER — HOSPITAL ENCOUNTER (OUTPATIENT)
Dept: RADIOLOGY | Facility: HOSPITAL | Age: 43
Discharge: HOME | End: 2023-12-11
Attending: STUDENT IN AN ORGANIZED HEALTH CARE EDUCATION/TRAINING PROGRAM
Payer: COMMERCIAL

## 2023-12-11 DIAGNOSIS — C90.00 MULTIPLE MYELOMA, REMISSION STATUS UNSPECIFIED: ICD-10-CM

## 2023-12-11 PROCEDURE — 71046 X-RAY EXAM CHEST 2 VIEWS: CPT | Mod: TC

## 2023-12-13 ENCOUNTER — HOSPITAL ENCOUNTER (OUTPATIENT)
Dept: RADIOLOGY | Facility: HOSPITAL | Age: 43
Discharge: HOME | End: 2023-12-13
Attending: STUDENT IN AN ORGANIZED HEALTH CARE EDUCATION/TRAINING PROGRAM
Payer: COMMERCIAL

## 2023-12-13 DIAGNOSIS — C90.00 MULTIPLE MYELOMA, REMISSION STATUS UNSPECIFIED: ICD-10-CM

## 2023-12-13 LAB
AV PEAK GRADIENT: 9 MMHG
AV VALVE AREA BY VELOCITY RATIO: 3.16 CM²
AV VELOCITY RATIO: 0.83
CV ECHO LV RWT: 0.52 CM
DOP CALC AO PEAK VEL: 1.49 M/S
DOP CALC LVOT AREA: 3.8 CM2
DOP CALC LVOT DIAMETER: 2.21 CM
DOP CALC LVOT PEAK VEL: 1.23 M/S
DOP CALC MV VTI: 30.1 CM
E WAVE DECELERATION TIME: 246.15 MSEC
E/A RATIO: 1.72
ECHO LV POSTERIOR WALL: 1.35 CM (ref 0.6–1.1)
FRACTIONAL SHORTENING: 26 % (ref 28–44)
INTERVENTRICULAR SEPTUM: 1.59 CM (ref 0.6–1.1)
LEFT ATRIUM SIZE: 5.58 CM
LEFT INTERNAL DIMENSION IN SYSTOLE: 3.85 CM (ref 2.1–4)
LEFT VENTRICLE DIASTOLIC VOLUME: 127.82 ML
LEFT VENTRICLE SYSTOLIC VOLUME: 63.76 ML
LEFT VENTRICULAR INTERNAL DIMENSION IN DIASTOLE: 5.17 CM (ref 3.5–6)
LEFT VENTRICULAR MASS: 329.43 G
MV MEAN GRADIENT: 2 MMHG
MV PEAK A VEL: 0.57 M/S
MV PEAK E VEL: 0.98 M/S
MV PEAK GRADIENT: 5 MMHG
MV STENOSIS PRESSURE HALF TIME: 71.38 MS
MV VALVE AREA P 1/2 METHOD: 3.08 CM2
OHS LV EJECTION FRACTION SIMPSONS BIPLANE MOD: 52 %
PISA MRMAX VEL: 2.16 M/S
PISA TR MAX VEL: 2.07 M/S
PV PEAK GRADIENT: 6 MMHG
PV PEAK VELOCITY: 1.22 M/S
RIGHT VENTRICULAR END-DIASTOLIC DIMENSION: 2.89 CM
TR MAX PG: 17 MMHG

## 2023-12-13 PROCEDURE — 93306 TTE W/DOPPLER COMPLETE: CPT

## 2023-12-13 NOTE — PROGRESS NOTES
Subjective:       Patient ID: Pete Fernandez is a 43 y.o. male.    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 At the time of his presentation, his labs which had been normal with PCP in 8/2023 were notable for new severe DOUG, transaminitis, and Ca of 18. He was admitted and started on IVF resuscitation.  Hypercalcemia workup was initiated and he was ultimately found to have numerous osseous lesions on 10/15/23 CT CAP, FLC - Kappa 3.29, Lambda 744, Ratio 226 , SPEP with Mspike 0.17 IgG lambda. Bone marrow biopsy done 10/18/23 revealed 95% plasma cells and FISH with TP53 deletion, FGFR3/IGH (or NSD2/IGH) fusion, usually representing a t(4;14), and a tetraploid subclone was observed. After correction of his hypercalcemia and improvement in his renal function he was discharged on 10/22/23.   He started C1 Beatriz + RVD + Zometa on 11/2/23. He was referred to [facility] for consideration of bone marrow transplant, they recommended change to Beatriz + Krd, however patient developed life threatening pleural and pericardial edema several days after 1st infusion requiring hospitalization and placement of a pericardial window. Therefore he transitioned back to Beatriz - RVD for C2.     Interval History:   He returns to the clinic today for a follow-up and treatment clearance for C2D1 of Beatriz-VRD. He is doing well today. He states his SOB is getting  better and bilateral leg swelling has decreased. He was instructed to take Lasix 40 mg x 3 days r/t bilateral lung infiltrate shown on CXR on 12/7/23. He had follow up CXR on 12/11/23 showing some improvement. He is scheduled to get new Revlimid 25 mg today delivered. He is currently taking calcium BID and ASA 81 mg daily. Labs reviewed in detail with him and are stable to continue with treatment. Denies any fevers, chills, NS, headaches, bleeding, bruising, neuropathy.     Past Medical History:   Diagnosis Date    Anxiety disorder, unspecified     Hypercalcemia       Past Surgical History:   Procedure Laterality Date    BONE MARROW BIOPSY N/A 10/18/2023    Procedure: Biopsy-bone marrow;  Surgeon: Nhan Marte MD;  Location: [facility];  Service: General;  Laterality: N/A;    TONSILLECTOMY       Social History     Socioeconomic History    Marital status:    Tobacco Use    Smoking status: Never    Smokeless tobacco: Never   Substance and Sexual Activity    Alcohol use: Yes    Drug use: Never    Sexual activity: Yes     Partners: Female     Social Determinants of Health     Financial Resource Strain: Medium Risk (11/15/2023)    Overall Financial Resource Strain (CARDIA)     Difficulty of Paying Living Expenses: Somewhat hard   Food Insecurity: Food Insecurity Present (11/15/2023)    Hunger Vital Sign     Worried About Running Out of Food in the Last Year: Sometimes true     Ran Out of Food in the Last Year: Never true   Transportation Needs: No Transportation Needs (11/15/2023)    PRAPARE - Transportation     Lack of Transportation (Medical): No     Lack of Transportation (Non-Medical): No   Physical Activity: Insufficiently Active (11/15/2023)    Exercise Vital Sign     Days of Exercise per Week: 3 days     Minutes of Exercise per Session: 30 min   Stress: Stress Concern Present (11/15/2023)    [employer] - Occupational Stress Questionnaire     Feeling of Stress : To some extent    Social Connections: Unknown (11/15/2023)    Social Connection and Isolation Panel [NHANES]     Frequency of Communication with Friends and Family: More than three times a week     Frequency of Social Gatherings with Friends and Family: More than three times a week     Attends Gnosticism Services: Patient refused     Active Member of Clubs or Organizations: No     Attends Club or Organization Meetings: Never     Marital Status:    Housing Stability: Low Risk  (11/15/2023)    Housing Stability Vital Sign     Unable to Pay for Housing in the Last Year: No     Number of Places Lived in the Last Year: 1     Unstable Housing in the Last Year: No      Family History   Problem Relation Age of Onset    Breast cancer Mother     Pancreatic cancer Father       Review of patient's allergies indicates:  No Known Allergies   Review of Systems   Constitutional:  Positive for fatigue. Negative for activity change, appetite change, chills and fever.   HENT:  Negative for sore throat.    Eyes:  Negative for visual disturbance.   Respiratory:  Positive for shortness of breath. Negative for cough.    Cardiovascular:  Negative for chest pain.   Gastrointestinal:  Negative for abdominal pain, constipation, diarrhea, nausea and vomiting.   Endocrine: Negative for polyuria.   Genitourinary:  Negative for dysuria.   Musculoskeletal:  Negative for back pain.   Integumentary:  Negative for rash.   Allergic/Immunologic: Negative for frequent infections.   Neurological:  Negative for weakness and headaches.   Hematological:  Negative for adenopathy. Does not bruise/bleed easily.         Objective:      Vitals:    12/14/23 0928   BP: (!) 144/97   Pulse: 74   Resp: 18   Temp: 98 °F (36.7 °C)         Physical Exam  Constitutional:       General: He is not in acute distress.     Appearance: Normal appearance. He is not ill-appearing.   HENT:      Head: Normocephalic and atraumatic.      Nose: Nose normal.      Mouth/Throat:      Mouth:  Mucous membranes are moist.      Pharynx: Oropharynx is clear.   Eyes:      Extraocular Movements: Extraocular movements intact.      Conjunctiva/sclera: Conjunctivae normal.      Pupils: Pupils are equal, round, and reactive to light.   Cardiovascular:      Rate and Rhythm: Normal rate and regular rhythm.      Pulses: Normal pulses.      Heart sounds: Normal heart sounds. No murmur heard.  Pulmonary:      Effort: Pulmonary effort is normal. No respiratory distress.      Breath sounds: Normal breath sounds.   Abdominal:      General: There is no distension.      Palpations: Abdomen is soft.      Tenderness: There is no abdominal tenderness.   Musculoskeletal:         General: Normal range of motion.      Cervical back: Normal range of motion and neck supple.      Right lower leg: No edema.      Left lower leg: No edema.   Lymphadenopathy:      Cervical: No cervical adenopathy.   Skin:     General: Skin is warm and dry.      Findings: No rash.   Neurological:      General: No focal deficit present.      Mental Status: He is alert and oriented to person, place, and time.       LABS AND IMAGING REVIEWED IN EPIC  10/11/23 Xray Metastatic Survey  1. Scattered small defects at the calvarium measuring up to 5 mm too numerous to count.  2. Degene mild rative changes at the spine and extremities  3. MR exam and bone scan would allow further evaluation.  10/12/23 MRI Brain  1. Motion artifact  2. Mild scattered mucosal thickening throughout the paranasal sinuses  3. Somewhat limited evaluation of brain metastases without the administration of IV contrast  4. Otherwise unremarkable MR exam of the head without the administration of IV contrast  10/12/23 NM Bone Scan  1. Abnormal intense increased activity throughout the lungs of uncertain etiology.  This may related to hyperparathyroidism, hematologic malignancy, and or metabolic abnormality such as renal failure, vasculitis, or pulmonary infection.  Chest x-ray 10/10/2023  reveals the lungs to be relatively clear and well aerated.  2. Suspect scattered mild degenerative changes throughout the joint spaces  3. Increased activity nasal maxillary region suggesting mucoperiosteal disease  4. Mild activity at the stomach suggesting free pertechnetate  10/14/23 CT CAP  1. Widespread osseous findings suggestive of extensive metastatic skeletal disease, to include expansile destructive soft tissue component at the anterior right 7th rib.  2. Enlarged intrathoracic lymph nodes could also be of metastatic etiology.  3. Diffuse ground-glass attenuation through both lungs is nonspecific, could be secondary to atypical infectious or inflammatory process.  However, possibility of metastatic involvement cannot be excluded.  12/7/23 CXR:   1. Hazy opacification lower lungs bilaterally suspicious for infiltrate and pleural reaction which has progressed since the prior exam  2. Cardiomegaly  3. Thoracic spondylosis  12/11/23 CXR:  1. Mild cardiomegaly  2. Persistent bibasilar infiltrate and or pleural reaction  3. 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 Displayed all CRAB criteria at time of diagnosis in October. Undergoing 1st line Ayde + RVD + Zometa. Being followed by [facility] in [facility] for transplant consideration.        Plan:     Labs adequate for C2D1 of AYDE-VRD with Revlimid 25 mg  Will need 2 grams of calcium gluconate to regimen today   Continue to hold zometa due to hypocalcemia  Continue Aspirin 81mg, Acyclovir, and Bactrim for prophylaxis  Will need SPEP/OSVALDO and FLC with labs Q4w prior to every new cycle.   RTC in 1 week with NP with labs  Cbc, cmp, MM labs- 1 hr prior @ [facility]    I spent a  total of 40 minutes on the day of the visit.This includes face to face time and non-face to face time preparing to see the patient (eg, review of tests), obtaining and/or reviewing separately obtained history, documenting clinical information in the electronic or other health record, independently interpreting results and communicating results to the patient/family/caregiver, or care coordinator.    Elizabeth LeJeune, MD  Hematology/Oncology       I, Claudia Howard LPN, acted solely as a scribe for and in the presence of Dr. Elizabeth Lejeune, who performed these services.       **All blood products must be irradiated and CMV negative**

## 2023-12-14 ENCOUNTER — OFFICE VISIT (OUTPATIENT)
Dept: HEMATOLOGY/ONCOLOGY | Facility: CLINIC | Age: 43
End: 2023-12-14
Payer: COMMERCIAL

## 2023-12-14 ENCOUNTER — INFUSION (OUTPATIENT)
Dept: INFUSION THERAPY | Facility: HOSPITAL | Age: 43
End: 2023-12-14
Attending: STUDENT IN AN ORGANIZED HEALTH CARE EDUCATION/TRAINING PROGRAM
Payer: COMMERCIAL

## 2023-12-14 VITALS
WEIGHT: 230.38 LBS | RESPIRATION RATE: 18 BRPM | HEART RATE: 74 BPM | HEIGHT: 69 IN | TEMPERATURE: 98 F | BODY MASS INDEX: 34.12 KG/M2 | SYSTOLIC BLOOD PRESSURE: 144 MMHG | OXYGEN SATURATION: 99 % | DIASTOLIC BLOOD PRESSURE: 97 MMHG

## 2023-12-14 VITALS
HEIGHT: 69 IN | WEIGHT: 230.38 LBS | SYSTOLIC BLOOD PRESSURE: 144 MMHG | RESPIRATION RATE: 18 BRPM | OXYGEN SATURATION: 99 % | HEART RATE: 74 BPM | DIASTOLIC BLOOD PRESSURE: 97 MMHG | TEMPERATURE: 98 F | BODY MASS INDEX: 34.12 KG/M2

## 2023-12-14 DIAGNOSIS — C90.00 METASTATIC MULTIPLE MYELOMA TO BONE: ICD-10-CM

## 2023-12-14 DIAGNOSIS — D84.821 IMMUNODEFICIENCY DUE TO DRUG THERAPY: ICD-10-CM

## 2023-12-14 DIAGNOSIS — C90.00 MULTIPLE MYELOMA, REMISSION STATUS UNSPECIFIED: Primary | ICD-10-CM

## 2023-12-14 DIAGNOSIS — Z79.899 IMMUNODEFICIENCY DUE TO DRUG THERAPY: ICD-10-CM

## 2023-12-14 DIAGNOSIS — C90.00 MULTIPLE MYELOMA NOT HAVING ACHIEVED REMISSION: Primary | ICD-10-CM

## 2023-12-14 PROCEDURE — 3080F PR MOST RECENT DIASTOLIC BLOOD PRESSURE >= 90 MM HG: ICD-10-PCS | Mod: CPTII,S$GLB,, | Performed by: STUDENT IN AN ORGANIZED HEALTH CARE EDUCATION/TRAINING PROGRAM

## 2023-12-14 PROCEDURE — 96367 TX/PROPH/DG ADDL SEQ IV INF: CPT

## 2023-12-14 PROCEDURE — 96365 THER/PROPH/DIAG IV INF INIT: CPT

## 2023-12-14 PROCEDURE — 25000003 PHARM REV CODE 250: Performed by: STUDENT IN AN ORGANIZED HEALTH CARE EDUCATION/TRAINING PROGRAM

## 2023-12-14 PROCEDURE — 96401 CHEMO ANTI-NEOPL SQ/IM: CPT

## 2023-12-14 PROCEDURE — 96366 THER/PROPH/DIAG IV INF ADDON: CPT

## 2023-12-14 PROCEDURE — 63600175 PHARM REV CODE 636 W HCPCS: Mod: JZ,JG | Performed by: STUDENT IN AN ORGANIZED HEALTH CARE EDUCATION/TRAINING PROGRAM

## 2023-12-14 PROCEDURE — 3080F DIAST BP >= 90 MM HG: CPT | Mod: CPTII,S$GLB,, | Performed by: STUDENT IN AN ORGANIZED HEALTH CARE EDUCATION/TRAINING PROGRAM

## 2023-12-14 PROCEDURE — 99999 PR PBB SHADOW E&M-EST. PATIENT-LVL V: CPT | Mod: PBBFAC,,, | Performed by: STUDENT IN AN ORGANIZED HEALTH CARE EDUCATION/TRAINING PROGRAM

## 2023-12-14 PROCEDURE — 3077F SYST BP >= 140 MM HG: CPT | Mod: CPTII,S$GLB,, | Performed by: STUDENT IN AN ORGANIZED HEALTH CARE EDUCATION/TRAINING PROGRAM

## 2023-12-14 PROCEDURE — 3008F PR BODY MASS INDEX (BMI) DOCUMENTED: ICD-10-PCS | Mod: CPTII,S$GLB,, | Performed by: STUDENT IN AN ORGANIZED HEALTH CARE EDUCATION/TRAINING PROGRAM

## 2023-12-14 PROCEDURE — 99215 OFFICE O/P EST HI 40 MIN: CPT | Mod: S$GLB,,, | Performed by: STUDENT IN AN ORGANIZED HEALTH CARE EDUCATION/TRAINING PROGRAM

## 2023-12-14 PROCEDURE — 3077F PR MOST RECENT SYSTOLIC BLOOD PRESSURE >= 140 MM HG: ICD-10-PCS | Mod: CPTII,S$GLB,, | Performed by: STUDENT IN AN ORGANIZED HEALTH CARE EDUCATION/TRAINING PROGRAM

## 2023-12-14 PROCEDURE — 3008F BODY MASS INDEX DOCD: CPT | Mod: CPTII,S$GLB,, | Performed by: STUDENT IN AN ORGANIZED HEALTH CARE EDUCATION/TRAINING PROGRAM

## 2023-12-14 PROCEDURE — 99999 PR PBB SHADOW E&M-EST. PATIENT-LVL V: ICD-10-PCS | Mod: PBBFAC,,, | Performed by: STUDENT IN AN ORGANIZED HEALTH CARE EDUCATION/TRAINING PROGRAM

## 2023-12-14 PROCEDURE — 99215 PR OFFICE/OUTPT VISIT, EST, LEVL V, 40-54 MIN: ICD-10-PCS | Mod: S$GLB,,, | Performed by: STUDENT IN AN ORGANIZED HEALTH CARE EDUCATION/TRAINING PROGRAM

## 2023-12-14 RX ORDER — HEPARIN 100 UNIT/ML
500 SYRINGE INTRAVENOUS
Status: CANCELLED | OUTPATIENT
Start: 2023-12-14

## 2023-12-14 RX ORDER — BORTEZOMIB 3.5 MG/1
1.3 INJECTION, POWDER, LYOPHILIZED, FOR SOLUTION INTRAVENOUS; SUBCUTANEOUS
Status: CANCELLED | OUTPATIENT
Start: 2023-12-14

## 2023-12-14 RX ORDER — CALCIUM GLUCONATE 20 MG/ML
1 INJECTION, SOLUTION INTRAVENOUS
Status: DISCONTINUED | OUTPATIENT
Start: 2023-12-14 | End: 2023-12-14 | Stop reason: HOSPADM

## 2023-12-14 RX ORDER — EPINEPHRINE 0.3 MG/.3ML
0.3 INJECTION SUBCUTANEOUS ONCE AS NEEDED
Status: CANCELLED | OUTPATIENT
Start: 2023-12-14

## 2023-12-14 RX ORDER — SODIUM CHLORIDE 0.9 % (FLUSH) 0.9 %
10 SYRINGE (ML) INJECTION
Status: DISCONTINUED | OUTPATIENT
Start: 2023-12-14 | End: 2023-12-14 | Stop reason: HOSPADM

## 2023-12-14 RX ORDER — DIPHENHYDRAMINE HYDROCHLORIDE 50 MG/ML
50 INJECTION INTRAMUSCULAR; INTRAVENOUS ONCE AS NEEDED
Status: CANCELLED | OUTPATIENT
Start: 2023-12-14

## 2023-12-14 RX ORDER — PROCHLORPERAZINE EDISYLATE 5 MG/ML
10 INJECTION INTRAMUSCULAR; INTRAVENOUS ONCE AS NEEDED
Status: CANCELLED | OUTPATIENT
Start: 2023-12-14

## 2023-12-14 RX ORDER — SODIUM CHLORIDE 0.9 % (FLUSH) 0.9 %
10 SYRINGE (ML) INJECTION
Status: CANCELLED | OUTPATIENT
Start: 2023-12-14

## 2023-12-14 RX ORDER — BORTEZOMIB 3.5 MG/1
1.3 INJECTION, POWDER, LYOPHILIZED, FOR SOLUTION INTRAVENOUS; SUBCUTANEOUS
Status: COMPLETED | OUTPATIENT
Start: 2023-12-14 | End: 2023-12-14

## 2023-12-14 RX ADMIN — CALCIUM GLUCONATE 1 G: 98 INJECTION, SOLUTION INTRAVENOUS at 12:12

## 2023-12-14 RX ADMIN — BORTEZOMIB 3 MG: 3.5 INJECTION, POWDER, LYOPHILIZED, FOR SOLUTION INTRAVENOUS; SUBCUTANEOUS at 11:12

## 2023-12-14 RX ADMIN — SODIUM CHLORIDE: 9 INJECTION, SOLUTION INTRAVENOUS at 10:12

## 2023-12-14 RX ADMIN — DARATUMUMAB AND HYALURONIDASE-FIHJ (HUMAN RECOMBINANT) 1800 MG: 1800; 30000 INJECTION SUBCUTANEOUS at 10:12

## 2023-12-14 RX ADMIN — CALCIUM GLUCONATE 1 G: 98 INJECTION, SOLUTION INTRAVENOUS at 11:12

## 2023-12-20 DIAGNOSIS — K21.9 GASTROESOPHAGEAL REFLUX DISEASE, UNSPECIFIED WHETHER ESOPHAGITIS PRESENT: ICD-10-CM

## 2023-12-20 DIAGNOSIS — C90.00 MULTIPLE MYELOMA NOT HAVING ACHIEVED REMISSION: ICD-10-CM

## 2023-12-20 RX ORDER — PANTOPRAZOLE SODIUM 20 MG/1
20 TABLET, DELAYED RELEASE ORAL DAILY
Qty: 30 TABLET | Refills: 1 | Status: SHIPPED | OUTPATIENT
Start: 2023-12-20 | End: 2024-12-19

## 2023-12-21 ENCOUNTER — OFFICE VISIT (OUTPATIENT)
Dept: HEMATOLOGY/ONCOLOGY | Facility: CLINIC | Age: 43
End: 2023-12-21
Payer: COMMERCIAL

## 2023-12-21 ENCOUNTER — INFUSION (OUTPATIENT)
Dept: INFUSION THERAPY | Facility: HOSPITAL | Age: 43
End: 2023-12-21
Attending: STUDENT IN AN ORGANIZED HEALTH CARE EDUCATION/TRAINING PROGRAM
Payer: COMMERCIAL

## 2023-12-21 VITALS
HEART RATE: 81 BPM | WEIGHT: 217.19 LBS | TEMPERATURE: 99 F | BODY MASS INDEX: 32.17 KG/M2 | SYSTOLIC BLOOD PRESSURE: 154 MMHG | DIASTOLIC BLOOD PRESSURE: 92 MMHG | HEIGHT: 69 IN | OXYGEN SATURATION: 100 % | RESPIRATION RATE: 18 BRPM

## 2023-12-21 DIAGNOSIS — N40.1 BPH ASSOCIATED WITH NOCTURIA: ICD-10-CM

## 2023-12-21 DIAGNOSIS — D64.9 LOW HEMOGLOBIN: ICD-10-CM

## 2023-12-21 DIAGNOSIS — K21.9 GASTROESOPHAGEAL REFLUX DISEASE, UNSPECIFIED WHETHER ESOPHAGITIS PRESENT: ICD-10-CM

## 2023-12-21 DIAGNOSIS — C90.00 MULTIPLE MYELOMA, REMISSION STATUS UNSPECIFIED: Primary | ICD-10-CM

## 2023-12-21 DIAGNOSIS — Z79.899 IMMUNODEFICIENCY DUE TO DRUG THERAPY: ICD-10-CM

## 2023-12-21 DIAGNOSIS — C90.00 MULTIPLE MYELOMA NOT HAVING ACHIEVED REMISSION: ICD-10-CM

## 2023-12-21 DIAGNOSIS — R35.1 BPH ASSOCIATED WITH NOCTURIA: ICD-10-CM

## 2023-12-21 DIAGNOSIS — E83.51 HYPOCALCEMIA: ICD-10-CM

## 2023-12-21 DIAGNOSIS — C90.00 METASTATIC MULTIPLE MYELOMA TO BONE: ICD-10-CM

## 2023-12-21 DIAGNOSIS — D64.9 ANEMIA, UNSPECIFIED TYPE: ICD-10-CM

## 2023-12-21 DIAGNOSIS — R30.0 BURNING WITH URINATION: ICD-10-CM

## 2023-12-21 DIAGNOSIS — N17.9 AKI (ACUTE KIDNEY INJURY): ICD-10-CM

## 2023-12-21 DIAGNOSIS — D84.821 IMMUNODEFICIENCY DUE TO DRUG THERAPY: ICD-10-CM

## 2023-12-21 PROCEDURE — 3077F SYST BP >= 140 MM HG: CPT | Mod: CPTII,S$GLB,,

## 2023-12-21 PROCEDURE — 96365 THER/PROPH/DIAG IV INF INIT: CPT

## 2023-12-21 PROCEDURE — 96367 TX/PROPH/DG ADDL SEQ IV INF: CPT

## 2023-12-21 PROCEDURE — 63600175 PHARM REV CODE 636 W HCPCS: Performed by: STUDENT IN AN ORGANIZED HEALTH CARE EDUCATION/TRAINING PROGRAM

## 2023-12-21 PROCEDURE — 1159F MED LIST DOCD IN RCRD: CPT | Mod: CPTII,S$GLB,,

## 2023-12-21 PROCEDURE — 3008F PR BODY MASS INDEX (BMI) DOCUMENTED: ICD-10-PCS | Mod: CPTII,S$GLB,,

## 2023-12-21 PROCEDURE — 99215 PR OFFICE/OUTPT VISIT, EST, LEVL V, 40-54 MIN: ICD-10-PCS | Mod: S$GLB,,,

## 2023-12-21 PROCEDURE — 1160F RVW MEDS BY RX/DR IN RCRD: CPT | Mod: CPTII,S$GLB,,

## 2023-12-21 PROCEDURE — 96401 CHEMO ANTI-NEOPL SQ/IM: CPT

## 2023-12-21 PROCEDURE — 3080F DIAST BP >= 90 MM HG: CPT | Mod: CPTII,S$GLB,,

## 2023-12-21 PROCEDURE — 1160F PR REVIEW ALL MEDS BY PRESCRIBER/CLIN PHARMACIST DOCUMENTED: ICD-10-PCS | Mod: CPTII,S$GLB,,

## 2023-12-21 PROCEDURE — 3008F BODY MASS INDEX DOCD: CPT | Mod: CPTII,S$GLB,,

## 2023-12-21 PROCEDURE — 1159F PR MEDICATION LIST DOCUMENTED IN MEDICAL RECORD: ICD-10-PCS | Mod: CPTII,S$GLB,,

## 2023-12-21 PROCEDURE — 99999 PR PBB SHADOW E&M-EST. PATIENT-LVL V: CPT | Mod: PBBFAC,,,

## 2023-12-21 PROCEDURE — 25000003 PHARM REV CODE 250: Performed by: STUDENT IN AN ORGANIZED HEALTH CARE EDUCATION/TRAINING PROGRAM

## 2023-12-21 PROCEDURE — 3077F PR MOST RECENT SYSTOLIC BLOOD PRESSURE >= 140 MM HG: ICD-10-PCS | Mod: CPTII,S$GLB,,

## 2023-12-21 PROCEDURE — 63600175 PHARM REV CODE 636 W HCPCS: Mod: JZ,JG

## 2023-12-21 PROCEDURE — 99215 OFFICE O/P EST HI 40 MIN: CPT | Mod: S$GLB,,,

## 2023-12-21 PROCEDURE — 3080F PR MOST RECENT DIASTOLIC BLOOD PRESSURE >= 90 MM HG: ICD-10-PCS | Mod: CPTII,S$GLB,,

## 2023-12-21 PROCEDURE — 25000003 PHARM REV CODE 250

## 2023-12-21 PROCEDURE — 99999 PR PBB SHADOW E&M-EST. PATIENT-LVL V: ICD-10-PCS | Mod: PBBFAC,,,

## 2023-12-21 RX ORDER — SODIUM CHLORIDE 0.9 % (FLUSH) 0.9 %
10 SYRINGE (ML) INJECTION
Status: CANCELLED | OUTPATIENT
Start: 2023-12-28

## 2023-12-21 RX ORDER — EPINEPHRINE 0.3 MG/.3ML
0.3 INJECTION SUBCUTANEOUS ONCE AS NEEDED
Status: DISCONTINUED | OUTPATIENT
Start: 2023-12-21 | End: 2023-12-21 | Stop reason: HOSPADM

## 2023-12-21 RX ORDER — PROCHLORPERAZINE EDISYLATE 5 MG/ML
10 INJECTION INTRAMUSCULAR; INTRAVENOUS ONCE AS NEEDED
Status: CANCELLED | OUTPATIENT
Start: 2023-12-21

## 2023-12-21 RX ORDER — SODIUM CHLORIDE 0.9 % (FLUSH) 0.9 %
10 SYRINGE (ML) INJECTION
Status: CANCELLED | OUTPATIENT
Start: 2023-12-21

## 2023-12-21 RX ORDER — DIPHENHYDRAMINE HYDROCHLORIDE 50 MG/ML
50 INJECTION INTRAMUSCULAR; INTRAVENOUS ONCE AS NEEDED
Status: CANCELLED | OUTPATIENT
Start: 2023-12-21

## 2023-12-21 RX ORDER — EPINEPHRINE 0.3 MG/.3ML
0.3 INJECTION SUBCUTANEOUS ONCE AS NEEDED
Status: CANCELLED | OUTPATIENT
Start: 2023-12-21

## 2023-12-21 RX ORDER — HEPARIN 100 UNIT/ML
500 SYRINGE INTRAVENOUS
Status: CANCELLED | OUTPATIENT
Start: 2023-12-21

## 2023-12-21 RX ORDER — SODIUM CHLORIDE 0.9 % (FLUSH) 0.9 %
10 SYRINGE (ML) INJECTION
Status: DISCONTINUED | OUTPATIENT
Start: 2023-12-21 | End: 2023-12-21 | Stop reason: HOSPADM

## 2023-12-21 RX ORDER — HEPARIN 100 UNIT/ML
500 SYRINGE INTRAVENOUS
Status: CANCELLED | OUTPATIENT
Start: 2023-12-28

## 2023-12-21 RX ORDER — HEPARIN 100 UNIT/ML
500 SYRINGE INTRAVENOUS
Status: DISCONTINUED | OUTPATIENT
Start: 2023-12-21 | End: 2023-12-21 | Stop reason: HOSPADM

## 2023-12-21 RX ORDER — BORTEZOMIB 3.5 MG/1
1.3 INJECTION, POWDER, LYOPHILIZED, FOR SOLUTION INTRAVENOUS; SUBCUTANEOUS
Status: CANCELLED | OUTPATIENT
Start: 2023-12-21

## 2023-12-21 RX ORDER — TAMSULOSIN HYDROCHLORIDE 0.4 MG/1
0.4 CAPSULE ORAL DAILY
Qty: 30 CAPSULE | Refills: 6 | Status: SHIPPED | OUTPATIENT
Start: 2023-12-21 | End: 2024-12-20

## 2023-12-21 RX ORDER — DIPHENHYDRAMINE HYDROCHLORIDE 50 MG/ML
50 INJECTION INTRAMUSCULAR; INTRAVENOUS ONCE AS NEEDED
Status: DISCONTINUED | OUTPATIENT
Start: 2023-12-21 | End: 2023-12-21 | Stop reason: HOSPADM

## 2023-12-21 RX ORDER — PROCHLORPERAZINE EDISYLATE 5 MG/ML
10 INJECTION INTRAMUSCULAR; INTRAVENOUS ONCE AS NEEDED
Status: DISCONTINUED | OUTPATIENT
Start: 2023-12-21 | End: 2023-12-21 | Stop reason: HOSPADM

## 2023-12-21 RX ORDER — BORTEZOMIB 3.5 MG/1
1.3 INJECTION, POWDER, LYOPHILIZED, FOR SOLUTION INTRAVENOUS; SUBCUTANEOUS
Status: COMPLETED | OUTPATIENT
Start: 2023-12-21 | End: 2023-12-21

## 2023-12-21 RX ADMIN — BORTEZOMIB 3 MG: 3.5 INJECTION, POWDER, LYOPHILIZED, FOR SOLUTION INTRAVENOUS; SUBCUTANEOUS at 09:12

## 2023-12-21 RX ADMIN — DARATUMUMAB AND HYALURONIDASE-FIHJ (HUMAN RECOMBINANT) 1800 MG: 1800; 30000 INJECTION SUBCUTANEOUS at 09:12

## 2023-12-21 RX ADMIN — ZOLEDRONIC ACID 3 MG: 4 INJECTION, SOLUTION, CONCENTRATE INTRAVENOUS at 10:12

## 2023-12-21 RX ADMIN — SODIUM CHLORIDE: 9 INJECTION, SOLUTION INTRAVENOUS at 09:12

## 2023-12-21 NOTE — PROGRESS NOTES
Subjective:       Patient ID: Pete Fernandez is a 43 y.o. male.    Chief Complaint: Multiple Myeloma (No complaints. 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 Beatriz+ KrD for cycle 2 on 11/30/23; Change at request of [facility]  Stopped after 1 dose due to severe pleural and pericardial effusion development   Switched back to BEATRIZ + RVD with increase of Rev to 25mg    HPI  44yo M presented to [facility] on 10/11 after 1 month of progressive weakness, abdominal pain, and intermittent confusion. At the time of his presentation, his labs which had been normal with PCP in 8/2023 were notable for new severe DOUG, transaminitis, and Ca of 18. He was admitted and started on IVF resuscitation.  Hypercalcemia workup was initiated and he was ultimately found to have numerous osseous lesions on 10/15/23 CT CAP, FLC - Kappa 3.29, Lambda 744, Ratio 226 , SPEP with Mspike 0.17 IgG lambda. Bone marrow biopsy done 10/18/23 revealed 95% plasma cells and FISH with TP53 deletion, FGFR3/IGH (or NSD2/IGH) fusion, usually representing a t(4;14), and a tetraploid subclone was observed. After correction of his hypercalcemia and improvement in his renal function he was discharged on 10/22/23.   He started C1 Beatriz + RVD + Zometa on 11/2/23. He was referred to [facility] for consideration of bone marrow transplant, they recommended change to Beatriz + Krd, however patient developed life threatening pleural and pericardial edema several days after 1st infusion requiring hospitalization and placement of a pericardial window. Therefore he transitioned back to Beatriz - RVD for C2.     Interval History:   He returns to the clinic today for a follow-up and treatment  [facility] He is doing well today. He does report frequent voiding at night with stop and go urine stream. He does have a follow-up with urology soon. He states his SOB is getting better and bilateral leg swelling has decreased. He continues to take his Revlimid 25 mg. He is currently taking calcium TID and ASA 81 mg daily. Labs reviewed in detail with him and are stable to continue with treatment. Denies any fevers, chills, NS, headaches, bleeding, bruising, neuropathy.     Past Medical History:   Diagnosis Date    Anxiety disorder, unspecified     Hypercalcemia       Past Surgical History:   Procedure Laterality Date    BONE MARROW BIOPSY N/A 10/18/2023    Procedure: Biopsy-bone marrow;  Surgeon: Nhan Marte MD;  Location: [facility];  Service: General;  Laterality: N/A;    TONSILLECTOMY       Social History     Socioeconomic History    Marital status:    Tobacco Use    Smoking status: Never    Smokeless tobacco: Never   Substance and Sexual Activity    Alcohol use: Yes    Drug use: Never    Sexual activity: Yes     Partners: Female     Social Determinants of Health     Financial Resource Strain: Medium Risk (11/15/2023)    Overall Financial Resource Strain (CARDIA)     Difficulty of Paying Living Expenses: Somewhat hard   Food Insecurity: Food Insecurity Present (11/15/2023)    Hunger Vital Sign     Worried About Running Out of Food in the Last Year: Sometimes true     Ran Out of Food in the Last Year: Never true   Transportation Needs: No Transportation Needs (11/15/2023)    PRAPARE - Transportation     Lack of Transportation (Medical): No     Lack of Transportation (Non-Medical): No   Physical Activity: Insufficiently Active (11/15/2023)    Exercise Vital Sign     Days of Exercise per Week: 3 days     Minutes of Exercise per Session: 30 min   Stress: Stress Concern Present (11/15/2023)    [employer] - Occupational Stress Questionnaire     Feeling of  Stress : To some extent   Social Connections: Unknown (11/15/2023)    Social Connection and Isolation Panel [NHANES]     Frequency of Communication with Friends and Family: More than three times a week     Frequency of Social Gatherings with Friends and Family: More than three times a week     Attends Catholic Services: Patient declined     Active Member of Clubs or Organizations: No     Attends Club or Organization Meetings: Never     Marital Status:    Housing Stability: Low Risk  (11/15/2023)    Housing Stability Vital Sign     Unable to Pay for Housing in the Last Year: No     Number of Places Lived in the Last Year: 1     Unstable Housing in the Last Year: No      Family History   Problem Relation Age of Onset    Breast cancer Mother     Pancreatic cancer Father       Review of patient's allergies indicates:  No Known Allergies   Review of Systems   Constitutional:  Negative for activity change, appetite change, chills, fatigue and fever.   HENT:  Negative for sore throat.    Eyes:  Negative for visual disturbance.   Respiratory:  Negative for cough and shortness of breath.    Cardiovascular:  Negative for chest pain.   Gastrointestinal:  Negative for abdominal pain, constipation, diarrhea, nausea and vomiting.   Endocrine: Negative for polyuria.   Genitourinary:  Negative for dysuria.   Musculoskeletal:  Negative for back pain.   Integumentary:  Negative for rash.   Allergic/Immunologic: Negative for frequent infections.   Neurological:  Negative for weakness and headaches.   Hematological:  Negative for adenopathy. Does not bruise/bleed easily.         Objective:      Vitals:    12/21/23 0845   BP: (!) 154/92   Pulse: 81   Resp: 18   Temp: 98.7 °F (37.1 °C)         Physical Exam  Constitutional:       General: He is not in acute distress.     Appearance: Normal appearance. He is not ill-appearing.   HENT:      Head: Normocephalic and atraumatic.      Nose: Nose normal.      Mouth/Throat:      Mouth:  Mucous membranes are moist.      Pharynx: Oropharynx is clear.   Eyes:      Extraocular Movements: Extraocular movements intact.      Conjunctiva/sclera: Conjunctivae normal.      Pupils: Pupils are equal, round, and reactive to light.   Cardiovascular:      Rate and Rhythm: Normal rate and regular rhythm.      Pulses: Normal pulses.      Heart sounds: Normal heart sounds. No murmur heard.  Pulmonary:      Effort: Pulmonary effort is normal. No respiratory distress.      Breath sounds: Normal breath sounds.   Abdominal:      General: There is no distension.      Palpations: Abdomen is soft.      Tenderness: There is no abdominal tenderness.   Musculoskeletal:         General: Normal range of motion.      Cervical back: Normal range of motion and neck supple.   Lymphadenopathy:      Cervical: No cervical adenopathy.   Skin:     General: Skin is warm and dry.      Findings: No rash.   Neurological:      General: No focal deficit present.      Mental Status: He is alert and oriented to person, place, and time.         LABS AND IMAGING REVIEWED IN EPIC  10/11/23 Xray Metastatic Survey  1. Scattered small defects at the calvarium measuring up to 5 mm too numerous to count.  2. Degene mild rative changes at the spine and extremities  3. MR exam and bone scan would allow further evaluation.  10/12/23 MRI Brain  1. Motion artifact  2. Mild scattered mucosal thickening throughout the paranasal sinuses  3. Somewhat limited evaluation of brain metastases without the administration of IV contrast  4. Otherwise unremarkable MR exam of the head without the administration of IV contrast  10/12/23 NM Bone Scan  1. Abnormal intense increased activity throughout the lungs of uncertain etiology.  This may related to hyperparathyroidism, hematologic malignancy, and or metabolic abnormality such as renal failure, vasculitis, or pulmonary infection.  Chest x-ray 10/10/2023 reveals the lungs to be relatively clear and well aerated.  2.  Suspect scattered mild degenerative changes throughout the joint spaces  3. Increased activity nasal maxillary region suggesting mucoperiosteal disease  4. Mild activity at the stomach suggesting free pertechnetate  10/14/23 CT CAP  1. Widespread osseous findings suggestive of extensive metastatic skeletal disease, to include expansile destructive soft tissue component at the anterior right 7th rib.  2. Enlarged intrathoracic lymph nodes could also be of metastatic etiology.  3. Diffuse ground-glass attenuation through both lungs is nonspecific, could be secondary to atypical infectious or inflammatory process.  However, possibility of metastatic involvement cannot be excluded.  12/7/23 CXR:   1. Hazy opacification lower lungs bilaterally suspicious for infiltrate and pleural reaction which has progressed since the prior exam  2. Cardiomegaly  3. Thoracic spondylosis  12/11/23 CXR:  1. Mild cardiomegaly  2. Persistent bibasilar infiltrate and or pleural reaction  3. 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 Displayed all CRAB criteria at time of diagnosis in October. Undergoing 1st line Adye + RVD + Zometa. Being followed by [facility] for transplant [facility].        Plan:   Labs adequate for C2D8 of AYDE-VRD with Revlimid 25 mg  Will receive zometa today.   UA today, flomax sent to pharmacy. Follow-up with urology  Continue calcium TID  Continue Aspirin 81mg, Acyclovir, and Bactrim for prophylaxis  Will need SPEP/OSVALDO and FLC with labs Q4w prior to every new cycle.   RTC in 1 week with NP with labs  Cbc, cmp, MM labs- 1 hr prior @ [facility]    I spent a total of 40 minutes on the day of the visit.This includes  face to face time and non-face to face time preparing to see the patient (eg, review of tests), obtaining and/or reviewing separately obtained history, documenting clinical information in the electronic or other health record, independently interpreting results and communicating results to the patient/family/caregiver, or care coordinator.    Janet Alegria, YOVANNYP-C  Oncology/Hematology   [facility]        **All blood products must be irradiated and CMV negative**

## 2023-12-28 ENCOUNTER — INFUSION (OUTPATIENT)
Dept: INFUSION THERAPY | Facility: HOSPITAL | Age: 43
End: 2023-12-28
Attending: STUDENT IN AN ORGANIZED HEALTH CARE EDUCATION/TRAINING PROGRAM
Payer: COMMERCIAL

## 2023-12-28 ENCOUNTER — OFFICE VISIT (OUTPATIENT)
Dept: HEMATOLOGY/ONCOLOGY | Facility: CLINIC | Age: 43
End: 2023-12-28
Payer: COMMERCIAL

## 2023-12-28 VITALS
HEIGHT: 69 IN | WEIGHT: 213.19 LBS | RESPIRATION RATE: 18 BRPM | BODY MASS INDEX: 31.58 KG/M2 | HEART RATE: 78 BPM | TEMPERATURE: 98 F | OXYGEN SATURATION: 99 % | DIASTOLIC BLOOD PRESSURE: 94 MMHG | SYSTOLIC BLOOD PRESSURE: 155 MMHG

## 2023-12-28 DIAGNOSIS — C90.00 MULTIPLE MYELOMA NOT HAVING ACHIEVED REMISSION: Primary | ICD-10-CM

## 2023-12-28 DIAGNOSIS — C90.00 MULTIPLE MYELOMA, REMISSION STATUS UNSPECIFIED: Primary | ICD-10-CM

## 2023-12-28 DIAGNOSIS — D84.821 IMMUNODEFICIENCY DUE TO DRUG THERAPY: ICD-10-CM

## 2023-12-28 DIAGNOSIS — C90.00 METASTATIC MULTIPLE MYELOMA TO BONE: ICD-10-CM

## 2023-12-28 DIAGNOSIS — Z79.899 IMMUNODEFICIENCY DUE TO DRUG THERAPY: ICD-10-CM

## 2023-12-28 DIAGNOSIS — K13.79 MOUTH SORES: Primary | ICD-10-CM

## 2023-12-28 PROCEDURE — 99215 OFFICE O/P EST HI 40 MIN: CPT | Mod: S$GLB,,, | Performed by: STUDENT IN AN ORGANIZED HEALTH CARE EDUCATION/TRAINING PROGRAM

## 2023-12-28 PROCEDURE — 3008F BODY MASS INDEX DOCD: CPT | Mod: CPTII,S$GLB,, | Performed by: STUDENT IN AN ORGANIZED HEALTH CARE EDUCATION/TRAINING PROGRAM

## 2023-12-28 PROCEDURE — 96401 CHEMO ANTI-NEOPL SQ/IM: CPT

## 2023-12-28 PROCEDURE — 3008F PR BODY MASS INDEX (BMI) DOCUMENTED: ICD-10-PCS | Mod: CPTII,S$GLB,, | Performed by: STUDENT IN AN ORGANIZED HEALTH CARE EDUCATION/TRAINING PROGRAM

## 2023-12-28 PROCEDURE — 3080F DIAST BP >= 90 MM HG: CPT | Mod: CPTII,S$GLB,, | Performed by: STUDENT IN AN ORGANIZED HEALTH CARE EDUCATION/TRAINING PROGRAM

## 2023-12-28 PROCEDURE — 3077F PR MOST RECENT SYSTOLIC BLOOD PRESSURE >= 140 MM HG: ICD-10-PCS | Mod: CPTII,S$GLB,, | Performed by: STUDENT IN AN ORGANIZED HEALTH CARE EDUCATION/TRAINING PROGRAM

## 2023-12-28 PROCEDURE — 3077F SYST BP >= 140 MM HG: CPT | Mod: CPTII,S$GLB,, | Performed by: STUDENT IN AN ORGANIZED HEALTH CARE EDUCATION/TRAINING PROGRAM

## 2023-12-28 PROCEDURE — 1160F RVW MEDS BY RX/DR IN RCRD: CPT | Mod: CPTII,S$GLB,, | Performed by: STUDENT IN AN ORGANIZED HEALTH CARE EDUCATION/TRAINING PROGRAM

## 2023-12-28 PROCEDURE — 99999 PR PBB SHADOW E&M-EST. PATIENT-LVL V: CPT | Mod: PBBFAC,,, | Performed by: STUDENT IN AN ORGANIZED HEALTH CARE EDUCATION/TRAINING PROGRAM

## 2023-12-28 PROCEDURE — 99215 PR OFFICE/OUTPT VISIT, EST, LEVL V, 40-54 MIN: ICD-10-PCS | Mod: S$GLB,,, | Performed by: STUDENT IN AN ORGANIZED HEALTH CARE EDUCATION/TRAINING PROGRAM

## 2023-12-28 PROCEDURE — 1160F PR REVIEW ALL MEDS BY PRESCRIBER/CLIN PHARMACIST DOCUMENTED: ICD-10-PCS | Mod: CPTII,S$GLB,, | Performed by: STUDENT IN AN ORGANIZED HEALTH CARE EDUCATION/TRAINING PROGRAM

## 2023-12-28 PROCEDURE — 99999 PR PBB SHADOW E&M-EST. PATIENT-LVL V: ICD-10-PCS | Mod: PBBFAC,,, | Performed by: STUDENT IN AN ORGANIZED HEALTH CARE EDUCATION/TRAINING PROGRAM

## 2023-12-28 PROCEDURE — 3080F PR MOST RECENT DIASTOLIC BLOOD PRESSURE >= 90 MM HG: ICD-10-PCS | Mod: CPTII,S$GLB,, | Performed by: STUDENT IN AN ORGANIZED HEALTH CARE EDUCATION/TRAINING PROGRAM

## 2023-12-28 PROCEDURE — 1159F MED LIST DOCD IN RCRD: CPT | Mod: CPTII,S$GLB,, | Performed by: STUDENT IN AN ORGANIZED HEALTH CARE EDUCATION/TRAINING PROGRAM

## 2023-12-28 PROCEDURE — 1159F PR MEDICATION LIST DOCUMENTED IN MEDICAL RECORD: ICD-10-PCS | Mod: CPTII,S$GLB,, | Performed by: STUDENT IN AN ORGANIZED HEALTH CARE EDUCATION/TRAINING PROGRAM

## 2023-12-28 PROCEDURE — 63600175 PHARM REV CODE 636 W HCPCS: Mod: JZ,JG | Performed by: STUDENT IN AN ORGANIZED HEALTH CARE EDUCATION/TRAINING PROGRAM

## 2023-12-28 RX ORDER — ALUMINUM HYDROXIDE, MAGNESIUM HYDROXIDE, AND SIMETHICONE 2400; 240; 2400 MG/30ML; MG/30ML; MG/30ML
5 SUSPENSION ORAL EVERY 6 HOURS PRN
Qty: 160 ML | Refills: 3 | Status: SHIPPED | OUTPATIENT
Start: 2023-12-28 | End: 2024-12-27

## 2023-12-28 RX ORDER — PROCHLORPERAZINE EDISYLATE 5 MG/ML
10 INJECTION INTRAMUSCULAR; INTRAVENOUS ONCE AS NEEDED
Status: CANCELLED | OUTPATIENT
Start: 2023-12-28

## 2023-12-28 RX ORDER — BORTEZOMIB 3.5 MG/1
1.3 INJECTION, POWDER, LYOPHILIZED, FOR SOLUTION INTRAVENOUS; SUBCUTANEOUS
Status: CANCELLED | OUTPATIENT
Start: 2023-12-28

## 2023-12-28 RX ORDER — EPINEPHRINE 0.3 MG/.3ML
0.3 INJECTION SUBCUTANEOUS ONCE AS NEEDED
Status: DISCONTINUED | OUTPATIENT
Start: 2023-12-28 | End: 2023-12-28 | Stop reason: HOSPADM

## 2023-12-28 RX ORDER — SODIUM CHLORIDE 0.9 % (FLUSH) 0.9 %
10 SYRINGE (ML) INJECTION
Status: CANCELLED | OUTPATIENT
Start: 2023-12-28

## 2023-12-28 RX ORDER — HEPARIN 100 UNIT/ML
500 SYRINGE INTRAVENOUS
Status: DISCONTINUED | OUTPATIENT
Start: 2023-12-28 | End: 2023-12-28 | Stop reason: HOSPADM

## 2023-12-28 RX ORDER — SODIUM CHLORIDE 0.9 % (FLUSH) 0.9 %
10 SYRINGE (ML) INJECTION
Status: DISCONTINUED | OUTPATIENT
Start: 2023-12-28 | End: 2023-12-28 | Stop reason: HOSPADM

## 2023-12-28 RX ORDER — HEPARIN 100 UNIT/ML
500 SYRINGE INTRAVENOUS
Status: CANCELLED | OUTPATIENT
Start: 2023-12-28

## 2023-12-28 RX ORDER — BORTEZOMIB 3.5 MG/1
1.3 INJECTION, POWDER, LYOPHILIZED, FOR SOLUTION INTRAVENOUS; SUBCUTANEOUS
Status: COMPLETED | OUTPATIENT
Start: 2023-12-28 | End: 2023-12-28

## 2023-12-28 RX ORDER — COLCHICINE 0.6 MG/1
TABLET ORAL
COMMUNITY
Start: 2023-12-27

## 2023-12-28 RX ORDER — PROCHLORPERAZINE EDISYLATE 5 MG/ML
10 INJECTION INTRAMUSCULAR; INTRAVENOUS ONCE AS NEEDED
Status: DISCONTINUED | OUTPATIENT
Start: 2023-12-28 | End: 2023-12-28 | Stop reason: HOSPADM

## 2023-12-28 RX ORDER — EPINEPHRINE 0.3 MG/.3ML
0.3 INJECTION SUBCUTANEOUS ONCE AS NEEDED
Status: CANCELLED | OUTPATIENT
Start: 2023-12-28

## 2023-12-28 RX ORDER — IBUPROFEN 800 MG/1
TABLET ORAL
COMMUNITY
Start: 2023-12-27 | End: 2024-01-11

## 2023-12-28 RX ORDER — DIPHENHYDRAMINE HYDROCHLORIDE 50 MG/ML
50 INJECTION INTRAMUSCULAR; INTRAVENOUS ONCE AS NEEDED
Status: CANCELLED | OUTPATIENT
Start: 2023-12-28

## 2023-12-28 RX ORDER — DIPHENHYDRAMINE HYDROCHLORIDE 50 MG/ML
50 INJECTION INTRAMUSCULAR; INTRAVENOUS ONCE AS NEEDED
Status: DISCONTINUED | OUTPATIENT
Start: 2023-12-28 | End: 2023-12-28 | Stop reason: HOSPADM

## 2023-12-28 RX ORDER — DIPHENHYDRAMINE HCL 12.5MG/5ML
12.5 ELIXIR ORAL 4 TIMES DAILY PRN
Qty: 160 ML | Refills: 3 | Status: SHIPPED | OUTPATIENT
Start: 2023-12-28

## 2023-12-28 RX ORDER — LIDOCAINE HYDROCHLORIDE 20 MG/ML
SOLUTION OROPHARYNGEAL EVERY 4 HOURS
Qty: 160 ML | Refills: 3 | Status: SHIPPED | OUTPATIENT
Start: 2023-12-28

## 2023-12-28 RX ADMIN — BORTEZOMIB 3 MG: 3.5 INJECTION, POWDER, LYOPHILIZED, FOR SOLUTION INTRAVENOUS; SUBCUTANEOUS at 02:12

## 2023-12-28 RX ADMIN — DARATUMUMAB AND HYALURONIDASE-FIHJ (HUMAN RECOMBINANT) 1800 MG: 1800; 30000 INJECTION SUBCUTANEOUS at 02:12

## 2023-12-28 NOTE — PROGRESS NOTES
Subjective:       Patient ID: Pete Fernandez is a 43 y.o. male.    Chief Complaint: Multiple Myeloma (Pt reports right upper abd pain. 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 Beatriz+ KrD for cycle 2 on 11/30/23; Change at request of [facility]  Stopped after 1 dose due to severe pleural and pericardial effusion development   Switched back to BEATRIZ + RVD with increase of Rev to 25mg    HPI  44yo M presented to [facility] on 10/11 after 1 month of progressive weakness, abdominal pain, and intermittent confusion. At the time of his presentation, his labs which had been normal with PCP in 8/2023 were notable for new severe DOUG, transaminitis, and Ca of 18. He was admitted and started on IVF resuscitation.  Hypercalcemia workup was initiated and he was ultimately found to have numerous osseous lesions on 10/15/23 CT CAP, FLC - Kappa 3.29, Lambda 744, Ratio 226 , SPEP with Mspike 0.17 IgG lambda. Bone marrow biopsy done 10/18/23 revealed 95% plasma cells and FISH with TP53 deletion, FGFR3/IGH (or NSD2/IGH) fusion, usually representing a t(4;14), and a tetraploid subclone was observed. After correction of his hypercalcemia and improvement in his renal function he was discharged on 10/22/23.   He started C1 Beatriz + RVD + Zometa on 11/2/23. He was referred to [facility] for consideration of bone marrow transplant, they recommended change to Beatriz + Krd, however patient developed life threatening pleural and pericardial edema several days after 1st infusion requiring hospitalization and placement of a pericardial window. Therefore he transitioned back to Beatriz - RVD for C2.     Interval History:   He returns to the clinic today for a follow-up  and treatment clearance for C2D15 of BeatrizCIPRIANO. He is doing well today. He states his SOB is getting better and bilateral leg swelling has decreased. He continues to take his Revlimid 25 mg. He has a follow p with Dr. Cook on 1/22/24. Right upper abdominal quadrant painful when taking deep breath. He was seen by cardiology in [place] on 12/27/23. He also reports mouth sores. He is currently taking calcium TID and ASA 81 mg daily. Labs reviewed in detail with him and are stable to continue with treatment. Denies any fevers, chills, NS, headaches, bleeding, bruising, neuropathy.     Past Medical History:   Diagnosis Date    Anxiety disorder, unspecified     Hypercalcemia       Past Surgical History:   Procedure Laterality Date    BONE MARROW BIOPSY N/A 10/18/2023    Procedure: Biopsy-bone marrow;  Surgeon: Nhan Marte MD;  Location: [facility];  Service: General;  Laterality: N/A;    TONSILLECTOMY       Social History     Socioeconomic History    Marital status:    Tobacco Use    Smoking status: Never    Smokeless tobacco: Never   Substance and Sexual Activity    Alcohol use: Yes    Drug use: Never    Sexual activity: Yes     Partners: Female     Social Determinants of Health     Financial Resource Strain: Medium Risk (11/15/2023)    Overall Financial Resource Strain (CARDIA)     Difficulty of Paying Living Expenses: Somewhat hard   Food Insecurity: Food Insecurity Present (11/15/2023)    Hunger Vital Sign     Worried About Running Out of Food in the Last Year: Sometimes true     Ran Out of Food in the Last Year: Never true   Transportation Needs: No Transportation Needs (11/15/2023)    PRAPARE - Transportation     Lack of Transportation (Medical): No     Lack of Transportation (Non-Medical): No   Physical Activity: Insufficiently Active (11/15/2023)    Exercise Vital Sign     Days of Exercise per Week: 3 days     Minutes of Exercise per Session: 30 min   Stress: Stress Concern Present (11/15/2023)     [facility] of Occupational Health - Occupational Stress Questionnaire     Feeling of Stress : To some extent   Social Connections: Unknown (11/15/2023)    Social Connection and Isolation Panel [NHANES]     Frequency of Communication with Friends and Family: More than three times a week     Frequency of Social Gatherings with Friends and Family: More than three times a week     Attends [employer]an Services: Patient declined     Active Member of Clubs or Organizations: No     Attends Club or Organization Meetings: Never     Marital Status:    Housing Stability: Low Risk  (11/15/2023)    Housing Stability Vital Sign     Unable to Pay for Housing in the Last Year: No     Number of Places Lived in the Last Year: 1     Unstable Housing in the Last Year: No      Family History   Problem Relation Age of Onset    Breast cancer Mother     Pancreatic cancer Father       Review of patient's allergies indicates:  No Known Allergies   Review of Systems   Constitutional:  Negative for activity change, appetite change, chills, fatigue, fever and unexpected weight change.   HENT:  Positive for mouth sores. Negative for sore throat.    Eyes:  Negative for visual disturbance.   Respiratory:  Negative for cough and shortness of breath.    Cardiovascular:  Negative for chest pain.   Gastrointestinal:  Negative for abdominal pain, constipation, diarrhea, nausea and vomiting.   Endocrine: Negative for polyuria.   Genitourinary:  Positive for frequency. Negative for dysuria.   Musculoskeletal:  Negative for back pain.   Integumentary:  Negative for rash.   Allergic/Immunologic: Negative for frequent infections.   Neurological:  Negative for weakness and headaches.   Hematological:  Negative for adenopathy. Does not bruise/bleed easily.   Psychiatric/Behavioral:  The patient is not nervous/anxious.          Objective:      Vitals:    12/28/23 1258   BP: (!) 155/94   Pulse: 78   Resp: 18   Temp: 98.2 °F (36.8 °C)            Physical Exam  Constitutional:       General: He is not in acute distress.     Appearance: Normal appearance. He is not ill-appearing.   HENT:      Head: Normocephalic and atraumatic.      Nose: Nose normal.      Mouth/Throat:      Mouth: Mucous membranes are moist.      Pharynx: Oropharynx is clear.   Eyes:      Extraocular Movements: Extraocular movements intact.      Conjunctiva/sclera: Conjunctivae normal.      Pupils: Pupils are equal, round, and reactive to light.   Cardiovascular:      Rate and Rhythm: Normal rate and regular rhythm.      Pulses: Normal pulses.      Heart sounds: Normal heart sounds. No murmur heard.  Pulmonary:      Effort: Pulmonary effort is normal. No respiratory distress.      Breath sounds: Normal breath sounds.   Abdominal:      General: There is no distension.      Palpations: Abdomen is soft.      Tenderness: There is no abdominal tenderness.   Musculoskeletal:         General: Normal range of motion.      Cervical back: Normal range of motion and neck supple.   Lymphadenopathy:      Cervical: No cervical adenopathy.   Skin:     General: Skin is warm and dry.      Findings: No rash.   Neurological:      General: No focal deficit present.      Mental Status: He is alert and oriented to person, place, and time.         LABS AND IMAGING REVIEWED IN EPIC  10/11/23 Xray Metastatic Survey  1. Scattered small defects at the calvarium measuring up to 5 mm too numerous to count.  2. Degene mild rative changes at the spine and extremities  3. MR exam and bone scan would allow further evaluation.  10/12/23 MRI Brain  1. Motion artifact  2. Mild scattered mucosal thickening throughout the paranasal sinuses  3. Somewhat limited evaluation of brain metastases without the administration of IV contrast  4. Otherwise unremarkable MR exam of the head without the administration of IV contrast  10/12/23 NM Bone Scan  1. Abnormal intense increased activity throughout the lungs of uncertain  etiology.  This may related to hyperparathyroidism, hematologic malignancy, and or metabolic abnormality such as renal failure, vasculitis, or pulmonary infection.  Chest x-ray 10/10/2023 reveals the lungs to be relatively clear and well aerated.  2. Suspect scattered mild degenerative changes throughout the joint spaces  3. Increased activity nasal maxillary region suggesting mucoperiosteal disease  4. Mild activity at the stomach suggesting free pertechnetate  10/14/23 CT CAP  1. Widespread osseous findings suggestive of extensive metastatic skeletal disease, to include expansile destructive soft tissue component at the anterior right 7th rib.  2. Enlarged intrathoracic lymph nodes could also be of metastatic etiology.  3. Diffuse ground-glass attenuation through both lungs is nonspecific, could be secondary to atypical infectious or inflammatory process.  However, possibility of metastatic involvement cannot be excluded.  12/7/23 CXR:   1. Hazy opacification lower lungs bilaterally suspicious for infiltrate and pleural reaction which has progressed since the prior exam  2. Cardiomegaly  3. Thoracic spondylosis  12/11/23 CXR:  1. Mild cardiomegaly  2. Persistent bibasilar infiltrate and or pleural reaction  3. 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 Displayed all CRAB criteria at time of diagnosis in October. Undergoing 1st line Ayde + RVD + Zometa. Being followed by [facility] in [facility] for transplant consideration.        Plan:   Labs adequate for C2D15 of AYDE-VRD with Revlimid 25 mg  Will order Magic Mouth Wash for mouth sores  Continue calcium TID  Continue Aspirin 81mg, Acyclovir, and Bactrim for  prophylaxis  Will need SPEP/OSVALDO and FLC with labs Q4w prior to every new cycle.   RTC in 1 week with NP with labs  Cbc, cmp, MM labs- 1 hr prior @ [facility], [place]    I spent a total of 40 minutes on the day of the visit.This includes face to face time and non-face to face time preparing to see the patient (eg, review of tests), obtaining and/or reviewing separately obtained history, documenting clinical information in the electronic or other health record, independently interpreting results and communicating results to the patient/family/caregiver, or care coordinator.      Elizabeth LeJeune, MD  Hematology/Oncology      I, Claudia Howard LPN, acted solely as a scribe for and in the presence of Dr. Elizabeth Lejeune, who performed these services.       **All blood products must be irradiated and CMV negative**

## 2024-01-02 ENCOUNTER — PATIENT MESSAGE (OUTPATIENT)
Dept: HEMATOLOGY/ONCOLOGY | Facility: CLINIC | Age: 44
End: 2024-01-02
Payer: COMMERCIAL

## 2024-01-02 DIAGNOSIS — R07.9 CHEST PAIN, UNSPECIFIED TYPE: Primary | ICD-10-CM

## 2024-01-03 ENCOUNTER — HOSPITAL ENCOUNTER (OUTPATIENT)
Dept: RADIOLOGY | Facility: HOSPITAL | Age: 44
Discharge: HOME | End: 2024-01-03
Payer: COMMERCIAL

## 2024-01-03 DIAGNOSIS — R07.9 CHEST PAIN, UNSPECIFIED TYPE: ICD-10-CM

## 2024-01-03 PROCEDURE — 71046 X-RAY EXAM CHEST 2 VIEWS: CPT | Mod: TC

## 2024-01-03 NOTE — PROGRESS NOTES
Subjective:       Patient ID: Pete Fernandez is a 43 y.o. male.    Chief Complaint: Multiple Myeloma (Pt c/o right upper abd pain and N. 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 Beatriz+ KrD for cycle 2 on 11/30/23; Change at request of [facility]  Stopped after 1 dose due to severe pleural and pericardial effusion development   Switched back to BEATRIZ + RVD with increase of Rev to 25mg    HPI  44yo M presented to [facility] on 10/11 after 1 month of progressive weakness, abdominal pain, and intermittent confusion. At the time of his presentation, his labs which had been normal with PCP in 8/2023 were notable for new severe DOUG, transaminitis, and Ca of 18. He was admitted and started on IVF resuscitation.  Hypercalcemia workup was initiated and he was ultimately found to have numerous osseous lesions on 10/15/23 CT CAP, FLC - Kappa 3.29, Lambda 744, Ratio 226 , SPEP with Mspike 0.17 IgG lambda. Bone marrow biopsy done 10/18/23 revealed 95% plasma cells and FISH with TP53 deletion, FGFR3/IGH (or NSD2/IGH) fusion, usually representing a t(4;14), and a tetraploid subclone was observed. After correction of his hypercalcemia and improvement in his renal function he was discharged on 10/22/23.   He started C1 Beatriz + RVD + Zometa on 11/2/23. He was referred to [facility] for consideration of bone marrow transplant, they recommended change to Beatriz + Krd, however patient developed life threatening pleural and pericardial edema several days after 1st infusion requiring hospitalization and placement of a pericardial window. Therefore he transitioned back to Beatriz - RVD for C2.     Interval History:   He returns to the clinic today for a follow-up  and treatment clearance for C2D22 of BeatrizCIPRIANO. He is doing well today. He does report some nausea, he does take zofran and compazine for this, he states it does help. He reports one episode of vomiting during this week after eating a meal. He continues to take his Revlimid 25 mg. He has a follow p with Dr. Cook on 1/22/24. Right upper abdominal quadrant painful when taking deep breath, this is improving. He also reports mouth sores. He is currently taking calcium TID and ASA 81 mg daily. Labs reviewed in detail with him and are stable to continue with treatment. Denies any fevers, chills, NS, headaches, bleeding, bruising, neuropathy.     Past Medical History:   Diagnosis Date    Anxiety disorder, unspecified     Hypercalcemia       Past Surgical History:   Procedure Laterality Date    BONE MARROW BIOPSY N/A 10/18/2023    Procedure: Biopsy-bone marrow;  Surgeon: hNan Marte MD;  Location: [facility];  Service: General;  Laterality: N/A;    TONSILLECTOMY       Social History     Socioeconomic History    Marital status:    Tobacco Use    Smoking status: Never    Smokeless tobacco: Never   Substance and Sexual Activity    Alcohol use: Yes    Drug use: Never    Sexual activity: Yes     Partners: Female     Social Determinants of Health     Financial Resource Strain: Medium Risk (11/15/2023)    Overall Financial Resource Strain (CARDIA)     Difficulty of Paying Living Expenses: Somewhat hard   Food Insecurity: Food Insecurity Present (11/15/2023)    Hunger Vital Sign     Worried About Running Out of Food in the Last Year: Sometimes true     Ran Out of Food in the Last Year: Never true   Transportation Needs: No Transportation Needs (11/15/2023)    PRAPARE - Transportation     Lack of Transportation (Medical): No     Lack of Transportation (Non-Medical): No   Physical Activity: Insufficiently Active (11/15/2023)    Exercise Vital Sign     Days of Exercise per Week: 3 days     Minutes of Exercise per Session: 30  min   Stress: Stress Concern Present (11/15/2023)    [employer] [place] of Occupational Health - Occupational Stress Questionnaire     Feeling of Stress : To some extent   Social Connections: Unknown (11/15/2023)    Social Connection and Isolation Panel [NHANES]     Frequency of Communication with Friends and Family: More than three times a week     Frequency of Social Gatherings with Friends and Family: More than three times a week     Attends Hindu Services: Patient declined     Active Member of Clubs or Organizations: No     Attends Club or Organization Meetings: Never     Marital Status:    Housing Stability: Low Risk  (11/15/2023)    Housing Stability Vital Sign     Unable to Pay for Housing in the Last Year: No     Number of Places Lived in the Last Year: 1     Unstable Housing in the Last Year: No      Family History   Problem Relation Age of Onset    Breast cancer Mother     Pancreatic cancer Father       Review of patient's allergies indicates:  No Known Allergies   Review of Systems   Constitutional:  Negative for activity change, appetite change, chills, fatigue, fever and unexpected weight change.   HENT:  Positive for mouth sores. Negative for sore throat.    Eyes:  Negative for visual disturbance.   Respiratory:  Negative for cough and shortness of breath.    Cardiovascular:  Negative for chest pain.   Gastrointestinal:  Negative for abdominal pain, constipation, diarrhea, nausea and vomiting.   Endocrine: Negative for polyuria.   Genitourinary:  Positive for frequency. Negative for dysuria.   Musculoskeletal:  Negative for back pain.   Integumentary:  Negative for rash.   Allergic/Immunologic: Negative for frequent infections.   Neurological:  Negative for weakness and headaches.   Hematological:  Negative for adenopathy. Does not bruise/bleed easily.   Psychiatric/Behavioral:  The patient is not nervous/anxious.          Objective:      Vitals:    01/04/24 0926   BP: (!) 156/88   Pulse: 82    Resp: 18   Temp: 98.5 °F (36.9 °C)             Physical Exam  Constitutional:       General: He is not in acute distress.     Appearance: Normal appearance. He is not ill-appearing.   HENT:      Head: Normocephalic and atraumatic.      Nose: Nose normal.      Mouth/Throat:      Mouth: Mucous membranes are moist.      Pharynx: Oropharynx is clear.   Eyes:      Extraocular Movements: Extraocular movements intact.      Conjunctiva/sclera: Conjunctivae normal.      Pupils: Pupils are equal, round, and reactive to light.   Cardiovascular:      Rate and Rhythm: Normal rate and regular rhythm.      Pulses: Normal pulses.      Heart sounds: Normal heart sounds. No murmur heard.  Pulmonary:      Effort: Pulmonary effort is normal. No respiratory distress.      Breath sounds: Normal breath sounds.   Abdominal:      General: There is no distension.      Palpations: Abdomen is soft.      Tenderness: There is no abdominal tenderness.   Musculoskeletal:         General: Normal range of motion.      Cervical back: Normal range of motion and neck supple.   Lymphadenopathy:      Cervical: No cervical adenopathy.   Skin:     General: Skin is warm and dry.      Findings: No rash.   Neurological:      General: No focal deficit present.      Mental Status: He is alert and oriented to person, place, and time.       LABS AND IMAGING REVIEWED IN EPIC  10/11/23 Xray Metastatic Survey  1. Scattered small defects at the calvarium measuring up to 5 mm too numerous to count.  2. Degene mild rative changes at the spine and extremities  3. MR exam and bone scan would allow further evaluation.  10/12/23 MRI Brain  1. Motion artifact  2. Mild scattered mucosal thickening throughout the paranasal sinuses  3. Somewhat limited evaluation of brain metastases without the administration of IV contrast  4. Otherwise unremarkable MR exam of the head without the administration of IV contrast  10/12/23 NM Bone Scan  1. Abnormal intense increased activity  throughout the lungs of uncertain etiology.  This may related to hyperparathyroidism, hematologic malignancy, and or metabolic abnormality such as renal failure, vasculitis, or pulmonary infection.  Chest x-ray 10/10/2023 reveals the lungs to be relatively clear and well aerated.  2. Suspect scattered mild degenerative changes throughout the joint spaces  3. Increased activity nasal maxillary region suggesting mucoperiosteal disease  4. Mild activity at the stomach suggesting free pertechnetate  10/14/23 CT CAP  1. Widespread osseous findings suggestive of extensive metastatic skeletal disease, to include expansile destructive soft tissue component at the anterior right 7th rib.  2. Enlarged intrathoracic lymph nodes could also be of metastatic etiology.  3. Diffuse ground-glass attenuation through both lungs is nonspecific, could be secondary to atypical infectious or inflammatory process.  However, possibility of metastatic involvement cannot be excluded.  12/7/23 CXR:   1. Hazy opacification lower lungs bilaterally suspicious for infiltrate and pleural reaction which has progressed since the prior exam  2. Cardiomegaly  3. Thoracic spondylosis  12/11/23 CXR:  1. Mild cardiomegaly  2. Persistent bibasilar infiltrate and or pleural reaction  3. 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 Displayed all CRAB criteria at time of diagnosis in October. Undergoing 1st line Ayde + RVD + Zometa. Being followed by [facility] in [facility] for transplant consideration.        Plan:   Labs adequate for C2D22 of AYDE-VRD with Revlimid 25 mg tomorrow 1/5/23  Will order Magic Mouth Wash for mouth sores  Continue calcium TID  Continue Aspirin  81mg, Acyclovir, and Bactrim for prophylaxis  Will need SPEP/OSVALDO and FLC with labs Q4w prior to every new cycle.   RTC in 1 week with NP with labs  Cbc, cmp, MM labs- 1 hr prior @ [facility]    I spent a total of 40 minutes on the day of the visit.This includes face to face time and non-face to face time preparing to see the patient (eg, review of tests), obtaining and/or reviewing separately obtained history, documenting clinical information in the electronic or other health record, independently interpreting results and communicating results to the patient/family/caregiver, or care coordinator.      Elizabeth LeJeune, MD  Hematology/Oncology      I, Claudia Howard LPN, acted solely as a scribe for and in the presence of Dr. Elizabeth Lejeune, who performed these services.       **All blood products must be irradiated and CMV negative**

## 2024-01-04 ENCOUNTER — OFFICE VISIT (OUTPATIENT)
Dept: HEMATOLOGY/ONCOLOGY | Facility: CLINIC | Age: 44
End: 2024-01-04
Payer: COMMERCIAL

## 2024-01-04 VITALS
TEMPERATURE: 99 F | SYSTOLIC BLOOD PRESSURE: 156 MMHG | DIASTOLIC BLOOD PRESSURE: 88 MMHG | WEIGHT: 211.38 LBS | HEIGHT: 69 IN | HEART RATE: 82 BPM | BODY MASS INDEX: 31.31 KG/M2 | OXYGEN SATURATION: 100 % | RESPIRATION RATE: 18 BRPM

## 2024-01-04 DIAGNOSIS — C90.00 METASTATIC MULTIPLE MYELOMA TO BONE: ICD-10-CM

## 2024-01-04 DIAGNOSIS — C90.00 MULTIPLE MYELOMA NOT HAVING ACHIEVED REMISSION: ICD-10-CM

## 2024-01-04 DIAGNOSIS — D84.821 IMMUNODEFICIENCY DUE TO DRUG THERAPY: Primary | ICD-10-CM

## 2024-01-04 DIAGNOSIS — Z79.899 IMMUNODEFICIENCY DUE TO DRUG THERAPY: Primary | ICD-10-CM

## 2024-01-04 DIAGNOSIS — C90.00 MULTIPLE MYELOMA NOT HAVING ACHIEVED REMISSION: Primary | ICD-10-CM

## 2024-01-04 PROCEDURE — 3008F BODY MASS INDEX DOCD: CPT | Mod: CPTII,S$GLB,, | Performed by: STUDENT IN AN ORGANIZED HEALTH CARE EDUCATION/TRAINING PROGRAM

## 2024-01-04 PROCEDURE — 1160F RVW MEDS BY RX/DR IN RCRD: CPT | Mod: CPTII,S$GLB,, | Performed by: STUDENT IN AN ORGANIZED HEALTH CARE EDUCATION/TRAINING PROGRAM

## 2024-01-04 PROCEDURE — 99999 PR PBB SHADOW E&M-EST. PATIENT-LVL V: CPT | Mod: PBBFAC,,, | Performed by: STUDENT IN AN ORGANIZED HEALTH CARE EDUCATION/TRAINING PROGRAM

## 2024-01-04 PROCEDURE — 99215 OFFICE O/P EST HI 40 MIN: CPT | Mod: S$GLB,,, | Performed by: STUDENT IN AN ORGANIZED HEALTH CARE EDUCATION/TRAINING PROGRAM

## 2024-01-04 PROCEDURE — 3077F SYST BP >= 140 MM HG: CPT | Mod: CPTII,S$GLB,, | Performed by: STUDENT IN AN ORGANIZED HEALTH CARE EDUCATION/TRAINING PROGRAM

## 2024-01-04 PROCEDURE — 1159F MED LIST DOCD IN RCRD: CPT | Mod: CPTII,S$GLB,, | Performed by: STUDENT IN AN ORGANIZED HEALTH CARE EDUCATION/TRAINING PROGRAM

## 2024-01-04 PROCEDURE — 3079F DIAST BP 80-89 MM HG: CPT | Mod: CPTII,S$GLB,, | Performed by: STUDENT IN AN ORGANIZED HEALTH CARE EDUCATION/TRAINING PROGRAM

## 2024-01-04 RX ORDER — EPINEPHRINE 0.3 MG/.3ML
0.3 INJECTION SUBCUTANEOUS ONCE AS NEEDED
Status: CANCELLED | OUTPATIENT
Start: 2024-01-04

## 2024-01-04 RX ORDER — LENALIDOMIDE 25 MG/1
25 CAPSULE ORAL DAILY
Qty: 21 CAPSULE | Refills: 0 | Status: SHIPPED | OUTPATIENT
Start: 2024-01-04 | End: 2024-01-29

## 2024-01-04 RX ORDER — BORTEZOMIB 3.5 MG/1
1.3 INJECTION, POWDER, LYOPHILIZED, FOR SOLUTION INTRAVENOUS; SUBCUTANEOUS
Status: CANCELLED | OUTPATIENT
Start: 2024-01-04

## 2024-01-04 RX ORDER — SODIUM CHLORIDE 0.9 % (FLUSH) 0.9 %
10 SYRINGE (ML) INJECTION
Status: CANCELLED | OUTPATIENT
Start: 2024-01-04

## 2024-01-04 RX ORDER — HEPARIN 100 UNIT/ML
500 SYRINGE INTRAVENOUS
Status: CANCELLED | OUTPATIENT
Start: 2024-01-04

## 2024-01-04 RX ORDER — PROCHLORPERAZINE EDISYLATE 5 MG/ML
10 INJECTION INTRAMUSCULAR; INTRAVENOUS ONCE AS NEEDED
Status: CANCELLED | OUTPATIENT
Start: 2024-01-04

## 2024-01-04 RX ORDER — DIPHENHYDRAMINE HYDROCHLORIDE 50 MG/ML
50 INJECTION, SOLUTION INTRAMUSCULAR; INTRAVENOUS ONCE AS NEEDED
Status: CANCELLED | OUTPATIENT
Start: 2024-01-04

## 2024-01-05 ENCOUNTER — INFUSION (OUTPATIENT)
Dept: INFUSION THERAPY | Facility: HOSPITAL | Age: 44
End: 2024-01-05
Attending: STUDENT IN AN ORGANIZED HEALTH CARE EDUCATION/TRAINING PROGRAM
Payer: COMMERCIAL

## 2024-01-05 VITALS
HEIGHT: 69 IN | OXYGEN SATURATION: 98 % | SYSTOLIC BLOOD PRESSURE: 155 MMHG | TEMPERATURE: 98 F | HEART RATE: 79 BPM | WEIGHT: 213 LBS | BODY MASS INDEX: 31.55 KG/M2 | DIASTOLIC BLOOD PRESSURE: 94 MMHG | RESPIRATION RATE: 18 BRPM

## 2024-01-05 DIAGNOSIS — C90.00 MULTIPLE MYELOMA, REMISSION STATUS UNSPECIFIED: Primary | ICD-10-CM

## 2024-01-05 PROCEDURE — 63600175 PHARM REV CODE 636 W HCPCS: Mod: JZ,JG | Performed by: STUDENT IN AN ORGANIZED HEALTH CARE EDUCATION/TRAINING PROGRAM

## 2024-01-05 PROCEDURE — 96401 CHEMO ANTI-NEOPL SQ/IM: CPT

## 2024-01-05 RX ORDER — EPINEPHRINE 0.3 MG/.3ML
0.3 INJECTION SUBCUTANEOUS ONCE AS NEEDED
Status: DISCONTINUED | OUTPATIENT
Start: 2024-01-05 | End: 2024-01-05 | Stop reason: HOSPADM

## 2024-01-05 RX ORDER — PROCHLORPERAZINE EDISYLATE 5 MG/ML
10 INJECTION INTRAMUSCULAR; INTRAVENOUS ONCE AS NEEDED
Status: DISCONTINUED | OUTPATIENT
Start: 2024-01-05 | End: 2024-01-05 | Stop reason: HOSPADM

## 2024-01-05 RX ORDER — HEPARIN 100 UNIT/ML
500 SYRINGE INTRAVENOUS
Status: DISCONTINUED | OUTPATIENT
Start: 2024-01-05 | End: 2024-01-05 | Stop reason: HOSPADM

## 2024-01-05 RX ORDER — DIPHENHYDRAMINE HYDROCHLORIDE 50 MG/ML
50 INJECTION, SOLUTION INTRAMUSCULAR; INTRAVENOUS ONCE AS NEEDED
Status: DISCONTINUED | OUTPATIENT
Start: 2024-01-05 | End: 2024-01-05 | Stop reason: HOSPADM

## 2024-01-05 RX ORDER — BORTEZOMIB 3.5 MG/1
1.3 INJECTION, POWDER, LYOPHILIZED, FOR SOLUTION INTRAVENOUS; SUBCUTANEOUS
Status: COMPLETED | OUTPATIENT
Start: 2024-01-05 | End: 2024-01-05

## 2024-01-05 RX ORDER — SODIUM CHLORIDE 0.9 % (FLUSH) 0.9 %
10 SYRINGE (ML) INJECTION
Status: DISCONTINUED | OUTPATIENT
Start: 2024-01-05 | End: 2024-01-05 | Stop reason: HOSPADM

## 2024-01-05 RX ADMIN — DARATUMUMAB AND HYALURONIDASE-FIHJ (HUMAN RECOMBINANT) 1800 MG: 1800; 30000 INJECTION SUBCUTANEOUS at 08:01

## 2024-01-05 RX ADMIN — BORTEZOMIB 3 MG: 3.5 INJECTION, POWDER, LYOPHILIZED, FOR SOLUTION INTRAVENOUS; SUBCUTANEOUS at 08:01

## 2024-01-11 ENCOUNTER — OFFICE VISIT (OUTPATIENT)
Dept: HEMATOLOGY/ONCOLOGY | Facility: CLINIC | Age: 44
End: 2024-01-11
Payer: COMMERCIAL

## 2024-01-11 ENCOUNTER — INFUSION (OUTPATIENT)
Dept: INFUSION THERAPY | Facility: HOSPITAL | Age: 44
End: 2024-01-11
Attending: STUDENT IN AN ORGANIZED HEALTH CARE EDUCATION/TRAINING PROGRAM
Payer: COMMERCIAL

## 2024-01-11 VITALS
WEIGHT: 216 LBS | BODY MASS INDEX: 31.99 KG/M2 | HEIGHT: 69 IN | DIASTOLIC BLOOD PRESSURE: 90 MMHG | OXYGEN SATURATION: 100 % | SYSTOLIC BLOOD PRESSURE: 163 MMHG | TEMPERATURE: 98 F | HEART RATE: 75 BPM

## 2024-01-11 VITALS
DIASTOLIC BLOOD PRESSURE: 90 MMHG | WEIGHT: 216 LBS | SYSTOLIC BLOOD PRESSURE: 163 MMHG | BODY MASS INDEX: 31.99 KG/M2 | HEART RATE: 75 BPM | OXYGEN SATURATION: 100 % | HEIGHT: 69 IN | TEMPERATURE: 98 F | RESPIRATION RATE: 18 BRPM

## 2024-01-11 DIAGNOSIS — C90.00 MULTIPLE MYELOMA, REMISSION STATUS UNSPECIFIED: ICD-10-CM

## 2024-01-11 DIAGNOSIS — G89.3 CANCER ASSOCIATED PAIN: ICD-10-CM

## 2024-01-11 DIAGNOSIS — C90.00 MULTIPLE MYELOMA NOT HAVING ACHIEVED REMISSION: Primary | ICD-10-CM

## 2024-01-11 DIAGNOSIS — R07.9 CHEST PAIN, UNSPECIFIED TYPE: ICD-10-CM

## 2024-01-11 DIAGNOSIS — D84.821 IMMUNODEFICIENCY DUE TO DRUG THERAPY: ICD-10-CM

## 2024-01-11 DIAGNOSIS — R53.82 CHRONIC FATIGUE: ICD-10-CM

## 2024-01-11 DIAGNOSIS — Z79.899 IMMUNODEFICIENCY DUE TO DRUG THERAPY: ICD-10-CM

## 2024-01-11 DIAGNOSIS — M87.180 OSTEONECROSIS OF JAW DUE TO DRUG: ICD-10-CM

## 2024-01-11 DIAGNOSIS — C90.00 METASTATIC MULTIPLE MYELOMA TO BONE: ICD-10-CM

## 2024-01-11 DIAGNOSIS — N17.9 AKI (ACUTE KIDNEY INJURY): ICD-10-CM

## 2024-01-11 DIAGNOSIS — C90.00 MULTIPLE MYELOMA, REMISSION STATUS UNSPECIFIED: Primary | ICD-10-CM

## 2024-01-11 PROCEDURE — 63600175 PHARM REV CODE 636 W HCPCS: Mod: JZ,JG

## 2024-01-11 PROCEDURE — 3008F BODY MASS INDEX DOCD: CPT | Mod: CPTII,S$GLB,,

## 2024-01-11 PROCEDURE — 3077F SYST BP >= 140 MM HG: CPT | Mod: CPTII,S$GLB,,

## 2024-01-11 PROCEDURE — 3080F DIAST BP >= 90 MM HG: CPT | Mod: CPTII,S$GLB,,

## 2024-01-11 PROCEDURE — 99999 PR PBB SHADOW E&M-EST. PATIENT-LVL V: CPT | Mod: PBBFAC,,,

## 2024-01-11 PROCEDURE — 99215 OFFICE O/P EST HI 40 MIN: CPT | Mod: S$GLB,,,

## 2024-01-11 PROCEDURE — 1160F RVW MEDS BY RX/DR IN RCRD: CPT | Mod: CPTII,S$GLB,,

## 2024-01-11 PROCEDURE — 96401 CHEMO ANTI-NEOPL SQ/IM: CPT

## 2024-01-11 PROCEDURE — 1159F MED LIST DOCD IN RCRD: CPT | Mod: CPTII,S$GLB,,

## 2024-01-11 RX ORDER — PROCHLORPERAZINE EDISYLATE 5 MG/ML
10 INJECTION INTRAMUSCULAR; INTRAVENOUS ONCE AS NEEDED
Status: CANCELLED | OUTPATIENT
Start: 2024-01-11

## 2024-01-11 RX ORDER — HEPARIN 100 UNIT/ML
500 SYRINGE INTRAVENOUS
Status: CANCELLED | OUTPATIENT
Start: 2024-01-11

## 2024-01-11 RX ORDER — DIPHENHYDRAMINE HYDROCHLORIDE 50 MG/ML
50 INJECTION, SOLUTION INTRAMUSCULAR; INTRAVENOUS ONCE AS NEEDED
Status: CANCELLED | OUTPATIENT
Start: 2024-01-11

## 2024-01-11 RX ORDER — BORTEZOMIB 3.5 MG/1
1.3 INJECTION, POWDER, LYOPHILIZED, FOR SOLUTION INTRAVENOUS; SUBCUTANEOUS
Status: COMPLETED | OUTPATIENT
Start: 2024-01-11 | End: 2024-01-11

## 2024-01-11 RX ORDER — EPINEPHRINE 0.3 MG/.3ML
0.3 INJECTION SUBCUTANEOUS ONCE AS NEEDED
Status: CANCELLED | OUTPATIENT
Start: 2024-01-11

## 2024-01-11 RX ORDER — BORTEZOMIB 3.5 MG/1
1.3 INJECTION, POWDER, LYOPHILIZED, FOR SOLUTION INTRAVENOUS; SUBCUTANEOUS
Status: CANCELLED | OUTPATIENT
Start: 2024-01-11

## 2024-01-11 RX ORDER — SODIUM CHLORIDE 0.9 % (FLUSH) 0.9 %
10 SYRINGE (ML) INJECTION
Status: CANCELLED | OUTPATIENT
Start: 2024-01-11

## 2024-01-11 RX ADMIN — BORTEZOMIB 3 MG: 3.5 INJECTION, POWDER, LYOPHILIZED, FOR SOLUTION INTRAVENOUS; SUBCUTANEOUS at 10:01

## 2024-01-11 RX ADMIN — DARATUMUMAB AND HYALURONIDASE-FIHJ (HUMAN RECOMBINANT) 1800 MG: 1800; 30000 INJECTION SUBCUTANEOUS at 10:01

## 2024-01-11 NOTE — PROGRESS NOTES
Subjective:       Patient ID: Pete Fernandez is a 43 y.o. male.    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 Beatriz+ KrD for cycle 2 on 11/30/23; Change at request of [facility]  Stopped after 1 dose due to severe pleural and pericardial effusion development   Switched back to BEATRIZ + RVD with increase of Rev to 25mg    HPI  44yo M presented to [facility] on 10/11 after 1 month of progressive weakness, abdominal pain, and intermittent confusion. At the time of his presentation, his labs which had been normal with PCP in 8/2023 were notable for new severe DOUG, transaminitis, and Ca of 18. He was admitted and started on IVF resuscitation.  Hypercalcemia workup was initiated and he was ultimately found to have numerous osseous lesions on 10/15/23 CT CAP, FLC - Kappa 3.29, Lambda 744, Ratio 226 , SPEP with Mspike 0.17 IgG lambda. Bone marrow biopsy done 10/18/23 revealed 95% plasma cells and FISH with TP53 deletion, FGFR3/IGH (or NSD2/IGH) fusion, usually representing a t(4;14), and a tetraploid subclone was observed. After correction of his hypercalcemia and improvement in his renal function he was discharged on 10/22/23.   He started C1 Beatriz + RVD + Zometa on 11/2/23. He was referred to [facility] for consideration of bone marrow transplant, they recommended change to Beatriz + Krd, however patient developed life threatening pleural and pericardial edema several days after 1st infusion requiring hospitalization and placement of a pericardial window. Therefore he transitioned back to Beatriz - RVD for C2.     Interval History:   He  returns to the clinic today for a follow-up and treatment clearance for C3D1 of Beatriz-VRD.  He reports that he was seen by his local dentist for his jaw pain and reports his jaw bone is protruding through gums. He does have a follow-up soon. Will get recent OV note for further review. Nausea controlled with meds as needed. He continues to take his Revlimid 25 mg as prescribed. He has a follow p with Dr. Cook on 1/22/24. Right upper abdominal quadrant painful when taking deep breath, has now resolved. He is currently taking calcium TID and ASA 81 mg daily. Labs reviewed in detail with him and are stable to continue with treatment. Denies any fevers, chills, NS, headaches, bleeding, bruising, neuropathy.     Past Medical History:   Diagnosis Date    Anxiety disorder, unspecified     Hypercalcemia       Past Surgical History:   Procedure Laterality Date    BONE MARROW BIOPSY N/A 10/18/2023    Procedure: Biopsy-bone marrow;  Surgeon: Nhan Marte MD;  Location: [facility];  Service: General;  Laterality: N/A;    TONSILLECTOMY       Social History     Socioeconomic History    Marital status:    Tobacco Use    Smoking status: Never    Smokeless tobacco: Never   Substance and Sexual Activity    Alcohol use: Yes    Drug use: Never    Sexual activity: Yes     Partners: Female     Social Determinants of Health     Financial Resource Strain: Medium Risk (11/15/2023)    Overall Financial Resource Strain (CARDIA)     Difficulty of Paying Living Expenses: Somewhat hard   Food Insecurity: Food Insecurity Present (11/15/2023)    Hunger Vital Sign     Worried About Running Out of Food in the Last Year: Sometimes true     Ran Out of Food in the Last Year: Never true   Transportation Needs: No Transportation Needs (11/15/2023)    PRAPARE - Transportation     Lack of Transportation (Medical): No     Lack of Transportation (Non-Medical): No   Physical Activity: Insufficiently Active (11/15/2023)    Exercise Vital Sign     Days  of Exercise per Week: 3 days     Minutes of Exercise per Session: 30 min   Stress: Stress Concern Present (11/15/2023)    [employer] - Occupational Stress Questionnaire     Feeling of Stress : To some extent   Social Connections: Unknown (11/15/2023)    Social Connection and Isolation Panel [NHANES]     Frequency of Communication with Friends and Family: More than three times a week     Frequency of Social Gatherings with Friends and Family: More than three times a week     Attends Sikh Services: Patient declined     Active Member of Clubs or Organizations: No     Attends Club or Organization Meetings: Never     Marital Status:    Housing Stability: Low Risk  (11/15/2023)    Housing Stability Vital Sign     Unable to Pay for Housing in the Last Year: No     Number of Places Lived in the Last Year: 1     Unstable Housing in the Last Year: No      Family History   Problem Relation Age of Onset    Breast cancer Mother     Pancreatic cancer Father       Review of patient's allergies indicates:  No Known Allergies   Review of Systems   Constitutional:  Negative for activity change, appetite change, chills, fatigue, fever and unexpected weight change.   HENT:  Positive for mouth sores. Negative for sore throat.    Eyes:  Negative for visual disturbance.   Respiratory:  Negative for cough and shortness of breath.    Cardiovascular:  Negative for chest pain.   Gastrointestinal:  Negative for abdominal pain, constipation, diarrhea, nausea and vomiting.   Endocrine: Negative for polyuria.   Genitourinary:  Positive for frequency. Negative for dysuria.   Musculoskeletal:  Negative for back pain.   Integumentary:  Negative for rash.   Allergic/Immunologic: Negative for frequent infections.   Neurological:  Negative for weakness and headaches.   Hematological:  Negative for adenopathy. Does not bruise/bleed easily.   Psychiatric/Behavioral:  The patient is not nervous/anxious.           Objective:      Vitals:    01/11/24 0923   BP: (!) 163/90   Pulse: 75   Resp: 18   Temp: 98 °F (36.7 °C)               Physical Exam  Constitutional:       General: He is not in acute distress.     Appearance: Normal appearance. He is not ill-appearing.   HENT:      Head: Normocephalic and atraumatic.      Nose: Nose normal.      Mouth/Throat:      Mouth: Mucous membranes are moist.      Pharynx: Oropharynx is clear.   Eyes:      Extraocular Movements: Extraocular movements intact.      Conjunctiva/sclera: Conjunctivae normal.      Pupils: Pupils are equal, round, and reactive to light.   Cardiovascular:      Rate and Rhythm: Normal rate and regular rhythm.      Pulses: Normal pulses.      Heart sounds: Normal heart sounds. No murmur heard.  Pulmonary:      Effort: Pulmonary effort is normal. No respiratory distress.      Breath sounds: Normal breath sounds.   Abdominal:      General: There is no distension.      Palpations: Abdomen is soft.      Tenderness: There is no abdominal tenderness.   Musculoskeletal:         General: Normal range of motion.      Cervical back: Normal range of motion and neck supple.   Lymphadenopathy:      Cervical: No cervical adenopathy.   Skin:     General: Skin is warm and dry.      Findings: No rash.   Neurological:      General: No focal deficit present.      Mental Status: He is alert and oriented to person, place, and time.         LABS AND IMAGING REVIEWED IN EPIC  10/11/23 Xray Metastatic Survey  1. Scattered small defects at the calvarium measuring up to 5 mm too numerous to count.  2. Degene mild rative changes at the spine and extremities  3. MR exam and bone scan would allow further evaluation.  10/12/23 MRI Brain  1. Motion artifact  2. Mild scattered mucosal thickening throughout the paranasal sinuses  3. Somewhat limited evaluation of brain metastases without the administration of IV contrast  4. Otherwise unremarkable MR exam of the head without the administration of IV  contrast  10/12/23 NM Bone Scan  1. Abnormal intense increased activity throughout the lungs of uncertain etiology.  This may related to hyperparathyroidism, hematologic malignancy, and or metabolic abnormality such as renal failure, vasculitis, or pulmonary infection.  Chest x-ray 10/10/2023 reveals the lungs to be relatively clear and well aerated.  2. Suspect scattered mild degenerative changes throughout the joint spaces  3. Increased activity nasal maxillary region suggesting mucoperiosteal disease  4. Mild activity at the stomach suggesting free pertechnetate  10/14/23 CT CAP  1. Widespread osseous findings suggestive of extensive metastatic skeletal disease, to include expansile destructive soft tissue component at the anterior right 7th rib.  2. Enlarged intrathoracic lymph nodes could also be of metastatic etiology.  3. Diffuse ground-glass attenuation through both lungs is nonspecific, could be secondary to atypical infectious or inflammatory process.  However, possibility of metastatic involvement cannot be excluded.  12/7/23 CXR:   1. Hazy opacification lower lungs bilaterally suspicious for infiltrate and pleural reaction which has progressed since the prior exam  2. Cardiomegaly  3. Thoracic spondylosis  12/11/23 CXR:  1. Mild cardiomegaly  2. Persistent bibasilar infiltrate and or pleural reaction  3. 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 Displayed all CRAB criteria at time of diagnosis in October. Undergoing 1st line Beatriz + RVD + Zometa. Being followed by [facility] in [facility] for transplant consideration.    ONJ  Zometa now on hold. Continue follow-ups with dental as scheduled.      Plan:   Labs  adequate for C3D1 of AYDE-VRD with Revlimid 25 mg   Current MM labs pending.   Zometa plan discontinued until further notice.   Continue calcium TID. Will stop at next OV.   Continue Aspirin 81mg, Acyclovir, and Bactrim for prophylaxis  Will need SPEP/OSVALDO and FLC with labs Q4w prior to every new cycle.   RTC in 1 week with NP with labs  Cbc, cmp, MM labs- 1 hr prior @ [facility]    I spent a total of 40 minutes on the day of the visit.This includes face to face time and non-face to face time preparing to see the patient (eg, review of tests), obtaining and/or reviewing separately obtained history, documenting clinical information in the electronic or other health record, independently interpreting results and communicating results to the patient/family/caregiver, or care coordinator.    Janet Alegria, YOVANNYP-C  Oncology/Hematology   [facility]cer Center [facility]          **All blood products must be irradiated and CMV negative**

## 2024-01-11 NOTE — PLAN OF CARE
Patient will have no S&S of nausea and instructed understanding of how to take antiemetics.Patient will receive injection in a timely manor. Patient completed injection without difficulty

## 2024-01-12 DIAGNOSIS — C90.00 MULTIPLE MYELOMA NOT HAVING ACHIEVED REMISSION: Primary | ICD-10-CM

## 2024-01-15 PROBLEM — N17.9 AKI (ACUTE KIDNEY INJURY): Status: RESOLVED | Noted: 2023-10-10 | Resolved: 2024-01-15

## 2024-01-18 ENCOUNTER — INFUSION (OUTPATIENT)
Dept: INFUSION THERAPY | Facility: HOSPITAL | Age: 44
End: 2024-01-18
Attending: STUDENT IN AN ORGANIZED HEALTH CARE EDUCATION/TRAINING PROGRAM
Payer: COMMERCIAL

## 2024-01-18 ENCOUNTER — OFFICE VISIT (OUTPATIENT)
Dept: HEMATOLOGY/ONCOLOGY | Facility: CLINIC | Age: 44
End: 2024-01-18
Payer: COMMERCIAL

## 2024-01-18 VITALS
RESPIRATION RATE: 18 BRPM | SYSTOLIC BLOOD PRESSURE: 148 MMHG | HEART RATE: 66 BPM | TEMPERATURE: 99 F | OXYGEN SATURATION: 99 % | HEIGHT: 69 IN | WEIGHT: 212.19 LBS | DIASTOLIC BLOOD PRESSURE: 85 MMHG | BODY MASS INDEX: 31.43 KG/M2

## 2024-01-18 VITALS
SYSTOLIC BLOOD PRESSURE: 148 MMHG | WEIGHT: 212.19 LBS | RESPIRATION RATE: 18 BRPM | BODY MASS INDEX: 31.43 KG/M2 | HEIGHT: 69 IN | OXYGEN SATURATION: 99 % | HEART RATE: 66 BPM | DIASTOLIC BLOOD PRESSURE: 85 MMHG | TEMPERATURE: 99 F

## 2024-01-18 DIAGNOSIS — R53.82 CHRONIC FATIGUE: ICD-10-CM

## 2024-01-18 DIAGNOSIS — N17.9 AKI (ACUTE KIDNEY INJURY): ICD-10-CM

## 2024-01-18 DIAGNOSIS — D84.821 IMMUNODEFICIENCY DUE TO DRUG THERAPY: ICD-10-CM

## 2024-01-18 DIAGNOSIS — N40.1 BPH ASSOCIATED WITH NOCTURIA: ICD-10-CM

## 2024-01-18 DIAGNOSIS — C90.00 MULTIPLE MYELOMA, REMISSION STATUS UNSPECIFIED: Primary | ICD-10-CM

## 2024-01-18 DIAGNOSIS — C90.00 METASTATIC MULTIPLE MYELOMA TO BONE: ICD-10-CM

## 2024-01-18 DIAGNOSIS — C90.00 MULTIPLE MYELOMA NOT HAVING ACHIEVED REMISSION: Primary | ICD-10-CM

## 2024-01-18 DIAGNOSIS — R35.1 BPH ASSOCIATED WITH NOCTURIA: ICD-10-CM

## 2024-01-18 DIAGNOSIS — Z79.899 IMMUNODEFICIENCY DUE TO DRUG THERAPY: ICD-10-CM

## 2024-01-18 DIAGNOSIS — C90.00 MULTIPLE MYELOMA, REMISSION STATUS UNSPECIFIED: ICD-10-CM

## 2024-01-18 DIAGNOSIS — M87.180 OSTEONECROSIS OF JAW DUE TO DRUG: ICD-10-CM

## 2024-01-18 DIAGNOSIS — G89.3 CANCER ASSOCIATED PAIN: ICD-10-CM

## 2024-01-18 PROCEDURE — 3008F BODY MASS INDEX DOCD: CPT | Mod: CPTII,S$GLB,,

## 2024-01-18 PROCEDURE — 96401 CHEMO ANTI-NEOPL SQ/IM: CPT

## 2024-01-18 PROCEDURE — 99999 PR PBB SHADOW E&M-EST. PATIENT-LVL V: CPT | Mod: PBBFAC,,,

## 2024-01-18 PROCEDURE — 99215 OFFICE O/P EST HI 40 MIN: CPT | Mod: S$GLB,,,

## 2024-01-18 PROCEDURE — 63600175 PHARM REV CODE 636 W HCPCS: Mod: JZ,JG

## 2024-01-18 PROCEDURE — 3079F DIAST BP 80-89 MM HG: CPT | Mod: CPTII,S$GLB,,

## 2024-01-18 PROCEDURE — 3077F SYST BP >= 140 MM HG: CPT | Mod: CPTII,S$GLB,,

## 2024-01-18 RX ORDER — HEPARIN 100 UNIT/ML
500 SYRINGE INTRAVENOUS
Status: CANCELLED | OUTPATIENT
Start: 2024-01-18

## 2024-01-18 RX ORDER — BORTEZOMIB 3.5 MG/1
1.3 INJECTION, POWDER, LYOPHILIZED, FOR SOLUTION INTRAVENOUS; SUBCUTANEOUS
Status: COMPLETED | OUTPATIENT
Start: 2024-01-18 | End: 2024-01-18

## 2024-01-18 RX ORDER — PROCHLORPERAZINE EDISYLATE 5 MG/ML
10 INJECTION INTRAMUSCULAR; INTRAVENOUS ONCE AS NEEDED
Status: CANCELLED | OUTPATIENT
Start: 2024-01-18

## 2024-01-18 RX ORDER — DIPHENHYDRAMINE HYDROCHLORIDE 50 MG/ML
50 INJECTION INTRAMUSCULAR; INTRAVENOUS ONCE AS NEEDED
Status: CANCELLED | OUTPATIENT
Start: 2024-01-18

## 2024-01-18 RX ORDER — EPINEPHRINE 0.3 MG/.3ML
0.3 INJECTION SUBCUTANEOUS ONCE AS NEEDED
Status: CANCELLED | OUTPATIENT
Start: 2024-01-18

## 2024-01-18 RX ORDER — SODIUM CHLORIDE 0.9 % (FLUSH) 0.9 %
10 SYRINGE (ML) INJECTION
Status: CANCELLED | OUTPATIENT
Start: 2024-01-18

## 2024-01-18 RX ORDER — BORTEZOMIB 3.5 MG/1
1.3 INJECTION, POWDER, LYOPHILIZED, FOR SOLUTION INTRAVENOUS; SUBCUTANEOUS
Status: CANCELLED | OUTPATIENT
Start: 2024-01-18

## 2024-01-18 RX ADMIN — BORTEZOMIB 3 MG: 3.5 INJECTION, POWDER, LYOPHILIZED, FOR SOLUTION INTRAVENOUS; SUBCUTANEOUS at 01:01

## 2024-01-18 NOTE — PROGRESS NOTES
Subjective:       Patient ID: Pete Fernandez is a 43 y.o. male.    Chief Complaint: Multiple Myeloma (Pt reports mouth sores. )    Diagnosis: 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    Current Treatment:   Beatriz+ RVD - 11/2/23 x 1 cycle, plan to restart for C2 on 12/14 potentially  C1 - Rev 10mg dose reduction due to CrCl  C2 - Rev increased to 25mg due to renal function improvement  Zometa 10/17/23 - 1/11/2024 (3 treatments total)  Currently on hold due to ONJ    Treatment History:  1. Beatriz+ KrD for cycle 2 on 11/30/23; Change at request of [facility]  Stopped after 1 dose due to severe pleural and pericardial effusion development   Switched back to BEATRIZ + RVD with increase of Rev to 25mg    HPI  42yo M presented to [facility] on 10/11 after 1 month of progressive weakness, abdominal pain, and intermittent confusion. At the time of his presentation, his labs which had been normal with PCP in 8/2023 were notable for new severe DOUG, transaminitis, and Ca of 18. He was admitted and started on IVF resuscitation.  Hypercalcemia workup was initiated and he was ultimately found to have numerous osseous lesions on 10/15/23 CT CAP, FLC - Kappa 3.29, Lambda 744, Ratio 226 , SPEP with Mspike 0.17 IgG lambda. Bone marrow biopsy done 10/18/23 revealed 95% plasma cells and FISH with TP53 deletion, FGFR3/IGH (or NSD2/IGH) fusion, usually representing a t(4;14), and a tetraploid subclone was observed. After correction of his hypercalcemia and improvement in his renal function he was discharged on 10/22/23.   He started C1 Beatriz + RVD + Zometa on 11/2/23. He was referred to [facility] for consideration of bone marrow transplant, they recommended change to Beatriz + Krd, however patient developed life threatening pleural and pericardial edema several days after 1st infusion requiring hospitalization and placement of a pericardial window. Therefore he transitioned back to Beatriz - RVD for C2.     Interval History:   He  returns to the clinic today for a follow-up and treatment clearance for C3D8 of Beatriz-CIPRIANO. He feels well today. UPEP was not completed last week, currently completing now. He was asked to complete on 1/22/2024 as he will be receiving chemo today and this may alter results.  He continues to have jaw pain related to his ONJ. He has a follow-up with dental in one week for close monitoring. He continues to take his Revlimid 25mg as prescribed. He has a follow up with Dr. Cook on 1/22/24. Nausea controlled with meds as needed. He is currently taking calcium TID and ASA 81 mg daily. Labs reviewed in detail with him and are stable to continue with treatment. Denies any fevers, chills, NS, headaches, bleeding, bruising, neuropathy.       Past Medical History:   Diagnosis Date    Anxiety disorder, unspecified     Hypercalcemia       Past Surgical History:   Procedure Laterality Date    BONE MARROW BIOPSY N/A 10/18/2023    Procedure: Biopsy-bone marrow;  Surgeon: Nhan Marte MD;  Location: [facility];  Service: General;  Laterality: N/A;    TONSILLECTOMY       Social History     Socioeconomic History    Marital status:    Tobacco Use    Smoking status: Never    Smokeless tobacco: Never   Substance and Sexual Activity    Alcohol use: Yes    Drug use: Never    Sexual activity: Yes     Partners: Female     Social Determinants of Health     Financial Resource Strain: Medium Risk (11/15/2023)    Overall Financial Resource Strain (CARDIA)     Difficulty of Paying Living Expenses: Somewhat hard   Food Insecurity: Food Insecurity Present (11/15/2023)    Hunger Vital Sign     Worried About Running Out of Food in the Last Year: Sometimes true     Ran Out of Food in the Last Year: Never true   Transportation Needs: No Transportation Needs (11/15/2023)    PRAPARE - Transportation     Lack of Transportation (Medical): No     Lack of Transportation (Non-Medical): No   Physical Activity: Insufficiently Active (11/15/2023)     Exercise Vital Sign     Days of Exercise per Week: 3 days     Minutes of Exercise per Session: 30 min   Stress: Stress Concern Present (11/15/2023)    [employer] - Occupational Stress Questionnaire     Feeling of Stress : To some extent   Social Connections: Unknown (11/15/2023)    Social Connection and Isolation Panel [NHANES]     Frequency of Communication with Friends and Family: More than three times a week     Frequency of Social Gatherings with Friends and Family: More than three times a week     Attends Yarsani Services: Patient declined     Active Member of Clubs or Organizations: No     Attends Club or Organization Meetings: Never     Marital Status:    Housing Stability: Low Risk  (11/15/2023)    Housing Stability Vital Sign     Unable to Pay for Housing in the Last Year: No     Number of Places Lived in the Last Year: 1     Unstable Housing in the Last Year: No      Family History   Problem Relation Age of Onset    Breast cancer Mother     Pancreatic cancer Father       Review of patient's allergies indicates:  No Known Allergies   Review of Systems   Constitutional:  Negative for activity change, appetite change, chills, fatigue, fever and unexpected weight change.   HENT:  Positive for mouth sores. Negative for sore throat.    Eyes:  Negative for visual disturbance.   Respiratory:  Negative for cough and shortness of breath.    Cardiovascular:  Negative for chest pain.   Gastrointestinal:  Negative for abdominal pain, constipation, diarrhea, nausea and vomiting.   Endocrine: Negative for polyuria.   Genitourinary:  Positive for frequency. Negative for dysuria.   Musculoskeletal:  Negative for back pain.   Integumentary:  Negative for rash.   Allergic/Immunologic: Negative for frequent infections.   Neurological:  Negative for weakness and headaches.   Hematological:  Negative for adenopathy. Does not bruise/bleed easily.   Psychiatric/Behavioral:  The patient is  not nervous/anxious.          Objective:      Vitals:    01/18/24 1244   BP: (!) 148/85   Pulse: 66   Resp: 18   Temp: 99 °F (37.2 °C)                 Physical Exam  Constitutional:       General: He is not in acute distress.     Appearance: Normal appearance. He is not ill-appearing.   HENT:      Head: Normocephalic and atraumatic.      Nose: Nose normal.      Mouth/Throat:      Mouth: Mucous membranes are moist.      Pharynx: Oropharynx is clear.   Eyes:      Extraocular Movements: Extraocular movements intact.      Conjunctiva/sclera: Conjunctivae normal.      Pupils: Pupils are equal, round, and reactive to light.   Cardiovascular:      Rate and Rhythm: Normal rate and regular rhythm.      Pulses: Normal pulses.      Heart sounds: Normal heart sounds. No murmur heard.  Pulmonary:      Effort: Pulmonary effort is normal. No respiratory distress.      Breath sounds: Normal breath sounds.   Abdominal:      General: There is no distension.      Palpations: Abdomen is soft.      Tenderness: There is no abdominal tenderness.   Musculoskeletal:         General: Normal range of motion.      Cervical back: Normal range of motion and neck supple.   Lymphadenopathy:      Cervical: No cervical adenopathy.   Skin:     General: Skin is warm and dry.      Findings: No rash.   Neurological:      General: No focal deficit present.      Mental Status: He is alert and oriented to person, place, and time.         LABS AND IMAGING REVIEWED IN EPIC  10/11/23 Xray Metastatic Survey  1. Scattered small defects at the calvarium measuring up to 5 mm too numerous to count.  2. Degene mild rative changes at the spine and extremities  3. MR exam and bone scan would allow further evaluation.  10/12/23 MRI Brain  1. Motion artifact  2. Mild scattered mucosal thickening throughout the paranasal sinuses  3. Somewhat limited evaluation of brain metastases without the administration of IV contrast  4. Otherwise unremarkable MR exam of the head  without the administration of IV contrast  10/12/23 NM Bone Scan  1. Abnormal intense increased activity throughout the lungs of uncertain etiology.  This may related to hyperparathyroidism, hematologic malignancy, and or metabolic abnormality such as renal failure, vasculitis, or pulmonary infection.  Chest x-ray 10/10/2023 reveals the lungs to be relatively clear and well aerated.  2. Suspect scattered mild degenerative changes throughout the joint spaces  3. Increased activity nasal maxillary region suggesting mucoperiosteal disease  4. Mild activity at the stomach suggesting free pertechnetate  10/14/23 CT CAP  1. Widespread osseous findings suggestive of extensive metastatic skeletal disease, to include expansile destructive soft tissue component at the anterior right 7th rib.  2. Enlarged intrathoracic lymph nodes could also be of metastatic etiology.  3. Diffuse ground-glass attenuation through both lungs is nonspecific, could be secondary to atypical infectious or inflammatory process.  However, possibility of metastatic involvement cannot be excluded.  12/7/23 CXR:   1. Hazy opacification lower lungs bilaterally suspicious for infiltrate and pleural reaction which has progressed since the prior exam  2. Cardiomegaly  3. Thoracic spondylosis  12/11/23 CXR:  1. Mild cardiomegaly  2. Persistent bibasilar infiltrate and or pleural reaction  3. 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 Displayed all CRAB criteria at time of diagnosis in October. Undergoing 1st line Beatriz + RVD + Zometa. Being followed by [facility] in [facility] for transplant consideration.     ONJ  Zometa now on hold. Continue follow-ups with dental as scheduled.      Plan:   Labs adequate for C3D8 of AYDE-VRD with Revlimid 25 mg   UPEP to be completed after chemo for worsening Mspike.   Continue to hold zometa due to ONJ.   Calcium decreased to once daily. If labs remain normal, will D/C as no longer on zometa.   Continue Aspirin 81mg, Acyclovir, and Bactrim for prophylaxis  Will need SPEP/OSVALDO and FLC with labs Q4w prior to every new cycle.   RTC in 1 week with NP with labs  Cbc, cmp, MM labs- 1 hr prior @ [facility]    I spent a total of 40 minutes on the day of the visit.This includes face to face time and non-face to face time preparing to see the patient (eg, review of tests), obtaining and/or reviewing separately obtained history, documenting clinical information in the electronic or other health record, independently interpreting results and communicating results to the patient/family/caregiver, or care coordinator.    Janet Alegria, YOVANNYP-C  Oncology/Hematology   Cancer Center [facility]          **All blood products must be irradiated and CMV negative**

## 2024-01-22 ENCOUNTER — OFFICE VISIT (OUTPATIENT)
Dept: HEMATOLOGY/ONCOLOGY | Facility: CLINIC | Age: 44
End: 2024-01-22
Payer: COMMERCIAL

## 2024-01-22 VITALS
RESPIRATION RATE: 18 BRPM | HEIGHT: 69 IN | WEIGHT: 212 LBS | TEMPERATURE: 98 F | SYSTOLIC BLOOD PRESSURE: 150 MMHG | DIASTOLIC BLOOD PRESSURE: 75 MMHG | HEART RATE: 76 BPM | BODY MASS INDEX: 31.4 KG/M2 | OXYGEN SATURATION: 98 %

## 2024-01-22 DIAGNOSIS — D70.1 CHEMOTHERAPY-INDUCED NEUTROPENIA: ICD-10-CM

## 2024-01-22 DIAGNOSIS — T45.1X5A CHEMOTHERAPY-INDUCED NEUTROPENIA: ICD-10-CM

## 2024-01-22 DIAGNOSIS — Z76.82 STEM CELL TRANSPLANT CANDIDATE: Primary | ICD-10-CM

## 2024-01-22 DIAGNOSIS — R53.82 CHRONIC FATIGUE: ICD-10-CM

## 2024-01-22 DIAGNOSIS — C90.00 MULTIPLE MYELOMA NOT HAVING ACHIEVED REMISSION: ICD-10-CM

## 2024-01-22 DIAGNOSIS — G89.3 CANCER ASSOCIATED PAIN: ICD-10-CM

## 2024-01-22 DIAGNOSIS — D63.0 ANEMIA IN NEOPLASTIC DISEASE: ICD-10-CM

## 2024-01-22 PROCEDURE — 1159F MED LIST DOCD IN RCRD: CPT | Mod: CPTII,S$GLB,, | Performed by: INTERNAL MEDICINE

## 2024-01-22 PROCEDURE — 99214 OFFICE O/P EST MOD 30 MIN: CPT | Mod: S$GLB,,, | Performed by: INTERNAL MEDICINE

## 2024-01-22 PROCEDURE — 3077F SYST BP >= 140 MM HG: CPT | Mod: CPTII,S$GLB,, | Performed by: INTERNAL MEDICINE

## 2024-01-22 PROCEDURE — 3008F BODY MASS INDEX DOCD: CPT | Mod: CPTII,S$GLB,, | Performed by: INTERNAL MEDICINE

## 2024-01-22 PROCEDURE — 3078F DIAST BP <80 MM HG: CPT | Mod: CPTII,S$GLB,, | Performed by: INTERNAL MEDICINE

## 2024-01-22 PROCEDURE — 99999 PR PBB SHADOW E&M-EST. PATIENT-LVL IV: CPT | Mod: PBBFAC,,, | Performed by: INTERNAL MEDICINE

## 2024-01-22 NOTE — PROGRESS NOTES
CC: 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, R-ISS III, stem cell transplant candidate; follow up       Current Treatment:   Beatriz+ RVd - 11/2/23 - Present  Zometa 10/17/23 - Present          HPI  is a 44y/o M with no significant medical problems. He has been diagnosed with multiple myeloma. He is here for a virtual stem cell transplant consultation.   Per records,  presented to [facility] on 10/11/23 after 1 month of progressive weakness, abdominal pain, and intermittent confusion. At the time of his presentation, his labs, which had been normal  in 8/2023, were notable for new severe DOUG, transaminitis, and Ca of 18. He was admitted and started on IVF .  Hypercalcemia workup was initiated and he was ultimately found to have numerous osseous lesions on 10/15/23 CT CAP, FLC - Kappa 3.29, Lambda 744, Ratio 226 , SPEP with M spike 0.17, IgG lambda on sIFE. Bone marrow biopsy done 10/18/23 revealed 95% plasma cells and FISH with TP53 deletion, FGFR3/IGH (or NSD2/IGH) fusion, usually representing a t(4;14), and a tetraploid subclone was observed. After correction of his hypercalcemia and improvement in his renal function he was discharged on 10/22/23.   He was started on Beatriz + RVd on 11/2/23.      Interval History: He is here for follow up.  He feels well today. He continues to have fatigue.      Past Medical History:   Diagnosis Date    Anxiety disorder, unspecified     Hypercalcemia            Past Surgical History:   Procedure Laterality Date    BONE MARROW BIOPSY N/A 10/18/2023    Procedure: Biopsy-bone marrow;  Surgeon: Nhan Marte MD;  Location: [facility];  Service: General;  Laterality: N/A;    TONSILLECTOMY           Social History     Socioeconomic History    Marital status:    Tobacco Use    Smoking status: Never    Smokeless tobacco: Never   Substance and Sexual Activity    Alcohol use: Yes    Drug use: Never    Sexual activity: Yes     Partners: Female     Social Determinants  of Health     Financial Resource Strain: Medium Risk (11/15/2023)    Overall Financial Resource Strain (CARDIA)     Difficulty of Paying Living Expenses: Somewhat hard   Food Insecurity: Food Insecurity Present (11/15/2023)    Hunger Vital Sign     Worried About Running Out of Food in the Last Year: Sometimes true     Ran Out of Food in the Last Year: Never true   Transportation Needs: No Transportation Needs (11/15/2023)    PRAPARE - Transportation     Lack of Transportation (Medical): No     Lack of Transportation (Non-Medical): No   Physical Activity: Insufficiently Active (11/15/2023)    Exercise Vital Sign     Days of Exercise per Week: 3 days     Minutes of Exercise per Session: 30 min   Stress: Stress Concern Present (11/15/2023)    [place] [employer] [place] [employer] - Occupational Stress Questionnaire     Feeling of Stress : To some extent   Social Connections: Unknown (11/15/2023)    Social Connection and Isolation Panel [NHANES]     Frequency of Communication with Friends and Family: More than three times a week     Frequency of Social Gatherings with Friends and Family: More than three times a week     Attends Restorationism Services: Patient declined     Active Member of Clubs or Organizations: No     Attends Club or Organization Meetings: Never     Marital Status:    Housing Stability: Low Risk  (11/15/2023)    Housing Stability Vital Sign     Unable to Pay for Housing in the Last Year: No     Number of Places Lived in the Last Year: 1     Unstable Housing in the Last Year: No         Review of patient's allergies indicates:  No Known Allergies      Current Outpatient Medications   Medication Sig    acyclovir (ZOVIRAX) 400 MG tablet Take 400 mg by mouth 2 (two) times daily.    aluminum & magnesium hydroxide-simethicone (MYLANTA MAX STRENGTH) 400-400-40 mg/5 mL suspension Take 5 mLs by mouth every 6 (six) hours as needed for Indigestion.    amiodarone (PACERONE) 200 MG Tab Take 200 mg by mouth  once daily.    aspirin 81 MG Chew Take 81 mg by mouth once daily.    calcitRIOL (ROCALTROL) 0.5 MCG Cap Take 0.5 mcg by mouth once daily.    colchicine (COLCRYS) 0.6 mg tablet     dexAMETHasone (DECADRON) 4 MG Tab Take 10 tablets (40 mg total) by mouth every 7 days. Take with food.    dexAMETHasone (DECADRON) 4 MG Tab Take 10 tablets (40 mg total) by mouth every 7 days. Take with food.    dextroamphetamine-amphetamine (ADDERALL) 20 mg tablet     diphenhydrAMINE (BENADRYL) 12.5 mg/5 mL elixir Take 5 mLs (12.5 mg total) by mouth 4 (four) times daily as needed for Allergies.    febuxostat (ULORIC) 40 mg Tab Take 40 mg by mouth once daily.    furosemide (LASIX) 40 MG tablet Take 1 tablet (40 mg total) by mouth once daily. for 3 days    LIDOcaine viscous HCl 2% (LIDOCAINE VISCOUS) Soln by Mucous Membrane route every 4 (four) hours. Mix equal parts of benadryl, mylanta, and lidocaine. Swish and spit 15 ml of solution every 4 hours as needed for mouth sores    metoprolol tartrate (LOPRESSOR) 25 MG tablet Take 25 mg by mouth 2 (two) times daily.    ondansetron (ZOFRAN) 8 MG tablet Take 1 tablet (8 mg total) by mouth every 8 (eight) hours as needed for Nausea.    oxyCODONE (ROXICODONE) 10 mg Tab immediate release tablet Take 1 tablet (10 mg total) by mouth every 4 (four) hours as needed for Pain.    pantoprazole (PROTONIX) 20 MG tablet TAKE 1 TABLET (20 MG TOTAL) BY MOUTH ONCE DAILY    prochlorperazine (COMPAZINE) 5 MG tablet Take 5 mg by mouth every 6 (six) hours as needed for Nausea.    REVLIMID 10 mg Cap Take 10 mg by mouth once daily on days 1 - 21 of each 28 day cycle.    REVLIMID 25 mg Cap Take 1 capsule (25 mg total) by mouth once daily on days 1-21 of each 28 day cycle. [facility] [[email or web address]] # [id number].    sodium bicarbonate 650 MG tablet Take 650 mg by mouth 2 (two) times daily.    sulfamethoxazole-trimethoprim 800-160mg (BACTRIM DS) 800-160 mg Tab Take 1 tablet by mouth once daily.    tamsulosin (FLOMAX) 0.4 mg Cap Take 1  capsule (0.4 mg total) by mouth once daily.     Current Facility-Administered Medications   Medication    0.9%  NaCl infusion (for blood administration)         Review of Systems   Constitutional:  Positive for malaise/fatigue. Negative for chills, fever and weight loss.   HENT:  Negative for congestion, ear discharge, ear pain, hearing loss, nosebleeds, sinus pain and tinnitus.    Eyes:  Negative for blurred vision, photophobia, pain, discharge and redness.   Respiratory:  Negative for sputum production.    Cardiovascular:  Negative for chest pain, palpitations, orthopnea and claudication.   Gastrointestinal:  Negative for blood in stool, constipation, diarrhea, heartburn, melena and vomiting.   Genitourinary:  Negative for dysuria, frequency, hematuria and urgency.   Musculoskeletal:  Positive for back pain and myalgias. Negative for joint pain and neck pain.   Neurological:  Negative for tremors, sensory change, speech change, focal weakness, weakness and headaches.   Endo/Heme/Allergies:  Negative for environmental allergies and polydipsia. Does not bruise/bleed easily.   Psychiatric/Behavioral:  Negative for depression, hallucinations and substance abuse. The patient is not nervous/anxious.             Physical exam      PS: ECOG 1        Physical Exam  HENT:      Head: Normocephalic and atraumatic.      Mouth/Throat:      Pharynx: No posterior oropharyngeal erythema.   Eyes:      General: No scleral icterus.  Cardiovascular:      Rate and Rhythm: Normal rate.   Pulmonary:      Effort: Pulmonary effort is normal.      Breath sounds: Normal breath sounds.   Abdominal:      General: There is no distension.   Musculoskeletal:      Right lower leg: No edema.      Left lower leg: No edema.   Lymphadenopathy:      Cervical: No cervical adenopathy.   Skin:     Coloration: Skin is not jaundiced.   Neurological:      General: No focal deficit present.      Mental Status: He is alert.      Cranial Nerves: No cranial  nerve deficit.          10/14/23 CT C/A/P    COMPARISON  None available at the time of initial interpretation.     FINDINGS  Images were reviewed in soft tissue, lung, and bone windows.     Exam quality: Inherently limited vascular and soft tissue assessment due to utilization of non-contrast protocol.  Quality further limited by patient movement throughout image acquisition with resulting widespread artifact particularly through the abdomen.     Lines/tubes: none visualized     Cardiovascular: Normal heart chamber size. No pericardial effusion.  Normal vessel caliber and contour through the chest, abdomen, and pelvis.     Lungs/Pleura: Central airways are widely patent.  No organized airspace consolidation or focal pulmonary lesion is identified.  However, there is diffuse ill-defined ground-glass attenuation throughout both lungs of overall nonspecific appearance.  Small layering bilateral pleural effusions are also evident.  No evidence of loculated component or pleural thickening identified.  There is no pneumothorax.     Abdominal Solid Organs: Unremarkable appearance for limited non-contrast CT assessment.  No evidence of acute abnormality appreciated.     Gallbladder/Biliary: No acute process, calcified stone, or evidence of biliary obstruction.     Gastrointestinal: No evidence of acute esophageal or abdominal hollow viscus injury. No active inflammatory process.     Pelvic Organs: No focal abnormality of the urinary bladder or adjacent structures.     Peritoneal Cavity/Retroperitoneum: No free fluid or air, and no drainable collections.     Musculoskeletal: No acute or focal subcutaneous or muscular abnormality.  There is no evidence of acute osseous displacement.  However, widespread heterogeneity and innumerable lucent foci are appreciated throughout the spinal column, as well as the bony thorax and pelvis.  A more pronounced lytic lesion that is poorly marginated is noted at the inferior right  posterolateral T11 vertebral body measuring 2.3 cm x 1.3 cm with overlying cortical disruption (series 3, image 96).  An additional focal expansile soft tissue density lesion is present at the right anterolateral 7th ribs with overlying cortical destruction and dimensions of approximately 3.6 cm x 2.3 cm (series 2, image 89).  Multiple nondisplaced, likely subacute/chronic right rib fractures also incidentally identified.     Other findings: Scattered mediastinal lymph nodes are enlarged.  For example, right posterolateral upper paratracheal node measures 1.4 cm short axis (series 2, image 19).  Subcarinal node short axis dimension 1.4 cm (image 53).  No necrotic adenopathy is identified.  The thyroid is unremarkable.     IMPRESSION  1. Widespread osseous findings suggestive of extensive metastatic skeletal disease, to include expansile destructive soft tissue component at the anterior right 7th rib.  2. Enlarged intrathoracic lymph nodes could also be of metastatic etiology.  3. Diffuse ground-glass attenuation through both lungs is nonspecific, could be secondary to atypical infectious or inflammatory process.  However, possibility of metastatic involvement cannot be excluded.      10/18/23 bone marrow biopsy       1. BONE MARROW, RIGHT POSTERIOR ILIAC CREST, BIOPSY AND ASPIRATE:      PLASMA CELL MYELOMA, LAMBDA-RESTRICTED.      LAMBDA-RESTRICTED ATYPICAL PLASMA CELLS COMPRISE 95% OF MARROW NUCLEATED   CELLULARITY AND ARE ARRANGED IN DIFFUSE SHEETS.      REDUCED MARROW STORES.         PERIPHERAL BLOOD:   MILD NORMOCHROMIC NORMOCYTIC ANEMIA.      2. SEE DIAGNOSIS #1.       Comment   A. The patient's clinical history of hypercalcemia, weakness and acute kidney injury with CT showing extensive osseous lesions, SPEP showing monoclonal spike   0.17 g/dL, IgG lambda by OSVALDO is noted.      B. The current bone marrow is 100% cellular and the vast majority (95%) of the cellularity is composed of atypical plasma cells which  are Lambda-restricted by   flow cytometry and immunohistochemical staining.      C. A representative sample of this patient's bone marrow was submitted for flow cytometry, which revealed a population of monotypic plasma cells (43.71%),   restricted to the production of Lambda light chains, with coexpression of CD38 and 138, negative for CD19 and CD45. No monotypic B cell population or increase   in blasts are identified. See below and separate scanned report.      D. A representative sample was also submitted for Myeloma FISH panel, which was abnormal, indicating a plasma cell clone with a TP53 deletion and FGFR3/IGH (or   NSD2/IGH) fusion, usually representing a t(4;14).  In addition, a tetraploid subclone was observed. In plasma cell dyscrasias, a TP53 deletion represents a   high risk cytogenetic abnormality.  See below and separate scanned report.       12/14/23 SPEP: Serum protein electrophoresis shows a faint monoclonal peak (0.02 g/dL) in the gamma zone, consistent with a monoclonal gammopathy.     12/14/23 OSVALDO: A faint band is present in the IgG kaitlin with a corresponding faint band in the kappa kaitlin.     The immunofixation electrophoresis are consistent with monoclonal gammopathy of IgG-kappa isotype.         Component      Latest Ref Rng 1/11/2024   Sodium      136 - 145 mmol/L 143    Potassium      3.5 - 5.1 mmol/L 3.9    Chloride      98 - 107 mmol/L 105    CO2      22 - 29 mmol/L 28    Glucose      74 - 100 mg/dL 93    BUN      8.9 - 20.6 mg/dL 18.0    Creatinine      0.73 - 1.18 mg/dL 1.67 (H)    Calcium      8.4 - 10.2 mg/dL 9.1    PROTEIN TOTAL      6.4 - 8.3 gm/dL 5.8 (L)    Albumin      3.5 - 5.0 g/dL 3.7    Globulin, Total      2.4 - 3.5 gm/dL 2.1 (L)    Albumin/Globulin Ratio      1.1 - 2.0 ratio 1.8    BILIRUBIN TOTAL      <=1.5 mg/dL 0.9    ALP      40 - 150 unit/L 74    ALT      0 - 55 unit/L 24    AST      5 - 34 unit/L 19    eGFR      mls/min/1.73/m2 51    IgG      540.00 - 1,822.00 mg/dL  273.00 (L)    IgA      63.0 - 484.0 mg/dL 5.0 (L)    IgM      22.0 - 240.0 mg/dL 6.0 (L)    [place] Free Light Chain      0.3300 - 1.94 mg/dL 0.3900    Lambda Free Light Chain      0.5700 - 2.63 mg/dL 0.3300 (L)    Kappa/Lambda FLC Ratio      0.2600 - 1.65  1.18    Magnesium       1.60 - 2.60 mg/dL 1.70    Phosphorus Level      2.3 - 4.7 mg/dL 4.0       WBC 4.50 - 11.50 x10(3)/mcL 4.08 Low    RBC 4.70 - 6.10 x10(6)/mcL 2.54 Low    Hgb 14.0 - 18.0 g/dL 8.5 Low    Hct 42.0 - 52.0 % 25.5 Low    MCV 80.0 - 94.0 fL 100.4 High    MCH 27.0 - 31.0 pg 33.5 High    MCHC 33.0 - 36.0 g/dL 33.3   RDW 11.5 - 17.0 % 17.3 High    Platelet 130 - 400 x10(3)/mcL 223   MPV 7.4 - 10.4 fL 11.5 High    Neut % % 67.2   Lymph % % 17.6   Mono % % 7.6   Eos % % 5.9   Basophil % % 1.2   Lymph # 0.6 - 4.6 x10(3)/mcL 0.72   Neut # 2.1 - 9.2 x10(3)/mcL 2.74   Mono # 0.1 - 1.3 x10(3)/mcL 0.31   Eos # 0 - 0.9 x10(3)/mcL 0.24   Baso # <=0.2 x10(3)/mcL 0.05   IG# 0 - 0.04 x10(3)/mcL 0.02   IG% % 0.5     Legend:  (H) High  (L) Low      Assessment:      1. R-ISS stage III IgG lambda multiple myeloma not in remission  2. Stem cell transplant candidate  3. DOUG  4. Fatigue  5. Cancer associated pain  6. Anemia in neoplastic disease  7. Leukopenia  8. Neutropenia  9. thrombocytopenia      Plan:    1,2. He has high risk features on bone marrow aspirate FISH,  including TP53 deletion and FGFR3/IGH (or NSD2/IGH) fusion, usually representing a t(4;14). He had presented with DOUG and hypercalcemia.    He has started mira VRd induction. In view of high risk features on FISH and good performance status I had recommended switching bortezomib to carfilzomib.   I discussed the role, timing, risks and benefits of autologous stem cell transplant in multiple myeloma. I explained that although the autologous stem cell transplant is NOT curative, it has progression free survival and overall survival benefits, when the transplant is followed by maintenance therapy. I  discussed that he will andi to be in  [place] for 4 weeks for the transplant , with approximately 2 weeks in the hospital, and 2 weeks post discharge locally and will need an adult care-giver for all four weeks.   He has good performance status with limited co-morbid conditions and will possibly be transplanted in March-April 2024 if he responds well to induction.  He has not had a baseline PET CT. CT C/A/P done on 10/14/23 demonstrated scattered mediastinal lymph nodes. Unclear if this is extramedullary involvement  by his myeloma.  He is on zometa.   He is in cycle 3 mira VRd . His serum free light chain ratio is normal as of 1/11/24. 12/14/23 SPEP/ OSVALDO with a  faint monoclonal peak (0.02 g/dL) in the gamma zone, consistent with a monoclonal gammopathy. A faint band was present in the IgG kaitlin with a corresponding faint band in the kappa kaitlin, possibly due to daratumumab interference ( original paraprotein was IgG lambda).   He will be meeting our transplant co-ordinator today and start evaluation once transplant dates are finalized.   Daratumumab will be held after C4.         3. Secondary to multiple myeloma, Cr improving.       4. Secodnary to multiple myeloma    5. He has widespread bone involvement. He is on zometa, oxycodone.    6. Most recent Hgb 8.5g/dl    7, 8. Most recent WBC count 4.08, ANC 2.74. He is afebrile.    9,. Most recent platelet count 223 k

## 2024-01-23 ENCOUNTER — LAB VISIT (OUTPATIENT)
Dept: LAB | Facility: HOSPITAL | Age: 44
End: 2024-01-23
Payer: COMMERCIAL

## 2024-01-23 DIAGNOSIS — D84.821 IMMUNODEFICIENCY DUE TO DRUG THERAPY: ICD-10-CM

## 2024-01-23 DIAGNOSIS — Z79.899 IMMUNODEFICIENCY DUE TO DRUG THERAPY: ICD-10-CM

## 2024-01-23 DIAGNOSIS — E83.52 HYPERCALCEMIA: Primary | ICD-10-CM

## 2024-01-23 DIAGNOSIS — D69.6 THROMBOCYTOPENIA: ICD-10-CM

## 2024-01-23 PROCEDURE — 0077U IG PARAPROTEIN QUAL BLD/UR: CPT

## 2024-01-23 PROCEDURE — 84156 ASSAY OF PROTEIN URINE: CPT

## 2024-01-25 ENCOUNTER — OFFICE VISIT (OUTPATIENT)
Dept: HEMATOLOGY/ONCOLOGY | Facility: CLINIC | Age: 44
End: 2024-01-25
Payer: COMMERCIAL

## 2024-01-25 ENCOUNTER — INFUSION (OUTPATIENT)
Dept: INFUSION THERAPY | Facility: HOSPITAL | Age: 44
End: 2024-01-25
Attending: STUDENT IN AN ORGANIZED HEALTH CARE EDUCATION/TRAINING PROGRAM
Payer: COMMERCIAL

## 2024-01-25 ENCOUNTER — LAB VISIT (OUTPATIENT)
Dept: LAB | Facility: HOSPITAL | Age: 44
End: 2024-01-25
Payer: COMMERCIAL

## 2024-01-25 VITALS
OXYGEN SATURATION: 98 % | RESPIRATION RATE: 18 BRPM | WEIGHT: 207.63 LBS | HEIGHT: 69 IN | DIASTOLIC BLOOD PRESSURE: 76 MMHG | SYSTOLIC BLOOD PRESSURE: 125 MMHG | HEART RATE: 73 BPM | BODY MASS INDEX: 30.75 KG/M2 | TEMPERATURE: 98 F

## 2024-01-25 VITALS
OXYGEN SATURATION: 98 % | TEMPERATURE: 98 F | WEIGHT: 207.63 LBS | SYSTOLIC BLOOD PRESSURE: 125 MMHG | BODY MASS INDEX: 30.75 KG/M2 | RESPIRATION RATE: 18 BRPM | HEART RATE: 73 BPM | HEIGHT: 69 IN | DIASTOLIC BLOOD PRESSURE: 76 MMHG

## 2024-01-25 DIAGNOSIS — C90.00 MULTIPLE MYELOMA, REMISSION STATUS UNSPECIFIED: Primary | ICD-10-CM

## 2024-01-25 DIAGNOSIS — C90.00 MULTIPLE MYELOMA, REMISSION STATUS UNSPECIFIED: ICD-10-CM

## 2024-01-25 DIAGNOSIS — Z79.899 IMMUNODEFICIENCY DUE TO DRUG THERAPY: ICD-10-CM

## 2024-01-25 DIAGNOSIS — T45.1X5A CHEMOTHERAPY-INDUCED NEUTROPENIA: ICD-10-CM

## 2024-01-25 DIAGNOSIS — C90.00 MULTIPLE MYELOMA NOT HAVING ACHIEVED REMISSION: ICD-10-CM

## 2024-01-25 DIAGNOSIS — D63.0 ANEMIA IN NEOPLASTIC DISEASE: ICD-10-CM

## 2024-01-25 DIAGNOSIS — C90.00 MULTIPLE MYELOMA NOT HAVING ACHIEVED REMISSION: Primary | ICD-10-CM

## 2024-01-25 DIAGNOSIS — G89.3 CANCER ASSOCIATED PAIN: ICD-10-CM

## 2024-01-25 DIAGNOSIS — M87.180 OSTEONECROSIS OF JAW DUE TO DRUG: ICD-10-CM

## 2024-01-25 DIAGNOSIS — N17.9 AKI (ACUTE KIDNEY INJURY): ICD-10-CM

## 2024-01-25 DIAGNOSIS — D84.821 IMMUNODEFICIENCY DUE TO DRUG THERAPY: ICD-10-CM

## 2024-01-25 DIAGNOSIS — Z76.82 STEM CELL TRANSPLANT CANDIDATE: ICD-10-CM

## 2024-01-25 DIAGNOSIS — C90.00 METASTATIC MULTIPLE MYELOMA TO BONE: ICD-10-CM

## 2024-01-25 DIAGNOSIS — D70.1 CHEMOTHERAPY-INDUCED NEUTROPENIA: ICD-10-CM

## 2024-01-25 DIAGNOSIS — R53.82 CHRONIC FATIGUE: ICD-10-CM

## 2024-01-25 LAB
ALBUMIN SERPL-MCNC: 3.8 G/DL (ref 3.5–5)
ALBUMIN/GLOB SERPL: 1.9 RATIO (ref 1.1–2)
ALP SERPL-CCNC: 57 UNIT/L (ref 40–150)
ALT SERPL-CCNC: 20 UNIT/L (ref 0–55)
AST SERPL-CCNC: 17 UNIT/L (ref 5–34)
BASOPHILS # BLD AUTO: 0.05 X10(3)/MCL
BASOPHILS NFR BLD AUTO: 0.7 %
BILIRUB SERPL-MCNC: 0.9 MG/DL
BUN SERPL-MCNC: 18 MG/DL (ref 8.9–20.6)
CALCIUM SERPL-MCNC: 9.5 MG/DL (ref 8.4–10.2)
CHLORIDE SERPL-SCNC: 99 MMOL/L (ref 98–107)
CO2 SERPL-SCNC: 29 MMOL/L (ref 22–29)
CREAT SERPL-MCNC: 1.89 MG/DL (ref 0.73–1.18)
EOSINOPHIL # BLD AUTO: 0.25 X10(3)/MCL (ref 0–0.9)
EOSINOPHIL NFR BLD AUTO: 3.3 %
ERYTHROCYTE [DISTWIDTH] IN BLOOD BY AUTOMATED COUNT: 16.6 % (ref 11.5–17)
GFR SERPLBLD CREATININE-BSD FMLA CKD-EPI: 44 MLS/MIN/1.73/M2
GLOBULIN SER-MCNC: 2 GM/DL (ref 2.4–3.5)
GLUCOSE SERPL-MCNC: 112 MG/DL (ref 74–100)
HCT VFR BLD AUTO: 26.2 % (ref 42–52)
HGB BLD-MCNC: 8.8 G/DL (ref 14–18)
IMM GRANULOCYTES # BLD AUTO: 0.04 X10(3)/MCL (ref 0–0.04)
IMM GRANULOCYTES NFR BLD AUTO: 0.5 %
LYMPHOCYTES # BLD AUTO: 0.16 X10(3)/MCL (ref 0.6–4.6)
LYMPHOCYTES NFR BLD AUTO: 2.1 %
MAGNESIUM SERPL-MCNC: 1.7 MG/DL (ref 1.6–2.6)
MCH RBC QN AUTO: 33.5 PG (ref 27–31)
MCHC RBC AUTO-ENTMCNC: 33.6 G/DL (ref 33–36)
MCV RBC AUTO: 99.6 FL (ref 80–94)
MONOCYTES # BLD AUTO: 0.71 X10(3)/MCL (ref 0.1–1.3)
MONOCYTES NFR BLD AUTO: 9.3 %
NEUTROPHILS # BLD AUTO: 6.41 X10(3)/MCL (ref 2.1–9.2)
NEUTROPHILS NFR BLD AUTO: 84.1 %
PHOSPHATE SERPL-MCNC: 3.4 MG/DL (ref 2.3–4.7)
PLATELET # BLD AUTO: 129 X10(3)/MCL (ref 130–400)
PMV BLD AUTO: 10.7 FL (ref 7.4–10.4)
POTASSIUM SERPL-SCNC: 3.8 MMOL/L (ref 3.5–5.1)
PROT SERPL-MCNC: 5.8 GM/DL (ref 6.4–8.3)
RBC # BLD AUTO: 2.63 X10(6)/MCL (ref 4.7–6.1)
SODIUM SERPL-SCNC: 135 MMOL/L (ref 136–145)
WBC # SPEC AUTO: 7.62 X10(3)/MCL (ref 4.5–11.5)

## 2024-01-25 PROCEDURE — 3074F SYST BP LT 130 MM HG: CPT | Mod: CPTII,S$GLB,,

## 2024-01-25 PROCEDURE — 3078F DIAST BP <80 MM HG: CPT | Mod: CPTII,S$GLB,,

## 2024-01-25 PROCEDURE — 36415 COLL VENOUS BLD VENIPUNCTURE: CPT

## 2024-01-25 PROCEDURE — 84100 ASSAY OF PHOSPHORUS: CPT

## 2024-01-25 PROCEDURE — 83735 ASSAY OF MAGNESIUM: CPT

## 2024-01-25 PROCEDURE — 80053 COMPREHEN METABOLIC PANEL: CPT

## 2024-01-25 PROCEDURE — 3008F BODY MASS INDEX DOCD: CPT | Mod: CPTII,S$GLB,,

## 2024-01-25 PROCEDURE — 99999 PR PBB SHADOW E&M-EST. PATIENT-LVL V: CPT | Mod: PBBFAC,,,

## 2024-01-25 PROCEDURE — 96401 CHEMO ANTI-NEOPL SQ/IM: CPT

## 2024-01-25 PROCEDURE — 99215 OFFICE O/P EST HI 40 MIN: CPT | Mod: S$GLB,,,

## 2024-01-25 PROCEDURE — 63600175 PHARM REV CODE 636 W HCPCS: Mod: JZ,JG

## 2024-01-25 PROCEDURE — 85025 COMPLETE CBC W/AUTO DIFF WBC: CPT

## 2024-01-25 PROCEDURE — 1160F RVW MEDS BY RX/DR IN RCRD: CPT | Mod: CPTII,S$GLB,,

## 2024-01-25 PROCEDURE — 1159F MED LIST DOCD IN RCRD: CPT | Mod: CPTII,S$GLB,,

## 2024-01-25 RX ORDER — SODIUM CHLORIDE 0.9 % (FLUSH) 0.9 %
10 SYRINGE (ML) INJECTION
Status: CANCELLED | OUTPATIENT
Start: 2024-01-25

## 2024-01-25 RX ORDER — BORTEZOMIB 3.5 MG/1
1.3 INJECTION, POWDER, LYOPHILIZED, FOR SOLUTION INTRAVENOUS; SUBCUTANEOUS
Status: COMPLETED | OUTPATIENT
Start: 2024-01-25 | End: 2024-01-25

## 2024-01-25 RX ORDER — EPINEPHRINE 0.3 MG/.3ML
0.3 INJECTION SUBCUTANEOUS ONCE AS NEEDED
Status: CANCELLED | OUTPATIENT
Start: 2024-01-25

## 2024-01-25 RX ORDER — PROCHLORPERAZINE EDISYLATE 5 MG/ML
10 INJECTION INTRAMUSCULAR; INTRAVENOUS ONCE AS NEEDED
Status: CANCELLED | OUTPATIENT
Start: 2024-01-25

## 2024-01-25 RX ORDER — BORTEZOMIB 3.5 MG/1
1.3 INJECTION, POWDER, LYOPHILIZED, FOR SOLUTION INTRAVENOUS; SUBCUTANEOUS
Status: CANCELLED | OUTPATIENT
Start: 2024-01-25

## 2024-01-25 RX ORDER — DIPHENHYDRAMINE HYDROCHLORIDE 50 MG/ML
50 INJECTION INTRAMUSCULAR; INTRAVENOUS ONCE AS NEEDED
Status: CANCELLED | OUTPATIENT
Start: 2024-01-25

## 2024-01-25 RX ORDER — HEPARIN 100 UNIT/ML
500 SYRINGE INTRAVENOUS
Status: CANCELLED | OUTPATIENT
Start: 2024-01-25

## 2024-01-25 RX ADMIN — BORTEZOMIB 3 MG: 3.5 INJECTION, POWDER, LYOPHILIZED, FOR SOLUTION INTRAVENOUS; SUBCUTANEOUS at 12:01

## 2024-01-25 RX ADMIN — DARATUMUMAB AND HYALURONIDASE-FIHJ (HUMAN RECOMBINANT) 1800 MG: 1800; 30000 INJECTION SUBCUTANEOUS at 12:01

## 2024-01-25 NOTE — PROGRESS NOTES
Subjective:       Patient ID: Pete Fernandez is a 43 y.o. male.    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 Baetriz+ KrD for cycle 2 on 11/30/23; Change at request of [facility]  Stopped after 1 dose due to severe pleural and pericardial effusion development   Switched back to BEATRIZ + RVD with increase of Rev to 25mg    HPI  42yo M presented to [facility] on 10/11 after 1 month of progressive weakness, abdominal pain, and intermittent confusion. At the time of his presentation, his labs which had been normal with PCP in 8/2023 were notable for new severe DOUG, transaminitis, and Ca of 18. He was admitted and started on IVF resuscitation.  Hypercalcemia workup was initiated and he was ultimately found to have numerous osseous lesions on 10/15/23 CT CAP, FLC - Kappa 3.29, Lambda 744, Ratio 226 , SPEP with Mspike 0.17 IgG lambda. Bone marrow biopsy done 10/18/23 revealed 95% plasma cells and FISH with TP53 deletion, FGFR3/IGH (or NSD2/IGH) fusion, usually representing a t(4;14), and a tetraploid subclone was observed. After correction of his hypercalcemia and improvement in his renal function he was discharged on 10/22/23.   He started C1 Beatriz + RVD + Zometa on 11/2/23. He was referred to [facility] for consideration of bone marrow transplant, they recommended change to Beatriz + Krd, however patient developed life threatening pleural and pericardial edema several days after 1st infusion requiring hospitalization and placement of a pericardial window. Therefore he transitioned back to Beatriz - RVD for C2.     Interval History:   He returns to  the clinic today for a follow-up and treatment clearance for C3D15 of Beatriz-VRD. He feels well today. UPEP currently pending.  He continues to have jaw pain related to his ONJ that is stable. He was seen by dental last week and has no active infection. He continues to take his Revlimid 25mg as prescribed. He was seen by Dr. Cook on 1/22/2024. Planning to hold Daratumumab after cycle 4 with transplant march or April of this year. Nausea controlled with meds as needed. He is currently taking calcium once daily and ASA 81 mg daily. Labs reviewed in detail with him and are stable to continue with treatment. Denies any fevers, chills, NS, headaches, bleeding, bruising, neuropathy.       Past Medical History:   Diagnosis Date    Anxiety disorder, unspecified     Hypercalcemia       Past Surgical History:   Procedure Laterality Date    BONE MARROW BIOPSY N/A 10/18/2023    Procedure: Biopsy-bone marrow;  Surgeon: Nhan Marte MD;  Location: [facility];  Service: General;  Laterality: N/A;    TONSILLECTOMY       Social History     Socioeconomic History    Marital status:    Tobacco Use    Smoking status: Never    Smokeless tobacco: Never   Substance and Sexual Activity    Alcohol use: Yes    Drug use: Never    Sexual activity: Yes     Partners: Female     Social Determinants of Health     Financial Resource Strain: Medium Risk (11/15/2023)    Overall Financial Resource Strain (CARDIA)     Difficulty of Paying Living Expenses: Somewhat hard   Food Insecurity: Food Insecurity Present (11/15/2023)    Hunger Vital Sign     Worried About Running Out of Food in the Last Year: Sometimes true     Ran Out of Food in the Last Year: Never true   Transportation Needs: No Transportation Needs (11/15/2023)    PRAPARE - Transportation     Lack of Transportation (Medical): No     Lack of Transportation (Non-Medical): No   Physical Activity: Insufficiently Active (11/15/2023)    Exercise Vital Sign     Days of Exercise per Week: 3  days     Minutes of Exercise per Session: 30 min   Stress: Stress Concern Present (11/15/2023)    [place] [employer] - Occupational Stress Questionnaire     Feeling of Stress : To some extent   Social Connections: Unknown (11/15/2023)    Social Connection and Isolation Panel [NHANES]     Frequency of Communication with Friends and Family: More than three times a week     Frequency of Social Gatherings with Friends and Family: More than three times a week     Attends Gnosticist Services: Patient declined     Active Member of Clubs or Organizations: No     Attends Club or Organization Meetings: Never     Marital Status:    Housing Stability: Low Risk  (11/15/2023)    Housing Stability Vital Sign     Unable to Pay for Housing in the Last Year: No     Number of Places Lived in the Last Year: 1     Unstable Housing in the Last Year: No      Family History   Problem Relation Age of Onset    Breast cancer Mother     Pancreatic cancer Father       Review of patient's allergies indicates:  No Known Allergies   Review of Systems   Constitutional:  Negative for activity change, appetite change, chills, fatigue, fever and unexpected weight change.   HENT:  Negative for mouth sores and sore throat.    Eyes:  Negative for visual disturbance.   Respiratory:  Negative for cough and shortness of breath.    Cardiovascular:  Negative for chest pain.   Gastrointestinal:  Negative for abdominal pain, constipation, diarrhea, nausea and vomiting.   Endocrine: Negative for polyuria.   Genitourinary:  Negative for dysuria and frequency.   Musculoskeletal:  Negative for back pain.   Integumentary:  Negative for rash.   Allergic/Immunologic: Negative for frequent infections.   Neurological:  Negative for weakness and headaches.   Hematological:  Negative for adenopathy. Does not bruise/bleed easily.   Psychiatric/Behavioral:  The patient is not nervous/anxious.          Objective:      Vitals:    01/25/24 1107    BP: 125/76   Pulse: 73   Resp: 18   Temp: 97.8 °F (36.6 °C)         Physical Exam  Constitutional:       General: He is not in acute distress.     Appearance: Normal appearance. He is not ill-appearing.   HENT:      Head: Normocephalic and atraumatic.      Nose: Nose normal.      Mouth/Throat:      Mouth: Mucous membranes are moist.      Pharynx: Oropharynx is clear.   Eyes:      Extraocular Movements: Extraocular movements intact.      Conjunctiva/sclera: Conjunctivae normal.      Pupils: Pupils are equal, round, and reactive to light.   Cardiovascular:      Rate and Rhythm: Normal rate and regular rhythm.      Pulses: Normal pulses.      Heart sounds: Normal heart sounds. No murmur heard.  Pulmonary:      Effort: Pulmonary effort is normal. No respiratory distress.      Breath sounds: Normal breath sounds.   Abdominal:      General: There is no distension.      Palpations: Abdomen is soft.      Tenderness: There is no abdominal tenderness.   Musculoskeletal:         General: Normal range of motion.      Cervical back: Normal range of motion and neck supple.   Lymphadenopathy:      Cervical: No cervical adenopathy.   Skin:     General: Skin is warm and dry.      Findings: No rash.   Neurological:      General: No focal deficit present.      Mental Status: He is alert and oriented to person, place, and time.       LABS AND IMAGING REVIEWED IN EPIC  10/11/23 Xray Metastatic Survey  1. Scattered small defects at the calvarium measuring up to 5 mm too numerous to count.  2. Degene mild rative changes at the spine and extremities  3. MR exam and bone scan would allow further evaluation.  10/12/23 MRI Brain  1. Motion artifact  2. Mild scattered mucosal thickening throughout the paranasal sinuses  3. Somewhat limited evaluation of brain metastases without the administration of IV contrast  4. Otherwise unremarkable MR exam of the head without the administration of IV contrast  10/12/23 NM Bone Scan  1. Abnormal intense  increased activity throughout the lungs of uncertain etiology.  This may related to hyperparathyroidism, hematologic malignancy, and or metabolic abnormality such as renal failure, vasculitis, or pulmonary infection.  Chest x-ray 10/10/2023 reveals the lungs to be relatively clear and well aerated.  2. Suspect scattered mild degenerative changes throughout the joint spaces  3. Increased activity nasal maxillary region suggesting mucoperiosteal disease  4. Mild activity at the stomach suggesting free pertechnetate  10/14/23 CT CAP  1. Widespread osseous findings suggestive of extensive metastatic skeletal disease, to include expansile destructive soft tissue component at the anterior right 7th rib.  2. Enlarged intrathoracic lymph nodes could also be of metastatic etiology.  3. Diffuse ground-glass attenuation through both lungs is nonspecific, could be secondary to atypical infectious or inflammatory process.  However, possibility of metastatic involvement cannot be excluded.  12/7/23 CXR:   1. Hazy opacification lower lungs bilaterally suspicious for infiltrate and pleural reaction which has progressed since the prior exam  2. Cardiomegaly  3. Thoracic spondylosis  12/11/23 CXR:  1. Mild cardiomegaly  2. Persistent bibasilar infiltrate and or pleural reaction  3. 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 Displayed all CRAB criteria at time of diagnosis in October. Undergoing 1st line Beatriz + RVD + Zometa. Being followed by [facility] in [facility] for transplant consideration.    ONJ  Zometa now on hold. Continue follow-ups with dental as scheduled.      Plan:   Labs  adequate for C3D15 of AYDE-VRD with Revlimid 25 mg   Hydration encouraged.  Per transplant, plan to hold daratumumab after C4 with possible transplant March-April.  PET ordered to be completed prior to C4.  GI referral for colonoscopy screening needed prior to transplant.   UPEP pending.  Continue to hold zometa due to ONJ.   Calcium only once daily.  Continue Aspirin 81mg, Acyclovir, and Bactrim for prophylaxis  Will need SPEP/OSVALDO and FLC with labs Q4w prior to every new cycle.   RTC in 1 week with NP with labs  Cbc, cmp, MM labs- 1 hr prior @ [facility]    I spent a total of 40 minutes on the day of the visit.This includes face to face time and non-face to face time preparing to see the patient (eg, review of tests), obtaining and/or reviewing separately obtained history, documenting clinical information in the electronic or other health record, independently interpreting results and communicating results to the patient/family/caregiver, or care coordinator.    JONN Stephenson-C  Oncology/Hematology   [facility]          **All blood products must be irradiated and CMV negative**

## 2024-01-26 DIAGNOSIS — C90.00 MULTIPLE MYELOMA NOT HAVING ACHIEVED REMISSION: Primary | ICD-10-CM

## 2024-01-29 ENCOUNTER — PATIENT MESSAGE (OUTPATIENT)
Dept: HEMATOLOGY/ONCOLOGY | Facility: CLINIC | Age: 44
End: 2024-01-29
Payer: COMMERCIAL

## 2024-01-29 DIAGNOSIS — C90.00 MULTIPLE MYELOMA NOT HAVING ACHIEVED REMISSION: ICD-10-CM

## 2024-01-29 LAB — MAYO GENERIC ORDERABLE RESULT: NORMAL

## 2024-01-29 RX ORDER — LENALIDOMIDE 25 MG/1
25 CAPSULE ORAL DAILY
Qty: 21 CAPSULE | Refills: 0 | OUTPATIENT
Start: 2024-01-29 | End: 2024-02-02

## 2024-02-01 DIAGNOSIS — C90.00 MULTIPLE MYELOMA NOT HAVING ACHIEVED REMISSION: Primary | ICD-10-CM

## 2024-02-01 DIAGNOSIS — C90.00 MULTIPLE MYELOMA NOT HAVING ACHIEVED REMISSION: ICD-10-CM

## 2024-02-02 RX ORDER — LENALIDOMIDE 25 MG/1
CAPSULE ORAL
Qty: 21 CAPSULE | Refills: 0 | Status: SHIPPED | OUTPATIENT
Start: 2024-02-02 | End: 2024-03-05 | Stop reason: SDUPTHER

## 2024-02-07 ENCOUNTER — HOSPITAL ENCOUNTER (OUTPATIENT)
Dept: RADIOLOGY | Facility: HOSPITAL | Age: 44
Discharge: HOME | End: 2024-02-07
Payer: COMMERCIAL

## 2024-02-07 DIAGNOSIS — C90.00 METASTATIC MULTIPLE MYELOMA TO BONE: ICD-10-CM

## 2024-02-07 DIAGNOSIS — C90.00 MULTIPLE MYELOMA NOT HAVING ACHIEVED REMISSION: ICD-10-CM

## 2024-02-07 PROCEDURE — 78815 PET IMAGE W/CT SKULL-THIGH: CPT | Mod: TC

## 2024-02-07 PROCEDURE — A9552 F18 FDG: HCPCS

## 2024-02-07 NOTE — PROGRESS NOTES
Subjective:       Patient ID: Pete Fernandez is a 43 y.o. male.    Chief Complaint: Multiple Myeloma (Pt c/o low appetite and low energy.)    Diagnosis: 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    Current Treatment:   Beatriz+ RVD - 11/2/23 x 1 cycle, plan to restart for C2 on 12/14 potentially  C1 - Rev 10mg dose reduction due to CrCl  C2 - Rev increased to 25mg due to renal function improvement  C3 - D22 held 1 week due to covid  Zometa 10/17/23 - 1/11/2024 (3 treatments total)  Currently on hold due to ONJ    Treatment History:  1. Beatriz+ KrD for cycle 2 on 11/30/23; Change at request of [facility]  Stopped after 1 dose due to severe pleural and pericardial effusion development   Switched back to BEATRIZ + RVD with increase of Rev to 25mg    HPI  44yo M presented to [facility] on 10/11 after 1 month of progressive weakness, abdominal pain, and intermittent confusion. At the time of his presentation, his labs which had been normal with PCP in 8/2023 were notable for new severe DOUG, transaminitis, and Ca of 18. He was admitted and started on IVF resuscitation.  Hypercalcemia workup was initiated and he was ultimately found to have numerous osseous lesions on 10/15/23 CT CAP, FLC - Kappa 3.29, Lambda 744, Ratio 226 , SPEP with Mspike 0.17 IgG lambda. Bone marrow biopsy done 10/18/23 revealed 95% plasma cells and FISH with TP53 deletion, FGFR3/IGH (or NSD2/IGH) fusion, usually representing a t(4;14), and a tetraploid subclone was observed. After correction of his hypercalcemia and improvement in his renal function he was discharged on 10/22/23.   He started C1 Beatriz + RVD + Zometa on 11/2/23. He was referred to [facility] for consideration of bone marrow transplant, they recommended change to Beatriz + Krd, however patient developed life threatening pleural and pericardial edema several days after 1st infusion requiring hospitalization and placement of a pericardial window. Therefore he transitioned back to  Beatriz - RVD for C2.     Interval History:   He returns to the clinic today for a follow-up and treatment clearance for C3D22 of Beatriz-VRD. He recently had PET/CT completed. He reports fatigue. Patient had Covid-19 last week. He continues to have jaw pain related to his ONJ that is stable. He was seen by dental last week and has no active infection.   He was seen by Dr. Cook on 1/22/2024. Planning to hold Daratumumab after cycle 4 with transplant march or April of this year.   Taking calcium once daily and ASA 81 mg daily. Labs reviewed in detail with him and are stable to continue with treatment. Denies any fevers, chills, NS, headaches, bleeding, bruising, neuropathy.       Past Medical History:   Diagnosis Date    Anxiety disorder, unspecified     Hypercalcemia       Past Surgical History:   Procedure Laterality Date    BONE MARROW BIOPSY N/A 10/18/2023    Procedure: Biopsy-bone marrow;  Surgeon: Nhan Marte MD;  Location: [facility];  Service: General;  Laterality: N/A;    TONSILLECTOMY       Social History     Socioeconomic History    Marital status:    Tobacco Use    Smoking status: Never    Smokeless tobacco: Never   Substance and Sexual Activity    Alcohol use: Yes    Drug use: Never    Sexual activity: Yes     Partners: Female     Social Determinants of Health     Financial Resource Strain: Medium Risk (11/15/2023)    Overall Financial Resource Strain (CARDIA)     Difficulty of Paying Living Expenses: Somewhat hard   Food Insecurity: Food Insecurity Present (11/15/2023)    Hunger Vital Sign     Worried About Running Out of Food in the Last Year: Sometimes true     Ran Out of Food in the Last Year: Never true   Transportation Needs: No Transportation Needs (11/15/2023)    PRAPARE - Transportation     Lack of Transportation (Medical): No     Lack of Transportation (Non-Medical): No   Physical Activity: Insufficiently Active (11/15/2023)    Exercise Vital Sign     Days of Exercise per Week: 3 days      Minutes of Exercise per Session: 30 min   Stress: Stress Concern Present (11/15/2023)    Citizen of [place] of Occupational Health - Occupational Stress Questionnaire     Feeling of Stress : To some extent   Social Connections: Unknown (11/15/2023)    Social Connection and Isolation Panel [NHANES]     Frequency of Communication with Friends and Family: More than three times a week     Frequency of Social Gatherings with Friends and Family: More than three times a week     Attends Mandaeism Services: Patient declined     Active Member of Clubs or Organizations: No     Attends Club or Organization Meetings: Never     Marital Status:    Housing Stability: Low Risk  (11/15/2023)    Housing Stability Vital Sign     Unable to Pay for Housing in the Last Year: No     Number of Places Lived in the Last Year: 1     Unstable Housing in the Last Year: No      Family History   Problem Relation Age of Onset    Breast cancer Mother     Pancreatic cancer Father       Review of patient's allergies indicates:  No Known Allergies   Review of Systems   Constitutional:  Negative for activity change, appetite change, chills, fatigue, fever and unexpected weight change.   HENT:  Negative for mouth sores and sore throat.    Eyes:  Negative for visual disturbance.   Respiratory:  Negative for cough and shortness of breath.    Cardiovascular:  Negative for chest pain.   Gastrointestinal:  Negative for abdominal pain, constipation, diarrhea, nausea and vomiting.   Endocrine: Negative for polyuria.   Genitourinary:  Negative for dysuria and frequency.   Musculoskeletal:  Negative for back pain.   Integumentary:  Negative for rash.   Allergic/Immunologic: Negative for frequent infections.   Neurological:  Negative for weakness and headaches.   Hematological:  Negative for adenopathy. Does not bruise/bleed easily.   Psychiatric/Behavioral:  The patient is not nervous/anxious.          Objective:      Vitals:    02/08/24 1053   BP: (!)  146/95   Pulse: 75   Resp: 18   Temp: 97.9 °F (36.6 °C)       Physical Exam  Constitutional:       General: He is not in acute distress.     Appearance: Normal appearance. He is not ill-appearing.   HENT:      Head: Normocephalic and atraumatic.      Nose: Nose normal.      Mouth/Throat:      Mouth: Mucous membranes are moist.      Pharynx: Oropharynx is clear.   Eyes:      Extraocular Movements: Extraocular movements intact.      Conjunctiva/sclera: Conjunctivae normal.      Pupils: Pupils are equal, round, and reactive to light.   Cardiovascular:      Rate and Rhythm: Normal rate and regular rhythm.      Pulses: Normal pulses.      Heart sounds: Normal heart sounds. No murmur heard.  Pulmonary:      Effort: Pulmonary effort is normal. No respiratory distress.      Breath sounds: Normal breath sounds.   Abdominal:      General: There is no distension.      Palpations: Abdomen is soft.      Tenderness: There is no abdominal tenderness.   Musculoskeletal:         General: Normal range of motion.      Cervical back: Normal range of motion and neck supple.   Lymphadenopathy:      Cervical: No cervical adenopathy.   Skin:     General: Skin is warm and dry.      Findings: No rash.   Neurological:      General: No focal deficit present.      Mental Status: He is alert and oriented to person, place, and time.         LABS AND IMAGING REVIEWED IN EPIC  10/11/23 Xray Metastatic Survey  1. Scattered small defects at the calvarium measuring up to 5 mm too numerous to count.  2. Degene mild rative changes at the spine and extremities  3. MR exam and bone scan would allow further evaluation.  10/12/23 MRI Brain  1. Motion artifact  2. Mild scattered mucosal thickening throughout the paranasal sinuses  3. Somewhat limited evaluation of brain metastases without the administration of IV contrast  4. Otherwise unremarkable MR exam of the head without the administration of IV contrast  10/12/23 NM Bone Scan  1. Abnormal intense  increased activity throughout the lungs of uncertain etiology.  This may related to hyperparathyroidism, hematologic malignancy, and or metabolic abnormality such as renal failure, vasculitis, or pulmonary infection.  Chest x-ray 10/10/2023 reveals the lungs to be relatively clear and well aerated.  2. Suspect scattered mild degenerative changes throughout the joint spaces  3. Increased activity nasal maxillary region suggesting mucoperiosteal disease  4. Mild activity at the stomach suggesting free pertechnetate  10/14/23 CT CAP  1. Widespread osseous findings suggestive of extensive metastatic skeletal disease, to include expansile destructive soft tissue component at the anterior right 7th rib.  2. Enlarged intrathoracic lymph nodes could also be of metastatic etiology.  3. Diffuse ground-glass attenuation through both lungs is nonspecific, could be secondary to atypical infectious or inflammatory process.  However, possibility of metastatic involvement cannot be excluded.  12/7/23 CXR:   1. Hazy opacification lower lungs bilaterally suspicious for infiltrate and pleural reaction which has progressed since the prior exam  2. Cardiomegaly  3. Thoracic spondylosis  12/11/23 CXR:  1. Mild cardiomegaly  2. Persistent bibasilar infiltrate and or pleural reaction  3. Thoracic spondylosis with anterior wedging again evident at a lower thoracic vertebral body  12/13/23 ECHO: EF 50-55%  2/7/24 PET/CT:  1. Interval improved appearance of lytic osseous lesions compared to previous exam with sub threshold uptake. The bones are demineralized with numerous tiny lytic foci at the axial and appendicular skeleton.  2. Subacute appearing bilateral rib and right clavicle fractures with mild FDG uptake.  3. Nonspecific patchy ground-glass and interstitial opacities with mild FDG uptake.       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 Displayed all CRAB criteria at time of diagnosis in October. Undergoing 1st line Beatriz + RVD + Zometa. Being followed by [facility] in [facility] for transplant consideration.    ONJ  Zometa now on hold. Continue follow-ups with dental as scheduled.      Plan:   Toxicity reviewed Labs adequate for C3D22 of BEATRIZ-VRD with Revlimid 25 mg   1 unit PRBC tomorrow 2/9/24  Hydration encouraged.  Per transplant, plan to hold daratumumab after C4 with possible transplant March-April.  GI referral for colonoscopy screening needed prior to transplant.   Continue to hold zometa due to ONJ.   Continue Aspirin 81mg, Acyclovir, and Bactrim for prophylaxis  Will need SPEP/OSVALDO and FLC with labs Q4w prior to every new cycle.   RTC in 1 week with NP with labs  Cbc, cmp, MM labs- 1 hr prior @ [facility]    I spent a total of 40 minutes on the day of the visit.This includes face to face time and non-face to face time preparing to see the patient (eg, review of tests), obtaining and/or reviewing separately obtained history, documenting clinical information in the electronic or other health record, independently interpreting results and communicating results to the patient/family/caregiver, or care coordinator.    Elizabeth LeJeune, MD  Hematology/Oncology     **All blood products must be irradiated and CMV negative**  IClaudia LPN, acted solely as a scribe for and in the presence of Dr. Elizabeth Lejeune, who performed these services.

## 2024-02-08 ENCOUNTER — OFFICE VISIT (OUTPATIENT)
Dept: HEMATOLOGY/ONCOLOGY | Facility: CLINIC | Age: 44
End: 2024-02-08
Payer: COMMERCIAL

## 2024-02-08 ENCOUNTER — INFUSION (OUTPATIENT)
Dept: INFUSION THERAPY | Facility: HOSPITAL | Age: 44
End: 2024-02-08
Attending: STUDENT IN AN ORGANIZED HEALTH CARE EDUCATION/TRAINING PROGRAM
Payer: COMMERCIAL

## 2024-02-08 ENCOUNTER — LAB VISIT (OUTPATIENT)
Dept: LAB | Facility: HOSPITAL | Age: 44
End: 2024-02-08
Payer: COMMERCIAL

## 2024-02-08 VITALS
DIASTOLIC BLOOD PRESSURE: 95 MMHG | WEIGHT: 204.63 LBS | TEMPERATURE: 98 F | HEIGHT: 69 IN | BODY MASS INDEX: 30.31 KG/M2 | RESPIRATION RATE: 18 BRPM | OXYGEN SATURATION: 100 % | HEART RATE: 75 BPM | SYSTOLIC BLOOD PRESSURE: 146 MMHG

## 2024-02-08 VITALS
TEMPERATURE: 98 F | OXYGEN SATURATION: 100 % | BODY MASS INDEX: 30.31 KG/M2 | WEIGHT: 204.63 LBS | DIASTOLIC BLOOD PRESSURE: 95 MMHG | HEART RATE: 75 BPM | SYSTOLIC BLOOD PRESSURE: 146 MMHG | RESPIRATION RATE: 18 BRPM | HEIGHT: 69 IN

## 2024-02-08 DIAGNOSIS — D64.9 LOW HEMOGLOBIN: ICD-10-CM

## 2024-02-08 DIAGNOSIS — C90.00 MULTIPLE MYELOMA NOT HAVING ACHIEVED REMISSION: Primary | ICD-10-CM

## 2024-02-08 DIAGNOSIS — C90.00 MULTIPLE MYELOMA NOT HAVING ACHIEVED REMISSION: ICD-10-CM

## 2024-02-08 DIAGNOSIS — C90.00 METASTATIC MULTIPLE MYELOMA TO BONE: ICD-10-CM

## 2024-02-08 DIAGNOSIS — D64.9 LOW HEMOGLOBIN: Primary | ICD-10-CM

## 2024-02-08 DIAGNOSIS — C90.00 MULTIPLE MYELOMA, REMISSION STATUS UNSPECIFIED: Primary | ICD-10-CM

## 2024-02-08 LAB
ALBUMIN SERPL-MCNC: 3.2 G/DL (ref 3.5–5)
ALBUMIN/GLOB SERPL: 1.3 RATIO (ref 1.1–2)
ALP SERPL-CCNC: 55 UNIT/L (ref 40–150)
ALT SERPL-CCNC: 25 UNIT/L (ref 0–55)
ANTIBODY IDENTIFICATION: NORMAL
AST SERPL-CCNC: 17 UNIT/L (ref 5–34)
BASOPHILS # BLD AUTO: 0.11 X10(3)/MCL
BASOPHILS NFR BLD AUTO: 4.1 %
BILIRUB SERPL-MCNC: 0.6 MG/DL
BUN SERPL-MCNC: 17 MG/DL (ref 8.9–20.6)
CALCIUM SERPL-MCNC: 8.9 MG/DL (ref 8.4–10.2)
CHLORIDE SERPL-SCNC: 103 MMOL/L (ref 98–107)
CO2 SERPL-SCNC: 29 MMOL/L (ref 22–29)
CREAT SERPL-MCNC: 1.58 MG/DL (ref 0.73–1.18)
EOSINOPHIL # BLD AUTO: 0.09 X10(3)/MCL (ref 0–0.9)
EOSINOPHIL NFR BLD AUTO: 3.4 %
ERYTHROCYTE [DISTWIDTH] IN BLOOD BY AUTOMATED COUNT: 16.2 % (ref 11.5–17)
GFR SERPLBLD CREATININE-BSD FMLA CKD-EPI: 55 MLS/MIN/1.73/M2
GLOBULIN SER-MCNC: 2.4 GM/DL (ref 2.4–3.5)
GLUCOSE SERPL-MCNC: 87 MG/DL (ref 74–100)
GROUP & RH: ABNORMAL
HCT VFR BLD AUTO: 23.7 % (ref 42–52)
HGB BLD-MCNC: 7.7 G/DL (ref 14–18)
IGA SERPL-MCNC: 5 MG/DL (ref 63–484)
IGG SERPL-MCNC: 257 MG/DL (ref 540–1822)
IGM SERPL-MCNC: 5 MG/DL (ref 22–240)
IMM GRANULOCYTES # BLD AUTO: 0.01 X10(3)/MCL (ref 0–0.04)
IMM GRANULOCYTES NFR BLD AUTO: 0.4 %
INDIRECT COOMBS: ABNORMAL
LYMPHOCYTES # BLD AUTO: 0.85 X10(3)/MCL (ref 0.6–4.6)
LYMPHOCYTES NFR BLD AUTO: 32 %
MCH RBC QN AUTO: 33.2 PG (ref 27–31)
MCHC RBC AUTO-ENTMCNC: 32.5 G/DL (ref 33–36)
MCV RBC AUTO: 102.2 FL (ref 80–94)
MONOCYTES # BLD AUTO: 0.29 X10(3)/MCL (ref 0.1–1.3)
MONOCYTES NFR BLD AUTO: 10.9 %
NEUTROPHILS # BLD AUTO: 1.31 X10(3)/MCL (ref 2.1–9.2)
NEUTROPHILS NFR BLD AUTO: 49.2 %
PLATELET # BLD AUTO: 347 X10(3)/MCL (ref 130–400)
PMV BLD AUTO: 9.5 FL (ref 7.4–10.4)
POTASSIUM SERPL-SCNC: 4.1 MMOL/L (ref 3.5–5.1)
PROT SERPL-MCNC: 5.6 GM/DL (ref 6.4–8.3)
RBC # BLD AUTO: 2.32 X10(6)/MCL (ref 4.7–6.1)
SODIUM SERPL-SCNC: 139 MMOL/L (ref 136–145)
SPECIMEN OUTDATE: ABNORMAL
WBC # SPEC AUTO: 2.66 X10(3)/MCL (ref 4.5–11.5)

## 2024-02-08 PROCEDURE — 96401 CHEMO ANTI-NEOPL SQ/IM: CPT

## 2024-02-08 PROCEDURE — 84165 PROTEIN E-PHORESIS SERUM: CPT

## 2024-02-08 PROCEDURE — 3008F BODY MASS INDEX DOCD: CPT | Mod: CPTII,S$GLB,, | Performed by: STUDENT IN AN ORGANIZED HEALTH CARE EDUCATION/TRAINING PROGRAM

## 2024-02-08 PROCEDURE — 82784 ASSAY IGA/IGD/IGG/IGM EACH: CPT

## 2024-02-08 PROCEDURE — 63600175 PHARM REV CODE 636 W HCPCS: Mod: JZ,JG | Performed by: STUDENT IN AN ORGANIZED HEALTH CARE EDUCATION/TRAINING PROGRAM

## 2024-02-08 PROCEDURE — 86870 RBC ANTIBODY IDENTIFICATION: CPT | Performed by: STUDENT IN AN ORGANIZED HEALTH CARE EDUCATION/TRAINING PROGRAM

## 2024-02-08 PROCEDURE — 99999 PR PBB SHADOW E&M-EST. PATIENT-LVL V: CPT | Mod: PBBFAC,,, | Performed by: STUDENT IN AN ORGANIZED HEALTH CARE EDUCATION/TRAINING PROGRAM

## 2024-02-08 PROCEDURE — 85025 COMPLETE CBC W/AUTO DIFF WBC: CPT

## 2024-02-08 PROCEDURE — 80053 COMPREHEN METABOLIC PANEL: CPT

## 2024-02-08 PROCEDURE — 99215 OFFICE O/P EST HI 40 MIN: CPT | Mod: S$GLB,,, | Performed by: STUDENT IN AN ORGANIZED HEALTH CARE EDUCATION/TRAINING PROGRAM

## 2024-02-08 PROCEDURE — 83521 IG LIGHT CHAINS FREE EACH: CPT

## 2024-02-08 PROCEDURE — 3077F SYST BP >= 140 MM HG: CPT | Mod: CPTII,S$GLB,, | Performed by: STUDENT IN AN ORGANIZED HEALTH CARE EDUCATION/TRAINING PROGRAM

## 2024-02-08 PROCEDURE — 86850 RBC ANTIBODY SCREEN: CPT | Performed by: STUDENT IN AN ORGANIZED HEALTH CARE EDUCATION/TRAINING PROGRAM

## 2024-02-08 PROCEDURE — 1159F MED LIST DOCD IN RCRD: CPT | Mod: CPTII,S$GLB,, | Performed by: STUDENT IN AN ORGANIZED HEALTH CARE EDUCATION/TRAINING PROGRAM

## 2024-02-08 PROCEDURE — 1160F RVW MEDS BY RX/DR IN RCRD: CPT | Mod: CPTII,S$GLB,, | Performed by: STUDENT IN AN ORGANIZED HEALTH CARE EDUCATION/TRAINING PROGRAM

## 2024-02-08 PROCEDURE — 3080F DIAST BP >= 90 MM HG: CPT | Mod: CPTII,S$GLB,, | Performed by: STUDENT IN AN ORGANIZED HEALTH CARE EDUCATION/TRAINING PROGRAM

## 2024-02-08 PROCEDURE — 36415 COLL VENOUS BLD VENIPUNCTURE: CPT

## 2024-02-08 RX ORDER — HYDROCODONE BITARTRATE AND ACETAMINOPHEN 500; 5 MG/1; MG/1
TABLET ORAL ONCE
Status: CANCELLED | OUTPATIENT
Start: 2024-02-08 | End: 2024-02-08

## 2024-02-08 RX ORDER — DIPHENHYDRAMINE HYDROCHLORIDE 50 MG/ML
50 INJECTION INTRAMUSCULAR; INTRAVENOUS ONCE AS NEEDED
Status: CANCELLED | OUTPATIENT
Start: 2024-02-08

## 2024-02-08 RX ORDER — SODIUM CHLORIDE 0.9 % (FLUSH) 0.9 %
10 SYRINGE (ML) INJECTION
Status: CANCELLED | OUTPATIENT
Start: 2024-02-08

## 2024-02-08 RX ORDER — BORTEZOMIB 3.5 MG/1
1.3 INJECTION, POWDER, LYOPHILIZED, FOR SOLUTION INTRAVENOUS; SUBCUTANEOUS
Status: COMPLETED | OUTPATIENT
Start: 2024-02-08 | End: 2024-02-08

## 2024-02-08 RX ORDER — EPINEPHRINE 0.3 MG/.3ML
0.3 INJECTION SUBCUTANEOUS ONCE AS NEEDED
Status: CANCELLED | OUTPATIENT
Start: 2024-02-08

## 2024-02-08 RX ORDER — PROCHLORPERAZINE EDISYLATE 5 MG/ML
10 INJECTION INTRAMUSCULAR; INTRAVENOUS ONCE AS NEEDED
Status: CANCELLED | OUTPATIENT
Start: 2024-02-08

## 2024-02-08 RX ORDER — HEPARIN 100 UNIT/ML
500 SYRINGE INTRAVENOUS
Status: CANCELLED | OUTPATIENT
Start: 2024-02-08

## 2024-02-08 RX ORDER — BORTEZOMIB 3.5 MG/1
1.3 INJECTION, POWDER, LYOPHILIZED, FOR SOLUTION INTRAVENOUS; SUBCUTANEOUS
Status: CANCELLED | OUTPATIENT
Start: 2024-02-08

## 2024-02-08 RX ADMIN — BORTEZOMIB 3 MG: 3.5 INJECTION, POWDER, LYOPHILIZED, FOR SOLUTION INTRAVENOUS; SUBCUTANEOUS at 12:02

## 2024-02-09 ENCOUNTER — INFUSION (OUTPATIENT)
Dept: INFUSION THERAPY | Facility: HOSPITAL | Age: 44
End: 2024-02-09
Attending: STUDENT IN AN ORGANIZED HEALTH CARE EDUCATION/TRAINING PROGRAM
Payer: COMMERCIAL

## 2024-02-09 VITALS
DIASTOLIC BLOOD PRESSURE: 68 MMHG | SYSTOLIC BLOOD PRESSURE: 111 MMHG | HEART RATE: 82 BPM | BODY MASS INDEX: 30.31 KG/M2 | HEIGHT: 69 IN | WEIGHT: 204.63 LBS | RESPIRATION RATE: 18 BRPM | TEMPERATURE: 98 F | OXYGEN SATURATION: 100 %

## 2024-02-09 DIAGNOSIS — D64.9 LOW HEMOGLOBIN: ICD-10-CM

## 2024-02-09 LAB
ABO + RH BLD: NORMAL
ABO + RH BLD: NORMAL
ALBUMIN % SPEP (OHS): 56.63
ALBUMIN SERPL-MCNC: 2.9 G/DL (ref 3.5–5)
ALBUMIN/GLOB SERPL: 1.3 RATIO (ref 1.1–2)
ALPHA 1 GLOB (OHS): 0.31 GM/DL
ALPHA 1 GLOB% (OHS): 5.91
ALPHA 2 GLOB % (OHS): 16.26
ALPHA 2 GLOB (OHS): 0.85 GM/DL
BETA GLOB (OHS): 0.83 GM/DL
BETA GLOB% (OHS): 16.05
BLD PROD TYP BPU: NORMAL
BLD PROD TYP BPU: NORMAL
BLOOD UNIT EXPIRATION DATE: NORMAL
BLOOD UNIT EXPIRATION DATE: NORMAL
BLOOD UNIT TYPE CODE: 6200
BLOOD UNIT TYPE CODE: 6200
CROSSMATCH INTERPRETATION: NORMAL
CROSSMATCH INTERPRETATION: NORMAL
DISPENSE STATUS: NORMAL
DISPENSE STATUS: NORMAL
GAMMA GLOBULIN % (OHS): 5.15
GAMMA GLOBULIN (OHS): 0.27 GM/DL
GLOBULIN SER-MCNC: 2.3 GM/DL (ref 2.4–3.5)
KAPPA LC FREE SER-MCNC: 0.48 MG/DL (ref 0.33–1.94)
KAPPA LC FREE/LAMBDA FREE SER: 1.3 {RATIO} (ref 0.26–1.65)
LAMBDA LC FREE SERPL-MCNC: 0.37 MG/DL (ref 0.57–2.63)
M SPIKE % (OHS): 0.61
M SPIKE (OHS): 0.03 GM/DL
PATH REV: NORMAL
PROT SERPL-MCNC: 5.2 GM/DL (ref 6.4–8.3)
UNIT NUMBER: NORMAL
UNIT NUMBER: NORMAL

## 2024-02-09 PROCEDURE — 36430 TRANSFUSION BLD/BLD COMPNT: CPT

## 2024-02-09 PROCEDURE — P9040 RBC LEUKOREDUCED IRRADIATED: HCPCS | Performed by: STUDENT IN AN ORGANIZED HEALTH CARE EDUCATION/TRAINING PROGRAM

## 2024-02-09 PROCEDURE — 86922 COMPATIBILITY TEST ANTIGLOB: CPT | Performed by: STUDENT IN AN ORGANIZED HEALTH CARE EDUCATION/TRAINING PROGRAM

## 2024-02-09 PROCEDURE — 25000003 PHARM REV CODE 250: Performed by: STUDENT IN AN ORGANIZED HEALTH CARE EDUCATION/TRAINING PROGRAM

## 2024-02-09 RX ORDER — HYDROCODONE BITARTRATE AND ACETAMINOPHEN 500; 5 MG/1; MG/1
TABLET ORAL ONCE
Status: COMPLETED | OUTPATIENT
Start: 2024-02-09 | End: 2024-02-09

## 2024-02-09 RX ADMIN — SODIUM CHLORIDE: 9 INJECTION, SOLUTION INTRAVENOUS at 10:02

## 2024-02-14 DIAGNOSIS — C90.00 MULTIPLE MYELOMA NOT HAVING ACHIEVED REMISSION: Primary | ICD-10-CM

## 2024-02-15 ENCOUNTER — INFUSION (OUTPATIENT)
Dept: INFUSION THERAPY | Facility: HOSPITAL | Age: 44
End: 2024-02-15
Attending: STUDENT IN AN ORGANIZED HEALTH CARE EDUCATION/TRAINING PROGRAM
Payer: COMMERCIAL

## 2024-02-15 ENCOUNTER — OFFICE VISIT (OUTPATIENT)
Dept: HEMATOLOGY/ONCOLOGY | Facility: CLINIC | Age: 44
End: 2024-02-15
Payer: COMMERCIAL

## 2024-02-15 ENCOUNTER — LAB VISIT (OUTPATIENT)
Dept: LAB | Facility: HOSPITAL | Age: 44
End: 2024-02-15
Attending: STUDENT IN AN ORGANIZED HEALTH CARE EDUCATION/TRAINING PROGRAM
Payer: COMMERCIAL

## 2024-02-15 VITALS
DIASTOLIC BLOOD PRESSURE: 86 MMHG | HEART RATE: 96 BPM | BODY MASS INDEX: 30.36 KG/M2 | WEIGHT: 205 LBS | TEMPERATURE: 99 F | DIASTOLIC BLOOD PRESSURE: 86 MMHG | HEIGHT: 69 IN | OXYGEN SATURATION: 99 % | RESPIRATION RATE: 18 BRPM | BODY MASS INDEX: 30.36 KG/M2 | HEIGHT: 69 IN | HEART RATE: 96 BPM | OXYGEN SATURATION: 99 % | SYSTOLIC BLOOD PRESSURE: 138 MMHG | RESPIRATION RATE: 18 BRPM | TEMPERATURE: 99 F | SYSTOLIC BLOOD PRESSURE: 138 MMHG | WEIGHT: 205 LBS

## 2024-02-15 DIAGNOSIS — D84.821 IMMUNODEFICIENCY DUE TO DRUG THERAPY: ICD-10-CM

## 2024-02-15 DIAGNOSIS — C90.00 MULTIPLE MYELOMA, REMISSION STATUS UNSPECIFIED: ICD-10-CM

## 2024-02-15 DIAGNOSIS — T45.1X5A CHEMOTHERAPY-INDUCED NEUTROPENIA: ICD-10-CM

## 2024-02-15 DIAGNOSIS — Z76.82 STEM CELL TRANSPLANT CANDIDATE: ICD-10-CM

## 2024-02-15 DIAGNOSIS — C90.00 METASTATIC MULTIPLE MYELOMA TO BONE: ICD-10-CM

## 2024-02-15 DIAGNOSIS — G89.3 CANCER ASSOCIATED PAIN: ICD-10-CM

## 2024-02-15 DIAGNOSIS — C90.00 MULTIPLE MYELOMA NOT HAVING ACHIEVED REMISSION: ICD-10-CM

## 2024-02-15 DIAGNOSIS — Z79.899 IMMUNODEFICIENCY DUE TO DRUG THERAPY: ICD-10-CM

## 2024-02-15 DIAGNOSIS — D64.9 LOW HEMOGLOBIN: ICD-10-CM

## 2024-02-15 DIAGNOSIS — C90.00 MULTIPLE MYELOMA NOT HAVING ACHIEVED REMISSION: Primary | ICD-10-CM

## 2024-02-15 DIAGNOSIS — D63.0 ANEMIA IN NEOPLASTIC DISEASE: ICD-10-CM

## 2024-02-15 DIAGNOSIS — C90.00 MULTIPLE MYELOMA, REMISSION STATUS UNSPECIFIED: Primary | ICD-10-CM

## 2024-02-15 DIAGNOSIS — D70.1 CHEMOTHERAPY-INDUCED NEUTROPENIA: ICD-10-CM

## 2024-02-15 DIAGNOSIS — R53.82 CHRONIC FATIGUE: ICD-10-CM

## 2024-02-15 LAB
ALBUMIN SERPL-MCNC: 3.5 G/DL (ref 3.5–5)
ALBUMIN/GLOB SERPL: 1.3 RATIO (ref 1.1–2)
ALP SERPL-CCNC: 56 UNIT/L (ref 40–150)
ALT SERPL-CCNC: 16 UNIT/L (ref 0–55)
AST SERPL-CCNC: 14 UNIT/L (ref 5–34)
BASOPHILS # BLD AUTO: 0.05 X10(3)/MCL
BASOPHILS NFR BLD AUTO: 0.5 %
BILIRUB SERPL-MCNC: 1 MG/DL
BUN SERPL-MCNC: 26 MG/DL (ref 8.9–20.6)
CALCIUM SERPL-MCNC: 9.2 MG/DL (ref 8.4–10.2)
CHLORIDE SERPL-SCNC: 100 MMOL/L (ref 98–107)
CO2 SERPL-SCNC: 27 MMOL/L (ref 22–29)
CREAT SERPL-MCNC: 1.62 MG/DL (ref 0.73–1.18)
EOSINOPHIL # BLD AUTO: 0.02 X10(3)/MCL (ref 0–0.9)
EOSINOPHIL NFR BLD AUTO: 0.2 %
ERYTHROCYTE [DISTWIDTH] IN BLOOD BY AUTOMATED COUNT: 17.7 % (ref 11.5–17)
GFR SERPLBLD CREATININE-BSD FMLA CKD-EPI: 53 MLS/MIN/1.73/M2
GLOBULIN SER-MCNC: 2.6 GM/DL (ref 2.4–3.5)
GLUCOSE SERPL-MCNC: 104 MG/DL (ref 74–100)
HCT VFR BLD AUTO: 28.4 % (ref 42–52)
HGB BLD-MCNC: 9.5 G/DL (ref 14–18)
IGA SERPL-MCNC: 7 MG/DL (ref 63–484)
IGG SERPL-MCNC: 251 MG/DL (ref 540–1822)
IGM SERPL-MCNC: 7 MG/DL (ref 22–240)
IMM GRANULOCYTES # BLD AUTO: 0.09 X10(3)/MCL (ref 0–0.04)
IMM GRANULOCYTES NFR BLD AUTO: 0.9 %
LYMPHOCYTES # BLD AUTO: 0.67 X10(3)/MCL (ref 0.6–4.6)
LYMPHOCYTES NFR BLD AUTO: 6.5 %
MAGNESIUM SERPL-MCNC: 1.8 MG/DL (ref 1.6–2.6)
MCH RBC QN AUTO: 32.9 PG (ref 27–31)
MCHC RBC AUTO-ENTMCNC: 33.5 G/DL (ref 33–36)
MCV RBC AUTO: 98.3 FL (ref 80–94)
MONOCYTES # BLD AUTO: 0.8 X10(3)/MCL (ref 0.1–1.3)
MONOCYTES NFR BLD AUTO: 7.7 %
NEUTROPHILS # BLD AUTO: 8.71 X10(3)/MCL (ref 2.1–9.2)
NEUTROPHILS NFR BLD AUTO: 84.2 %
PHOSPHATE SERPL-MCNC: 3.2 MG/DL (ref 2.3–4.7)
PLATELET # BLD AUTO: 218 X10(3)/MCL (ref 130–400)
PMV BLD AUTO: 9.9 FL (ref 7.4–10.4)
POTASSIUM SERPL-SCNC: 4.1 MMOL/L (ref 3.5–5.1)
PROT SERPL-MCNC: 6.1 GM/DL (ref 6.4–8.3)
RBC # BLD AUTO: 2.89 X10(6)/MCL (ref 4.7–6.1)
SODIUM SERPL-SCNC: 135 MMOL/L (ref 136–145)
WBC # SPEC AUTO: 10.34 X10(3)/MCL (ref 4.5–11.5)

## 2024-02-15 PROCEDURE — 83521 IG LIGHT CHAINS FREE EACH: CPT

## 2024-02-15 PROCEDURE — 36415 COLL VENOUS BLD VENIPUNCTURE: CPT

## 2024-02-15 PROCEDURE — 84100 ASSAY OF PHOSPHORUS: CPT

## 2024-02-15 PROCEDURE — 3079F DIAST BP 80-89 MM HG: CPT | Mod: CPTII,S$GLB,,

## 2024-02-15 PROCEDURE — 99215 OFFICE O/P EST HI 40 MIN: CPT | Mod: S$GLB,,,

## 2024-02-15 PROCEDURE — 83735 ASSAY OF MAGNESIUM: CPT

## 2024-02-15 PROCEDURE — 3008F BODY MASS INDEX DOCD: CPT | Mod: CPTII,S$GLB,,

## 2024-02-15 PROCEDURE — 99999 PR PBB SHADOW E&M-EST. PATIENT-LVL V: CPT | Mod: PBBFAC,,,

## 2024-02-15 PROCEDURE — 84165 PROTEIN E-PHORESIS SERUM: CPT

## 2024-02-15 PROCEDURE — 80053 COMPREHEN METABOLIC PANEL: CPT

## 2024-02-15 PROCEDURE — 85025 COMPLETE CBC W/AUTO DIFF WBC: CPT

## 2024-02-15 PROCEDURE — 82784 ASSAY IGA/IGD/IGG/IGM EACH: CPT

## 2024-02-15 PROCEDURE — 96401 CHEMO ANTI-NEOPL SQ/IM: CPT

## 2024-02-15 PROCEDURE — 3075F SYST BP GE 130 - 139MM HG: CPT | Mod: CPTII,S$GLB,,

## 2024-02-15 PROCEDURE — 63600175 PHARM REV CODE 636 W HCPCS: Mod: JZ,JG

## 2024-02-15 RX ORDER — PROCHLORPERAZINE EDISYLATE 5 MG/ML
10 INJECTION INTRAMUSCULAR; INTRAVENOUS ONCE AS NEEDED
Status: CANCELLED | OUTPATIENT
Start: 2024-02-15

## 2024-02-15 RX ORDER — HEPARIN 100 UNIT/ML
500 SYRINGE INTRAVENOUS
Status: CANCELLED | OUTPATIENT
Start: 2024-02-15

## 2024-02-15 RX ORDER — SODIUM CHLORIDE 0.9 % (FLUSH) 0.9 %
10 SYRINGE (ML) INJECTION
Status: CANCELLED | OUTPATIENT
Start: 2024-02-15

## 2024-02-15 RX ORDER — DIPHENHYDRAMINE HYDROCHLORIDE 50 MG/ML
50 INJECTION INTRAMUSCULAR; INTRAVENOUS ONCE AS NEEDED
Status: CANCELLED | OUTPATIENT
Start: 2024-02-15

## 2024-02-15 RX ORDER — EPINEPHRINE 0.3 MG/.3ML
0.3 INJECTION SUBCUTANEOUS ONCE AS NEEDED
Status: CANCELLED | OUTPATIENT
Start: 2024-02-15

## 2024-02-15 RX ORDER — OXYCODONE HYDROCHLORIDE 10 MG/1
10 TABLET ORAL EVERY 4 HOURS PRN
Qty: 90 TABLET | Refills: 0 | Status: SHIPPED | OUTPATIENT
Start: 2024-02-15 | End: 2024-04-18 | Stop reason: SDUPTHER

## 2024-02-15 RX ORDER — BORTEZOMIB 3.5 MG/1
1.3 INJECTION, POWDER, LYOPHILIZED, FOR SOLUTION INTRAVENOUS; SUBCUTANEOUS
Status: COMPLETED | OUTPATIENT
Start: 2024-02-15 | End: 2024-02-15

## 2024-02-15 RX ORDER — BORTEZOMIB 3.5 MG/1
1.3 INJECTION, POWDER, LYOPHILIZED, FOR SOLUTION INTRAVENOUS; SUBCUTANEOUS
Status: CANCELLED | OUTPATIENT
Start: 2024-02-15

## 2024-02-15 RX ADMIN — DARATUMUMAB AND HYALURONIDASE-FIHJ (HUMAN RECOMBINANT) 1800 MG: 1800; 30000 INJECTION SUBCUTANEOUS at 12:02

## 2024-02-15 RX ADMIN — BORTEZOMIB 3 MG: 3.5 INJECTION, POWDER, LYOPHILIZED, FOR SOLUTION INTRAVENOUS; SUBCUTANEOUS at 12:02

## 2024-02-15 NOTE — PROGRESS NOTES
Subjective:       Patient ID: Pete Fernandez is a 43 y.o. male.    Chief Complaint: Multiple Myeloma (Pt c/o right side pain, more in the back area. 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 Beatriz+ KrD for cycle 2 on 11/30/23; Change at request of [facility]  Stopped after 1 dose due to severe pleural and pericardial effusion development   Switched back to BEATRIZ + RVD with increase of Rev to 25mg    HPI  44yo M presented to [facility] on 10/11 after 1 month of progressive weakness, abdominal pain, and intermittent confusion. At the time of his presentation, his labs which had been normal with PCP in 8/2023 were notable for new severe DOUG, transaminitis, and Ca of 18. He was admitted and started on IVF resuscitation.  Hypercalcemia workup was initiated and he was ultimately found to have numerous osseous lesions on 10/15/23 CT CAP, FLC - Kappa 3.29, Lambda 744, Ratio 226 , SPEP with Mspike 0.17 IgG lambda. Bone marrow biopsy done 10/18/23 revealed 95% plasma cells and FISH with TP53 deletion, FGFR3/IGH (or NSD2/IGH) fusion, usually representing a t(4;14), and a tetraploid subclone was observed. After correction of his hypercalcemia and improvement in his renal function he was discharged on 10/22/23.   He started C1 Beatriz + RVD + Zometa on 11/2/23. He was referred to [facility] for consideration of bone marrow transplant, they recommended change to Beatriz + Krd, however patient developed life threatening pleural and pericardial edema several days after 1st infusion requiring hospitalization and placement of a pericardial window. Therefore he  transitioned back to Beatriz - RVD for C2.     Interval History:   He returns to the clinic today for a follow-up and treatment clearance for C4D1 of Beatriz-VRD. He continues to report fatigue that is stable.  He continues to have occasional jaw pain related to his ONJ that is stable. He was referred to oral surgery by his dentist and has an appt 2/27/2024. He is currently waiting on cardiology clearance to receive his colonoscopy. He was seen by Dr. Cook on 1/22/2024. Planning to hold Daratumumab after cycle 4 with transplant march or April of this year. Taking calcium once daily and ASA 81 mg daily. Labs reviewed in detail with him and are stable to continue with treatment. Denies any fevers, chills, NS, headaches, bleeding, bruising, neuropathy.       Past Medical History:   Diagnosis Date    Anxiety disorder, unspecified     Hypercalcemia       Past Surgical History:   Procedure Laterality Date    BONE MARROW BIOPSY N/A 10/18/2023    Procedure: Biopsy-bone marrow;  Surgeon: Nhan Marte MD;  Location: [facility];  Service: General;  Laterality: N/A;    TONSILLECTOMY       Social History     Socioeconomic History    Marital status:    Tobacco Use    Smoking status: Never    Smokeless tobacco: Never   Substance and Sexual Activity    Alcohol use: Yes    Drug use: Never    Sexual activity: Yes     Partners: Female     Social Determinants of Health     Financial Resource Strain: Medium Risk (11/15/2023)    Overall Financial Resource Strain (CARDIA)     Difficulty of Paying Living Expenses: Somewhat hard   Food Insecurity: Food Insecurity Present (11/15/2023)    Hunger Vital Sign     Worried About Running Out of Food in the Last Year: Sometimes true     Ran Out of Food in the Last Year: Never true   Transportation Needs: No Transportation Needs (11/15/2023)    PRAPARE - Transportation     Lack of Transportation (Medical): No     Lack of Transportation (Non-Medical): No   Physical Activity: Insufficiently Active  (11/15/2023)    Exercise Vital Sign     Days of Exercise per Week: 3 days     Minutes of Exercise per Session: 30 min   Stress: Stress Concern Present (11/15/2023)    [employer] - Occupational Stress Questionnaire     Feeling of Stress : To some extent   Social Connections: Unknown (11/15/2023)    Social Connection and Isolation Panel [NHANES]     Frequency of Communication with Friends and Family: More than three times a week     Frequency of Social Gatherings with Friends and Family: More than three times a week     Attends Ep[employer] Services: Patient declined     Active Member of Clubs or Organizations: No     Attends Club or Organization Meetings: Never     Marital Status:    Housing Stability: Low Risk  (11/15/2023)    Housing Stability Vital Sign     Unable to Pay for Housing in the Last Year: No     Number of Places Lived in the Last Year: 1     Unstable Housing in the Last Year: No      Family History   Problem Relation Age of Onset    Breast cancer Mother     Pancreatic cancer Father       Review of patient's allergies indicates:  No Known Allergies   Review of Systems   Constitutional:  Negative for activity change, appetite change, chills, fatigue, fever and unexpected weight change.   HENT:  Negative for mouth sores and sore throat.    Eyes:  Negative for visual disturbance.   Respiratory:  Negative for cough and shortness of breath.    Cardiovascular:  Negative for chest pain.   Gastrointestinal:  Negative for abdominal pain, constipation, diarrhea, nausea and vomiting.   Endocrine: Negative for polyuria.   Genitourinary:  Negative for dysuria and frequency.   Musculoskeletal:  Negative for back pain.   Integumentary:  Negative for rash.   Allergic/Immunologic: Negative for frequent infections.   Neurological:  Negative for weakness and headaches.   Hematological:  Negative for adenopathy. Does not bruise/bleed easily.   Psychiatric/Behavioral:  The patient is not  nervous/anxious.          Objective:      Vitals:    02/15/24 1113   BP: 138/86   Pulse: 96   Resp: 18   Temp: 99.1 °F (37.3 °C)       Physical Exam  Constitutional:       General: He is not in acute distress.     Appearance: Normal appearance. He is not ill-appearing.   HENT:      Head: Normocephalic and atraumatic.      Nose: Nose normal.      Mouth/Throat:      Mouth: Mucous membranes are moist.      Pharynx: Oropharynx is clear.   Eyes:      Extraocular Movements: Extraocular movements intact.      Conjunctiva/sclera: Conjunctivae normal.      Pupils: Pupils are equal, round, and reactive to light.   Cardiovascular:      Rate and Rhythm: Normal rate and regular rhythm.      Pulses: Normal pulses.      Heart sounds: Normal heart sounds. No murmur heard.  Pulmonary:      Effort: Pulmonary effort is normal. No respiratory distress.      Breath sounds: Normal breath sounds.   Abdominal:      General: There is no distension.      Palpations: Abdomen is soft.      Tenderness: There is no abdominal tenderness.   Musculoskeletal:         General: Normal range of motion.      Cervical back: Normal range of motion and neck supple.   Lymphadenopathy:      Cervical: No cervical adenopathy.   Skin:     General: Skin is warm and dry.      Findings: No rash.   Neurological:      General: No focal deficit present.      Mental Status: He is alert and oriented to person, place, and time.         LABS AND IMAGING REVIEWED IN EPIC  10/11/23 Xray Metastatic Survey  1. Scattered small defects at the calvarium measuring up to 5 mm too numerous to count.  2. Degene mild rative changes at the spine and extremities  3. MR exam and bone scan would allow further evaluation.  10/12/23 MRI Brain  1. Motion artifact  2. Mild scattered mucosal thickening throughout the paranasal sinuses  3. Somewhat limited evaluation of brain metastases without the administration of IV contrast  4. Otherwise unremarkable MR exam of the head without the  administration of IV contrast  10/12/23 NM Bone Scan  1. Abnormal intense increased activity throughout the lungs of uncertain etiology.  This may related to hyperparathyroidism, hematologic malignancy, and or metabolic abnormality such as renal failure, vasculitis, or pulmonary infection.  Chest x-ray 10/10/2023 reveals the lungs to be relatively clear and well aerated.  2. Suspect scattered mild degenerative changes throughout the joint spaces  3. Increased activity nasal maxillary region suggesting mucoperiosteal disease  4. Mild activity at the stomach suggesting free pertechnetate  10/14/23 CT CAP  1. Widespread osseous findings suggestive of extensive metastatic skeletal disease, to include expansile destructive soft tissue component at the anterior right 7th rib.  2. Enlarged intrathoracic lymph nodes could also be of metastatic etiology.  3. Diffuse ground-glass attenuation through both lungs is nonspecific, could be secondary to atypical infectious or inflammatory process.  However, possibility of metastatic involvement cannot be excluded.  12/7/23 CXR:   1. Hazy opacification lower lungs bilaterally suspicious for infiltrate and pleural reaction which has progressed since the prior exam  2. Cardiomegaly  3. Thoracic spondylosis  12/11/23 CXR:  1. Mild cardiomegaly  2. Persistent bibasilar infiltrate and or pleural reaction  3. Thoracic spondylosis with anterior wedging again evident at a lower thoracic vertebral body  12/13/23 ECHO: EF 50-55%  2/7/24 PET/CT:  1. Interval improved appearance of lytic osseous lesions compared to previous exam with sub threshold uptake. The bones are demineralized with numerous tiny lytic foci at the axial and appendicular skeleton.  2. Subacute appearing bilateral rib and right clavicle fractures with mild FDG uptake.  3. Nonspecific patchy ground-glass and interstitial opacities with mild FDG uptake.       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 Displayed all CRAB criteria at time of diagnosis in October. Undergoing 1st line Beatriz + RVD + Zometa. Being followed by [facility] for transplant [facility].    ONJ  Zometa now on hold. Continue follow-ups with dental as scheduled.      Plan:   Toxicity reviewed Labs adequate for C4D1 of BEATRIZ-VRD with Revlimid 25 mg   Hydration encouraged.  Per transplant, plan to hold daratumumab after C4 with possible transplant March-April.  GI referral for colonoscopy screening needed prior to transplant.   Continue to hold zometa due to ONJ.   Pain med refilled.   Continue Aspirin 81mg, Acyclovir, and Bactrim for prophylaxis  Will need SPEP/OSVALDO and FLC with labs Q4w prior to every new cycle.   RTC in 1 week with NP with labs  Cbc, cmp, MM labs- 1 hr prior @ [facility]    I spent a total of 40 minutes on the day of the visit.This includes face to face time and non-face to face time preparing to see the patient (eg, review of tests), obtaining and/or reviewing separately obtained history, documenting clinical information in the electronic or other health record, independently interpreting results and communicating results to the patient/family/caregiver, or care coordinator.       **All blood products must be irradiated and CMV negative**        Janet Alegria, YOVANNYP-C  Oncology/Hematology   Cancer Center [facility]

## 2024-02-15 NOTE — PLAN OF CARE
Patient will receive therapeutic injection safely within appropriate time frame.    Problem: Discharge Planning  Goal: Discharge Planning (Adult, OB, Behavioral, Peds)  Outpatient/Observation goals to be met before discharge home:         -Diagnostic tests and consults completed and resulted - Yes  -Vital signs normal or at patient baseline - Yes  -Tolerating oral intake to maintain hydration -Yes  - Dizziness resolved - Yes  -Returns to baseline functional status - No  -Safe disposition plan has been identified - Yes

## 2024-02-16 LAB
ALBUMIN % SPEP (OHS): 58.32
ALBUMIN SERPL-MCNC: 3.2 G/DL (ref 3.5–5)
ALBUMIN/GLOB SERPL: 1.4 RATIO (ref 1.1–2)
ALPHA 1 GLOB (OHS): 0.34 GM/DL
ALPHA 1 GLOB% (OHS): 6.26
ALPHA 2 GLOB % (OHS): 15.47
ALPHA 2 GLOB (OHS): 0.85 GM/DL
BETA GLOB (OHS): 0.86 GM/DL
BETA GLOB% (OHS): 15.56
GAMMA GLOBULIN % (OHS): 4.39
GAMMA GLOBULIN (OHS): 0.24 GM/DL
GLOBULIN SER-MCNC: 2.3 GM/DL (ref 2.4–3.5)
KAPPA LC FREE SER-MCNC: 0.4 MG/DL (ref 0.33–1.94)
KAPPA LC FREE/LAMBDA FREE SER: 1.03 {RATIO} (ref 0.26–1.65)
LAMBDA LC FREE SERPL-MCNC: 0.39 MG/DL (ref 0.57–2.63)
M SPIKE % (OHS): 0.82
M SPIKE (OHS): 0.05 GM/DL
PATH REV: NORMAL
PROT SERPL-MCNC: 5.5 GM/DL (ref 6.4–8.3)

## 2024-02-22 ENCOUNTER — OFFICE VISIT (OUTPATIENT)
Dept: HEMATOLOGY/ONCOLOGY | Facility: CLINIC | Age: 44
End: 2024-02-22
Payer: COMMERCIAL

## 2024-02-22 ENCOUNTER — LAB VISIT (OUTPATIENT)
Dept: LAB | Facility: HOSPITAL | Age: 44
End: 2024-02-22
Payer: COMMERCIAL

## 2024-02-22 ENCOUNTER — INFUSION (OUTPATIENT)
Dept: INFUSION THERAPY | Facility: HOSPITAL | Age: 44
End: 2024-02-22
Attending: STUDENT IN AN ORGANIZED HEALTH CARE EDUCATION/TRAINING PROGRAM
Payer: COMMERCIAL

## 2024-02-22 VITALS
TEMPERATURE: 98 F | SYSTOLIC BLOOD PRESSURE: 124 MMHG | HEART RATE: 78 BPM | DIASTOLIC BLOOD PRESSURE: 77 MMHG | RESPIRATION RATE: 20 BRPM | WEIGHT: 201.19 LBS | HEIGHT: 69 IN | OXYGEN SATURATION: 98 % | BODY MASS INDEX: 29.8 KG/M2

## 2024-02-22 VITALS
BODY MASS INDEX: 29.8 KG/M2 | HEIGHT: 69 IN | DIASTOLIC BLOOD PRESSURE: 77 MMHG | WEIGHT: 201.19 LBS | OXYGEN SATURATION: 98 % | TEMPERATURE: 99 F | HEART RATE: 78 BPM | SYSTOLIC BLOOD PRESSURE: 124 MMHG | RESPIRATION RATE: 20 BRPM

## 2024-02-22 DIAGNOSIS — D70.1 CHEMOTHERAPY-INDUCED NEUTROPENIA: ICD-10-CM

## 2024-02-22 DIAGNOSIS — Z79.899 IMMUNODEFICIENCY DUE TO DRUG THERAPY: ICD-10-CM

## 2024-02-22 DIAGNOSIS — C90.00 MULTIPLE MYELOMA, REMISSION STATUS UNSPECIFIED: Primary | ICD-10-CM

## 2024-02-22 DIAGNOSIS — T45.1X5A CHEMOTHERAPY-INDUCED NEUTROPENIA: ICD-10-CM

## 2024-02-22 DIAGNOSIS — C90.00 METASTATIC MULTIPLE MYELOMA TO BONE: ICD-10-CM

## 2024-02-22 DIAGNOSIS — C90.00 MULTIPLE MYELOMA, REMISSION STATUS UNSPECIFIED: ICD-10-CM

## 2024-02-22 DIAGNOSIS — Z79.899 IMMUNODEFICIENCY DUE TO CHEMOTHERAPY: ICD-10-CM

## 2024-02-22 DIAGNOSIS — Z76.82 STEM CELL TRANSPLANT CANDIDATE: ICD-10-CM

## 2024-02-22 DIAGNOSIS — G89.3 CANCER ASSOCIATED PAIN: ICD-10-CM

## 2024-02-22 DIAGNOSIS — D64.9 LOW HEMOGLOBIN: ICD-10-CM

## 2024-02-22 DIAGNOSIS — R53.82 CHRONIC FATIGUE: ICD-10-CM

## 2024-02-22 DIAGNOSIS — D84.821 IMMUNODEFICIENCY DUE TO DRUG THERAPY: ICD-10-CM

## 2024-02-22 DIAGNOSIS — C90.00 MULTIPLE MYELOMA NOT HAVING ACHIEVED REMISSION: ICD-10-CM

## 2024-02-22 DIAGNOSIS — T45.1X5A IMMUNODEFICIENCY DUE TO CHEMOTHERAPY: ICD-10-CM

## 2024-02-22 DIAGNOSIS — D84.821 IMMUNODEFICIENCY DUE TO CHEMOTHERAPY: ICD-10-CM

## 2024-02-22 DIAGNOSIS — D63.0 ANEMIA IN NEOPLASTIC DISEASE: ICD-10-CM

## 2024-02-22 LAB
ALBUMIN SERPL-MCNC: 3.3 G/DL (ref 3.5–5)
ALBUMIN/GLOB SERPL: 1.2 RATIO (ref 1.1–2)
ALP SERPL-CCNC: 52 UNIT/L (ref 40–150)
ALT SERPL-CCNC: 11 UNIT/L (ref 0–55)
AST SERPL-CCNC: 11 UNIT/L (ref 5–34)
BASOPHILS # BLD AUTO: 0.04 X10(3)/MCL
BASOPHILS NFR BLD AUTO: 0.4 %
BILIRUB SERPL-MCNC: 0.7 MG/DL
BUN SERPL-MCNC: 24 MG/DL (ref 8.9–20.6)
CALCIUM SERPL-MCNC: 9 MG/DL (ref 8.4–10.2)
CHLORIDE SERPL-SCNC: 95 MMOL/L (ref 98–107)
CO2 SERPL-SCNC: 29 MMOL/L (ref 22–29)
CREAT SERPL-MCNC: 1.75 MG/DL (ref 0.73–1.18)
EOSINOPHIL # BLD AUTO: 0.3 X10(3)/MCL (ref 0–0.9)
EOSINOPHIL NFR BLD AUTO: 3.1 %
ERYTHROCYTE [DISTWIDTH] IN BLOOD BY AUTOMATED COUNT: 17.2 % (ref 11.5–17)
GFR SERPLBLD CREATININE-BSD FMLA CKD-EPI: 48 MLS/MIN/1.73/M2
GLOBULIN SER-MCNC: 2.7 GM/DL (ref 2.4–3.5)
GLUCOSE SERPL-MCNC: 90 MG/DL (ref 74–100)
HCT VFR BLD AUTO: 28.5 % (ref 42–52)
HGB BLD-MCNC: 9.5 G/DL (ref 14–18)
IMM GRANULOCYTES # BLD AUTO: 0.06 X10(3)/MCL (ref 0–0.04)
IMM GRANULOCYTES NFR BLD AUTO: 0.6 %
LYMPHOCYTES # BLD AUTO: 0.64 X10(3)/MCL (ref 0.6–4.6)
LYMPHOCYTES NFR BLD AUTO: 6.7 %
MAGNESIUM SERPL-MCNC: 2.2 MG/DL (ref 1.6–2.6)
MCH RBC QN AUTO: 33 PG (ref 27–31)
MCHC RBC AUTO-ENTMCNC: 33.3 G/DL (ref 33–36)
MCV RBC AUTO: 99 FL (ref 80–94)
MONOCYTES # BLD AUTO: 1.3 X10(3)/MCL (ref 0.1–1.3)
MONOCYTES NFR BLD AUTO: 13.6 %
NEUTROPHILS # BLD AUTO: 7.21 X10(3)/MCL (ref 2.1–9.2)
NEUTROPHILS NFR BLD AUTO: 75.6 %
PHOSPHATE SERPL-MCNC: 3.8 MG/DL (ref 2.3–4.7)
PLATELET # BLD AUTO: 186 X10(3)/MCL (ref 130–400)
PMV BLD AUTO: 11.4 FL (ref 7.4–10.4)
POTASSIUM SERPL-SCNC: 3.9 MMOL/L (ref 3.5–5.1)
PROT SERPL-MCNC: 6 GM/DL (ref 6.4–8.3)
RBC # BLD AUTO: 2.88 X10(6)/MCL (ref 4.7–6.1)
SODIUM SERPL-SCNC: 132 MMOL/L (ref 136–145)
WBC # SPEC AUTO: 9.55 X10(3)/MCL (ref 4.5–11.5)

## 2024-02-22 PROCEDURE — 85025 COMPLETE CBC W/AUTO DIFF WBC: CPT

## 2024-02-22 PROCEDURE — 84100 ASSAY OF PHOSPHORUS: CPT

## 2024-02-22 PROCEDURE — 63600175 PHARM REV CODE 636 W HCPCS: Mod: JZ,JG

## 2024-02-22 PROCEDURE — 99215 OFFICE O/P EST HI 40 MIN: CPT | Mod: S$GLB,,,

## 2024-02-22 PROCEDURE — 83735 ASSAY OF MAGNESIUM: CPT

## 2024-02-22 PROCEDURE — 1160F RVW MEDS BY RX/DR IN RCRD: CPT | Mod: CPTII,S$GLB,,

## 2024-02-22 PROCEDURE — 99999 PR PBB SHADOW E&M-EST. PATIENT-LVL V: CPT | Mod: PBBFAC,,,

## 2024-02-22 PROCEDURE — 3008F BODY MASS INDEX DOCD: CPT | Mod: CPTII,S$GLB,,

## 2024-02-22 PROCEDURE — 36415 COLL VENOUS BLD VENIPUNCTURE: CPT

## 2024-02-22 PROCEDURE — 3078F DIAST BP <80 MM HG: CPT | Mod: CPTII,S$GLB,,

## 2024-02-22 PROCEDURE — 80053 COMPREHEN METABOLIC PANEL: CPT

## 2024-02-22 PROCEDURE — 1159F MED LIST DOCD IN RCRD: CPT | Mod: CPTII,S$GLB,,

## 2024-02-22 PROCEDURE — 3074F SYST BP LT 130 MM HG: CPT | Mod: CPTII,S$GLB,,

## 2024-02-22 PROCEDURE — 96401 CHEMO ANTI-NEOPL SQ/IM: CPT

## 2024-02-22 RX ORDER — BORTEZOMIB 3.5 MG/1
1.3 INJECTION, POWDER, LYOPHILIZED, FOR SOLUTION INTRAVENOUS; SUBCUTANEOUS
Status: COMPLETED | OUTPATIENT
Start: 2024-02-22 | End: 2024-02-22

## 2024-02-22 RX ORDER — PROCHLORPERAZINE EDISYLATE 5 MG/ML
10 INJECTION INTRAMUSCULAR; INTRAVENOUS ONCE AS NEEDED
Status: CANCELLED | OUTPATIENT
Start: 2024-02-22

## 2024-02-22 RX ORDER — DIPHENHYDRAMINE HYDROCHLORIDE 50 MG/ML
50 INJECTION INTRAMUSCULAR; INTRAVENOUS ONCE AS NEEDED
Status: CANCELLED | OUTPATIENT
Start: 2024-02-22

## 2024-02-22 RX ORDER — HEPARIN 100 UNIT/ML
500 SYRINGE INTRAVENOUS
Status: CANCELLED | OUTPATIENT
Start: 2024-02-22

## 2024-02-22 RX ORDER — EPINEPHRINE 0.3 MG/.3ML
0.3 INJECTION SUBCUTANEOUS ONCE AS NEEDED
Status: CANCELLED | OUTPATIENT
Start: 2024-02-22

## 2024-02-22 RX ORDER — SODIUM CHLORIDE 0.9 % (FLUSH) 0.9 %
10 SYRINGE (ML) INJECTION
Status: CANCELLED | OUTPATIENT
Start: 2024-02-22

## 2024-02-22 RX ORDER — BORTEZOMIB 3.5 MG/1
1.3 INJECTION, POWDER, LYOPHILIZED, FOR SOLUTION INTRAVENOUS; SUBCUTANEOUS
Status: CANCELLED | OUTPATIENT
Start: 2024-02-22

## 2024-02-22 RX ORDER — DARATUMUMAB 100 MG/5ML
INJECTION, SOLUTION, CONCENTRATE INTRAVENOUS
COMMUNITY
End: 2024-04-16

## 2024-02-22 RX ORDER — BORTEZOMIB 2.5 MG/1
INJECTION, POWDER, LYOPHILIZED, FOR SOLUTION INTRAVENOUS; SUBCUTANEOUS
COMMUNITY
Start: 2023-11-06 | End: 2024-04-16

## 2024-02-22 RX ADMIN — BORTEZOMIB 3 MG: 3.5 INJECTION, POWDER, LYOPHILIZED, FOR SOLUTION INTRAVENOUS; SUBCUTANEOUS at 10:02

## 2024-02-22 NOTE — PROGRESS NOTES
Subjective:       Patient ID: Pete Fernandez is a 43 y.o. male.    Chief Complaint: Multiple Myeloma (Pt lost 4 lb. Pt has colonoscopy scheduled for tomorrow and is currently fasting. Pt reports felling N x 3 days and takes Zofran that resolves it. 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 Beatriz+ KrD for cycle 2 on 11/30/23; Change at request of [facility]  Stopped after 1 dose due to severe pleural and pericardial effusion development   Switched back to BEATRIZ + RVD with increase of Rev to 25mg    HPI  44yo M presented to [facility] on 10/11 after 1 month of progressive weakness, abdominal pain, and intermittent confusion. At the time of his presentation, his labs which had been normal with PCP in 8/2023 were notable for new severe DOUG, transaminitis, and Ca of 18. He was admitted and started on IVF resuscitation.  Hypercalcemia workup was initiated and he was ultimately found to have numerous osseous lesions on 10/15/23 CT CAP, FLC - Kappa 3.29, Lambda 744, Ratio 226 , SPEP with Mspike 0.17 IgG lambda. Bone marrow biopsy done 10/18/23 revealed 95% plasma cells and FISH with TP53 deletion, FGFR3/IGH (or NSD2/IGH) fusion, usually representing a t(4;14), and a tetraploid subclone was observed. After correction of his hypercalcemia and improvement in his renal function he was discharged on 10/22/23.   He started C1 Beatriz + RVD + Zometa on 11/2/23. He was referred to [facility] for consideration of bone marrow transplant, they recommended change to Beatriz + Krd, however patient developed life threatening pleural and pericardial edema several days after  1st infusion requiring hospitalization and placement of a pericardial window. Therefore he transitioned back to Beatriz - RVD for C2.     Interval History:   He returns to the clinic today for a follow-up and treatment clearance for C4D8 of Beatriz-VRD. He continues to report fatigue that is stable.  He continues to have occasional jaw pain related to his ONJ that is stable. He was referred to oral surgery by his dentist and has an appt 2/27/2024. He is currently scheduled for his colonoscopy tomorrow. He was seen by Dr. Cook on 1/22/2024. Planning to hold Daratumumab after cycle 4 with transplant march or April of this year. Taking calcium once daily and ASA 81 mg daily. Labs reviewed in detail with him and are stable to continue with treatment. Denies any fevers, chills, NS, headaches, bleeding, bruising, neuropathy.       Past Medical History:   Diagnosis Date    Anxiety disorder, unspecified     Hypercalcemia       Past Surgical History:   Procedure Laterality Date    BONE MARROW BIOPSY N/A 10/18/2023    Procedure: Biopsy-bone marrow;  Surgeon: Nhan Marte MD;  Location: [facility];  Service: General;  Laterality: N/A;    TONSILLECTOMY       Social History     Socioeconomic History    Marital status:    Tobacco Use    Smoking status: Never    Smokeless tobacco: Never   Substance and Sexual Activity    Alcohol use: Yes    Drug use: Never    Sexual activity: Yes     Partners: Female     Social Determinants of Health     Financial Resource Strain: Medium Risk (11/15/2023)    Overall Financial Resource Strain (CARDIA)     Difficulty of Paying Living Expenses: Somewhat hard   Food Insecurity: Food Insecurity Present (11/15/2023)    Hunger Vital Sign     Worried About Running Out of Food in the Last Year: Sometimes true     Ran Out of Food in the Last Year: Never true   Transportation Needs: No Transportation Needs (11/15/2023)    PRAPARE - Transportation     Lack of Transportation (Medical): No     Lack of  Transportation (Non-Medical): No   Physical Activity: Insufficiently Active (11/15/2023)    Exercise Vital Sign     Days of Exercise per Week: 3 days     Minutes of Exercise per Session: 30 min   Stress: Stress Concern Present (11/15/2023)    [employer] - Occupational Stress Questionnaire     Feeling of Stress : To some extent   Social Connections: Unknown (11/15/2023)    Social Connection and Isolation Panel [NHANES]     Frequency of Communication with Friends and Family: More than three times a week     Frequency of Social Gatherings with Friends and Family: More than three times a week     Attends [employer] Services: Patient declined     Active Member of Clubs or Organizations: No     Attends Club or Organization Meetings: Never     Marital Status:    Housing Stability: Low Risk  (11/15/2023)    Housing Stability Vital Sign     Unable to Pay for Housing in the Last Year: No     Number of Places Lived in the Last Year: 1     Unstable Housing in the Last Year: No      Family History   Problem Relation Age of Onset    Breast cancer Mother     Pancreatic cancer Father       Review of patient's allergies indicates:  No Known Allergies   Review of Systems   Constitutional:  Negative for activity change, appetite change, chills, fatigue, fever and unexpected weight change.   HENT:  Negative for mouth sores and sore throat.    Eyes:  Negative for visual disturbance.   Respiratory:  Negative for cough and shortness of breath.    Cardiovascular:  Negative for chest pain.   Gastrointestinal:  Negative for abdominal pain, constipation, diarrhea, nausea and vomiting.   Endocrine: Negative for polyuria.   Genitourinary:  Negative for dysuria and frequency.   Musculoskeletal:  Negative for back pain.   Integumentary:  Negative for rash.   Allergic/Immunologic: Negative for frequent infections.   Neurological:  Negative for weakness and headaches.   Hematological:  Negative for adenopathy.  Does not bruise/bleed easily.   Psychiatric/Behavioral:  The patient is not nervous/anxious.          Objective:      Vitals:    02/22/24 0930   BP: 124/77   Pulse: 78   Resp: 20   Temp: 98.5 °F (36.9 °C)         Physical Exam  Constitutional:       General: He is not in acute distress.     Appearance: Normal appearance. He is not ill-appearing.   HENT:      Head: Normocephalic and atraumatic.      Nose: Nose normal.      Mouth/Throat:      Mouth: Mucous membranes are moist.      Pharynx: Oropharynx is clear.   Eyes:      Extraocular Movements: Extraocular movements intact.      Conjunctiva/sclera: Conjunctivae normal.      Pupils: Pupils are equal, round, and reactive to light.   Cardiovascular:      Rate and Rhythm: Normal rate and regular rhythm.      Pulses: Normal pulses.      Heart sounds: Normal heart sounds. No murmur heard.  Pulmonary:      Effort: Pulmonary effort is normal. No respiratory distress.      Breath sounds: Normal breath sounds.   Abdominal:      General: There is no distension.      Palpations: Abdomen is soft.      Tenderness: There is no abdominal tenderness.   Musculoskeletal:         General: Normal range of motion.      Cervical back: Normal range of motion and neck supple.   Lymphadenopathy:      Cervical: No cervical adenopathy.   Skin:     General: Skin is warm and dry.      Findings: No rash.   Neurological:      General: No focal deficit present.      Mental Status: He is alert and oriented to person, place, and time.         LABS AND IMAGING REVIEWED IN EPIC  10/11/23 Xray Metastatic Survey  1. Scattered small defects at the calvarium measuring up to 5 mm too numerous to count.  2. Degene mild rative changes at the spine and extremities  3. MR exam and bone scan would allow further evaluation.  10/12/23 MRI Brain  1. Motion artifact  2. Mild scattered mucosal thickening throughout the paranasal sinuses  3. Somewhat limited evaluation of brain metastases without the administration of  IV contrast  4. Otherwise unremarkable MR exam of the head without the administration of IV contrast  10/12/23 NM Bone Scan  1. Abnormal intense increased activity throughout the lungs of uncertain etiology.  This may related to hyperparathyroidism, hematologic malignancy, and or metabolic abnormality such as renal failure, vasculitis, or pulmonary infection.  Chest x-ray 10/10/2023 reveals the lungs to be relatively clear and well aerated.  2. Suspect scattered mild degenerative changes throughout the joint spaces  3. Increased activity nasal maxillary region suggesting mucoperiosteal disease  4. Mild activity at the stomach suggesting free pertechnetate  10/14/23 CT CAP  1. Widespread osseous findings suggestive of extensive metastatic skeletal disease, to include expansile destructive soft tissue component at the anterior right 7th rib.  2. Enlarged intrathoracic lymph nodes could also be of metastatic etiology.  3. Diffuse ground-glass attenuation through both lungs is nonspecific, could be secondary to atypical infectious or inflammatory process.  However, possibility of metastatic involvement cannot be excluded.  12/7/23 CXR:   1. Hazy opacification lower lungs bilaterally suspicious for infiltrate and pleural reaction which has progressed since the prior exam  2. Cardiomegaly  3. Thoracic spondylosis  12/11/23 CXR:  1. Mild cardiomegaly  2. Persistent bibasilar infiltrate and or pleural reaction  3. Thoracic spondylosis with anterior wedging again evident at a lower thoracic vertebral body  12/13/23 ECHO: EF 50-55%  2/7/24 PET/CT:  1. Interval improved appearance of lytic osseous lesions compared to previous exam with sub threshold uptake. The bones are demineralized with numerous tiny lytic foci at the axial and appendicular skeleton.  2. Subacute appearing bilateral rib and right clavicle fractures with mild FDG uptake.  3. Nonspecific patchy ground-glass and interstitial opacities with mild FDG uptake.        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 Displayed all CRAB criteria at time of diagnosis in October. Undergoing 1st line Ayde + RVD + Zometa. Being followed by [facility] in [facility] for transplant consideration.    ONJ  Zometa now on hold. Continue follow-ups with dental as scheduled.      Plan:   Toxicity reviewed Labs adequate for C4D8 of AYDE-VRD with Revlimid 25 mg   Hydration encouraged.  Per transplant, plan to hold daratumumab after C4 with possible transplant March-April.  Colonoscopy scheduled for tomorrow.   Continue to hold zometa due to ONJ. Oral surgeon scheduled for 2/27/2024.   Continue Aspirin 81mg, Acyclovir, and Bactrim for prophylaxis  Will need SPEP/OSVALDO and FLC with labs Q4w prior to every new cycle.   RTC in 1 week with NP with labs  Cbc, cmp, MM labs- 1 hr prior @ [facility]    I spent a total of 40 minutes on the day of the visit.This includes face to face time and non-face to face time preparing to see the patient (eg, review of tests), obtaining and/or reviewing separately obtained history, documenting clinical information in the electronic or other health record, independently interpreting results and communicating results to the patient/family/caregiver, or care coordinator.       **All blood products must be irradiated and CMV negative**        Janet Alegria, FNP-C  Oncology/Hematology   Cancer Center [facility]

## 2024-02-29 ENCOUNTER — INFUSION (OUTPATIENT)
Dept: INFUSION THERAPY | Facility: HOSPITAL | Age: 44
End: 2024-02-29
Payer: COMMERCIAL

## 2024-02-29 ENCOUNTER — OFFICE VISIT (OUTPATIENT)
Dept: HEMATOLOGY/ONCOLOGY | Facility: CLINIC | Age: 44
End: 2024-02-29
Payer: COMMERCIAL

## 2024-02-29 ENCOUNTER — LAB VISIT (OUTPATIENT)
Dept: LAB | Facility: HOSPITAL | Age: 44
End: 2024-02-29
Payer: COMMERCIAL

## 2024-02-29 VITALS
SYSTOLIC BLOOD PRESSURE: 127 MMHG | RESPIRATION RATE: 20 BRPM | BODY MASS INDEX: 29.3 KG/M2 | HEART RATE: 77 BPM | HEIGHT: 69 IN | TEMPERATURE: 98 F | OXYGEN SATURATION: 100 % | WEIGHT: 197.81 LBS | DIASTOLIC BLOOD PRESSURE: 86 MMHG

## 2024-02-29 DIAGNOSIS — D84.821 IMMUNODEFICIENCY DUE TO CHEMOTHERAPY: ICD-10-CM

## 2024-02-29 DIAGNOSIS — Z79.899 IMMUNODEFICIENCY DUE TO CHEMOTHERAPY: ICD-10-CM

## 2024-02-29 DIAGNOSIS — Z79.899 IMMUNODEFICIENCY DUE TO DRUG THERAPY: ICD-10-CM

## 2024-02-29 DIAGNOSIS — R53.82 CHRONIC FATIGUE: ICD-10-CM

## 2024-02-29 DIAGNOSIS — D63.0 ANEMIA IN NEOPLASTIC DISEASE: ICD-10-CM

## 2024-02-29 DIAGNOSIS — C90.00 MULTIPLE MYELOMA, REMISSION STATUS UNSPECIFIED: Primary | ICD-10-CM

## 2024-02-29 DIAGNOSIS — D84.821 IMMUNODEFICIENCY DUE TO DRUG THERAPY: ICD-10-CM

## 2024-02-29 DIAGNOSIS — D64.9 LOW HEMOGLOBIN: ICD-10-CM

## 2024-02-29 DIAGNOSIS — T45.1X5A IMMUNODEFICIENCY DUE TO CHEMOTHERAPY: ICD-10-CM

## 2024-02-29 DIAGNOSIS — C90.00 MULTIPLE MYELOMA, REMISSION STATUS UNSPECIFIED: ICD-10-CM

## 2024-02-29 DIAGNOSIS — G89.3 CANCER ASSOCIATED PAIN: ICD-10-CM

## 2024-02-29 DIAGNOSIS — C90.00 METASTATIC MULTIPLE MYELOMA TO BONE: ICD-10-CM

## 2024-02-29 DIAGNOSIS — M87.180 OSTEONECROSIS OF JAW DUE TO DRUG: ICD-10-CM

## 2024-02-29 DIAGNOSIS — D70.1 CHEMOTHERAPY-INDUCED NEUTROPENIA: ICD-10-CM

## 2024-02-29 DIAGNOSIS — Z76.82 STEM CELL TRANSPLANT CANDIDATE: ICD-10-CM

## 2024-02-29 DIAGNOSIS — C90.00 MULTIPLE MYELOMA NOT HAVING ACHIEVED REMISSION: ICD-10-CM

## 2024-02-29 DIAGNOSIS — T45.1X5A CHEMOTHERAPY-INDUCED NEUTROPENIA: ICD-10-CM

## 2024-02-29 LAB
ALBUMIN SERPL-MCNC: 3 G/DL (ref 3.5–5)
ALBUMIN/GLOB SERPL: 1.1 RATIO (ref 1.1–2)
ALP SERPL-CCNC: 47 UNIT/L (ref 40–150)
ALT SERPL-CCNC: 15 UNIT/L (ref 0–55)
AST SERPL-CCNC: 11 UNIT/L (ref 5–34)
BASOPHILS # BLD AUTO: 0.02 X10(3)/MCL
BASOPHILS NFR BLD AUTO: 0.4 %
BILIRUB SERPL-MCNC: 0.7 MG/DL
BUN SERPL-MCNC: 20 MG/DL (ref 8.9–20.6)
CALCIUM SERPL-MCNC: 9.1 MG/DL (ref 8.4–10.2)
CHLORIDE SERPL-SCNC: 101 MMOL/L (ref 98–107)
CO2 SERPL-SCNC: 29 MMOL/L (ref 22–29)
CREAT SERPL-MCNC: 1.89 MG/DL (ref 0.73–1.18)
EOSINOPHIL # BLD AUTO: 0.51 X10(3)/MCL (ref 0–0.9)
EOSINOPHIL NFR BLD AUTO: 10.3 %
ERYTHROCYTE [DISTWIDTH] IN BLOOD BY AUTOMATED COUNT: 16.7 % (ref 11.5–17)
GFR SERPLBLD CREATININE-BSD FMLA CKD-EPI: 44 MLS/MIN/1.73/M2
GLOBULIN SER-MCNC: 2.7 GM/DL (ref 2.4–3.5)
GLUCOSE SERPL-MCNC: 91 MG/DL (ref 74–100)
HCT VFR BLD AUTO: 27.6 % (ref 42–52)
HGB BLD-MCNC: 9.1 G/DL (ref 14–18)
IMM GRANULOCYTES # BLD AUTO: 0.02 X10(3)/MCL (ref 0–0.04)
IMM GRANULOCYTES NFR BLD AUTO: 0.4 %
LYMPHOCYTES # BLD AUTO: 0.75 X10(3)/MCL (ref 0.6–4.6)
LYMPHOCYTES NFR BLD AUTO: 15.1 %
MAGNESIUM SERPL-MCNC: 1.8 MG/DL (ref 1.6–2.6)
MCH RBC QN AUTO: 32.9 PG (ref 27–31)
MCHC RBC AUTO-ENTMCNC: 33 G/DL (ref 33–36)
MCV RBC AUTO: 99.6 FL (ref 80–94)
MONOCYTES # BLD AUTO: 0.77 X10(3)/MCL (ref 0.1–1.3)
MONOCYTES NFR BLD AUTO: 15.5 %
NEUTROPHILS # BLD AUTO: 2.89 X10(3)/MCL (ref 2.1–9.2)
NEUTROPHILS NFR BLD AUTO: 58.3 %
PHOSPHATE SERPL-MCNC: 3.9 MG/DL (ref 2.3–4.7)
PLATELET # BLD AUTO: 186 X10(3)/MCL (ref 130–400)
PMV BLD AUTO: 11 FL (ref 7.4–10.4)
POTASSIUM SERPL-SCNC: 4 MMOL/L (ref 3.5–5.1)
PROT SERPL-MCNC: 5.7 GM/DL (ref 6.4–8.3)
RBC # BLD AUTO: 2.77 X10(6)/MCL (ref 4.7–6.1)
SODIUM SERPL-SCNC: 138 MMOL/L (ref 136–145)
WBC # SPEC AUTO: 4.96 X10(3)/MCL (ref 4.5–11.5)

## 2024-02-29 PROCEDURE — 96409 CHEMO IV PUSH SNGL DRUG: CPT

## 2024-02-29 PROCEDURE — 1159F MED LIST DOCD IN RCRD: CPT | Mod: CPTII,S$GLB,,

## 2024-02-29 PROCEDURE — 1160F RVW MEDS BY RX/DR IN RCRD: CPT | Mod: CPTII,S$GLB,,

## 2024-02-29 PROCEDURE — 80053 COMPREHEN METABOLIC PANEL: CPT

## 2024-02-29 PROCEDURE — 99999 PR PBB SHADOW E&M-EST. PATIENT-LVL V: CPT | Mod: PBBFAC,,,

## 2024-02-29 PROCEDURE — 63600175 PHARM REV CODE 636 W HCPCS: Mod: JZ,JG

## 2024-02-29 PROCEDURE — 36415 COLL VENOUS BLD VENIPUNCTURE: CPT

## 2024-02-29 PROCEDURE — 84100 ASSAY OF PHOSPHORUS: CPT

## 2024-02-29 PROCEDURE — 83735 ASSAY OF MAGNESIUM: CPT

## 2024-02-29 PROCEDURE — 85025 COMPLETE CBC W/AUTO DIFF WBC: CPT

## 2024-02-29 PROCEDURE — 3008F BODY MASS INDEX DOCD: CPT | Mod: CPTII,S$GLB,,

## 2024-02-29 PROCEDURE — 96411 CHEMO IV PUSH ADDL DRUG: CPT

## 2024-02-29 PROCEDURE — 3079F DIAST BP 80-89 MM HG: CPT | Mod: CPTII,S$GLB,,

## 2024-02-29 PROCEDURE — 99215 OFFICE O/P EST HI 40 MIN: CPT | Mod: S$GLB,,,

## 2024-02-29 PROCEDURE — 3074F SYST BP LT 130 MM HG: CPT | Mod: CPTII,S$GLB,,

## 2024-02-29 RX ORDER — EPINEPHRINE 0.3 MG/.3ML
0.3 INJECTION SUBCUTANEOUS ONCE AS NEEDED
Status: CANCELLED | OUTPATIENT
Start: 2024-02-29

## 2024-02-29 RX ORDER — HEPARIN 100 UNIT/ML
500 SYRINGE INTRAVENOUS
Status: CANCELLED | OUTPATIENT
Start: 2024-02-29

## 2024-02-29 RX ORDER — BORTEZOMIB 3.5 MG/1
1.3 INJECTION, POWDER, LYOPHILIZED, FOR SOLUTION INTRAVENOUS; SUBCUTANEOUS
Status: COMPLETED | OUTPATIENT
Start: 2024-02-29 | End: 2024-02-29

## 2024-02-29 RX ORDER — DIPHENHYDRAMINE HYDROCHLORIDE 50 MG/ML
50 INJECTION INTRAMUSCULAR; INTRAVENOUS ONCE AS NEEDED
Status: DISCONTINUED | OUTPATIENT
Start: 2024-02-29 | End: 2024-02-29 | Stop reason: HOSPADM

## 2024-02-29 RX ORDER — DIPHENHYDRAMINE HYDROCHLORIDE 50 MG/ML
50 INJECTION INTRAMUSCULAR; INTRAVENOUS ONCE AS NEEDED
Status: CANCELLED | OUTPATIENT
Start: 2024-02-29

## 2024-02-29 RX ORDER — BORTEZOMIB 3.5 MG/1
1.3 INJECTION, POWDER, LYOPHILIZED, FOR SOLUTION INTRAVENOUS; SUBCUTANEOUS
Status: CANCELLED | OUTPATIENT
Start: 2024-02-29

## 2024-02-29 RX ORDER — EPINEPHRINE 0.3 MG/.3ML
0.3 INJECTION SUBCUTANEOUS ONCE AS NEEDED
Status: DISCONTINUED | OUTPATIENT
Start: 2024-02-29 | End: 2024-02-29 | Stop reason: HOSPADM

## 2024-02-29 RX ORDER — PROCHLORPERAZINE EDISYLATE 5 MG/ML
10 INJECTION INTRAMUSCULAR; INTRAVENOUS ONCE AS NEEDED
Status: DISCONTINUED | OUTPATIENT
Start: 2024-02-29 | End: 2024-02-29 | Stop reason: HOSPADM

## 2024-02-29 RX ORDER — SODIUM CHLORIDE 0.9 % (FLUSH) 0.9 %
10 SYRINGE (ML) INJECTION
Status: DISCONTINUED | OUTPATIENT
Start: 2024-02-29 | End: 2024-02-29 | Stop reason: HOSPADM

## 2024-02-29 RX ORDER — PROCHLORPERAZINE EDISYLATE 5 MG/ML
10 INJECTION INTRAMUSCULAR; INTRAVENOUS ONCE AS NEEDED
Status: CANCELLED | OUTPATIENT
Start: 2024-02-29

## 2024-02-29 RX ORDER — HEPARIN 100 UNIT/ML
500 SYRINGE INTRAVENOUS
Status: DISCONTINUED | OUTPATIENT
Start: 2024-02-29 | End: 2024-02-29 | Stop reason: HOSPADM

## 2024-02-29 RX ORDER — SODIUM CHLORIDE 0.9 % (FLUSH) 0.9 %
10 SYRINGE (ML) INJECTION
Status: CANCELLED | OUTPATIENT
Start: 2024-02-29

## 2024-02-29 RX ADMIN — DARATUMUMAB AND HYALURONIDASE-FIHJ (HUMAN RECOMBINANT) 1800 MG: 1800; 30000 INJECTION SUBCUTANEOUS at 10:02

## 2024-02-29 RX ADMIN — BORTEZOMIB 3 MG: 3.5 INJECTION, POWDER, LYOPHILIZED, FOR SOLUTION INTRAVENOUS; SUBCUTANEOUS at 10:02

## 2024-02-29 NOTE — PROGRESS NOTES
Subjective:       Patient ID: Pete Fernandez is a 43 y.o. male.    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 Beatriz+ KrD for cycle 2 on 11/30/23; Change at request of [facility]  Stopped after 1 dose due to severe pleural and pericardial effusion development   Switched back to BEATRIZ + RVD with increase of Rev to 25mg    HPI  42yo M presented to [facility] on 10/11 after 1 month of progressive weakness, abdominal pain, and intermittent confusion. At the time of his presentation, his labs which had been normal with PCP in 8/2023 were notable for new severe DOUG, transaminitis, and Ca of 18. He was admitted and started on IVF resuscitation.  Hypercalcemia workup was initiated and he was ultimately found to have numerous osseous lesions on 10/15/23 CT CAP, FLC - Kappa 3.29, Lambda 744, Ratio 226 , SPEP with Mspike 0.17 IgG lambda. Bone marrow biopsy done 10/18/23 revealed 95% plasma cells and FISH with TP53 deletion, FGFR3/IGH (or NSD2/IGH) fusion, usually representing a t(4;14), and a tetraploid subclone was observed. After correction of his hypercalcemia and improvement in his renal function he was discharged on 10/22/23.   He started C1 Beatriz + RVD + Zometa on 11/2/23. He was referred to [facility] for consideration of bone marrow transplant, they recommended change to Beatriz + Krd, however patient developed life threatening pleural and pericardial edema several days after 1st infusion requiring hospitalization and placement of a pericardial window. Therefore he transitioned back to Beatriz - RVD for C2.     Interval  History:   He returns to the clinic today for a follow-up and treatment clearance for C4D15 of Beatriz-VRD. He continues to report fatigue that is stable.  Jaw pain has now resolved after seeing oral surgeon. Colonoscopy was completed and was normal per pt report. He was seen by Dr. Cook on 1/22/2024. Planning to hold Daratumumab after cycle 4 with transplant march or April of this year. Taking calcium once daily and ASA 81 mg daily. Labs reviewed in detail with him and are stable to continue with treatment. Denies any fevers, chills, NS, headaches, bleeding, bruising, neuropathy.       Past Medical History:   Diagnosis Date    Anxiety disorder, unspecified     Hypercalcemia       Past Surgical History:   Procedure Laterality Date    BONE MARROW BIOPSY N/A 10/18/2023    Procedure: Biopsy-bone marrow;  Surgeon: Nhan Marte MD;  Location: [facility];  Service: General;  Laterality: N/A;    TONSILLECTOMY       Social History     Socioeconomic History    Marital status:    Tobacco Use    Smoking status: Never    Smokeless tobacco: Never   Substance and Sexual Activity    Alcohol use: Yes    Drug use: Never    Sexual activity: Yes     Partners: Female     Social Determinants of Health     Financial Resource Strain: Medium Risk (11/15/2023)    Overall Financial Resource Strain (CARDIA)     Difficulty of Paying Living Expenses: Somewhat hard   Food Insecurity: Food Insecurity Present (11/15/2023)    Hunger Vital Sign     Worried About Running Out of Food in the Last Year: Sometimes true     Ran Out of Food in the Last Year: Never true   Transportation Needs: No Transportation Needs (11/15/2023)    PRAPARE - Transportation     Lack of Transportation (Medical): No     Lack of Transportation (Non-Medical): No   Physical Activity: Insufficiently Active (11/15/2023)    Exercise Vital Sign     Days of Exercise per Week: 3 days     Minutes of Exercise per Session: 30 min   Stress: Stress Concern Present (11/15/2023)     [facility] of Occupational Health - Occupational Stress Questionnaire     Feeling of Stress : To some extent   Social Connections: Unknown (11/15/2023)    Social Connection and Isolation Panel [NHANES]     Frequency of Communication with Friends and Family: More than three times a week     Frequency of Social Gatherings with Friends and Family: More than three times a week     Attends Restorationism Services: Patient declined     Active Member of Clubs or Organizations: No     Attends Club or Organization Meetings: Never     Marital Status:    Housing Stability: Low Risk  (11/15/2023)    Housing Stability Vital Sign     Unable to Pay for Housing in the Last Year: No     Number of Places Lived in the Last Year: 1     Unstable Housing in the Last Year: No      Family History   Problem Relation Age of Onset    Breast cancer Mother     Pancreatic cancer Father       Review of patient's allergies indicates:  No Known Allergies   Review of Systems   Constitutional:  Negative for activity change, appetite change, chills, fatigue, fever and unexpected weight change.   HENT:  Negative for mouth sores and sore throat.    Eyes:  Negative for visual disturbance.   Respiratory:  Negative for cough and shortness of breath.    Cardiovascular:  Negative for chest pain.   Gastrointestinal:  Negative for abdominal pain, constipation, diarrhea, nausea and vomiting.   Endocrine: Negative for polyuria.   Genitourinary:  Negative for dysuria and frequency.   Musculoskeletal:  Negative for back pain.   Integumentary:  Negative for rash.   Allergic/Immunologic: Negative for frequent infections.   Neurological:  Negative for weakness and headaches.   Hematological:  Negative for adenopathy. Does not bruise/bleed easily.   Psychiatric/Behavioral:  The patient is not nervous/anxious.          Objective:      Vitals:    02/29/24 0912   BP: 127/86   Pulse: 77   Resp: 20   Temp: 98.1 °F (36.7 °C)         Physical Exam  Constitutional:        General: He is not in acute distress.     Appearance: Normal appearance. He is not ill-appearing.   HENT:      Head: Normocephalic and atraumatic.      Nose: Nose normal.      Mouth/Throat:      Mouth: Mucous membranes are moist.      Pharynx: Oropharynx is clear.   Eyes:      Extraocular Movements: Extraocular movements intact.      Conjunctiva/sclera: Conjunctivae normal.      Pupils: Pupils are equal, round, and reactive to light.   Cardiovascular:      Rate and Rhythm: Normal rate and regular rhythm.      Pulses: Normal pulses.      Heart sounds: Normal heart sounds. No murmur heard.  Pulmonary:      Effort: Pulmonary effort is normal. No respiratory distress.      Breath sounds: Normal breath sounds.   Abdominal:      General: There is no distension.      Palpations: Abdomen is soft.      Tenderness: There is no abdominal tenderness.   Musculoskeletal:         General: Normal range of motion.      Cervical back: Normal range of motion and neck supple.   Lymphadenopathy:      Cervical: No cervical adenopathy.   Skin:     General: Skin is warm and dry.      Findings: No rash.   Neurological:      General: No focal deficit present.      Mental Status: He is alert and oriented to person, place, and time.         LABS AND IMAGING REVIEWED IN EPIC  10/11/23 Xray Metastatic Survey  1. Scattered small defects at the calvarium measuring up to 5 mm too numerous to count.  2. Degene mild rative changes at the spine and extremities  3. MR exam and bone scan would allow further evaluation.  10/12/23 MRI Brain  1. Motion artifact  2. Mild scattered mucosal thickening throughout the paranasal sinuses  3. Somewhat limited evaluation of brain metastases without the administration of IV contrast  4. Otherwise unremarkable MR exam of the head without the administration of IV contrast  10/12/23 NM Bone Scan  1. Abnormal intense increased activity throughout the lungs of uncertain etiology.  This may related to  hyperparathyroidism, hematologic malignancy, and or metabolic abnormality such as renal failure, vasculitis, or pulmonary infection.  Chest x-ray 10/10/2023 reveals the lungs to be relatively clear and well aerated.  2. Suspect scattered mild degenerative changes throughout the joint spaces  3. Increased activity nasal maxillary region suggesting mucoperiosteal disease  4. Mild activity at the stomach suggesting free pertechnetate  10/14/23 CT CAP  1. Widespread osseous findings suggestive of extensive metastatic skeletal disease, to include expansile destructive soft tissue component at the anterior right 7th rib.  2. Enlarged intrathoracic lymph nodes could also be of metastatic etiology.  3. Diffuse ground-glass attenuation through both lungs is nonspecific, could be secondary to atypical infectious or inflammatory process.  However, possibility of metastatic involvement cannot be excluded.  12/7/23 CXR:   1. Hazy opacification lower lungs bilaterally suspicious for infiltrate and pleural reaction which has progressed since the prior exam  2. Cardiomegaly  3. Thoracic spondylosis  12/11/23 CXR:  1. Mild cardiomegaly  2. Persistent bibasilar infiltrate and or pleural reaction  3. Thoracic spondylosis with anterior wedging again evident at a lower thoracic vertebral body  12/13/23 ECHO: EF 50-55%  2/7/24 PET/CT:  1. Interval improved appearance of lytic osseous lesions compared to previous exam with sub threshold uptake. The bones are demineralized with numerous tiny lytic foci at the axial and appendicular skeleton.  2. Subacute appearing bilateral rib and right clavicle fractures with mild FDG uptake.  3. Nonspecific patchy ground-glass and interstitial opacities with mild FDG uptake.       Free Light Chains  10/17//23 - Lambda , Kappa FLC 3.29, K/L Ratio .0044  11/22/23 - Lambda FLC 0.66, Kappa FLC 0.66, K/L Ratio 1.0  01/18/24 - lambda FLC 0.33, kappa FLC 0.39, K/L ratio 1.18    Assessment:   High Risk  IgG Lambda Light Chain Multiple Myeloma - TP53 mutated. Displayed all CRAB criteria at time of diagnosis in October. Undergoing 1st line Ayde + RVD + Zometa. Being followed by [facility] in [facility] for transplant consideration.    ONJ  Zometa now on hold. Continue follow-ups with dental as scheduled.      Plan:   Toxicity reviewed Labs adequate for C4D15 of AYDE-VRD with Revlimid 25 mg   Hydration encouraged.  Per transplant, plan to hold daratumumab after C4 with possible transplant March-April.  Colonoscopy completed  Continue to hold zometa due to ONJ.   Continue Aspirin 81mg, Acyclovir, and Bactrim for prophylaxis  Will need SPEP/OSVALDO and FLC with labs Q4w prior to every new cycle.   RTC in 1 week with NP with labs  Cbc, cmp, MM labs- 1 hr prior @ [facility]    I spent a total of 40 minutes on the day of the visit.This includes face to face time and non-face to face time preparing to see the patient (eg, review of tests), obtaining and/or reviewing separately obtained history, documenting clinical information in the electronic or other health record, independently interpreting results and communicating results to the patient/family/caregiver, or care coordinator.       **All blood products must be irradiated and CMV negative**        ANA StephensonC  Oncology/Hematology   Cancer Center [facility]

## 2024-03-05 DIAGNOSIS — C90.00 MULTIPLE MYELOMA NOT HAVING ACHIEVED REMISSION: ICD-10-CM

## 2024-03-05 RX ORDER — LENALIDOMIDE 25 MG/1
25 CAPSULE ORAL DAILY
Qty: 21 CAPSULE | Refills: 0 | Status: SHIPPED | OUTPATIENT
Start: 2024-03-05 | End: 2024-03-07

## 2024-03-06 ENCOUNTER — LAB VISIT (OUTPATIENT)
Dept: LAB | Facility: HOSPITAL | Age: 44
End: 2024-03-06
Payer: COMMERCIAL

## 2024-03-06 DIAGNOSIS — E83.52 HYPERCALCEMIA: Primary | ICD-10-CM

## 2024-03-06 DIAGNOSIS — D69.6 THROMBOCYTOPENIA: ICD-10-CM

## 2024-03-06 DIAGNOSIS — D84.821 IMMUNODEFICIENCY DUE TO DRUG THERAPY: ICD-10-CM

## 2024-03-06 DIAGNOSIS — Z79.899 IMMUNODEFICIENCY DUE TO DRUG THERAPY: ICD-10-CM

## 2024-03-06 PROCEDURE — 84156 ASSAY OF PROTEIN URINE: CPT

## 2024-03-06 PROCEDURE — 0077U IG PARAPROTEIN QUAL BLD/UR: CPT

## 2024-03-07 ENCOUNTER — PATIENT MESSAGE (OUTPATIENT)
Dept: HEMATOLOGY/ONCOLOGY | Facility: CLINIC | Age: 44
End: 2024-03-07

## 2024-03-07 ENCOUNTER — HOSPITAL ENCOUNTER (OUTPATIENT)
Dept: RADIOLOGY | Facility: HOSPITAL | Age: 44
Discharge: HOME | End: 2024-03-07
Payer: COMMERCIAL

## 2024-03-07 ENCOUNTER — OFFICE VISIT (OUTPATIENT)
Dept: HEMATOLOGY/ONCOLOGY | Facility: CLINIC | Age: 44
End: 2024-03-07
Payer: COMMERCIAL

## 2024-03-07 ENCOUNTER — INFUSION (OUTPATIENT)
Dept: INFUSION THERAPY | Facility: HOSPITAL | Age: 44
End: 2024-03-07
Attending: NURSE PRACTITIONER
Payer: COMMERCIAL

## 2024-03-07 VITALS
SYSTOLIC BLOOD PRESSURE: 108 MMHG | WEIGHT: 195 LBS | BODY MASS INDEX: 28.88 KG/M2 | TEMPERATURE: 98 F | HEART RATE: 63 BPM | OXYGEN SATURATION: 100 % | HEIGHT: 69 IN | RESPIRATION RATE: 20 BRPM | DIASTOLIC BLOOD PRESSURE: 71 MMHG

## 2024-03-07 VITALS
WEIGHT: 195 LBS | DIASTOLIC BLOOD PRESSURE: 71 MMHG | BODY MASS INDEX: 28.88 KG/M2 | OXYGEN SATURATION: 100 % | HEIGHT: 69 IN | TEMPERATURE: 98 F | HEART RATE: 63 BPM | RESPIRATION RATE: 20 BRPM | SYSTOLIC BLOOD PRESSURE: 108 MMHG

## 2024-03-07 DIAGNOSIS — Z76.82 STEM CELL TRANSPLANT CANDIDATE: ICD-10-CM

## 2024-03-07 DIAGNOSIS — D64.9 LOW HEMOGLOBIN: ICD-10-CM

## 2024-03-07 DIAGNOSIS — G89.3 CANCER ASSOCIATED PAIN: ICD-10-CM

## 2024-03-07 DIAGNOSIS — R93.89 ABNORMAL CXR: ICD-10-CM

## 2024-03-07 DIAGNOSIS — N17.9 AKI (ACUTE KIDNEY INJURY): ICD-10-CM

## 2024-03-07 DIAGNOSIS — D84.821 IMMUNODEFICIENCY DUE TO CHEMOTHERAPY: ICD-10-CM

## 2024-03-07 DIAGNOSIS — C90.00 MULTIPLE MYELOMA, REMISSION STATUS UNSPECIFIED: Primary | ICD-10-CM

## 2024-03-07 DIAGNOSIS — C90.00 MULTIPLE MYELOMA NOT HAVING ACHIEVED REMISSION: Primary | ICD-10-CM

## 2024-03-07 DIAGNOSIS — R30.0 BURNING WITH URINATION: ICD-10-CM

## 2024-03-07 DIAGNOSIS — R06.02 SOB (SHORTNESS OF BREATH): ICD-10-CM

## 2024-03-07 DIAGNOSIS — C90.00 MULTIPLE MYELOMA, REMISSION STATUS UNSPECIFIED: ICD-10-CM

## 2024-03-07 DIAGNOSIS — R53.82 CHRONIC FATIGUE: ICD-10-CM

## 2024-03-07 DIAGNOSIS — M87.180 OSTEONECROSIS OF JAW DUE TO DRUG: ICD-10-CM

## 2024-03-07 DIAGNOSIS — D63.0 ANEMIA IN NEOPLASTIC DISEASE: ICD-10-CM

## 2024-03-07 DIAGNOSIS — T45.1X5A IMMUNODEFICIENCY DUE TO CHEMOTHERAPY: ICD-10-CM

## 2024-03-07 DIAGNOSIS — Z79.899 IMMUNODEFICIENCY DUE TO CHEMOTHERAPY: ICD-10-CM

## 2024-03-07 DIAGNOSIS — Z79.899 IMMUNODEFICIENCY DUE TO DRUG THERAPY: ICD-10-CM

## 2024-03-07 DIAGNOSIS — R11.0 NAUSEA: ICD-10-CM

## 2024-03-07 DIAGNOSIS — D84.821 IMMUNODEFICIENCY DUE TO DRUG THERAPY: ICD-10-CM

## 2024-03-07 DIAGNOSIS — C90.00 METASTATIC MULTIPLE MYELOMA TO BONE: ICD-10-CM

## 2024-03-07 LAB
APPEARANCE UR: ABNORMAL
BACTERIA #/AREA URNS AUTO: ABNORMAL /HPF
BILIRUB UR QL STRIP.AUTO: NEGATIVE
COLOR UR AUTO: YELLOW
GLUCOSE UR QL STRIP.AUTO: NEGATIVE
KETONES UR QL STRIP.AUTO: NEGATIVE
LEUKOCYTE ESTERASE UR QL STRIP.AUTO: ABNORMAL
NITRITE UR QL STRIP.AUTO: NEGATIVE
PH UR STRIP.AUTO: 6.5 [PH]
PROT UR QL STRIP.AUTO: ABNORMAL
RBC #/AREA URNS AUTO: ABNORMAL /HPF
RBC UR QL AUTO: ABNORMAL
SP GR UR STRIP.AUTO: 1.01 (ref 1–1.03)
SQUAMOUS #/AREA URNS AUTO: ABNORMAL /HPF
UROBILINOGEN UR STRIP-ACNC: 0.2
WBC #/AREA URNS AUTO: ABNORMAL /HPF

## 2024-03-07 PROCEDURE — 3008F BODY MASS INDEX DOCD: CPT | Mod: CPTII,S$GLB,,

## 2024-03-07 PROCEDURE — 3078F DIAST BP <80 MM HG: CPT | Mod: CPTII,S$GLB,,

## 2024-03-07 PROCEDURE — 3074F SYST BP LT 130 MM HG: CPT | Mod: CPTII,S$GLB,,

## 2024-03-07 PROCEDURE — 96401 CHEMO ANTI-NEOPL SQ/IM: CPT

## 2024-03-07 PROCEDURE — 71046 X-RAY EXAM CHEST 2 VIEWS: CPT | Mod: TC

## 2024-03-07 PROCEDURE — 99215 OFFICE O/P EST HI 40 MIN: CPT | Mod: S$GLB,,,

## 2024-03-07 PROCEDURE — 81003 URINALYSIS AUTO W/O SCOPE: CPT

## 2024-03-07 PROCEDURE — 25000003 PHARM REV CODE 250

## 2024-03-07 PROCEDURE — 99999 PR PBB SHADOW E&M-EST. PATIENT-LVL V: CPT | Mod: PBBFAC,,,

## 2024-03-07 PROCEDURE — 63600175 PHARM REV CODE 636 W HCPCS: Mod: JZ,JG

## 2024-03-07 RX ORDER — DIPHENHYDRAMINE HYDROCHLORIDE 50 MG/ML
50 INJECTION INTRAMUSCULAR; INTRAVENOUS ONCE AS NEEDED
Status: CANCELLED | OUTPATIENT
Start: 2024-03-07

## 2024-03-07 RX ORDER — ONDANSETRON HYDROCHLORIDE 8 MG/1
8 TABLET, FILM COATED ORAL EVERY 8 HOURS PRN
Qty: 30 TABLET | Refills: 2 | Status: SHIPPED | OUTPATIENT
Start: 2024-03-07 | End: 2024-06-13 | Stop reason: SDUPTHER

## 2024-03-07 RX ORDER — PROCHLORPERAZINE EDISYLATE 5 MG/ML
10 INJECTION INTRAMUSCULAR; INTRAVENOUS ONCE AS NEEDED
Status: CANCELLED | OUTPATIENT
Start: 2024-03-07

## 2024-03-07 RX ORDER — EPINEPHRINE 0.3 MG/.3ML
0.3 INJECTION SUBCUTANEOUS ONCE AS NEEDED
Status: CANCELLED | OUTPATIENT
Start: 2024-03-07

## 2024-03-07 RX ORDER — LENALIDOMIDE 10 MG/1
10 CAPSULE ORAL DAILY
Qty: 21 EACH | Refills: 0 | Status: SHIPPED | OUTPATIENT
Start: 2024-03-07 | End: 2024-03-28

## 2024-03-07 RX ORDER — ONDANSETRON 4 MG/1
8 TABLET, ORALLY DISINTEGRATING ORAL
Status: CANCELLED
Start: 2024-03-07

## 2024-03-07 RX ORDER — SODIUM CHLORIDE 0.9 % (FLUSH) 0.9 %
10 SYRINGE (ML) INJECTION
Status: CANCELLED | OUTPATIENT
Start: 2024-03-07

## 2024-03-07 RX ORDER — BORTEZOMIB 3.5 MG/1
1.3 INJECTION, POWDER, LYOPHILIZED, FOR SOLUTION INTRAVENOUS; SUBCUTANEOUS
Status: CANCELLED | OUTPATIENT
Start: 2024-03-07

## 2024-03-07 RX ORDER — ONDANSETRON 4 MG/1
8 TABLET, ORALLY DISINTEGRATING ORAL
Status: COMPLETED | OUTPATIENT
Start: 2024-03-07 | End: 2024-03-07

## 2024-03-07 RX ORDER — BORTEZOMIB 3.5 MG/1
1.3 INJECTION, POWDER, LYOPHILIZED, FOR SOLUTION INTRAVENOUS; SUBCUTANEOUS
Status: COMPLETED | OUTPATIENT
Start: 2024-03-07 | End: 2024-03-07

## 2024-03-07 RX ORDER — HEPARIN 100 UNIT/ML
500 SYRINGE INTRAVENOUS
Status: CANCELLED | OUTPATIENT
Start: 2024-03-07

## 2024-03-07 RX ADMIN — ONDANSETRON 8 MG: 4 TABLET, ORALLY DISINTEGRATING ORAL at 11:03

## 2024-03-07 RX ADMIN — BORTEZOMIB 3 MG: 3.5 INJECTION, POWDER, LYOPHILIZED, FOR SOLUTION INTRAVENOUS; SUBCUTANEOUS at 11:03

## 2024-03-07 NOTE — PROGRESS NOTES
Subjective:       Patient ID: Pete Fernandez is a 43 y.o. male.    Chief Complaint: MM treatment follow-up     Diagnosis: 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    Current Treatment:   Beatriz+ RVD - 11/2/23 x 1 cycle, plan to restart for C2 on 12/14 potentially  C1 - Rev 10mg dose reduction due to CrCl  C2 - Rev increased to 25mg due to renal function improvement  C3 - D22 held 1 week due to covid  C4-  D22 Rev decreased to 10mg due to decline in renal function  Zometa 10/17/23 - 1/11/2024 (3 treatments total)  Currently on hold due to ONJ    Treatment History:  1. Beatriz+ KrD for cycle 2 on 11/30/23; Change at request of [facility]  Stopped after 1 dose due to severe pleural and pericardial effusion development   Switched back to BEATRIZ + RVD with increase of Rev to 25mg    HPI  42yo M presented to [facility] on 10/11 after 1 month of progressive weakness, abdominal pain, and intermittent confusion. At the time of his presentation, his labs which had been normal with PCP in 8/2023 were notable for new severe DOUG, transaminitis, and Ca of 18. He was admitted and started on IVF resuscitation.  Hypercalcemia workup was initiated and he was ultimately found to have numerous osseous lesions on 10/15/23 CT CAP, FLC - Kappa 3.29, Lambda 744, Ratio 226 , SPEP with Mspike 0.17 IgG lambda. Bone marrow biopsy done 10/18/23 revealed 95% plasma cells and FISH with TP53 deletion, FGFR3/IGH (or NSD2/IGH) fusion, usually representing a t(4;14), and a tetraploid subclone was observed. After correction of his hypercalcemia and improvement in his renal function he was discharged on 10/22/23.   He started C1 Beatriz + RVD + Zometa on 11/2/23. He was referred to [facility] for consideration of bone marrow transplant, they recommended change to Beatriz + Krd, however patient developed life threatening pleural and pericardial edema several days after 1st infusion requiring hospitalization and placement of a pericardial window.  Therefore he transitioned back to Beatriz - RVD for C2.     Interval History:   He returns to the clinic today for a follow-up and treatment clearance for C4D22 of Beatriz-VRD. He reports increased SOB and burning with urination over the past week.  He continues to report fatigue that is stable.  Jaw pain has now resolved after seeing oral surgeon. Colonoscopy was completed and was normal per pt report. He was seen by Dr. Cook on 1/22/2024. Planning to hold Daratumumab after cycle 4 with transplant march or April of this year. He is awaiting a return call from transplant for his scheduling date. Taking calcium once daily and ASA 81 mg daily. Labs reviewed in detail with him and are stable to continue with treatment. Denies any fevers, chills, NS, headaches, bleeding, bruising, neuropathy.       Past Medical History:   Diagnosis Date    Anxiety disorder, unspecified     Hypercalcemia       Past Surgical History:   Procedure Laterality Date    BONE MARROW BIOPSY N/A 10/18/2023    Procedure: Biopsy-bone marrow;  Surgeon: Nhan Marte MD;  Location: [facility];  Service: General;  Laterality: N/A;    TONSILLECTOMY       Social History     Socioeconomic History    Marital status:    Tobacco Use    Smoking status: Never    Smokeless tobacco: Never   Substance and Sexual Activity    Alcohol use: Yes    Drug use: Never    Sexual activity: Yes     Partners: Female     Social Determinants of Health     Financial Resource Strain: Medium Risk (11/15/2023)    Overall Financial Resource Strain (CARDIA)     Difficulty of Paying Living Expenses: Somewhat hard   Food Insecurity: Food Insecurity Present (11/15/2023)    Hunger Vital Sign     Worried About Running Out of Food in the Last Year: Sometimes true     Ran Out of Food in the Last Year: Never true   Transportation Needs: No Transportation Needs (11/15/2023)    PRAPARE - Transportation     Lack of Transportation (Medical): No     Lack of Transportation (Non-Medical): No    Physical Activity: Insufficiently Active (11/15/2023)    Exercise Vital Sign     Days of Exercise per Week: 3 days     Minutes of Exercise per Session: 30 min   Stress: Stress Concern Present (11/15/2023)    [place] of Occupational Health - Occupational Stress Questionnaire     Feeling of Stress : To some extent   Social Connections: Unknown (11/15/2023)    Social Connection and Isolation Panel [NHANES]     Frequency of Communication with Friends and Family: More than three times a week     Frequency of Social Gatherings with Friends and Family: More than three times a week     Attends Rastafari Services: Patient declined     Active Member of Clubs or Organizations: No     Attends Club or Organization Meetings: Never     Marital Status:    Housing Stability: Low Risk  (11/15/2023)    Housing Stability Vital Sign     Unable to Pay for Housing in the Last Year: No     Number of Places Lived in the Last Year: 1     Unstable Housing in the Last Year: No      Family History   Problem Relation Age of Onset    Breast cancer Mother     Pancreatic cancer Father       Review of patient's allergies indicates:  No Known Allergies   Review of Systems   Constitutional:  Negative for activity change, appetite change, chills, fatigue, fever and unexpected weight change.   HENT:  Negative for mouth sores and sore throat.    Eyes:  Negative for visual disturbance.   Respiratory:  Negative for cough and shortness of breath.    Cardiovascular:  Negative for chest pain.   Gastrointestinal:  Negative for abdominal pain, constipation, diarrhea, nausea and vomiting.   Endocrine: Negative for polyuria.   Genitourinary:  Negative for dysuria and frequency.   Musculoskeletal:  Negative for back pain.   Integumentary:  Negative for rash.   Allergic/Immunologic: Negative for frequent infections.   Neurological:  Negative for weakness and headaches.   Hematological:  Negative for adenopathy. Does not bruise/bleed easily.    Psychiatric/Behavioral:  The patient is not nervous/anxious.          Objective:      Vitals:    03/07/24 0940   BP: 108/71   Pulse: 63   Resp: 20   Temp: 97.8 °F (36.6 °C)           Physical Exam  Constitutional:       General: He is not in acute distress.     Appearance: Normal appearance. He is not ill-appearing.   HENT:      Head: Normocephalic and atraumatic.      Nose: Nose normal.      Mouth/Throat:      Mouth: Mucous membranes are moist.      Pharynx: Oropharynx is clear.   Eyes:      Extraocular Movements: Extraocular movements intact.      Conjunctiva/sclera: Conjunctivae normal.      Pupils: Pupils are equal, round, and reactive to light.   Cardiovascular:      Rate and Rhythm: Normal rate and regular rhythm.      Pulses: Normal pulses.      Heart sounds: Normal heart sounds. No murmur heard.  Pulmonary:      Effort: Pulmonary effort is normal. No respiratory distress.      Breath sounds: Normal breath sounds.   Abdominal:      General: There is no distension.      Palpations: Abdomen is soft.      Tenderness: There is no abdominal tenderness.   Musculoskeletal:         General: Normal range of motion.      Cervical back: Normal range of motion and neck supple.   Lymphadenopathy:      Cervical: No cervical adenopathy.   Skin:     General: Skin is warm and dry.      Findings: No rash.   Neurological:      General: No focal deficit present.      Mental Status: He is alert and oriented to person, place, and time.         LABS AND IMAGING REVIEWED IN EPIC  10/11/23 Xray Metastatic Survey  1. Scattered small defects at the calvarium measuring up to 5 mm too numerous to count.  2. Degene mild rative changes at the spine and extremities  3. MR exam and bone scan would allow further evaluation.  10/12/23 MRI Brain  1. Motion artifact  2. Mild scattered mucosal thickening throughout the paranasal sinuses  3. Somewhat limited evaluation of brain metastases without the administration of IV contrast  4. Otherwise  unremarkable MR exam of the head without the administration of IV contrast  10/12/23 NM Bone Scan  1. Abnormal intense increased activity throughout the lungs of uncertain etiology.  This may related to hyperparathyroidism, hematologic malignancy, and or metabolic abnormality such as renal failure, vasculitis, or pulmonary infection.  Chest x-ray 10/10/2023 reveals the lungs to be relatively clear and well aerated.  2. Suspect scattered mild degenerative changes throughout the joint spaces  3. Increased activity nasal maxillary region suggesting mucoperiosteal disease  4. Mild activity at the stomach suggesting free pertechnetate  10/14/23 CT CAP  1. Widespread osseous findings suggestive of extensive metastatic skeletal disease, to include expansile destructive soft tissue component at the anterior right 7th rib.  2. Enlarged intrathoracic lymph nodes could also be of metastatic etiology.  3. Diffuse ground-glass attenuation through both lungs is nonspecific, could be secondary to atypical infectious or inflammatory process.  However, possibility of metastatic involvement cannot be excluded.  12/7/23 CXR:   1. Hazy opacification lower lungs bilaterally suspicious for infiltrate and pleural reaction which has progressed since the prior exam  2. Cardiomegaly  3. Thoracic spondylosis  12/11/23 CXR:  1. Mild cardiomegaly  2. Persistent bibasilar infiltrate and or pleural reaction  3. Thoracic spondylosis with anterior wedging again evident at a lower thoracic vertebral body  12/13/23 ECHO: EF 50-55%  2/7/24 PET/CT:  1. Interval improved appearance of lytic osseous lesions compared to previous exam with sub threshold uptake. The bones are demineralized with numerous tiny lytic foci at the axial and appendicular skeleton.  2. Subacute appearing bilateral rib and right clavicle fractures with mild FDG uptake.  3. Nonspecific patchy ground-glass and interstitial opacities with mild FDG uptake.       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 Displayed all CRAB criteria at time of diagnosis in October. Undergoing 1st line Beatriz + RVD + Zometa. Being followed by [facility] in [facility] for transplant consideration.    ONJ  Zometa now on hold. Continue follow-ups with dental as scheduled.      Plan:   Toxicity reviewed Labs adequate for C4D22 of BEATRIZ-VRD with Revlimid 10mg (dose decreased from 25mg due to declining renal function.   UA today shows no UTI. CXR completed, will get VQ scan to rule out PE.   Hydration encouraged.  Per transplant, plan to hold daratumumab after C4 with possible transplant March-April.  Colonoscopy completed  Continue to hold zometa due to ONJ.   Continue Aspirin 81mg, Acyclovir, and Bactrim for prophylaxis  Will need SPEP/OSVALDO and FLC with labs Q4w prior to every new cycle.   RTC in 1 week with MD with labs  Cbc, cmp, MM labs- 1 hr prior @ [facility]    I spent a total of 40 minutes on the day of the visit.This includes face to face time and non-face to face time preparing to see the patient (eg, review of tests), obtaining and/or reviewing separately obtained history, documenting clinical information in the electronic or other health record, independently interpreting results and communicating results to the patient/family/caregiver, or care coordinator.       **All blood products must be irradiated and CMV negative**        Janet Alegria, YOVANNYP-C  Oncology/Hematology   Cancer Center [facility]

## 2024-03-08 DIAGNOSIS — C90.00 MULTIPLE MYELOMA NOT HAVING ACHIEVED REMISSION: ICD-10-CM

## 2024-03-08 DIAGNOSIS — Z76.82 STEM CELL TRANSPLANT CANDIDATE: Primary | ICD-10-CM

## 2024-03-12 LAB — MAYO GENERIC ORDERABLE RESULT: ABNORMAL

## 2024-03-13 ENCOUNTER — HOSPITAL ENCOUNTER (OUTPATIENT)
Dept: RADIOLOGY | Facility: HOSPITAL | Age: 44
Discharge: HOME | End: 2024-03-13
Payer: COMMERCIAL

## 2024-03-13 DIAGNOSIS — R93.89 ABNORMAL CXR: ICD-10-CM

## 2024-03-13 DIAGNOSIS — R06.02 SOB (SHORTNESS OF BREATH): ICD-10-CM

## 2024-03-13 DIAGNOSIS — R09.89 SUSPECTED PULMONARY EMBOLISM: ICD-10-CM

## 2024-03-13 PROCEDURE — 71046 X-RAY EXAM CHEST 2 VIEWS: CPT | Mod: TC

## 2024-03-13 PROCEDURE — 78582 LUNG VENTILAT&PERFUS IMAGING: CPT | Mod: TC

## 2024-03-13 PROCEDURE — A9540 TC99M MAA: HCPCS

## 2024-03-13 PROCEDURE — A9567 TECHNETIUM TC-99M AEROSOL: HCPCS

## 2024-03-13 RX ADMIN — KIT FOR THE PREPARATION OF TECHNETIUM TC 99M ALBUMIN AGGREGATED 6.2 MILLICURIE: 2 INJECTION, POWDER, LYOPHILIZED, FOR SUSPENSION INTRAPERITONEAL; INTRAVENOUS at 11:03

## 2024-03-13 RX ADMIN — KIT FOR THE PREPARATION OF TECHNETIUM TC 99M PENTETATE 40 MILLICURIE: 20 INJECTION, POWDER, LYOPHILIZED, FOR SOLUTION INTRAVENOUS; RESPIRATORY (INHALATION) at 10:03

## 2024-03-13 NOTE — PROGRESS NOTES
Subjective:       Patient ID: Pete Fernandez is a 43 y.o. male.    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 Rev remains at 10mg due to renal function  Zometa 10/17/23 - 1/11/2024 (3 treatments total)  Currently on hold due to ONJ    Treatment History:  1. Beatriz+ KrD for cycle 2 on 11/30/23; Change at request of [facility]  Stopped after 1 dose due to severe pleural and pericardial effusion development   Switched back to BEATRIZ + RVD with increase of Rev to 25mg    HPI  42yo M presented to [facility] on 10/11 after 1 month of progressive weakness, abdominal pain, and intermittent confusion. At the time of his presentation, his labs which had been normal with PCP in 8/2023 were notable for new severe DOUG, transaminitis, and Ca of 18. He was admitted and started on IVF resuscitation.  Hypercalcemia workup was initiated and he was ultimately found to have numerous osseous lesions on 10/15/23 CT CAP, FLC - Kappa 3.29, Lambda 744, Ratio 226 , SPEP with Mspike 0.17 IgG lambda. Bone marrow biopsy done 10/18/23 revealed 95% plasma cells and FISH with TP53 deletion, FGFR3/IGH (or NSD2/IGH) fusion, usually representing a t(4;14), and a tetraploid subclone was observed. After correction of his hypercalcemia and improvement in his renal function he was discharged on 10/22/23.   He started C1 Beatriz + RVD + Zometa on 11/2/23. He was referred to [facility] for consideration of bone marrow transplant, they recommended change to Beatriz + Krd, however patient developed life threatening pleural and pericardial edema  several days after 1st infusion requiring hospitalization and placement of a pericardial window. Therefore he transitioned back to Beatriz - RVD for C2.     Interval History:   He returns to the clinic today for a follow-up and treatment clearance for C5D1 of Beatriz-VRD.  Improved shortness of breath over the last several days. Recent VQ scan without evidence of PE.   Still with occasional dysuria, difficulty with urination.   He feels really good today. Increased appetite, tolerating PO without issue, has gained some weight.   Plan for collection 5/2/24   Taking calcium once daily and ASA 81 mg daily.   Labs reviewed in detail with him and are stable to continue with treatment.   Denies any fevers, chills, NS, headaches, bleeding, bruising, neuropathy.       Past Medical History:   Diagnosis Date    Anxiety disorder, unspecified     Hypercalcemia       Past Surgical History:   Procedure Laterality Date    BONE MARROW BIOPSY N/A 10/18/2023    Procedure: Biopsy-bone marrow;  Surgeon: Nhan Marte MD;  Location: [facility];  Service: General;  Laterality: N/A;    TONSILLECTOMY       Social History     Socioeconomic History    Marital status:    Tobacco Use    Smoking status: Never    Smokeless tobacco: Never   Substance and Sexual Activity    Alcohol use: Yes    Drug use: Never    Sexual activity: Yes     Partners: Female     Social Determinants of Health     Financial Resource Strain: Medium Risk (11/15/2023)    Overall Financial Resource Strain (CARDIA)     Difficulty of Paying Living Expenses: Somewhat hard   Food Insecurity: Food Insecurity Present (11/15/2023)    Hunger Vital Sign     Worried About Running Out of Food in the Last Year: Sometimes true     Ran Out of Food in the Last Year: Never true   Transportation Needs: No Transportation Needs (11/15/2023)    PRAPARE - Transportation     Lack of Transportation (Medical): No     Lack of Transportation (Non-Medical): No   Physical Activity: Insufficiently  Active (11/15/2023)    Exercise Vital Sign     Days of Exercise per Week: 3 days     Minutes of Exercise per Session: 30 min   Stress: Stress Concern Present (11/15/2023)    [employer] - Occupational Stress Questionnaire     Feeling of Stress : To some extent   Social Connections: Unknown (11/15/2023)    Social Connection and Isolation Panel [NHANES]     Frequency of Communication with Friends and Family: More than three times a week     Frequency of Social Gatherings with Friends and Family: More than three times a week     Attends Sabianist Services: Patient declined     Active Member of Clubs or Organizations: No     Attends Club or Organization Meetings: Never     Marital Status:    Housing Stability: Low Risk  (11/15/2023)    Housing Stability Vital Sign     Unable to Pay for Housing in the Last Year: No     Number of Places Lived in the Last Year: 1     Unstable Housing in the Last Year: No      Family History   Problem Relation Age of Onset    Breast cancer Mother     Pancreatic cancer Father       Review of patient's allergies indicates:  No Known Allergies   Review of Systems   Constitutional:  Negative for activity change, appetite change, chills, fatigue, fever and unexpected weight change.   HENT:  Negative for mouth sores and sore throat.    Eyes:  Negative for visual disturbance.   Respiratory:  Negative for cough and shortness of breath.    Cardiovascular:  Negative for chest pain.   Gastrointestinal:  Negative for abdominal pain, constipation, diarrhea, nausea and vomiting.   Endocrine: Negative for polyuria.   Genitourinary:  Negative for dysuria and frequency.   Musculoskeletal:  Negative for back pain.   Integumentary:  Negative for rash.   Allergic/Immunologic: Negative for frequent infections.   Neurological:  Negative for weakness and headaches.   Hematological:  Negative for adenopathy. Does not bruise/bleed easily.   Psychiatric/Behavioral:  The patient is  not nervous/anxious.          Objective:      Vitals:    03/14/24 0920   BP: (!) 148/86   Pulse: 68   Resp: 20   Temp: 97.6 °F (36.4 °C)       Physical Exam  Constitutional:       General: He is not in acute distress.     Appearance: Normal appearance. He is not ill-appearing.   HENT:      Head: Normocephalic and atraumatic.      Nose: Nose normal.      Mouth/Throat:      Mouth: Mucous membranes are moist.      Pharynx: Oropharynx is clear.   Eyes:      Extraocular Movements: Extraocular movements intact.      Conjunctiva/sclera: Conjunctivae normal.      Pupils: Pupils are equal, round, and reactive to light.   Cardiovascular:      Rate and Rhythm: Normal rate and regular rhythm.      Pulses: Normal pulses.      Heart sounds: Normal heart sounds. No murmur heard.  Pulmonary:      Effort: Pulmonary effort is normal. No respiratory distress.      Breath sounds: Normal breath sounds.   Abdominal:      General: There is no distension.      Palpations: Abdomen is soft.      Tenderness: There is no abdominal tenderness.   Musculoskeletal:         General: Normal range of motion.      Cervical back: Normal range of motion and neck supple.   Lymphadenopathy:      Cervical: No cervical adenopathy.   Skin:     General: Skin is warm and dry.      Findings: No rash.   Neurological:      General: No focal deficit present.      Mental Status: He is alert and oriented to person, place, and time.         LABS AND IMAGING REVIEWED IN EPIC  10/11/23 Xray Metastatic Survey  1. Scattered small defects at the calvarium measuring up to 5 mm too numerous to count.  2. Degene mild rative changes at the spine and extremities  3. MR exam and bone scan would allow further evaluation.  10/12/23 MRI Brain  1. Motion artifact  2. Mild scattered mucosal thickening throughout the paranasal sinuses  3. Somewhat limited evaluation of brain metastases without the administration of IV contrast  4. Otherwise unremarkable MR exam of the head without  the administration of IV contrast  10/12/23 NM Bone Scan  1. Abnormal intense increased activity throughout the lungs of uncertain etiology.  This may related to hyperparathyroidism, hematologic malignancy, and or metabolic abnormality such as renal failure, vasculitis, or pulmonary infection.  Chest x-ray 10/10/2023 reveals the lungs to be relatively clear and well aerated.  2. Suspect scattered mild degenerative changes throughout the joint spaces  3. Increased activity nasal maxillary region suggesting mucoperiosteal disease  4. Mild activity at the stomach suggesting free pertechnetate  10/14/23 CT CAP  1. Widespread osseous findings suggestive of extensive metastatic skeletal disease, to include expansile destructive soft tissue component at the anterior right 7th rib.  2. Enlarged intrathoracic lymph nodes could also be of metastatic etiology.  3. Diffuse ground-glass attenuation through both lungs is nonspecific, could be secondary to atypical infectious or inflammatory process.  However, possibility of metastatic involvement cannot be excluded.  12/7/23 CXR:   1. Hazy opacification lower lungs bilaterally suspicious for infiltrate and pleural reaction which has progressed since the prior exam  2. Cardiomegaly  3. Thoracic spondylosis  12/11/23 CXR:  1. Mild cardiomegaly  2. Persistent bibasilar infiltrate and or pleural reaction  3. Thoracic spondylosis with anterior wedging again evident at a lower thoracic vertebral body  12/13/23 ECHO: EF 50-55%  2/7/24 PET/CT:  1. Interval improved appearance of lytic osseous lesions compared to previous exam with sub threshold uptake. The bones are demineralized with numerous tiny lytic foci at the axial and appendicular skeleton.  2. Subacute appearing bilateral rib and right clavicle fractures with mild FDG uptake.  3. Nonspecific patchy ground-glass and interstitial opacities with mild FDG uptake.       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 Displayed all CRAB criteria at time of diagnosis in October. Undergoing 1st line Beatriz + RVD + Zometa. Being followed by [facility] in [facility] for transplant consideration.    ONJ  Zometa now on hold. Continue follow-ups with dental as scheduled.      Plan:   Toxicity reviewed Labs adequate for C5D1 of VRD with Revlimid 10mg,  holding BEATRIZ(dose decreased from 25mg due to declining renal function.   Continue to hold zometa due to ONJ.   Continue Aspirin 81mg, Acyclovir, and Bactrim for prophylaxis  Will need SPEP/OSVALDO and FLC with labs Q4w prior to every new cycle.   RTC in 1 week with NP with labs  Cbc, cmp, MM labs- 1 hr prior @ [facility]    I spent a total of 40 minutes on the day of the visit.This includes face to face time and non-face to face time preparing to see the patient (eg, review of tests), obtaining and/or reviewing separately obtained history, documenting clinical information in the electronic or other health record, independently interpreting results and communicating results to the patient/family/caregiver, or care coordinator.       **All blood products must be irradiated and CMV negative**        Elizabeth Lejeune MD  Hematology /Oncology       I, Claudia Howard LPN, acted solely as a scribe for and in the presence of Dr. Elizabeth Lejeune, who performed these services.

## 2024-03-14 ENCOUNTER — LAB VISIT (OUTPATIENT)
Dept: LAB | Facility: HOSPITAL | Age: 44
End: 2024-03-14
Payer: COMMERCIAL

## 2024-03-14 ENCOUNTER — OFFICE VISIT (OUTPATIENT)
Dept: HEMATOLOGY/ONCOLOGY | Facility: CLINIC | Age: 44
End: 2024-03-14
Payer: COMMERCIAL

## 2024-03-14 ENCOUNTER — INFUSION (OUTPATIENT)
Dept: INFUSION THERAPY | Facility: HOSPITAL | Age: 44
End: 2024-03-14
Payer: COMMERCIAL

## 2024-03-14 VITALS
WEIGHT: 202.63 LBS | OXYGEN SATURATION: 99 % | HEIGHT: 69 IN | RESPIRATION RATE: 20 BRPM | BODY MASS INDEX: 30.01 KG/M2 | DIASTOLIC BLOOD PRESSURE: 86 MMHG | SYSTOLIC BLOOD PRESSURE: 148 MMHG | TEMPERATURE: 98 F | HEART RATE: 68 BPM

## 2024-03-14 VITALS
SYSTOLIC BLOOD PRESSURE: 148 MMHG | HEIGHT: 69 IN | HEART RATE: 68 BPM | WEIGHT: 202.63 LBS | BODY MASS INDEX: 30.01 KG/M2 | DIASTOLIC BLOOD PRESSURE: 86 MMHG | TEMPERATURE: 98 F | RESPIRATION RATE: 20 BRPM | OXYGEN SATURATION: 99 %

## 2024-03-14 DIAGNOSIS — R30.0 BURNING WITH URINATION: ICD-10-CM

## 2024-03-14 DIAGNOSIS — Z79.899 IMMUNODEFICIENCY DUE TO DRUG THERAPY: ICD-10-CM

## 2024-03-14 DIAGNOSIS — C90.00 MULTIPLE MYELOMA NOT HAVING ACHIEVED REMISSION: ICD-10-CM

## 2024-03-14 DIAGNOSIS — C90.00 METASTATIC MULTIPLE MYELOMA TO BONE: Primary | ICD-10-CM

## 2024-03-14 DIAGNOSIS — C90.00 MULTIPLE MYELOMA, REMISSION STATUS UNSPECIFIED: ICD-10-CM

## 2024-03-14 DIAGNOSIS — C90.00 MULTIPLE MYELOMA, REMISSION STATUS UNSPECIFIED: Primary | ICD-10-CM

## 2024-03-14 DIAGNOSIS — D84.821 IMMUNODEFICIENCY DUE TO DRUG THERAPY: ICD-10-CM

## 2024-03-14 LAB
ALBUMIN SERPL-MCNC: 3.4 G/DL (ref 3.5–5)
ALBUMIN/GLOB SERPL: 1.5 RATIO (ref 1.1–2)
ALP SERPL-CCNC: 48 UNIT/L (ref 40–150)
ALT SERPL-CCNC: 14 UNIT/L (ref 0–55)
APPEARANCE UR: CLEAR
AST SERPL-CCNC: 14 UNIT/L (ref 5–34)
BACTERIA #/AREA URNS AUTO: NORMAL /HPF
BASOPHILS # BLD AUTO: 0.07 X10(3)/MCL
BASOPHILS NFR BLD AUTO: 1 %
BILIRUB SERPL-MCNC: 0.4 MG/DL
BILIRUB UR QL STRIP.AUTO: NEGATIVE
BUN SERPL-MCNC: 21 MG/DL (ref 8.9–20.6)
CALCIUM SERPL-MCNC: 9.1 MG/DL (ref 8.4–10.2)
CHLORIDE SERPL-SCNC: 104 MMOL/L (ref 98–107)
CO2 SERPL-SCNC: 31 MMOL/L (ref 22–29)
COLOR UR AUTO: YELLOW
CREAT SERPL-MCNC: 2.01 MG/DL (ref 0.73–1.18)
EOSINOPHIL # BLD AUTO: 0.04 X10(3)/MCL (ref 0–0.9)
EOSINOPHIL NFR BLD AUTO: 0.6 %
ERYTHROCYTE [DISTWIDTH] IN BLOOD BY AUTOMATED COUNT: 16.9 % (ref 11.5–17)
GFR SERPLBLD CREATININE-BSD FMLA CKD-EPI: 41 MLS/MIN/1.73/M2
GLOBULIN SER-MCNC: 2.3 GM/DL (ref 2.4–3.5)
GLUCOSE SERPL-MCNC: 68 MG/DL (ref 74–100)
GLUCOSE UR QL STRIP.AUTO: NEGATIVE
HCT VFR BLD AUTO: 25.9 % (ref 42–52)
HGB BLD-MCNC: 8.2 G/DL (ref 14–18)
IGA SERPL-MCNC: 7 MG/DL (ref 63–484)
IGG SERPL-MCNC: 219 MG/DL (ref 540–1822)
IGM SERPL-MCNC: 13 MG/DL (ref 22–240)
IMM GRANULOCYTES # BLD AUTO: 0.03 X10(3)/MCL (ref 0–0.04)
IMM GRANULOCYTES NFR BLD AUTO: 0.4 %
KETONES UR QL STRIP.AUTO: NEGATIVE
LEUKOCYTE ESTERASE UR QL STRIP.AUTO: ABNORMAL
LYMPHOCYTES # BLD AUTO: 1.5 X10(3)/MCL (ref 0.6–4.6)
LYMPHOCYTES NFR BLD AUTO: 21.4 %
MAGNESIUM SERPL-MCNC: 2.3 MG/DL (ref 1.6–2.6)
MCH RBC QN AUTO: 32.4 PG (ref 27–31)
MCHC RBC AUTO-ENTMCNC: 31.7 G/DL (ref 33–36)
MCV RBC AUTO: 102.4 FL (ref 80–94)
MONOCYTES # BLD AUTO: 0.73 X10(3)/MCL (ref 0.1–1.3)
MONOCYTES NFR BLD AUTO: 10.4 %
NEUTROPHILS # BLD AUTO: 4.64 X10(3)/MCL (ref 2.1–9.2)
NEUTROPHILS NFR BLD AUTO: 66.2 %
NITRITE UR QL STRIP.AUTO: NEGATIVE
PH UR STRIP.AUTO: 7 [PH]
PHOSPHATE SERPL-MCNC: 5.1 MG/DL (ref 2.3–4.7)
PLATELET # BLD AUTO: 390 X10(3)/MCL (ref 130–400)
PMV BLD AUTO: 9.9 FL (ref 7.4–10.4)
POTASSIUM SERPL-SCNC: 4.2 MMOL/L (ref 3.5–5.1)
PROT SERPL-MCNC: 5.7 GM/DL (ref 6.4–8.3)
PROT UR QL STRIP.AUTO: NEGATIVE
RBC # BLD AUTO: 2.53 X10(6)/MCL (ref 4.7–6.1)
RBC #/AREA URNS AUTO: NORMAL /HPF
RBC UR QL AUTO: NEGATIVE
SODIUM SERPL-SCNC: 141 MMOL/L (ref 136–145)
SP GR UR STRIP.AUTO: 1.01 (ref 1–1.03)
SQUAMOUS #/AREA URNS AUTO: NORMAL /HPF
UROBILINOGEN UR STRIP-ACNC: 0.2
WBC # SPEC AUTO: 7.01 X10(3)/MCL (ref 4.5–11.5)
WBC #/AREA URNS AUTO: NORMAL /HPF

## 2024-03-14 PROCEDURE — 85025 COMPLETE CBC W/AUTO DIFF WBC: CPT

## 2024-03-14 PROCEDURE — 63600175 PHARM REV CODE 636 W HCPCS: Mod: JZ,JG | Performed by: STUDENT IN AN ORGANIZED HEALTH CARE EDUCATION/TRAINING PROGRAM

## 2024-03-14 PROCEDURE — 3077F SYST BP >= 140 MM HG: CPT | Mod: CPTII,S$GLB,, | Performed by: STUDENT IN AN ORGANIZED HEALTH CARE EDUCATION/TRAINING PROGRAM

## 2024-03-14 PROCEDURE — 83521 IG LIGHT CHAINS FREE EACH: CPT

## 2024-03-14 PROCEDURE — 83735 ASSAY OF MAGNESIUM: CPT

## 2024-03-14 PROCEDURE — 84100 ASSAY OF PHOSPHORUS: CPT

## 2024-03-14 PROCEDURE — 99215 OFFICE O/P EST HI 40 MIN: CPT | Mod: S$GLB,,, | Performed by: STUDENT IN AN ORGANIZED HEALTH CARE EDUCATION/TRAINING PROGRAM

## 2024-03-14 PROCEDURE — 36415 COLL VENOUS BLD VENIPUNCTURE: CPT

## 2024-03-14 PROCEDURE — 81001 URINALYSIS AUTO W/SCOPE: CPT

## 2024-03-14 PROCEDURE — 3008F BODY MASS INDEX DOCD: CPT | Mod: CPTII,S$GLB,, | Performed by: STUDENT IN AN ORGANIZED HEALTH CARE EDUCATION/TRAINING PROGRAM

## 2024-03-14 PROCEDURE — 80053 COMPREHEN METABOLIC PANEL: CPT

## 2024-03-14 PROCEDURE — 82784 ASSAY IGA/IGD/IGG/IGM EACH: CPT

## 2024-03-14 PROCEDURE — 96401 CHEMO ANTI-NEOPL SQ/IM: CPT

## 2024-03-14 PROCEDURE — 3079F DIAST BP 80-89 MM HG: CPT | Mod: CPTII,S$GLB,, | Performed by: STUDENT IN AN ORGANIZED HEALTH CARE EDUCATION/TRAINING PROGRAM

## 2024-03-14 PROCEDURE — 99999 PR PBB SHADOW E&M-EST. PATIENT-LVL V: CPT | Mod: PBBFAC,,, | Performed by: STUDENT IN AN ORGANIZED HEALTH CARE EDUCATION/TRAINING PROGRAM

## 2024-03-14 RX ORDER — BORTEZOMIB 3.5 MG/1
1.3 INJECTION, POWDER, LYOPHILIZED, FOR SOLUTION INTRAVENOUS; SUBCUTANEOUS
Status: CANCELLED | OUTPATIENT
Start: 2024-03-14

## 2024-03-14 RX ORDER — HEPARIN 100 UNIT/ML
500 SYRINGE INTRAVENOUS
Status: CANCELLED | OUTPATIENT
Start: 2024-03-14

## 2024-03-14 RX ORDER — SODIUM CHLORIDE 0.9 % (FLUSH) 0.9 %
10 SYRINGE (ML) INJECTION
Status: CANCELLED | OUTPATIENT
Start: 2024-03-14

## 2024-03-14 RX ORDER — EPINEPHRINE 0.3 MG/.3ML
0.3 INJECTION SUBCUTANEOUS ONCE AS NEEDED
Status: CANCELLED | OUTPATIENT
Start: 2024-03-14

## 2024-03-14 RX ORDER — PROCHLORPERAZINE EDISYLATE 5 MG/ML
10 INJECTION INTRAMUSCULAR; INTRAVENOUS ONCE AS NEEDED
Status: CANCELLED | OUTPATIENT
Start: 2024-03-14

## 2024-03-14 RX ORDER — DIPHENHYDRAMINE HYDROCHLORIDE 50 MG/ML
50 INJECTION INTRAMUSCULAR; INTRAVENOUS ONCE AS NEEDED
Status: CANCELLED | OUTPATIENT
Start: 2024-03-14

## 2024-03-14 RX ORDER — LENALIDOMIDE 10 MG/1
10 CAPSULE ORAL DAILY
Qty: 21 EACH | Refills: 0 | Status: ACTIVE | OUTPATIENT
Start: 2024-03-14 | End: 2024-04-15 | Stop reason: SDUPTHER

## 2024-03-14 RX ORDER — BORTEZOMIB 3.5 MG/1
1.3 INJECTION, POWDER, LYOPHILIZED, FOR SOLUTION INTRAVENOUS; SUBCUTANEOUS
Status: COMPLETED | OUTPATIENT
Start: 2024-03-14 | End: 2024-03-14

## 2024-03-14 RX ADMIN — BORTEZOMIB 3 MG: 3.5 INJECTION, POWDER, LYOPHILIZED, FOR SOLUTION INTRAVENOUS; SUBCUTANEOUS at 10:03

## 2024-03-15 ENCOUNTER — PATIENT MESSAGE (OUTPATIENT)
Dept: HEMATOLOGY/ONCOLOGY | Facility: CLINIC | Age: 44
End: 2024-03-15
Payer: COMMERCIAL

## 2024-03-15 LAB
KAPPA LC FREE SER-MCNC: 0.37 MG/DL (ref 0.33–1.94)
KAPPA LC FREE/LAMBDA FREE SER: 1 {RATIO} (ref 0.26–1.65)
LAMBDA LC FREE SERPL-MCNC: 0.37 MG/DL (ref 0.57–2.63)

## 2024-03-20 DIAGNOSIS — C90.00 METASTATIC MULTIPLE MYELOMA TO BONE: Primary | ICD-10-CM

## 2024-03-20 DIAGNOSIS — C90.00 MULTIPLE MYELOMA NOT HAVING ACHIEVED REMISSION: ICD-10-CM

## 2024-03-21 ENCOUNTER — LAB VISIT (OUTPATIENT)
Dept: LAB | Facility: HOSPITAL | Age: 44
End: 2024-03-21
Payer: COMMERCIAL

## 2024-03-21 ENCOUNTER — INFUSION (OUTPATIENT)
Dept: INFUSION THERAPY | Facility: HOSPITAL | Age: 44
End: 2024-03-21
Attending: STUDENT IN AN ORGANIZED HEALTH CARE EDUCATION/TRAINING PROGRAM
Payer: COMMERCIAL

## 2024-03-21 ENCOUNTER — OFFICE VISIT (OUTPATIENT)
Dept: HEMATOLOGY/ONCOLOGY | Facility: CLINIC | Age: 44
End: 2024-03-21
Payer: COMMERCIAL

## 2024-03-21 VITALS
BODY MASS INDEX: 29.8 KG/M2 | DIASTOLIC BLOOD PRESSURE: 86 MMHG | HEIGHT: 69 IN | TEMPERATURE: 98 F | WEIGHT: 201.19 LBS | HEART RATE: 64 BPM | SYSTOLIC BLOOD PRESSURE: 142 MMHG | RESPIRATION RATE: 20 BRPM | OXYGEN SATURATION: 100 %

## 2024-03-21 DIAGNOSIS — C90.00 MULTIPLE MYELOMA, REMISSION STATUS UNSPECIFIED: Primary | ICD-10-CM

## 2024-03-21 DIAGNOSIS — Z79.899 IMMUNODEFICIENCY DUE TO CHEMOTHERAPY: ICD-10-CM

## 2024-03-21 DIAGNOSIS — D63.0 ANEMIA IN NEOPLASTIC DISEASE: ICD-10-CM

## 2024-03-21 DIAGNOSIS — T45.1X5A IMMUNODEFICIENCY DUE TO CHEMOTHERAPY: ICD-10-CM

## 2024-03-21 DIAGNOSIS — D84.821 IMMUNODEFICIENCY DUE TO CHEMOTHERAPY: ICD-10-CM

## 2024-03-21 DIAGNOSIS — D64.9 LOW HEMOGLOBIN: ICD-10-CM

## 2024-03-21 DIAGNOSIS — C90.00 METASTATIC MULTIPLE MYELOMA TO BONE: ICD-10-CM

## 2024-03-21 DIAGNOSIS — C90.00 MULTIPLE MYELOMA NOT HAVING ACHIEVED REMISSION: ICD-10-CM

## 2024-03-21 DIAGNOSIS — C90.00 METASTATIC MULTIPLE MYELOMA TO BONE: Primary | ICD-10-CM

## 2024-03-21 DIAGNOSIS — Z79.899 IMMUNODEFICIENCY DUE TO DRUG THERAPY: ICD-10-CM

## 2024-03-21 DIAGNOSIS — R53.82 CHRONIC FATIGUE: ICD-10-CM

## 2024-03-21 DIAGNOSIS — Z76.82 STEM CELL TRANSPLANT CANDIDATE: ICD-10-CM

## 2024-03-21 DIAGNOSIS — D84.821 IMMUNODEFICIENCY DUE TO DRUG THERAPY: ICD-10-CM

## 2024-03-21 DIAGNOSIS — M87.180 OSTEONECROSIS OF JAW DUE TO DRUG: ICD-10-CM

## 2024-03-21 DIAGNOSIS — C90.00 MULTIPLE MYELOMA, REMISSION STATUS UNSPECIFIED: ICD-10-CM

## 2024-03-21 LAB
ALBUMIN SERPL-MCNC: 3.6 G/DL (ref 3.5–5)
ALBUMIN/GLOB SERPL: 1.6 RATIO (ref 1.1–2)
ALP SERPL-CCNC: 49 UNIT/L (ref 40–150)
ALT SERPL-CCNC: 15 UNIT/L (ref 0–55)
AST SERPL-CCNC: 16 UNIT/L (ref 5–34)
BASOPHILS # BLD AUTO: 0.1 X10(3)/MCL
BASOPHILS NFR BLD AUTO: 1.6 %
BILIRUB SERPL-MCNC: 0.5 MG/DL
BUN SERPL-MCNC: 19 MG/DL (ref 8.9–20.6)
CALCIUM SERPL-MCNC: 8.8 MG/DL (ref 8.4–10.2)
CHLORIDE SERPL-SCNC: 103 MMOL/L (ref 98–107)
CO2 SERPL-SCNC: 29 MMOL/L (ref 22–29)
CREAT SERPL-MCNC: 1.98 MG/DL (ref 0.73–1.18)
EOSINOPHIL # BLD AUTO: 0.28 X10(3)/MCL (ref 0–0.9)
EOSINOPHIL NFR BLD AUTO: 4.4 %
ERYTHROCYTE [DISTWIDTH] IN BLOOD BY AUTOMATED COUNT: 17.7 % (ref 11.5–17)
GFR SERPLBLD CREATININE-BSD FMLA CKD-EPI: 42 MLS/MIN/1.73/M2
GLOBULIN SER-MCNC: 2.3 GM/DL (ref 2.4–3.5)
GLUCOSE SERPL-MCNC: 84 MG/DL (ref 74–100)
HCT VFR BLD AUTO: 26.7 % (ref 42–52)
HGB BLD-MCNC: 8.7 G/DL (ref 14–18)
IGA SERPL-MCNC: <25 MG/DL (ref 63–484)
IGG SERPL-MCNC: 222 MG/DL (ref 540–1822)
IGM SERPL-MCNC: 13 MG/DL (ref 22–240)
IMM GRANULOCYTES # BLD AUTO: 0.03 X10(3)/MCL (ref 0–0.04)
IMM GRANULOCYTES NFR BLD AUTO: 0.5 %
LYMPHOCYTES # BLD AUTO: 0.73 X10(3)/MCL (ref 0.6–4.6)
LYMPHOCYTES NFR BLD AUTO: 11.4 %
MAGNESIUM SERPL-MCNC: 2.2 MG/DL (ref 1.6–2.6)
MCH RBC QN AUTO: 33.6 PG (ref 27–31)
MCHC RBC AUTO-ENTMCNC: 32.6 G/DL (ref 33–36)
MCV RBC AUTO: 103.1 FL (ref 80–94)
MONOCYTES # BLD AUTO: 0.4 X10(3)/MCL (ref 0.1–1.3)
MONOCYTES NFR BLD AUTO: 6.2 %
NEUTROPHILS # BLD AUTO: 4.89 X10(3)/MCL (ref 2.1–9.2)
NEUTROPHILS NFR BLD AUTO: 75.9 %
NRBC BLD AUTO-RTO: 0 %
PHOSPHATE SERPL-MCNC: 4.3 MG/DL (ref 2.3–4.7)
PLATELET # BLD AUTO: 253 X10(3)/MCL (ref 130–400)
PMV BLD AUTO: 10.9 FL (ref 7.4–10.4)
POTASSIUM SERPL-SCNC: 4.1 MMOL/L (ref 3.5–5.1)
PROT SERPL-MCNC: 5.9 GM/DL (ref 6.4–8.3)
RBC # BLD AUTO: 2.59 X10(6)/MCL (ref 4.7–6.1)
SODIUM SERPL-SCNC: 139 MMOL/L (ref 136–145)
WBC # SPEC AUTO: 6.43 X10(3)/MCL (ref 4.5–11.5)

## 2024-03-21 PROCEDURE — 96401 CHEMO ANTI-NEOPL SQ/IM: CPT

## 2024-03-21 PROCEDURE — 3008F BODY MASS INDEX DOCD: CPT | Mod: CPTII,S$GLB,,

## 2024-03-21 PROCEDURE — 80053 COMPREHEN METABOLIC PANEL: CPT

## 2024-03-21 PROCEDURE — 3077F SYST BP >= 140 MM HG: CPT | Mod: CPTII,S$GLB,,

## 2024-03-21 PROCEDURE — 36415 COLL VENOUS BLD VENIPUNCTURE: CPT

## 2024-03-21 PROCEDURE — 1160F RVW MEDS BY RX/DR IN RCRD: CPT | Mod: CPTII,S$GLB,,

## 2024-03-21 PROCEDURE — 82784 ASSAY IGA/IGD/IGG/IGM EACH: CPT

## 2024-03-21 PROCEDURE — 3079F DIAST BP 80-89 MM HG: CPT | Mod: CPTII,S$GLB,,

## 2024-03-21 PROCEDURE — 99215 OFFICE O/P EST HI 40 MIN: CPT | Mod: S$GLB,,,

## 2024-03-21 PROCEDURE — 63600175 PHARM REV CODE 636 W HCPCS: Mod: JZ,JG

## 2024-03-21 PROCEDURE — 84165 PROTEIN E-PHORESIS SERUM: CPT

## 2024-03-21 PROCEDURE — 83735 ASSAY OF MAGNESIUM: CPT

## 2024-03-21 PROCEDURE — 1159F MED LIST DOCD IN RCRD: CPT | Mod: CPTII,S$GLB,,

## 2024-03-21 PROCEDURE — 85025 COMPLETE CBC W/AUTO DIFF WBC: CPT

## 2024-03-21 PROCEDURE — 99999 PR PBB SHADOW E&M-EST. PATIENT-LVL V: CPT | Mod: PBBFAC,,,

## 2024-03-21 PROCEDURE — 83521 IG LIGHT CHAINS FREE EACH: CPT

## 2024-03-21 PROCEDURE — 84100 ASSAY OF PHOSPHORUS: CPT

## 2024-03-21 RX ORDER — HEPARIN 100 UNIT/ML
500 SYRINGE INTRAVENOUS
Status: DISCONTINUED | OUTPATIENT
Start: 2024-03-21 | End: 2024-03-21 | Stop reason: HOSPADM

## 2024-03-21 RX ORDER — PROCHLORPERAZINE EDISYLATE 5 MG/ML
10 INJECTION INTRAMUSCULAR; INTRAVENOUS ONCE AS NEEDED
Status: DISCONTINUED | OUTPATIENT
Start: 2024-03-21 | End: 2024-03-21 | Stop reason: HOSPADM

## 2024-03-21 RX ORDER — HEPARIN 100 UNIT/ML
500 SYRINGE INTRAVENOUS
Status: CANCELLED | OUTPATIENT
Start: 2024-03-21

## 2024-03-21 RX ORDER — EPINEPHRINE 0.3 MG/.3ML
0.3 INJECTION SUBCUTANEOUS ONCE AS NEEDED
Status: DISCONTINUED | OUTPATIENT
Start: 2024-03-21 | End: 2024-03-21 | Stop reason: HOSPADM

## 2024-03-21 RX ORDER — DIPHENHYDRAMINE HYDROCHLORIDE 50 MG/ML
50 INJECTION INTRAMUSCULAR; INTRAVENOUS ONCE AS NEEDED
Status: CANCELLED | OUTPATIENT
Start: 2024-03-21

## 2024-03-21 RX ORDER — BORTEZOMIB 3.5 MG/1
1.3 INJECTION, POWDER, LYOPHILIZED, FOR SOLUTION INTRAVENOUS; SUBCUTANEOUS
Status: COMPLETED | OUTPATIENT
Start: 2024-03-21 | End: 2024-03-21

## 2024-03-21 RX ORDER — SODIUM CHLORIDE 0.9 % (FLUSH) 0.9 %
10 SYRINGE (ML) INJECTION
Status: CANCELLED | OUTPATIENT
Start: 2024-03-21

## 2024-03-21 RX ORDER — SODIUM CHLORIDE 0.9 % (FLUSH) 0.9 %
10 SYRINGE (ML) INJECTION
Status: DISCONTINUED | OUTPATIENT
Start: 2024-03-21 | End: 2024-03-21 | Stop reason: HOSPADM

## 2024-03-21 RX ORDER — PROCHLORPERAZINE EDISYLATE 5 MG/ML
10 INJECTION INTRAMUSCULAR; INTRAVENOUS ONCE AS NEEDED
Status: CANCELLED | OUTPATIENT
Start: 2024-03-21

## 2024-03-21 RX ORDER — DIPHENHYDRAMINE HYDROCHLORIDE 50 MG/ML
50 INJECTION INTRAMUSCULAR; INTRAVENOUS ONCE AS NEEDED
Status: DISCONTINUED | OUTPATIENT
Start: 2024-03-21 | End: 2024-03-21 | Stop reason: HOSPADM

## 2024-03-21 RX ORDER — EPINEPHRINE 0.3 MG/.3ML
0.3 INJECTION SUBCUTANEOUS ONCE AS NEEDED
Status: CANCELLED | OUTPATIENT
Start: 2024-03-21

## 2024-03-21 RX ORDER — BORTEZOMIB 3.5 MG/1
1.3 INJECTION, POWDER, LYOPHILIZED, FOR SOLUTION INTRAVENOUS; SUBCUTANEOUS
Status: CANCELLED | OUTPATIENT
Start: 2024-03-21

## 2024-03-21 RX ADMIN — BORTEZOMIB 3 MG: 3.5 INJECTION, POWDER, LYOPHILIZED, FOR SOLUTION INTRAVENOUS; SUBCUTANEOUS at 12:03

## 2024-03-21 NOTE — PROGRESS NOTES
Subjective:       Patient ID: Pete Fernandez is a 43 y.o. male.    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 Rev remains at 10mg due to renal function  Zometa 10/17/23 - 1/11/2024 (3 treatments total)  Currently on hold due to ONJ    Treatment History:  1. Beatriz+ KrD for cycle 2 on 11/30/23; Change at request of [facility]  Stopped after 1 dose due to severe pleural and pericardial effusion development   Switched back to BEATRIZ + RVD with increase of Rev to 25mg    HPI  44yo M presented to [facility] on 10/11 after 1 month of progressive weakness, abdominal pain, and intermittent confusion. At the time of his presentation, his labs which had been normal with PCP in 8/2023 were notable for new severe DOUG, transaminitis, and Ca of 18. He was admitted and started on IVF resuscitation.  Hypercalcemia workup was initiated and he was ultimately found to have numerous osseous lesions on 10/15/23 CT CAP, FLC - Kappa 3.29, Lambda 744, Ratio 226 , SPEP with Mspike 0.17 IgG lambda. Bone marrow biopsy done 10/18/23 revealed 95% plasma cells and FISH with TP53 deletion, FGFR3/IGH (or NSD2/IGH) fusion, usually representing a t(4;14), and a tetraploid subclone was observed. After correction of his hypercalcemia and improvement in his renal function he was discharged on 10/22/23.   He started C1 Beatriz + RVD + Zometa on 11/2/23. He was referred to [facility] for consideration of bone marrow transplant, they recommended change to Beatriz + Krd, however patient developed life threatening pleural and pericardial edema  several days after 1st infusion requiring hospitalization and placement of a pericardial window. Therefore he transitioned back to Beatriz - RVD for C2.     Interval History:   He returns to the clinic today for a follow-up and treatment clearance for C5D8 of Beatriz-VRD. He feels well today. He continues to have right sided rib pain that is stable and unchanged. He is scheduled with [facility] on 4/9/2024 to begin testing for his transplant. Collection planned 5/2/2024. Dysuria has improved. Increased appetite, tolerating PO without issue, weight stable.Taking calcium once daily and ASA 81 mg daily.   Labs reviewed in detail with him and are stable to continue with treatment.   Denies any fevers, chills, NS, headaches, bleeding, bruising, neuropathy.       Past Medical History:   Diagnosis Date    Anxiety disorder, unspecified     Hypercalcemia       Past Surgical History:   Procedure Laterality Date    BONE MARROW BIOPSY N/A 10/18/2023    Procedure: Biopsy-bone marrow;  Surgeon: Nhan Marte MD;  Location: [facility];  Service: General;  Laterality: N/A;    TONSILLECTOMY       Social History     Socioeconomic History    Marital status:    Tobacco Use    Smoking status: Never    Smokeless tobacco: Never   Substance and Sexual Activity    Alcohol use: Yes    Drug use: Never    Sexual activity: Yes     Partners: Female     Social Determinants of Health     Financial Resource Strain: Medium Risk (11/15/2023)    Overall Financial Resource Strain (CARDIA)     Difficulty of Paying Living Expenses: Somewhat hard   Food Insecurity: Food Insecurity Present (11/15/2023)    Hunger Vital Sign     Worried About Running Out of Food in the Last Year: Sometimes true     Ran Out of Food in the Last Year: Never true   Transportation Needs: No Transportation Needs (11/15/2023)    PRAPARE - Transportation     Lack of Transportation (Medical): No     Lack of Transportation (Non-Medical): No   Physical Activity: Insufficiently Active  (11/15/2023)    Exercise Vital Sign     Days of Exercise per Week: 3 days     Minutes of Exercise per Session: 30 min   Stress: Stress Concern Present (11/15/2023)    [employer] [employer] - Occupational Stress Questionnaire     Feeling of Stress : To some extent   Social Connections: Unknown (11/15/2023)    Social Connection and Isolation Panel [NHANES]     Frequency of Communication with Friends and Family: More than three times a week     Frequency of Social Gatherings with Friends and Family: More than three times a week     Attends Restorationism Services: Patient declined     Active Member of Clubs or Organizations: No     Attends Club or Organization Meetings: Never     Marital Status:    Housing Stability: Low Risk  (11/15/2023)    Housing Stability Vital Sign     Unable to Pay for Housing in the Last Year: No     Number of Places Lived in the Last Year: 1     Unstable Housing in the Last Year: No      Family History   Problem Relation Age of Onset    Breast cancer Mother     Pancreatic cancer Father       Review of patient's allergies indicates:  No Known Allergies   Review of Systems   Constitutional:  Negative for activity change, appetite change, chills, fatigue, fever and unexpected weight change.   HENT:  Negative for mouth sores and sore throat.    Eyes:  Negative for visual disturbance.   Respiratory:  Negative for cough and shortness of breath.    Cardiovascular:  Negative for chest pain.   Gastrointestinal:  Negative for abdominal pain, constipation, diarrhea, nausea and vomiting.   Endocrine: Negative for polyuria.   Genitourinary:  Negative for dysuria and frequency.   Musculoskeletal:  Negative for back pain.   Integumentary:  Negative for rash.   Allergic/Immunologic: Negative for frequent infections.   Neurological:  Negative for weakness and headaches.   Hematological:  Negative for adenopathy. Does not bruise/bleed easily.   Psychiatric/Behavioral:  The patient is not  nervous/anxious.          Objective:      Vitals:    03/21/24 1055   BP: (!) 142/86   Pulse: 64   Resp: 20   Temp: 97.8 °F (36.6 °C)       Physical Exam  Constitutional:       General: He is not in acute distress.     Appearance: Normal appearance. He is not ill-appearing.   HENT:      Head: Normocephalic and atraumatic.      Nose: Nose normal.      Mouth/Throat:      Mouth: Mucous membranes are moist.      Pharynx: Oropharynx is clear.   Eyes:      Extraocular Movements: Extraocular movements intact.      Conjunctiva/sclera: Conjunctivae normal.      Pupils: Pupils are equal, round, and reactive to light.   Cardiovascular:      Rate and Rhythm: Normal rate and regular rhythm.      Pulses: Normal pulses.      Heart sounds: Normal heart sounds. No murmur heard.  Pulmonary:      Effort: Pulmonary effort is normal. No respiratory distress.      Breath sounds: Normal breath sounds.   Abdominal:      General: There is no distension.      Palpations: Abdomen is soft.      Tenderness: There is no abdominal tenderness.   Musculoskeletal:         General: Normal range of motion.      Cervical back: Normal range of motion and neck supple.   Lymphadenopathy:      Cervical: No cervical adenopathy.   Skin:     General: Skin is warm and dry.      Findings: No rash.   Neurological:      General: No focal deficit present.      Mental Status: He is alert and oriented to person, place, and time.         LABS AND IMAGING REVIEWED IN EPIC  10/11/23 Xray Metastatic Survey  1. Scattered small defects at the calvarium measuring up to 5 mm too numerous to count.  2. Degene mild rative changes at the spine and extremities  3. MR exam and bone scan would allow further evaluation.  10/12/23 MRI Brain  1. Motion artifact  2. Mild scattered mucosal thickening throughout the paranasal sinuses  3. Somewhat limited evaluation of brain metastases without the administration of IV contrast  4. Otherwise unremarkable MR exam of the head without the  administration of IV contrast  10/12/23 NM Bone Scan  1. Abnormal intense increased activity throughout the lungs of uncertain etiology.  This may related to hyperparathyroidism, hematologic malignancy, and or metabolic abnormality such as renal failure, vasculitis, or pulmonary infection.  Chest x-ray 10/10/2023 reveals the lungs to be relatively clear and well aerated.  2. Suspect scattered mild degenerative changes throughout the joint spaces  3. Increased activity nasal maxillary region suggesting mucoperiosteal disease  4. Mild activity at the stomach suggesting free pertechnetate  10/14/23 CT CAP  1. Widespread osseous findings suggestive of extensive metastatic skeletal disease, to include expansile destructive soft tissue component at the anterior right 7th rib.  2. Enlarged intrathoracic lymph nodes could also be of metastatic etiology.  3. Diffuse ground-glass attenuation through both lungs is nonspecific, could be secondary to atypical infectious or inflammatory process.  However, possibility of metastatic involvement cannot be excluded.  12/7/23 CXR:   1. Hazy opacification lower lungs bilaterally suspicious for infiltrate and pleural reaction which has progressed since the prior exam  2. Cardiomegaly  3. Thoracic spondylosis  12/11/23 CXR:  1. Mild cardiomegaly  2. Persistent bibasilar infiltrate and or pleural reaction  3. Thoracic spondylosis with anterior wedging again evident at a lower thoracic vertebral body  12/13/23 ECHO: EF 50-55%  2/7/24 PET/CT:  1. Interval improved appearance of lytic osseous lesions compared to previous exam with sub threshold uptake. The bones are demineralized with numerous tiny lytic foci at the axial and appendicular skeleton.  2. Subacute appearing bilateral rib and right clavicle fractures with mild FDG uptake.  3. Nonspecific patchy ground-glass and interstitial opacities with mild FDG uptake.       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 Displayed all CRAB criteria at time of diagnosis in October. Undergoing 1st line Beatriz + RVD + Zometa. Being followed by [facility] in [facility] for transplant consideration.    ONJ  Zometa now on hold. Continue follow-ups with dental as scheduled.      Plan:   Toxicity reviewed Labs adequate for C5D8 of VRD with Revlimid 10mg,  holding BEATRIZ due to transplant.  Continue to hold zometa due to ONJ.   Continue Aspirin 81mg, Acyclovir, and Bactrim for prophylaxis  Will need SPEP/OSVALDO and FLC with labs Q4w prior to every new cycle.   RTC in 1 week with NP with labs  Cbc, cmp, MM labs- 1 hr prior @ [facility]    I spent a total of 40 minutes on the day of the visit.This includes face to face time and non-face to face time preparing to see the patient (eg, review of tests), obtaining and/or reviewing separately obtained history, documenting clinical information in the electronic or other health record, independently interpreting results and communicating results to the patient/family/caregiver, or care coordinator.       **All blood products must be irradiated and CMV negative**        Janet Alegria, [id number]  Oncology/Hematology   [facility]

## 2024-03-22 LAB
ALBUMIN % SPEP (OHS): 59.38
ALBUMIN SERPL-MCNC: 3.3 G/DL (ref 3.5–5)
ALBUMIN/GLOB SERPL: 1.5 RATIO (ref 1.1–2)
ALPHA 1 GLOB (OHS): 0.31 GM/DL
ALPHA 1 GLOB% (OHS): 5.7
ALPHA 2 GLOB % (OHS): 14.5
ALPHA 2 GLOB (OHS): 0.8 GM/DL
BETA GLOB (OHS): 0.87 GM/DL
BETA GLOB% (OHS): 15.77
GAMMA GLOBULIN % (OHS): 4.64
GAMMA GLOBULIN (OHS): 0.26 GM/DL
GLOBULIN SER-MCNC: 2.2 GM/DL (ref 2.4–3.5)
KAPPA LC FREE SER-MCNC: 0.4 MG/DL (ref 0.33–1.94)
KAPPA LC FREE/LAMBDA FREE SER: 0.98 {RATIO} (ref 0.26–1.65)
LAMBDA LC FREE SERPL-MCNC: 0.41 MG/DL (ref 0.57–2.63)
M SPIKE % (OHS): 0.21
M SPIKE (OHS): 0.01 GM/DL
PATH REV: NORMAL
PROT SERPL-MCNC: 5.5 GM/DL (ref 6.4–8.3)

## 2024-03-28 ENCOUNTER — INFUSION (OUTPATIENT)
Dept: INFUSION THERAPY | Facility: HOSPITAL | Age: 44
End: 2024-03-28
Attending: STUDENT IN AN ORGANIZED HEALTH CARE EDUCATION/TRAINING PROGRAM
Payer: COMMERCIAL

## 2024-03-28 ENCOUNTER — LAB VISIT (OUTPATIENT)
Dept: LAB | Facility: HOSPITAL | Age: 44
End: 2024-03-28
Payer: COMMERCIAL

## 2024-03-28 ENCOUNTER — OFFICE VISIT (OUTPATIENT)
Dept: HEMATOLOGY/ONCOLOGY | Facility: CLINIC | Age: 44
End: 2024-03-28
Payer: COMMERCIAL

## 2024-03-28 VITALS
BODY MASS INDEX: 30.12 KG/M2 | WEIGHT: 203.38 LBS | TEMPERATURE: 98 F | HEART RATE: 65 BPM | BODY MASS INDEX: 30.12 KG/M2 | TEMPERATURE: 98 F | RESPIRATION RATE: 20 BRPM | HEIGHT: 69 IN | HEIGHT: 69 IN | OXYGEN SATURATION: 100 % | WEIGHT: 203.38 LBS | DIASTOLIC BLOOD PRESSURE: 89 MMHG | OXYGEN SATURATION: 100 % | SYSTOLIC BLOOD PRESSURE: 144 MMHG | SYSTOLIC BLOOD PRESSURE: 144 MMHG | HEART RATE: 65 BPM | RESPIRATION RATE: 20 BRPM | DIASTOLIC BLOOD PRESSURE: 89 MMHG

## 2024-03-28 DIAGNOSIS — Z79.899 IMMUNODEFICIENCY DUE TO DRUG THERAPY: ICD-10-CM

## 2024-03-28 DIAGNOSIS — D64.9 LOW HEMOGLOBIN: ICD-10-CM

## 2024-03-28 DIAGNOSIS — M87.180 OSTEONECROSIS OF JAW DUE TO DRUG: ICD-10-CM

## 2024-03-28 DIAGNOSIS — D84.821 IMMUNODEFICIENCY DUE TO DRUG THERAPY: ICD-10-CM

## 2024-03-28 DIAGNOSIS — C90.00 METASTATIC MULTIPLE MYELOMA TO BONE: ICD-10-CM

## 2024-03-28 DIAGNOSIS — C90.00 MULTIPLE MYELOMA, REMISSION STATUS UNSPECIFIED: Primary | ICD-10-CM

## 2024-03-28 DIAGNOSIS — C90.00 METASTATIC MULTIPLE MYELOMA TO BONE: Primary | ICD-10-CM

## 2024-03-28 DIAGNOSIS — D63.0 ANEMIA IN NEOPLASTIC DISEASE: ICD-10-CM

## 2024-03-28 DIAGNOSIS — D84.821 IMMUNODEFICIENCY DUE TO CHEMOTHERAPY: ICD-10-CM

## 2024-03-28 DIAGNOSIS — C90.00 MULTIPLE MYELOMA NOT HAVING ACHIEVED REMISSION: ICD-10-CM

## 2024-03-28 DIAGNOSIS — Z79.899 IMMUNODEFICIENCY DUE TO CHEMOTHERAPY: ICD-10-CM

## 2024-03-28 DIAGNOSIS — T45.1X5A IMMUNODEFICIENCY DUE TO CHEMOTHERAPY: ICD-10-CM

## 2024-03-28 DIAGNOSIS — C90.00 MULTIPLE MYELOMA, REMISSION STATUS UNSPECIFIED: ICD-10-CM

## 2024-03-28 DIAGNOSIS — Z76.82 STEM CELL TRANSPLANT CANDIDATE: ICD-10-CM

## 2024-03-28 DIAGNOSIS — R53.82 CHRONIC FATIGUE: ICD-10-CM

## 2024-03-28 DIAGNOSIS — G89.3 CANCER ASSOCIATED PAIN: ICD-10-CM

## 2024-03-28 LAB
ALBUMIN SERPL-MCNC: 3.6 G/DL (ref 3.5–5)
ALBUMIN/GLOB SERPL: 1.8 RATIO (ref 1.1–2)
ALP SERPL-CCNC: 39 UNIT/L (ref 40–150)
ALT SERPL-CCNC: 17 UNIT/L (ref 0–55)
AST SERPL-CCNC: 17 UNIT/L (ref 5–34)
BASOPHILS # BLD AUTO: 0.08 X10(3)/MCL
BASOPHILS NFR BLD AUTO: 1 %
BILIRUB SERPL-MCNC: 0.8 MG/DL
BUN SERPL-MCNC: 20 MG/DL (ref 8.9–20.6)
CALCIUM SERPL-MCNC: 8.8 MG/DL (ref 8.4–10.2)
CHLORIDE SERPL-SCNC: 105 MMOL/L (ref 98–107)
CO2 SERPL-SCNC: 30 MMOL/L (ref 22–29)
CREAT SERPL-MCNC: 1.93 MG/DL (ref 0.73–1.18)
EOSINOPHIL # BLD AUTO: 0.55 X10(3)/MCL (ref 0–0.9)
EOSINOPHIL NFR BLD AUTO: 7.1 %
ERYTHROCYTE [DISTWIDTH] IN BLOOD BY AUTOMATED COUNT: 17.2 % (ref 11.5–17)
FOLATE SERPL-MCNC: 8 NG/ML (ref 7–31.4)
GFR SERPLBLD CREATININE-BSD FMLA CKD-EPI: 43 MLS/MIN/1.73/M2
GLOBULIN SER-MCNC: 2 GM/DL (ref 2.4–3.5)
GLUCOSE SERPL-MCNC: 77 MG/DL (ref 74–100)
HCT VFR BLD AUTO: 27.2 % (ref 42–52)
HGB BLD-MCNC: 8.7 G/DL (ref 14–18)
IMM GRANULOCYTES # BLD AUTO: 0.02 X10(3)/MCL (ref 0–0.04)
IMM GRANULOCYTES NFR BLD AUTO: 0.3 %
LYMPHOCYTES # BLD AUTO: 0.96 X10(3)/MCL (ref 0.6–4.6)
LYMPHOCYTES NFR BLD AUTO: 12.3 %
MAGNESIUM SERPL-MCNC: 2.1 MG/DL (ref 1.6–2.6)
MCH RBC QN AUTO: 32.7 PG (ref 27–31)
MCHC RBC AUTO-ENTMCNC: 32 G/DL (ref 33–36)
MCV RBC AUTO: 102.3 FL (ref 80–94)
MONOCYTES # BLD AUTO: 0.99 X10(3)/MCL (ref 0.1–1.3)
MONOCYTES NFR BLD AUTO: 12.7 %
NEUTROPHILS # BLD AUTO: 5.2 X10(3)/MCL (ref 2.1–9.2)
NEUTROPHILS NFR BLD AUTO: 66.6 %
NRBC BLD AUTO-RTO: 0 %
PHOSPHATE SERPL-MCNC: 4.7 MG/DL (ref 2.3–4.7)
PLATELET # BLD AUTO: 201 X10(3)/MCL (ref 130–400)
PMV BLD AUTO: 11.1 FL (ref 7.4–10.4)
POTASSIUM SERPL-SCNC: 4.2 MMOL/L (ref 3.5–5.1)
PROT SERPL-MCNC: 5.6 GM/DL (ref 6.4–8.3)
RBC # BLD AUTO: 2.66 X10(6)/MCL (ref 4.7–6.1)
SODIUM SERPL-SCNC: 140 MMOL/L (ref 136–145)
VIT B12 SERPL-MCNC: 667 PG/ML (ref 213–816)
WBC # SPEC AUTO: 7.8 X10(3)/MCL (ref 4.5–11.5)

## 2024-03-28 PROCEDURE — 83735 ASSAY OF MAGNESIUM: CPT

## 2024-03-28 PROCEDURE — 99215 OFFICE O/P EST HI 40 MIN: CPT | Mod: S$GLB,,,

## 2024-03-28 PROCEDURE — 82607 VITAMIN B-12: CPT

## 2024-03-28 PROCEDURE — 84100 ASSAY OF PHOSPHORUS: CPT

## 2024-03-28 PROCEDURE — 3077F SYST BP >= 140 MM HG: CPT | Mod: CPTII,S$GLB,,

## 2024-03-28 PROCEDURE — 80053 COMPREHEN METABOLIC PANEL: CPT

## 2024-03-28 PROCEDURE — 85025 COMPLETE CBC W/AUTO DIFF WBC: CPT

## 2024-03-28 PROCEDURE — 96401 CHEMO ANTI-NEOPL SQ/IM: CPT

## 2024-03-28 PROCEDURE — 82746 ASSAY OF FOLIC ACID SERUM: CPT

## 2024-03-28 PROCEDURE — 36415 COLL VENOUS BLD VENIPUNCTURE: CPT

## 2024-03-28 PROCEDURE — 3079F DIAST BP 80-89 MM HG: CPT | Mod: CPTII,S$GLB,,

## 2024-03-28 PROCEDURE — 99999 PR PBB SHADOW E&M-EST. PATIENT-LVL V: CPT | Mod: PBBFAC,,,

## 2024-03-28 PROCEDURE — 63600175 PHARM REV CODE 636 W HCPCS: Mod: JZ,JG

## 2024-03-28 RX ORDER — BORTEZOMIB 3.5 MG/1
1.3 INJECTION, POWDER, LYOPHILIZED, FOR SOLUTION INTRAVENOUS; SUBCUTANEOUS
Status: CANCELLED | OUTPATIENT
Start: 2024-03-28

## 2024-03-28 RX ORDER — EPINEPHRINE 0.3 MG/.3ML
0.3 INJECTION SUBCUTANEOUS ONCE AS NEEDED
Status: CANCELLED | OUTPATIENT
Start: 2024-03-28

## 2024-03-28 RX ORDER — SODIUM CHLORIDE 0.9 % (FLUSH) 0.9 %
10 SYRINGE (ML) INJECTION
Status: CANCELLED | OUTPATIENT
Start: 2024-03-28

## 2024-03-28 RX ORDER — HEPARIN 100 UNIT/ML
500 SYRINGE INTRAVENOUS
Status: CANCELLED | OUTPATIENT
Start: 2024-03-28

## 2024-03-28 RX ORDER — BORTEZOMIB 3.5 MG/1
1.3 INJECTION, POWDER, LYOPHILIZED, FOR SOLUTION INTRAVENOUS; SUBCUTANEOUS
Status: COMPLETED | OUTPATIENT
Start: 2024-03-28 | End: 2024-03-28

## 2024-03-28 RX ORDER — PROCHLORPERAZINE EDISYLATE 5 MG/ML
10 INJECTION INTRAMUSCULAR; INTRAVENOUS ONCE AS NEEDED
Status: CANCELLED | OUTPATIENT
Start: 2024-03-28

## 2024-03-28 RX ORDER — DIPHENHYDRAMINE HYDROCHLORIDE 50 MG/ML
50 INJECTION INTRAMUSCULAR; INTRAVENOUS ONCE AS NEEDED
Status: CANCELLED | OUTPATIENT
Start: 2024-03-28

## 2024-03-28 RX ADMIN — BORTEZOMIB 3 MG: 3.5 INJECTION, POWDER, LYOPHILIZED, FOR SOLUTION INTRAVENOUS; SUBCUTANEOUS at 11:03

## 2024-03-28 NOTE — PROGRESS NOTES
Subjective:       Patient ID: Pete Fernandez is a 43 y.o. male.    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 Rev remains at 10mg due to renal function  Zometa 10/17/23 - 1/11/2024 (3 treatments total)  Currently on hold due to ONJ    Treatment History:  1. Beatriz+ KrD for cycle 2 on 11/30/23; Change at request of [facility]  Stopped after 1 dose due to severe pleural and pericardial effusion development   Switched back to BEATRIZ + RVD with increase of Rev to 25mg    HPI  44yo M presented to [facility] on 10/11 after 1 month of progressive weakness, abdominal pain, and intermittent confusion. At the time of his presentation, his labs which had been normal with PCP in 8/2023 were notable for new severe DOUG, transaminitis, and Ca of 18. He was admitted and started on IVF resuscitation.  Hypercalcemia workup was initiated and he was ultimately found to have numerous osseous lesions on 10/15/23 CT CAP, FLC - Kappa 3.29, Lambda 744, Ratio 226 , SPEP with Mspike 0.17 IgG lambda. Bone marrow biopsy done 10/18/23 revealed 95% plasma cells and FISH with TP53 deletion, FGFR3/IGH (or NSD2/IGH) fusion, usually representing a t(4;14), and a tetraploid subclone was observed. After correction of his hypercalcemia and improvement in his renal function he was discharged on 10/22/23.   He started C1 Beatriz + RVD + Zometa on 11/2/23. He was referred to [facility] for consideration of bone marrow transplant, they recommended change to Beatriz + Krd, however patient developed life threatening pleural and pericardial edema  several days after 1st infusion requiring hospitalization and placement of a pericardial window. Therefore he transitioned back to Beatriz - RVD for C2.     Interval History:   He returns to the clinic today for a follow-up and treatment clearance for C5D15 of Beatriz-VRD. He feels well today. He continues to have right sided rib pain that is stable and unchanged. He is scheduled with [facility] on 4/9/2024 to begin testing for his transplant. Collection planned 5/2/2024. Dysuria has improved. Increased appetite, tolerating PO without issue, weight stable.Taking calcium once daily and ASA 81 mg daily.   Labs reviewed in detail with him and are stable to continue with treatment.   Denies any fevers, chills, NS, headaches, bleeding, bruising, neuropathy.       Past Medical History:   Diagnosis Date    Anxiety disorder, unspecified     Hypercalcemia       Past Surgical History:   Procedure Laterality Date    BONE MARROW BIOPSY N/A 10/18/2023    Procedure: Biopsy-bone marrow;  Surgeon: Nhan Marte MD;  Location: [facility];  Service: General;  Laterality: N/A;    TONSILLECTOMY       Social History     Socioeconomic History    Marital status:    Tobacco Use    Smoking status: Never    Smokeless tobacco: Never   Substance and Sexual Activity    Alcohol use: Yes    Drug use: Never    Sexual activity: Yes     Partners: Female     Social Determinants of Health     Financial Resource Strain: Medium Risk (11/15/2023)    Overall Financial Resource Strain (CARDIA)     Difficulty of Paying Living Expenses: Somewhat hard   Food Insecurity: Food Insecurity Present (11/15/2023)    Hunger Vital Sign     Worried About Running Out of Food in the Last Year: Sometimes true     Ran Out of Food in the Last Year: Never true   Transportation Needs: No Transportation Needs (11/15/2023)    PRAPARE - Transportation     Lack of Transportation (Medical): No     Lack of Transportation (Non-Medical): No   Physical Activity: Insufficiently Active  (11/15/2023)    Exercise Vital Sign     Days of Exercise per Week: 3 days     Minutes of Exercise per Session: 30 min   Stress: Stress Concern Present (11/15/2023)    [place] [employer] - Occupational Stress Questionnaire     Feeling of Stress : To some extent   Social Connections: Unknown (11/15/2023)    Social Connection and Isolation Panel [NHANES]     Frequency of Communication with Friends and Family: More than three times a week     Frequency of Social Gatherings with Friends and Family: More than three times a week     Attends Zoroastrianism Services: Patient declined     Active Member of Clubs or Organizations: No     Attends Club or Organization Meetings: Never     Marital Status:    Housing Stability: Low Risk  (11/15/2023)    Housing Stability Vital Sign     Unable to Pay for Housing in the Last Year: No     Number of Places Lived in the Last Year: 1     Unstable Housing in the Last Year: No      Family History   Problem Relation Age of Onset    Breast cancer Mother     Pancreatic cancer Father       Review of patient's allergies indicates:  No Known Allergies   Review of Systems   Constitutional:  Negative for activity change, appetite change, chills, fatigue, fever and unexpected weight change.   HENT:  Negative for mouth sores and sore throat.    Eyes:  Negative for visual disturbance.   Respiratory:  Negative for cough and shortness of breath.    Cardiovascular:  Negative for chest pain.   Gastrointestinal:  Negative for abdominal pain, constipation, diarrhea, nausea and vomiting.   Endocrine: Negative for polyuria.   Genitourinary:  Negative for dysuria and frequency.   Musculoskeletal:  Negative for back pain.   Integumentary:  Negative for rash.   Allergic/Immunologic: Negative for frequent infections.   Neurological:  Negative for weakness and headaches.   Hematological:  Negative for adenopathy. Does not bruise/bleed easily.   Psychiatric/Behavioral:  The patient is not  nervous/anxious.          Objective:      Vitals:    03/28/24 1059   BP: (!) 144/89   Pulse: 65   Resp: 20   Temp: 97.7 °F (36.5 °C)         Physical Exam  Constitutional:       General: He is not in acute distress.     Appearance: Normal appearance. He is not ill-appearing.   HENT:      Head: Normocephalic and atraumatic.      Nose: Nose normal.      Mouth/Throat:      Mouth: Mucous membranes are moist.      Pharynx: Oropharynx is clear.   Eyes:      Extraocular Movements: Extraocular movements intact.      Conjunctiva/sclera: Conjunctivae normal.      Pupils: Pupils are equal, round, and reactive to light.   Cardiovascular:      Rate and Rhythm: Normal rate and regular rhythm.      Pulses: Normal pulses.      Heart sounds: Normal heart sounds. No murmur heard.  Pulmonary:      Effort: Pulmonary effort is normal. No respiratory distress.      Breath sounds: Normal breath sounds.   Abdominal:      General: There is no distension.      Palpations: Abdomen is soft.      Tenderness: There is no abdominal tenderness.   Musculoskeletal:         General: Normal range of motion.      Cervical back: Normal range of motion and neck supple.   Lymphadenopathy:      Cervical: No cervical adenopathy.   Skin:     General: Skin is warm and dry.      Findings: No rash.   Neurological:      General: No focal deficit present.      Mental Status: He is alert and oriented to person, place, and time.         LABS AND IMAGING REVIEWED IN EPIC  10/11/23 Xray Metastatic Survey  1. Scattered small defects at the calvarium measuring up to 5 mm too numerous to count.  2. Degene mild rative changes at the spine and extremities  3. MR exam and bone scan would allow further evaluation.  10/12/23 MRI Brain  1. Motion artifact  2. Mild scattered mucosal thickening throughout the paranasal sinuses  3. Somewhat limited evaluation of brain metastases without the administration of IV contrast  4. Otherwise unremarkable MR exam of the head without the  administration of IV contrast  10/12/23 NM Bone Scan  1. Abnormal intense increased activity throughout the lungs of uncertain etiology.  This may related to hyperparathyroidism, hematologic malignancy, and or metabolic abnormality such as renal failure, vasculitis, or pulmonary infection.  Chest x-ray 10/10/2023 reveals the lungs to be relatively clear and well aerated.  2. Suspect scattered mild degenerative changes throughout the joint spaces  3. Increased activity nasal maxillary region suggesting mucoperiosteal disease  4. Mild activity at the stomach suggesting free pertechnetate  10/14/23 CT CAP  1. Widespread osseous findings suggestive of extensive metastatic skeletal disease, to include expansile destructive soft tissue component at the anterior right 7th rib.  2. Enlarged intrathoracic lymph nodes could also be of metastatic etiology.  3. Diffuse ground-glass attenuation through both lungs is nonspecific, could be secondary to atypical infectious or inflammatory process.  However, possibility of metastatic involvement cannot be excluded.  12/7/23 CXR:   1. Hazy opacification lower lungs bilaterally suspicious for infiltrate and pleural reaction which has progressed since the prior exam  2. Cardiomegaly  3. Thoracic spondylosis  12/11/23 CXR:  1. Mild cardiomegaly  2. Persistent bibasilar infiltrate and or pleural reaction  3. Thoracic spondylosis with anterior wedging again evident at a lower thoracic vertebral body  12/13/23 ECHO: EF 50-55%  2/7/24 PET/CT:  1. Interval improved appearance of lytic osseous lesions compared to previous exam with sub threshold uptake. The bones are demineralized with numerous tiny lytic foci at the axial and appendicular skeleton.  2. Subacute appearing bilateral rib and right clavicle fractures with mild FDG uptake.  3. Nonspecific patchy ground-glass and interstitial opacities with mild FDG uptake.       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 Displayed all CRAB criteria at time of diagnosis in October. Undergoing 1st line Beatriz + RVD + Zometa. Being followed by [facility] in [facility] for transplant consideration.    ONJ  Zometa now on hold. Continue follow-ups with dental as scheduled.      Plan:   Toxicity reviewed Labs adequate for C5D15 of VRD with Revlimid 10mg,  holding BEATRIZ due to transplant.  Continue to hold zometa due to ONJ.   Continue Aspirin 81mg, Acyclovir, and Bactrim for prophylaxis  Will need SPEP/OSVALDO and FLC with labs Q4w prior to every new cycle.   RTC in 1 week with NP with labs  Cbc, cmp, MM labs- 1 hr prior @ [facility], [place]    I spent a total of 40 minutes on the day of the visit.This includes face to face time and non-face to face time preparing to see the patient (eg, review of tests), obtaining and/or reviewing separately obtained history, documenting clinical information in the electronic or other health record, independently interpreting results and communicating results to the patient/family/caregiver, or care coordinator.       **All blood products must be irradiated and CMV negative**        Janet Alegria, YOVANNYP-C  Oncology/Hematology   Cancer Center [facility]

## 2024-04-01 DIAGNOSIS — C90.00 MULTIPLE MYELOMA NOT HAVING ACHIEVED REMISSION: ICD-10-CM

## 2024-04-01 DIAGNOSIS — Z76.82 STEM CELL TRANSPLANT CANDIDATE: Primary | ICD-10-CM

## 2024-04-04 ENCOUNTER — LAB VISIT (OUTPATIENT)
Dept: LAB | Facility: HOSPITAL | Age: 44
End: 2024-04-04
Payer: COMMERCIAL

## 2024-04-04 ENCOUNTER — INFUSION (OUTPATIENT)
Dept: INFUSION THERAPY | Facility: HOSPITAL | Age: 44
End: 2024-04-04
Attending: STUDENT IN AN ORGANIZED HEALTH CARE EDUCATION/TRAINING PROGRAM
Payer: COMMERCIAL

## 2024-04-04 ENCOUNTER — OFFICE VISIT (OUTPATIENT)
Dept: HEMATOLOGY/ONCOLOGY | Facility: CLINIC | Age: 44
End: 2024-04-04
Payer: COMMERCIAL

## 2024-04-04 VITALS
HEART RATE: 66 BPM | WEIGHT: 205 LBS | RESPIRATION RATE: 20 BRPM | HEIGHT: 69 IN | DIASTOLIC BLOOD PRESSURE: 83 MMHG | OXYGEN SATURATION: 100 % | SYSTOLIC BLOOD PRESSURE: 128 MMHG | TEMPERATURE: 98 F | BODY MASS INDEX: 30.36 KG/M2

## 2024-04-04 DIAGNOSIS — C90.00 MULTIPLE MYELOMA, REMISSION STATUS UNSPECIFIED: Primary | ICD-10-CM

## 2024-04-04 DIAGNOSIS — D64.9 LOW HEMOGLOBIN: ICD-10-CM

## 2024-04-04 DIAGNOSIS — C90.00 METASTATIC MULTIPLE MYELOMA TO BONE: ICD-10-CM

## 2024-04-04 DIAGNOSIS — Z76.82 STEM CELL TRANSPLANT CANDIDATE: ICD-10-CM

## 2024-04-04 DIAGNOSIS — C90.00 MULTIPLE MYELOMA, REMISSION STATUS UNSPECIFIED: ICD-10-CM

## 2024-04-04 DIAGNOSIS — T45.1X5A IMMUNODEFICIENCY DUE TO CHEMOTHERAPY: ICD-10-CM

## 2024-04-04 DIAGNOSIS — R53.82 CHRONIC FATIGUE: ICD-10-CM

## 2024-04-04 DIAGNOSIS — Z79.899 IMMUNODEFICIENCY DUE TO DRUG THERAPY: ICD-10-CM

## 2024-04-04 DIAGNOSIS — C90.00 MULTIPLE MYELOMA NOT HAVING ACHIEVED REMISSION: Primary | ICD-10-CM

## 2024-04-04 DIAGNOSIS — M87.180 OSTEONECROSIS OF JAW DUE TO DRUG: ICD-10-CM

## 2024-04-04 DIAGNOSIS — D84.821 IMMUNODEFICIENCY DUE TO DRUG THERAPY: ICD-10-CM

## 2024-04-04 DIAGNOSIS — D63.0 ANEMIA IN NEOPLASTIC DISEASE: ICD-10-CM

## 2024-04-04 DIAGNOSIS — G89.3 CANCER ASSOCIATED PAIN: ICD-10-CM

## 2024-04-04 DIAGNOSIS — D84.821 IMMUNODEFICIENCY DUE TO CHEMOTHERAPY: ICD-10-CM

## 2024-04-04 DIAGNOSIS — Z79.899 IMMUNODEFICIENCY DUE TO CHEMOTHERAPY: ICD-10-CM

## 2024-04-04 LAB
ALBUMIN SERPL-MCNC: 3.7 G/DL (ref 3.5–5)
ALBUMIN/GLOB SERPL: 1.8 RATIO (ref 1.1–2)
ALP SERPL-CCNC: 40 UNIT/L (ref 40–150)
ALT SERPL-CCNC: 16 UNIT/L (ref 0–55)
AST SERPL-CCNC: 16 UNIT/L (ref 5–34)
BASOPHILS # BLD AUTO: 0.07 X10(3)/MCL
BASOPHILS NFR BLD AUTO: 1.2 %
BILIRUB SERPL-MCNC: 0.6 MG/DL
BUN SERPL-MCNC: 20 MG/DL (ref 8.9–20.6)
CALCIUM SERPL-MCNC: 8.8 MG/DL (ref 8.4–10.2)
CHLORIDE SERPL-SCNC: 105 MMOL/L (ref 98–107)
CO2 SERPL-SCNC: 31 MMOL/L (ref 22–29)
CREAT SERPL-MCNC: 1.83 MG/DL (ref 0.73–1.18)
EOSINOPHIL # BLD AUTO: 0.85 X10(3)/MCL (ref 0–0.9)
EOSINOPHIL NFR BLD AUTO: 15.1 %
ERYTHROCYTE [DISTWIDTH] IN BLOOD BY AUTOMATED COUNT: 17.2 % (ref 11.5–17)
GFR SERPLBLD CREATININE-BSD FMLA CKD-EPI: 46 MLS/MIN/1.73/M2
GLOBULIN SER-MCNC: 2.1 GM/DL (ref 2.4–3.5)
GLUCOSE SERPL-MCNC: 93 MG/DL (ref 74–100)
HCT VFR BLD AUTO: 26.1 % (ref 42–52)
HGB BLD-MCNC: 8.6 G/DL (ref 14–18)
IMM GRANULOCYTES # BLD AUTO: 0.02 X10(3)/MCL (ref 0–0.04)
IMM GRANULOCYTES NFR BLD AUTO: 0.4 %
LYMPHOCYTES # BLD AUTO: 0.74 X10(3)/MCL (ref 0.6–4.6)
LYMPHOCYTES NFR BLD AUTO: 13.1 %
MAGNESIUM SERPL-MCNC: 2.2 MG/DL (ref 1.6–2.6)
MCH RBC QN AUTO: 33.9 PG (ref 27–31)
MCHC RBC AUTO-ENTMCNC: 33 G/DL (ref 33–36)
MCV RBC AUTO: 102.8 FL (ref 80–94)
MONOCYTES # BLD AUTO: 0.59 X10(3)/MCL (ref 0.1–1.3)
MONOCYTES NFR BLD AUTO: 10.5 %
NEUTROPHILS # BLD AUTO: 3.37 X10(3)/MCL (ref 2.1–9.2)
NEUTROPHILS NFR BLD AUTO: 59.7 %
PHOSPHATE SERPL-MCNC: 4.3 MG/DL (ref 2.3–4.7)
PLATELET # BLD AUTO: 130 X10(3)/MCL (ref 130–400)
PMV BLD AUTO: 11.1 FL (ref 7.4–10.4)
POTASSIUM SERPL-SCNC: 3.8 MMOL/L (ref 3.5–5.1)
PROT SERPL-MCNC: 5.8 GM/DL (ref 6.4–8.3)
RBC # BLD AUTO: 2.54 X10(6)/MCL (ref 4.7–6.1)
SODIUM SERPL-SCNC: 140 MMOL/L (ref 136–145)
WBC # SPEC AUTO: 5.64 X10(3)/MCL (ref 4.5–11.5)

## 2024-04-04 PROCEDURE — 3079F DIAST BP 80-89 MM HG: CPT | Mod: CPTII,S$GLB,,

## 2024-04-04 PROCEDURE — 84100 ASSAY OF PHOSPHORUS: CPT

## 2024-04-04 PROCEDURE — 1159F MED LIST DOCD IN RCRD: CPT | Mod: CPTII,S$GLB,,

## 2024-04-04 PROCEDURE — 36415 COLL VENOUS BLD VENIPUNCTURE: CPT

## 2024-04-04 PROCEDURE — 1160F RVW MEDS BY RX/DR IN RCRD: CPT | Mod: CPTII,S$GLB,,

## 2024-04-04 PROCEDURE — 3008F BODY MASS INDEX DOCD: CPT | Mod: CPTII,S$GLB,,

## 2024-04-04 PROCEDURE — 83735 ASSAY OF MAGNESIUM: CPT

## 2024-04-04 PROCEDURE — 85025 COMPLETE CBC W/AUTO DIFF WBC: CPT

## 2024-04-04 PROCEDURE — 63600175 PHARM REV CODE 636 W HCPCS: Mod: JZ,JG

## 2024-04-04 PROCEDURE — 99215 OFFICE O/P EST HI 40 MIN: CPT | Mod: S$GLB,,,

## 2024-04-04 PROCEDURE — 3074F SYST BP LT 130 MM HG: CPT | Mod: CPTII,S$GLB,,

## 2024-04-04 PROCEDURE — 99999 PR PBB SHADOW E&M-EST. PATIENT-LVL V: CPT | Mod: PBBFAC,,,

## 2024-04-04 PROCEDURE — 96401 CHEMO ANTI-NEOPL SQ/IM: CPT

## 2024-04-04 PROCEDURE — 80053 COMPREHEN METABOLIC PANEL: CPT

## 2024-04-04 RX ORDER — EPINEPHRINE 0.3 MG/.3ML
0.3 INJECTION SUBCUTANEOUS ONCE AS NEEDED
Status: DISCONTINUED | OUTPATIENT
Start: 2024-04-04 | End: 2024-04-04 | Stop reason: HOSPADM

## 2024-04-04 RX ORDER — DEXAMETHASONE 4 MG/1
40 TABLET ORAL
Qty: 40 TABLET | Refills: 5 | Status: ON HOLD | OUTPATIENT
Start: 2024-04-04 | End: 2024-05-27 | Stop reason: HOSPADM

## 2024-04-04 RX ORDER — SODIUM CHLORIDE 0.9 % (FLUSH) 0.9 %
10 SYRINGE (ML) INJECTION
Status: DISCONTINUED | OUTPATIENT
Start: 2024-04-04 | End: 2024-04-04 | Stop reason: HOSPADM

## 2024-04-04 RX ORDER — BORTEZOMIB 3.5 MG/1
1.3 INJECTION, POWDER, LYOPHILIZED, FOR SOLUTION INTRAVENOUS; SUBCUTANEOUS
Status: COMPLETED | OUTPATIENT
Start: 2024-04-04 | End: 2024-04-04

## 2024-04-04 RX ORDER — PROCHLORPERAZINE EDISYLATE 5 MG/ML
10 INJECTION INTRAMUSCULAR; INTRAVENOUS ONCE AS NEEDED
Status: DISCONTINUED | OUTPATIENT
Start: 2024-04-04 | End: 2024-04-04 | Stop reason: HOSPADM

## 2024-04-04 RX ORDER — HEPARIN 100 UNIT/ML
500 SYRINGE INTRAVENOUS
Status: DISCONTINUED | OUTPATIENT
Start: 2024-04-04 | End: 2024-04-04 | Stop reason: HOSPADM

## 2024-04-04 RX ORDER — DIPHENHYDRAMINE HYDROCHLORIDE 50 MG/ML
50 INJECTION INTRAMUSCULAR; INTRAVENOUS ONCE AS NEEDED
Status: DISCONTINUED | OUTPATIENT
Start: 2024-04-04 | End: 2024-04-04 | Stop reason: HOSPADM

## 2024-04-04 RX ORDER — DIPHENHYDRAMINE HYDROCHLORIDE 50 MG/ML
50 INJECTION INTRAMUSCULAR; INTRAVENOUS ONCE AS NEEDED
Status: CANCELLED | OUTPATIENT
Start: 2024-04-04

## 2024-04-04 RX ORDER — BORTEZOMIB 3.5 MG/1
1.3 INJECTION, POWDER, LYOPHILIZED, FOR SOLUTION INTRAVENOUS; SUBCUTANEOUS
Status: CANCELLED | OUTPATIENT
Start: 2024-04-04

## 2024-04-04 RX ORDER — HEPARIN 100 UNIT/ML
500 SYRINGE INTRAVENOUS
Status: CANCELLED | OUTPATIENT
Start: 2024-04-04

## 2024-04-04 RX ORDER — CALCITRIOL 0.5 UG/1
0.5 CAPSULE ORAL DAILY
Qty: 30 CAPSULE | Refills: 3 | Status: ON HOLD | OUTPATIENT
Start: 2024-04-04 | End: 2024-05-27 | Stop reason: HOSPADM

## 2024-04-04 RX ORDER — SODIUM CHLORIDE 0.9 % (FLUSH) 0.9 %
10 SYRINGE (ML) INJECTION
Status: CANCELLED | OUTPATIENT
Start: 2024-04-04

## 2024-04-04 RX ORDER — FEBUXOSTAT 40 MG/1
40 TABLET, FILM COATED ORAL DAILY
Qty: 30 TABLET | Refills: 3 | Status: SHIPPED | OUTPATIENT
Start: 2024-04-04

## 2024-04-04 RX ORDER — EPINEPHRINE 0.3 MG/.3ML
0.3 INJECTION SUBCUTANEOUS ONCE AS NEEDED
Status: CANCELLED | OUTPATIENT
Start: 2024-04-04

## 2024-04-04 RX ORDER — PROCHLORPERAZINE EDISYLATE 5 MG/ML
10 INJECTION INTRAMUSCULAR; INTRAVENOUS ONCE AS NEEDED
Status: CANCELLED | OUTPATIENT
Start: 2024-04-04

## 2024-04-04 RX ADMIN — BORTEZOMIB 3 MG: 3.5 INJECTION, POWDER, LYOPHILIZED, FOR SOLUTION INTRAVENOUS; SUBCUTANEOUS at 11:04

## 2024-04-04 NOTE — PROGRESS NOTES
Subjective:       Patient ID: Pete Fernandez is a 43 y.o. male.    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 Rev remains at 10mg due to renal function  Zometa 10/17/23 - 1/11/2024 (3 treatments total)  Currently on hold due to ONJ    Treatment History:  1. Beatriz+ KrD for cycle 2 on 11/30/23; Change at request of [facility]  Stopped after 1 dose due to severe pleural and pericardial effusion development   Switched back to BEATRIZ + RVD with increase of Rev to 25mg    HPI  42yo M presented to [facility] on 10/11 after 1 month of progressive weakness, abdominal pain, and intermittent confusion. At the time of his presentation, his labs which had been normal with PCP in 8/2023 were notable for new severe DOUG, transaminitis, and Ca of 18. He was admitted and started on IVF resuscitation.  Hypercalcemia workup was initiated and he was ultimately found to have numerous osseous lesions on 10/15/23 CT CAP, FLC - Kappa 3.29, Lambda 744, Ratio 226 , SPEP with Mspike 0.17 IgG lambda. Bone marrow biopsy done 10/18/23 revealed 95% plasma cells and FISH with TP53 deletion, FGFR3/IGH (or NSD2/IGH) fusion, usually representing a t(4;14), and a tetraploid subclone was observed. After correction of his hypercalcemia and improvement in his renal function he was discharged on 10/22/23.   He started C1 Beatriz + RVD + Zometa on 11/2/23. He was referred to [facility] for consideration of bone marrow transplant, they recommended change to Beatriz + Krd, however patient developed life threatening pleural and pericardial edema  several days after 1st infusion requiring hospitalization and placement of a pericardial window. Therefore he transitioned back to Beatriz - RVD for C2.     Interval History:   He returns to the clinic today for a follow-up and treatment clearance for C5D22 of VRD. He feels well today. He continues to have right sided rib pain that is stable and unchanged. He is scheduled with [facility] on 4/9/2024 to begin testing for his transplant. Collection planned 5/13/2024. Dysuria has resolved. Increased appetite, tolerating PO without issue, weight stable.Taking calcium once daily and ASA 81 mg daily.   Labs reviewed in detail with him and are stable to continue with treatment.   Denies any fevers, chills, NS, headaches, bleeding, bruising, neuropathy.       Past Medical History:   Diagnosis Date    Anxiety disorder, unspecified     Hypercalcemia       Past Surgical History:   Procedure Laterality Date    BONE MARROW BIOPSY N/A 10/18/2023    Procedure: Biopsy-bone marrow;  Surgeon: Nhan Marte MD;  Location: [facility];  Service: General;  Laterality: N/A;    TONSILLECTOMY       Social History     Socioeconomic History    Marital status:    Tobacco Use    Smoking status: Never    Smokeless tobacco: Never   Substance and Sexual Activity    Alcohol use: Yes    Drug use: Never    Sexual activity: Yes     Partners: Female     Social Determinants of Health     Financial Resource Strain: Medium Risk (11/15/2023)    Overall Financial Resource Strain (CARDIA)     Difficulty of Paying Living Expenses: Somewhat hard   Food Insecurity: Food Insecurity Present (11/15/2023)    Hunger Vital Sign     Worried About Running Out of Food in the Last Year: Sometimes true     Ran Out of Food in the Last Year: Never true   Transportation Needs: No Transportation Needs (11/15/2023)    PRAPARE - Transportation     Lack of Transportation (Medical): No     Lack of Transportation (Non-Medical): No   Physical Activity: Insufficiently Active  (11/15/2023)    Exercise Vital Sign     Days of Exercise per Week: 3 days     Minutes of Exercise per Session: 30 min   Stress: Stress Concern Present (11/15/2023)    [place] [employer] - Occupational Stress Questionnaire     Feeling of Stress : To some extent   Social Connections: Unknown (11/15/2023)    Social Connection and Isolation Panel [NHANES]     Frequency of Communication with Friends and Family: More than three times a week     Frequency of Social Gatherings with Friends and Family: More than three times a week     Attends Temple Services: Patient declined     Active Member of Clubs or Organizations: No     Attends Club or Organization Meetings: Never     Marital Status:    Housing Stability: Low Risk  (11/15/2023)    Housing Stability Vital Sign     Unable to Pay for Housing in the Last Year: No     Number of Places Lived in the Last Year: 1     Unstable Housing in the Last Year: No      Family History   Problem Relation Age of Onset    Breast cancer Mother     Pancreatic cancer Father       Review of patient's allergies indicates:  No Known Allergies   Review of Systems   Constitutional:  Negative for activity change, appetite change, chills, fatigue, fever and unexpected weight change.   HENT:  Negative for mouth sores and sore throat.    Eyes:  Negative for visual disturbance.   Respiratory:  Negative for cough and shortness of breath.    Cardiovascular:  Negative for chest pain.   Gastrointestinal:  Negative for abdominal pain, constipation, diarrhea, nausea and vomiting.   Endocrine: Negative for polyuria.   Genitourinary:  Negative for dysuria and frequency.   Musculoskeletal:  Negative for back pain.   Integumentary:  Negative for rash.   Allergic/Immunologic: Negative for frequent infections.   Neurological:  Negative for weakness and headaches.   Hematological:  Negative for adenopathy. Does not bruise/bleed easily.   Psychiatric/Behavioral:  The patient is not  nervous/anxious.          Objective:      Vitals:    04/04/24 0955   BP: 128/83   Pulse: 66   Resp: 20   Temp: 97.8 °F (36.6 °C)           Physical Exam  Constitutional:       General: He is not in acute distress.     Appearance: Normal appearance. He is not ill-appearing.   HENT:      Head: Normocephalic and atraumatic.      Nose: Nose normal.      Mouth/Throat:      Mouth: Mucous membranes are moist.      Pharynx: Oropharynx is clear.   Eyes:      Extraocular Movements: Extraocular movements intact.      Conjunctiva/sclera: Conjunctivae normal.      Pupils: Pupils are equal, round, and reactive to light.   Cardiovascular:      Rate and Rhythm: Normal rate and regular rhythm.      Pulses: Normal pulses.      Heart sounds: Normal heart sounds. No murmur heard.  Pulmonary:      Effort: Pulmonary effort is normal. No respiratory distress.      Breath sounds: Normal breath sounds.   Abdominal:      General: There is no distension.      Palpations: Abdomen is soft.      Tenderness: There is no abdominal tenderness.   Musculoskeletal:         General: Normal range of motion.      Cervical back: Normal range of motion and neck supple.   Lymphadenopathy:      Cervical: No cervical adenopathy.   Skin:     General: Skin is warm and dry.      Findings: No rash.   Neurological:      General: No focal deficit present.      Mental Status: He is alert and oriented to person, place, and time.         LABS AND IMAGING REVIEWED IN EPIC  10/11/23 Xray Metastatic Survey  1. Scattered small defects at the calvarium measuring up to 5 mm too numerous to count.  2. Degene mild rative changes at the spine and extremities  3. MR exam and bone scan would allow further evaluation.  10/12/23 MRI Brain  1. Motion artifact  2. Mild scattered mucosal thickening throughout the paranasal sinuses  3. Somewhat limited evaluation of brain metastases without the administration of IV contrast  4. Otherwise unremarkable MR exam of the head without the  administration of IV contrast  10/12/23 NM Bone Scan  1. Abnormal intense increased activity throughout the lungs of uncertain etiology.  This may related to hyperparathyroidism, hematologic malignancy, and or metabolic abnormality such as renal failure, vasculitis, or pulmonary infection.  Chest x-ray 10/10/2023 reveals the lungs to be relatively clear and well aerated.  2. Suspect scattered mild degenerative changes throughout the joint spaces  3. Increased activity nasal maxillary region suggesting mucoperiosteal disease  4. Mild activity at the stomach suggesting free pertechnetate  10/14/23 CT CAP  1. Widespread osseous findings suggestive of extensive metastatic skeletal disease, to include expansile destructive soft tissue component at the anterior right 7th rib.  2. Enlarged intrathoracic lymph nodes could also be of metastatic etiology.  3. Diffuse ground-glass attenuation through both lungs is nonspecific, could be secondary to atypical infectious or inflammatory process.  However, possibility of metastatic involvement cannot be excluded.  12/7/23 CXR:   1. Hazy opacification lower lungs bilaterally suspicious for infiltrate and pleural reaction which has progressed since the prior exam  2. Cardiomegaly  3. Thoracic spondylosis  12/11/23 CXR:  1. Mild cardiomegaly  2. Persistent bibasilar infiltrate and or pleural reaction  3. Thoracic spondylosis with anterior wedging again evident at a lower thoracic vertebral body  12/13/23 ECHO: EF 50-55%  2/7/24 PET/CT:  1. Interval improved appearance of lytic osseous lesions compared to previous exam with sub threshold uptake. The bones are demineralized with numerous tiny lytic foci at the axial and appendicular skeleton.  2. Subacute appearing bilateral rib and right clavicle fractures with mild FDG uptake.  3. Nonspecific patchy ground-glass and interstitial opacities with mild FDG uptake.       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 Displayed all CRAB criteria at time of diagnosis in October. Undergoing 1st line Beatriz + RVD + Zometa. Being followed by [facility] in [facility] for transplant consideration.    ONJ  Zometa now on hold. Continue follow-ups with dental as scheduled.      Plan:   Toxicity reviewed Labs adequate for C5D22 of VRD with Revlimid 10mg,  holding BEATRIZ due to transplant.  Continue to hold zometa due to ONJ.   Continue Aspirin 81mg, Acyclovir, and Bactrim for prophylaxis  Will need SPEP/OSVALDO and FLC with labs Q4w prior to every new cycle.   Follow-up as scheduled with transplant 4/9/2024.  RTC in 1 week with NP with labs  Cbc, cmp, MM labs- 1 hr prior @ [facility]    I spent a total of 40 minutes on the day of the visit.This includes face to face time and non-face to face time preparing to see the patient (eg, review of tests), obtaining and/or reviewing separately obtained history, documenting clinical information in the electronic or other health record, independently interpreting results and communicating results to the patient/family/caregiver, or care coordinator.       **All blood products must be irradiated and CMV negative**        Janet Alegria, JONN-C  Oncology/Hematology   Cancer Center [facility]

## 2024-04-09 ENCOUNTER — HOSPITAL ENCOUNTER (OUTPATIENT)
Dept: RADIOLOGY | Facility: HOSPITAL | Age: 44
Discharge: HOME | End: 2024-04-09
Attending: INTERNAL MEDICINE
Payer: COMMERCIAL

## 2024-04-09 ENCOUNTER — PROCEDURE VISIT (OUTPATIENT)
Dept: HEMATOLOGY/ONCOLOGY | Facility: CLINIC | Age: 44
End: 2024-04-09
Payer: COMMERCIAL

## 2024-04-09 VITALS — HEART RATE: 62 BPM | OXYGEN SATURATION: 100 % | DIASTOLIC BLOOD PRESSURE: 91 MMHG | SYSTOLIC BLOOD PRESSURE: 171 MMHG

## 2024-04-09 DIAGNOSIS — Z76.82 STEM CELL TRANSPLANT CANDIDATE: ICD-10-CM

## 2024-04-09 DIAGNOSIS — C90.00 MULTIPLE MYELOMA NOT HAVING ACHIEVED REMISSION: ICD-10-CM

## 2024-04-09 DIAGNOSIS — C90.00 MULTIPLE MYELOMA, REMISSION STATUS UNSPECIFIED: Primary | ICD-10-CM

## 2024-04-09 LAB — POCT GLUCOSE: 91 MG/DL (ref 70–110)

## 2024-04-09 PROCEDURE — 88311 DECALCIFY TISSUE: CPT | Performed by: PATHOLOGY

## 2024-04-09 PROCEDURE — 88305 TISSUE EXAM BY PATHOLOGIST: CPT | Performed by: PATHOLOGY

## 2024-04-09 PROCEDURE — 88313 SPECIAL STAINS GROUP 2: CPT | Mod: 26,,, | Performed by: PATHOLOGY

## 2024-04-09 PROCEDURE — 88342 IMHCHEM/IMCYTCHM 1ST ANTB: CPT | Mod: 59 | Performed by: PATHOLOGY

## 2024-04-09 PROCEDURE — A9552 F18 FDG: HCPCS | Performed by: INTERNAL MEDICINE

## 2024-04-09 PROCEDURE — 88274 CYTOGENETICS 25-99: CPT | Performed by: PHYSICIAN ASSISTANT

## 2024-04-09 PROCEDURE — 78816 PET IMAGE W/CT FULL BODY: CPT | Mod: TC

## 2024-04-09 PROCEDURE — 88342 IMHCHEM/IMCYTCHM 1ST ANTB: CPT | Mod: 26,59,, | Performed by: PATHOLOGY

## 2024-04-09 PROCEDURE — 88365 INSITU HYBRIDIZATION (FISH): CPT | Performed by: PATHOLOGY

## 2024-04-09 PROCEDURE — 88305 TISSUE EXAM BY PATHOLOGIST: CPT | Mod: 26,,, | Performed by: PATHOLOGY

## 2024-04-09 PROCEDURE — 88313 SPECIAL STAINS GROUP 2: CPT | Mod: 59 | Performed by: PATHOLOGY

## 2024-04-09 PROCEDURE — 88184 FLOWCYTOMETRY/ TC 1 MARKER: CPT | Mod: 91 | Performed by: PHYSICIAN ASSISTANT

## 2024-04-09 PROCEDURE — 88341 IMHCHEM/IMCYTCHM EA ADD ANTB: CPT | Performed by: PATHOLOGY

## 2024-04-09 PROCEDURE — 78816 PET IMAGE W/CT FULL BODY: CPT | Mod: 26,PS,, | Performed by: STUDENT IN AN ORGANIZED HEALTH CARE EDUCATION/TRAINING PROGRAM

## 2024-04-09 PROCEDURE — 88189 FLOWCYTOMETRY/READ 16 & >: CPT | Mod: ,,, | Performed by: PATHOLOGY

## 2024-04-09 PROCEDURE — 88185 FLOWCYTOMETRY/TC ADD-ON: CPT | Mod: 59 | Performed by: PATHOLOGY

## 2024-04-09 PROCEDURE — 88341 IMHCHEM/IMCYTCHM EA ADD ANTB: CPT | Mod: 26,,, | Performed by: PATHOLOGY

## 2024-04-09 PROCEDURE — 38222 DX BONE MARROW BX & ASPIR: CPT | Mod: LT,S$GLB,, | Performed by: PHYSICIAN ASSISTANT

## 2024-04-09 PROCEDURE — 88364 INSITU HYBRIDIZATION (FISH): CPT | Performed by: PATHOLOGY

## 2024-04-09 PROCEDURE — 85097 BONE MARROW INTERPRETATION: CPT | Mod: ,,, | Performed by: PATHOLOGY

## 2024-04-09 PROCEDURE — 88365 INSITU HYBRIDIZATION (FISH): CPT | Mod: 26,,, | Performed by: PATHOLOGY

## 2024-04-09 PROCEDURE — 88184 FLOWCYTOMETRY/ TC 1 MARKER: CPT | Performed by: PATHOLOGY

## 2024-04-09 RX ORDER — LIDOCAINE HYDROCHLORIDE 20 MG/ML
10 INJECTION, SOLUTION EPIDURAL; INFILTRATION; INTRACAUDAL; PERINEURAL ONCE
Status: COMPLETED | OUTPATIENT
Start: 2024-04-09 | End: 2024-04-09

## 2024-04-09 RX ORDER — FLUDEOXYGLUCOSE F18 500 MCI/ML
12 INJECTION INTRAVENOUS
Status: COMPLETED | OUTPATIENT
Start: 2024-04-09 | End: 2024-04-09

## 2024-04-09 RX ADMIN — LIDOCAINE HYDROCHLORIDE 200 MG: 20 INJECTION, SOLUTION EPIDURAL; INFILTRATION; INTRACAUDAL; PERINEURAL at 03:04

## 2024-04-09 RX ADMIN — FLUDEOXYGLUCOSE F-18 12 MILLICURIE: 500 INJECTION INTRAVENOUS at 10:04

## 2024-04-09 NOTE — PROGRESS NOTES
Pete Fernandez is a 43 y.o. male with history of multiple myeloma who presented to clinic for bone marrow biopsy and aspiration. See Dr. Cook's recent clinic note for full history. I have reviewed medications, allergies and labs. Procedure explained and informed consent obtained. The patient tolerated procedure well and hemostasis was achieved, no signs of distress upon completion. Patient instructed to keep bandaid intact and dry for 24 hours. Please see procedure note for full details.    Whit Lopez [facility], PA-C  Malignant Hematology & Bone Marrow Transplant

## 2024-04-09 NOTE — PROGRESS NOTES
PROCEDURE NOTE:  Date of Procedure: 4/9/2024  Procedure: Bone Marrow Biopsy and Aspiration  Indication: MM, Pre-transplant eval  Consent: Informed consent was obtained from patient.  Timeout: Done and documented.  Position: Prone  Site: Left posterior illiac crest.  Prep: Betadine.  Needle used: 11 gauge Jamshidi needle.  Anesthetic: 2% lidocaine 10 cc.  Biopsy: The biopsy needle was introduced into the marrow cavity and an aspirate was obtained without complications and sent for flow cytometry and cytogenetics. Core biopsy obtained without difficulty and sent for routine histologic examination.  Complications: None.  Disposition: The patient was placed supine for 15min following procedure. RN to assess bandaid for bleeding prior to discharge home.  Blood loss: Minimal.     Whit Lopez PA-C  Malignant Hematology & Bone Marrow Transplant

## 2024-04-10 LAB
BODY SITE - BONE MARROW: NORMAL
CLINICAL DIAGNOSIS - BONE MARROW: NORMAL
FLOW CYTOMETRY ANTIBODIES ANALYZED - BONE MARROW: NORMAL
FLOW CYTOMETRY COMMENT - BONE MARROW: NORMAL
FLOW CYTOMETRY INTERPRETATION - BONE MARROW: NORMAL

## 2024-04-11 ENCOUNTER — INFUSION (OUTPATIENT)
Dept: INFUSION THERAPY | Facility: HOSPITAL | Age: 44
End: 2024-04-11
Attending: STUDENT IN AN ORGANIZED HEALTH CARE EDUCATION/TRAINING PROGRAM
Payer: COMMERCIAL

## 2024-04-11 ENCOUNTER — OFFICE VISIT (OUTPATIENT)
Dept: HEMATOLOGY/ONCOLOGY | Facility: CLINIC | Age: 44
End: 2024-04-11
Payer: COMMERCIAL

## 2024-04-11 ENCOUNTER — LAB VISIT (OUTPATIENT)
Dept: LAB | Facility: HOSPITAL | Age: 44
End: 2024-04-11
Payer: COMMERCIAL

## 2024-04-11 VITALS
OXYGEN SATURATION: 100 % | TEMPERATURE: 98 F | HEART RATE: 65 BPM | WEIGHT: 202.63 LBS | BODY MASS INDEX: 30.01 KG/M2 | DIASTOLIC BLOOD PRESSURE: 87 MMHG | HEIGHT: 69 IN | SYSTOLIC BLOOD PRESSURE: 144 MMHG | RESPIRATION RATE: 20 BRPM

## 2024-04-11 DIAGNOSIS — C90.00 MULTIPLE MYELOMA, REMISSION STATUS UNSPECIFIED: ICD-10-CM

## 2024-04-11 DIAGNOSIS — C90.00 MULTIPLE MYELOMA NOT HAVING ACHIEVED REMISSION: ICD-10-CM

## 2024-04-11 DIAGNOSIS — C90.00 MULTIPLE MYELOMA, REMISSION STATUS UNSPECIFIED: Primary | ICD-10-CM

## 2024-04-11 LAB
ALBUMIN SERPL-MCNC: 3.8 G/DL (ref 3.5–5)
ALBUMIN/GLOB SERPL: 2 RATIO (ref 1.1–2)
ALP SERPL-CCNC: 39 UNIT/L (ref 40–150)
ALT SERPL-CCNC: 12 UNIT/L (ref 0–55)
AST SERPL-CCNC: 18 UNIT/L (ref 5–34)
BASOPHILS # BLD AUTO: 0.06 X10(3)/MCL
BASOPHILS NFR BLD AUTO: 1.4 %
BILIRUB SERPL-MCNC: 0.8 MG/DL
BUN SERPL-MCNC: 19 MG/DL (ref 8.9–20.6)
CALCIUM SERPL-MCNC: 9.5 MG/DL (ref 8.4–10.2)
CHLORIDE SERPL-SCNC: 106 MMOL/L (ref 98–107)
CO2 SERPL-SCNC: 31 MMOL/L (ref 22–29)
CREAT SERPL-MCNC: 1.83 MG/DL (ref 0.73–1.18)
EOSINOPHIL # BLD AUTO: 0.25 X10(3)/MCL (ref 0–0.9)
EOSINOPHIL NFR BLD AUTO: 5.9 %
ERYTHROCYTE [DISTWIDTH] IN BLOOD BY AUTOMATED COUNT: 16.8 % (ref 11.5–17)
GFR SERPLBLD CREATININE-BSD FMLA CKD-EPI: 46 MLS/MIN/1.73/M2
GLOBULIN SER-MCNC: 1.9 GM/DL (ref 2.4–3.5)
GLUCOSE SERPL-MCNC: 90 MG/DL (ref 74–100)
HCT VFR BLD AUTO: 26.9 % (ref 42–52)
HGB BLD-MCNC: 8.7 G/DL (ref 14–18)
IGA SERPL-MCNC: 7 MG/DL (ref 63–484)
IGG SERPL-MCNC: 199 MG/DL (ref 540–1822)
IGM SERPL-MCNC: 9 MG/DL (ref 22–240)
IMM GRANULOCYTES # BLD AUTO: 0.01 X10(3)/MCL (ref 0–0.04)
IMM GRANULOCYTES NFR BLD AUTO: 0.2 %
LYMPHOCYTES # BLD AUTO: 0.64 X10(3)/MCL (ref 0.6–4.6)
LYMPHOCYTES NFR BLD AUTO: 15.1 %
MAGNESIUM SERPL-MCNC: 2 MG/DL (ref 1.6–2.6)
MCH RBC QN AUTO: 33.3 PG (ref 27–31)
MCHC RBC AUTO-ENTMCNC: 32.3 G/DL (ref 33–36)
MCV RBC AUTO: 103.1 FL (ref 80–94)
MONOCYTES # BLD AUTO: 0.3 X10(3)/MCL (ref 0.1–1.3)
MONOCYTES NFR BLD AUTO: 7.1 %
NEUTROPHILS # BLD AUTO: 2.99 X10(3)/MCL (ref 2.1–9.2)
NEUTROPHILS NFR BLD AUTO: 70.3 %
PCPDS FINAL DIAGNOSIS: NORMAL
PCPDS PRE-ANALYSIS PRE-SORT: NORMAL
PHOSPHATE SERPL-MCNC: 4.3 MG/DL (ref 2.3–4.7)
PLATELET # BLD AUTO: 155 X10(3)/MCL (ref 130–400)
PMV BLD AUTO: 10.3 FL (ref 7.4–10.4)
POTASSIUM SERPL-SCNC: 4.3 MMOL/L (ref 3.5–5.1)
PROT SERPL-MCNC: 5.7 GM/DL (ref 6.4–8.3)
RBC # BLD AUTO: 2.61 X10(6)/MCL (ref 4.7–6.1)
SODIUM SERPL-SCNC: 141 MMOL/L (ref 136–145)
URATE SERPL-MCNC: 2.6 MG/DL (ref 3.5–7.2)
WBC # SPEC AUTO: 4.25 X10(3)/MCL (ref 4.5–11.5)

## 2024-04-11 PROCEDURE — 83735 ASSAY OF MAGNESIUM: CPT

## 2024-04-11 PROCEDURE — 36415 COLL VENOUS BLD VENIPUNCTURE: CPT

## 2024-04-11 PROCEDURE — 1159F MED LIST DOCD IN RCRD: CPT | Mod: CPTII,S$GLB,,

## 2024-04-11 PROCEDURE — 84550 ASSAY OF BLOOD/URIC ACID: CPT

## 2024-04-11 PROCEDURE — 82784 ASSAY IGA/IGD/IGG/IGM EACH: CPT

## 2024-04-11 PROCEDURE — 99999 PR PBB SHADOW E&M-EST. PATIENT-LVL V: CPT | Mod: PBBFAC,,,

## 2024-04-11 PROCEDURE — 84100 ASSAY OF PHOSPHORUS: CPT

## 2024-04-11 PROCEDURE — 63600175 PHARM REV CODE 636 W HCPCS: Mod: JZ,JG

## 2024-04-11 PROCEDURE — 96401 CHEMO ANTI-NEOPL SQ/IM: CPT

## 2024-04-11 PROCEDURE — 83521 IG LIGHT CHAINS FREE EACH: CPT

## 2024-04-11 PROCEDURE — 1160F RVW MEDS BY RX/DR IN RCRD: CPT | Mod: CPTII,S$GLB,,

## 2024-04-11 PROCEDURE — 85025 COMPLETE CBC W/AUTO DIFF WBC: CPT

## 2024-04-11 PROCEDURE — 3079F DIAST BP 80-89 MM HG: CPT | Mod: CPTII,S$GLB,,

## 2024-04-11 PROCEDURE — 99215 OFFICE O/P EST HI 40 MIN: CPT | Mod: S$GLB,,,

## 2024-04-11 PROCEDURE — 80053 COMPREHEN METABOLIC PANEL: CPT

## 2024-04-11 PROCEDURE — 3077F SYST BP >= 140 MM HG: CPT | Mod: CPTII,S$GLB,,

## 2024-04-11 PROCEDURE — 3008F BODY MASS INDEX DOCD: CPT | Mod: CPTII,S$GLB,,

## 2024-04-11 PROCEDURE — 84165 PROTEIN E-PHORESIS SERUM: CPT

## 2024-04-11 RX ORDER — EPINEPHRINE 0.3 MG/.3ML
0.3 INJECTION SUBCUTANEOUS ONCE AS NEEDED
Status: DISCONTINUED | OUTPATIENT
Start: 2024-04-11 | End: 2024-04-11 | Stop reason: HOSPADM

## 2024-04-11 RX ORDER — PROCHLORPERAZINE EDISYLATE 5 MG/ML
10 INJECTION INTRAMUSCULAR; INTRAVENOUS ONCE AS NEEDED
Status: CANCELLED | OUTPATIENT
Start: 2024-04-11

## 2024-04-11 RX ORDER — DIPHENHYDRAMINE HYDROCHLORIDE 50 MG/ML
50 INJECTION INTRAMUSCULAR; INTRAVENOUS ONCE AS NEEDED
Status: DISCONTINUED | OUTPATIENT
Start: 2024-04-11 | End: 2024-04-11 | Stop reason: HOSPADM

## 2024-04-11 RX ORDER — EPINEPHRINE 0.3 MG/.3ML
0.3 INJECTION SUBCUTANEOUS ONCE AS NEEDED
Status: CANCELLED | OUTPATIENT
Start: 2024-04-11

## 2024-04-11 RX ORDER — BORTEZOMIB 3.5 MG/1
1.3 INJECTION, POWDER, LYOPHILIZED, FOR SOLUTION INTRAVENOUS; SUBCUTANEOUS
Status: COMPLETED | OUTPATIENT
Start: 2024-04-11 | End: 2024-04-11

## 2024-04-11 RX ORDER — HEPARIN 100 UNIT/ML
500 SYRINGE INTRAVENOUS
Status: CANCELLED | OUTPATIENT
Start: 2024-04-11

## 2024-04-11 RX ORDER — SODIUM CHLORIDE 0.9 % (FLUSH) 0.9 %
10 SYRINGE (ML) INJECTION
Status: DISCONTINUED | OUTPATIENT
Start: 2024-04-11 | End: 2024-04-11 | Stop reason: HOSPADM

## 2024-04-11 RX ORDER — HEPARIN 100 UNIT/ML
500 SYRINGE INTRAVENOUS
Status: DISCONTINUED | OUTPATIENT
Start: 2024-04-11 | End: 2024-04-11 | Stop reason: HOSPADM

## 2024-04-11 RX ORDER — BORTEZOMIB 3.5 MG/1
1.3 INJECTION, POWDER, LYOPHILIZED, FOR SOLUTION INTRAVENOUS; SUBCUTANEOUS
Status: CANCELLED | OUTPATIENT
Start: 2024-04-11

## 2024-04-11 RX ORDER — PROCHLORPERAZINE EDISYLATE 5 MG/ML
10 INJECTION INTRAMUSCULAR; INTRAVENOUS ONCE AS NEEDED
Status: DISCONTINUED | OUTPATIENT
Start: 2024-04-11 | End: 2024-04-11 | Stop reason: HOSPADM

## 2024-04-11 RX ORDER — SODIUM CHLORIDE 0.9 % (FLUSH) 0.9 %
10 SYRINGE (ML) INJECTION
Status: CANCELLED | OUTPATIENT
Start: 2024-04-11

## 2024-04-11 RX ORDER — DIPHENHYDRAMINE HYDROCHLORIDE 50 MG/ML
50 INJECTION INTRAMUSCULAR; INTRAVENOUS ONCE AS NEEDED
Status: CANCELLED | OUTPATIENT
Start: 2024-04-11

## 2024-04-11 RX ADMIN — BORTEZOMIB 3 MG: 3.5 INJECTION, POWDER, LYOPHILIZED, FOR SOLUTION INTRAVENOUS; SUBCUTANEOUS at 11:04

## 2024-04-11 NOTE — PROGRESS NOTES
Subjective:       Patient ID: Pete Fernandez is a 43 y.o. male.    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 Rev remains at 10mg due to renal function  Zometa 10/17/23 - 1/11/2024 (3 treatments total)  Currently on hold due to ONJ    Treatment History:  1. Beatriz+ KrD for cycle 2 on 11/30/23; Change at request of [facility]  Stopped after 1 dose due to severe pleural and pericardial effusion development   Switched back to BEATRIZ + RVD with increase of Rev to 25mg    HPI  42yo M presented to [facility] on 10/11 after 1 month of progressive weakness, abdominal pain, and intermittent confusion. At the time of his presentation, his labs which had been normal with PCP in 8/2023 were notable for new severe DOUG, transaminitis, and Ca of 18. He was admitted and started on IVF resuscitation.  Hypercalcemia workup was initiated and he was ultimately found to have numerous osseous lesions on 10/15/23 CT CAP, FLC - Kappa 3.29, Lambda 744, Ratio 226 , SPEP with Mspike 0.17 IgG lambda. Bone marrow biopsy done 10/18/23 revealed 95% plasma cells and FISH with TP53 deletion, FGFR3/IGH (or NSD2/IGH) fusion, usually representing a t(4;14), and a tetraploid subclone was observed. After correction of his hypercalcemia and improvement in his renal function he was discharged on 10/22/23.   He started C1 Beatriz + RVD + Zometa on 11/2/23. He was referred to [facility] for consideration of bone marrow transplant, they recommended change to Beatriz + Krd, however patient developed life threatening pleural and pericardial edema  several days after 1st infusion requiring hospitalization and placement of a pericardial window. Therefore he transitioned back to Beatriz - RVD for C2.     Interval History:   He returns to the clinic today for a follow-up and treatment clearance for C6D1 of VRD. He feels well today. He continues to have right sided rib pain that is stable and unchanged. He did complete his PET and bone marrow for DESIREE this week. He was instructed to continue taking his medications until 4/18/2024, two weeks prior to beginning his transplant phase. Collection planned 5/13/2024. Dysuria has resolved. Increased appetite, tolerating PO without issue, weight stable.Taking calcium once daily and ASA 81 mg daily.   Labs reviewed in detail with him and are stable to continue with treatment.   Denies any fevers, chills, NS, headaches, bleeding, bruising, neuropathy.       Past Medical History:   Diagnosis Date    Anxiety disorder, unspecified     Hypercalcemia       Past Surgical History:   Procedure Laterality Date    BONE MARROW BIOPSY N/A 10/18/2023    Procedure: Biopsy-bone marrow;  Surgeon: Nhan Marte MD;  Location: [facility];  Service: General;  Laterality: N/A;    TONSILLECTOMY       Social History     Socioeconomic History    Marital status:    Tobacco Use    Smoking status: Never    Smokeless tobacco: Never   Substance and Sexual Activity    Alcohol use: Yes    Drug use: Never    Sexual activity: Yes     Partners: Female     Social Determinants of Health     Financial Resource Strain: Medium Risk (11/15/2023)    Overall Financial Resource Strain (CARDIA)     Difficulty of Paying Living Expenses: Somewhat hard   Food Insecurity: Food Insecurity Present (11/15/2023)    Hunger Vital Sign     Worried About Running Out of Food in the Last Year: Sometimes true     Ran Out of Food in the Last Year: Never true   Transportation Needs: No Transportation Needs (11/15/2023)    PRAPARE - Transportation     Lack of Transportation  (Medical): No     Lack of Transportation (Non-Medical): No   Physical Activity: Insufficiently Active (11/15/2023)    Exercise Vital Sign     Days of Exercise per Week: 3 days     Minutes of Exercise per Session: 30 min   Stress: Stress Concern Present (11/15/2023)    [employer] [place] [employer] - Occupational Stress Questionnaire     Feeling of Stress : To some extent   Social Connections: Unknown (11/15/2023)    Social Connection and Isolation Panel [NHANES]     Frequency of Communication with Friends and Family: More than three times a week     Frequency of Social Gatherings with Friends and Family: More than three times a week     Attends Sabianist Services: Patient declined     Active Member of Clubs or Organizations: No     Attends Club or Organization Meetings: Never     Marital Status:    Housing Stability: Low Risk  (11/15/2023)    Housing Stability Vital Sign     Unable to Pay for Housing in the Last Year: No     Number of Places Lived in the Last Year: 1     Unstable Housing in the Last Year: No      Family History   Problem Relation Age of Onset    Breast cancer Mother     Pancreatic cancer Father       Review of patient's allergies indicates:  No Known Allergies   Review of Systems   Constitutional:  Negative for activity change, appetite change, chills, fatigue, fever and unexpected weight change.   HENT:  Negative for mouth sores and sore throat.    Eyes:  Negative for visual disturbance.   Respiratory:  Negative for cough and shortness of breath.    Cardiovascular:  Negative for chest pain.   Gastrointestinal:  Negative for abdominal pain, constipation, diarrhea, nausea and vomiting.   Endocrine: Negative for polyuria.   Genitourinary:  Negative for dysuria and frequency.   Musculoskeletal:  Negative for back pain.   Integumentary:  Negative for rash.   Allergic/Immunologic: Negative for frequent infections.   Neurological:  Negative for weakness and headaches.   Hematological:   Negative for adenopathy. Does not bruise/bleed easily.   Psychiatric/Behavioral:  The patient is not nervous/anxious.          Objective:      Vitals:    04/11/24 1056   BP: (!) 144/87   Pulse: 65   Resp: 20   Temp: 98.1 °F (36.7 °C)           Physical Exam  Constitutional:       General: He is not in acute distress.     Appearance: Normal appearance. He is not ill-appearing.   HENT:      Head: Normocephalic and atraumatic.      Nose: Nose normal.      Mouth/Throat:      Mouth: Mucous membranes are moist.      Pharynx: Oropharynx is clear.   Eyes:      Extraocular Movements: Extraocular movements intact.      Conjunctiva/sclera: Conjunctivae normal.      Pupils: Pupils are equal, round, and reactive to light.   Cardiovascular:      Rate and Rhythm: Normal rate and regular rhythm.      Pulses: Normal pulses.      Heart sounds: Normal heart sounds. No murmur heard.  Pulmonary:      Effort: Pulmonary effort is normal. No respiratory distress.      Breath sounds: Normal breath sounds.   Abdominal:      General: There is no distension.      Palpations: Abdomen is soft.      Tenderness: There is no abdominal tenderness.   Musculoskeletal:         General: Normal range of motion.      Cervical back: Normal range of motion and neck supple.   Lymphadenopathy:      Cervical: No cervical adenopathy.   Skin:     General: Skin is warm and dry.      Findings: No rash.   Neurological:      General: No focal deficit present.      Mental Status: He is alert and oriented to person, place, and time.         LABS AND IMAGING REVIEWED IN EPIC  10/11/23 Xray Metastatic Survey  1. Scattered small defects at the calvarium measuring up to 5 mm too numerous to count.  2. Degene mild rative changes at the spine and extremities  3. MR exam and bone scan would allow further evaluation.  10/12/23 MRI Brain  1. Motion artifact  2. Mild scattered mucosal thickening throughout the paranasal sinuses  3. Somewhat limited evaluation of brain  metastases without the administration of IV contrast  4. Otherwise unremarkable MR exam of the head without the administration of IV contrast  10/12/23 NM Bone Scan  1. Abnormal intense increased activity throughout the lungs of uncertain etiology.  This may related to hyperparathyroidism, hematologic malignancy, and or metabolic abnormality such as renal failure, vasculitis, or pulmonary infection.  Chest x-ray 10/10/2023 reveals the lungs to be relatively clear and well aerated.  2. Suspect scattered mild degenerative changes throughout the joint spaces  3. Increased activity nasal maxillary region suggesting mucoperiosteal disease  4. Mild activity at the stomach suggesting free pertechnetate  10/14/23 CT CAP  1. Widespread osseous findings suggestive of extensive metastatic skeletal disease, to include expansile destructive soft tissue component at the anterior right 7th rib.  2. Enlarged intrathoracic lymph nodes could also be of metastatic etiology.  3. Diffuse ground-glass attenuation through both lungs is nonspecific, could be secondary to atypical infectious or inflammatory process.  However, possibility of metastatic involvement cannot be excluded.  12/7/23 CXR:   1. Hazy opacification lower lungs bilaterally suspicious for infiltrate and pleural reaction which has progressed since the prior exam  2. Cardiomegaly  3. Thoracic spondylosis  12/11/23 CXR:  1. Mild cardiomegaly  2. Persistent bibasilar infiltrate and or pleural reaction  3. Thoracic spondylosis with anterior wedging again evident at a lower thoracic vertebral body  12/13/23 ECHO: EF 50-55%  2/7/24 PET/CT:  1. Interval improved appearance of lytic osseous lesions compared to previous exam with sub threshold uptake. The bones are demineralized with numerous tiny lytic foci at the axial and appendicular skeleton.  2. Subacute appearing bilateral rib and right clavicle fractures with mild FDG uptake.  3. Nonspecific patchy ground-glass and  interstitial opacities with mild FDG uptake.       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 Displayed all CRAB criteria at time of diagnosis in October. Undergoing 1st line Ayde + RVD + Zometa. Being followed by [facility] for transplant.  ONJ  Zometa now on hold. Continue follow-ups with dental as scheduled.      Plan:   Toxicity reviewed Labs adequate for C6D1 of VRD with Revlimid 10mg,  holding AYDE due to transplant.  Plan to stop treatment after 4/18/2024 per transplant.   Continue to hold zometa due to ONJ.   Continue Aspirin 81mg, Acyclovir, and Bactrim for prophylaxis  Will need SPEP/OSVALDO and FLC with labs Q4w prior to every new cycle.   RTC in 1 week with NP with labs  Cbc, cmp, MM labs- 1 hr prior @ [facility]    I spent a total of 40 minutes on the day of the visit.This includes face to face time and non-face to face time preparing to see the patient (eg, review of tests), obtaining and/or reviewing separately obtained history, documenting clinical information in the electronic or other health record, independently interpreting results and communicating results to the patient/family/caregiver, or care coordinator.       **All blood products must be irradiated and CMV negative**        JONN Stephenson-C  Oncology/Hematology   [facility]

## 2024-04-12 LAB
ALBUMIN % SPEP (OHS): 61.41
ALBUMIN SERPL-MCNC: 3.3 G/DL (ref 3.5–5)
ALBUMIN/GLOB SERPL: 1.7 RATIO (ref 1.1–2)
ALPHA 1 GLOB (OHS): 0.27 GM/DL
ALPHA 1 GLOB% (OHS): 5.01
ALPHA 2 GLOB % (OHS): 13.83
ALPHA 2 GLOB (OHS): 0.73 GM/DL
BETA GLOB (OHS): 0.82 GM/DL
BETA GLOB% (OHS): 15.4
COMMENT: NORMAL
FINAL PATHOLOGIC DIAGNOSIS: NORMAL
GAMMA GLOBULIN % (OHS): 4.35
GAMMA GLOBULIN (OHS): 0.23 GM/DL
GENETICIST REVIEW: NORMAL
GLOBULIN SER-MCNC: 2 GM/DL (ref 2.4–3.5)
GROSS: NORMAL
KAPPA LC FREE SER-MCNC: 0.35 MG/DL (ref 0.33–1.94)
KAPPA LC FREE/LAMBDA FREE SER: 0.43 {RATIO} (ref 0.26–1.65)
LAMBDA LC FREE SERPL-MCNC: 0.81 MG/DL (ref 0.57–2.63)
Lab: NORMAL
M SPIKE % (OHS): 0.49
M SPIKE (OHS): 0.03 GM/DL
MICROSCOPIC EXAM: NORMAL
PATH REV: NORMAL
PLASMA CELL PROLIF RELEASED BY: NORMAL
PLASMA CELL PROLIF RESULT SUMMARY: NORMAL
PLASMA CELL PROLIF RESULT TABLE: NORMAL
PROT SERPL-MCNC: 5.3 GM/DL (ref 6.4–8.3)
REASON FOR REFERRAL, PLASMA CELL PROLIF (PCPD), FISH: NORMAL
REF LAB TEST METHOD: NORMAL
RESULTS, PLASMA CELL PROLIF (PCPD), FISH: NORMAL
SERVICE CMNT-IMP: NORMAL
SERVICE CMNT-IMP: NORMAL
SPECIMEN SOURCE: NORMAL
SPECIMEN, PLASMA CELL PROLIF (PCPD), FISH: NORMAL
SUPPLEMENTAL DIAGNOSIS: NORMAL

## 2024-04-15 DIAGNOSIS — C90.00 METASTATIC MULTIPLE MYELOMA TO BONE: ICD-10-CM

## 2024-04-15 RX ORDER — LENALIDOMIDE 10 MG/1
10 CAPSULE ORAL DAILY
Qty: 21 EACH | Refills: 0 | Status: SHIPPED | OUTPATIENT
Start: 2024-04-15 | End: 2024-04-16

## 2024-04-15 NOTE — PROGRESS NOTES
"Oncology Nutrition - Evaluation and Education Note    Pete Fernandez  1980    Referring Provider: No ref. provider found    Diagnosis: MM  Treatment: OP Myeloma AYDE-VRD daratumumab bortezomib (WEEKLY) lenalidomide dexAMETHasone Q4W; Auto SCT Candidate    PMHx:   Past Medical History:   Diagnosis Date    Anxiety disorder, unspecified     Hypercalcemia      Allergies: Patient has no known allergies.    Nutrition Assessment    Anthropometrics:   Weight:   Wt Readings from Last 10 Encounters:   04/11/24 91.9 kg (202 lb 9.6 oz)   04/04/24 93 kg (205 lb)   03/28/24 92.3 kg (203 lb 6.4 oz)   03/28/24 92.3 kg (203 lb 6.4 oz)   03/21/24 91.3 kg (201 lb 3.2 oz)   03/14/24 91.9 kg (202 lb 9.6 oz)   03/14/24 91.9 kg (202 lb 9.6 oz)   03/07/24 88.5 kg (195 lb)   03/07/24 88.5 kg (195 lb)   02/29/24 89.7 kg (197 lb 12.8 oz)                         Ht Readings from Last 1 Encounters:   04/11/24 5' 9.02" (1.753 m)     BMI Readings from Last 1 Encounters:   04/11/24 29.90 kg/m²     Usual BW: 240 lb. Timeframe: October 2023  Weight Change: Weight loss of ~40 lbs in 6 months. Patient attributes this to stopping alcohol intake     Appetite/Intake: Regular Diet, Eating 2 meals daily. Snacking throughout the day. No ONS. Drinking "a lot" of water and occasional soda and tea. No coffee. FA    Client History/Food Access:  Cultural/Spiritual/Personal Preferences: No Preferences    Living Situation: Lives with spouse  Who: Shops for Groceries?  Patient and spouse  Who: Prepare meals? Patient  Eating out: 0-1x/week.    Encounter Notes:  Pete Feranndez is a 43 y.o. male with MM being seen for nutrition evaluation and education prior to Autologous. History of ~40 lb weight loss in 6 months due to wound at jaw line limiting oral intake of solid foods. This has resolved.    Nutrition Impact Symptoms    [] No nutritional concerns at current  [] Poor Appetite   [] Weight loss   [x] Nausea - antiemetic helps  [] Vomiting   [] Diarrhea   [] " Constipation   [] Early Satiety [] Change in smell   [] Change in taste   [] Dry Mouth   [] Thick saliva   [] Dysphagia   [] Difficulty chewing  [] Mucositis  [] Indigestion  [] Gas/Bloating  [] Reflux      [] Fatigue     [] other, please specify- N/A       Current Medications:    Current Outpatient Medications:     acyclovir (ZOVIRAX) 400 MG tablet, Take 400 mg by mouth 2 (two) times daily., Disp: , Rfl:     aluminum & magnesium hydroxide-simethicone (MYLANTA MAX STRENGTH) 400-400-40 mg/5 mL suspension, Take 5 mLs by mouth every 6 (six) hours as needed for Indigestion., Disp: 160 mL, Rfl: 3    amiodarone (PACERONE) 200 MG Tab, Take 200 mg by mouth once daily., Disp: , Rfl:     aspirin 81 MG Chew, Take 81 mg by mouth once daily., Disp: , Rfl:     bortezomib (VELCADE) 2.5 mg SolR, as directed subcutaneous weekly, Disp: , Rfl:     calcitRIOL (ROCALTROL) 0.5 MCG Cap, Take 1 capsule (0.5 mcg total) by mouth once daily., Disp: 30 capsule, Rfl: 3    colchicine (COLCRYS) 0.6 mg tablet, , Disp: , Rfl:     daratumumab (DARZALEX) 20 mg/mL injection, as directed Intravenous, Disp: , Rfl:     dexAMETHasone (DECADRON) 4 MG Tab, Take 10 tablets (40 mg total) by mouth every 7 days. Take with food., Disp: 40 tablet, Rfl: 3    dexAMETHasone (DECADRON) 4 MG Tab, Take 10 tablets (40 mg total) by mouth every 7 days. Take with food., Disp: 40 tablet, Rfl: 5    dextroamphetamine-amphetamine (ADDERALL) 20 mg tablet, , Disp: , Rfl:     diphenhydrAMINE (BENADRYL) 12.5 mg/5 mL elixir, Take 5 mLs (12.5 mg total) by mouth 4 (four) times daily as needed for Allergies., Disp: 160 mL, Rfl: 3    febuxostat (ULORIC) 40 mg Tab, Take 1 tablet (40 mg total) by mouth once daily., Disp: 30 tablet, Rfl: 3    furosemide (LASIX) 40 MG tablet, Take 1 tablet (40 mg total) by mouth once daily. for 3 days, Disp: 3 tablet, Rfl: 0    lenalidomide (REVLIMID) 10 mg Cap, Take 10 mg by mouth once daily For days 1-21 of 28 day cycle., Disp: 21 each, Rfl: 0     LIDOcaine viscous HCl 2% (LIDOCAINE VISCOUS) Soln, by Mucous Membrane route every 4 (four) hours. Mix equal parts of benadryl, mylanta, and lidocaine. Swish and spit 15 ml of solution every 4 hours as needed for mouth sores, Disp: 160 mL, Rfl: 3    metoprolol tartrate (LOPRESSOR) 25 MG tablet, Take 25 mg by mouth 2 (two) times daily., Disp: , Rfl:     ondansetron (ZOFRAN) 8 MG tablet, Take 1 tablet (8 mg total) by mouth every 8 (eight) hours as needed for Nausea., Disp: 30 tablet, Rfl: 2    oxyCODONE (ROXICODONE) 10 mg Tab immediate release tablet, Take 1 tablet (10 mg total) by mouth every 4 (four) hours as needed for Pain., Disp: 90 tablet, Rfl: 0    pantoprazole (PROTONIX) 20 MG tablet, TAKE 1 TABLET (20 MG TOTAL) BY MOUTH ONCE DAILY, Disp: 30 tablet, Rfl: 1    prochlorperazine (COMPAZINE) 5 MG tablet, Take 5 mg by mouth every 6 (six) hours as needed for Nausea., Disp: , Rfl:     sodium bicarbonate 650 MG tablet, Take 650 mg by mouth 2 (two) times daily., Disp: , Rfl:     sulfamethoxazole-trimethoprim 800-160mg (BACTRIM DS) 800-160 mg Tab, Take 1 tablet by mouth once daily., Disp: 30 tablet, Rfl: 6    tamsulosin (FLOMAX) 0.4 mg Cap, Take 1 capsule (0.4 mg total) by mouth once daily., Disp: 30 capsule, Rfl: 6    Current Facility-Administered Medications:     0.9%  NaCl infusion (for blood administration), , Intravenous, Once, Janet Alegria FNP    Vitamins/Supplements: Only those prescribed by MD.    Labs: Reviewed 4/16/24: bun 30, cr 1.8, alk phos 41    Recommendations:   Review details of [facility] Nutrition Guide review during visit   Encouraged safe food practices.    Materials Provided/Reviewed: [facility] Nutrition Guide  Barriers to Learning: none identified  Patient and/or Family Verbalizes understanding: yes     Nutrition Monitoring and Evaluation    Follow up: upon RTC post SCT    Communication to referring provider/care team: Note available in chart.    Consultation Time: 30  Minutes     Andres CRAIGAP, , LDN  Advanced [facility] in Clinical Nutrition  Board Certified Specialist in Oncology Nutrition   [facility]  [phone]

## 2024-04-16 ENCOUNTER — HOSPITAL ENCOUNTER (OUTPATIENT)
Dept: RADIOLOGY | Facility: HOSPITAL | Age: 44
Discharge: HOME | End: 2024-04-16
Attending: INTERNAL MEDICINE
Payer: COMMERCIAL

## 2024-04-16 ENCOUNTER — HOSPITAL ENCOUNTER (OUTPATIENT)
Dept: PULMONOLOGY | Facility: CLINIC | Age: 44
Discharge: HOME | End: 2024-04-16
Payer: COMMERCIAL

## 2024-04-16 ENCOUNTER — CLINICAL SUPPORT (OUTPATIENT)
Dept: HEMATOLOGY/ONCOLOGY | Facility: CLINIC | Age: 44
End: 2024-04-16
Payer: COMMERCIAL

## 2024-04-16 ENCOUNTER — HOSPITAL ENCOUNTER (OUTPATIENT)
Dept: CARDIOLOGY | Facility: CLINIC | Age: 44
Discharge: HOME | End: 2024-04-16
Payer: COMMERCIAL

## 2024-04-16 ENCOUNTER — HOSPITAL ENCOUNTER (OUTPATIENT)
Dept: CARDIOLOGY | Facility: HOSPITAL | Age: 44
Discharge: HOME | End: 2024-04-16
Attending: INTERNAL MEDICINE
Payer: COMMERCIAL

## 2024-04-16 VITALS
WEIGHT: 202 LBS | HEART RATE: 65 BPM | BODY MASS INDEX: 29.92 KG/M2 | DIASTOLIC BLOOD PRESSURE: 80 MMHG | SYSTOLIC BLOOD PRESSURE: 122 MMHG | HEIGHT: 69 IN

## 2024-04-16 DIAGNOSIS — Z76.82 STEM CELL TRANSPLANT CANDIDATE: ICD-10-CM

## 2024-04-16 DIAGNOSIS — C90.00 MULTIPLE MYELOMA NOT HAVING ACHIEVED REMISSION: ICD-10-CM

## 2024-04-16 DIAGNOSIS — Z71.3 NUTRITIONAL COUNSELING: Primary | ICD-10-CM

## 2024-04-16 DIAGNOSIS — C90.00 METASTATIC MULTIPLE MYELOMA TO BONE: ICD-10-CM

## 2024-04-16 DIAGNOSIS — C90.00 MULTIPLE MYELOMA, REMISSION STATUS UNSPECIFIED: Primary | ICD-10-CM

## 2024-04-16 DIAGNOSIS — C90.00 MULTIPLE MYELOMA, REMISSION STATUS UNSPECIFIED: ICD-10-CM

## 2024-04-16 LAB
ASCENDING AORTA: 2.92 CM
AV INDEX (PROSTH): 0.54
AV MEAN GRADIENT: 12 MMHG
AV PEAK GRADIENT: 20 MMHG
AV VALVE AREA BY VELOCITY RATIO: 2.29 CM²
AV VALVE AREA: 2.1 CM²
AV VELOCITY RATIO: 0.59
BSA FOR ECHO PROCEDURE: 2.11 M2
CV ECHO LV RWT: 0.35 CM
DLCO ADJ PRE: 22.02 ML/(MIN*MMHG) (ref 24.34–38.2)
DLCO SINGLE BREATH LLN: 24.34
DLCO SINGLE BREATH PRE REF: 56.1 %
DLCO SINGLE BREATH REF: 31.27
DLCOC SBVA LLN: 3.26
DLCOC SBVA PRE REF: 80 %
DLCOC SBVA REF: 4.52
DLCOC SINGLE BREATH LLN: 24.34
DLCOC SINGLE BREATH PRE REF: 70.4 %
DLCOC SINGLE BREATH REF: 31.27
DLCOCSBVAULN: 5.77
DLCOCSINGLEBREATHULN: 38.2
DLCOSINGLEBREATHULN: 38.2
DLCOVA LLN: 3.26
DLCOVA PRE REF: 63.7 %
DLCOVA PRE: 2.88 ML/(MIN*MMHG*L) (ref 3.26–5.77)
DLCOVA REF: 4.52
DLCOVAULN: 5.77
DLVAADJ PRE: 3.61 ML/(MIN*MMHG*L) (ref 3.26–5.77)
DOP CALC AO PEAK VEL: 2.25 M/S
DOP CALC AO VTI: 48.12 CM
DOP CALC LVOT AREA: 3.9 CM2
DOP CALC LVOT DIAMETER: 2.22 CM
DOP CALC LVOT PEAK VEL: 1.33 M/S
DOP CALC LVOT STROKE VOLUME: 100.98 CM3
DOP CALCLVOT PEAK VEL VTI: 26.1 CM
E WAVE DECELERATION TIME: 227.24 MSEC
E/A RATIO: 0.86
E/E' RATIO: 7.83 M/S
ECHO LV POSTERIOR WALL: 1.06 CM (ref 0.6–1.1)
FEF 25 75 LLN: 2.08
FEF 25 75 PRE REF: 108.9 %
FEF 25 75 REF: 3.71
FEV05 LLN: 1.84
FEV05 REF: 2.97
FEV1 FVC LLN: 69
FEV1 FVC PRE REF: 100.6 %
FEV1 FVC REF: 80
FEV1 LLN: 3.09
FEV1 PRE REF: 98.7 %
FEV1 REF: 3.93
FRACTIONAL SHORTENING: 26 % (ref 28–44)
FVC LLN: 3.89
FVC PRE REF: 97.7 %
FVC REF: 4.93
GLOBAL LONGITUIDAL STRAIN: 17 %
INTERVENTRICULAR SEPTUM: 0.96 CM (ref 0.6–1.1)
IVC PRE: 4.71 L (ref 3.89–5.99)
IVC SINGLE BREATH LLN: 3.89
IVC SINGLE BREATH PRE REF: 95.5 %
IVC SINGLE BREATH REF: 4.93
IVCSINGLEBREATHULN: 5.99
LA MAJOR: 5.8 CM
LA MINOR: 5.64 CM
LA WIDTH: 4.74 CM
LEFT ATRIUM SIZE: 4.85 CM
LEFT ATRIUM VOLUME INDEX MOD: 51.6 ML/M2
LEFT ATRIUM VOLUME INDEX: 54 ML/M2
LEFT ATRIUM VOLUME MOD: 106.89 CM3
LEFT ATRIUM VOLUME: 111.75 CM3
LEFT INTERNAL DIMENSION IN SYSTOLE: 4.53 CM (ref 2.1–4)
LEFT VENTRICLE DIASTOLIC VOLUME INDEX: 91.05 ML/M2
LEFT VENTRICLE DIASTOLIC VOLUME: 188.47 ML
LEFT VENTRICLE MASS INDEX: 125 G/M2
LEFT VENTRICLE SYSTOLIC VOLUME INDEX: 45.4 ML/M2
LEFT VENTRICLE SYSTOLIC VOLUME: 93.88 ML
LEFT VENTRICULAR INTERNAL DIMENSION IN DIASTOLE: 6.12 CM (ref 3.5–6)
LEFT VENTRICULAR MASS: 258.63 G
LV LATERAL E/E' RATIO: 6 M/S
LV SEPTAL E/E' RATIO: 11.25 M/S
MV PEAK A VEL: 1.05 M/S
MV PEAK E VEL: 0.9 M/S
MV STENOSIS PRESSURE HALF TIME: 65.9 MS
MV VALVE AREA P 1/2 METHOD: 3.34 CM2
OHS CV RV/LV RATIO: 0.61 CM
OHS LV EJECTION FRACTION SIMPSONS BIPLANE MOD: 55 %
PEF LLN: 7.56
PEF PRE REF: 88.7 %
PEF REF: 9.81
PHYSICIAN COMMENT: ABNORMAL
PISA TR MAX VEL: 3.05 M/S
PRE DLCO: 17.53 ML/(MIN*MMHG) (ref 24.34–38.2)
PRE FEF 25 75: 4.04 L/S (ref 2.08–5.82)
PRE FET 100: 7.93 SEC
PRE FEV05 REF: 107.9 %
PRE FEV1 FVC: 80.38 % (ref 69.26–88.98)
PRE FEV1: 3.87 L (ref 3.09–4.72)
PRE FEV5: 3.21 L (ref 1.84–4.11)
PRE FVC: 4.82 L (ref 3.89–5.99)
PRE PEF: 8.7 L/S (ref 7.56–12.06)
RA MAJOR: 5.65 CM
RA PRESSURE ESTIMATED: 3 MMHG
RA WIDTH: 4.15 CM
RIGHT VENTRICULAR END-DIASTOLIC DIMENSION: 3.72 CM
RV TB RVSP: 6 MMHG
SINUS: 3.35 CM
STJ: 2.94 CM
TDI LATERAL: 0.15 M/S
TDI SEPTAL: 0.08 M/S
TDI: 0.12 M/S
TR MAX PG: 37 MMHG
TRICUSPID ANNULAR PLANE SYSTOLIC EXCURSION: 1.57 CM
TV REST PULMONARY ARTERY PRESSURE: 40 MMHG
VA PRE: 6.09 L (ref 6.77–6.77)
VA SINGLE BREATH LLN: 6.77
VA SINGLE BREATH PRE REF: 90 %
VA SINGLE BREATH REF: 6.77
VASINGLEBREATHULN: 6.77
Z-SCORE OF LEFT VENTRICULAR DIMENSION IN END DIASTOLE: -0.28
Z-SCORE OF LEFT VENTRICULAR DIMENSION IN END SYSTOLE: 1.28

## 2024-04-16 PROCEDURE — 99999 PR PBB SHADOW E&M-EST. PATIENT-LVL III: CPT | Mod: PBBFAC,,,

## 2024-04-16 PROCEDURE — 97802 MEDICAL NUTRITION INDIV IN: CPT | Mod: S$GLB,,,

## 2024-04-16 PROCEDURE — 71046 X-RAY EXAM CHEST 2 VIEWS: CPT | Mod: TC,FY

## 2024-04-16 PROCEDURE — 93005 ELECTROCARDIOGRAM TRACING: CPT | Mod: S$GLB,,, | Performed by: INTERNAL MEDICINE

## 2024-04-16 PROCEDURE — 93010 ELECTROCARDIOGRAM REPORT: CPT | Mod: S$GLB,,, | Performed by: INTERNAL MEDICINE

## 2024-04-16 PROCEDURE — 94010 BREATHING CAPACITY TEST: CPT | Mod: S$GLB,,, | Performed by: INTERNAL MEDICINE

## 2024-04-16 PROCEDURE — 94729 DIFFUSING CAPACITY: CPT | Mod: S$GLB,,, | Performed by: INTERNAL MEDICINE

## 2024-04-16 PROCEDURE — 93356 MYOCRD STRAIN IMG SPCKL TRCK: CPT | Mod: ,,, | Performed by: INTERNAL MEDICINE

## 2024-04-16 PROCEDURE — 93306 TTE W/DOPPLER COMPLETE: CPT | Mod: 26,,, | Performed by: INTERNAL MEDICINE

## 2024-04-16 PROCEDURE — 71046 X-RAY EXAM CHEST 2 VIEWS: CPT | Mod: 26,,, | Performed by: RADIOLOGY

## 2024-04-16 PROCEDURE — 93356 MYOCRD STRAIN IMG SPCKL TRCK: CPT

## 2024-04-16 RX ORDER — DIPHENHYDRAMINE HCL 25 MG
25 CAPSULE ORAL ONCE AS NEEDED
OUTPATIENT
Start: 2024-05-08

## 2024-04-16 RX ORDER — ACETAMINOPHEN 325 MG/1
650 TABLET ORAL ONCE AS NEEDED
Status: CANCELLED | OUTPATIENT
Start: 2024-05-05

## 2024-04-16 RX ORDER — POTASSIUM CHLORIDE 20 MEQ/1
40 TABLET, EXTENDED RELEASE ORAL ONCE AS NEEDED
Status: CANCELLED | OUTPATIENT
Start: 2024-05-07

## 2024-04-16 RX ORDER — ACETAMINOPHEN 325 MG/1
650 TABLET ORAL ONCE AS NEEDED
Status: CANCELLED | OUTPATIENT
Start: 2024-05-04

## 2024-04-16 RX ORDER — PLERIXAFOR 24 MG/1.2ML
0.24 SOLUTION SUBCUTANEOUS ONCE AS NEEDED
OUTPATIENT
Start: 2024-05-08 | End: 2035-10-05

## 2024-04-16 RX ORDER — SODIUM,POTASSIUM PHOSPHATES 280-250MG
2 POWDER IN PACKET (EA) ORAL ONCE AS NEEDED
Status: CANCELLED | OUTPATIENT
Start: 2024-05-05

## 2024-04-16 RX ORDER — ACETAMINOPHEN 325 MG/1
650 TABLET ORAL ONCE AS NEEDED
Status: CANCELLED | OUTPATIENT
Start: 2024-05-06

## 2024-04-16 RX ORDER — SODIUM,POTASSIUM PHOSPHATES 280-250MG
2 POWDER IN PACKET (EA) ORAL ONCE AS NEEDED
Status: CANCELLED | OUTPATIENT
Start: 2024-05-03

## 2024-04-16 RX ORDER — PLERIXAFOR 24 MG/1.2ML
0.24 SOLUTION SUBCUTANEOUS ONCE AS NEEDED
OUTPATIENT
Start: 2024-05-07 | End: 2035-10-04

## 2024-04-16 RX ORDER — LANOLIN ALCOHOL/MO/W.PET/CERES
800 CREAM (GRAM) TOPICAL ONCE AS NEEDED
Status: CANCELLED | OUTPATIENT
Start: 2024-05-05

## 2024-04-16 RX ORDER — DIPHENHYDRAMINE HCL 25 MG
25 CAPSULE ORAL ONCE AS NEEDED
Status: CANCELLED | OUTPATIENT
Start: 2024-05-05

## 2024-04-16 RX ORDER — POTASSIUM CHLORIDE 20 MEQ/1
40 TABLET, EXTENDED RELEASE ORAL ONCE AS NEEDED
Status: CANCELLED | OUTPATIENT
Start: 2024-05-05

## 2024-04-16 RX ORDER — LANOLIN ALCOHOL/MO/W.PET/CERES
800 CREAM (GRAM) TOPICAL ONCE AS NEEDED
Status: CANCELLED | OUTPATIENT
Start: 2024-05-06

## 2024-04-16 RX ORDER — SODIUM,POTASSIUM PHOSPHATES 280-250MG
2 POWDER IN PACKET (EA) ORAL ONCE AS NEEDED
Status: CANCELLED | OUTPATIENT
Start: 2024-05-07

## 2024-04-16 RX ORDER — LANOLIN ALCOHOL/MO/W.PET/CERES
800 CREAM (GRAM) TOPICAL ONCE AS NEEDED
OUTPATIENT
Start: 2024-05-08

## 2024-04-16 RX ORDER — POTASSIUM CHLORIDE 20 MEQ/1
40 TABLET, EXTENDED RELEASE ORAL ONCE AS NEEDED
Status: CANCELLED | OUTPATIENT
Start: 2024-05-04

## 2024-04-16 RX ORDER — POTASSIUM CHLORIDE 20 MEQ/1
40 TABLET, EXTENDED RELEASE ORAL ONCE AS NEEDED
OUTPATIENT
Start: 2024-05-09

## 2024-04-16 RX ORDER — PLERIXAFOR 24 MG/1.2ML
0.24 SOLUTION SUBCUTANEOUS ONCE AS NEEDED
OUTPATIENT
Start: 2024-05-09 | End: 2035-10-06

## 2024-04-16 RX ORDER — POTASSIUM CHLORIDE 20 MEQ/1
40 TABLET, EXTENDED RELEASE ORAL ONCE AS NEEDED
Status: CANCELLED | OUTPATIENT
Start: 2024-05-06

## 2024-04-16 RX ORDER — ACETAMINOPHEN 325 MG/1
650 TABLET ORAL ONCE AS NEEDED
OUTPATIENT
Start: 2024-05-09

## 2024-04-16 RX ORDER — LENALIDOMIDE 10 MG/1
CAPSULE ORAL
Qty: 21 EACH | Refills: 0 | Status: ON HOLD | OUTPATIENT
Start: 2024-04-16 | End: 2024-05-27 | Stop reason: HOSPADM

## 2024-04-16 RX ORDER — SODIUM,POTASSIUM PHOSPHATES 280-250MG
2 POWDER IN PACKET (EA) ORAL ONCE AS NEEDED
Status: CANCELLED | OUTPATIENT
Start: 2024-05-06

## 2024-04-16 RX ORDER — LANOLIN ALCOHOL/MO/W.PET/CERES
800 CREAM (GRAM) TOPICAL ONCE AS NEEDED
Status: CANCELLED | OUTPATIENT
Start: 2024-05-07

## 2024-04-16 RX ORDER — DIPHENHYDRAMINE HCL 25 MG
25 CAPSULE ORAL ONCE AS NEEDED
Status: CANCELLED | OUTPATIENT
Start: 2024-05-04

## 2024-04-16 RX ORDER — ACETAMINOPHEN 325 MG/1
650 TABLET ORAL ONCE AS NEEDED
Status: CANCELLED | OUTPATIENT
Start: 2024-05-07

## 2024-04-16 RX ORDER — LANOLIN ALCOHOL/MO/W.PET/CERES
800 CREAM (GRAM) TOPICAL ONCE AS NEEDED
OUTPATIENT
Start: 2024-05-09

## 2024-04-16 RX ORDER — LANOLIN ALCOHOL/MO/W.PET/CERES
800 CREAM (GRAM) TOPICAL ONCE AS NEEDED
Status: CANCELLED | OUTPATIENT
Start: 2024-05-04

## 2024-04-16 RX ORDER — POTASSIUM CHLORIDE 20 MEQ/1
40 TABLET, EXTENDED RELEASE ORAL ONCE AS NEEDED
Status: CANCELLED | OUTPATIENT
Start: 2024-05-03

## 2024-04-16 RX ORDER — DIPHENHYDRAMINE HCL 25 MG
25 CAPSULE ORAL ONCE AS NEEDED
Status: CANCELLED | OUTPATIENT
Start: 2024-05-06

## 2024-04-16 RX ORDER — SODIUM,POTASSIUM PHOSPHATES 280-250MG
2 POWDER IN PACKET (EA) ORAL ONCE AS NEEDED
OUTPATIENT
Start: 2024-05-09

## 2024-04-16 RX ORDER — PLERIXAFOR 24 MG/1.2ML
0.24 SOLUTION SUBCUTANEOUS ONCE AS NEEDED
Status: CANCELLED | OUTPATIENT
Start: 2024-05-06 | End: 2035-10-03

## 2024-04-16 RX ORDER — DIPHENHYDRAMINE HCL 25 MG
25 CAPSULE ORAL ONCE AS NEEDED
Status: CANCELLED | OUTPATIENT
Start: 2024-05-03

## 2024-04-16 RX ORDER — POTASSIUM CHLORIDE 20 MEQ/1
40 TABLET, EXTENDED RELEASE ORAL ONCE AS NEEDED
OUTPATIENT
Start: 2024-05-08

## 2024-04-16 RX ORDER — LANOLIN ALCOHOL/MO/W.PET/CERES
800 CREAM (GRAM) TOPICAL ONCE AS NEEDED
Status: CANCELLED | OUTPATIENT
Start: 2024-05-03

## 2024-04-16 RX ORDER — DIPHENHYDRAMINE HCL 25 MG
25 CAPSULE ORAL ONCE AS NEEDED
OUTPATIENT
Start: 2024-05-09

## 2024-04-16 RX ORDER — ACETAMINOPHEN 325 MG/1
650 TABLET ORAL ONCE AS NEEDED
OUTPATIENT
Start: 2024-05-08

## 2024-04-16 RX ORDER — ACETAMINOPHEN 325 MG/1
650 TABLET ORAL ONCE AS NEEDED
Status: CANCELLED | OUTPATIENT
Start: 2024-05-03

## 2024-04-16 RX ORDER — SODIUM,POTASSIUM PHOSPHATES 280-250MG
2 POWDER IN PACKET (EA) ORAL ONCE AS NEEDED
OUTPATIENT
Start: 2024-05-08

## 2024-04-16 RX ORDER — DIPHENHYDRAMINE HCL 25 MG
25 CAPSULE ORAL ONCE AS NEEDED
Status: CANCELLED | OUTPATIENT
Start: 2024-05-07

## 2024-04-16 RX ORDER — SODIUM,POTASSIUM PHOSPHATES 280-250MG
2 POWDER IN PACKET (EA) ORAL ONCE AS NEEDED
Status: CANCELLED | OUTPATIENT
Start: 2024-05-04

## 2024-04-16 NOTE — PROGRESS NOTES
BMT Pharmacist Evaluation    Current Outpatient Medications:     acyclovir (ZOVIRAX) 400 MG tablet, Take 400 mg by mouth 2 (two) times daily., Disp: , Rfl:     aspirin 81 MG Chew, Take 81 mg by mouth once daily., Disp: , Rfl:     calcitRIOL (ROCALTROL) 0.5 MCG Cap, Take 1 capsule (0.5 mcg total) by mouth once daily., Disp: 30 capsule, Rfl: 3    colchicine (COLCRYS) 0.6 mg tablet, Take 0.6 mg by mouth once daily., Disp: , Rfl:     dexAMETHasone (DECADRON) 4 MG Tab, Take 10 tablets (40 mg total) by mouth every 7 days. Take with food., Disp: 40 tablet, Rfl: 5    dextroamphetamine-amphetamine (ADDERALL) 20 mg tablet, Take 1 tablet by mouth once daily., Disp: , Rfl:     febuxostat (ULORIC) 40 mg Tab, Take 1 tablet (40 mg total) by mouth once daily., Disp: 30 tablet, Rfl: 3    lenalidomide (REVLIMID) 10 mg Cap, TAKE 1 CAPSULE ONCE DAILY DAYS 1 THROUGH 21 OF EACH 28 DAY CYCLE, Disp: 21 each, Rfl: 0    metoprolol tartrate (LOPRESSOR) 25 MG tablet, Take 12.5 mg by mouth 2 (two) times daily., Disp: , Rfl:     ondansetron (ZOFRAN) 8 MG tablet, Take 1 tablet (8 mg total) by mouth every 8 (eight) hours as needed for Nausea., Disp: 30 tablet, Rfl: 2    oxyCODONE (ROXICODONE) 10 mg Tab immediate release tablet, Take 1 tablet (10 mg total) by mouth every 4 (four) hours as needed for Pain., Disp: 90 tablet, Rfl: 0    pantoprazole (PROTONIX) 20 MG tablet, TAKE 1 TABLET (20 MG TOTAL) BY MOUTH ONCE DAILY, Disp: 30 tablet, Rfl: 1    prochlorperazine (COMPAZINE) 5 MG tablet, Take 5 mg by mouth every 6 (six) hours as needed for Nausea., Disp: , Rfl:     sodium bicarbonate 650 MG tablet, Take 650 mg by mouth 2 (two) times daily., Disp: , Rfl:     sulfamethoxazole-trimethoprim 800-160mg (BACTRIM DS) 800-160 mg Tab, Take 1 tablet by mouth once daily., Disp: 30 tablet, Rfl: 6    tamsulosin (FLOMAX) 0.4 mg Cap, Take 1 capsule (0.4 mg total) by mouth once daily., Disp: 30 capsule, Rfl: 6  No current facility-administered medications for this  visit.      Review of patient's allergies indicates:  No Known Allergies      Estimated Creatinine Clearance: 58.1 mL/min (A) (based on SCr of 1.8 mg/dL (H)).       Medication adherence: good; pill box  Medication-related problems: none     Planned conditioning regimen:  Melphalan 200 on Day -1     Antimicrobial Prophylaxis:  Acyclovir starting on Day -1  Levofloxacin starting on Day -1  Fluconazole starting on Day -1  Bactrim starting on Day +30    Growth Factor Support:  Neupogen starting on Day +7      Caregiver: wife and mother  Post-transplant discharge plans: Becca Martin     Notes:  Reviewed and reconciled the medication list with the patient. Patient was able to confirm all medications via list on his phone. Patient demonstrates good medication adherence and understands the importance of this through the transplant process.     Reviewed the planned high-dose chemotherapy regimen, including schedule and possible side effects. Provided the patient with drug information handouts for chemotherapy. Reviewed possible side effects of transplant including: neutropenia, thrombocytopenia, anemia, infection, infusion reactions, nausea/vomiting, mucositis, loss of appetite, taste changes, diarrhea,hair loss, liver and/or renal dysfunction. Also discussed the rare side effect of neurotoxicity. Reviewed prophylactic antimicrobials, as well as prophylactic and as needed antiemetics. Encouraged the patient to report all possible side effects/new symptoms and to ask for supportive care medications if needed. Patient verbalized understanding and all questions were answered.    Pharmacy Notes:   - He is currently on aspirin for VTE prophylaxis with lenalidomide. This can be held upon admission and discontinued upon discharge.    - Patient is still taking colchicine at this time, however pericarditis episode was back in December. Initial episode is usually treated for 3 months; recurrences are treated for 6 months.    - I  explained that amphetamine containing medications are not stocked in inpatient pharmacy. Patient is fine with holding Adderall while admitted.    - He does not have a history of gout; febuxostat is for uric acid reduction. This can be held upon admission and possibly discontinued at discharge if uric acid levels are low.    - He takes 12.5mg of metoprolol (half of a 25mg tablet).    - He has used both ondansetron and prochlorperazine in the past successfully for CINV.    - Patient mentioned he was on a medication for anxiety in the past. From chart review it appears to have been desvenlafaxine. He states once he received his cancer diagnosis he stopped this medication because of the increase in amount of pills he was taking with treatment. Patient states he is doing well now; CTM while admitted for transplant.    - No history of shingles, invasive fungal infections, or CDiff.    - Patient understands that he will need to take a higher dose of acyclovir for one year post-transplant and start taking Bactrim at day +30 and continue until six months post-transplant.     Drug Interactions: none   - Colchicine can cause bone marrow suppression and need for medication should be re-evaluated when he is admitted for stem cell transplant.        Proposed recommendations:  The patient demonstrates good medication adherence and understanding of the chemotherapy and transplant plan. BMT/Hematology Oncology PharmD will continue to follow the patient while admitted to the inpatient unit.       Nicolasa Camacho, Pharm.D., [facility]  Clinical Pharmacy Specialist, Bone Marrow Transplant/Cellular Therapy  [facility]  SpectraLink: [id number]

## 2024-04-17 LAB
OHS QRS DURATION: 98 MS
OHS QTC CALCULATION: 450 MS

## 2024-04-18 ENCOUNTER — LAB VISIT (OUTPATIENT)
Dept: LAB | Facility: HOSPITAL | Age: 44
End: 2024-04-18
Payer: COMMERCIAL

## 2024-04-18 ENCOUNTER — OFFICE VISIT (OUTPATIENT)
Dept: HEMATOLOGY/ONCOLOGY | Facility: CLINIC | Age: 44
End: 2024-04-18
Payer: COMMERCIAL

## 2024-04-18 ENCOUNTER — INFUSION (OUTPATIENT)
Dept: INFUSION THERAPY | Facility: HOSPITAL | Age: 44
End: 2024-04-18
Attending: STUDENT IN AN ORGANIZED HEALTH CARE EDUCATION/TRAINING PROGRAM
Payer: COMMERCIAL

## 2024-04-18 ENCOUNTER — SOCIAL WORK (OUTPATIENT)
Dept: HEMATOLOGY/ONCOLOGY | Facility: CLINIC | Age: 44
End: 2024-04-18
Payer: COMMERCIAL

## 2024-04-18 VITALS
HEIGHT: 69 IN | HEART RATE: 58 BPM | OXYGEN SATURATION: 100 % | TEMPERATURE: 98 F | WEIGHT: 202.19 LBS | DIASTOLIC BLOOD PRESSURE: 82 MMHG | BODY MASS INDEX: 29.95 KG/M2 | HEART RATE: 58 BPM | BODY MASS INDEX: 29.95 KG/M2 | TEMPERATURE: 98 F | SYSTOLIC BLOOD PRESSURE: 131 MMHG | WEIGHT: 202.19 LBS | OXYGEN SATURATION: 100 % | DIASTOLIC BLOOD PRESSURE: 82 MMHG | SYSTOLIC BLOOD PRESSURE: 131 MMHG | RESPIRATION RATE: 20 BRPM | HEIGHT: 69 IN | RESPIRATION RATE: 20 BRPM

## 2024-04-18 DIAGNOSIS — Z76.82 STEM CELL TRANSPLANT CANDIDATE: ICD-10-CM

## 2024-04-18 DIAGNOSIS — R53.82 CHRONIC FATIGUE: ICD-10-CM

## 2024-04-18 DIAGNOSIS — D63.0 ANEMIA IN NEOPLASTIC DISEASE: ICD-10-CM

## 2024-04-18 DIAGNOSIS — C90.00 MULTIPLE MYELOMA, REMISSION STATUS UNSPECIFIED: ICD-10-CM

## 2024-04-18 DIAGNOSIS — T45.1X5A IMMUNODEFICIENCY DUE TO CHEMOTHERAPY: ICD-10-CM

## 2024-04-18 DIAGNOSIS — C90.00 METASTATIC MULTIPLE MYELOMA TO BONE: Primary | ICD-10-CM

## 2024-04-18 DIAGNOSIS — D84.821 IMMUNODEFICIENCY DUE TO DRUG THERAPY: ICD-10-CM

## 2024-04-18 DIAGNOSIS — Z79.899 IMMUNODEFICIENCY DUE TO CHEMOTHERAPY: ICD-10-CM

## 2024-04-18 DIAGNOSIS — M87.180 OSTEONECROSIS OF JAW DUE TO DRUG: ICD-10-CM

## 2024-04-18 DIAGNOSIS — Z79.899 IMMUNODEFICIENCY DUE TO DRUG THERAPY: ICD-10-CM

## 2024-04-18 DIAGNOSIS — D84.821 IMMUNODEFICIENCY DUE TO CHEMOTHERAPY: ICD-10-CM

## 2024-04-18 DIAGNOSIS — D64.9 LOW HEMOGLOBIN: ICD-10-CM

## 2024-04-18 DIAGNOSIS — C90.00 MULTIPLE MYELOMA NOT HAVING ACHIEVED REMISSION: ICD-10-CM

## 2024-04-18 DIAGNOSIS — G89.3 CANCER ASSOCIATED PAIN: ICD-10-CM

## 2024-04-18 DIAGNOSIS — C90.00 MULTIPLE MYELOMA, REMISSION STATUS UNSPECIFIED: Primary | ICD-10-CM

## 2024-04-18 LAB
ALBUMIN SERPL-MCNC: 3.7 G/DL (ref 3.5–5)
ALBUMIN/GLOB SERPL: 1.9 RATIO (ref 1.1–2)
ALP SERPL-CCNC: 37 UNIT/L (ref 40–150)
ALT SERPL-CCNC: 14 UNIT/L (ref 0–55)
AST SERPL-CCNC: 19 UNIT/L (ref 5–34)
BASOPHILS # BLD AUTO: 0.06 X10(3)/MCL
BASOPHILS NFR BLD AUTO: 1.7 %
BILIRUB SERPL-MCNC: 0.7 MG/DL
BUN SERPL-MCNC: 25 MG/DL (ref 8.9–20.6)
CALCIUM SERPL-MCNC: 9.4 MG/DL (ref 8.4–10.2)
CHLORIDE SERPL-SCNC: 107 MMOL/L (ref 98–107)
CO2 SERPL-SCNC: 28 MMOL/L (ref 22–29)
CREAT SERPL-MCNC: 2.03 MG/DL (ref 0.73–1.18)
EOSINOPHIL # BLD AUTO: 0.29 X10(3)/MCL (ref 0–0.9)
EOSINOPHIL NFR BLD AUTO: 8.4 %
ERYTHROCYTE [DISTWIDTH] IN BLOOD BY AUTOMATED COUNT: 16.8 % (ref 11.5–17)
GFR SERPLBLD CREATININE-BSD FMLA CKD-EPI: 40 MLS/MIN/1.73/M2
GLOBULIN SER-MCNC: 1.9 GM/DL (ref 2.4–3.5)
GLUCOSE SERPL-MCNC: 113 MG/DL (ref 74–100)
HCT VFR BLD AUTO: 26.1 % (ref 42–52)
HGB BLD-MCNC: 8.5 G/DL (ref 14–18)
IMM GRANULOCYTES # BLD AUTO: 0.01 X10(3)/MCL (ref 0–0.04)
IMM GRANULOCYTES NFR BLD AUTO: 0.3 %
LYMPHOCYTES # BLD AUTO: 0.76 X10(3)/MCL (ref 0.6–4.6)
LYMPHOCYTES NFR BLD AUTO: 22 %
MAGNESIUM SERPL-MCNC: 2 MG/DL (ref 1.6–2.6)
MCH RBC QN AUTO: 33.9 PG (ref 27–31)
MCHC RBC AUTO-ENTMCNC: 32.6 G/DL (ref 33–36)
MCV RBC AUTO: 104 FL (ref 80–94)
MONOCYTES # BLD AUTO: 0.23 X10(3)/MCL (ref 0.1–1.3)
MONOCYTES NFR BLD AUTO: 6.6 %
NEUTROPHILS # BLD AUTO: 2.11 X10(3)/MCL (ref 2.1–9.2)
NEUTROPHILS NFR BLD AUTO: 61 %
PHOSPHATE SERPL-MCNC: 3.8 MG/DL (ref 2.3–4.7)
PLATELET # BLD AUTO: 187 X10(3)/MCL (ref 130–400)
PMV BLD AUTO: 11 FL (ref 7.4–10.4)
POTASSIUM SERPL-SCNC: 4.1 MMOL/L (ref 3.5–5.1)
PROT SERPL-MCNC: 5.6 GM/DL (ref 6.4–8.3)
RBC # BLD AUTO: 2.51 X10(6)/MCL (ref 4.7–6.1)
SODIUM SERPL-SCNC: 140 MMOL/L (ref 136–145)
WBC # SPEC AUTO: 3.46 X10(3)/MCL (ref 4.5–11.5)

## 2024-04-18 PROCEDURE — 3008F BODY MASS INDEX DOCD: CPT | Mod: CPTII,S$GLB,,

## 2024-04-18 PROCEDURE — 36415 COLL VENOUS BLD VENIPUNCTURE: CPT

## 2024-04-18 PROCEDURE — 99215 OFFICE O/P EST HI 40 MIN: CPT | Mod: S$GLB,,,

## 2024-04-18 PROCEDURE — 99999 PR PBB SHADOW E&M-EST. PATIENT-LVL III: CPT | Mod: PBBFAC,,,

## 2024-04-18 PROCEDURE — 85025 COMPLETE CBC W/AUTO DIFF WBC: CPT

## 2024-04-18 PROCEDURE — 63600175 PHARM REV CODE 636 W HCPCS: Mod: JZ,JG

## 2024-04-18 PROCEDURE — 96401 CHEMO ANTI-NEOPL SQ/IM: CPT

## 2024-04-18 PROCEDURE — 3075F SYST BP GE 130 - 139MM HG: CPT | Mod: CPTII,S$GLB,,

## 2024-04-18 PROCEDURE — 80053 COMPREHEN METABOLIC PANEL: CPT

## 2024-04-18 PROCEDURE — 83735 ASSAY OF MAGNESIUM: CPT

## 2024-04-18 PROCEDURE — 84100 ASSAY OF PHOSPHORUS: CPT

## 2024-04-18 PROCEDURE — 3079F DIAST BP 80-89 MM HG: CPT | Mod: CPTII,S$GLB,,

## 2024-04-18 RX ORDER — BORTEZOMIB 3.5 MG/1
1.3 INJECTION, POWDER, LYOPHILIZED, FOR SOLUTION INTRAVENOUS; SUBCUTANEOUS
Status: COMPLETED | OUTPATIENT
Start: 2024-04-18 | End: 2024-04-18

## 2024-04-18 RX ORDER — OXYCODONE HYDROCHLORIDE 10 MG/1
10 TABLET ORAL EVERY 4 HOURS PRN
Qty: 90 TABLET | Refills: 0 | Status: ON HOLD | OUTPATIENT
Start: 2024-04-18 | End: 2024-05-27

## 2024-04-18 RX ORDER — BORTEZOMIB 3.5 MG/1
1.3 INJECTION, POWDER, LYOPHILIZED, FOR SOLUTION INTRAVENOUS; SUBCUTANEOUS
Status: CANCELLED | OUTPATIENT
Start: 2024-04-18

## 2024-04-18 RX ORDER — PROCHLORPERAZINE EDISYLATE 5 MG/ML
10 INJECTION INTRAMUSCULAR; INTRAVENOUS ONCE AS NEEDED
Status: CANCELLED | OUTPATIENT
Start: 2024-04-18

## 2024-04-18 RX ORDER — DIPHENHYDRAMINE HYDROCHLORIDE 50 MG/ML
50 INJECTION INTRAMUSCULAR; INTRAVENOUS ONCE AS NEEDED
Status: CANCELLED | OUTPATIENT
Start: 2024-04-18

## 2024-04-18 RX ORDER — HEPARIN 100 UNIT/ML
500 SYRINGE INTRAVENOUS
Status: CANCELLED | OUTPATIENT
Start: 2024-04-18

## 2024-04-18 RX ORDER — EPINEPHRINE 0.3 MG/.3ML
0.3 INJECTION SUBCUTANEOUS ONCE AS NEEDED
Status: CANCELLED | OUTPATIENT
Start: 2024-04-18

## 2024-04-18 RX ORDER — SODIUM CHLORIDE 0.9 % (FLUSH) 0.9 %
10 SYRINGE (ML) INJECTION
Status: CANCELLED | OUTPATIENT
Start: 2024-04-18

## 2024-04-18 RX ADMIN — BORTEZOMIB 3 MG: 3.5 INJECTION, POWDER, LYOPHILIZED, FOR SOLUTION INTRAVENOUS; SUBCUTANEOUS at 12:04

## 2024-04-18 NOTE — PROGRESS NOTES
Subjective:       Patient ID: Pete Fernandez is a 43 y.o. male.    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 Rev remains at 10mg due to renal function  Zometa 10/17/23 - 1/11/2024 (3 treatments total)  Currently on hold due to ONJ    Treatment History:  1. Beatriz+ KrD for cycle 2 on 11/30/23; Change at request of [facility]  Stopped after 1 dose due to severe pleural and pericardial effusion development   Switched back to BEATRIZ + RVD with increase of Rev to 25mg    HPI  42yo M presented to [facility] on 10/11 after 1 month of progressive weakness, abdominal pain, and intermittent confusion. At the time of his presentation, his labs which had been normal with PCP in 8/2023 were notable for new severe DOUG, transaminitis, and Ca of 18. He was admitted and started on IVF resuscitation.  Hypercalcemia workup was initiated and he was ultimately found to have numerous osseous lesions on 10/15/23 CT CAP, FLC - Kappa 3.29, Lambda 744, Ratio 226 , SPEP with Mspike 0.17 IgG lambda. Bone marrow biopsy done 10/18/23 revealed 95% plasma cells and FISH with TP53 deletion, FGFR3/IGH (or NSD2/IGH) fusion, usually representing a t(4;14), and a tetraploid subclone was observed. After correction of his hypercalcemia and improvement in his renal function he was discharged on 10/22/23.   He started C1 Beatriz + RVD + Zometa on 11/2/23. He was referred to [facility] for consideration of bone marrow transplant, they recommended change to Beatriz + Krd, however patient developed life threatening pleural and pericardial edema  several days after 1st infusion requiring hospitalization and placement of a pericardial window. Therefore he transitioned back to Beatriz - RVD for C2.     Interval History:   He returns to the clinic today for a follow-up and treatment clearance for C6D8 of VRD. He feels well today. He continues to have right sided rib pain that is stable and unchanged. He has started his testing for his transplant. Collection is currently planned for 5/3/2024 with transplant scheduled 5/13/2024. He was instructed to stop Revlimid today. Dysuria has resolved. Increased appetite, tolerating PO without issue, weight stable.Taking calcium once daily and ASA 81 mg daily.  Labs reviewed in detail with him and are stable to continue with treatment. Denies any fevers, chills, NS, headaches, bleeding, bruising, neuropathy.       Past Medical History:   Diagnosis Date    Anxiety disorder, unspecified     Hypercalcemia       Past Surgical History:   Procedure Laterality Date    BONE MARROW BIOPSY N/A 10/18/2023    Procedure: Biopsy-bone marrow;  Surgeon: Nhan Marte MD;  Location: [facility];  Service: General;  Laterality: N/A;    TONSILLECTOMY       Social History     Socioeconomic History    Marital status:    Tobacco Use    Smoking status: Never    Smokeless tobacco: Never   Substance and Sexual Activity    Alcohol use: Yes    Drug use: Never    Sexual activity: Yes     Partners: Female     Social Determinants of Health     Financial Resource Strain: Medium Risk (11/15/2023)    Overall Financial Resource Strain (CARDIA)     Difficulty of Paying Living Expenses: Somewhat hard   Food Insecurity: Food Insecurity Present (11/15/2023)    Hunger Vital Sign     Worried About Running Out of Food in the Last Year: Sometimes true     Ran Out of Food in the Last Year: Never true   Transportation Needs: No Transportation Needs (11/15/2023)    PRAPARE - Transportation     Lack of Transportation (Medical): No     Lack of Transportation  (Non-Medical): No   Physical Activity: Insufficiently Active (11/15/2023)    Exercise Vital Sign     Days of Exercise per Week: 3 days     Minutes of Exercise per Session: 30 min   Stress: Stress Concern Present (11/15/2023)    [place] [employer] - Occupational Stress Questionnaire     Feeling of Stress : To some extent   Social Connections: Unknown (11/15/2023)    Social Connection and Isolation Panel [NHANES]     Frequency of Communication with Friends and Family: More than three times a week     Frequency of Social Gatherings with Friends and Family: More than three times a week     Attends Buddhist Services: Patient declined     Active Member of Clubs or Organizations: No     Attends Club or Organization Meetings: Never     Marital Status:    Housing Stability: Low Risk  (11/15/2023)    Housing Stability Vital Sign     Unable to Pay for Housing in the Last Year: No     Number of Places Lived in the Last Year: 1     Unstable Housing in the Last Year: No      Family History   Problem Relation Name Age of Onset    Breast cancer Mother      Pancreatic cancer Father        Review of patient's allergies indicates:  No Known Allergies   Review of Systems   Constitutional:  Negative for activity change, appetite change, chills, fatigue, fever and unexpected weight change.   HENT:  Negative for mouth sores and sore throat.    Eyes:  Negative for visual disturbance.   Respiratory:  Negative for cough and shortness of breath.    Cardiovascular:  Negative for chest pain.   Gastrointestinal:  Negative for abdominal pain, constipation, diarrhea, nausea and vomiting.   Endocrine: Negative for polyuria.   Genitourinary:  Negative for dysuria and frequency.   Musculoskeletal:  Negative for back pain.   Integumentary:  Negative for rash.   Allergic/Immunologic: Negative for frequent infections.   Neurological:  Negative for weakness and headaches.   Hematological:  Negative for adenopathy. Does not  bruise/bleed easily.   Psychiatric/Behavioral:  The patient is not nervous/anxious.          Objective:      Vitals:    04/18/24 1108   BP: 131/82   Pulse: (!) 58   Resp: 20   Temp: 97.9 °F (36.6 °C)             Physical Exam  Constitutional:       General: He is not in acute distress.     Appearance: Normal appearance. He is not ill-appearing.   HENT:      Head: Normocephalic and atraumatic.      Nose: Nose normal.      Mouth/Throat:      Mouth: Mucous membranes are moist.      Pharynx: Oropharynx is clear.   Eyes:      Extraocular Movements: Extraocular movements intact.      Conjunctiva/sclera: Conjunctivae normal.      Pupils: Pupils are equal, round, and reactive to light.   Cardiovascular:      Rate and Rhythm: Normal rate and regular rhythm.      Pulses: Normal pulses.      Heart sounds: Normal heart sounds. No murmur heard.  Pulmonary:      Effort: Pulmonary effort is normal. No respiratory distress.      Breath sounds: Normal breath sounds.   Abdominal:      General: There is no distension.      Palpations: Abdomen is soft.      Tenderness: There is no abdominal tenderness.   Musculoskeletal:         General: Normal range of motion.      Cervical back: Normal range of motion and neck supple.   Lymphadenopathy:      Cervical: No cervical adenopathy.   Skin:     General: Skin is warm and dry.      Findings: No rash.   Neurological:      General: No focal deficit present.      Mental Status: He is alert and oriented to person, place, and time.         LABS AND IMAGING REVIEWED IN EPIC  10/11/23 Xray Metastatic Survey  1. Scattered small defects at the calvarium measuring up to 5 mm too numerous to count.  2. Degene mild rative changes at the spine and extremities  3. MR exam and bone scan would allow further evaluation.  10/12/23 MRI Brain  1. Motion artifact  2. Mild scattered mucosal thickening throughout the paranasal sinuses  3. Somewhat limited evaluation of brain metastases without the administration of  IV contrast  4. Otherwise unremarkable MR exam of the head without the administration of IV contrast  10/12/23 NM Bone Scan  1. Abnormal intense increased activity throughout the lungs of uncertain etiology.  This may related to hyperparathyroidism, hematologic malignancy, and or metabolic abnormality such as renal failure, vasculitis, or pulmonary infection.  Chest x-ray 10/10/2023 reveals the lungs to be relatively clear and well aerated.  2. Suspect scattered mild degenerative changes throughout the joint spaces  3. Increased activity nasal maxillary region suggesting mucoperiosteal disease  4. Mild activity at the stomach suggesting free pertechnetate  10/14/23 CT CAP  1. Widespread osseous findings suggestive of extensive metastatic skeletal disease, to include expansile destructive soft tissue component at the anterior right 7th rib.  2. Enlarged intrathoracic lymph nodes could also be of metastatic etiology.  3. Diffuse ground-glass attenuation through both lungs is nonspecific, could be secondary to atypical infectious or inflammatory process.  However, possibility of metastatic involvement cannot be excluded.  12/7/23 CXR:   1. Hazy opacification lower lungs bilaterally suspicious for infiltrate and pleural reaction which has progressed since the prior exam  2. Cardiomegaly  3. Thoracic spondylosis  12/11/23 CXR:  1. Mild cardiomegaly  2. Persistent bibasilar infiltrate and or pleural reaction  3. Thoracic spondylosis with anterior wedging again evident at a lower thoracic vertebral body  12/13/23 ECHO: EF 50-55%  2/7/24 PET/CT:  1. Interval improved appearance of lytic osseous lesions compared to previous exam with sub threshold uptake. The bones are demineralized with numerous tiny lytic foci at the axial and appendicular skeleton.  2. Subacute appearing bilateral rib and right clavicle fractures with mild FDG uptake.  3. Nonspecific patchy ground-glass and interstitial opacities with mild FDG uptake.        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 Displayed all CRAB criteria at time of diagnosis in October. Undergoing 1st line Beatriz + RVD + Zometa. Being followed by [facility] for transplant [facility].  Collection planned 5/3/2024 with transplant scheduled for 5/13/2024.   ONJ  Zometa now on hold. Continue follow-ups with dental as scheduled.      Plan:   Toxicity reviewed Labs adequate for C6D8 of VRD with Revlimid 10mg,  holding BEATRIZ due to transplant.  Continue to hold zometa due to ONJ.   Continue Aspirin 81mg, Acyclovir, and Bactrim for prophylaxis  Will need SPEP/OSVALDO and FLC with labs Q4w prior to every new cycle.   Follow-up as scheduled with transplant as scheduled.  RTC in 6/13/2024 with MD only for FU/treatment planning.   Cbc, cmp, MM labs- 1 hr prior @ [facility]    I spent a total of 40 minutes on the day of the visit.This includes face to face time and non-face to face time preparing to see the patient (eg, review of tests), obtaining and/or reviewing separately obtained history, documenting clinical information in the electronic or other health record, independently interpreting results and communicating results to the patient/family/caregiver, or care coordinator.       **All blood products must be irradiated and CMV negative**        Janet Alegria, YOVANNYP-C  Oncology/Hematology   [facility]

## 2024-04-18 NOTE — PROGRESS NOTES
[facility]   Bone Marrow Transplant Psychosocial Assessment   Date: 2024       Demographic Information     Name: Pete Fernandez    : 1980    Age: 43 y.o.    Sex: male    Race: White    Marital Status:    SS #:     Phone Number(s): [phone] (home)     Home Address: [address] [place] [id number]    Mailing Address: [address] [place] [id number]    Are you a [place] Citizen? Yes     Contact Information     Next of Kin: Nury Marin   Relationship: mother   Phone Number(s): [phone]   Emergency Contact: Extended Emergency Contact Information  Primary Emergency Contact: SOFIA FERNANDEZRICIA  Address: [address] [place] [address] [id number] [place] [address]  Mobile Phone: [phone]  Relation: Spouse  Preferred language: English   needed? No        Living Arrangements   Household Composition:  Patient currently resides with: Spouse   If patient resides with spouse, please explain the marital relationship: Not present/unable to assess   Does the patient currently own his/her own home? Yes   Does the patient currently rent home/apartment: No   Are current living arrangements permanent? Yes   If no, please explain:        Children's Names     Name Sex Age   1. Harriet female 23   2. Javierjj female 21     Support System   Primary Caregiver:     Name: KATY FERNANDEZ   Relationship: spouse   Cell #: [phone]   Address: [address]    Home #: [phone]   City: [place]   Street: [place]   ZIP: [id number]       Secondary Caregiver:     Name: Nury Marin   Relationship: mother   Cell #: [phone]                         Will patient's caregiver be available full-time? Yes   What is the patient's Druze? No Taoist   Is patient currently practicing or non-practicing? No      Does patient have any other sources for support? Yes      If yes, please explain: Extended family/friends       Post BMT Plans      Does patient have full understanding of recovery from  "BMT? Yes   Does patient understand risk associated with BMT? Yes   What are patient's housing plans post BMT? [facility]   Does patient have a Living Will? No  (pending)   Does patient have a Power of ?  No             Employment Information     Is patient currently employed? 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 full time             Financial Information     Monthly Income: $9,000   Yearly Income: $108,000   Source of Income:  salary   Do you have any financial concerns? No   If yes, please explain:         Insurance Information      Do you have health insurance?  Yes   [place]urance Carrier [place]   Policy #: [id number]   Group #: [id number]   Policy [place]: self   Medicare: No   Medicare Part D: No   Medicaid: No   Do you have Disability Insurance? Yes      Do you have a Cancer Policy? No   Do you have medication/prescription coverage? Yes   Are you a ? No       Medical Information     Diagnosis: No diagnosis found. "MM"   Date of Diagnosis: 10/2023   Is this a new diagnosis? Yes         If no, please explain:    Past Medical History: Past Medical History:   Diagnosis Date    Anxiety disorder, unspecified     Hypercalcemia       Infusion Services: none   Home Health: none   Durable Medical Equipment: none   Activities of Daily Living: independent   Patient's Family Cancer History: Cancer-related family history includes Breast cancer in his mother; Pancreatic cancer in his father. MGM, BCA; PGF and PGM, Dx unknown.       Cognitive Functioning     Cognitive State: alert   Does patient have any " "concerns that may affect medical follow up and full understanding of treatment? No   Does patient have any concerns that may impact medication compliance? No   Education Level: technical college   Does the patient have any learning disabilities? Yes   If yes, please explain: ADHD diagnosed after school graduation   Can the patient read English? Yes   Can the patient write in English? Yes      Is the patient Literate? Yes   What is the patient's primary language? English   Does the patient need interpretation services? No        Psychosocial History     Does patient have any emotional issues? Yes   If, yes please explain:  Denied on assessment; chart notes Hx of anxiety   Does patient have a psychiatric history? No   If, yes please explain:     Is patient currently taking any psychiatric medication? No   If yes, please list medications:    Is patient currently in therapy or attending support groups? No (online occ.)                   Alcohol/Drug Use/Abuse History     Alcohol Use:quit on Dx Social History     Substance and Sexual Activity   Alcohol Use Yes      Tobacco Use: none Social History     Tobacco Use   Smoking Status Never   Smokeless Tobacco Never      Drug Use: none Social History     Substance and Sexual Activity   Drug Use Never          Coping Skills     How is the patient currently coping with their diagnosis? Time in the woods, riding 3-vivas   Is the patient open/receptive to psychosocial intervention? Yes   Has the patient experienced any significant losses in his/her life? Yes      If yes, please explain: grandparents   What are the patient's identified needs? lodging   Goals: "Full recovery"   Interview Behavior: cooperative   Suitability for Transplant: Suitable   Additional Comments: Mr. Fernandez was open and cooperative throughout assessment for auto SCT on 4/16.  His wife will be his primary caregiver at the [facility], with his mother available to assist if needed. All are eligible for a stay, " with no needs for wheelchair access, service animals, and no history of criminal conviction.  He denied a history of mental illness, though the chart notes a history of anxiety; strong weight should  be given to any concerns noted by psych onc.  He had a good understanding of risks and benefits of transplant and the recovery process.  He signed the caregiver contract without reservation.  Should his admission be delayed, care should be given to scheduling around daughter's wedding in September.

## 2024-04-19 ENCOUNTER — PATIENT MESSAGE (OUTPATIENT)
Dept: SURGERY | Facility: CLINIC | Age: 44
End: 2024-04-19
Payer: COMMERCIAL

## 2024-04-19 DIAGNOSIS — Z76.82 STEM CELL TRANSPLANT CANDIDATE: Primary | ICD-10-CM

## 2024-04-24 ENCOUNTER — PATIENT MESSAGE (OUTPATIENT)
Dept: HEMATOLOGY/ONCOLOGY | Facility: CLINIC | Age: 44
End: 2024-04-24
Payer: COMMERCIAL

## 2024-04-26 NOTE — PROGRESS NOTES
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 He has been diagnosed with multiple myeloma. He is here for a virtual stem cell transplant consultation.   Per records,  presented to [facility] on 10/11/23 after 1 month of progressive weakness, abdominal pain, and intermittent confusion. At the time of his presentation, his labs, which had been normal  in 8/2023, were notable for new severe DOUG, transaminitis, and Ca of 18. He was admitted and started on IVF .  Hypercalcemia workup was initiated and he was ultimately found to have numerous osseous lesions on 10/15/23 CT CAP, FLC - Kappa 3.29, Lambda 744, Ratio 226 , SPEP with M spike 0.17, IgG lambda on sIFE. Bone marrow biopsy done 10/18/23 revealed 95% plasma cells and FISH with TP53 deletion, FGFR3/IGH (or NSD2/IGH) fusion, usually representing a t(4;14), and a tetraploid subclone was observed. After correction of his hypercalcemia and improvement in his renal function he was discharged on 10/22/23.   He was started on Beatriz + RVd on 11/2/23.      Interval History: He is here for follow up.  He feels well today. He continues to have fatigue.      Past Medical History:   Diagnosis Date    Anxiety disorder, unspecified     Hypercalcemia            Past Surgical History:   Procedure Laterality Date    BONE MARROW BIOPSY N/A 10/18/2023    Procedure: Biopsy-bone marrow;  Surgeon: Nhan Marte MD;  Location: [facility];  Service: General;  Laterality: N/A;    TONSILLECTOMY           Social History     Socioeconomic History    Marital status:    Tobacco Use    Smoking status: Never    Smokeless tobacco: Never   Substance and Sexual Activity    Alcohol use: Yes    Drug use: Never    Sexual activity: Yes     Partners:  Female     Social Determinants of Health     Financial Resource Strain: Medium Risk (11/15/2023)    Overall Financial Resource Strain (CARDIA)     Difficulty of Paying Living Expenses: Somewhat hard   Food Insecurity: Food Insecurity Present (11/15/2023)    Hunger Vital Sign     Worried About Running Out of Food in the Last Year: Sometimes true     Ran Out of Food in the Last Year: Never true   Transportation Needs: No Transportation Needs (11/15/2023)    PRAPARE - Transportation     Lack of Transportation (Medical): No     Lack of Transportation (Non-Medical): No   Physical Activity: Insufficiently Active (11/15/2023)    Exercise Vital Sign     Days of Exercise per Week: 3 days     Minutes of Exercise per Session: 30 min   Stress: Stress Concern Present (11/15/2023)    [employer] [place] [employer] - Occupational Stress Questionnaire     Feeling of Stress : To some extent   Social Connections: Unknown (11/15/2023)    Social Connection and Isolation Panel [NHANES]     Frequency of Communication with Friends and Family: More than three times a week     Frequency of Social Gatherings with Friends and Family: More than three times a week     Attends [employer]ology Services: Patient declined     Active Member of Clubs or Organizations: No     Attends Club or Organization Meetings: Never     Marital Status:    Housing Stability: Low Risk  (11/15/2023)    Housing Stability Vital Sign     Unable to Pay for Housing in the Last Year: No     Number of Places Lived in the Last Year: 1     Unstable Housing in the Last Year: No         Review of patient's allergies indicates:  No Known Allergies      Current Outpatient Medications   Medication Sig Dispense Refill    acyclovir (ZOVIRAX) 400 MG tablet Take 400 mg by mouth 2 (two) times daily.      aspirin 81 MG Chew Take 81 mg by mouth once daily.      calcitRIOL (ROCALTROL) 0.5 MCG Cap Take 1 capsule (0.5 mcg total) by mouth once daily. 30 capsule 3    colchicine  (COLCRYS) 0.6 mg tablet Take 0.6 mg by mouth once daily.      dexAMETHasone (DECADRON) 4 MG Tab Take 10 tablets (40 mg total) by mouth every 7 days. Take with food. 40 tablet 5    dextroamphetamine-amphetamine (ADDERALL) 20 mg tablet Take 1 tablet by mouth once daily.      febuxostat (ULORIC) 40 mg Tab Take 1 tablet (40 mg total) by mouth once daily. 30 tablet 3    lenalidomide (REVLIMID) 10 mg Cap TAKE 1 CAPSULE ONCE DAILY DAYS 1 THROUGH 21 OF EACH 28 DAY CYCLE 21 each 0    metoprolol tartrate (LOPRESSOR) 25 MG tablet Take 12.5 mg by mouth 2 (two) times daily.      ondansetron (ZOFRAN) 8 MG tablet Take 1 tablet (8 mg total) by mouth every 8 (eight) hours as needed for Nausea. 30 tablet 2    oxyCODONE (ROXICODONE) 10 mg Tab immediate release tablet Take 1 tablet (10 mg total) by mouth every 4 (four) hours as needed for Pain. 90 tablet 0    pantoprazole (PROTONIX) 20 MG tablet TAKE 1 TABLET (20 MG TOTAL) BY MOUTH ONCE DAILY 30 tablet 1    prochlorperazine (COMPAZINE) 5 MG tablet Take 5 mg by mouth every 6 (six) hours as needed for Nausea.      sodium bicarbonate 650 MG tablet Take 650 mg by mouth 2 (two) times daily.      sulfamethoxazole-trimethoprim 800-160mg (BACTRIM DS) 800-160 mg Tab Take 1 tablet by mouth once daily. 30 tablet 6    tamsulosin (FLOMAX) 0.4 mg Cap Take 1 capsule (0.4 mg total) by mouth once daily. 30 capsule 6     No current facility-administered medications for this visit.         Review of Systems   Constitutional:  Positive for malaise/fatigue. Negative for chills, fever and weight loss.   HENT:  Negative for congestion, ear discharge, ear pain, hearing loss, nosebleeds, sinus pain and tinnitus.    Eyes:  Negative for blurred vision, photophobia, pain, discharge and redness.   Respiratory:  Negative for sputum production.    Cardiovascular:  Negative for chest pain, palpitations, orthopnea and claudication.   Gastrointestinal:  Negative for blood in stool, constipation, diarrhea, heartburn,  melena and vomiting.   Genitourinary:  Negative for dysuria, frequency, hematuria and urgency.   Musculoskeletal:  Positive for back pain and myalgias. Negative for joint pain and neck pain.   Neurological:  Negative for tremors, sensory change, speech change, focal weakness, weakness and headaches.   Endo/Heme/Allergies:  Negative for environmental allergies and polydipsia. Does not bruise/bleed easily.   Psychiatric/Behavioral:  Negative for depression, hallucinations and substance abuse. The patient is not nervous/anxious.               PS: ECOG 1 / KPS 80  Response at transplant: VGPR  Risk at transplant: low  HCT CI score: 3 ( 2 points for DLCO < 80%; 1 point for anxiety)        Physical Exam  HENT:      Head: Normocephalic and atraumatic.      Mouth/Throat:      Pharynx: No posterior oropharyngeal erythema.   Eyes:      General: No scleral icterus.  Cardiovascular:      Rate and Rhythm: Normal rate.   Pulmonary:      Effort: Pulmonary effort is normal.      Breath sounds: Normal breath sounds.   Abdominal:      General: There is no distension.   Musculoskeletal:      Right lower leg: No edema.      Left lower leg: No edema.   Lymphadenopathy:      Cervical: No cervical adenopathy.   Skin:     Coloration: Skin is not jaundiced.   Neurological:      General: No focal deficit present.      Mental Status: He is alert.      Cranial Nerves: No cranial nerve deficit.            10/14/23 CT C/A/P    COMPARISON  None available at the time of initial interpretation.     FINDINGS  Images were reviewed in soft tissue, lung, and bone windows.     Exam quality: Inherently limited vascular and soft tissue assessment due to utilization of non-contrast protocol.  Quality further limited by patient movement throughout image acquisition with resulting widespread artifact particularly through the abdomen.     Lines/tubes: none visualized     Cardiovascular: Normal heart chamber size. No pericardial effusion.  Normal vessel  caliber and contour through the chest, abdomen, and pelvis.     Lungs/Pleura: Central airways are widely patent.  No organized airspace consolidation or focal pulmonary lesion is identified.  However, there is diffuse ill-defined ground-glass attenuation throughout both lungs of overall nonspecific appearance.  Small layering bilateral pleural effusions are also evident.  No evidence of loculated component or pleural thickening identified.  There is no pneumothorax.     Abdominal Solid Organs: Unremarkable appearance for limited non-contrast CT assessment.  No evidence of acute abnormality appreciated.     Gallbladder/Biliary: No acute process, calcified stone, or evidence of biliary obstruction.     Gastrointestinal: No evidence of acute esophageal or abdominal hollow viscus injury. No active inflammatory process.     Pelvic Organs: No focal abnormality of the urinary bladder or adjacent structures.     Peritoneal Cavity/Retroperitoneum: No free fluid or air, and no drainable collections.     Musculoskeletal: No acute or focal subcutaneous or muscular abnormality.  There is no evidence of acute osseous displacement.  However, widespread heterogeneity and innumerable lucent foci are appreciated throughout the spinal column, as well as the bony thorax and pelvis.  A more pronounced lytic lesion that is poorly marginated is noted at the inferior right posterolateral T11 vertebral body measuring 2.3 cm x 1.3 cm with overlying cortical disruption (series 3, image 96).  An additional focal expansile soft tissue density lesion is present at the right anterolateral 7th ribs with overlying cortical destruction and dimensions of approximately 3.6 cm x 2.3 cm (series 2, image 89).  Multiple nondisplaced, likely subacute/chronic right rib fractures also incidentally identified.     Other findings: Scattered mediastinal lymph nodes are enlarged.  For example, right posterolateral upper paratracheal node measures 1.4 cm short  axis (series 2, image 19).  Subcarinal node short axis dimension 1.4 cm (image 53).  No necrotic adenopathy is identified.  The thyroid is unremarkable.     IMPRESSION  1. Widespread osseous findings suggestive of extensive metastatic skeletal disease, to include expansile destructive soft tissue component at the anterior right 7th rib.  2. Enlarged intrathoracic lymph nodes could also be of metastatic etiology.  3. Diffuse ground-glass attenuation through both lungs is nonspecific, could be secondary to atypical infectious or inflammatory process.  However, possibility of metastatic involvement cannot be excluded.      10/18/23 bone marrow biopsy       1. BONE MARROW, RIGHT POSTERIOR ILIAC CREST, BIOPSY AND ASPIRATE:      PLASMA CELL MYELOMA, LAMBDA-RESTRICTED.      LAMBDA-RESTRICTED ATYPICAL PLASMA CELLS COMPRISE 95% OF MARROW NUCLEATED   CELLULARITY AND ARE ARRANGED IN DIFFUSE SHEETS.      REDUCED MARROW STORES.         PERIPHERAL BLOOD:   MILD NORMOCHROMIC NORMOCYTIC ANEMIA.      2. SEE DIAGNOSIS #1.       Comment   A. The patient's clinical history of hypercalcemia, weakness and acute kidney injury with CT showing extensive osseous lesions, SPEP showing monoclonal spike   0.17 g/dL, IgG lambda by OSVALDO is noted.      B. The current bone marrow is 100% cellular and the vast majority (95%) of the cellularity is composed of atypical plasma cells which are Lambda-restricted by   flow cytometry and immunohistochemical staining.      C. A representative sample of this patient's bone marrow was submitted for flow cytometry, which revealed a population of monotypic plasma cells (43.71%),   restricted to the production of Lambda light chains, with coexpression of CD38 and 138, negative for CD19 and CD45. No monotypic B cell population or increase   in blasts are identified. See below and separate scanned report.      D. A representative sample was also submitted for Myeloma FISH panel, which was abnormal, indicating a  plasma cell clone with a TP53 deletion and FGFR3/IGH (or   NSD2/IGH) fusion, usually representing a t(4;14).  In addition, a tetraploid subclone was observed. In plasma cell dyscrasias, a TP53 deletion represents a   high risk cytogenetic abnormality.  See below and separate scanned report.       12/14/23 SPEP: Serum protein electrophoresis shows a faint monoclonal peak (0.02 g/dL) in the gamma zone, consistent with a monoclonal gammopathy.     12/14/23 OSVALDO: A faint band is present in the IgG kaitlin with a corresponding faint band in the kappa kaitlin.     The immunofixation electrophoresis are consistent with monoclonal gammopathy of IgG-kappa isotype.       4/9/24 LEFT ILIAC CREST BONE MARROW ASPIRATE (INADEQUATE), BONE MARROW CLOT (INADEQUATE), AND BONE MARROW CORE BIOPSY WITH:     CELLULARITY=20%, TRILINEAGE HEMATOPOIETIC ACTIVITY .   NO DEFINITIVE MORPHOLOGIC OR IMMUNOPHENOTYPIC EVIDENCE OF RESIDUAL PLASMA CELL NEOPLASM.  SEE COMMENT.   CONGO RED NEGATIVE.   GRADE 1-2 RETICULAR FIBROSIS.   INCREASED STORAGE IRON.   ADEQUATE NUMBER OF MEGAKARYOCYTES.     Immunohistochemical studies were performed on the  core biopsy for greater sensitivity and further architecture evaluation with adequate positive and negative controls.  Scattered mixed predominantly T cells (CD3 positive) and B cells (CD20 positive) are    evident.  About 1% plasma cells ( positive) are noted.  In situ hybridization for kappa and lambda light chain is noncontributory.   highlights scattered mast cells.     There is no definitive morphologic or immunophenotypic evidence of residual plasma cell neoplasm.  Correlate clinically and with a cytogenetics report.       Interp, Plasma Cell Prolif (PCPD), FISH: Plasma Cell Prolif, High Risk, FISH was cancelled on 04/12/2024 at 11:10; Quantity was not sufficient for testing. The FISH test was cancelled due to an insufficient number of plasma cells within the provided   specimen.         4/9/24 PET  CT      COMPARISON:  [facility] PET-CT 02/07/2024     FINDINGS:  Quality of the study: Adequate.     In the head and neck, there are no hypermetabolic lesions worrisome for malignancy. There are no hypermetabolic mucosal lesions, and there are no pathologically enlarged or hypermetabolic lymph nodes.     In the chest, there are no hypermetabolic lesions worrisome for malignancy.  There are no concerning pulmonary nodules or masses, and there are no pathologically enlarged or hypermetabolic lymph nodes.     In the abdomen and pelvis, there is physiologic tracer distribution within the abdominal organs and excretion into the genitourinary system.     In the bones, there are no hypermetabolic lesions worrisome for malignancy.  Index sclerotic lesion in the anterior right 7th rib demonstrates background level uptake.  Index lytic lesion in the anterior left ilium demonstrates background level uptake.  There is mild uptake associated with a right posterolateral 4th rib fracture, slightly improved compared to prior.  Numerous additional non tracer avid lytic foci and remote bilateral rib fractures.     In the extremities, there are no hypermetabolic lesions worrisome for malignancy.     Additional CT findings: Stable subcentimeter right lower lobe nodules, below size threshold for PET.     Impression:     No focal tracer avid lesions to suggest active multiple myeloma.      4/16/24 CXR    1. Coarse interstitial attenuation, accentuated by habitus.  No large focal consolidation.  2. Remote appearing rib injuries and kyphotic curvature of the lower thoracic spine associated with anterior vertebral body height loss.  This is better evaluated PET-CT 04/09/2024.    Component      Latest Ref Rng 4/16/2024   WBC      3.90 - 12.70 K/uL 3.76 (L)    RBC      4.60 - 6.20 M/uL 2.61 (L)    Hemoglobin      14.0 - 18.0 g/dL 9.0 (L)    Hematocrit      40.0 - 54.0 % 27.1 (L)    MCV      82 - 98 fL 104 (H)    MCH      27.0 - 31.0 pg 34.5 (H)     MCHC      32.0 - 36.0 g/dL 33.2    RDW      11.5 - 14.5 % 17.2 (H)    Platelet Count      150 - 450 K/uL 184    MPV      9.2 - 12.9 fL 11.2    Immature Granulocytes      0.0 - 0.5 % 0.3    Gran # (ANC)      1.8 - 7.7 K/uL 1.8    Immature Grans (Abs)      0.00 - 0.04 K/uL 0.01    Lymph #      1.0 - 4.8 K/uL 1.3    Mono #      0.3 - 1.0 K/uL 0.3    Eos #      0.0 - 0.5 K/uL 0.3    Baso #      0.00 - 0.20 K/uL 0.06    nRBC      0 /100 WBC 0    Gran %      38.0 - 73.0 % 47.6    Lymph %      18.0 - 48.0 % 35.1    Mono %      4.0 - 15.0 % 6.9    Eos %      0.0 - 8.0 % 8.5 (H)    Basophil %      0.0 - 1.9 % 1.6    Differential Method Automated    Sodium      136 - 145 mmol/L 137    Potassium      3.5 - 5.1 mmol/L 3.6    Chloride      98 - 107 mmol/L 101    CO2      22 - 29 mmol/L 28    Glucose      74 - 100 mg/dL 81    BUN      8.9 - 20.6 mg/dL 30 (H)    Creatinine      0.73 - 1.18 mg/dL 1.8 (H)    Calcium      8.4 - 10.2 mg/dL 9.7    PROTEIN TOTAL      6.4 - 8.3 gm/dL 6.1    Albumin      3.5 - 5.0 g/dL 3.9    Globulin, Total      2.4 - 3.5 gm/dL    Albumin/Globulin Ratio      1.1 - 2.0 ratio    BILIRUBIN TOTAL      <=1.5 mg/dL 0.5    ALP      40 - 150 unit/L 41 (L)    ALT      0 - 55 unit/L 18    AST      5 - 34 unit/L 18    eGFR      mls/min/1.73/m2 46.7 !    Anion Gap      8 - 16 mmol/L 8    Specimen UA Urine, Clean Catch    Color, UA      Yellow, Straw, Kait  Colorless !    Appearance, UA      Clear  Clear    pH, UA      5.0 - 8.0  6.0    Specific Gravity, UA      1.005 - 1.030  1.010    Protein, UA      Negative  Negative    Glucose, UA      Negative  Negative    Ketones, UA      Negative  Negative    Bilirubin (UA)      Negative  Negative    Blood, UA      Negative  Negative    NITRITE UA      Negative  Negative    Leukocyte Esterase, UA      Negative  Negative    Alcohol, Urine      <10 mg/dL <10    Benzodiazepines      Negative  Negative    Methadone metabolites      Negative  Negative    Cocaine, Urine       Negative  Negative    Opiates, Urine      Negative  Negative    Barbituates, Urine      Negative  Negative    Amphetamines, Urine      Negative  Presumptive Positive !    Marijuana (THC) Metabolite      Negative  Negative    Phencyclidine      Negative  Negative    Urine Creatinine      23.0 - 375.0 mg/dL 39.0    Toxicology Information SEE COMMENT    Amphetamine Scrn Negative    Barbiturate Scrn Negative    Benzodiazepines Negative    Cocaine Lvl Negative    Methadone (Dolophine), Serum Negative    Opiates, Blood Negative    Phencyclidine, Blood Negative    Propoxyphene Negative    THC (Marijuana) Metabolite, bl Negative    Alcohol Scrn Negative    NIL      IU/mL 0.11623    TB1 - Nil      IU/mL 0.024    TB2 - Nil      IU/mL 0.018    Mitogen - Nil      IU/mL 9.987    TB Gold Plus      Negative  Negative    IgG      650 - 1600 mg/dL 199 (L)    IgA      40 - 350 mg/dL 6 (L)    IgM      50 - 300 mg/dL 9 (L)    PT      9.0 - 12.5 sec 10.8    INR      0.8 - 1.2  1.0    Varicella IgG 308.90    Varicella Interpretation Positive    Chagas Ab, Donor Eval ([facility])      Non reactive  Non-reactive    CMV Ab Total, Donor Eval ([facility])      Non-reactive  Non-reactive    PAYAM Panel, Donor Eval ([facility]) See result image under hyperlink    Phosphorus Level      2.3 - 4.7 mg/dL 5.2 (H)    Prostate Specific Antigen      0.00 - 4.00 ng/mL 2.8    Sickle Cell Screen      Negative  Negative    Strongyloides Ab IgG      Negative  Negative    RPR, Donor Eval ([facility])      Non-reactive  Non-reactive    G6PD Quant      8.0 - 11.9 U/g Hb 9.5    Hepatitis B Core Antibody, Donor Eval ([facility])      Non reactive  Non-reactive    Hepatitis B Surface Ag, Donor Eval ([facility])      Non-reactive  Non-reactive    Hepatitis C Antibody, Donor Eval ([facility])      Non-reactive  Non-reactive    Magnesium       1.60 - 2.60 mg/dL      Component      Latest Ref Rng 4/18/2024   Sodium      136 - 145 mmol/L 140    Potassium       3.5 - 5.1 mmol/L 4.1    Chloride      98 - 107 mmol/L 107    CO2      22 - 29 mmol/L 28    Glucose      74 - 100 mg/dL 113 (H)    BUN      8.9 - 20.6 mg/dL 25.0 (H)    Creatinine      0.73 - 1.18 mg/dL 2.03 (H)    Calcium      8.4 - 10.2 mg/dL 9.4    PROTEIN TOTAL      6.4 - 8.3 gm/dL 5.6 (L)    Albumin      3.5 - 5.0 g/dL 3.7    Globulin, Total      2.4 - 3.5 gm/dL 1.9 (L)    Albumin/Globulin Ratio      1.1 - 2.0 ratio 1.9    BILIRUBIN TOTAL      <=1.5 mg/dL 0.7    ALP      40 - 150 unit/L 37 (L)    ALT      0 - 55 unit/L 14    AST      5 - 34 unit/L 19    eGFR      mls/min/1.73/m2 40       4/16/24 SPEP: Decreased total protein. A linear irregularity in near gamma approximately = 0.05 G/dL.   4/16/24 sIFE: Faint IgG kappa specific monoclonal protein band identified in gamma.   4/16/24 UIFE: No monoclonal peaks identified.     4/16/24 PFT:Spirometry is normal. DLCO is borderline decreased. Adj DLCO 70.4%      4/16/24 EKG: Normal sinus rhythm .Normal ECG       4/16/24 ECHO      Left Ventricle: The left ventricle is normal in size. Moderately increased ventricular mass. Normal wall thickness. There is eccentric hypertrophy. Normal wall motion. There is normal systolic function with a visually estimated ejection fraction of 55 - 60%. Biplane (2D) method of discs ejection fraction is 55%. Global longitudinal strain is -17.0%.    Right Ventricle: Normal right ventricular cavity size. Wall thickness is normal. Right ventricle wall motion  is normal. Systolic function is normal.    Left Atrium: Left atrium is severely dilated.    Right Atrium: Right atrium is severely dilated.    Aortic Valve: The aortic valve is a trileaflet valve.    Tricuspid Valve: There is mild regurgitation.    Pulmonary Artery: There is borderline elevated pulmonary hypertension. The estimated pulmonary artery systolic pressure is 40 mmHg.    IVC/SVC: Normal venous pressure at 3 mmHg.           Component      Latest Ref Rng 4/16/2024   Urine  Total Volume      mL 3200    Urine Collection Duration      Hr 24    CREATININE, URINE (SEND OUT)      23.0 - 375.0 mg/dL 31.0    Creatinine Clearance      70 - 110 mL/min 38 (L)    Urine Creatinine Absolute      mg/Spec 992.0    Creatinine      0.5 - 1.4 mg/dL 1.8 (H)       Component      Latest Ref Rng 4/16/2024   Urine Total Volume      mL 3200    Urine Collection Duration      Hr 24    PROTEIN URINE      0 - 15 mg/dL <7    Urine Protein, Timed      0 - 100 mg/Spec Unable to calculate              Assessment:      1. R-ISS stage III IgG lambda multiple myeloma not in remission  2. Stem cell transplant candidate  3. DOUG  4. Fatigue  5. Cancer associated pain  6. Anemia in neoplastic disease  7. Leukopenia  8. Neutropenia  9. thrombocytopenia      Plan:    1. He has high risk features on bone marrow aspirate FISH,  including TP53 deletion and FGFR3/IGH (or NSD2/IGH) fusion, usually representing a t(4;14). He had presented with DOUG and hypercalcemia.    He has started mira VRd induction. In view of high risk features on FISH and good performance status I had recommended switching bortezomib to carfilzomib.   I discussed the role, timing, risks and benefits of autologous stem cell transplant in multiple myeloma. I explained that although the autologous stem cell transplant is NOT curative, it has progression free survival and overall survival benefits, when the transplant is followed by maintenance therapy. I discussed that he will andi to be in  [place] for 4 weeks for the transplant , with approximately 2 weeks in the hospital, and 2 weeks post discharge locally and will need an adult care-giver for all four weeks.   He has good performance status with limited co-morbid conditions and will possibly be transplanted in March-April 2024 if he responds well to induction.  He has not had a baseline PET CT. CT C/A/P done on 10/14/23 demonstrated scattered mediastinal lymph nodes. Unclear if this is extramedullary  involvement  by his myeloma.  He is on zometa.   He is in cycle 3 mira VRd . His serum free light chain ratio is normal as of 1/11/24. 12/14/23 SPEP/ OSVALDO with a  faint monoclonal peak (0.02 g/dL) in the gamma zone, consistent with a monoclonal gammopathy. A faint band was present in the IgG kaitlin with a corresponding faint band in the kappa kaitlin, possibly due to daratumumab interference ( original paraprotein was IgG lambda).         2.   Autologous stem cell transplant in multiple myeloma NOT curative.   I discussed the risks and benefits.       BENEFITS    In  the phase 3 DETERMINATION trial, (  [facility] July 14 2022) adults (18 to 65 years of age) with symptomatic myeloma received one cycle of RVD and were then randomly assigned, in a 1:1 ratio, to receive two additional RVD cycles plus stem-cell mobilization, followed by either five additional RVD cycles (the RVD-alone group) or high-dose melphalan plus ASCT followed by two additional RVD cycles (the transplantation group). Both groups received lenalidomide until disease progression, unacceptable side effects, or both. The primary end point was progression-free survival.  At a median follow-up of 76.0 months, 328 events of disease progression or death occurred; the risk was 53% higher in the RVD-alone group than in the transplantation group (hazard ratio, 1.53; 95% confidence interval [CI], 1.23 to 1.91; P<0.001); median progression-free survival was 46.2 months and 67.5 months.   The percentage of patients with a partial response or better was 95.0% in the RVD-alone group and 97.5% in the transplantation group (P=0.55); 42.0% and 46.8%, respectively, had a complete response or better (P=0.99).   Treatment-related adverse events of grade 3 or higher occurred in 78.2% and 94.2%, respectively; 5-year survival was 79.2% and 80.7% (hazard ratio for death, 1.10; 95% CI, 0.73 to 1.65 )         RISKS       * Bone Marrow Suppression    High-dose chemotherapy directly  destroys the bone marrow's ability to produce white blood cells, red blood cells and platelets. Patients experience side effects caused by neutropenia, anemia and thrombocytopenia. Patients usually need blood and platelet transfusions to treat anemia and thrombocytopenia until the new graft beings producing blood cells. The duration of bone marrow suppression is shortened by infusing an optimal number of stem cells and administering growth factors that hasten the recovery of blood cell production.    * Infections    During the two to three weeks it takes the new bone marrow to grow and produce white blood cells, patients are susceptible to infection and require the administration of antibiotics to prevent bacterial and fungal infections. Bacterial infections are the most common during this initial period of neutropenia. Stem cells collected from peripheral blood tend to engraft faster than bone marrow and may reduce the risk of infection by shortening the period of neutropenia. The growth factor filgrastim also increases the rate of white blood cell recovery and has been approved by the Food and Drug Administration for use during autologous stem cell transplant.    The immune system takes even longer to recover than white blood cell production, with a resulting susceptibility to some bacterial, fungal and viral infections for weeks to months. After initial recovery from autologous stem cell transplant, patients are often required to take antibiotics for weeks to months to prevent infections from occurring. Prophylactic antibiotic administration can prevent Pneumocystis carinii pneumonia and some bacterial and fungal infections. Prophylactic antibiotics can also decrease the incidence of herpes zoster infection, which commonly occurs after high-dose chemotherapy and autologous stem cell transplant.    * Veno-Occlusive Disease of the Liver (VOD)    High-dose chemotherapy can result in damage to the liver, which can be  serious and even fatal. This complication is increased in patients who have substantial amounts of previous chemotherapy and/or radiation therapy, a history of liver damage or hepatitis. Veno-occlusive disease (VOD) of the liver typically occurs in the first two weeks after high-dose chemotherapy treatment. Patients typically experience symptoms of abdominal fullness or swelling, liver tenderness and weight gain from fluid retention.     * Interstitial Pneumonia Syndrome (IPS)  High-dose chemotherapy can directly damage the cells of the lungs. This may be more frequent in patients treated with certain types of chemotherapy and/or radiation therapy given prior to the transplant. This complication of transplant may occur anytime, from a few days after high-dose chemotherapy to several months after treatment. This often occurs after a patient has returned home from a transplant center and is being seen by a local oncologist.    Patients typically experience a dry non-productive cough or shortness of breath.  Patients experiencing shortness of breath or a new cough after autologous transplant should bring this to the immediate attention of their doctor since this can be a serious and even fatal complication.    * Graft Failure  Graft failure is extremely unusual in autologous stem cell transplantation. Graft failure occurs when bone marrow function does not return. The graft may fail to grow in the patient--resulting in bone marrow failure--with the absence of red blood cells, white blood cells and platelet production. This results in infection, anemia and bleeding. Graft failure may also occur in patients with extensive marrow fibrosis before transplantation, a viral illness or from the use of some drugs (such as methotrexate). In leukemia patients, graft failure often is associated with a recurrence of cancer; the leukemic cells may inhibit the growth of the transplanted cells. In some cases, the reasons for graft  failure are unknown.    * Risks associated with central iv line placement (for leukapheresis)    --Bleeding   --Pain  --Infections  --Pneumothorax( rare)    *Risks associated with use of neupogen    >10%:    Cardiovascular: Chest pain (5% to 13%)  Central nervous system: Fatigue (20%), dizziness (14%), pain (12%)  Dermatologic: Skin rash (2% to 14%)  Gastrointestinal: Nausea (43%)  Hematologic & oncologic: Thrombocytopenia (5% to 38%), splenomegaly (?5%; severe chronic neutropenia: 30%)  Hepatic: Increased serum alkaline phosphatase (6% to 11%)  Neuromuscular & skeletal: Ostealgia (11% to 30%), back pain (2% to 15%)  Respiratory: Epistaxis (?5%), cough (14%), dyspnea (13%)  Miscellaneous: Fever (8% to 48%)    1% to 10%:    Cardiovascular: Peripheral edema (?5%), hypertension (?5%)  Central nervous system: Headache (6% to 10%), hypoesthesia (?5%), insomnia (?5%), malaise (?5%), mouth pain (?5%)  Dermatologic: Alopecia (?5%), erythema (?2%), maculopapular rash (?2%)  Endocrine & metabolic: Increased lactate dehydrogenase (6%)  Gastrointestinal: Vomiting (?5%), decreased appetite (?5%), constipation (?2%), diarrhea (?2%)  Genitourinary: Urinary tract infection (?5%)  Hematologic & oncologic: Anemia (?5%), decreased hemoglobin (?5%), leukocytosis (?2%)  Hypersensitivity: Transfusion reaction (?2%), hypersensitivity reaction (?5%)  Immunologic: Antibody development (2% to 3%; no evidence of neutralizing response)  Infection: Sepsis (?5%)  Neuromuscular & skeletal: Arthralgia (5% to 9%), limb pain (2% to 7%), muscle spasm (?5%), musculoskeletal pain (?5%) asthenia (?5%)  Respiratory: Bronchitis (?5%), oropharyngeal pain (?5%), upper respiratory tract infection (?5%)        *Risks associated with transfusion    --Infections  --Volume over load  --Lung injury (very rare)  --Iron overload  --Formation of antibodies against platelets and/or red blood cells  --hemolytic reactions  --Febrile reactions    * Long-Term Side  "Effects of Autologous Stem Cell Transplant  There are several long-term or late side effects that result from the chemotherapy and radiation therapy used in autologous stem cell transplant. The frequency and severity of these problems depends on the chemotherapy used to treat the patient. Some examples of complications include the following:    --Cataracts: Cataracts occur in the overwhelming majority of patients who receive total body irradiation in their treatment regimen. In patients who receive chemotherapy without total body irradiation, cataracts are much less frequent. The onset of cataracts begins at 18 to 24 months following treatment. Patients who have received large doses of steroids will have an increased frequency and earlier onset of cataracts. Patients are advised to have slit lamp eye evaluations annually with early correction with artificial lenses.    --New Cancers: Treatment with chemotherapy and radiation therapy is known to increase the risk of developing a new cancer. These are called "secondary cancers" and may occur as a late complication of high-dose chemotherapy.Patients with lymphoma treated with high-dose chemotherapy and autologous stem cell transplant appear to have about a  2 to 4 percent if treated with high-dose chemotherapy and no radiation. Patients with lymphoma treated with conventional chemotherapy have also been reported to have a 4 to 8 percent risk of developing a secondary cancer. How much additional risk occurs from high-dose chemotherapy is unclear; however, high-dose radiation clearly increases the risk of developing a secondary cancer.        G6PD: normal  Sickle cell screen : negative  CMV Ab : non reactive  RPR: non-reactive  Chagas Ab: non-reactive  HBV, HCV, HTLV I/II , HIV 1/2 : all non-reactive  Varicella IgG : positive  Urine toxicology screen: positive for amphetamine  TB Gold Plus negative  Blood toxicology screen: negative  APTT, INR, Magnesium, phosphorous: " all normal  UA :normal  24hr creatinine clearance: 38/min  24hr UPEP/ UIFE: No paraprotein band / no monoclonal peaks identified.   4/16/24 sFLC ratio: normal  4/16/24 SPEP/OSVALDO: A linear irregularity in near gamma approximately = 0.05 G/dL.  Faint IgG kappa specific monoclonal protein band identified in gamma.   Strongyloides IgG : negative  HSV 1 IgG: negative  HSV 2 IgG:  negative  4/16/24 ECHO:The left ventricle is normal in size. Moderately increased ventricular mass. Normal wall thickness. There is eccentric hypertrophy. Normal wall motion. There is normal systolic function with a visually estimated ejection fraction of 55 - 60%.   EKG: Normal EKG   4/9/24 PET CT: No focal tracer avid lesions to suggest active multiple myeloma.   4/16/24 PFT:Spirometry is normal. DLCO is borderline decreased.   PSA: 2.8, normal  2/3/24 colonoscopy: Normal  4/9/24 Bone marrow biopsy:NO DEFINITIVE MORPHOLOGIC OR IMMUNOPHENOTYPIC EVIDENCE OF RESIDUAL PLASMA CELL NEOPLASM  Dental clearance: Obtained  SW: No barriers identified  Care giver: spouse  Disposition: [facility] after hospital discharge, then home  Onco psychology: No overt contraindications for SCT      3. Secondary to multiple myeloma, Cr improving.       4. Secodnary to multiple myeloma    5. He has widespread bone involvement. He is on zometa, oxycodone.    6. Most recent Hgb 8.5g/dl    7, 8. Most recent WBC count 4.08, ANC 2.74. He is afebrile.    9,. Most recent platelet count 223 k

## 2024-04-29 ENCOUNTER — OFFICE VISIT (OUTPATIENT)
Dept: PSYCHIATRY | Facility: CLINIC | Age: 44
End: 2024-04-29
Payer: COMMERCIAL

## 2024-04-29 ENCOUNTER — OFFICE VISIT (OUTPATIENT)
Dept: HEMATOLOGY/ONCOLOGY | Facility: CLINIC | Age: 44
End: 2024-04-29
Payer: COMMERCIAL

## 2024-04-29 VITALS
DIASTOLIC BLOOD PRESSURE: 86 MMHG | RESPIRATION RATE: 18 BRPM | WEIGHT: 209.56 LBS | HEIGHT: 69 IN | OXYGEN SATURATION: 100 % | SYSTOLIC BLOOD PRESSURE: 155 MMHG | BODY MASS INDEX: 31.04 KG/M2 | HEART RATE: 61 BPM | TEMPERATURE: 98 F

## 2024-04-29 DIAGNOSIS — Z76.82 STEM CELL TRANSPLANT CANDIDATE: ICD-10-CM

## 2024-04-29 DIAGNOSIS — R53.82 CHRONIC FATIGUE: ICD-10-CM

## 2024-04-29 DIAGNOSIS — Z76.82 STEM CELL TRANSPLANT CANDIDATE: Primary | ICD-10-CM

## 2024-04-29 DIAGNOSIS — C90.00 METASTATIC MULTIPLE MYELOMA TO BONE: ICD-10-CM

## 2024-04-29 DIAGNOSIS — C90.00 MULTIPLE MYELOMA, REMISSION STATUS UNSPECIFIED: ICD-10-CM

## 2024-04-29 DIAGNOSIS — D63.0 ANEMIA IN NEOPLASTIC DISEASE: ICD-10-CM

## 2024-04-29 DIAGNOSIS — D84.821 IMMUNODEFICIENCY DUE TO DRUG THERAPY: Primary | ICD-10-CM

## 2024-04-29 DIAGNOSIS — D70.1 CHEMOTHERAPY-INDUCED NEUTROPENIA: ICD-10-CM

## 2024-04-29 DIAGNOSIS — G89.3 CANCER ASSOCIATED PAIN: ICD-10-CM

## 2024-04-29 DIAGNOSIS — Z01.818 PRE-TRANSPLANT EVALUATION FOR STEM CELL TRANSPLANT: Primary | ICD-10-CM

## 2024-04-29 DIAGNOSIS — T45.1X5A CHEMOTHERAPY-INDUCED NEUTROPENIA: ICD-10-CM

## 2024-04-29 DIAGNOSIS — C90.01 MULTIPLE MYELOMA IN REMISSION: ICD-10-CM

## 2024-04-29 DIAGNOSIS — C90.00 MULTIPLE MYELOMA NOT HAVING ACHIEVED REMISSION: ICD-10-CM

## 2024-04-29 DIAGNOSIS — Z79.899 IMMUNODEFICIENCY DUE TO DRUG THERAPY: Primary | ICD-10-CM

## 2024-04-29 PROCEDURE — 99999 PR PBB SHADOW E&M-EST. PATIENT-LVL IV: CPT | Mod: PBBFAC,,, | Performed by: INTERNAL MEDICINE

## 2024-04-29 PROCEDURE — 1159F MED LIST DOCD IN RCRD: CPT | Mod: CPTII,S$GLB,, | Performed by: INTERNAL MEDICINE

## 2024-04-29 PROCEDURE — 96146 PSYCL/NRPSYC TST AUTO RESULT: CPT | Mod: S$GLB,,, | Performed by: PSYCHOLOGIST

## 2024-04-29 PROCEDURE — 3077F SYST BP >= 140 MM HG: CPT | Mod: CPTII,S$GLB,, | Performed by: INTERNAL MEDICINE

## 2024-04-29 PROCEDURE — 99215 OFFICE O/P EST HI 40 MIN: CPT | Mod: S$GLB,,, | Performed by: INTERNAL MEDICINE

## 2024-04-29 PROCEDURE — 99999 PR PBB SHADOW E&M-EST. PATIENT-LVL I: CPT | Mod: PBBFAC,,,

## 2024-04-29 PROCEDURE — 3008F BODY MASS INDEX DOCD: CPT | Mod: CPTII,S$GLB,, | Performed by: INTERNAL MEDICINE

## 2024-04-29 PROCEDURE — 90791 PSYCH DIAGNOSTIC EVALUATION: CPT | Mod: S$GLB,,, | Performed by: PSYCHOLOGIST

## 2024-04-29 PROCEDURE — 99999 PR PBB SHADOW E&M-EST. PATIENT-LVL II: CPT | Mod: PBBFAC,,, | Performed by: PSYCHOLOGIST

## 2024-04-29 PROCEDURE — 3079F DIAST BP 80-89 MM HG: CPT | Mod: CPTII,S$GLB,, | Performed by: INTERNAL MEDICINE

## 2024-04-29 PROCEDURE — 96130 PSYCL TST EVAL PHYS/QHP 1ST: CPT | Mod: S$GLB,,, | Performed by: PSYCHOLOGIST

## 2024-04-29 NOTE — PROGRESS NOTES
"Psycho-Oncology Pre-Transplant Evaluation  Psychiatry Initial Visit (PhD)  Psychological Intake and Assessment    Date:  4/29/2024     CPT Code:   74367 (1hr) , Psychiatric Diagnostic Evaluation  24112 (1hr), Psychological testing evaluation services by physician or other qualified health care professional    Evaluation Length (direct face-to-face time):  1 hour, 15 minutes  Total Time including report writing, test scoring, chart review, integration of data and feedback: 2.5 hours       Referred by:  Oncologist: IRMA Cook MD.     Chief complaint/reason for encounter:  Psychological Evaluation prior to bone marrow tranplantation    Clinical status of patient: Outpatient    Pete Fernandez, a 43 y.o. male, was seen for initial evaluation visit.  Met with patient. His primary care physician is Eric Kohli MD.           Psychological Intake  Medical/Surgical History:   Patient Active Problem List   Diagnosis    Hypercalcemia    Transaminitis    Multiple myeloma    Metastatic multiple myeloma to bone    Immunodeficiency due to drug therapy    Cancer associated pain    Chronic fatigue    Anemia in neoplastic disease    Chemotherapy-induced neutropenia    Thrombocytopenia    Stem cell transplant candidate        Health Behaviors:       ETOH Use: No (past social)      Tobacco Use: No   Illicit Drug Use:  No     Prescription Misuse:No   Caffeine: moderate   Exercise:The patient engages in environmental activity only. Prior gym 2-3x/week (several years ago)    Family History:   Psychiatric illness: No     Alcohol/Drug Abuse: No     Suicide: No      Past Psychiatric History:   Inpatient treatment: No     Outpatient treatment: No     Prior substance abuse treatment: No     Suicide Attempts: No      Psychotropic Medications:  Current: Adderall (does not plan to take it during BMT or recovery)      Past: Ativan, Pristiq (less than 1 month, "didn't need it"), and Xanax    Current medications as per below, allergies reviewed in " chart.  Current Outpatient Medications   Medication Sig Dispense Refill    acyclovir (ZOVIRAX) 400 MG tablet Take 400 mg by mouth 2 (two) times daily.      aspirin 81 MG Chew Take 81 mg by mouth once daily.      calcitRIOL (ROCALTROL) 0.5 MCG Cap Take 1 capsule (0.5 mcg total) by mouth once daily. 30 capsule 3    colchicine (COLCRYS) 0.6 mg tablet Take 0.6 mg by mouth once daily.      dexAMETHasone (DECADRON) 4 MG Tab Take 10 tablets (40 mg total) by mouth every 7 days. Take with food. 40 tablet 5    dextroamphetamine-amphetamine (ADDERALL) 20 mg tablet Take 1 tablet by mouth once daily.      febuxostat (ULORIC) 40 mg Tab Take 1 tablet (40 mg total) by mouth once daily. 30 tablet 3    lenalidomide (REVLIMID) 10 mg Cap TAKE 1 CAPSULE ONCE DAILY DAYS 1 THROUGH 21 OF EACH 28 DAY CYCLE 21 each 0    metoprolol tartrate (LOPRESSOR) 25 MG tablet Take 12.5 mg by mouth 2 (two) times daily.      ondansetron (ZOFRAN) 8 MG tablet Take 1 tablet (8 mg total) by mouth every 8 (eight) hours as needed for Nausea. 30 tablet 2    oxyCODONE (ROXICODONE) 10 mg Tab immediate release tablet Take 1 tablet (10 mg total) by mouth every 4 (four) hours as needed for Pain. 90 tablet 0    pantoprazole (PROTONIX) 20 MG tablet TAKE 1 TABLET (20 MG TOTAL) BY MOUTH ONCE DAILY 30 tablet 1    prochlorperazine (COMPAZINE) 5 MG tablet Take 5 mg by mouth every 6 (six) hours as needed for Nausea.      sodium bicarbonate 650 MG tablet Take 650 mg by mouth 2 (two) times daily.      sulfamethoxazole-trimethoprim 800-160mg (BACTRIM DS) 800-160 mg Tab Take 1 tablet by mouth once daily. 30 tablet 6    tamsulosin (FLOMAX) 0.4 mg Cap Take 1 capsule (0.4 mg total) by mouth once daily. 30 capsule 6     No current facility-administered medications for this visit.         Social situation/Stressors:Pete Fernandez is an 43 y.o. male referred by IRMA Cook MD for pre-transplant evaluation.  Pete Fernandez lives with his wife in [place], [place]. He is a former [email or web address]  worker ([employer], ).  He had been in his job for 17 years. He has not worked since his October 2023 diagnosis.  His prior work history is stable. 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  Pete Fernandez has been  1x and has 2 adult daughters (Harriet, Kjerston).  His spouse is Rosalinda.  His parents are living. He has 1 brother, living in [place].  The patient reports good social support from his parents, wife, and extended family.  His wife and mother will be present and available to assist the patient during his recovery period.   Pete Fernandez is not Confucianist. Pete Fernandez's hobbies include riding his 3 vivas, cooking, and doing yard/household maintenance.   The patient has no  history.    Additional stressors:  daughter's September wedding    Concerns over what type of work he will be able to do after BMT     Strengths: Housing stability, Able to vocalize needs, Motivation, readiness for change, Setting and pursuing goals, hopes, dreams, aspirations, and Interpersonal relationships and supports available - family, relatives, friends   Liabilities: Complicated medical illness    History of present illness:   Oncology History   Multiple myeloma   10/20/2023 Initial Diagnosis    Multiple myeloma     11/2/2023 - 11/22/2023 Chemotherapy    Treatment Summary   Plan Name: OP Myeloma AYDE-CIPRIANO daratumumab bortezomib (WEEKLY) lenalidomide dexAMETHasone Q4W  Treatment Goal: Curative  Status: Inactive  Start Date: 11/2/2023  End Date: 11/22/2023  Provider: Elizabeth Kennedy Lejeune, MD  Chemotherapy: bortezomib (VELCADE) injection 3 mg, 3 mg, Subcutaneous, Clinic/HOD 1 time, 1 of 6 cycles  Administration: 3 mg (11/2/2023), 2.75 mg (11/9/2023), 3 mg (11/22/2023), 2.75 mg (11/16/2023)  lenalidomide 10 mg Cap, 10 mg (100 % of original dose 10 mg), Oral, Daily, 1 of 1 cycle, Start date: 11/1/2023, End date: 10/24/2023  Dose modification: 10 mg (original dose 10 mg, Cycle  0)  daratumumab-hyaluronidase-fihj subcutaneous injection 1,800 mg, 1,800 mg, Subcutaneous, Clinic/HOD 1 time, 1 of 6 cycles  Administration: 1,800 mg (11/2/2023), 1,800 mg (11/9/2023), 1,800 mg (11/22/2023), 1,800 mg (11/16/2023)     11/2/2023 -  Chemotherapy    Treatment Summary   Plan Name: OP Myeloma AYDE-VRD daratumumab bortezomib (WEEKLY) lenalidomide dexAMETHasone Q4W  Treatment Goal: Control  Status: Active  Start Date: 11/2/2023  End Date: 4/26/2024 (Planned)  Provider: Elizabeth Kennedy Lejeune, MD  Chemotherapy: bortezomib (VELCADE) injection 3 mg, 1.3 mg/m2, Subcutaneous, Clinic/HOD 1 time, 6 of 6 cycles  Administration: 3 mg (12/14/2023), 3 mg (12/21/2023), 3 mg (1/11/2024), 3 mg (1/18/2024), 3 mg (1/25/2024), 3 mg (2/8/2024), 3 mg (12/28/2023), 3 mg (1/5/2024), 3 mg (2/15/2024), 3 mg (2/22/2024), 3 mg (3/28/2024), 3 mg (4/4/2024), 3 mg (3/14/2024), 3 mg (3/21/2024), 3 mg (2/29/2024), 3 mg (3/7/2024), 3 mg (4/11/2024), 3 mg (4/18/2024)  daratumumab-hyaluronidase-fihj (DARZALEX FASPRO) subcutaneous injection 1,800 mg 15 mL, 1,800 mg, Subcutaneous, Clinic/HOD 1 time, 5 of 5 cycles  Administration: 1,800 mg (12/14/2023), 1,800 mg (12/21/2023), 1,800 mg (1/11/2024), 1,800 mg (1/25/2024), 1,800 mg (12/28/2023), 1,800 mg (1/5/2024), 1,800 mg (2/15/2024), 1,800 mg (2/29/2024)     11/20/2023 Cancer Staged    Staging form: Plasma Cell Myeloma and Plasma Cell Disorders, AJCC 8th Edition  - Clinical: RISS Stage III (Beta-2-microglobulin (mg/L): 18, Albumin (g/dL): 3, ISS: Stage III, High-risk cytogenetics: Present, LDH: Not assessed)     11/30/2023 - 11/30/2023 Chemotherapy    Treatment Summary   Plan Name: OP Myeloma AYDE-BALDO daratumumab carflizomib (WEEKLY) lenalidomide dexAMETHasone Q4W  Treatment Goal: Curative  Status: Inactive  Start Date: 11/30/2023  End Date: 11/30/2023  Provider: Elizabeth Kennedy Lejeune, MD  Chemotherapy: lenalidomide (REVLIMID) 10 mg Cap, 10 mg (100 % of original dose 10 mg), Oral,  "Daily, 1 of 1 cycle, Start date: 11/28/2023, End date: 11/28/2023  Dose modification: 10 mg (original dose 10 mg, Cycle 0)  daratumumab-hyaluronidase-fihj subcutaneous injection 1,800 mg, 1,800 mg, Subcutaneous, Clinic/HOD 1 time, 1 of 8 cycles  Administration: 1,800 mg (11/30/2023)  carfilzomib (KYPROLIS) 44 mg in dextrose 5 % (D5W) 72 mL IVPB, 46 mg, Intravenous, Clinic/HOD 1 time, 1 of 8 cycles  Dose modification: 56 mg/m2 (Cycle 1)  Administration: 44 mg (11/30/2023)         Pete Fernandez has adjusted to illness fairly well after an initial period of shock. He states he was "down at the beginning" but now has come to good acceptance. He has coped through active coping strategies, focus on alternative activities, and focus on family. He has engaged in appropriate information gathering.  The patient has excellent family support.  His family is coping adequately with the diagnosis/treatment/prognosis.  He worries that his wife may struggle due to her history of "lots of losses" since childhood. It is hard for him to see her suffering.    Pete Fernandez reports using  distraction and time with family and friends  as his primary methods of coping with general stressors.  Illness-related psychosocial stressors include absence from work and changes in ability to engage in leisure activities. These stressors will not prevent patient from adhering to post-transplant requirements.  The patient has a satisfactory partnership with his [facility] oncology treatment team. The patient reports the following barriers to cancer care:none.    Stem Cell Transplantation (SCT):  Pete Fernandez possesses a good level of knowledge about autologous SCT gleaned from materials provided by his clinicians and discussions with his clinical team .  Pete Fernandez is knowledgeable about the possible costs, risks, and complications of the procedure and the behavioral changes which will be required of him.  He has anticipated his recovery needs and has " planned adequate time away from work, assistance from his wife and mother, and residence at [facility] to facilitate healing. Pete Fernandez is aware of the requirement that autologous HSCT patients must stay within 1 hour of the hospital for their first 30 days post-transplant.  Pete Fernandez knows he must commit to careful monitoring of symptoms, the possibility of a complex long-term multiple drug treatment regimen, and long term follow-up visits with his oncologist (as required) following the procedure.  He is aware of the following necessary behavioral changes:changes in food selection, preparation, and storage, increased vigilance with home cleanliness , careful personal and dental hygiene , and rapid return to physical activity. The patient reports good compliance with medical treatment in the past, which is supported by review of his medical chart.  Pete Fernandez has realistic expectations of health and illness possibilities following SCT. He is aware of possible medical side effects including infection, organ toxicity/failure , hair loss, GI difficulties , loss of appetite, fatigue , neurocognitive changes, and mucositis  during/following treatment. He is aware of the risk of mortality. He also anticipates social strains including decreased ability to participate in some leisure and social activities for some time and the strains of care-giving demands on friends/family.      Collateral Information: No concerns expressed by team    Current Symptoms:  Mood: denies depressed mood or anhedonia, reports weight loss, insomnia, fatigue, and poor concentration;  no prior and no SI/HI;   Isabel: Denies symptoms of isabel, including distractibility, grandiosity, delusions, hallucinations, decreased sleep with increase in activity, talkativeness, or risky behavior.    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  Yes (Drugs of abuse screen negative, other than amphetamines and patient is prescribed Adderall)  Cognitive functioning:  reports some treatment-related cognitive changes  Health behaviors: noncontributory  Can the patient identify own medications and describe purpose and proper dosing? Yes  Does the patient appropriately manage chronic conditions which need close monitoring (such as diabetes)? N/A  Does the patient use complementary or alternative medications, remedies, procedures, or interventions? No   Sleep:  The patient reports approximately 4 hours of uninterrupted sleep per night (6 hours total is normal for him; currently 6-8 hours/night). Very poor sleep during chenotherapy due to steroids; historical problems with sleep onset insomnia; no EDS , no reported apneic events, and no naps , AM caffeine, PM caffeine, and (+) psychophysiological factors; (+) use of OTC "Rexall" (diphenhydramine) in past    Pain: Mr. Fernandez reports 0/10 pain.    Trauma: Denies  Sexual Dysfunction:  Denies  Head Injury History: Denies LOC  Personality Functioning: The patient does not display any personality characteristics which would be an impediment to receiving BMT.    Psychological Assessment/Testing:     Distress thermometer:       4/29/2024     2:27 PM 1/22/2024     1:08 PM 1/3/2024     9:50 PM 11/27/2023     9:25 PM 11/24/2023     8:00 AM 11/16/2023    12:00 PM 11/2/2023    12:30 " PM   DISTRESS SCREENING   Distress Score 1 0 - No Distress 2 2 0 - No Distress 0 - No Distress 0 - No Distress   Practical Concerns   None of these None of these None of these None of these None of these   Social Concerns   None of these None of these None of these None of these None of these   Emotional Concerns   None of these None of these None of these None of these Sadness;Worry   Retire Spiritual or Hoahaoism Concerns    No No No No   Spiritual or Hoahaoism Concerns   None of these       Physical Concerns   Pain;Fatigue Fatigue;Pain;Sleep None of these None of these None of these            PHQ-9      The patient completed the Patient Health Questionnaire-9 item (PHQ-9) Depression Screen and received a score of 6 (not meeting screener), indicating no depression symptoms presently impacting current functioning.      AMRIAN-7         No data to display                       The patient completed the General Anxiety Disorder, 7 Item Questionnaire (GAD7) and received a score of 5, indicating  minimal  anxiety symptoms presently impacting current functioning.    SLUMS  The [employer] Mental Status Examination (UMS), a cognitive screener, was administered to assess the patient's current mental status and cognitive capacity. Patient obtained a score of 22/30. This score does not fall within normal limits as compared to those within similar age and level of education, and suggests mild impairments in neurocognitive functioning.  He is currently undergoing cancer treatment, which is associated with temporary decline in cognitive functioning.  His performance may have also been impacted by his known ADHD. Areas of difficulty were delayed memory, immediate memory, attention, and executive functioning.. Patient's ability to complete ADLS is not impacted.     REALM-R:   The Rapid Estimate of Adult Literacy in Medicine, Revised (REALM-R) is a brief screening instrument used to assess an adult patient's ability to  "read common medical words. It is designed to assist medical professionals in identifying patients at risk for poor understanding of written healthcare communication.  Results:  Sufficient health literacy      [facility] BEHAVIORAL MEDICINE DIAGNOSTIC (MBMD)                                    The MBMD provides an assessment of the potential role of psychiatric factors in a patient's disease and treatment. Pete Fernandez produced a valid MBMD profile.  Validity indices suggest no concerns in his test-taking attitude (Disclosure, Desirability or Debasement). All additional scales will be interpreted with this information in mind.    The patient's profile suggests the presence of  mild  anxiety, no depression, mild  cognitive issues, no difficulty with moodiness or mood swings, and mild apprehension to being open with others about these issues. He may struggle with overeating and inactivity, which may be exacerbated by stress. There are no indications he may struggle with overuse of EtOH, overuse of caffeine, use of substances, or tobacco use when under stress.     In regard to COPING STYLES, a key scale shows response patterns expected to POSITIVELY influence jackson-procedural course (Respectful), and another scale suggests patterns that could NEGATIVELY influence jackson-procedural course (Forceful).  This suggests that the patient may tend to be responsible and cooperative, but may keep his feelings to himself (performing the role of "ideal patient") and may, sometimes, be distrustful of others and/or present as angry in stressful situations. He would be best served by straightforward information provision and clear guidelines for behavior.      The STRESS MODERATORS scales assess factors which have the potential to influence patient responses to treatment.  Pete Fernandez obtained a significant elevation on the Illness Apprehension scale and  moderate elevations on the Stress Moderators scales Functional Deficits, and Future " Pessimism. Profiles similar to Pete Fernandez indicate the patient may be very worried about his illness and have a high awareness of changes in his physical functioning, which may be beneficial to him with regard to being proactive in seeking medical treatment, but may cause him excessive preoccupation about his physical health. He may require more emotional support from team members than other patients. His illness may be limiting his ability to picture a productive future. His low score on the Spiritual Absence scale suggests strong spiritual coping mechanisms.     Scores on the TREATMENT PROGNOSTIC scales  and MANAGEMENT GUIDES indices reveal that Pete Fernandez is likely to be very stoic in the face of medical intervention and is receptive to information about his health and comfortable learning about details of his illness. The patient's responses indicate that he is likely to be an adequate partner with his medical team in working toward wellness. He is not likely to require excessive psychosocial support or assistance from his medical team during his jackson-procedural course.        Mental Status Exam:    General appearance:  appears stated age, neatly dressed, well groomed  Level of cooperation:  cooperative  Thought processes:  logical, goal-directed   Speech: normal in rate, volume, and tone    Mood: euthymic  Affect: constricted  Thought content:  no illusions, no visual hallucinations, no auditory hallucinations, no delusions, no active or passive homicidal thoughts, no active or passive suicidal ideation, no obsessions, no compulsions, no violence  Orientation:  oriented to person, place, and time  Memory:  Recent memory:  3 of 5 objects after brief delay.    Remote memory - intact  Attention span and concentration:  spelled WORLD forwards, but skipped a letter when spelling backwards; SAVEAHAART without difficulty  Abstract reasoning:    Similarities: abstract.    Proverbs: concrete  Judgment and insight:  fair   Language:  Intact        SUMMARY:  Pete Fernandez is a  43 y.o. male referred by Dr. IRMA Cook MD. for psychological evaluation prior to stem cell transplantation.  The patient appears absent of disabling psychopathology or disabilities which would prevent understanding and compliance with medical treatment.  There is no evidence of suicidality. He does describe increased anxiety/worry, but these are not at a level which would interfere with moving forward to treatment.  The patient has good knowledge about HSCT, appropriate expectations for health and illness following transplantation, adequate understanding of the possible risks and complications of this treatment option, and a high willingness to sustain effort for lifestyle changes and health adaptations which will be required of him. He is aware of the 30 day 1 hour residence requirement.  He reports adequate compliance with previous medical treatment.  Pete Fernandez has good social support from his wife and mother (as caregivers) and his extended family, in general.   The patient exhibits a high degree of social stability.  The patient acknowledges no stressors expected to limit his ability to cope with the demands of HSCT and recovery.  The patient reports  moderate caffeine consumption, no tobacco use, no alcohol use, and no illicit drug use  He demonstrates adequate health literacy.    He does demonstrate some impairment in cognitive functioning, but retains the ability to complete all ADLs and has the ability to consent to HSCT based upon knowledge and understanding of the procedure.      IMPRESSIONS AND RECOMMENDATIONS  Pete Fernandez is an acceptable HSCT candidate from a psychological perspective. He is very comfortable with details about his illness and would be best served by straightforward information provision and clear guidelines for behavior.   There are no overt psychological contraindications for proceeding with the procedure. His anxiety is  not of a level expected to interfere with jackson-procedural demands.   The patient has reasonable expectations for the procedure, good social support, and has already begun making appropriate life plans in anticipation of the procedure. The patient has verbalized appropriate awareness and commitment to the necessary behavioral changes associated with HSCT and appears willing to adjust to long-term lifestyle challenges and medical follow-up. There are no recommendations for psychological treatment at this time. The patient and family are aware of resources available should their psychological needs change in the future.      1. Metastatic multiple myeloma to bone    2. Multiple myeloma not having achieved remission    3. Stem cell transplant candidate        Andres Worrell, PhD  Clinical Psychologist  [place] License #[id number]  [place] License #[id number]  [place] License #[id number]   [id number]

## 2024-04-29 NOTE — LETTER
April 30, 2024        Christel Cook MD  [address] [facility] [address]             [facility] - Psychiatry  [address] [place] [id number]  Phone: [phone]  Fax: [phone]   Patient: Pete Fernandez   MR Number: [id number]   YOB: 1980   Date of Visit: 4/29/2024       Dear Dr. Cook:    Thank you for referring Pete Fernandez to me for evaluation. Below are the relevant portions of my assessment and plan of care.       Pete Fernandez is a  43 y.o. male referred by Dr. IRMA Cook MD. for psychological evaluation prior to stem cell transplantation.  The patient appears absent of disabling psychopathology or disabilities which would prevent understanding and compliance with medical treatment.  There is no evidence of suicidality. He does describe increased anxiety/worry, but these are not at a level which would interfere with moving forward to treatment.  The patient has good knowledge about HSCT, appropriate expectations for health and illness following transplantation, adequate understanding of the possible risks and complications of this treatment option, and a high willingness to sustain effort for lifestyle changes and health adaptations which will be required of him. He is aware of the 30 day 1 hour residence requirement.  He reports adequate compliance with previous medical treatment.  Pete Fernandez has good social support from his wife and mother (as caregivers) and his extended family, in general.   The patient exhibits a high degree of social stability.  The patient acknowledges no stressors expected to limit his ability to cope with the demands of HSCT and recovery.  The patient reports moderate caffeine consumption, no tobacco use, no alcohol use, and no illicit drug use  He demonstrates adequate health literacy.    He does demonstrate some impairment in cognitive functioning, but retains the ability to complete all ADLs and has the ability to consent to HSCT  Rizwan is here today for a weight check. On 4/29/24 Rizwan's weight was 7lbs 1.5oz, and on 4/30/24 Rizwan weighed 7lbs 0.5oz.     Today Rizwan weighed in at 7lbs 2.5oz. Per mom, Rizwan is eating 60mL every 2-3 hours. Currently he is getting both expressed breast milk and formula as mom is waiting for her supply to come in.     Mom also expressed concern due to Rizwan's eye being goopy this morning. Mom also asked if he was old enough for diaper cream to be used.     Rizwan's weight today was discussed Dr. Dunn, who was happy with the weight gain in 2 days. Dr. Dunn did talk to mom about her concerns about Rizwan's eyes and the diaper cream. She advised mom to continue to feed a minimum of 60mL at each feeding. Dr. Dunn felt that the goopy eyes were a result of a slow draining tear duct, if it continues to be an issue we can see him in the office again.     Dr. Dunn was happy with the way Rizwan is heading and said that we will see Rizwan back in the office for his 2 week well visit.   based upon knowledge and understanding of the procedure.      IMPRESSIONS AND RECOMMENDATIONS  Pete Fernandez is an acceptable HSCT candidate from a psychological perspective. He is very comfortable with details about his illness and would be best served by straightforward information provision and clear guidelines for behavior.   There are no overt psychological contraindications for proceeding with the procedure.His anxiety is not of a level expected to interfere with jackson-procedural demands.  The patient has reasonable expectations for the procedure, good social support, and has already begun making appropriate life plans in anticipation of the procedure. The patient has verbalized appropriate awareness and commitment to the necessary behavioral changes associated with HSCT and appears willing to adjust to long-term lifestyle challenges and medical follow-up. There are no recommendations for psychological treatment at this time. The patient and family are aware of resources available should their psychological needs change in the future.     If you have questions, please do not hesitate to call me. I look forward to following Pete along with you.    Sincerely,      Andres Worrell, PhD           CC  Kirill Berrios RN

## 2024-04-30 NOTE — PROGRESS NOTES
Computer Administered Psychological Testing (26567)  Date:  4/29/2024     CPT Code: 78295 Evaluation Length:  45 minutes      Referred by:  BMT Team/ Oncologist: IRMA Cook MD.     Chief complaint/reason for encounter:  Psychological Evaluation prior to stem cell transplantation      Pete Fernandez is an 43 y.o. male referred by IRMA Cook MD for pre-transplant evaluation.     ePte Fernandez arrived promptly for the scheduled appointment during which computer-administered psychological testing of the [facility] Behavioral Medicine Diagnostic (MBMD) was conducted. Patient informed of the risks and benefits of testing, was instructed how to navigate the computer program, and was informed of the importance of accurate responding.  Patient expressed understanding and willingly engaged in the assessment.      Risk Questions on the MBMD (Q49, Q86) were negative indicating the patient denied thoughts of suicide and denied that he cannot find things in life that were interesting/pleasurable.    Pete Fernandez 's complete assessment report will be included in the medical record with any additional testing instruments  compiled with appropriate   interview data, summary, and recommendations.  Patient will be provided feedback on the assessment results at BMT Evaluation visit with the psychologist.       ICD-10-CM ICD-9-CM   1. Pre-transplant evaluation for stem cell transplant  Z01.818 V72.83   2. Metastatic multiple myeloma to bone  C90.00 203.00       Andres Worrell, PhD  Clinical Psychologist  [place] License #[id number]  [place] License #[id number]

## 2024-05-01 ENCOUNTER — DOCUMENTATION ONLY (OUTPATIENT)
Dept: HEMATOLOGY/ONCOLOGY | Facility: CLINIC | Age: 44
End: 2024-05-01
Payer: COMMERCIAL

## 2024-05-01 ENCOUNTER — TELEPHONE (OUTPATIENT)
Dept: HEMATOLOGY/ONCOLOGY | Facility: CLINIC | Age: 44
End: 2024-05-01
Payer: COMMERCIAL

## 2024-05-01 NOTE — PROGRESS NOTES
Consent status for submitting research data to [facility]:    Patient accepts [facility] study.

## 2024-05-01 NOTE — TELEPHONE ENCOUNTER
Spoke with patient to inform him to stop Aspirin today in preparation for central line placement scheduled Mon, 5/6. Also reviewed mobilization and collection process. Pt understands that he needs to report to clinic at 8 am each morning for Neupogen injection. Depending on time of line placement, pt understands he is to report for Neupogen injection on that day as well.

## 2024-05-03 ENCOUNTER — ANESTHESIA EVENT (OUTPATIENT)
Dept: SURGERY | Facility: HOSPITAL | Age: 44
End: 2024-05-03
Payer: COMMERCIAL

## 2024-05-03 ENCOUNTER — INFUSION (OUTPATIENT)
Dept: INFUSION THERAPY | Facility: HOSPITAL | Age: 44
End: 2024-05-03
Payer: COMMERCIAL

## 2024-05-03 ENCOUNTER — LAB VISIT (OUTPATIENT)
Dept: LAB | Facility: HOSPITAL | Age: 44
End: 2024-05-03
Payer: COMMERCIAL

## 2024-05-03 ENCOUNTER — TELEPHONE (OUTPATIENT)
Dept: SURGERY | Facility: CLINIC | Age: 44
End: 2024-05-03
Payer: COMMERCIAL

## 2024-05-03 DIAGNOSIS — Z76.82 STEM CELL TRANSPLANT CANDIDATE: ICD-10-CM

## 2024-05-03 DIAGNOSIS — C90.00 MULTIPLE MYELOMA, REMISSION STATUS UNSPECIFIED: Primary | ICD-10-CM

## 2024-05-03 DIAGNOSIS — C90.00 MULTIPLE MYELOMA, REMISSION STATUS UNSPECIFIED: ICD-10-CM

## 2024-05-03 PROCEDURE — 86335 IMMUNFIX E-PHORSIS/URINE/CSF: CPT | Performed by: INTERNAL MEDICINE

## 2024-05-03 PROCEDURE — 63600175 PHARM REV CODE 636 W HCPCS: Mod: JZ,JG

## 2024-05-03 PROCEDURE — 86335 IMMUNFIX E-PHORSIS/URINE/CSF: CPT | Mod: 26,,, | Performed by: PATHOLOGY

## 2024-05-03 PROCEDURE — 84156 ASSAY OF PROTEIN URINE: CPT | Performed by: INTERNAL MEDICINE

## 2024-05-03 PROCEDURE — 96372 THER/PROPH/DIAG INJ SC/IM: CPT

## 2024-05-03 PROCEDURE — 82575 CREATININE CLEARANCE TEST: CPT | Performed by: INTERNAL MEDICINE

## 2024-05-03 RX ADMIN — FILGRASTIM 960 MCG: 480 INJECTION, SOLUTION INTRAVENOUS; SUBCUTANEOUS at 08:05

## 2024-05-03 NOTE — ANESTHESIA PREPROCEDURE EVALUATION
05/03/2024  Pete Fernandez is a 43 y.o., male.    Pre-operative evaluation for Procedure(s) (LRB):  INSERTION, CATHETER, HEMODIALYSIS, DUAL LUMEN, left poss right, Bard 14.5 fr hemosplit catheter, model [id number] (Left)    Pete Fernandez is a 43 y.o. male     Patient Active Problem List   Diagnosis    Hypercalcemia    Transaminitis    Multiple myeloma    Metastatic multiple myeloma to bone    Immunodeficiency due to drug therapy    Cancer associated pain    Chronic fatigue    Anemia in neoplastic disease    Chemotherapy-induced neutropenia    Thrombocytopenia    Stem cell transplant candidate       Review of patient's allergies indicates:  No Known Allergies    No current facility-administered medications on file prior to encounter.     Current Outpatient Medications on File Prior to Encounter   Medication Sig Dispense Refill    acyclovir (ZOVIRAX) 400 MG tablet Take 400 mg by mouth 2 (two) times daily.      aspirin 81 MG Chew Take 81 mg by mouth every evening.      calcitRIOL (ROCALTROL) 0.5 MCG Cap Take 1 capsule (0.5 mcg total) by mouth once daily. 30 capsule 3    colchicine (COLCRYS) 0.6 mg tablet Take 0.6 mg by mouth once daily.      dexAMETHasone (DECADRON) 4 MG Tab Take 10 tablets (40 mg total) by mouth every 7 days. Take with food. (Patient not taking: Reported on 5/1/2024) 40 tablet 5    dextroamphetamine-amphetamine (ADDERALL) 20 mg tablet Take 1 tablet by mouth once daily.      febuxostat (ULORIC) 40 mg Tab Take 1 tablet (40 mg total) by mouth once daily. 30 tablet 3    lenalidomide (REVLIMID) 10 mg Cap TAKE 1 CAPSULE ONCE DAILY DAYS 1 THROUGH 21 OF EACH 28 DAY CYCLE (Patient not taking: Reported on 5/1/2024) 21 each 0    metoprolol tartrate (LOPRESSOR) 25 MG tablet Take 12.5 mg by mouth 2 (two) times daily.      ondansetron (ZOFRAN) 8 MG tablet Take 1 tablet (8 mg total) by mouth every 8 (eight) hours  as needed for Nausea. 30 tablet 2    oxyCODONE (ROXICODONE) 10 mg Tab immediate release tablet Take 1 tablet (10 mg total) by mouth every 4 (four) hours as needed for Pain. 90 tablet 0    pantoprazole (PROTONIX) 20 MG tablet TAKE 1 TABLET (20 MG TOTAL) BY MOUTH ONCE DAILY (Patient taking differently: Take 20 mg by mouth every evening.) 30 tablet 1    prochlorperazine (COMPAZINE) 5 MG tablet Take 5 mg by mouth every 6 (six) hours as needed for Nausea. (Patient not taking: Reported on 5/1/2024)      sodium bicarbonate 650 MG tablet Take 650 mg by mouth 2 (two) times daily.      sulfamethoxazole-trimethoprim 800-160mg (BACTRIM DS) 800-160 mg Tab Take 1 tablet by mouth once daily. 30 tablet 6    tamsulosin (FLOMAX) 0.4 mg Cap Take 1 capsule (0.4 mg total) by mouth once daily. (Patient taking differently: Take 0.4 mg by mouth every evening.) 30 capsule 6       Past Surgical History:   Procedure Laterality Date    BONE MARROW BIOPSY N/A 10/18/2023    Procedure: Biopsy-bone marrow;  Surgeon: Nhan Marte MD;  Location: [facility];  Service: General;  Laterality: N/A;    TONSILLECTOMY         Social History     Socioeconomic History    Marital status:      Spouse name: Rosalinda    Number of children: 2   Tobacco Use    Smoking status: Never    Smokeless tobacco: Never   Substance and Sexual Activity    Alcohol use: Yes    Drug use: Never    Sexual activity: Yes     Partners: Female     Social Determinants of Health     Financial Resource Strain: Medium Risk (11/15/2023)    Overall Financial Resource Strain (CARDIA)     Difficulty of Paying Living Expenses: Somewhat hard   Food Insecurity: Food Insecurity Present (11/15/2023)    Hunger Vital Sign     Worried About Running Out of Food in the Last Year: Sometimes true     Ran Out of Food in the Last Year: Never true   Transportation Needs: No Transportation Needs (11/15/2023)    PRAPARE - Transportation     Lack of Transportation (Medical): No     Lack of Transportation  "(Non-Medical): No   Physical Activity: Insufficiently Active (11/15/2023)    Exercise Vital Sign     Days of Exercise per Week: 3 days     Minutes of Exercise per Session: 30 min   Stress: Stress Concern Present (11/15/2023)    [employer] - Occupational Stress Questionnaire     Feeling of Stress : To some extent   Housing Stability: Low Risk  (11/15/2023)    Housing Stability Vital Sign     Unable to Pay for Housing in the Last Year: No     Number of Places Lived in the Last Year: 1     Unstable Housing in the Last Year: No         CBC: No results for input(s): "WBC", "RBC", "HGB", "HCT", "PLT", "MCV", "MCH", "MCHC" in the last 72 hours.    CMP: No results for input(s): "NA", "K", "CL", "CO2", "BUN", "CREATININE", "GLU", "MG", "PHOS", "CALCIUM", "ALBUMIN", "PROT", "ALKPHOS", "ALT", "AST", "BILITOT" in the last 72 hours.    INR  No results for input(s): "PT", "INR", "PROTIME", "APTT" in the last 72 hours.        Diagnostic Studies:      EKD Echo:  No results found for this or any previous visit.        Pre-op Assessment    I have reviewed the Patient Summary Reports.     I have reviewed the Nursing Notes. I have reviewed the NPO Status.   I have reviewed the Medications.     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                          Neurological:  Neurology Normal                                      Psych:  Psychiatric History anxiety                 Physical Exam  General: Well nourished, Cooperative and Alert    Airway:  Mallampati: II   Mouth Opening: Normal  TM Distance: Normal  Tongue: Normal  Neck ROM: Normal ROM    Dental:  Intact    Chest/Lungs:  Normal " Respiratory Rate    Heart:  Rate: Normal        Anesthesia Plan  Type of Anesthesia, risks & benefits discussed:    Anesthesia Type: Gen ETT, Gen Natural Airway  Intra-op Monitoring Plan: Standard ASA Monitors  Post Op Pain Control Plan: multimodal analgesia and IV/PO Opioids PRN  Induction:  IV  Airway Plan: Direct, Post-Induction  Informed Consent: Informed consent signed with the Patient and all parties understand the risks and agree with anesthesia plan.  All questions answered.   ASA Score: 2  Day of Surgery Review of History & Physical: H&P Update referred to the surgeon/provider.    Ready For Surgery From Anesthesia Perspective.     .

## 2024-05-04 ENCOUNTER — INFUSION (OUTPATIENT)
Dept: INFUSION THERAPY | Facility: HOSPITAL | Age: 44
End: 2024-05-04
Payer: COMMERCIAL

## 2024-05-04 VITALS
HEART RATE: 60 BPM | DIASTOLIC BLOOD PRESSURE: 80 MMHG | RESPIRATION RATE: 18 BRPM | TEMPERATURE: 98 F | SYSTOLIC BLOOD PRESSURE: 140 MMHG

## 2024-05-04 DIAGNOSIS — C90.00 MULTIPLE MYELOMA, REMISSION STATUS UNSPECIFIED: Primary | ICD-10-CM

## 2024-05-04 DIAGNOSIS — Z76.82 STEM CELL TRANSPLANT CANDIDATE: ICD-10-CM

## 2024-05-04 LAB
PROT 24H UR-MRATE: NORMAL MG/SPEC (ref 0–100)
PROT UR-MCNC: <7 MG/DL (ref 0–15)
URINE COLLECTION DURATION: 24 HR
URINE VOLUME: 3000 ML

## 2024-05-04 PROCEDURE — 96372 THER/PROPH/DIAG INJ SC/IM: CPT

## 2024-05-04 PROCEDURE — 63600175 PHARM REV CODE 636 W HCPCS: Mod: JZ,JG

## 2024-05-04 RX ADMIN — FILGRASTIM 960 MCG: 480 INJECTION, SOLUTION INTRAVENOUS; SUBCUTANEOUS at 07:05

## 2024-05-05 ENCOUNTER — INFUSION (OUTPATIENT)
Dept: INFUSION THERAPY | Facility: HOSPITAL | Age: 44
End: 2024-05-05
Attending: INTERNAL MEDICINE
Payer: COMMERCIAL

## 2024-05-05 VITALS — HEART RATE: 79 BPM | SYSTOLIC BLOOD PRESSURE: 149 MMHG | TEMPERATURE: 98 F | DIASTOLIC BLOOD PRESSURE: 88 MMHG

## 2024-05-05 DIAGNOSIS — C90.00 MULTIPLE MYELOMA, REMISSION STATUS UNSPECIFIED: Primary | ICD-10-CM

## 2024-05-05 DIAGNOSIS — Z76.82 STEM CELL TRANSPLANT CANDIDATE: ICD-10-CM

## 2024-05-05 PROCEDURE — 96372 THER/PROPH/DIAG INJ SC/IM: CPT

## 2024-05-05 PROCEDURE — 63600175 PHARM REV CODE 636 W HCPCS: Mod: JZ,JG

## 2024-05-05 RX ORDER — DIPHENHYDRAMINE HCL 25 MG
25 CAPSULE ORAL ONCE AS NEEDED
Status: DISCONTINUED | OUTPATIENT
Start: 2024-05-05 | End: 2024-05-05 | Stop reason: HOSPADM

## 2024-05-05 RX ORDER — SODIUM,POTASSIUM PHOSPHATES 280-250MG
2 POWDER IN PACKET (EA) ORAL ONCE AS NEEDED
Status: DISCONTINUED | OUTPATIENT
Start: 2024-05-05 | End: 2024-05-05 | Stop reason: HOSPADM

## 2024-05-05 RX ORDER — LANOLIN ALCOHOL/MO/W.PET/CERES
800 CREAM (GRAM) TOPICAL ONCE AS NEEDED
Status: DISCONTINUED | OUTPATIENT
Start: 2024-05-05 | End: 2024-05-05 | Stop reason: HOSPADM

## 2024-05-05 RX ORDER — POTASSIUM CHLORIDE 20 MEQ/1
40 TABLET, EXTENDED RELEASE ORAL ONCE AS NEEDED
Status: DISCONTINUED | OUTPATIENT
Start: 2024-05-05 | End: 2024-05-05 | Stop reason: HOSPADM

## 2024-05-05 RX ORDER — ACETAMINOPHEN 325 MG/1
650 TABLET ORAL ONCE AS NEEDED
Status: DISCONTINUED | OUTPATIENT
Start: 2024-05-05 | End: 2024-05-05 | Stop reason: HOSPADM

## 2024-05-05 RX ADMIN — FILGRASTIM 960 MCG: 480 INJECTION, SOLUTION INTRAVENOUS; SUBCUTANEOUS at 08:05

## 2024-05-05 NOTE — PLAN OF CARE
Problem: Chemotherapy Effects  Goal: Anemia Symptom Improvement  Outcome: Progressing  Goal: Safety Maintained  Outcome: Progressing  Goal: Absence of Hematuria  Outcome: Progressing  Goal: Nausea and Vomiting Relief  Outcome: Progressing  Goal: Neurotoxicity Symptom Control  Outcome: Progressing  Goal: Absence of Infection  Outcome: Progressing  Goal: Absence of Bleeding  Outcome: Progressing     Problem: Fatigue  Goal: Improved Activity Tolerance  Outcome: Progressing     Problem: Anemia  Goal: Anemia Symptom Improvement  Outcome: Progressing     Problem: Infection  Goal: Absence of Infection Signs and Symptoms  Outcome: Progressing   Pt reveived neupogen via SQ without any adverse effects. VS WNL. Discharged AAOX4 and ambulatory

## 2024-05-06 ENCOUNTER — HOSPITAL ENCOUNTER (OUTPATIENT)
Facility: HOSPITAL | Age: 44
Discharge: HOME | End: 2024-05-06
Attending: SURGERY | Admitting: SURGERY
Payer: COMMERCIAL

## 2024-05-06 ENCOUNTER — TELEPHONE (OUTPATIENT)
Dept: HEMATOLOGY/ONCOLOGY | Facility: CLINIC | Age: 44
End: 2024-05-06
Payer: COMMERCIAL

## 2024-05-06 ENCOUNTER — INFUSION (OUTPATIENT)
Dept: INFUSION THERAPY | Facility: HOSPITAL | Age: 44
End: 2024-05-06
Payer: COMMERCIAL

## 2024-05-06 ENCOUNTER — ANESTHESIA (OUTPATIENT)
Dept: SURGERY | Facility: HOSPITAL | Age: 44
End: 2024-05-06
Payer: COMMERCIAL

## 2024-05-06 VITALS
WEIGHT: 209.69 LBS | HEIGHT: 69 IN | TEMPERATURE: 99 F | BODY MASS INDEX: 31.06 KG/M2 | DIASTOLIC BLOOD PRESSURE: 80 MMHG | SYSTOLIC BLOOD PRESSURE: 127 MMHG | OXYGEN SATURATION: 98 % | RESPIRATION RATE: 18 BRPM | HEART RATE: 71 BPM

## 2024-05-06 VITALS
DIASTOLIC BLOOD PRESSURE: 81 MMHG | TEMPERATURE: 98 F | RESPIRATION RATE: 20 BRPM | SYSTOLIC BLOOD PRESSURE: 137 MMHG | HEART RATE: 86 BPM

## 2024-05-06 DIAGNOSIS — C90.00 MULTIPLE MYELOMA, REMISSION STATUS UNSPECIFIED: Primary | ICD-10-CM

## 2024-05-06 DIAGNOSIS — Z76.82 STEM CELL TRANSPLANT CANDIDATE: ICD-10-CM

## 2024-05-06 LAB
ABO + RH BLD: ABNORMAL
ALBUMIN SERPL BCP-MCNC: 3.6 G/DL (ref 3.5–5.2)
ALP SERPL-CCNC: 108 U/L (ref 55–135)
ALT SERPL W/O P-5'-P-CCNC: 15 U/L (ref 10–44)
ANION GAP SERPL CALC-SCNC: 8 MMOL/L (ref 8–16)
ANISOCYTOSIS BLD QL SMEAR: SLIGHT
APTT PPP: 37 SEC (ref 21–32)
AST SERPL-CCNC: 16 U/L (ref 10–40)
BASOPHILS # BLD AUTO: 0.04 K/UL (ref 0–0.2)
BASOPHILS NFR BLD: 0.1 % (ref 0–1.9)
BILIRUB SERPL-MCNC: 0.4 MG/DL (ref 0.1–1)
BLD GP AB SCN CELLS X3 SERPL QL: ABNORMAL
BLOOD GROUP ANTIBODIES SERPL: NORMAL
BUN SERPL-MCNC: 14 MG/DL (ref 6–20)
CA-I BLDV-SCNC: 1.14 MMOL/L (ref 1.06–1.42)
CALCIUM SERPL-MCNC: 9.5 MG/DL (ref 8.7–10.5)
CD34 %: 0.02 %
CD34 ABSOLUTE: 8.57 CELLS/UL
CD34 VIABILITY: 100 %
CHLORIDE SERPL-SCNC: 104 MMOL/L (ref 95–110)
CO2 SERPL-SCNC: 29 MMOL/L (ref 23–29)
CREAT SERPL-MCNC: 1.8 MG/DL (ref 0.5–1.4)
DAT IGG-SP REAG RBC-IMP: NORMAL
DIFFERENTIAL METHOD BLD: ABNORMAL
EOSINOPHIL # BLD AUTO: 0.3 K/UL (ref 0–0.5)
EOSINOPHIL NFR BLD: 0.8 % (ref 0–8)
ERYTHROCYTE [DISTWIDTH] IN BLOOD BY AUTOMATED COUNT: 16 % (ref 11.5–14.5)
EST. GFR  (NO RACE VARIABLE): 46.7 ML/MIN/1.73 M^2
GLUCOSE SERPL-MCNC: 84 MG/DL (ref 70–110)
HCT VFR BLD AUTO: 30.6 % (ref 40–54)
HGB BLD-MCNC: 10 G/DL (ref 14–18)
HYPOCHROMIA BLD QL SMEAR: ABNORMAL
IMM GRANULOCYTES # BLD AUTO: 1.26 K/UL (ref 0–0.04)
IMM GRANULOCYTES NFR BLD AUTO: 3.3 % (ref 0–0.5)
INR PPP: 1.8 (ref 0.8–1.2)
INTERPRETATION UR IFE-IMP: NORMAL
LYMPHOCYTES # BLD AUTO: 3.9 K/UL (ref 1–4.8)
LYMPHOCYTES NFR BLD: 10.1 % (ref 18–48)
MAGNESIUM SERPL-MCNC: 1.8 MG/DL (ref 1.6–2.6)
MCH RBC QN AUTO: 35.3 PG (ref 27–31)
MCHC RBC AUTO-ENTMCNC: 32.7 G/DL (ref 32–36)
MCV RBC AUTO: 108 FL (ref 82–98)
MONOCYTES # BLD AUTO: 3.3 K/UL (ref 0.3–1)
MONOCYTES NFR BLD: 8.5 % (ref 4–15)
NEUTROPHILS # BLD AUTO: 29.9 K/UL (ref 1.8–7.7)
NEUTROPHILS NFR BLD: 77.2 % (ref 38–73)
NRBC BLD-RTO: 0 /100 WBC
OVALOCYTES BLD QL SMEAR: ABNORMAL
PHOSPHATE SERPL-MCNC: 3.6 MG/DL (ref 2.7–4.5)
PLATELET # BLD AUTO: 295 K/UL (ref 150–450)
PMV BLD AUTO: 10.1 FL (ref 9.2–12.9)
POIKILOCYTOSIS BLD QL SMEAR: SLIGHT
POLYCHROMASIA BLD QL SMEAR: ABNORMAL
POTASSIUM SERPL-SCNC: 3.7 MMOL/L (ref 3.5–5.1)
PROT SERPL-MCNC: 5.7 G/DL (ref 6–8.4)
PROTHROMBIN TIME: 18.9 SEC (ref 9–12.5)
RBC # BLD AUTO: 2.83 M/UL (ref 4.6–6.2)
SCHISTOCYTES BLD QL SMEAR: ABNORMAL
SODIUM SERPL-SCNC: 141 MMOL/L (ref 136–145)
SPECIMEN OUTDATE: ABNORMAL
SPHEROCYTES BLD QL SMEAR: ABNORMAL
WBC # BLD AUTO: 38.76 K/UL (ref 3.9–12.7)

## 2024-05-06 PROCEDURE — 84100 ASSAY OF PHOSPHORUS: CPT | Performed by: SURGERY

## 2024-05-06 PROCEDURE — 63600175 PHARM REV CODE 636 W HCPCS: Performed by: STUDENT IN AN ORGANIZED HEALTH CARE EDUCATION/TRAINING PROGRAM

## 2024-05-06 PROCEDURE — 86880 COOMBS TEST DIRECT: CPT | Performed by: SURGERY

## 2024-05-06 PROCEDURE — 71000044 HC DOSC ROUTINE RECOVERY FIRST HOUR: Performed by: SURGERY

## 2024-05-06 PROCEDURE — 85730 THROMBOPLASTIN TIME PARTIAL: CPT | Performed by: SURGERY

## 2024-05-06 PROCEDURE — 37000009 HC ANESTHESIA EA ADD 15 MINS: Performed by: SURGERY

## 2024-05-06 PROCEDURE — 25000003 PHARM REV CODE 250: Performed by: STUDENT IN AN ORGANIZED HEALTH CARE EDUCATION/TRAINING PROGRAM

## 2024-05-06 PROCEDURE — 36000707: Performed by: SURGERY

## 2024-05-06 PROCEDURE — 96372 THER/PROPH/DIAG INJ SC/IM: CPT

## 2024-05-06 PROCEDURE — 83735 ASSAY OF MAGNESIUM: CPT | Performed by: SURGERY

## 2024-05-06 PROCEDURE — 37000008 HC ANESTHESIA 1ST 15 MINUTES: Performed by: SURGERY

## 2024-05-06 PROCEDURE — 36415 COLL VENOUS BLD VENIPUNCTURE: CPT | Performed by: SURGERY

## 2024-05-06 PROCEDURE — 71000016 HC POSTOP RECOV ADDL HR: Performed by: SURGERY

## 2024-05-06 PROCEDURE — 86901 BLOOD TYPING SEROLOGIC RH(D): CPT | Performed by: SURGERY

## 2024-05-06 PROCEDURE — 63600175 PHARM REV CODE 636 W HCPCS: Performed by: SURGERY

## 2024-05-06 PROCEDURE — D9220A PRA ANESTHESIA: Mod: ANES,,, | Performed by: ANESTHESIOLOGY

## 2024-05-06 PROCEDURE — 86870 RBC ANTIBODY IDENTIFICATION: CPT | Performed by: SURGERY

## 2024-05-06 PROCEDURE — 63600175 PHARM REV CODE 636 W HCPCS: Mod: JW,JG

## 2024-05-06 PROCEDURE — 85025 COMPLETE CBC W/AUTO DIFF WBC: CPT | Performed by: SURGERY

## 2024-05-06 PROCEDURE — 85610 PROTHROMBIN TIME: CPT | Performed by: SURGERY

## 2024-05-06 PROCEDURE — 86905 BLOOD TYPING RBC ANTIGENS: CPT | Performed by: SURGERY

## 2024-05-06 PROCEDURE — 63600175 PHARM REV CODE 636 W HCPCS: Mod: JZ,JG

## 2024-05-06 PROCEDURE — 36000706: Performed by: SURGERY

## 2024-05-06 PROCEDURE — 36558 INSERT TUNNELED CV CATH: CPT | Mod: RT,,, | Performed by: SURGERY

## 2024-05-06 PROCEDURE — 86367 STEM CELLS TOTAL COUNT: CPT | Performed by: SURGERY

## 2024-05-06 PROCEDURE — 80053 COMPREHEN METABOLIC PANEL: CPT | Performed by: SURGERY

## 2024-05-06 PROCEDURE — C1750 CATH, HEMODIALYSIS,LONG-TERM: HCPCS | Performed by: SURGERY

## 2024-05-06 PROCEDURE — 77001 FLUOROGUIDE FOR VEIN DEVICE: CPT | Mod: 26,,, | Performed by: SURGERY

## 2024-05-06 PROCEDURE — 71000015 HC POSTOP RECOV 1ST HR: Performed by: SURGERY

## 2024-05-06 PROCEDURE — 82330 ASSAY OF CALCIUM: CPT | Performed by: SURGERY

## 2024-05-06 PROCEDURE — D9220A PRA ANESTHESIA: Mod: CRNA,,, | Performed by: STUDENT IN AN ORGANIZED HEALTH CARE EDUCATION/TRAINING PROGRAM

## 2024-05-06 DEVICE — CATH HEMOSPLIT DL 14.5FR 24CM: Type: IMPLANTABLE DEVICE | Site: NECK | Status: FUNCTIONAL

## 2024-05-06 RX ORDER — CEFAZOLIN SODIUM 1 G/3ML
INJECTION, POWDER, FOR SOLUTION INTRAMUSCULAR; INTRAVENOUS
Status: DISCONTINUED | OUTPATIENT
Start: 2024-05-06 | End: 2024-05-06

## 2024-05-06 RX ORDER — PROPOFOL 10 MG/ML
VIAL (ML) INTRAVENOUS
Status: DISCONTINUED | OUTPATIENT
Start: 2024-05-06 | End: 2024-05-06

## 2024-05-06 RX ORDER — ONDANSETRON HYDROCHLORIDE 2 MG/ML
INJECTION, SOLUTION INTRAVENOUS
Status: DISCONTINUED | OUTPATIENT
Start: 2024-05-06 | End: 2024-05-06

## 2024-05-06 RX ORDER — PLERIXAFOR 24 MG/1.2ML
0.24 SOLUTION SUBCUTANEOUS ONCE AS NEEDED
Status: COMPLETED | OUTPATIENT
Start: 2024-05-06 | End: 2024-05-06

## 2024-05-06 RX ORDER — HEPARIN 100 UNIT/ML
SYRINGE INTRAVENOUS
Status: DISCONTINUED | OUTPATIENT
Start: 2024-05-06 | End: 2024-05-06 | Stop reason: HOSPADM

## 2024-05-06 RX ORDER — HYDROMORPHONE HYDROCHLORIDE 1 MG/ML
0.5 INJECTION, SOLUTION INTRAMUSCULAR; INTRAVENOUS; SUBCUTANEOUS EVERY 10 MIN PRN
Status: DISCONTINUED | OUTPATIENT
Start: 2024-05-06 | End: 2024-05-06 | Stop reason: HOSPADM

## 2024-05-06 RX ORDER — DEXAMETHASONE SODIUM PHOSPHATE 4 MG/ML
INJECTION, SOLUTION INTRA-ARTICULAR; INTRALESIONAL; INTRAMUSCULAR; INTRAVENOUS; SOFT TISSUE
Status: DISCONTINUED | OUTPATIENT
Start: 2024-05-06 | End: 2024-05-06

## 2024-05-06 RX ORDER — OXYCODONE HYDROCHLORIDE 5 MG/1
5 TABLET ORAL
Status: DISCONTINUED | OUTPATIENT
Start: 2024-05-06 | End: 2024-05-06 | Stop reason: HOSPADM

## 2024-05-06 RX ORDER — PROPOFOL 10 MG/ML
VIAL (ML) INTRAVENOUS CONTINUOUS PRN
Status: DISCONTINUED | OUTPATIENT
Start: 2024-05-06 | End: 2024-05-06

## 2024-05-06 RX ORDER — HALOPERIDOL 5 MG/ML
INJECTION INTRAMUSCULAR
Status: DISCONTINUED | OUTPATIENT
Start: 2024-05-06 | End: 2024-05-06

## 2024-05-06 RX ORDER — SODIUM CHLORIDE 9 MG/ML
INJECTION, SOLUTION INTRAVENOUS CONTINUOUS
Status: DISCONTINUED | OUTPATIENT
Start: 2024-05-06 | End: 2024-05-06 | Stop reason: HOSPADM

## 2024-05-06 RX ORDER — SODIUM,POTASSIUM PHOSPHATES 280-250MG
2 POWDER IN PACKET (EA) ORAL ONCE AS NEEDED
Status: DISCONTINUED | OUTPATIENT
Start: 2024-05-06 | End: 2024-05-06 | Stop reason: HOSPADM

## 2024-05-06 RX ORDER — FENTANYL CITRATE 50 UG/ML
INJECTION, SOLUTION INTRAMUSCULAR; INTRAVENOUS
Status: DISCONTINUED | OUTPATIENT
Start: 2024-05-06 | End: 2024-05-06

## 2024-05-06 RX ORDER — PROCHLORPERAZINE EDISYLATE 5 MG/ML
5 INJECTION INTRAMUSCULAR; INTRAVENOUS EVERY 30 MIN PRN
Status: DISCONTINUED | OUTPATIENT
Start: 2024-05-06 | End: 2024-05-06 | Stop reason: HOSPADM

## 2024-05-06 RX ORDER — BUPIVACAINE HYDROCHLORIDE 2.5 MG/ML
INJECTION, SOLUTION EPIDURAL; INFILTRATION; INTRACAUDAL
Status: DISCONTINUED | OUTPATIENT
Start: 2024-05-06 | End: 2024-05-06 | Stop reason: HOSPADM

## 2024-05-06 RX ORDER — ONDANSETRON HYDROCHLORIDE 2 MG/ML
4 INJECTION, SOLUTION INTRAVENOUS DAILY PRN
Status: DISCONTINUED | OUTPATIENT
Start: 2024-05-06 | End: 2024-05-06 | Stop reason: HOSPADM

## 2024-05-06 RX ORDER — LIDOCAINE HYDROCHLORIDE 20 MG/ML
INJECTION INTRAVENOUS
Status: DISCONTINUED | OUTPATIENT
Start: 2024-05-06 | End: 2024-05-06

## 2024-05-06 RX ORDER — MIDAZOLAM HYDROCHLORIDE 1 MG/ML
INJECTION INTRAMUSCULAR; INTRAVENOUS
Status: DISCONTINUED | OUTPATIENT
Start: 2024-05-06 | End: 2024-05-06

## 2024-05-06 RX ORDER — ACETAMINOPHEN 325 MG/1
650 TABLET ORAL ONCE AS NEEDED
Status: DISCONTINUED | OUTPATIENT
Start: 2024-05-06 | End: 2024-05-06 | Stop reason: HOSPADM

## 2024-05-06 RX ORDER — LANOLIN ALCOHOL/MO/W.PET/CERES
800 CREAM (GRAM) TOPICAL ONCE AS NEEDED
Status: DISCONTINUED | OUTPATIENT
Start: 2024-05-06 | End: 2024-05-06 | Stop reason: HOSPADM

## 2024-05-06 RX ORDER — FENTANYL CITRATE 50 UG/ML
25 INJECTION, SOLUTION INTRAMUSCULAR; INTRAVENOUS EVERY 5 MIN PRN
Status: DISCONTINUED | OUTPATIENT
Start: 2024-05-06 | End: 2024-05-06 | Stop reason: HOSPADM

## 2024-05-06 RX ORDER — POTASSIUM CHLORIDE 20 MEQ/1
40 TABLET, EXTENDED RELEASE ORAL ONCE AS NEEDED
Status: DISCONTINUED | OUTPATIENT
Start: 2024-05-06 | End: 2024-05-06 | Stop reason: HOSPADM

## 2024-05-06 RX ORDER — DIPHENHYDRAMINE HCL 25 MG
25 CAPSULE ORAL ONCE AS NEEDED
Status: DISCONTINUED | OUTPATIENT
Start: 2024-05-06 | End: 2024-05-06 | Stop reason: HOSPADM

## 2024-05-06 RX ADMIN — PROPOFOL 20 MG: 10 INJECTION, EMULSION INTRAVENOUS at 07:05

## 2024-05-06 RX ADMIN — FENTANYL CITRATE 50 MCG: 50 INJECTION, SOLUTION INTRAMUSCULAR; INTRAVENOUS at 07:05

## 2024-05-06 RX ADMIN — PROPOFOL 175 MCG/KG/MIN: 10 INJECTION, EMULSION INTRAVENOUS at 07:05

## 2024-05-06 RX ADMIN — SODIUM CHLORIDE: 0.9 INJECTION, SOLUTION INTRAVENOUS at 06:05

## 2024-05-06 RX ADMIN — FENTANYL CITRATE 12.5 MCG: 50 INJECTION, SOLUTION INTRAMUSCULAR; INTRAVENOUS at 07:05

## 2024-05-06 RX ADMIN — PLERIXAFOR 22 MG: 24 INJECTION, SOLUTION SUBCUTANEOUS at 05:05

## 2024-05-06 RX ADMIN — HALOPERIDOL LACTATE 0.5 MG: 5 INJECTION, SOLUTION INTRAMUSCULAR at 07:05

## 2024-05-06 RX ADMIN — PROPOFOL 10 MG: 10 INJECTION, EMULSION INTRAVENOUS at 07:05

## 2024-05-06 RX ADMIN — FILGRASTIM 960 MCG: 480 INJECTION, SOLUTION INTRAVENOUS; SUBCUTANEOUS at 10:05

## 2024-05-06 RX ADMIN — PROPOFOL 40 MG: 10 INJECTION, EMULSION INTRAVENOUS at 07:05

## 2024-05-06 RX ADMIN — MIDAZOLAM HYDROCHLORIDE 2 MG: 1 INJECTION, SOLUTION INTRAMUSCULAR; INTRAVENOUS at 06:05

## 2024-05-06 RX ADMIN — CEFAZOLIN 2 G: 330 INJECTION, POWDER, FOR SOLUTION INTRAMUSCULAR; INTRAVENOUS at 07:05

## 2024-05-06 RX ADMIN — ONDANSETRON 4 MG: 2 INJECTION INTRAMUSCULAR; INTRAVENOUS at 07:05

## 2024-05-06 RX ADMIN — FENTANYL CITRATE 25 MCG: 50 INJECTION, SOLUTION INTRAMUSCULAR; INTRAVENOUS at 07:05

## 2024-05-06 RX ADMIN — LIDOCAINE HYDROCHLORIDE 100 MG: 20 INJECTION INTRAVENOUS at 07:05

## 2024-05-06 RX ADMIN — DEXAMETHASONE SODIUM PHOSPHATE 4 MG: 4 INJECTION, SOLUTION INTRAMUSCULAR; INTRAVENOUS at 07:05

## 2024-05-06 NOTE — BRIEF OP NOTE
[facility] - Surgery ([facility])  Brief Operative Note    Surgery Date: 5/6/2024     Surgeons and Role:     * Alvin White MD - Primary     * Jenny Hidalgo MD - Resident - Assisting        Pre-op Diagnosis:  Stem cell transplant candidate [Z76.82]    Post-op Diagnosis:  Post-Op Diagnosis Codes:     * Stem cell transplant candidate [Z76.82]    Procedure(s) (LRB):  INSERTION, CATHETER, HEMODIALYSIS, DUAL LUMEN, left poss right, Bard 14.5 fr hemosplit catheter, model [id number] (Left)    Anesthesia: Local MAC    Operative Findings: right IJ tunneled CVC placed successfully. No apparent complication. Position confirmed intra-op with fluoroscopy. Post op CXR to be performed in PACU.    Estimated Blood Loss: < 10mL               Discharge Note    OUTCOME: Patient tolerated treatment/procedure well without complication and is now ready for discharge.    DISPOSITION: Home or Self Care    FINAL DIAGNOSIS:    * Stem cell transplant candidate [Z76.82]    FOLLOWUP: In clinic    DISCHARGE INSTRUCTIONS:    Discharge Procedure Orders   Diet Adult Regular     Notify your health care provider if you experience any of the following:  temperature >100.4     Notify your health care provider if you experience any of the following:  persistent nausea and vomiting or diarrhea     Notify your health care provider if you experience any of the following:  severe uncontrolled pain     Notify your health care provider if you experience any of the following:  redness, tenderness, or signs of infection (pain, swelling, redness, odor or green/yellow discharge around incision site)     Notify your health care provider if you experience any of the following:  difficulty breathing or increased cough     Weight bearing restrictions (specify):   Order Comments: Do not lift greater than 10 lbs for 4 weeks

## 2024-05-06 NOTE — ANESTHESIA POSTPROCEDURE EVALUATION
Anesthesia Post Evaluation    Patient: Pete Fernandez    Procedure(s) Performed: Procedure(s) (LRB):  INSERTION, CATHETER, HEMODIALYSIS, DUAL LUMEN, left poss right, Bard 14.5 fr hemosplit catheter, model [id number] (Left)    Final Anesthesia Type: general      Patient location during evaluation: [facility]  Patient participation: Yes- Able to Participate  Level of consciousness: awake and alert  Post-procedure vital signs: reviewed and stable  Pain management: adequate  Airway patency: patent  ALISON mitigation strategies: Extubation while patient is awake and Multimodal analgesia  PONV status at discharge: No PONV  Anesthetic complications: no      Cardiovascular status: stable  Respiratory status: unassisted and spontaneous ventilation  Hydration status: euvolemic  Follow-up not needed.              Vitals Value Taken Time   /79 05/06/24 0917   Temp 37.2 °C (98.9 °F) 05/06/24 0754   Pulse 69 05/06/24 0917   Resp 13 05/06/24 0917   SpO2 96 % 05/06/24 0917   Vitals shown include unfiled device data.      No case tracking events are documented in the log.      Pain/Desiree Score: Desiree Score: 10 (5/6/2024  9:00 AM)

## 2024-05-06 NOTE — PLAN OF CARE
Problem: Chemotherapy Effects  Goal: Anemia Symptom Improvement  Outcome: Progressing  Goal: Safety Maintained  Outcome: Progressing  Goal: Absence of Hematuria  Outcome: Progressing  Goal: Nausea and Vomiting Relief  Outcome: Progressing  Goal: Neurotoxicity Symptom Control  Outcome: Progressing  Goal: Absence of Infection  Outcome: Progressing  Goal: Absence of Bleeding  Outcome: Progressing     Problem: Fatigue  Goal: Improved Activity Tolerance  Outcome: Progressing     Problem: Anemia  Goal: Anemia Symptom Improvement  Outcome: Progressing     Problem: Infection  Goal: Absence of Infection Signs and Symptoms  Outcome: Progressing   Pt completed mozobil inj via SQ without adverse reaction. VS WNL Discharged AAOX4 and ambulatory

## 2024-05-06 NOTE — TELEPHONE ENCOUNTER
LM for pt to return call regarding need for Mozobil. Pt has been advised to return to BMT clinic at 1700 for injection and to take Imodium at 1600.

## 2024-05-06 NOTE — OP NOTE
DATE: 5/6/24.    PREOPERATIVE DIAGNOSIS:  Stem cell transplant candidate.    POSTOPERATIVE DIAGNOSIS:  Same.    PROCEDURE:  Right internal jugular PermCath placement.    ATTENDING SURGEON: Alvin White MD.    RESIDENT: Jenny Hidalgo MD.    ANESTHESIA: MAC.    INDICATION:  The patient is a 43-year-old male who will be undergoing a stem cell transplant and presents for catheter placement.    PROCEDURE IN DETAIL:  Patient was taken to the operating room and placed supine.  After initiation of monitored anesthesia care, the right neck and chest were prepped and draped in the standard fashion.  Time-out was performed.  Local anesthetic was applied.  Under ultrasonic guidance, the right internal jugular vein was cannulated with a needle and a wire was threaded distally.  This was confirmed in the venous circulation by fluoroscopy.  Counter incision was made in the right chest and the catheter was tunneled to the wire exit site.  Under fluoroscopic guidance, a sheath dilator was placed and the wire and dilator removed.  The catheter was placed down the sheath which was peeled away, leaving the catheter in intravascular position.  Repeat fluoroscopy showed appropriate positioning of the catheter without twisting or kinking.  Each port easily aspirated and flushed.  Heparin lock was applied.  Next skin incision was closed with subcuticular Monocryl and Dermabond.  Catheter was secured with Prolene suture and sterile dressing was applied.  Patient was awakened from anesthesia and transferred to the PACU in good condition.  All needle and sponge counts were correct at the conclusion of the case.  I was present for the procedure in its entirety.    EBL: Minimal.    FINDINGS:  Right IJ PermCath placed without apparent complication.

## 2024-05-06 NOTE — TRANSFER OF CARE
"Anesthesia Transfer of Care Note    Patient: Pete Fernandez    Procedure(s) Performed: Procedure(s) (LRB):  INSERTION, CATHETER, HEMODIALYSIS, DUAL LUMEN, left poss right, Bard 14.5 fr hemosplit catheter, model [id number] (Left)    Patient location: PACU    Anesthesia Type: general    Transport from OR: Transported from OR on room air with adequate spontaneous ventilation    Post pain: adequate analgesia    Post assessment: no apparent anesthetic complications    Post vital signs: stable    Level of consciousness: awake    Nausea/Vomiting: no nausea/vomiting    Complications: none    Transfer of care protocol was followed      Last vitals: Visit Vitals  BP (!) 158/98 (BP Location: Left arm, Patient Position: Lying)   Pulse 79   Temp 36.6 °C (97.8 °F) (Oral)   Resp 16   Ht 5' 9" (1.753 m)   Wt 95.1 kg (209 lb 10.5 oz)   SpO2 100%   BMI 30.96 kg/m²     "

## 2024-05-06 NOTE — H&P
General Surgery   History and Physical    Patient Name: Pete Fernandez  YOB: 1980 (43 y.o.)  MRN: [id number]  Today's Date: 05/06/2024    Referring Md:   Alvin White Md  [address] [place] [address]    SUBJECTIVE:     History of Present Illness:  Pete Fernandez is a 43 y.o. male with PMHx of recently diagnosed multiple myeloma who presents today for tunneled CVC placement for upcoming stem cell transplant. He denies prior central line or port placements on either side.      Patient denies personal history of MI, CVA, lung disease, DM  Denies alcohol, tobacco, and elicit drug use.   Not currently on any anticoagulants      Review of patient's allergies indicates:  No Known Allergies    Past Medical History:   Diagnosis Date    Anxiety disorder, unspecified     Hypercalcemia      Past Surgical History:   Procedure Laterality Date    BONE MARROW BIOPSY N/A 10/18/2023    Procedure: Biopsy-bone marrow;  Surgeon: Nhan Marte MD;  Location: [facility];  Service: General;  Laterality: N/A;    TONSILLECTOMY       Family History   Problem Relation Name Age of Onset    Breast cancer Mother      Pancreatic cancer Father       Social History     Tobacco Use    Smoking status: Never    Smokeless tobacco: Never   Substance Use Topics    Alcohol use: Yes    Drug use: Never        Review of Systems:  Review of Systems   Constitutional:  Negative for chills and fever.   HENT: Negative.     Eyes: Negative.    Respiratory:  Negative for cough and shortness of breath.    Cardiovascular:  Negative for chest pain.   Gastrointestinal:  Negative for abdominal pain, nausea and vomiting.   Genitourinary: Negative.    Musculoskeletal: Negative.    Skin: Negative.    Neurological:  Negative for dizziness and weakness.   Psychiatric/Behavioral: Negative.         OBJECTIVE:     Vital Signs (Most Recent)  There were no vitals taken for this visit.    Physical Exam  Constitutional:       Appearance: Normal  appearance.   HENT:      Head: Normocephalic and atraumatic.      Nose: Nose normal.      Mouth/Throat:      Mouth: Mucous membranes are moist.      Pharynx: Oropharynx is clear.   Eyes:      Extraocular Movements: Extraocular movements intact.      Conjunctiva/sclera: Conjunctivae normal.   Cardiovascular:      Rate and Rhythm: Normal rate and regular rhythm.   Pulmonary:      Effort: Pulmonary effort is normal.      Breath sounds: Normal breath sounds.   Abdominal:      General: Abdomen is flat.      Palpations: Abdomen is soft.      Comments: Midline incision well approximated without induration or erythema   Skin:     General: Skin is warm and dry.      Capillary Refill: Capillary refill takes less than 2 seconds.   Neurological:      General: No focal deficit present.      Mental Status: He is alert.           Labs:     Lab Results   Component Value Date    WBC 3.46 (L) 04/18/2024    HGB 8.5 (L) 04/18/2024    HCT 26.1 (L) 04/18/2024    .0 (H) 04/18/2024     04/18/2024         CMP  Sodium   Date Value Ref Range Status   04/16/2024 137 136 - 145 mmol/L Final     Sodium Level   Date Value Ref Range Status   04/18/2024 140 136 - 145 mmol/L Final     Potassium   Date Value Ref Range Status   04/16/2024 3.6 3.5 - 5.1 mmol/L Final     Potassium Level   Date Value Ref Range Status   04/18/2024 4.1 3.5 - 5.1 mmol/L Final     Chloride   Date Value Ref Range Status   04/16/2024 101 95 - 110 mmol/L Final     CO2   Date Value Ref Range Status   04/16/2024 28 23 - 29 mmol/L Final     Carbon Dioxide   Date Value Ref Range Status   04/18/2024 28 22 - 29 mmol/L Final     Glucose   Date Value Ref Range Status   04/16/2024 81 70 - 110 mg/dL Final     BUN   Date Value Ref Range Status   04/16/2024 30 (H) 6 - 20 mg/dL Final     Blood Urea Nitrogen   Date Value Ref Range Status   04/18/2024 25.0 (H) 8.9 - 20.6 mg/dL Final     Creatinine   Date Value Ref Range Status   04/18/2024 2.03 (H) 0.73 - 1.18 mg/dL Final    04/16/2024 1.8 (H) 0.5 - 1.4 mg/dL Final     Calcium   Date Value Ref Range Status   04/16/2024 9.7 8.7 - 10.5 mg/dL Final     Calcium Level Total   Date Value Ref Range Status   04/18/2024 9.4 8.4 - 10.2 mg/dL Final     Total Protein   Date Value Ref Range Status   04/16/2024 6.1 6.0 - 8.4 g/dL Final     Albumin   Date Value Ref Range Status   04/16/2024 3.9 3.5 - 5.2 g/dL Final     Albumin Level   Date Value Ref Range Status   04/18/2024 3.7 3.5 - 5.0 g/dL Final     Total Bilirubin   Date Value Ref Range Status   04/16/2024 0.5 0.1 - 1.0 mg/dL Final     Comment:     For infants and newborns, interpretation of results should be based  on gestational age, weight and in agreement with clinical  observations.    Premature Infant recommended reference ranges:  Up to 24 hours.............<8.0 mg/dL  Up to 48 hours............<12.0 mg/dL  3-5 days..................<15.0 mg/dL  6-29 days.................<15.0 mg/dL       Bilirubin Total   Date Value Ref Range Status   04/18/2024 0.7 <=1.5 mg/dL Final     Alkaline Phosphatase   Date Value Ref Range Status   04/18/2024 37 (L) 40 - 150 unit/L Final   04/16/2024 41 (L) 55 - 135 U/L Final     AST   Date Value Ref Range Status   04/16/2024 18 10 - 40 U/L Final     Aspartate Aminotransferase   Date Value Ref Range Status   04/18/2024 19 5 - 34 unit/L Final     ALT   Date Value Ref Range Status   04/16/2024 18 10 - 44 U/L Final     Alanine Aminotransferase   Date Value Ref Range Status   04/18/2024 14 0 - 55 unit/L Final     Anion Gap   Date Value Ref Range Status   04/16/2024 8 8 - 16 mmol/L Final         ASSESSMENT/PLAN:   Pete Fernandez is a 43 y.o. male with multiple myeloma who is a stem cell transplant candidate.    - will proceed to OR today with tunneled CVC placement: right, possible left chest  - consent obtained      Jenny Hidalgo MD  [facility]  General Surgery PGY-1

## 2024-05-06 NOTE — PLAN OF CARE
Dc instructions reviewed with pt and wife, verbalized understanding. VSS, IV removed intact, no distress noted. Pt to be escorted to [facility] via wheelchair by wife.

## 2024-05-07 ENCOUNTER — HOSPITAL ENCOUNTER (OUTPATIENT)
Dept: TRANSFUSION MEDICINE | Facility: HOSPITAL | Age: 44
Discharge: HOME | End: 2024-05-07
Payer: COMMERCIAL

## 2024-05-07 VITALS
TEMPERATURE: 97 F | WEIGHT: 209.69 LBS | OXYGEN SATURATION: 98 % | BODY MASS INDEX: 31.06 KG/M2 | SYSTOLIC BLOOD PRESSURE: 130 MMHG | HEIGHT: 69 IN | RESPIRATION RATE: 20 BRPM | HEART RATE: 85 BPM | DIASTOLIC BLOOD PRESSURE: 70 MMHG

## 2024-05-07 DIAGNOSIS — Z76.82 STEM CELL TRANSPLANT CANDIDATE: ICD-10-CM

## 2024-05-07 DIAGNOSIS — C90.00 MULTIPLE MYELOMA: Primary | ICD-10-CM

## 2024-05-07 DIAGNOSIS — C90.00 MULTIPLE MYELOMA, REMISSION STATUS UNSPECIFIED: ICD-10-CM

## 2024-05-07 LAB
ALBUMIN SERPL BCP-MCNC: 3.3 G/DL (ref 3.5–5.2)
ALBUMIN SERPL BCP-MCNC: 3.3 G/DL (ref 3.5–5.2)
ALBUMIN SERPL BCP-MCNC: 3.8 G/DL (ref 3.5–5.2)
ALP SERPL-CCNC: 182 U/L (ref 55–135)
ALP SERPL-CCNC: 215 U/L (ref 55–135)
ALP SERPL-CCNC: 223 U/L (ref 55–135)
ALT SERPL W/O P-5'-P-CCNC: 10 U/L (ref 10–44)
ALT SERPL W/O P-5'-P-CCNC: 13 U/L (ref 10–44)
ALT SERPL W/O P-5'-P-CCNC: 9 U/L (ref 10–44)
ANION GAP SERPL CALC-SCNC: 12 MMOL/L (ref 8–16)
ANION GAP SERPL CALC-SCNC: 14 MMOL/L (ref 8–16)
ANION GAP SERPL CALC-SCNC: 9 MMOL/L (ref 8–16)
ANISOCYTOSIS BLD QL SMEAR: SLIGHT
AST SERPL-CCNC: 16 U/L (ref 10–40)
AST SERPL-CCNC: 17 U/L (ref 10–40)
AST SERPL-CCNC: 18 U/L (ref 10–40)
BASOPHILS # BLD AUTO: ABNORMAL K/UL (ref 0–0.2)
BASOPHILS # BLD AUTO: ABNORMAL K/UL (ref 0–0.2)
BASOPHILS NFR BLD: 0 % (ref 0–1.9)
BILIRUB SERPL-MCNC: 0.4 MG/DL (ref 0.1–1)
BUN SERPL-MCNC: 16 MG/DL (ref 6–20)
BUN SERPL-MCNC: 16 MG/DL (ref 6–20)
BUN SERPL-MCNC: 18 MG/DL (ref 6–20)
CA-I BLDV-SCNC: 0.96 MMOL/L (ref 1.06–1.42)
CA-I BLDV-SCNC: 0.98 MMOL/L (ref 1.06–1.42)
CA-I BLDV-SCNC: 1.14 MMOL/L (ref 1.06–1.42)
CALCIUM SERPL-MCNC: 9.3 MG/DL (ref 8.7–10.5)
CALCIUM SERPL-MCNC: 9.7 MG/DL (ref 8.7–10.5)
CALCIUM SERPL-MCNC: 9.8 MG/DL (ref 8.7–10.5)
CD34 %: 0.13 %
CD34 ABSOLUTE: 89.67 CELLS/UL
CD34 VIABILITY: 98.5 %
CHLORIDE SERPL-SCNC: 101 MMOL/L (ref 95–110)
CHLORIDE SERPL-SCNC: 102 MMOL/L (ref 95–110)
CHLORIDE SERPL-SCNC: 105 MMOL/L (ref 95–110)
CO2 SERPL-SCNC: 28 MMOL/L (ref 23–29)
CO2 SERPL-SCNC: 31 MMOL/L (ref 23–29)
CO2 SERPL-SCNC: 31 MMOL/L (ref 23–29)
CREAT CL/1.73 SQ M 12H UR+SERPL-ARVRAT: NORMAL ML/MIN
CREAT SERPL-MCNC: 1.7 MG/DL (ref 0.5–1.4)
CREAT SERPL-MCNC: 1.7 MG/DL (ref 0.5–1.4)
CREAT SERPL-MCNC: 1.8 MG/DL (ref 0.5–1.4)
CREAT SERPL-MCNC: NORMAL MG/DL
CREAT UR-MCNC: 25 MG/DL (ref 23–375)
CREATININE, URINE (MG/SPEC): 750 MG/SPEC
DACRYOCYTES BLD QL SMEAR: ABNORMAL
DACRYOCYTES BLD QL SMEAR: ABNORMAL
DIFFERENTIAL METHOD BLD: ABNORMAL
DOHLE BOD BLD QL SMEAR: PRESENT
EOSINOPHIL # BLD AUTO: ABNORMAL K/UL (ref 0–0.5)
EOSINOPHIL # BLD AUTO: ABNORMAL K/UL (ref 0–0.5)
EOSINOPHIL NFR BLD: 0 % (ref 0–8)
EOSINOPHIL NFR BLD: 1 % (ref 0–8)
EOSINOPHIL NFR BLD: 1 % (ref 0–8)
ERYTHROCYTE [DISTWIDTH] IN BLOOD BY AUTOMATED COUNT: 16.3 % (ref 11.5–14.5)
ERYTHROCYTE [DISTWIDTH] IN BLOOD BY AUTOMATED COUNT: 16.3 % (ref 11.5–14.5)
ERYTHROCYTE [DISTWIDTH] IN BLOOD BY AUTOMATED COUNT: 16.4 % (ref 11.5–14.5)
EST. GFR  (NO RACE VARIABLE): 46.7 ML/MIN/1.73 M^2
EST. GFR  (NO RACE VARIABLE): 50 ML/MIN/1.73 M^2
EST. GFR  (NO RACE VARIABLE): 50 ML/MIN/1.73 M^2
GIANT PLATELETS BLD QL SMEAR: PRESENT
GLUCOSE SERPL-MCNC: 85 MG/DL (ref 70–110)
GLUCOSE SERPL-MCNC: 86 MG/DL (ref 70–110)
GLUCOSE SERPL-MCNC: 95 MG/DL (ref 70–110)
HCT VFR BLD AUTO: 27.5 % (ref 40–54)
HCT VFR BLD AUTO: 27.6 % (ref 40–54)
HCT VFR BLD AUTO: 30.8 % (ref 40–54)
HGB BLD-MCNC: 8.9 G/DL (ref 14–18)
HGB BLD-MCNC: 9 G/DL (ref 14–18)
HGB BLD-MCNC: 9.9 G/DL (ref 14–18)
HYPOCHROMIA BLD QL SMEAR: ABNORMAL
IMM GRANULOCYTES # BLD AUTO: ABNORMAL K/UL (ref 0–0.04)
IMM GRANULOCYTES NFR BLD AUTO: ABNORMAL % (ref 0–0.5)
LYMPHOCYTES # BLD AUTO: ABNORMAL K/UL (ref 1–4.8)
LYMPHOCYTES # BLD AUTO: ABNORMAL K/UL (ref 1–4.8)
LYMPHOCYTES NFR BLD: 10 % (ref 18–48)
LYMPHOCYTES NFR BLD: 12.5 % (ref 18–48)
LYMPHOCYTES NFR BLD: 17 % (ref 18–48)
MAGNESIUM SERPL-MCNC: 1.7 MG/DL (ref 1.6–2.6)
MAGNESIUM SERPL-MCNC: 1.8 MG/DL (ref 1.6–2.6)
MAGNESIUM SERPL-MCNC: 1.8 MG/DL (ref 1.6–2.6)
MCH RBC QN AUTO: 34.5 PG (ref 27–31)
MCH RBC QN AUTO: 34.5 PG (ref 27–31)
MCH RBC QN AUTO: 34.6 PG (ref 27–31)
MCHC RBC AUTO-ENTMCNC: 32.1 G/DL (ref 32–36)
MCHC RBC AUTO-ENTMCNC: 32.4 G/DL (ref 32–36)
MCHC RBC AUTO-ENTMCNC: 32.6 G/DL (ref 32–36)
MCV RBC AUTO: 106 FL (ref 82–98)
MCV RBC AUTO: 107 FL (ref 82–98)
MCV RBC AUTO: 108 FL (ref 82–98)
METAMYELOCYTES NFR BLD MANUAL: 1 %
METAMYELOCYTES NFR BLD MANUAL: 1 %
METAMYELOCYTES NFR BLD MANUAL: 2.5 %
MONOCYTES # BLD AUTO: ABNORMAL K/UL (ref 0.3–1)
MONOCYTES # BLD AUTO: ABNORMAL K/UL (ref 0.3–1)
MONOCYTES NFR BLD: 0.5 % (ref 4–15)
MONOCYTES NFR BLD: 2 % (ref 4–15)
MONOCYTES NFR BLD: 3 % (ref 4–15)
MYELOCYTES NFR BLD MANUAL: 1 %
MYELOCYTES NFR BLD MANUAL: 3 %
MYELOCYTES NFR BLD MANUAL: 4 %
NEUTROPHILS NFR BLD: 68 % (ref 38–73)
NEUTROPHILS NFR BLD: 75 % (ref 38–73)
NEUTROPHILS NFR BLD: 78.5 % (ref 38–73)
NEUTS BAND NFR BLD MANUAL: 1 %
NEUTS BAND NFR BLD MANUAL: 9 %
NEUTS BAND NFR BLD MANUAL: 9 %
NRBC BLD-RTO: 0 /100 WBC
OVALOCYTES BLD QL SMEAR: ABNORMAL
PATHOLOGIST INTERPRETATION UIFE: NORMAL
PHOSPHATE SERPL-MCNC: 2.6 MG/DL (ref 2.7–4.5)
PHOSPHATE SERPL-MCNC: 3.3 MG/DL (ref 2.7–4.5)
PHOSPHATE SERPL-MCNC: 4 MG/DL (ref 2.7–4.5)
PLATELET # BLD AUTO: 152 K/UL (ref 150–450)
PLATELET # BLD AUTO: 197 K/UL (ref 150–450)
PLATELET # BLD AUTO: 293 K/UL (ref 150–450)
PLATELET BLD QL SMEAR: ABNORMAL
PMV BLD AUTO: 9.4 FL (ref 9.2–12.9)
PMV BLD AUTO: 9.7 FL (ref 9.2–12.9)
PMV BLD AUTO: 9.8 FL (ref 9.2–12.9)
POIKILOCYTOSIS BLD QL SMEAR: SLIGHT
POLYCHROMASIA BLD QL SMEAR: ABNORMAL
POTASSIUM SERPL-SCNC: 3.2 MMOL/L (ref 3.5–5.1)
POTASSIUM SERPL-SCNC: 3.2 MMOL/L (ref 3.5–5.1)
POTASSIUM SERPL-SCNC: 3.3 MMOL/L (ref 3.5–5.1)
PROT SERPL-MCNC: 5.1 G/DL (ref 6–8.4)
PROT SERPL-MCNC: 5.2 G/DL (ref 6–8.4)
PROT SERPL-MCNC: 6 G/DL (ref 6–8.4)
RBC # BLD AUTO: 2.58 M/UL (ref 4.6–6.2)
RBC # BLD AUTO: 2.61 M/UL (ref 4.6–6.2)
RBC # BLD AUTO: 2.86 M/UL (ref 4.6–6.2)
SCHISTOCYTES BLD QL SMEAR: ABNORMAL
SCHISTOCYTES BLD QL SMEAR: PRESENT
SODIUM SERPL-SCNC: 142 MMOL/L (ref 136–145)
SODIUM SERPL-SCNC: 145 MMOL/L (ref 136–145)
SODIUM SERPL-SCNC: 146 MMOL/L (ref 136–145)
TOXIC GRANULES BLD QL SMEAR: PRESENT
URINE COLLECTION DURATION: 24 HR
URINE VOLUME: 3000 ML
WBC # BLD AUTO: 43.27 K/UL (ref 3.9–12.7)
WBC # BLD AUTO: 46.03 K/UL (ref 3.9–12.7)
WBC # BLD AUTO: 65.6 K/UL (ref 3.9–12.7)
WBC TOXIC VACUOLES BLD QL SMEAR: PRESENT

## 2024-05-07 PROCEDURE — 63600175 PHARM REV CODE 636 W HCPCS: Performed by: PATHOLOGY

## 2024-05-07 PROCEDURE — 25000003 PHARM REV CODE 250

## 2024-05-07 PROCEDURE — 38206 HARVEST AUTO STEM CELLS: CPT

## 2024-05-07 PROCEDURE — 38206 HARVEST AUTO STEM CELLS: CPT | Mod: ,,, | Performed by: PATHOLOGY

## 2024-05-07 PROCEDURE — 83735 ASSAY OF MAGNESIUM: CPT | Mod: 91 | Performed by: INTERNAL MEDICINE

## 2024-05-07 PROCEDURE — 38214 VOLUME DEPLETE OF HARVEST: CPT

## 2024-05-07 PROCEDURE — 86367 STEM CELLS TOTAL COUNT: CPT | Performed by: PATHOLOGY

## 2024-05-07 PROCEDURE — 25000003 PHARM REV CODE 250: Performed by: INTERNAL MEDICINE

## 2024-05-07 PROCEDURE — 38207 CRYOPRESERVE STEM CELLS: CPT

## 2024-05-07 PROCEDURE — 84100 ASSAY OF PHOSPHORUS: CPT | Mod: 91 | Performed by: INTERNAL MEDICINE

## 2024-05-07 PROCEDURE — 63600175 PHARM REV CODE 636 W HCPCS: Mod: JZ,JG

## 2024-05-07 PROCEDURE — 82330 ASSAY OF CALCIUM: CPT | Mod: 91 | Performed by: INTERNAL MEDICINE

## 2024-05-07 PROCEDURE — 25000003 PHARM REV CODE 250: Performed by: PATHOLOGY

## 2024-05-07 PROCEDURE — 85027 COMPLETE CBC AUTOMATED: CPT | Performed by: INTERNAL MEDICINE

## 2024-05-07 PROCEDURE — 80053 COMPREHEN METABOLIC PANEL: CPT | Mod: 91 | Performed by: INTERNAL MEDICINE

## 2024-05-07 PROCEDURE — 85007 BL SMEAR W/DIFF WBC COUNT: CPT | Mod: 91 | Performed by: INTERNAL MEDICINE

## 2024-05-07 RX ORDER — DIPHENHYDRAMINE HCL 25 MG
25 CAPSULE ORAL ONCE AS NEEDED
Status: DISCONTINUED | OUTPATIENT
Start: 2024-05-07 | End: 2024-05-08 | Stop reason: HOSPADM

## 2024-05-07 RX ORDER — ACETAMINOPHEN 325 MG/1
650 TABLET ORAL ONCE AS NEEDED
Status: DISCONTINUED | OUTPATIENT
Start: 2024-05-07 | End: 2024-05-08 | Stop reason: HOSPADM

## 2024-05-07 RX ORDER — LANOLIN ALCOHOL/MO/W.PET/CERES
800 CREAM (GRAM) TOPICAL ONCE AS NEEDED
Status: DISCONTINUED | OUTPATIENT
Start: 2024-05-07 | End: 2024-05-08 | Stop reason: HOSPADM

## 2024-05-07 RX ORDER — SODIUM,POTASSIUM PHOSPHATES 280-250MG
2 POWDER IN PACKET (EA) ORAL ONCE AS NEEDED
Status: DISCONTINUED | OUTPATIENT
Start: 2024-05-07 | End: 2024-05-08 | Stop reason: HOSPADM

## 2024-05-07 RX ORDER — POTASSIUM CHLORIDE 20 MEQ/1
40 TABLET, EXTENDED RELEASE ORAL ONCE AS NEEDED
Status: DISCONTINUED | OUTPATIENT
Start: 2024-05-07 | End: 2024-05-08 | Stop reason: HOSPADM

## 2024-05-07 RX ORDER — HEPARIN SODIUM 1000 [USP'U]/ML
4000 INJECTION, SOLUTION INTRAVENOUS; SUBCUTANEOUS ONCE
Status: COMPLETED | OUTPATIENT
Start: 2024-05-07 | End: 2024-05-07

## 2024-05-07 RX ORDER — POTASSIUM CHLORIDE 20 MEQ/1
40 TABLET, EXTENDED RELEASE ORAL ONCE
Status: COMPLETED | OUTPATIENT
Start: 2024-05-07 | End: 2024-05-07

## 2024-05-07 RX ADMIN — POTASSIUM CHLORIDE 40 MEQ: 1500 TABLET, EXTENDED RELEASE ORAL at 02:05

## 2024-05-07 RX ADMIN — FILGRASTIM 960 MCG: 480 INJECTION, SOLUTION INTRAVENOUS; SUBCUTANEOUS at 08:05

## 2024-05-07 RX ADMIN — HEPARIN SODIUM 4000 UNITS: 1000 INJECTION, SOLUTION INTRAVENOUS; SUBCUTANEOUS at 02:05

## 2024-05-07 RX ADMIN — POTASSIUM CHLORIDE 40 MEQ: 1500 TABLET, EXTENDED RELEASE ORAL at 09:05

## 2024-05-07 RX ADMIN — CALCIUM GLUCONATE 4 G: 98 INJECTION, SOLUTION INTRAVENOUS at 08:05

## 2024-05-07 NOTE — PROCEDURES
[facility] - Apheresis  Transfusion Medicine  Procedure Note    SUMMARY   Stem Cell Collection Autologous    Date/Time: 5/7/2024 2:40 PM    Performed by: Fernando Patiño MD  Authorized by: Mary Alice Kapoor MD        Date of Procedure: 5/7/2024     Procedure: Hematopoietic Progenitor Cell Collection    Provider: Fernando Patiño MD     Assisting Provider: None    Pre-Procedure Diagnosis: <principal problem not specified>    Post-Procedure Diagnosis: <principal problem not specified>    Follow-up Assessment: Mr. Fernandez is a 43 y.o. male with multiple myeloma that presented today for autologous HPC collection for transplantation after receiving mobilization therapy.  His pre-procedure CD34 count was 89.67 cells/uL and mid-run drawn 4 hours after interface yielded a collection of 6.89x10^6 CD34/kg and the procedure was discontinued without plans for day 2 collection.  Collection was completed without significant complications and was well tolerated.  The collected unit was sent for processing and final count determination.      Collection goal: 5-10x10^6 CD34/kg.  Final collection: 8.05x10^6 CD34/kg.      Pertinent Laboratory Data: Complete Blood Count:   Lab Results   Component Value Date    HGB 9.0 (L) 05/07/2024    HCT 27.6 (L) 05/07/2024     05/07/2024    WBC 43.27 (H) 05/07/2024     Comprehensive Metabolic Panel:   Lab Results   Component Value Date     05/07/2024    K 3.2 (L) 05/07/2024     05/07/2024    CO2 31 (H) 05/07/2024    GLU 95 05/07/2024    BUN 18 05/07/2024    CREATININE 1.7 (H) 05/07/2024    CALCIUM 9.8 05/07/2024    PROT 5.2 (L) 05/07/2024    ALBUMIN 3.3 (L) 05/07/2024    BILITOT 0.4 05/07/2024    ALKPHOS 215 (H) 05/07/2024    AST 16 05/07/2024    ALT 9 (L) 05/07/2024    ANIONGAP 12 05/07/2024     CD34 - quantitative:   Lab Results   Component Value Date    CD34 0.13 05/07/2024    [id number] 89.67 05/07/2024    [id number] 98.5 05/07/2024       Pertinent Medications: None    Review  of patient's allergies indicates:  No Known Allergies    Anesthesia: None     Technical Procedures Used: Hematopoietic Progenitor Cell collection: The patient presented to the 5th floor Chemotherapy unit, details about the procedure reviewed. Any interim clinical changes from previous clinic visit - No. All pertinent labs reviewed. Human Progenitor (Stem) Cell Collection initiated by Apheresis Nurse. Current plan is to perform the procedure for 6 hours. Initial laboratory tests collected, results to Oncology Nurse. Mid-run laboratory tests collected, results to Oncology Nurse. Included CD34 count on collection bag - results to Stem Cell Lab and BMT clinical team. Final laboratory tests collected, results to Oncology Nurse. Red blood cell or platelet transfusion - No.  Date of next procedure : None.    Description of the Findings of the Procedure:     Please see Apheresis Nurse flowsheet for details.    The patient was evaluated and all clinical and laboratory data relevant to the treatment was reviewed, and a decision was made to proceed with the Apheresis procedure.    I was available to the clinical staff throughout the procedure.    Significant Surgical Tasks Conducted by the Assistant(s): Not applicable    Complications: None    Estimated Blood Loss (EBL): None    Implants: None     Specimens: None

## 2024-05-07 NOTE — PROGRESS NOTES
Day 1 Auto Stem Cell Collection    Pt arrived to Apheresis Clinic ambulatory, NAD. Pt stated no pain. MD notified of pts arrival, Pre-collection CD34 drawn before starting collection. Right Perm Cath accessed per protocol, CVC dressing change completed using sterile technique. Neupogen injection given at 0815 to Right Lower Abd Area, pt tolerated well. Auto Stem Cell Collection for Dx of Multiple Myeloma started at 0817. Interface acquired at 0840. VSS, NAD. Mid CD34 Lab to be drawn at 1240.  All bags labelled and verified by 2 nurses. Rinseback at 1430, Collection ended at 1439. Pt tolerated tx well.  Pt given CVC supplies for home use and given instructions on CVC Care. Collection bags transported via cooler and picked up by Adeline from BMT Lab. Electrolyte coverage given before discharge. Pt left clinic ambulatory to [facility].  Pts goal of 5-10 acquired and does not need to have a 2nd day collection. MD aware.

## 2024-05-09 NOTE — PROGRESS NOTES
CC: 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, R-ISS III, stem cell transplant candidate; stem cell transplant admisison visit      Current Treatment:   Beatriz+ RVd - 11/2/23 - Present  Zometa 10/17/23 - Present          HPI  is a 44y/o M with no significant medical problems. He has been diagnosed with multiple myeloma. He is here for a virtual stem cell transplant consultation.   Per records,  presented to [facility] on 10/11/23 after 1 month of progressive weakness, abdominal pain, and intermittent confusion. At the time of his presentation, his labs, which had been normal  in 8/2023, were notable for new severe DOUG, transaminitis, and Ca of 18. He was admitted and started on IVF .  Hypercalcemia workup was initiated and he was ultimately found to have numerous osseous lesions on 10/15/23 CT CAP, FLC - Kappa 3.29, Lambda 744, Ratio 226 , SPEP with M spike 0.17, IgG lambda on sIFE. Bone marrow biopsy done 10/18/23 revealed 95% plasma cells and FISH with TP53 deletion, FGFR3/IGH (or NSD2/IGH) fusion, usually representing a t(4;14), and a tetraploid subclone was observed. After correction of his hypercalcemia and improvement in his renal function he was discharged on 10/22/23.   He was started on Beatriz + RVd on 11/2/23.      Interval History: He is here for follow up.  He feels well today. He continues to have fatigue.      Past Medical History:   Diagnosis Date    Anxiety disorder, unspecified     Hypercalcemia            Past Surgical History:   Procedure Laterality Date    BONE MARROW BIOPSY N/A 10/18/2023    Procedure: Biopsy-bone marrow;  Surgeon: Nhan Marte MD;  Location: [facility];  Service: General;  Laterality: N/A;    INSERTION OF TUNNELED CENTRAL VENOUS HEMODIALYSIS CATHETER Left 5/6/2024    Procedure: INSERTION, CATHETER, HEMODIALYSIS, DUAL LUMEN, left poss right, Bard 14.5 fr hemosplit catheter, model [id number];  Surgeon: Alvin White MD;  Location: [facility] OR [address] [place] [address];  Service:  General;  Laterality: Left;    TONSILLECTOMY           Social History     Socioeconomic History    Marital status:      Spouse name: Rosalinda    Number of children: 2   Tobacco Use    Smoking status: Never    Smokeless tobacco: Never   Substance and Sexual Activity    Alcohol use: Yes    Drug use: Never    Sexual activity: Yes     Partners: Female     Social Determinants of Health     Financial Resource Strain: Medium Risk (11/15/2023)    Overall Financial Resource Strain (CARDIA)     Difficulty of Paying Living Expenses: Somewhat hard   Food Insecurity: Food Insecurity Present (11/15/2023)    Hunger Vital Sign     Worried About Running Out of Food in the Last Year: Sometimes true     Ran Out of Food in the Last Year: Never true   Transportation Needs: No Transportation Needs (11/15/2023)    PRAPARE - Transportation     Lack of Transportation (Medical): No     Lack of Transportation (Non-Medical): No   Physical Activity: Insufficiently Active (11/15/2023)    Exercise Vital Sign     Days of Exercise per Week: 3 days     Minutes of Exercise per Session: 30 min   Stress: Stress Concern Present (11/15/2023)    [employer] [place] [employer] - Occupational Stress Questionnaire     Feeling of Stress : To some extent   Housing Stability: Low Risk  (11/15/2023)    Housing Stability Vital Sign     Unable to Pay for Housing in the Last Year: No     Number of Places Lived in the Last Year: 1     Unstable Housing in the Last Year: No         Review of patient's allergies indicates:  No Known Allergies      Current Outpatient Medications   Medication Sig    acyclovir (ZOVIRAX) 400 MG tablet Take 400 mg by mouth 2 (two) times daily.    aspirin 81 MG Chew Take 81 mg by mouth every evening.    calcitRIOL (ROCALTROL) 0.5 MCG Cap Take 1 capsule (0.5 mcg total) by mouth once daily.    colchicine (COLCRYS) 0.6 mg tablet Take 0.6 mg by mouth once daily.    dexAMETHasone (DECADRON) 4 MG Tab Take 10 tablets (40 mg total)  by mouth every 7 days. Take with food. (Patient not taking: Reported on 5/1/2024)    dextroamphetamine-amphetamine (ADDERALL) 20 mg tablet Take 1 tablet by mouth once daily.    febuxostat (ULORIC) 40 mg Tab Take 1 tablet (40 mg total) by mouth once daily.    lenalidomide (REVLIMID) 10 mg Cap TAKE 1 CAPSULE ONCE DAILY DAYS 1 THROUGH 21 OF EACH 28 DAY CYCLE (Patient not taking: Reported on 5/1/2024)    metoprolol tartrate (LOPRESSOR) 25 MG tablet Take 12.5 mg by mouth 2 (two) times daily.    ondansetron (ZOFRAN) 8 MG tablet Take 1 tablet (8 mg total) by mouth every 8 (eight) hours as needed for Nausea.    oxyCODONE (ROXICODONE) 10 mg Tab immediate release tablet Take 1 tablet (10 mg total) by mouth every 4 (four) hours as needed for Pain.    pantoprazole (PROTONIX) 20 MG tablet TAKE 1 TABLET (20 MG TOTAL) BY MOUTH ONCE DAILY (Patient taking differently: Take 20 mg by mouth every evening.)    prochlorperazine (COMPAZINE) 5 MG tablet Take 5 mg by mouth every 6 (six) hours as needed for Nausea. (Patient not taking: Reported on 5/1/2024)    sodium bicarbonate 650 MG tablet Take 650 mg by mouth 2 (two) times daily.    sulfamethoxazole-trimethoprim 800-160mg (BACTRIM DS) 800-160 mg Tab Take 1 tablet by mouth once daily.    tamsulosin (FLOMAX) 0.4 mg Cap Take 1 capsule (0.4 mg total) by mouth once daily. (Patient taking differently: Take 0.4 mg by mouth every evening.)     No current facility-administered medications for this visit.         Review of Systems   Constitutional:  Positive for malaise/fatigue. Negative for chills, fever and weight loss.   HENT:  Negative for congestion, ear discharge, ear pain, hearing loss, nosebleeds, sinus pain and tinnitus.    Eyes:  Negative for blurred vision, photophobia, pain, discharge and redness.   Respiratory:  Negative for sputum production.    Cardiovascular:  Negative for chest pain, palpitations, orthopnea and claudication.   Gastrointestinal:  Negative for blood in stool,  constipation, diarrhea, heartburn, melena and vomiting.   Genitourinary:  Negative for dysuria, frequency, hematuria and urgency.   Musculoskeletal:  Positive for back pain and myalgias. Negative for joint pain and neck pain.   Neurological:  Negative for tremors, sensory change, speech change, focal weakness, weakness and headaches.   Endo/Heme/Allergies:  Negative for environmental allergies and polydipsia. Does not bruise/bleed easily.   Psychiatric/Behavioral:  Negative for depression, hallucinations and substance abuse. The patient is not nervous/anxious.               PS: ECOG 1 / KPS 80  Response at transplant: VGPR  Risk at transplant: low  HCT CI score: 3 ( 2 points for DLCO < 80%; 1 point for anxiety)        Physical Exam  HENT:      Head: Normocephalic and atraumatic.      Mouth/Throat:      Pharynx: No posterior oropharyngeal erythema.   Eyes:      General: No scleral icterus.  Cardiovascular:      Rate and Rhythm: Normal rate.   Pulmonary:      Effort: Pulmonary effort is normal.      Breath sounds: Normal breath sounds.   Abdominal:      General: There is no distension.   Musculoskeletal:      Right lower leg: No edema.      Left lower leg: No edema.   Lymphadenopathy:      Cervical: No cervical adenopathy.   Skin:     Coloration: Skin is not jaundiced.   Neurological:      General: No focal deficit present.      Mental Status: He is alert.      Cranial Nerves: No cranial nerve deficit.            10/14/23 CT C/A/P    COMPARISON  None available at the time of initial interpretation.     FINDINGS  Images were reviewed in soft tissue, lung, and bone windows.     Exam quality: Inherently limited vascular and soft tissue assessment due to utilization of non-contrast protocol.  Quality further limited by patient movement throughout image acquisition with resulting widespread artifact particularly through the abdomen.     Lines/tubes: none visualized     Cardiovascular: Normal heart chamber size. No  pericardial effusion.  Normal vessel caliber and contour through the chest, abdomen, and pelvis.     Lungs/Pleura: Central airways are widely patent.  No organized airspace consolidation or focal pulmonary lesion is identified.  However, there is diffuse ill-defined ground-glass attenuation throughout both lungs of overall nonspecific appearance.  Small layering bilateral pleural effusions are also evident.  No evidence of loculated component or pleural thickening identified.  There is no pneumothorax.     Abdominal Solid Organs: Unremarkable appearance for limited non-contrast CT assessment.  No evidence of acute abnormality appreciated.     Gallbladder/Biliary: No acute process, calcified stone, or evidence of biliary obstruction.     Gastrointestinal: No evidence of acute esophageal or abdominal hollow viscus injury. No active inflammatory process.     Pelvic Organs: No focal abnormality of the urinary bladder or adjacent structures.     Peritoneal Cavity/Retroperitoneum: No free fluid or air, and no drainable collections.     Musculoskeletal: No acute or focal subcutaneous or muscular abnormality.  There is no evidence of acute osseous displacement.  However, widespread heterogeneity and innumerable lucent foci are appreciated throughout the spinal column, as well as the bony thorax and pelvis.  A more pronounced lytic lesion that is poorly marginated is noted at the inferior right posterolateral T11 vertebral body measuring 2.3 cm x 1.3 cm with overlying cortical disruption (series 3, image 96).  An additional focal expansile soft tissue density lesion is present at the right anterolateral 7th ribs with overlying cortical destruction and dimensions of approximately 3.6 cm x 2.3 cm (series 2, image 89).  Multiple nondisplaced, likely subacute/chronic right rib fractures also incidentally identified.     Other findings: Scattered mediastinal lymph nodes are enlarged.  For example, right posterolateral upper  paratracheal node measures 1.4 cm short axis (series 2, image 19).  Subcarinal node short axis dimension 1.4 cm (image 53).  No necrotic adenopathy is identified.  The thyroid is unremarkable.     IMPRESSION  1. Widespread osseous findings suggestive of extensive metastatic skeletal disease, to include expansile destructive soft tissue component at the anterior right 7th rib.  2. Enlarged intrathoracic lymph nodes could also be of metastatic etiology.  3. Diffuse ground-glass attenuation through both lungs is nonspecific, could be secondary to atypical infectious or inflammatory process.  However, possibility of metastatic involvement cannot be excluded.      10/18/23 bone marrow biopsy       1. BONE MARROW, RIGHT POSTERIOR ILIAC CREST, BIOPSY AND ASPIRATE:      PLASMA CELL MYELOMA, LAMBDA-RESTRICTED.      LAMBDA-RESTRICTED ATYPICAL PLASMA CELLS COMPRISE 95% OF MARROW NUCLEATED   CELLULARITY AND ARE ARRANGED IN DIFFUSE SHEETS.      REDUCED MARROW STORES.         PERIPHERAL BLOOD:   MILD NORMOCHROMIC NORMOCYTIC ANEMIA.      2. SEE DIAGNOSIS #1.       Comment   A. The patient's clinical history of hypercalcemia, weakness and acute kidney injury with CT showing extensive osseous lesions, SPEP showing monoclonal spike   0.17 g/dL, IgG lambda by OSVALDO is noted.      B. The current bone marrow is 100% cellular and the vast majority (95%) of the cellularity is composed of atypical plasma cells which are Lambda-restricted by   flow cytometry and immunohistochemical staining.      C. A representative sample of this patient's bone marrow was submitted for flow cytometry, which revealed a population of monotypic plasma cells (43.71%),   restricted to the production of Lambda light chains, with coexpression of CD38 and 138, negative for CD19 and CD45. No monotypic B cell population or increase   in blasts are identified. See below and separate scanned report.      D. A representative sample was also submitted for Myeloma FISH  panel, which was abnormal, indicating a plasma cell clone with a TP53 deletion and FGFR3/IGH (or   NSD2/IGH) fusion, usually representing a t(4;14).  In addition, a tetraploid subclone was observed. In plasma cell dyscrasias, a TP53 deletion represents a   high risk cytogenetic abnormality.  See below and separate scanned report.       12/14/23 SPEP: Serum protein electrophoresis shows a faint monoclonal peak (0.02 g/dL) in the gamma zone, consistent with a monoclonal gammopathy.     12/14/23 OSVALDO: A faint band is present in the IgG kaitlin with a corresponding faint band in the kappa kaitlin.     The immunofixation electrophoresis are consistent with monoclonal gammopathy of IgG-kappa isotype.       4/9/24 LEFT ILIAC CREST BONE MARROW ASPIRATE (INADEQUATE), BONE MARROW CLOT (INADEQUATE), AND BONE MARROW CORE BIOPSY WITH:     CELLULARITY=20%, TRILINEAGE HEMATOPOIETIC ACTIVITY .   NO DEFINITIVE MORPHOLOGIC OR IMMUNOPHENOTYPIC EVIDENCE OF RESIDUAL PLASMA CELL NEOPLASM.  SEE COMMENT.   CONGO RED NEGATIVE.   GRADE 1-2 RETICULAR FIBROSIS.   INCREASED STORAGE IRON.   ADEQUATE NUMBER OF MEGAKARYOCYTES.     Immunohistochemical studies were performed on the  core biopsy for greater sensitivity and further architecture evaluation with adequate positive and negative controls.  Scattered mixed predominantly T cells (CD3 positive) and B cells (CD20 positive) are    evident.  About 1% plasma cells ( positive) are noted.  In situ hybridization for kappa and lambda light chain is noncontributory.   highlights scattered mast cells.     There is no definitive morphologic or immunophenotypic evidence of residual plasma cell neoplasm.  Correlate clinically and with a cytogenetics report.       Interp, Plasma Cell Prolif (PCPD), FISH: Plasma Cell Prolif, High Risk, FISH was cancelled on 04/12/2024 at 11:10; Quantity was not sufficient for testing. The FISH test was cancelled due to an insufficient number of plasma cells within the  provided   specimen.         4/9/24 PET CT      COMPARISON:  [facility] PET-CT 02/07/2024     FINDINGS:  Quality of the study: Adequate.     In the head and neck, there are no hypermetabolic lesions worrisome for malignancy. There are no hypermetabolic mucosal lesions, and there are no pathologically enlarged or hypermetabolic lymph nodes.     In the chest, there are no hypermetabolic lesions worrisome for malignancy.  There are no concerning pulmonary nodules or masses, and there are no pathologically enlarged or hypermetabolic lymph nodes.     In the abdomen and pelvis, there is physiologic tracer distribution within the abdominal organs and excretion into the genitourinary system.     In the bones, there are no hypermetabolic lesions worrisome for malignancy.  Index sclerotic lesion in the anterior right 7th rib demonstrates background level uptake.  Index lytic lesion in the anterior left ilium demonstrates background level uptake.  There is mild uptake associated with a right posterolateral 4th rib fracture, slightly improved compared to prior.  Numerous additional non tracer avid lytic foci and remote bilateral rib fractures.     In the extremities, there are no hypermetabolic lesions worrisome for malignancy.     Additional CT findings: Stable subcentimeter right lower lobe nodules, below size threshold for PET.     Impression:     No focal tracer avid lesions to suggest active multiple myeloma.           4/16/24 SPEP: Decreased total protein. A linear irregularity in near gamma approximately = 0.05 G/dL.   4/16/24 sIFE: Faint IgG kappa specific monoclonal protein band identified in gamma.   4/16/24 UIFE: No monoclonal peaks identified.     4/16/24 PFT:Spirometry is normal. DLCO is borderline decreased. Adj DLCO 70.4%      4/16/24 EKG: Normal sinus rhythm .Normal ECG       4/16/24 ECHO      Left Ventricle: The left ventricle is normal in size. Moderately increased ventricular mass. Normal wall thickness. There is  eccentric hypertrophy. Normal wall motion. There is normal systolic function with a visually estimated ejection fraction of 55 - 60%. Biplane (2D) method of discs ejection fraction is 55%. Global longitudinal strain is -17.0%.    Right Ventricle: Normal right ventricular cavity size. Wall thickness is normal. Right ventricle wall motion  is normal. Systolic function is normal.    Left Atrium: Left atrium is severely dilated.    Right Atrium: Right atrium is severely dilated.    Aortic Valve: The aortic valve is a trileaflet valve.    Tricuspid Valve: There is mild regurgitation.    Pulmonary Artery: There is borderline elevated pulmonary hypertension. The estimated pulmonary artery systolic pressure is 40 mmHg.    IVC/SVC: Normal venous pressure at 3 mmHg.           Component      Latest Ref Rng 4/16/2024   Urine Total Volume      mL 3200    Urine Collection Duration      Hr 24    CREATININE, URINE (SEND OUT)      23.0 - 375.0 mg/dL 31.0    Creatinine Clearance      70 - 110 mL/min 38 (L)    Urine Creatinine Absolute      mg/Spec 992.0    Creatinine      0.5 - 1.4 mg/dL 1.8 (H)       Component      Latest Ref Rng 4/16/2024   Urine Total Volume      mL 3200    Urine Collection Duration      Hr 24    PROTEIN URINE      0 - 15 mg/dL <7    Urine Protein, Timed      0 - 100 mg/Spec Unable to calculate        Component      Latest Ref Rng 5/10/2024   WBC      3.90 - 12.70 K/uL 8.71    RBC      4.60 - 6.20 M/uL 2.86 (L)    Hemoglobin      14.0 - 18.0 g/dL 9.5 (L)    Hematocrit      40.0 - 54.0 % 30.0 (L)    MCV      82 - 98 fL 105 (H)    MCH      27.0 - 31.0 pg 33.2 (H)    MCHC      32.0 - 36.0 g/dL 31.7 (L)    RDW      11.5 - 14.5 % 15.7 (H)    Platelet Count      150 - 450 K/uL 148 (L)    MPV      9.2 - 12.9 fL 10.1    Immature Granulocytes      0.0 - 0.5 % 1.7 (H)    Gran # (ANC)      1.8 - 7.7 K/uL 6.5    Immature Grans (Abs)      0.00 - 0.04 K/uL 0.15 (H)    Lymph #      1.0 - 4.8 K/uL 0.9 (L)    Mono #      0.3 - 1.0  K/uL 1.0    Eos #      0.0 - 0.5 K/uL 0.2    Baso #      0.00 - 0.20 K/uL 0.04    nRBC      0 /100 WBC 0    Gran %      38.0 - 73.0 % 74.2 (H)    Lymph %      18.0 - 48.0 % 10.8 (L)    Mono %      4.0 - 15.0 % 11.0    Eos %      0.0 - 8.0 % 1.8    Basophil %      0.0 - 1.9 % 0.5    Differential Method Automated    Sodium      136 - 145 mmol/L 139    Potassium      3.5 - 5.1 mmol/L 4.2    Chloride      95 - 110 mmol/L 104    CO2      23 - 29 mmol/L 27    Glucose      70 - 110 mg/dL 103    BUN      6 - 20 mg/dL 26 (H)    Creatinine      0.5 - 1.4 mg/dL 2.0 (H)    Calcium      8.7 - 10.5 mg/dL 9.7    PROTEIN TOTAL      6.0 - 8.4 g/dL 5.9 (L)    Albumin      3.5 - 5.2 g/dL 3.8    BILIRUBIN TOTAL      0.1 - 1.0 mg/dL 0.4    ALP      55 - 135 U/L 107    AST      10 - 40 U/L 15    ALT      10 - 44 U/L 11    eGFR      >60 mL/min/1.73 m^2 41.2 !    Anion Gap      8 - 16 mmol/L 8    SARS-CoV-2 RNA, Amplification, Qual      Negative  Negative       Legend:  (L) Low  (H) High  ! Abnormal      Assessment:      1. R-ISS stage III IgG lambda multiple myeloma not in remission  2. Stem cell transplant candidate  3. DOUG /CKD  4. Fatigue  5. Cancer associated pain  6. Anemia in neoplastic disease  7. thrombocytopenia      Plan:    1. He has high risk features on bone marrow aspirate FISH,  including TP53 deletion and FGFR3/IGH (or NSD2/IGH) fusion, usually representing a t(4;14). He had presented with DOUG and hypercalcemia.    He has started mira VRd induction. In view of high risk features on FISH and good performance status I had recommended switching bortezomib to carfilzomib.   I discussed the role, timing, risks and benefits of autologous stem cell transplant in multiple myeloma. I explained that although the autologous stem cell transplant is NOT curative, it has progression free survival and overall survival benefits, when the transplant is followed by maintenance therapy. I discussed that he will andi to be in  [place] for  4 weeks for the transplant , with approximately 2 weeks in the hospital, and 2 weeks post discharge locally and will need an adult care-giver for all four weeks.   He has good performance status with limited co-morbid conditions and will possibly be transplanted in March-April 2024 if he responds well to induction.  He has not had a baseline PET CT. CT C/A/P done on 10/14/23 demonstrated scattered mediastinal lymph nodes. Unclear if this is extramedullary involvement  by his myeloma.  He is on zometa.   He is in cycle 3 mira VRd . His serum free light chain ratio is normal as of 1/11/24. 12/14/23 SPEP/ OSVALDO with a  faint monoclonal peak (0.02 g/dL) in the gamma zone, consistent with a monoclonal gammopathy. A faint band was present in the IgG kaitlin with a corresponding faint band in the kappa kaitlin, possibly due to daratumumab interference ( original paraprotein was IgG lambda).         2.   Autologous stem cell transplant in multiple myeloma NOT curative.   I discussed the risks and benefits.       G6PD: normal  Sickle cell screen : negative  CMV Ab : non reactive  RPR: non-reactive  Chagas Ab: non-reactive  HBV, HCV, HTLV I/II , HIV 1/2 : all non-reactive  Varicella IgG : positive  Urine toxicology screen: positive for amphetamine  TB Gold Plus negative  Blood toxicology screen: negative  APTT, INR, Magnesium, phosphorous: all normal  UA :normal  24hr creatinine clearance: 38/min  24hr UPEP/ UIFE: No paraprotein band / no monoclonal peaks identified.   4/16/24 sFLC ratio: normal  4/16/24 SPEP/OSVALDO: A linear irregularity in near gamma approximately = 0.05 G/dL.  Faint IgG kappa specific monoclonal protein band identified in gamma.   Strongyloides IgG : negative  HSV 1 IgG: negative  HSV 2 IgG:  negative  4/16/24 ECHO:The left ventricle is normal in size. Moderately increased ventricular mass. Normal wall thickness. There is eccentric hypertrophy. Normal wall motion. There is normal systolic function with a visually  estimated ejection fraction of 55 - 60%.   EKG: Normal EKG   4/9/24 PET CT: No focal tracer avid lesions to suggest active multiple myeloma.   4/16/24 PFT:Spirometry is normal. DLCO is borderline decreased.   PSA: 2.8, normal  2/3/24 colonoscopy: Normal  4/9/24 Bone marrow biopsy:NO DEFINITIVE MORPHOLOGIC OR IMMUNOPHENOTYPIC EVIDENCE OF RESIDUAL PLASMA CELL NEOPLASM  Dental clearance: Obtained  SW: No barriers identified  Care giver: spouse  Disposition: [facility] after hospital discharge, then home  Onco psychology: No overt contraindications for SCT      3. Secondary to multiple myeloma, Cr improving.       4. Secondary to multiple myeloma    5. He has widespread bone involvement. He is on zometa, oxycodone.    6. Most recent Hgb 9.5g/dl    7,. Most recent platelet count 148 k

## 2024-05-10 ENCOUNTER — INFUSION (OUTPATIENT)
Dept: INFUSION THERAPY | Facility: HOSPITAL | Age: 44
End: 2024-05-10
Payer: COMMERCIAL

## 2024-05-10 ENCOUNTER — TELEPHONE (OUTPATIENT)
Dept: HEMATOLOGY/ONCOLOGY | Facility: CLINIC | Age: 44
End: 2024-05-10

## 2024-05-10 ENCOUNTER — OFFICE VISIT (OUTPATIENT)
Dept: HEMATOLOGY/ONCOLOGY | Facility: CLINIC | Age: 44
End: 2024-05-10
Payer: COMMERCIAL

## 2024-05-10 VITALS
HEART RATE: 83 BPM | HEIGHT: 69 IN | RESPIRATION RATE: 17 BRPM | DIASTOLIC BLOOD PRESSURE: 89 MMHG | WEIGHT: 203.06 LBS | OXYGEN SATURATION: 98 % | SYSTOLIC BLOOD PRESSURE: 154 MMHG | TEMPERATURE: 98 F | BODY MASS INDEX: 30.08 KG/M2

## 2024-05-10 DIAGNOSIS — D84.821 IMMUNODEFICIENCY DUE TO DRUG THERAPY: ICD-10-CM

## 2024-05-10 DIAGNOSIS — R53.82 CHRONIC FATIGUE: ICD-10-CM

## 2024-05-10 DIAGNOSIS — C90.01 MULTIPLE MYELOMA IN REMISSION: Primary | ICD-10-CM

## 2024-05-10 DIAGNOSIS — Z76.82 STEM CELL TRANSPLANT CANDIDATE: ICD-10-CM

## 2024-05-10 DIAGNOSIS — C90.00 MULTIPLE MYELOMA NOT HAVING ACHIEVED REMISSION: ICD-10-CM

## 2024-05-10 DIAGNOSIS — C90.00 METASTATIC MULTIPLE MYELOMA TO BONE: Primary | ICD-10-CM

## 2024-05-10 DIAGNOSIS — D69.6 THROMBOCYTOPENIA: ICD-10-CM

## 2024-05-10 DIAGNOSIS — D63.0 ANEMIA IN NEOPLASTIC DISEASE: ICD-10-CM

## 2024-05-10 DIAGNOSIS — Z79.899 IMMUNODEFICIENCY DUE TO DRUG THERAPY: ICD-10-CM

## 2024-05-10 PROBLEM — K21.9 GERD (GASTROESOPHAGEAL REFLUX DISEASE): Status: ACTIVE | Noted: 2024-05-10

## 2024-05-10 PROBLEM — M10.9 GOUT: Status: ACTIVE | Noted: 2024-05-10

## 2024-05-10 PROBLEM — T45.1X5A ANEMIA DUE TO CHEMOTHERAPY: Status: ACTIVE | Noted: 2024-05-10

## 2024-05-10 PROBLEM — N40.0 BPH (BENIGN PROSTATIC HYPERPLASIA): Status: ACTIVE | Noted: 2024-05-10

## 2024-05-10 PROBLEM — I10 HYPERTENSION: Status: ACTIVE | Noted: 2024-05-10

## 2024-05-10 PROBLEM — F98.8 ADD (ATTENTION DEFICIT DISORDER): Status: ACTIVE | Noted: 2024-05-10

## 2024-05-10 PROBLEM — N18.9 CKD (CHRONIC KIDNEY DISEASE): Status: ACTIVE | Noted: 2024-05-10

## 2024-05-10 PROBLEM — I31.9 PERICARDITIS: Status: ACTIVE | Noted: 2024-05-10

## 2024-05-10 PROBLEM — D64.81 ANEMIA DUE TO CHEMOTHERAPY: Status: ACTIVE | Noted: 2024-05-10

## 2024-05-10 LAB
ALBUMIN SERPL BCP-MCNC: 3.8 G/DL (ref 3.5–5.2)
ALP SERPL-CCNC: 107 U/L (ref 55–135)
ALT SERPL W/O P-5'-P-CCNC: 11 U/L (ref 10–44)
ANION GAP SERPL CALC-SCNC: 8 MMOL/L (ref 8–16)
AST SERPL-CCNC: 15 U/L (ref 10–40)
BASOPHILS # BLD AUTO: 0.04 K/UL (ref 0–0.2)
BASOPHILS NFR BLD: 0.5 % (ref 0–1.9)
BILIRUB SERPL-MCNC: 0.4 MG/DL (ref 0.1–1)
BUN SERPL-MCNC: 26 MG/DL (ref 6–20)
CALCIUM SERPL-MCNC: 9.7 MG/DL (ref 8.7–10.5)
CHLORIDE SERPL-SCNC: 104 MMOL/L (ref 95–110)
CO2 SERPL-SCNC: 27 MMOL/L (ref 23–29)
CREAT SERPL-MCNC: 2 MG/DL (ref 0.5–1.4)
DIFFERENTIAL METHOD BLD: ABNORMAL
EOSINOPHIL # BLD AUTO: 0.2 K/UL (ref 0–0.5)
EOSINOPHIL NFR BLD: 1.8 % (ref 0–8)
ERYTHROCYTE [DISTWIDTH] IN BLOOD BY AUTOMATED COUNT: 15.7 % (ref 11.5–14.5)
EST. GFR  (NO RACE VARIABLE): 41.2 ML/MIN/1.73 M^2
GLUCOSE SERPL-MCNC: 103 MG/DL (ref 70–110)
HCT VFR BLD AUTO: 30 % (ref 40–54)
HGB BLD-MCNC: 9.5 G/DL (ref 14–18)
IMM GRANULOCYTES # BLD AUTO: 0.15 K/UL (ref 0–0.04)
IMM GRANULOCYTES NFR BLD AUTO: 1.7 % (ref 0–0.5)
LYMPHOCYTES # BLD AUTO: 0.9 K/UL (ref 1–4.8)
LYMPHOCYTES NFR BLD: 10.8 % (ref 18–48)
MCH RBC QN AUTO: 33.2 PG (ref 27–31)
MCHC RBC AUTO-ENTMCNC: 31.7 G/DL (ref 32–36)
MCV RBC AUTO: 105 FL (ref 82–98)
MONOCYTES # BLD AUTO: 1 K/UL (ref 0.3–1)
MONOCYTES NFR BLD: 11 % (ref 4–15)
NEUTROPHILS # BLD AUTO: 6.5 K/UL (ref 1.8–7.7)
NEUTROPHILS NFR BLD: 74.2 % (ref 38–73)
NRBC BLD-RTO: 0 /100 WBC
PLATELET # BLD AUTO: 148 K/UL (ref 150–450)
PMV BLD AUTO: 10.1 FL (ref 9.2–12.9)
POTASSIUM SERPL-SCNC: 4.2 MMOL/L (ref 3.5–5.1)
PROT SERPL-MCNC: 5.9 G/DL (ref 6–8.4)
RBC # BLD AUTO: 2.86 M/UL (ref 4.6–6.2)
SODIUM SERPL-SCNC: 139 MMOL/L (ref 136–145)
WBC # BLD AUTO: 8.71 K/UL (ref 3.9–12.7)

## 2024-05-10 PROCEDURE — 25000003 PHARM REV CODE 250: Performed by: INTERNAL MEDICINE

## 2024-05-10 PROCEDURE — A4216 STERILE WATER/SALINE, 10 ML: HCPCS | Performed by: INTERNAL MEDICINE

## 2024-05-10 PROCEDURE — 63600175 PHARM REV CODE 636 W HCPCS: Performed by: INTERNAL MEDICINE

## 2024-05-10 PROCEDURE — 36592 COLLECT BLOOD FROM PICC: CPT

## 2024-05-10 PROCEDURE — 86901 BLOOD TYPING SEROLOGIC RH(D): CPT | Performed by: INTERNAL MEDICINE

## 2024-05-10 PROCEDURE — 3008F BODY MASS INDEX DOCD: CPT | Mod: CPTII,S$GLB,, | Performed by: INTERNAL MEDICINE

## 2024-05-10 PROCEDURE — 85025 COMPLETE CBC W/AUTO DIFF WBC: CPT | Performed by: INTERNAL MEDICINE

## 2024-05-10 PROCEDURE — 80053 COMPREHEN METABOLIC PANEL: CPT | Performed by: INTERNAL MEDICINE

## 2024-05-10 PROCEDURE — 3077F SYST BP >= 140 MM HG: CPT | Mod: CPTII,S$GLB,, | Performed by: INTERNAL MEDICINE

## 2024-05-10 PROCEDURE — 3079F DIAST BP 80-89 MM HG: CPT | Mod: CPTII,S$GLB,, | Performed by: INTERNAL MEDICINE

## 2024-05-10 PROCEDURE — 99214 OFFICE O/P EST MOD 30 MIN: CPT | Mod: S$GLB,,, | Performed by: INTERNAL MEDICINE

## 2024-05-10 PROCEDURE — 99999 PR PBB SHADOW E&M-EST. PATIENT-LVL III: CPT | Mod: PBBFAC,,, | Performed by: INTERNAL MEDICINE

## 2024-05-10 RX ORDER — EPINEPHRINE 0.3 MG/.3ML
0.3 INJECTION SUBCUTANEOUS ONCE AS NEEDED
Status: CANCELLED | OUTPATIENT
Start: 2024-05-10

## 2024-05-10 RX ORDER — HEPARIN 100 UNIT/ML
500 SYRINGE INTRAVENOUS
OUTPATIENT
Start: 2024-05-10

## 2024-05-10 RX ORDER — LANOLIN ALCOHOL/MO/W.PET/CERES
CREAM (GRAM) TOPICAL
Status: ON HOLD | COMMUNITY
End: 2024-05-27 | Stop reason: HOSPADM

## 2024-05-10 RX ORDER — HEPARIN SODIUM 1000 [USP'U]/ML
1000 INJECTION, SOLUTION INTRAVENOUS; SUBCUTANEOUS ONCE
Status: COMPLETED | OUTPATIENT
Start: 2024-05-10 | End: 2024-05-10

## 2024-05-10 RX ORDER — ONDANSETRON 8 MG/1
8 TABLET, ORALLY DISINTEGRATING ORAL
Status: CANCELLED | OUTPATIENT
Start: 2024-05-11

## 2024-05-10 RX ORDER — PROCHLORPERAZINE EDISYLATE 5 MG/ML
10 INJECTION INTRAMUSCULAR; INTRAVENOUS EVERY 6 HOURS PRN
Status: CANCELLED | OUTPATIENT
Start: 2024-05-10

## 2024-05-10 RX ORDER — FLUCONAZOLE 200 MG/1
400 TABLET ORAL DAILY
Status: CANCELLED | OUTPATIENT
Start: 2024-05-11

## 2024-05-10 RX ORDER — DEXAMETHASONE 4 MG/1
12 TABLET ORAL
Status: CANCELLED | OUTPATIENT
Start: 2024-05-11

## 2024-05-10 RX ORDER — HEPARIN 100 UNIT/ML
500 SYRINGE INTRAVENOUS
Status: DISCONTINUED | OUTPATIENT
Start: 2024-05-10 | End: 2024-05-10 | Stop reason: HOSPADM

## 2024-05-10 RX ORDER — SODIUM CHLORIDE AND POTASSIUM CHLORIDE 150; 900 MG/100ML; MG/100ML
INJECTION, SOLUTION INTRAVENOUS CONTINUOUS
Status: CANCELLED | OUTPATIENT
Start: 2024-05-10

## 2024-05-10 RX ORDER — LEVOFLOXACIN 500 MG/1
500 TABLET, FILM COATED ORAL DAILY
Status: CANCELLED | OUTPATIENT
Start: 2024-05-11

## 2024-05-10 RX ORDER — OLANZAPINE 5 MG/1
5 TABLET ORAL 2 TIMES DAILY
Status: CANCELLED | OUTPATIENT
Start: 2024-05-10

## 2024-05-10 RX ORDER — SODIUM CHLORIDE 0.9 % (FLUSH) 0.9 %
10 SYRINGE (ML) INJECTION
Status: CANCELLED | OUTPATIENT
Start: 2024-05-10

## 2024-05-10 RX ORDER — HEPARIN SODIUM 1000 [USP'U]/ML
1600 INJECTION, SOLUTION INTRAVENOUS; SUBCUTANEOUS
Status: CANCELLED | OUTPATIENT
Start: 2024-05-10

## 2024-05-10 RX ORDER — SULFAMETHOXAZOLE AND TRIMETHOPRIM 800; 160 MG/1; MG/1
1 TABLET ORAL
Status: CANCELLED | OUTPATIENT
Start: 2024-06-11

## 2024-05-10 RX ORDER — ACYCLOVIR 200 MG/1
800 CAPSULE ORAL 2 TIMES DAILY
Status: CANCELLED | OUTPATIENT
Start: 2024-05-11

## 2024-05-10 RX ORDER — DIPHENHYDRAMINE HYDROCHLORIDE 50 MG/ML
50 INJECTION INTRAMUSCULAR; INTRAVENOUS ONCE AS NEEDED
Status: CANCELLED | OUTPATIENT
Start: 2024-05-10

## 2024-05-10 RX ORDER — SODIUM CHLORIDE 0.9 % (FLUSH) 0.9 %
10 SYRINGE (ML) INJECTION
Status: DISCONTINUED | OUTPATIENT
Start: 2024-05-10 | End: 2024-05-10 | Stop reason: HOSPADM

## 2024-05-10 RX ORDER — SODIUM CHLORIDE 0.9 % (FLUSH) 0.9 %
10 SYRINGE (ML) INJECTION
OUTPATIENT
Start: 2024-05-10

## 2024-05-10 RX ADMIN — Medication 10 ML: at 12:05

## 2024-05-10 RX ADMIN — HEPARIN SODIUM 1000 UNITS: 1000 INJECTION, SOLUTION INTRAVENOUS; SUBCUTANEOUS at 12:05

## 2024-05-10 NOTE — HPI
Mr. Fernandez is a 43-y-o patient of Dr. Cook with multiple myeloma. Other medical history includes pericarditis, CKD, HTN, BPH, gout, GERD, ADD, and cancer pain. He is admitting today for a Sandra 200 auto SCT. He is feeling well today. Denies fevers, chills, cough, SOB, chest pain, bleeding or bruising, and n/v/d/c.

## 2024-05-10 NOTE — NURSING
Pt here for lab draw from CV. Ronal ordered blood work and sent to lab. No questions or concerns. Pt ambulated out of unit unassisted.

## 2024-05-10 NOTE — ASSESSMENT & PLAN NOTE
- Patient of Dr. Cook  - Admitting today (5/14/24) for a Sandra 200 auto SCT  - Today is Day -2  - See treatment plan below    Planned conditioning regimen:  Melphalan 200 on Day -1      Antimicrobial Prophylaxis:  Acyclovir starting on Day -1  Levofloxacin starting on Day -1  Fluconazole starting on Day -1  Bactrim starting on Day +30     Growth Factor Support:  Neupogen starting on Day +7      Caregiver: wife and mother  Post-transplant discharge plans: Becca Martin

## 2024-05-10 NOTE — ASSESSMENT & PLAN NOTE
From Dr. Cook's most recent clinic note:   is a 42y/o M with no significant medical problems. He has been diagnosed with multiple myeloma. He is here for a virtual stem cell transplant consultation.   Per records,  presented to [facility] on 10/11/23 after 1 month of progressive weakness, abdominal pain, and intermittent confusion. At the time of his presentation, his labs, which had been normal  in 8/2023, were notable for new severe DOUG, transaminitis, and Ca of 18. He was admitted and started on IVF .  Hypercalcemia workup was initiated and he was ultimately found to have numerous osseous lesions on 10/15/23 CT CAP, FLC - Kappa 3.29, Lambda 744, Ratio 226 , SPEP with M spike 0.17, IgG lambda on sIFE. Bone marrow biopsy done 10/18/23 revealed 95% plasma cells and FISH with TP53 deletion, FGFR3/IGH (or NSD2/IGH) fusion, usually representing a t(4;14), and a tetraploid subclone was observed. After correction of his hypercalcemia and improvement in his renal function he was discharged on 10/22/23.   He was started on Beatriz + RVd on 11/2/23.   - Admitting today for a Sandra 200 auto SCT.

## 2024-05-10 NOTE — ASSESSMENT & PLAN NOTE
- Anticipate pancytopenia following chemo  - Transfuse for plts < 10K  - Daily CBC while inpatient

## 2024-05-10 NOTE — SUBJECTIVE & OBJECTIVE
Patient information was obtained from patient and past medical records.     Oncology History:   From Dr. Cook's most recent clinic note:   is a 44y/o M with no significant medical problems. He has been diagnosed with multiple myeloma. He is here for a virtual stem cell transplant consultation.   Per records,  presented to [facility] on 10/11/23 after 1 month of progressive weakness, abdominal pain, and intermittent confusion. At the time of his presentation, his labs, which had been normal  in 8/2023, were notable for new severe DOUG, transaminitis, and Ca of 18. He was admitted and started on IVF .  Hypercalcemia workup was initiated and he was ultimately found to have numerous osseous lesions on 10/15/23 CT CAP, FLC - Kappa 3.29, Lambda 744, Ratio 226 , SPEP with M spike 0.17, IgG lambda on sIFE. Bone marrow biopsy done 10/18/23 revealed 95% plasma cells and FISH with TP53 deletion, FGFR3/IGH (or NSD2/IGH) fusion, usually representing a t(4;14), and a tetraploid subclone was observed. After correction of his hypercalcemia and improvement in his renal function he was discharged on 10/22/23.   He was started on Beatriz + RVd on 11/2/23.      No medications prior to admission.       Patient has no known allergies.     Past Medical History:   Diagnosis Date    Anxiety disorder, unspecified     Hypercalcemia      Past Surgical History:   Procedure Laterality Date    BONE MARROW BIOPSY N/A 10/18/2023    Procedure: Biopsy-bone marrow;  Surgeon: Nhan Marte MD;  Location: [facility];  Service: General;  Laterality: N/A;    INSERTION OF TUNNELED CENTRAL VENOUS HEMODIALYSIS CATHETER Left 5/6/2024    Procedure: INSERTION, CATHETER, HEMODIALYSIS, DUAL LUMEN, left poss right, Bard 14.5 fr hemosplit catheter, model [id number];  Surgeon: Alvin White MD;  Location: [address];  Service: General;  Laterality: Left;    TONSILLECTOMY       Family History       Problem Relation (Age of Onset)    Breast cancer Mother     Pancreatic cancer Father          Tobacco Use    Smoking status: Never    Smokeless tobacco: Never   Substance and Sexual Activity    Alcohol use: Yes    Drug use: Never    Sexual activity: Yes     Partners: Female       Review of Systems   Constitutional:  Negative for activity change, appetite change, chills, fatigue and fever.   HENT:  Negative for congestion, mouth sores, nosebleeds, rhinorrhea, sinus pressure, sinus pain, sneezing and sore throat.    Eyes:  Negative for photophobia, pain, discharge, redness, itching and visual disturbance.   Respiratory:  Negative for cough, chest tightness, shortness of breath and wheezing.    Cardiovascular:  Negative for chest pain, palpitations and leg swelling.   Gastrointestinal:  Negative for abdominal distention, abdominal pain, blood in stool, constipation, diarrhea, nausea and vomiting.   Endocrine: Negative for cold intolerance, heat intolerance, polydipsia, polyphagia and polyuria.   Genitourinary:  Negative for difficulty urinating, frequency, hematuria and urgency.   Musculoskeletal:  Negative for arthralgias, back pain, myalgias, neck pain and neck stiffness.   Skin:  Negative for pallor, rash and wound.   Allergic/Immunologic: Negative for environmental allergies, food allergies and immunocompromised state.   Neurological:  Negative for dizziness, tremors, seizures, syncope, speech difficulty, weakness, light-headedness, numbness and headaches.   Hematological:  Negative for adenopathy. Does not bruise/bleed easily.   Psychiatric/Behavioral:  Negative for agitation, confusion, hallucinations, sleep disturbance and suicidal ideas. The patient is not nervous/anxious.      Objective:     Vital Signs (Most Recent):    Vital Signs (24h Range):  Temp:  [98.4 °F (36.9 °C)] 98.4 °F (36.9 °C)  Pulse:  [83] 83  Resp:  [17] 17  SpO2:  [98 %] 98 %  BP: (154)/(89) 154/89        There is no height or weight on file to calculate BMI.  There is no height or weight on file to  calculate BSA.    ECOG SCORE           [unfilled]    Lines/Drains/Airways       Central Venous Catheter Line  Duration             Permacath right subclavian -- days                     Physical Exam  Constitutional:       Appearance: He is well-developed.   HENT:      Head: Normocephalic and atraumatic.      Mouth/Throat:      Pharynx: No oropharyngeal exudate.   Eyes:      General:         Right eye: No discharge.         Left eye: No discharge.      Conjunctiva/sclera: Conjunctivae normal.      Pupils: Pupils are equal, round, and reactive to light.   Cardiovascular:      Rate and Rhythm: Normal rate and regular rhythm.      Heart sounds: Normal heart sounds. No murmur heard.  Pulmonary:      Effort: Pulmonary effort is normal. No respiratory distress.      Breath sounds: Normal breath sounds. No wheezing or rales.   Abdominal:      General: Bowel sounds are normal. There is no distension.      Palpations: Abdomen is soft.      Tenderness: There is no abdominal tenderness.   Musculoskeletal:         General: No deformity. Normal range of motion.      Cervical back: Normal range of motion and neck supple.   Skin:     General: Skin is warm and dry.      Findings: No erythema or rash.      Comments: Right chest wall vas cath. Dressing c/d/i. No sign of infection to site.   Neurological:      Mental Status: He is alert and oriented to person, place, and time.   Psychiatric:         Behavior: Behavior normal.         Thought Content: Thought content normal.         Judgment: Judgment normal.            Significant Labs:   CBC:   Recent Labs   Lab 05/10/24  1219   WBC 8.71   HGB 9.5*   HCT 30.0*   *    and CMP:   Recent Labs   Lab 05/10/24  1219      K 4.2      CO2 27      BUN 26*   CREATININE 2.0*   CALCIUM 9.7   PROT 5.9*   ALBUMIN 3.8   BILITOT 0.4   ALKPHOS 107   AST 15   ALT 11   ANIONGAP 8       Diagnostic Results:  None

## 2024-05-10 NOTE — TELEPHONE ENCOUNTER
----- Message from Elvis Ji sent at 5/10/2024 10:17 AM CDT -----  Regarding: Consult/Advisory  Contact: Nurse Aliyah Case Management @ [facility]  Consult/Advisory    Name Of Caller:Nurse Aliyah Case Management @ [employer]       Contact Preference: [phone] direct line    Nature of call: Nurse Aliyah Case Management @ [facility] calling to speak to nurse to follow up on any case management needs the patient may have. Requesting a call back.

## 2024-05-11 ENCOUNTER — HOSPITAL ENCOUNTER (INPATIENT)
Facility: HOSPITAL | Age: 44
LOS: 16 days | Discharge: HOME | DRG: 016 | End: 2024-05-27
Attending: INTERNAL MEDICINE | Admitting: INTERNAL MEDICINE
Payer: COMMERCIAL

## 2024-05-11 DIAGNOSIS — C90.00 MULTIPLE MYELOMA: ICD-10-CM

## 2024-05-11 DIAGNOSIS — E83.39 HYPERPHOSPHATEMIA: ICD-10-CM

## 2024-05-11 DIAGNOSIS — C90.00 MULTIPLE MYELOMA, REMISSION STATUS UNSPECIFIED: ICD-10-CM

## 2024-05-11 DIAGNOSIS — Z94.84 HISTORY OF AUTO STEM CELL TRANSPLANT: Primary | ICD-10-CM

## 2024-05-11 DIAGNOSIS — Z76.82 STEM CELL TRANSPLANT CANDIDATE: ICD-10-CM

## 2024-05-11 PROCEDURE — 63600175 PHARM REV CODE 636 W HCPCS: Performed by: INTERNAL MEDICINE

## 2024-05-11 PROCEDURE — 25000003 PHARM REV CODE 250: Performed by: INTERNAL MEDICINE

## 2024-05-11 PROCEDURE — 20600001 HC STEP DOWN PRIVATE ROOM

## 2024-05-11 PROCEDURE — 25000003 PHARM REV CODE 250: Performed by: NURSE PRACTITIONER

## 2024-05-11 PROCEDURE — 99223 1ST HOSP IP/OBS HIGH 75: CPT | Mod: ,,, | Performed by: INTERNAL MEDICINE

## 2024-05-11 RX ORDER — ALLOPURINOL 100 MG/1
100 TABLET ORAL DAILY
Status: DISCONTINUED | OUTPATIENT
Start: 2024-05-12 | End: 2024-05-27 | Stop reason: HOSPADM

## 2024-05-11 RX ORDER — SODIUM BICARBONATE 650 MG/1
650 TABLET ORAL 2 TIMES DAILY
Status: DISCONTINUED | OUTPATIENT
Start: 2024-05-11 | End: 2024-05-27 | Stop reason: HOSPADM

## 2024-05-11 RX ORDER — SULFAMETHOXAZOLE AND TRIMETHOPRIM 800; 160 MG/1; MG/1
1 TABLET ORAL
Status: DISCONTINUED | OUTPATIENT
Start: 2024-06-12 | End: 2024-05-27 | Stop reason: HOSPADM

## 2024-05-11 RX ORDER — SODIUM CHLORIDE AND POTASSIUM CHLORIDE 150; 900 MG/100ML; MG/100ML
INJECTION, SOLUTION INTRAVENOUS CONTINUOUS
Status: DISCONTINUED | OUTPATIENT
Start: 2024-05-11 | End: 2024-05-14

## 2024-05-11 RX ORDER — ACYCLOVIR 200 MG/1
800 CAPSULE ORAL 2 TIMES DAILY
Status: DISCONTINUED | OUTPATIENT
Start: 2024-05-12 | End: 2024-05-27 | Stop reason: HOSPADM

## 2024-05-11 RX ORDER — PANTOPRAZOLE SODIUM 40 MG/1
40 TABLET, DELAYED RELEASE ORAL DAILY
Status: DISCONTINUED | OUTPATIENT
Start: 2024-05-12 | End: 2024-05-27 | Stop reason: HOSPADM

## 2024-05-11 RX ORDER — HEPARIN SODIUM 1000 [USP'U]/ML
1600 INJECTION, SOLUTION INTRAVENOUS; SUBCUTANEOUS
Status: DISCONTINUED | OUTPATIENT
Start: 2024-05-11 | End: 2024-05-27 | Stop reason: HOSPADM

## 2024-05-11 RX ORDER — OLANZAPINE 2.5 MG/1
5 TABLET ORAL 2 TIMES DAILY
Status: COMPLETED | OUTPATIENT
Start: 2024-05-11 | End: 2024-05-14

## 2024-05-11 RX ORDER — FLUCONAZOLE 200 MG/1
400 TABLET ORAL DAILY
Status: DISCONTINUED | OUTPATIENT
Start: 2024-05-12 | End: 2024-05-27 | Stop reason: HOSPADM

## 2024-05-11 RX ORDER — LANOLIN ALCOHOL/MO/W.PET/CERES
400 CREAM (GRAM) TOPICAL EVERY 4 HOURS PRN
Status: DISCONTINUED | OUTPATIENT
Start: 2024-05-11 | End: 2024-05-11

## 2024-05-11 RX ORDER — DEXAMETHASONE 4 MG/1
12 TABLET ORAL
Status: COMPLETED | OUTPATIENT
Start: 2024-05-12 | End: 2024-05-12

## 2024-05-11 RX ORDER — DIPHENHYDRAMINE HCL 25 MG
25 CAPSULE ORAL
Status: DISCONTINUED | OUTPATIENT
Start: 2024-05-11 | End: 2024-05-27 | Stop reason: HOSPADM

## 2024-05-11 RX ORDER — COLCHICINE 0.6 MG/1
0.6 TABLET, FILM COATED ORAL DAILY
Status: DISCONTINUED | OUTPATIENT
Start: 2024-05-12 | End: 2024-05-11

## 2024-05-11 RX ORDER — SODIUM CHLORIDE 0.9 % (FLUSH) 0.9 %
10 SYRINGE (ML) INJECTION
Status: DISCONTINUED | OUTPATIENT
Start: 2024-05-11 | End: 2024-05-27 | Stop reason: HOSPADM

## 2024-05-11 RX ORDER — ALUMINUM HYDROXIDE, MAGNESIUM HYDROXIDE, AND SIMETHICONE 1200; 120; 1200 MG/30ML; MG/30ML; MG/30ML
30 SUSPENSION ORAL EVERY 6 HOURS PRN
Status: DISCONTINUED | OUTPATIENT
Start: 2024-05-11 | End: 2024-05-27 | Stop reason: HOSPADM

## 2024-05-11 RX ORDER — LORAZEPAM 2 MG/ML
1 INJECTION INTRAMUSCULAR EVERY 6 HOURS PRN
Status: DISCONTINUED | OUTPATIENT
Start: 2024-05-11 | End: 2024-05-27 | Stop reason: HOSPADM

## 2024-05-11 RX ORDER — EPINEPHRINE 1 MG/ML
0.3 INJECTION, SOLUTION, CONCENTRATE INTRAVENOUS ONCE AS NEEDED
Status: DISCONTINUED | OUTPATIENT
Start: 2024-05-11 | End: 2024-05-14

## 2024-05-11 RX ORDER — POTASSIUM CHLORIDE 20 MEQ/1
20 TABLET, EXTENDED RELEASE ORAL
Status: DISCONTINUED | OUTPATIENT
Start: 2024-05-11 | End: 2024-05-11

## 2024-05-11 RX ORDER — CALCITRIOL 0.25 UG/1
0.5 CAPSULE ORAL DAILY
Status: DISCONTINUED | OUTPATIENT
Start: 2024-05-12 | End: 2024-05-21

## 2024-05-11 RX ORDER — LEVOFLOXACIN 500 MG/1
500 TABLET, FILM COATED ORAL DAILY
Status: DISCONTINUED | OUTPATIENT
Start: 2024-05-12 | End: 2024-05-22

## 2024-05-11 RX ORDER — OXYCODONE HYDROCHLORIDE 10 MG/1
10 TABLET ORAL EVERY 4 HOURS PRN
Status: DISCONTINUED | OUTPATIENT
Start: 2024-05-11 | End: 2024-05-24

## 2024-05-11 RX ORDER — ONDANSETRON 8 MG/1
8 TABLET, ORALLY DISINTEGRATING ORAL
Status: COMPLETED | OUTPATIENT
Start: 2024-05-12 | End: 2024-05-14

## 2024-05-11 RX ORDER — PROCHLORPERAZINE EDISYLATE 5 MG/ML
10 INJECTION INTRAMUSCULAR; INTRAVENOUS EVERY 6 HOURS PRN
Status: DISCONTINUED | OUTPATIENT
Start: 2024-05-11 | End: 2024-05-27 | Stop reason: HOSPADM

## 2024-05-11 RX ORDER — ACYCLOVIR 200 MG/1
400 CAPSULE ORAL 2 TIMES DAILY
Status: DISCONTINUED | OUTPATIENT
Start: 2024-05-11 | End: 2024-05-12

## 2024-05-11 RX ORDER — METOPROLOL TARTRATE 25 MG/1
12.5 TABLET ORAL 2 TIMES DAILY
Status: DISCONTINUED | OUTPATIENT
Start: 2024-05-11 | End: 2024-05-27 | Stop reason: HOSPADM

## 2024-05-11 RX ORDER — LANOLIN ALCOHOL/MO/W.PET/CERES
800 CREAM (GRAM) TOPICAL EVERY 4 HOURS PRN
Status: DISCONTINUED | OUTPATIENT
Start: 2024-05-11 | End: 2024-05-11

## 2024-05-11 RX ORDER — TAMSULOSIN HYDROCHLORIDE 0.4 MG/1
0.4 CAPSULE ORAL NIGHTLY
Status: DISCONTINUED | OUTPATIENT
Start: 2024-05-11 | End: 2024-05-27 | Stop reason: HOSPADM

## 2024-05-11 RX ORDER — DIPHENHYDRAMINE HYDROCHLORIDE 50 MG/ML
50 INJECTION INTRAMUSCULAR; INTRAVENOUS ONCE AS NEEDED
Status: DISCONTINUED | OUTPATIENT
Start: 2024-05-11 | End: 2024-05-14

## 2024-05-11 RX ADMIN — ACYCLOVIR 400 MG: 200 CAPSULE ORAL at 08:05

## 2024-05-11 RX ADMIN — METOPROLOL TARTRATE 12.5 MG: 25 TABLET, FILM COATED ORAL at 08:05

## 2024-05-11 RX ADMIN — OLANZAPINE 5 MG: 2.5 TABLET, FILM COATED ORAL at 09:05

## 2024-05-11 RX ADMIN — SODIUM CHLORIDE AND POTASSIUM CHLORIDE: .9; .15 SOLUTION INTRAVENOUS at 09:05

## 2024-05-11 RX ADMIN — SODIUM BICARBONATE 650 MG: 650 TABLET ORAL at 08:05

## 2024-05-11 RX ADMIN — TAMSULOSIN HYDROCHLORIDE 0.4 MG: 0.4 CAPSULE ORAL at 08:05

## 2024-05-11 RX ADMIN — Medication 1 DOSE: at 09:05

## 2024-05-11 NOTE — H&P
[facility] - Oncology ([facility])  Hematology  Bone Marrow Transplant  H&P    Subjective:     Principal Problem: Stem cell transplant candidate    HPI: Mr. Fernandez is a 43-y-o patient of Dr. Cook with multiple myeloma. Other medical history includes pericarditis, CKD, HTN, BPH, gout, GERD, ADD, and cancer pain. He is admitting today for a Sandra 200 auto SCT. He is feeling well today. Denies fevers, chills, cough, SOB, chest pain, bleeding or bruising, and n/v/d/c.    Patient information was obtained from patient and past medical records.     Oncology History:   From Dr. Cook's most recent clinic note:   is a 44y/o M with no significant medical problems. He has been diagnosed with multiple myeloma. He is here for a virtual stem cell transplant consultation.   Per records,  presented to [facility] on 10/11/23 after 1 month of progressive weakness, abdominal pain, and intermittent confusion. At the time of his presentation, his labs, which had been normal  in 8/2023, were notable for new severe DOUG, transaminitis, and Ca of 18. He was admitted and started on IVF .  Hypercalcemia workup was initiated and he was ultimately found to have numerous osseous lesions on 10/15/23 CT CAP, FLC - Kappa 3.29, Lambda 744, Ratio 226 , SPEP with M spike 0.17, IgG lambda on sIFE. Bone marrow biopsy done 10/18/23 revealed 95% plasma cells and FISH with TP53 deletion, FGFR3/IGH (or NSD2/IGH) fusion, usually representing a t(4;14), and a tetraploid subclone was observed. After correction of his hypercalcemia and improvement in his renal function he was discharged on 10/22/23.   He was started on Beatriz + RVd on 11/2/23.      No medications prior to admission.       Patient has no known allergies.     Past Medical History:   Diagnosis Date    Anxiety disorder, unspecified     Hypercalcemia      Past Surgical History:   Procedure Laterality Date    BONE MARROW BIOPSY N/A 10/18/2023    Procedure: Biopsy-bone marrow;  Surgeon: Estuardo  MD Nhan;  Location: [facility];  Service: General;  Laterality: N/A;    INSERTION OF TUNNELED CENTRAL VENOUS HEMODIALYSIS CATHETER Left 5/6/2024    Procedure: INSERTION, CATHETER, HEMODIALYSIS, DUAL LUMEN, left poss right, Bard 14.5 fr hemosplit catheter, model [id number];  Surgeon: Alvin White MD;  Location: [facility] OR [address] [place] [address];  Service: General;  Laterality: Left;    TONSILLECTOMY       Family History       Problem Relation (Age of Onset)    Breast cancer Mother    Pancreatic cancer Father          Tobacco Use    Smoking status: Never    Smokeless tobacco: Never   Substance and Sexual Activity    Alcohol use: Yes    Drug use: Never    Sexual activity: Yes     Partners: Female       Review of Systems   Constitutional:  Negative for activity change, appetite change, chills, fatigue and fever.   HENT:  Negative for congestion, mouth sores, nosebleeds, rhinorrhea, sinus pressure, sinus pain, sneezing and sore throat.    Eyes:  Negative for photophobia, pain, discharge, redness, itching and visual disturbance.   Respiratory:  Negative for cough, chest tightness, shortness of breath and wheezing.    Cardiovascular:  Negative for chest pain, palpitations and leg swelling.   Gastrointestinal:  Negative for abdominal distention, abdominal pain, blood in stool, constipation, diarrhea, nausea and vomiting.   Endocrine: Negative for cold intolerance, heat intolerance, polydipsia, polyphagia and polyuria.   Genitourinary:  Negative for difficulty urinating, frequency, hematuria and urgency.   Musculoskeletal:  Negative for arthralgias, back pain, myalgias, neck pain and neck stiffness.   Skin:  Negative for pallor, rash and wound.   Allergic/Immunologic: Negative for environmental allergies, food allergies and immunocompromised state.   Neurological:  Negative for dizziness, tremors, seizures, syncope, speech difficulty, weakness, light-headedness, numbness and headaches.   Hematological:  Negative for adenopathy. Does not  bruise/bleed easily.   Psychiatric/Behavioral:  Negative for agitation, confusion, hallucinations, sleep disturbance and suicidal ideas. The patient is not nervous/anxious.      Objective:     Vital Signs (Most Recent):    Vital Signs (24h Range):  Temp:  [98.4 °F (36.9 °C)] 98.4 °F (36.9 °C)  Pulse:  [83] 83  Resp:  [17] 17  SpO2:  [98 %] 98 %  BP: (154)/(89) 154/89        There is no height or weight on file to calculate BMI.  There is no height or weight on file to calculate BSA.    ECOG SCORE           [unfilled]    Lines/Drains/Airways       Central Venous Catheter Line  Duration             Permacath right subclavian -- days                     Physical Exam  Constitutional:       Appearance: He is well-developed.   HENT:      Head: Normocephalic and atraumatic.      Mouth/Throat:      Pharynx: No oropharyngeal exudate.   Eyes:      General:         Right eye: No discharge.         Left eye: No discharge.      Conjunctiva/sclera: Conjunctivae normal.      Pupils: Pupils are equal, round, and reactive to light.   Cardiovascular:      Rate and Rhythm: Normal rate and regular rhythm.      Heart sounds: Normal heart sounds. No murmur heard.  Pulmonary:      Effort: Pulmonary effort is normal. No respiratory distress.      Breath sounds: Normal breath sounds. No wheezing or rales.   Abdominal:      General: Bowel sounds are normal. There is no distension.      Palpations: Abdomen is soft.      Tenderness: There is no abdominal tenderness.   Musculoskeletal:         General: No deformity. Normal range of motion.      Cervical back: Normal range of motion and neck supple.   Skin:     General: Skin is warm and dry.      Findings: No erythema or rash.      Comments: Right chest wall vas cath. Dressing c/d/i. No sign of infection to site.   Neurological:      Mental Status: He is alert and oriented to person, place, and time.   Psychiatric:         Behavior: Behavior normal.         Thought Content: Thought content  normal.         Judgment: Judgment normal.            Significant Labs:   CBC:   Recent Labs   Lab 05/10/24  1219   WBC 8.71   HGB 9.5*   HCT 30.0*   *    and CMP:   Recent Labs   Lab 05/10/24  1219      K 4.2      CO2 27      BUN 26*   CREATININE 2.0*   CALCIUM 9.7   PROT 5.9*   ALBUMIN 3.8   BILITOT 0.4   ALKPHOS 107   AST 15   ALT 11   ANIONGAP 8       Diagnostic Results:  None  Assessment/Plan:     Psychiatric  ADD (attention deficit disorder)  - Will hold home Aderall while inpatient    Cardiac/Vascular  Hypertension  - Continue home metoprolol    Pericarditis  - Will hold home colchicine as he has completed a 3 month course per PharmD rec    Renal/  BPH (benign prostatic hyperplasia)  - Continue home Flomax    CKD (chronic kidney disease)  - Baseline creatinine appears to be ~ 1.8  - Renally dose meds as indicated    Hematology  Thrombocytopenia  - Anticipate pancytopenia following chemo  - Transfuse for plts < 10K  - Daily CBC while inpatient    Oncology  * Stem cell transplant candidate  - Patient of Dr. Cook  - Admitting today (5/14/24) for a Sandra 200 auto SCT  - Today is Day -2  - See treatment plan below    Planned conditioning regimen:  Melphalan 200 on Day -1      Antimicrobial Prophylaxis:  Acyclovir starting on Day -1  Levofloxacin starting on Day -1  Fluconazole starting on Day -1  Bactrim starting on Day +30     Growth Factor Support:  Neupogen starting on Day +7      Caregiver: wife and mother  Post-transplant discharge plans: [facility]    Anemia due to chemotherapy  - Anticipate pancytopenia following chemo  - Transfuse for hgb < 7  - Daily CBC while inpatient    Cancer associated pain  - Continue home Oxy IR    Multiple myeloma  From Dr. Cook's most recent clinic note:   is a 44y/o M with no significant medical problems. He has been diagnosed with multiple myeloma. He is here for a virtual stem cell transplant consultation.   Per records,  presented  to [facility] on 10/11/23 after 1 month of progressive weakness, abdominal pain, and intermittent confusion. At the time of his presentation, his labs, which had been normal  in 8/2023, were notable for new severe ODUG, transaminitis, and Ca of 18. He was admitted and started on IVF .  Hypercalcemia workup was initiated and he was ultimately found to have numerous osseous lesions on 10/15/23 CT CAP, FLC - Kappa 3.29, Lambda 744, Ratio 226 , SPEP with M spike 0.17, IgG lambda on sIFE. Bone marrow biopsy done 10/18/23 revealed 95% plasma cells and FISH with TP53 deletion, FGFR3/IGH (or NSD2/IGH) fusion, usually representing a t(4;14), and a tetraploid subclone was observed. After correction of his hypercalcemia and improvement in his renal function he was discharged on 10/22/23.   He was started on Beatriz + RVd on 11/2/23.   - Admitting today for a Sandra 200 auto SCT.      GI  GERD (gastroesophageal reflux disease)  - Takes protonix at home. Will receive inpatient as part of SCT protocol.    Orthopedic  Gout  - Home febuxostat not on formulary. Will substitute with allopurinol while inpatient.        VTE Risk Mitigation (From admission, onward)           Ordered     IP VTE HIGH RISK PATIENT  Once         05/11/24 1803     Place sequential compression device  Until discontinued         05/11/24 1803                    Emery Murillo MD  Bone Marrow Transplant  Hematology  [facility] - Oncology ([facility])

## 2024-05-12 LAB
ABO + RH BLD: ABNORMAL
ALBUMIN SERPL BCP-MCNC: 3.4 G/DL (ref 3.5–5.2)
ALP SERPL-CCNC: 77 U/L (ref 55–135)
ALT SERPL W/O P-5'-P-CCNC: 12 U/L (ref 10–44)
ANION GAP SERPL CALC-SCNC: 10 MMOL/L (ref 8–16)
AST SERPL-CCNC: 12 U/L (ref 10–40)
BASOPHILS # BLD AUTO: 0.02 K/UL (ref 0–0.2)
BASOPHILS NFR BLD: 0.3 % (ref 0–1.9)
BILIRUB SERPL-MCNC: 0.4 MG/DL (ref 0.1–1)
BLD GP AB SCN CELLS X3 SERPL QL: ABNORMAL
BUN SERPL-MCNC: 35 MG/DL (ref 6–20)
CALCIUM SERPL-MCNC: 9.1 MG/DL (ref 8.7–10.5)
CHLORIDE SERPL-SCNC: 106 MMOL/L (ref 95–110)
CO2 SERPL-SCNC: 26 MMOL/L (ref 23–29)
CREAT SERPL-MCNC: 1.8 MG/DL (ref 0.5–1.4)
DIFFERENTIAL METHOD BLD: ABNORMAL
EOSINOPHIL # BLD AUTO: 0.2 K/UL (ref 0–0.5)
EOSINOPHIL NFR BLD: 2.5 % (ref 0–8)
ERYTHROCYTE [DISTWIDTH] IN BLOOD BY AUTOMATED COUNT: 15.8 % (ref 11.5–14.5)
EST. GFR  (NO RACE VARIABLE): 46.7 ML/MIN/1.73 M^2
GLUCOSE SERPL-MCNC: 94 MG/DL (ref 70–110)
HCT VFR BLD AUTO: 27.2 % (ref 40–54)
HGB BLD-MCNC: 8.8 G/DL (ref 14–18)
IMM GRANULOCYTES # BLD AUTO: 0.07 K/UL (ref 0–0.04)
IMM GRANULOCYTES NFR BLD AUTO: 1 % (ref 0–0.5)
LYMPHOCYTES # BLD AUTO: 1.3 K/UL (ref 1–4.8)
LYMPHOCYTES NFR BLD: 18.6 % (ref 18–48)
MAGNESIUM SERPL-MCNC: 2 MG/DL (ref 1.6–2.6)
MCH RBC QN AUTO: 33.5 PG (ref 27–31)
MCHC RBC AUTO-ENTMCNC: 32.4 G/DL (ref 32–36)
MCV RBC AUTO: 103 FL (ref 82–98)
MONOCYTES # BLD AUTO: 1 K/UL (ref 0.3–1)
MONOCYTES NFR BLD: 14.5 % (ref 4–15)
NEUTROPHILS # BLD AUTO: 4.4 K/UL (ref 1.8–7.7)
NEUTROPHILS NFR BLD: 63.1 % (ref 38–73)
NRBC BLD-RTO: 0 /100 WBC
PHOSPHATE SERPL-MCNC: 5 MG/DL (ref 2.7–4.5)
PLATELET # BLD AUTO: 163 K/UL (ref 150–450)
PMV BLD AUTO: 10.3 FL (ref 9.2–12.9)
POTASSIUM SERPL-SCNC: 3.9 MMOL/L (ref 3.5–5.1)
PROT SERPL-MCNC: 5.3 G/DL (ref 6–8.4)
RBC # BLD AUTO: 2.63 M/UL (ref 4.6–6.2)
SODIUM SERPL-SCNC: 142 MMOL/L (ref 136–145)
SPECIMEN OUTDATE: ABNORMAL
WBC # BLD AUTO: 6.9 K/UL (ref 3.9–12.7)

## 2024-05-12 PROCEDURE — 97165 OT EVAL LOW COMPLEX 30 MIN: CPT

## 2024-05-12 PROCEDURE — 83735 ASSAY OF MAGNESIUM: CPT | Performed by: NURSE PRACTITIONER

## 2024-05-12 PROCEDURE — 25000003 PHARM REV CODE 250: Performed by: NURSE PRACTITIONER

## 2024-05-12 PROCEDURE — 84100 ASSAY OF PHOSPHORUS: CPT | Performed by: NURSE PRACTITIONER

## 2024-05-12 PROCEDURE — 97161 PT EVAL LOW COMPLEX 20 MIN: CPT

## 2024-05-12 PROCEDURE — 80053 COMPREHEN METABOLIC PANEL: CPT | Performed by: NURSE PRACTITIONER

## 2024-05-12 PROCEDURE — 85025 COMPLETE CBC W/AUTO DIFF WBC: CPT | Performed by: NURSE PRACTITIONER

## 2024-05-12 PROCEDURE — 86901 BLOOD TYPING SEROLOGIC RH(D): CPT | Performed by: NURSE PRACTITIONER

## 2024-05-12 PROCEDURE — 20600001 HC STEP DOWN PRIVATE ROOM

## 2024-05-12 PROCEDURE — 25000003 PHARM REV CODE 250: Performed by: INTERNAL MEDICINE

## 2024-05-12 PROCEDURE — 99233 SBSQ HOSP IP/OBS HIGH 50: CPT | Mod: ,,, | Performed by: INTERNAL MEDICINE

## 2024-05-12 PROCEDURE — 63600175 PHARM REV CODE 636 W HCPCS: Performed by: INTERNAL MEDICINE

## 2024-05-12 PROCEDURE — 97530 THERAPEUTIC ACTIVITIES: CPT

## 2024-05-12 RX ADMIN — OLANZAPINE 5 MG: 2.5 TABLET, FILM COATED ORAL at 09:05

## 2024-05-12 RX ADMIN — Medication 1 DOSE: at 01:05

## 2024-05-12 RX ADMIN — ONDANSETRON 8 MG: 8 TABLET, ORALLY DISINTEGRATING ORAL at 08:05

## 2024-05-12 RX ADMIN — CALCITRIOL CAPSULES 0.25 MCG 0.5 MCG: 0.25 CAPSULE ORAL at 09:05

## 2024-05-12 RX ADMIN — ACYCLOVIR 800 MG: 200 CAPSULE ORAL at 08:05

## 2024-05-12 RX ADMIN — MELPHALAN 425 MG: 50 INJECTION, POWDER, LYOPHILIZED, FOR SOLUTION INTRAVENOUS at 09:05

## 2024-05-12 RX ADMIN — Medication 1 DOSE: at 09:05

## 2024-05-12 RX ADMIN — SODIUM BICARBONATE 650 MG: 650 TABLET ORAL at 08:05

## 2024-05-12 RX ADMIN — OLANZAPINE 5 MG: 2.5 TABLET, FILM COATED ORAL at 08:05

## 2024-05-12 RX ADMIN — ALLOPURINOL 100 MG: 100 TABLET ORAL at 09:05

## 2024-05-12 RX ADMIN — ACYCLOVIR 800 MG: 200 CAPSULE ORAL at 09:05

## 2024-05-12 RX ADMIN — LEVOFLOXACIN 500 MG: 500 TABLET, FILM COATED ORAL at 09:05

## 2024-05-12 RX ADMIN — TAMSULOSIN HYDROCHLORIDE 0.4 MG: 0.4 CAPSULE ORAL at 08:05

## 2024-05-12 RX ADMIN — Medication 1 DOSE: at 08:05

## 2024-05-12 RX ADMIN — SODIUM BICARBONATE 650 MG: 650 TABLET ORAL at 09:05

## 2024-05-12 RX ADMIN — Medication 1 DOSE: at 05:05

## 2024-05-12 RX ADMIN — ONDANSETRON 8 MG: 8 TABLET, ORALLY DISINTEGRATING ORAL at 04:05

## 2024-05-12 RX ADMIN — PANTOPRAZOLE SODIUM 40 MG: 40 TABLET, DELAYED RELEASE ORAL at 09:05

## 2024-05-12 RX ADMIN — METOPROLOL TARTRATE 12.5 MG: 25 TABLET, FILM COATED ORAL at 08:05

## 2024-05-12 RX ADMIN — METOPROLOL TARTRATE 12.5 MG: 25 TABLET, FILM COATED ORAL at 09:05

## 2024-05-12 RX ADMIN — DEXAMETHASONE 12 MG: 4 TABLET ORAL at 09:05

## 2024-05-12 RX ADMIN — SODIUM CHLORIDE AND POTASSIUM CHLORIDE: .9; .15 SOLUTION INTRAVENOUS at 03:05

## 2024-05-12 RX ADMIN — ONDANSETRON 8 MG: 8 TABLET, ORALLY DISINTEGRATING ORAL at 01:05

## 2024-05-12 RX ADMIN — FLUCONAZOLE 400 MG: 200 TABLET ORAL at 09:05

## 2024-05-12 NOTE — HOSPITAL COURSE
05/12/2024 Patient admitted for a Sandra 200 auto SCT for MM. Today is Day -1. Plan for melphalan this morning. Labs and vitals stable. Patient with no symptoms.   05/13/2024: Day 0 for a Sandra 200 auto SCT for MM. He will receive 5 bags with a CD34 dose of 4.47 x 10^6 today at 1330. Received Melphalan yesterday and tolerated well. Remains afebrile. VSS. Creatinine 2.0. Baseline ~ 1.8. Receiving IVF and will receive pre and post hydration with transplant. No complaints today.  05/14/2024: Day +1 from a Sandra 200 auto SCT for MM. Received stem cells yesterday, and tolerated well. Remains afebrile. VSS. Creatinine down to 1.6 today. Weight up 8.5 lbs from admission. Received large volume of fluid for transplant. Stopped IVF, and will allow patient to auto-diurese today.  05/15/2024: Day +2 from a Sandra 200 auto SCT for MM. Remains afebrile. VSS. Weight up 8.5 lbs from admission yesterday. Stopped IVF but deferred diuresis. Weight down 6.5 lbs and UOP 7 L in last 24 hours. Only complaint today is dry mouth. Multiple BMs documented yesterday, but patient states that stools are formed. Understands that he should report change in stool consistency.  05/16/2024: Day +3 from a Sandra 200 auto SCT for MM. Remains afebrile. VSS. Blood counts dropping expectedly. Remains active. Oral intake is good. Weight down 5.25 lbs from admission, but patient states that today's weight is consistent with home weight. Will continue to hold IVF. Denies GI symptoms, including mucositis. Phos very mildly elevated at 4.8. Will trend for now.  05/17/2024 Day +4 s/p Sandra 200 Auto SCT for MM. Denies any pain, n/v/d/c. Continues with good oral intake. Using prn melatonin for insomnia. Phos remains mildly elevated, will continue to trend. Afebrile, VSS  05/18/2024 Day +5 s/p Sandra 200 Auto SCT for MM. Doing well. Eating without issue. Up in chair watching [employer].   05/19/2024 Day +6 s/p Sandra 200 Auto SCT for MM. Parents are with him. Doing well. Eating  "without issue. Up in chair after completing a walk around the floor.  05/20/2024 Day +7 s/p Sandra 200 Auto SCT for MM. Still having diarrhea. Cdiff from yesterday still pending. Reports "tightness" to oral mucosa and tongue. Denies any n/v. Restarting IVF with diarrhea and downtrending Na+/K+. Afebrile, VSS  05/21/2024: Day +8 from a Sandra 200 auto SCT for MM. Remains afebrile. VSS. Denies diarrhea today. PRN imodium available. Phos remains mildly elevated, so dose-reduced calcitriol.  05/22/2024: Day +9 from a Sandra 200 auto SCT for MM. Had temp of 101.8 over night. HR 110s with fever, but vitals otherwise stable. Infection w/u ordered and unremarkable thus far. Started on Cefepime. Repeat fever of 102.7 this morning for which he was given Tylenol. Noted to be coughing on am exam, so checking RIP. ANC is 0 today. Transfusing 1 unit of plts for plt count of 4K. Repleting mag.  05/23/2024: Day +10 from a Sandra 200 auto SCT for MM. Had persistent neutropenic fevers yesterday. Afebrile thus far today. VSS. Infection w/u neg thus far. D2 of Cefepime. Main complaint today is abdominal pain. Suspect chemo-induced mucositis, but will get abdominal imaging with next fever. Transfusing 1 unit prbc for hgb of 6.6. ANC remains 0 today.  05/24/2024: Day +11 from a Sandra 200 auto SCT for MM. Had temp of 103 yesterday evening. VS otherwise stable. No fevers since. Infection w/u ordered and unremarkable thus far. Abx not changed. May be having engraftment fevers. ANC 20 today. Not ill-appearing. Transfusing 1 unit plts for plt count of 6K. Repleting electrolytes.  05/25/2024 Day +12 from melphalan 200 conditioned autologous stem cell transplant for multiple myeloma. Feeling better this morning with an appetite and hunger for solid food. . Afebrile. 1 unit blood and platelets today.  05/26/2024 Day +12 from melphalan 200 conditioned autologous stem cell transplant for multiple myeloma. Feeling well this morning. Up in recliner. No " issues overnight. Discussed ANC now 600 and first day of engraftment. Probable discharge 5/27/2024. Continue to escalate diet. Continue Neupogen injections. Afebrile; deescalate antibiotics to Levaquin prophylaxis until ANC 1000 or greater.  05/27/2024: Day +14 from a Sandra 200 auto SCT for MM. Day 2 of engraftment. Remains afebrile. VSS. Last fever was 4 days ago. Infection w/u remains unremarkable. Off IV abx. Oral intake, diarrhea, and abdominal pain improved. Will plan to discharge to [facility] today following discharge teaching and line removal.

## 2024-05-12 NOTE — PROGRESS NOTES
[facility] - Oncology ([facility])  Hematology  Bone Marrow Transplant  Progress Note    Patient Name: Pete Fernandez  Admission Date: 5/11/2024  Hospital Length of Stay: 1 days  Code Status: Full Code    Subjective:     Interval History: Patient admitted for a Sandra 200 auto SCT for MM. Today is Day -1. Plan for melphalan this morning. Labs and vitals stable. Patient with no symptoms.     Objective:     Vital Signs (Most Recent):  Temp: 99.3 °F (37.4 °C) (05/12/24 0745)  Pulse: 76 (05/12/24 0745)  Resp: 18 (05/12/24 0745)  BP: 127/85 (05/12/24 0745)  SpO2: 96 % (05/12/24 0745) Vital Signs (24h Range):  Temp:  [97.9 °F (36.6 °C)-99.3 °F (37.4 °C)] 99.3 °F (37.4 °C)  Pulse:  [74-90] 76  Resp:  [18] 18  SpO2:  [95 %-100 %] 96 %  BP: (127-158)/(70-97) 127/85     Weight: 92.4 kg (203 lb 9.5 oz)  Body mass index is 30.07 kg/m².  Body surface area is 2.12 meters squared.    ECOG SCORE           [unfilled]    Intake/Output - Last 3 Shifts         05/10 0700  05/11 0659 05/11 0700 05/12 0659 05/12 0700 05/13 0659    I.V. (mL/kg)  1257 (13.6)     Total Intake(mL/kg)  1257 (13.6)     Urine (mL/kg/hr)  2050     Total Output  2050     Net  -793            Urine Occurrence  1 x              Physical Exam  Constitutional:       Appearance: He is well-developed.   HENT:      Head: Normocephalic and atraumatic.      Mouth/Throat:      Pharynx: No oropharyngeal exudate.   Eyes:      General:         Right eye: No discharge.         Left eye: No discharge.      Conjunctiva/sclera: Conjunctivae normal.      Pupils: Pupils are equal, round, and reactive to light.   Cardiovascular:      Rate and Rhythm: Normal rate and regular rhythm.      Heart sounds: Normal heart sounds. No murmur heard.  Pulmonary:      Effort: Pulmonary effort is normal. No respiratory distress.      Breath sounds: Normal breath sounds. No wheezing or rales.   Abdominal:      General: Bowel sounds are normal. There is no distension.      Palpations: Abdomen is soft.       Tenderness: There is no abdominal tenderness.   Musculoskeletal:         General: No deformity. Normal range of motion.      Cervical back: Normal range of motion and neck supple.   Skin:     General: Skin is warm and dry.      Findings: No erythema or rash.      Comments: Right chest wall vas cath. Dressing c/d/i. No sign of infection to site.   Neurological:      Mental Status: He is alert and oriented to person, place, and time.   Psychiatric:         Behavior: Behavior normal.         Thought Content: Thought content normal.         Judgment: Judgment normal.            Significant Labs:   All pertinent labs from the last 24 hours have been reviewed.    Diagnostic Results:  I have reviewed all pertinent imaging results/findings within the past 24 hours.  Assessment/Plan:     * Stem cell transplant candidate  - Patient of Dr. Cook  - Admitted (5/11/24) for a Sandra 200 auto SCT  - Today is Day -1  - See treatment plan below    Planned conditioning regimen:  Melphalan 200 on Day -1      Antimicrobial Prophylaxis:  Acyclovir starting on Day -1  Levofloxacin starting on Day -1  Fluconazole starting on Day -1  Bactrim starting on Day +30     Growth Factor Support:  Neupogen starting on Day +7      Caregiver: wife and mother  Post-transplant discharge plans: [place]    Hypertension  - Continue home metoprolol    GERD (gastroesophageal reflux disease)  - Takes protonix at home. Will receive inpatient as part of SCT protocol.    Gout  - Home febuxostat not on formulary. Will substitute with allopurinol while inpatient.    BPH (benign prostatic hyperplasia)  - Continue home Flomax    CKD (chronic kidney disease)  - Baseline creatinine appears to be ~ 1.8  - Renally dose meds as indicated    ADD (attention deficit disorder)  - Will hold home Aderall while inpatient    Pericarditis  - Will hold home colchicine as he has completed a 3 month course per PharmD rec    Anemia due to chemotherapy  - Anticipate pancytopenia  following chemo  - Transfuse for hgb < 7  - Daily CBC while inpatient    Thrombocytopenia  - Anticipate pancytopenia following chemo  - Transfuse for plts < 10K  - Daily CBC while inpatient    Cancer associated pain  - Continue home Oxy IR    Multiple myeloma  From Dr. Cook's most recent clinic note:   is a 44y/o M with no significant medical problems. He has been diagnosed with multiple myeloma. He is here for a virtual stem cell transplant consultation.   Per records,  presented to [facility] on 10/11/23 after 1 month of progressive weakness, abdominal pain, and intermittent confusion. At the time of his presentation, his labs, which had been normal  in 8/2023, were notable for new severe DOUG, transaminitis, and Ca of 18. He was admitted and started on IVF .  Hypercalcemia workup was initiated and he was ultimately found to have numerous osseous lesions on 10/15/23 CT CAP, FLC - Kappa 3.29, Lambda 744, Ratio 226 , SPEP with M spike 0.17, IgG lambda on sIFE. Bone marrow biopsy done 10/18/23 revealed 95% plasma cells and FISH with TP53 deletion, FGFR3/IGH (or NSD2/IGH) fusion, usually representing a t(4;14), and a tetraploid subclone was observed. After correction of his hypercalcemia and improvement in his renal function he was discharged on 10/22/23.   He was started on Beatriz + RVd on 11/2/23.   - Admitted for a Sandra 200 auto SCT.          VTE Risk Mitigation (From admission, onward)           Ordered     heparin (porcine) injection 1,600 Units  As needed (PRN)         05/11/24 2044     IP VTE HIGH RISK PATIENT  Once         05/11/24 1803     Place sequential compression device  Until discontinued         05/11/24 1803                      Emery Murillo MD  Bone Marrow Transplant  [facility] - Oncology ([facility])

## 2024-05-12 NOTE — PLAN OF CARE
Patient calm, cooperative and pleasant during shift.  Melphalan administered today, tolerated well by patient.  No complaints and no prn medications requested.  IVF continued.  Ice and other cold foods encouraged.  Patient remains independent of ADLs. Ambulating frequently in room, ambulated in hallway with therapist.  No falls or injuries.  Bed in low position, call light and belongings within reach, rails up x 2.  No s/s of distress.  Will continue to monitor.

## 2024-05-12 NOTE — ASSESSMENT & PLAN NOTE
- Patient of Dr. Cook  - Admitted (5/11/24) for a Sandra 200 auto SCT  - Today is Day -1  - See treatment plan below    Planned conditioning regimen:  Melphalan 200 on Day -1      Antimicrobial Prophylaxis:  Acyclovir starting on Day -1  Levofloxacin starting on Day -1  Fluconazole starting on Day -1  Bactrim starting on Day +30     Growth Factor Support:  Neupogen starting on Day +7      Caregiver: wife and mother  Post-transplant discharge plans: [place]

## 2024-05-12 NOTE — PLAN OF CARE
Day -1 Sandra Auto SCT. Pt involved in plan of care and communicating needs throughout shift. Up in room and to bathroom independently; voiding without difficulty. Tolerating diet. No c/o pain. CAR completed by charge nurse and signed by two nurses; consents verified and in chart. IVF released and started last night; infusing @ 150 ml/hr. All VSS. Pt remaining free from falls or injury throughout shift. Bed locked and in lowest position, personal belongings and call light within reach, non skid socks on when OOB. Pt instructed to call for assistance as needed. Q2H rounding done on pt.

## 2024-05-12 NOTE — PLAN OF CARE
Problem: Physical Therapy  Goal: Physical Therapy Goal  Description: Goals to be met by: 24     Patient will maintain functional independence with mobility, without evidence of deconditioning, by performin. Supine <> sit with [place] with HOB flat  2. Sit <> stand transfer with [place]  3. Gait  x 500 feet with [place] using No Assistive Device.   4. Ascend/descend 1 flight of stairs with [place] using No Assistive Device.   5. Lower extremity exercise program x30 reps per handout, with independence    Outcome: Progressing    Evaluation complete. Goals Appropriate.

## 2024-05-12 NOTE — PT/OT/SLP EVAL
Physical Therapy Evaluation    Patient Name:  Pete Fernandez   MRN:  [id number]    Recommendations:     Discharge Recommendations: No Therapy Indicated   Discharge Equipment Recommendations: none   Barriers to discharge: None    Assessment:     Pete Fernandez is a 43 y.o. male admitted with a medical diagnosis of Stem cell transplant candidate.  He presents with the following impairments/functional limitations:  (at risk for deconditioning). Patient demonstrating increased motivation to participate in PT evaluation, with good response to activities completed and education provided. Session completed prior to SCT, therefore patient was evaluated at their functional baseline. PT will continue to monitor throughout course of treatment. At this time, patient does not demonstrate the need for skilled physical therapy services post-acutely.       Rehab Prognosis: Good; patient would benefit from acute skilled PT services to address these deficits and reach maximum level of function.    Recent Surgery: * No surgery found *      Plan:     During this hospitalization, patient to be seen 2 x/week to address the identified rehab impairments via gait training, therapeutic activities, therapeutic exercises, neuromuscular re-education and progress toward the following goals:    Plan of Care Expires:  06/12/24    Subjective     Chief Complaint: None stated  Patient/Family Comments/goals: To get better  Pain/Comfort:  Pain Rating 1: 0/10  Pain Rating Post-Intervention 1: 0/10    Patients cultural, spiritual, Confucianism conflicts given the current situation: no    Social History:  Residence: Patient lives with their spouse in a 2 story house with  No DARCY . Patient's bedroom is on the 1st floor. His bathroom has a WIS & T/S combo.  Equipment Owned: none  Prior level of function:  Prior to admission, patient was independent for ambulation, mobility, and ADLs  Assistance Upon Discharge:  Spouse & Mother    Objective:     Communicated with RN  "prior to session.  Patient found up in chair with peripheral IV  upon PT entry to room.    General Precautions: Standard, fall   Orthopedic Precautions:N/A   Braces: N/A   Body mass index is 30.07 kg/m².  Oxygen Device: Room Air  Vitals: BP (!) 168/89 (BP Location: Right arm, Patient Position: Lying)   Pulse 87   Temp 98.1 °F (36.7 °C) (Oral)   Resp 18   Ht 5' 9" (1.753 m)   Wt 92.4 kg (203 lb 9.5 oz)   SpO2 96%   BMI 30.07 kg/m²     Exams:  Cognition:   Alert and Cooperative   Patient is oriented to Person, Place, Time, Situation  Command following: Follows multistep verbal commands  Fluency: clear/fluent  Skin Integrity: Visible skin intact  Postural Assessment: rounded shoulders and forward head  Physical Exam:    Left LE Right LE   Edema absent absent   Sensation Not formally tested; patient denied any sensory impairment. Not formally tested; patient denied any sensory impairment.   Coordination Intact Intact     LLE ROM: WFL  RLE ROM: WFL    BLE Strength: Assessment performed functionally, see below    Functional Mobility:  Transfers:     Sit<>Stand: x1, Independent from Bedside Chair with No AD  Chair Transfer: x1, Independent with No AD using step transfer technique    Gait: x650ft,  Supervision-Independent  with  IV pole-No AD    Total Distance: 650ft  Gait Assessment: No obvious gait deviations observed    Balance:   Static Sitting: Independent  Dynamic Sitting: Independent    Static Standing:  Supervision-Indepedent    Dynamic Standing: SBA-Independent    Stairs: Pt ascended/descended  3 stair(s) with SBA and unilateral handrail using No AD  Patient Negotiated Stairs with Reciprocal Pattern    AM-PAC 6 CLICK MOBILITY  Total Score:24     Treatment & Education:  Increased time spent with patient to discuss general timeline of SCT treatment, in relation to patient's functional mobility.  Discussed importance of maintaining increased level of functional activity & limit in-bed time during early phases " of treatment  Encouraged hallway ambulation & out of room activities throughout the day, as able    Patient Education Provided on:  The role of physical therapy and how the patient can benefit from skilled services  The negative effects of prolonged bed rest/sedentary behavior, along with the importance of OOB activity & patient participation with PT  The importance of contacting RN, via call light, for mobility throughout the day  Pt white board updated with current therapists name and level of mobility assistance needed.     Patient Verbalized understanding of all topics touched on this date. All questions answered within the PT scope of practice    Patient left up in chair with all lines intact, RN notified, and Spouse present.    GOALS:   Multidisciplinary Problems       Physical Therapy Goals          Problem: Physical Therapy    Goal Priority Disciplines Outcome Goal Variances Interventions   Physical Therapy Goal     PT, PT/OT Progressing     Description: Goals to be met by: 24     Patient will increase functional independence with mobility by performin. Supine <> sit with [place] with HOB flat  2. Sit <> stand transfer with [place]  3. Gait  x 500 feet with [place] using No Assistive Device.   4. Ascend/descend 1 flight of stairs with [place] using No Assistive Device.   5. Lower extremity exercise program x30 reps per handout, with independence                         History:     Past Medical History:   Diagnosis Date    Anxiety disorder, unspecified     Hypercalcemia        Past Surgical History:   Procedure Laterality Date    BONE MARROW BIOPSY N/A 10/18/2023    Procedure: Biopsy-bone marrow;  Surgeon: Nhan Marte MD;  Location: [facility];  Service: General;  Laterality: N/A;    INSERTION OF TUNNELED CENTRAL VENOUS HEMODIALYSIS CATHETER Left 2024    Procedure: INSERTION, CATHETER, HEMODIALYSIS, DUAL LUMEN, left poss right, Bard 14.5 fr hemosplit catheter, model  [id number];  Surgeon: Alvin White MD;  Location: [facility] OR [address] [place] [address];  Service: General;  Laterality: Left;    TONSILLECTOMY         Time Tracking:     PT Received On: 05/12/24  PT Start Time: 1527     PT Stop Time: 1544  PT Total Time (min): 17 min     Billable Minutes: Evaluation 8 and Therapeutic Activity 9    05/12/2024

## 2024-05-12 NOTE — PT/OT/SLP EVAL
Occupational Therapy   Evaluation    Name: Pete Fernandez  MRN: [id number]  Admitting Diagnosis: Stem cell transplant candidate  Recent Surgery: * No surgery found *      Recommendations:     Discharge Recommendations: No Therapy Indicated  Discharge Equipment Recommendations:  none  Barriers to discharge:  None    Assessment:     Pete Fernandez is a 43 y.o. male with a medical diagnosis of Stem cell transplant candidate.  He presents with independence with mobility, self-care, bed mobility, transfers, and communication. Performance deficits affecting function: other (comment) (Pt currently does not have impairments. Has the goal of maintaining current level of function.).  Pt will continue to receive therapy to ensure maintenance of current level of function. Pt on track to return home after post acute therapeutic care.      Rehab Prognosis: Good; patient would benefit from acute skilled OT services to address these deficits and reach maximum level of function.       Plan:     Patient to be seen 1 x/week to address the above listed problems via self-care/home management, therapeutic activities, therapeutic exercises  Plan of Care Expires: 06/12/24  Plan of Care Reviewed with: patient, spouse    Subjective     Chief Complaint: None stated  Patient/Family Comments/goals: I just finished brushing my teeth    Occupational Profile:  Living Environment: lives with wife in 2SH, 0STE, bedroom on 1st floor, t/s and WIS   Previous level of function: I with ADLs, mobility, driving  Roles and Routines: , father, off   Equipment Used at Home: none  Assistance upon Discharge: Wife and mother    Pain/Comfort:  Pain Rating 1: 0/10  Pain Rating Post-Intervention 1: 0/10    Patients cultural, spiritual, Mandaen conflicts given the current situation: no    Objective:     Communicated with: RN prior to session.  Patient found HOB elevated with peripheral IV upon OT entry to room.    General Precautions: Standard,  fall  Orthopedic Precautions: N/A  Braces: N/A  Respiratory Status: Room air    Occupational Performance:    Bed Mobility:    Patient completed Supine to Sit with independence    Functional Mobility/Transfers:  Patient completed Sit <> Stand Transfer with independence  with  no assistive device   Functional Mobility: Pt engaged in functional mobility to simulate household/community distances 10 ft x 2 trials with SPV and no AD in order to maximize functional endurance and standing balance required for engagement in occupations of choice      Activities of Daily Living:  Grooming: independence - completed oral and facial hygiene prior to OT arrival    Cognitive/Visual Perceptual:  Cognitive/Psychosocial Skills:     -       Oriented to: Person, Place, Time, and Situation   -       Follows Commands/attention:Follows multistep  commands  -       Communication: clear/fluent  -       Memory: No Deficits noted  -       Safety awareness/insight to disability: intact     Physical Exam:  Upper Extremity Range of Motion:     -       Right Upper Extremity: WFL  -       Left Upper Extremity: WFL  Upper Extremity Strength:    -       Right Upper Extremity: WFL  -       Left Upper Extremity: WFL   Strength:    -       Right Upper Extremity: WFL  -       Left Upper Extremity: WFL    AMPAC 6 Click ADL:  AMPAC Total Score: 24    Treatment & Education:  -Education on energy conservation and task modification to maximize safety and (I) during ADLs and mobility  -Education on importance of OOB activity to improve overall activity tolerance and promote recovery  -Pt educated to call for assistance and to transfer with hospital staff only  -Provided education regarding role of OT, POC, & discharge recommendations with pt verbalizing understanding.  Pt had no further questions & when asked whether there were any concerns pt reported none.      Patient left sitting edge of bed with all lines intact, call button in reach, and RN  present    GOALS:   Multidisciplinary Problems       Occupational Therapy Goals          Problem: Occupational Therapy    Goal Priority Disciplines Outcome Interventions   Occupational Therapy Goal     OT, PT/OT Progressing    Description: Goals to be met by: 6/12/24     Patient will maintain functional independence with ADLs by performing:    UE Dressing with [place].  LE Dressing with [place].  Grooming while standing at sink with [place].  Toileting from toilet with [place] for hygiene and clothing management.   Sitting at edge of bed x20+ minutes with [place].  Upper extremity exercise program x10 reps per handout, with independence.                           History:     Past Medical History:   Diagnosis Date    Anxiety disorder, unspecified     Hypercalcemia          Past Surgical History:   Procedure Laterality Date    BONE MARROW BIOPSY N/A 10/18/2023    Procedure: Biopsy-bone marrow;  Surgeon: Nhan Marte MD;  Location: [facility];  Service: General;  Laterality: N/A;    INSERTION OF TUNNELED CENTRAL VENOUS HEMODIALYSIS CATHETER Left 5/6/2024    Procedure: INSERTION, CATHETER, HEMODIALYSIS, DUAL LUMEN, left poss right, Bard 14.5 fr hemosplit catheter, model [id number];  Surgeon: Alvin White MD;  Location: [address];  Service: General;  Laterality: Left;    TONSILLECTOMY         Time Tracking:     OT Date of Treatment: 05/12/24  OT Start Time: 0938  OT Stop Time: 0948  OT Total Time (min): 10 min    Billable Minutes:Evaluation 2  Therapeutic Activity 8    5/12/2024

## 2024-05-12 NOTE — ASSESSMENT & PLAN NOTE
From Dr. Cook's most recent clinic note:   is a 42y/o M with no significant medical problems. He has been diagnosed with multiple myeloma. He is here for a virtual stem cell transplant consultation.   Per records,  presented to [facility] on 10/11/23 after 1 month of progressive weakness, abdominal pain, and intermittent confusion. At the time of his presentation, his labs, which had been normal  in 8/2023, were notable for new severe DOUG, transaminitis, and Ca of 18. He was admitted and started on IVF .  Hypercalcemia workup was initiated and he was ultimately found to have numerous osseous lesions on 10/15/23 CT CAP, FLC - Kappa 3.29, Lambda 744, Ratio 226 , SPEP with M spike 0.17, IgG lambda on sIFE. Bone marrow biopsy done 10/18/23 revealed 95% plasma cells and FISH with TP53 deletion, FGFR3/IGH (or NSD2/IGH) fusion, usually representing a t(4;14), and a tetraploid subclone was observed. After correction of his hypercalcemia and improvement in his renal function he was discharged on 10/22/23.   He was started on Beatriz + RVd on 11/2/23.   - Admitted for a Sandra 200 auto SCT.

## 2024-05-12 NOTE — PLAN OF CARE
Problem: Occupational Therapy  Goal: Occupational Therapy Goal  Description: Goals to be met by: 6/12/24     Patient will maintain functional independence with ADLs by performing:    UE Dressing with [place].  LE Dressing with [place].  Grooming while standing at sink with [place].  Toileting from toilet with [place] for hygiene and clothing management.   Sitting at edge of bed x20+ minutes with [place].  Upper extremity exercise program 3x10 reps per handout, with independence.      Outcome: Progressing   OT judit completed

## 2024-05-12 NOTE — SUBJECTIVE & OBJECTIVE
Subjective:     Interval History: Patient admitted for a Sandra 200 auto SCT for MM. Today is Day -1. Plan for melphalan this morning. Labs and vitals stable. Patient with no symptoms.     Objective:     Vital Signs (Most Recent):  Temp: 99.3 °F (37.4 °C) (05/12/24 0745)  Pulse: 76 (05/12/24 0745)  Resp: 18 (05/12/24 0745)  BP: 127/85 (05/12/24 0745)  SpO2: 96 % (05/12/24 0745) Vital Signs (24h Range):  Temp:  [97.9 °F (36.6 °C)-99.3 °F (37.4 °C)] 99.3 °F (37.4 °C)  Pulse:  [74-90] 76  Resp:  [18] 18  SpO2:  [95 %-100 %] 96 %  BP: (127-158)/(70-97) 127/85     Weight: 92.4 kg (203 lb 9.5 oz)  Body mass index is 30.07 kg/m².  Body surface area is 2.12 meters squared.    ECOG SCORE           [unfilled]    Intake/Output - Last 3 Shifts         05/10 0700  05/11 0659 05/11 0700  05/12 0659 05/12 0700  05/13 0659    I.V. (mL/kg)  1257 (13.6)     Total Intake(mL/kg)  1257 (13.6)     Urine (mL/kg/hr)  2050     Total Output  2050     Net  -793            Urine Occurrence  1 x              Physical Exam  Constitutional:       Appearance: He is well-developed.   HENT:      Head: Normocephalic and atraumatic.      Mouth/Throat:      Pharynx: No oropharyngeal exudate.   Eyes:      General:         Right eye: No discharge.         Left eye: No discharge.      Conjunctiva/sclera: Conjunctivae normal.      Pupils: Pupils are equal, round, and reactive to light.   Cardiovascular:      Rate and Rhythm: Normal rate and regular rhythm.      Heart sounds: Normal heart sounds. No murmur heard.  Pulmonary:      Effort: Pulmonary effort is normal. No respiratory distress.      Breath sounds: Normal breath sounds. No wheezing or rales.   Abdominal:      General: Bowel sounds are normal. There is no distension.      Palpations: Abdomen is soft.      Tenderness: There is no abdominal tenderness.   Musculoskeletal:         General: No deformity. Normal range of motion.      Cervical back: Normal range of motion and neck supple.   Skin:      General: Skin is warm and dry.      Findings: No erythema or rash.      Comments: Right chest wall vas cath. Dressing c/d/i. No sign of infection to site.   Neurological:      Mental Status: He is alert and oriented to person, place, and time.   Psychiatric:         Behavior: Behavior normal.         Thought Content: Thought content normal.         Judgment: Judgment normal.            Significant Labs:   All pertinent labs from the last 24 hours have been reviewed.    Diagnostic Results:  I have reviewed all pertinent imaging results/findings within the past 24 hours.

## 2024-05-13 PROBLEM — E44.1 MILD MALNUTRITION: Status: ACTIVE | Noted: 2024-05-13

## 2024-05-13 LAB
ALBUMIN SERPL BCP-MCNC: 3.3 G/DL (ref 3.5–5.2)
ALP SERPL-CCNC: 71 U/L (ref 55–135)
ALT SERPL W/O P-5'-P-CCNC: 12 U/L (ref 10–44)
ANION GAP SERPL CALC-SCNC: 9 MMOL/L (ref 8–16)
AST SERPL-CCNC: 11 U/L (ref 10–40)
BASOPHILS # BLD AUTO: 0.02 K/UL (ref 0–0.2)
BASOPHILS NFR BLD: 0.2 % (ref 0–1.9)
BILIRUB SERPL-MCNC: 0.4 MG/DL (ref 0.1–1)
BLD PROD TYP BPU: NORMAL
BLOOD UNIT EXPIRATION DATE: NORMAL
BLOOD UNIT TYPE CODE: 6200
BLOOD UNIT TYPE: NORMAL
BUN SERPL-MCNC: 37 MG/DL (ref 6–20)
CALCIUM SERPL-MCNC: 8.6 MG/DL (ref 8.7–10.5)
CHLORIDE SERPL-SCNC: 111 MMOL/L (ref 95–110)
CO2 SERPL-SCNC: 21 MMOL/L (ref 23–29)
CODING SYSTEM: NORMAL
CREAT SERPL-MCNC: 2 MG/DL (ref 0.5–1.4)
CROSSMATCH INTERPRETATION: NORMAL
DIFFERENTIAL METHOD BLD: ABNORMAL
DISPENSE STATUS: NORMAL
EOSINOPHIL # BLD AUTO: 0 K/UL (ref 0–0.5)
EOSINOPHIL NFR BLD: 0 % (ref 0–8)
ERYTHROCYTE [DISTWIDTH] IN BLOOD BY AUTOMATED COUNT: 15.5 % (ref 11.5–14.5)
EST. GFR  (NO RACE VARIABLE): 41.2 ML/MIN/1.73 M^2
GLUCOSE SERPL-MCNC: 106 MG/DL (ref 70–110)
HCT VFR BLD AUTO: 26 % (ref 40–54)
HGB BLD-MCNC: 8.6 G/DL (ref 14–18)
IMM GRANULOCYTES # BLD AUTO: 0.08 K/UL (ref 0–0.04)
IMM GRANULOCYTES NFR BLD AUTO: 0.7 % (ref 0–0.5)
LYMPHOCYTES # BLD AUTO: 0.3 K/UL (ref 1–4.8)
LYMPHOCYTES NFR BLD: 2.8 % (ref 18–48)
MAGNESIUM SERPL-MCNC: 2 MG/DL (ref 1.6–2.6)
MCH RBC QN AUTO: 34.4 PG (ref 27–31)
MCHC RBC AUTO-ENTMCNC: 33.1 G/DL (ref 32–36)
MCV RBC AUTO: 104 FL (ref 82–98)
MONOCYTES # BLD AUTO: 0.8 K/UL (ref 0.3–1)
MONOCYTES NFR BLD: 6.7 % (ref 4–15)
NEUTROPHILS # BLD AUTO: 10.4 K/UL (ref 1.8–7.7)
NEUTROPHILS NFR BLD: 89.6 % (ref 38–73)
NRBC BLD-RTO: 0 /100 WBC
PHOSPHATE SERPL-MCNC: 3.6 MG/DL (ref 2.7–4.5)
PLATELET # BLD AUTO: 164 K/UL (ref 150–450)
PMV BLD AUTO: 10.5 FL (ref 9.2–12.9)
POTASSIUM SERPL-SCNC: 4.5 MMOL/L (ref 3.5–5.1)
PROT SERPL-MCNC: 5.4 G/DL (ref 6–8.4)
RBC # BLD AUTO: 2.5 M/UL (ref 4.6–6.2)
SODIUM SERPL-SCNC: 141 MMOL/L (ref 136–145)
UNIT NUMBER: NORMAL
WBC # BLD AUTO: 11.65 K/UL (ref 3.9–12.7)

## 2024-05-13 PROCEDURE — 30243Y0 TRANSFUSION OF AUTOLOGOUS HEMATOPOIETIC STEM CELLS INTO CENTRAL VEIN, PERCUTANEOUS APPROACH: ICD-10-PCS | Performed by: INTERNAL MEDICINE

## 2024-05-13 PROCEDURE — 80053 COMPREHEN METABOLIC PANEL: CPT | Performed by: NURSE PRACTITIONER

## 2024-05-13 PROCEDURE — 83735 ASSAY OF MAGNESIUM: CPT | Performed by: NURSE PRACTITIONER

## 2024-05-13 PROCEDURE — 25000003 PHARM REV CODE 250: Performed by: INTERNAL MEDICINE

## 2024-05-13 PROCEDURE — 63600175 PHARM REV CODE 636 W HCPCS: Performed by: NURSE PRACTITIONER

## 2024-05-13 PROCEDURE — 20600001 HC STEP DOWN PRIVATE ROOM

## 2024-05-13 PROCEDURE — 99233 SBSQ HOSP IP/OBS HIGH 50: CPT | Mod: FS,,, | Performed by: INTERNAL MEDICINE

## 2024-05-13 PROCEDURE — 25000003 PHARM REV CODE 250: Performed by: NURSE PRACTITIONER

## 2024-05-13 PROCEDURE — 84100 ASSAY OF PHOSPHORUS: CPT | Performed by: NURSE PRACTITIONER

## 2024-05-13 PROCEDURE — 38208 THAW PRESERVED STEM CELLS: CPT

## 2024-05-13 PROCEDURE — 85025 COMPLETE CBC W/AUTO DIFF WBC: CPT | Performed by: NURSE PRACTITIONER

## 2024-05-13 PROCEDURE — 63600175 PHARM REV CODE 636 W HCPCS: Performed by: INTERNAL MEDICINE

## 2024-05-13 PROCEDURE — 38241 TRANSPLT AUTOL HCT/DONOR: CPT

## 2024-05-13 RX ORDER — LORAZEPAM 2 MG/ML
1 INJECTION INTRAMUSCULAR ONCE
Status: COMPLETED | OUTPATIENT
Start: 2024-05-13 | End: 2024-05-13

## 2024-05-13 RX ORDER — SODIUM CHLORIDE AND POTASSIUM CHLORIDE 150; 900 MG/100ML; MG/100ML
INJECTION, SOLUTION INTRAVENOUS CONTINUOUS
Status: DISCONTINUED | OUTPATIENT
Start: 2024-05-13 | End: 2024-05-14

## 2024-05-13 RX ORDER — MEPERIDINE HYDROCHLORIDE 50 MG/ML
50 INJECTION INTRAMUSCULAR; INTRAVENOUS; SUBCUTANEOUS ONCE AS NEEDED
Status: DISPENSED | OUTPATIENT
Start: 2024-05-13 | End: 2024-05-14

## 2024-05-13 RX ORDER — DIPHENHYDRAMINE HYDROCHLORIDE 50 MG/ML
50 INJECTION INTRAMUSCULAR; INTRAVENOUS ONCE AS NEEDED
Status: DISCONTINUED | OUTPATIENT
Start: 2024-05-13 | End: 2024-05-14

## 2024-05-13 RX ORDER — HYDROCODONE BITARTRATE AND ACETAMINOPHEN 500; 5 MG/1; MG/1
TABLET ORAL
Status: DISCONTINUED | OUTPATIENT
Start: 2024-05-13 | End: 2024-05-23

## 2024-05-13 RX ORDER — DIPHENHYDRAMINE HYDROCHLORIDE 50 MG/ML
50 INJECTION INTRAMUSCULAR; INTRAVENOUS ONCE
Status: COMPLETED | OUTPATIENT
Start: 2024-05-13 | End: 2024-05-13

## 2024-05-13 RX ORDER — FUROSEMIDE 10 MG/ML
100 INJECTION INTRAMUSCULAR; INTRAVENOUS ONCE AS NEEDED
Status: DISCONTINUED | OUTPATIENT
Start: 2024-05-13 | End: 2024-05-27 | Stop reason: HOSPADM

## 2024-05-13 RX ORDER — CLONIDINE HYDROCHLORIDE 0.1 MG/1
0.1 TABLET ORAL ONCE AS NEEDED
Status: DISCONTINUED | OUTPATIENT
Start: 2024-05-13 | End: 2024-05-27 | Stop reason: HOSPADM

## 2024-05-13 RX ORDER — NITROGLYCERIN 0.4 MG/1
0.4 TABLET SUBLINGUAL ONCE AS NEEDED
Status: DISCONTINUED | OUTPATIENT
Start: 2024-05-13 | End: 2024-05-27 | Stop reason: HOSPADM

## 2024-05-13 RX ORDER — EPINEPHRINE 1 MG/ML
0.5 INJECTION, SOLUTION, CONCENTRATE INTRAVENOUS ONCE AS NEEDED
Status: DISCONTINUED | OUTPATIENT
Start: 2024-05-13 | End: 2024-05-14

## 2024-05-13 RX ADMIN — SODIUM CHLORIDE AND POTASSIUM CHLORIDE: .9; .15 SOLUTION INTRAVENOUS at 02:05

## 2024-05-13 RX ADMIN — LEVOFLOXACIN 500 MG: 500 TABLET, FILM COATED ORAL at 08:05

## 2024-05-13 RX ADMIN — ACYCLOVIR 800 MG: 200 CAPSULE ORAL at 08:05

## 2024-05-13 RX ADMIN — ONDANSETRON 8 MG: 8 TABLET, ORALLY DISINTEGRATING ORAL at 04:05

## 2024-05-13 RX ADMIN — Medication 1 DOSE: at 05:05

## 2024-05-13 RX ADMIN — SODIUM BICARBONATE 650 MG: 650 TABLET ORAL at 08:05

## 2024-05-13 RX ADMIN — SODIUM CHLORIDE AND POTASSIUM CHLORIDE: .9; .15 SOLUTION INTRAVENOUS at 11:05

## 2024-05-13 RX ADMIN — OLANZAPINE 5 MG: 2.5 TABLET, FILM COATED ORAL at 08:05

## 2024-05-13 RX ADMIN — ALLOPURINOL 100 MG: 100 TABLET ORAL at 08:05

## 2024-05-13 RX ADMIN — ONDANSETRON 8 MG: 8 TABLET, ORALLY DISINTEGRATING ORAL at 08:05

## 2024-05-13 RX ADMIN — DIPHENHYDRAMINE HYDROCHLORIDE 50 MG: 50 INJECTION, SOLUTION INTRAMUSCULAR; INTRAVENOUS at 12:05

## 2024-05-13 RX ADMIN — Medication 1 DOSE: at 01:05

## 2024-05-13 RX ADMIN — METOPROLOL TARTRATE 12.5 MG: 25 TABLET, FILM COATED ORAL at 08:05

## 2024-05-13 RX ADMIN — CALCITRIOL CAPSULES 0.25 MCG 0.5 MCG: 0.25 CAPSULE ORAL at 08:05

## 2024-05-13 RX ADMIN — TAMSULOSIN HYDROCHLORIDE 0.4 MG: 0.4 CAPSULE ORAL at 08:05

## 2024-05-13 RX ADMIN — Medication 1 DOSE: at 08:05

## 2024-05-13 RX ADMIN — FLUCONAZOLE 400 MG: 200 TABLET ORAL at 08:05

## 2024-05-13 RX ADMIN — ONDANSETRON 8 MG: 8 TABLET, ORALLY DISINTEGRATING ORAL at 01:05

## 2024-05-13 RX ADMIN — PANTOPRAZOLE SODIUM 40 MG: 40 TABLET, DELAYED RELEASE ORAL at 08:05

## 2024-05-13 RX ADMIN — HYDROCORTISONE SODIUM SUCCINATE 250 MG: 250 INJECTION, POWDER, FOR SOLUTION INTRAMUSCULAR; INTRAVENOUS at 12:05

## 2024-05-13 RX ADMIN — LORAZEPAM 1 MG: 2 INJECTION INTRAMUSCULAR; INTRAVENOUS at 01:05

## 2024-05-13 NOTE — ASSESSMENT & PLAN NOTE
Malnutrition Type:  Context: acute illness or injury  Level: mild    Related to (etiology):   Increased physiological demands    Signs and Symptoms (as evidenced by):   Auto SCT     Malnutrition Characteristic Summary:  Weight Loss (Malnutrition): 10% in 6 months  Subcutaneous Fat (Malnutrition): mild depletion  Muscle Mass (Malnutrition): mild depletion  Hand  Strength, Left (Malnutrition): Good  Hand  Strength, Right (Malnutrition): Good      Interventions/Recommendations (treatment strategy):     Collaboration of nutrition care with other providers  High-calorie, high-protein diet  Nutrition education    Nutrition Diagnosis Status:   New

## 2024-05-13 NOTE — ASSESSMENT & PLAN NOTE
- Patient of Dr. Cook  - Admitted (5/11/24) for a Sandra 200 auto SCT  - Today is Day 0  - He will receive 5 bags containing a CD34 dose of 4.47 x 10^6 today at 1330.  - See treatment plan below    Planned conditioning regimen:  Melphalan 200 on Day -1      Antimicrobial Prophylaxis:  Acyclovir starting on Day -1  Levofloxacin starting on Day -1  Fluconazole starting on Day -1  Bactrim starting on Day +30     Growth Factor Support:  Neupogen starting on Day +7      Caregiver: wife and mother  Post-transplant discharge plans: Becca Martin

## 2024-05-13 NOTE — PROGRESS NOTES
[facility]ohan Weber - Oncology ([facility])  Hematology  Bone Marrow Transplant  Progress Note    Patient Name: Pete Fernandez  Admission Date: 5/11/2024  Hospital Length of Stay: 2 days  Code Status: Full Code    Subjective:     Interval History: Day 0 for a Sandra 200 auto SCT for MM. He will receive 5 bags with a CD34 dose of 4.47 x 10^6 today at 1330. Received Melphalan yesterday and tolerated well. Remains afebrile. VSS. Creatinine 2.0. Baseline ~ 1.8. Receiving IVF and will receive pre and post hydration with transplant. No complaints today.    Objective:     Vital Signs (Most Recent):  Temp: 99.5 °F (37.5 °C) (05/13/24 0730)  Pulse: 101 (05/13/24 0730)  Resp: 18 (05/13/24 0730)  BP: (!) 143/76 (05/13/24 0730)  SpO2: 97 % (05/13/24 0730) Vital Signs (24h Range):  Temp:  [97.8 °F (36.6 °C)-99.5 °F (37.5 °C)] 99.5 °F (37.5 °C)  Pulse:  [] 101  Resp:  [18] 18  SpO2:  [95 %-99 %] 97 %  BP: (115-168)/(67-91) 143/76     Weight: 94.1 kg (207 lb 7.3 oz)  Body mass index is 30.64 kg/m².  Body surface area is 2.14 meters squared.    ECOG SCORE           [unfilled]    Intake/Output - Last 3 Shifts         05/11 0700 05/12 0659 05/12 0700 05/13 0659 05/13 0700 05/14 0659    P.O.  1080     I.V. (mL/kg) 1257 (13.6) 2634 (28)     IV Piggyback  457.2     Total Intake(mL/kg) 1257 (13.6) 4171.2 (44.3)     Urine (mL/kg/hr) 2050 5200 (2.3)     Total Output 2050 5200     Net -793 -1028.8            Urine Occurrence 1 x               Physical Exam  Constitutional:       Appearance: He is well-developed.   HENT:      Head: Normocephalic and atraumatic.      Mouth/Throat:      Pharynx: No oropharyngeal exudate.   Eyes:      General:         Right eye: No discharge.         Left eye: No discharge.      Conjunctiva/sclera: Conjunctivae normal.      Pupils: Pupils are equal, round, and reactive to light.   Cardiovascular:      Rate and Rhythm: Normal rate and regular rhythm.      Heart sounds: Normal heart sounds. No murmur heard.  Pulmonary:       Effort: Pulmonary effort is normal. No respiratory distress.      Breath sounds: Normal breath sounds. No wheezing or rales.   Abdominal:      General: Bowel sounds are normal. There is no distension.      Palpations: Abdomen is soft.      Tenderness: There is no abdominal tenderness.   Musculoskeletal:         General: No deformity. Normal range of motion.      Cervical back: Normal range of motion and neck supple.   Skin:     General: Skin is warm and dry.      Findings: No erythema or rash.      Comments: Right chest wall vas cath. Dressing c/d/i. No sign of infection to site.   Neurological:      Mental Status: He is alert and oriented to person, place, and time.   Psychiatric:         Behavior: Behavior normal.         Thought Content: Thought content normal.         Judgment: Judgment normal.            Significant Labs:   CBC:   Recent Labs   Lab 05/12/24 0347 05/13/24  0328   WBC 6.90 11.65   HGB 8.8* 8.6*   HCT 27.2* 26.0*    164    and CMP:   Recent Labs   Lab 05/12/24 0347 05/13/24 0328    141   K 3.9 4.5    111*   CO2 26 21*   GLU 94 106   BUN 35* 37*   CREATININE 1.8* 2.0*   CALCIUM 9.1 8.6*   PROT 5.3* 5.4*   ALBUMIN 3.4* 3.3*   BILITOT 0.4 0.4   ALKPHOS 77 71   AST 12 11   ALT 12 12   ANIONGAP 10 9       Diagnostic Results:  I have reviewed all pertinent imaging results/findings within the past 24 hours.  Assessment/Plan:     * Stem cell transplant candidate  - Patient of Dr. Cook  - Admitted (5/11/24) for a Sandra 200 auto SCT  - Today is Day 0  - He will receive 5 bags containing a CD34 dose of 4.47 x 10^6 today at 1330.  - See treatment plan below    Planned conditioning regimen:  Melphalan 200 on Day -1      Antimicrobial Prophylaxis:  Acyclovir starting on Day -1  Levofloxacin starting on Day -1  Fluconazole starting on Day -1  Bactrim starting on Day +30     Growth Factor Support:  Neupogen starting on Day +7      Caregiver: wife and mother  Post-transplant discharge plans:  Becca Martin    Multiple myeloma  From Dr. Cook's most recent clinic note:   is a 44y/o M with no significant medical problems. He has been diagnosed with multiple myeloma. He is here for a virtual stem cell transplant consultation.   Per records,  presented to [facility] on 10/11/23 after 1 month of progressive weakness, abdominal pain, and intermittent confusion. At the time of his presentation, his labs, which had been normal  in 8/2023, were notable for new severe DOUG, transaminitis, and Ca of 18. He was admitted and started on IVF .  Hypercalcemia workup was initiated and he was ultimately found to have numerous osseous lesions on 10/15/23 CT CAP, FLC - Kappa 3.29, Lambda 744, Ratio 226 , SPEP with M spike 0.17, IgG lambda on sIFE. Bone marrow biopsy done 10/18/23 revealed 95% plasma cells and FISH with TP53 deletion, FGFR3/IGH (or NSD2/IGH) fusion, usually representing a t(4;14), and a tetraploid subclone was observed. After correction of his hypercalcemia and improvement in his renal function he was discharged on 10/22/23.   He was started on Beatriz + RVd on 11/2/23.   - Admitted for a Sandra 200 auto SCT.      Anemia due to chemotherapy  - Anticipate pancytopenia following chemo  - Transfuse for hgb < 7  - Daily CBC while inpatient    Thrombocytopenia  - Anticipate pancytopenia following chemo  - Transfuse for plts < 10K  - Daily CBC while inpatient    Hypertension  - Continue home metoprolol    GERD (gastroesophageal reflux disease)  - Takes protonix at home. Will receive inpatient as part of SCT protocol.    Gout  - Home febuxostat not on formulary. Will substitute with allopurinol while inpatient.    BPH (benign prostatic hyperplasia)  - Continue home Flomax    CKD (chronic kidney disease)  - Baseline creatinine appears to be ~ 1.8  - Renally dose meds as appropriate    ADD (attention deficit disorder)  - Will hold home Aderall while inpatient    Pericarditis  - Will hold home colchicine as he has  completed a 3 month course per PharmD rec    Cancer associated pain  - Continue home Oxy IR        VTE Risk Mitigation (From admission, onward)           Ordered     heparin (porcine) injection 1,600 Units  As needed (PRN)         05/11/24 2044     IP VTE HIGH RISK PATIENT  Once         05/11/24 1803     Place sequential compression device  Until discontinued         05/11/24 1803                    Disposition: Inpatient for autologous SCT.    Julianna Dalal, NP  Bone Marrow Transplant  Rohan sheila - Oncology ([facility])

## 2024-05-13 NOTE — SUBJECTIVE & OBJECTIVE
Subjective:     Interval History: Day 0 for a Sandra 200 auto SCT for MM. He will receive 5 bags with a CD34 dose of 4.47 x 10^6 today at 1330. Received Melphalan yesterday and tolerated well. Remains afebrile. VSS. Creatinine 2.0. Baseline ~ 1.8. Receiving IVF and will receive pre and post hydration with transplant. No complaints today.    Objective:     Vital Signs (Most Recent):  Temp: 99.5 °F (37.5 °C) (05/13/24 0730)  Pulse: 101 (05/13/24 0730)  Resp: 18 (05/13/24 0730)  BP: (!) 143/76 (05/13/24 0730)  SpO2: 97 % (05/13/24 0730) Vital Signs (24h Range):  Temp:  [97.8 °F (36.6 °C)-99.5 °F (37.5 °C)] 99.5 °F (37.5 °C)  Pulse:  [] 101  Resp:  [18] 18  SpO2:  [95 %-99 %] 97 %  BP: (115-168)/(67-91) 143/76     Weight: 94.1 kg (207 lb 7.3 oz)  Body mass index is 30.64 kg/m².  Body surface area is 2.14 meters squared.    ECOG SCORE           [unfilled]    Intake/Output - Last 3 Shifts         05/11 0700  05/12 0659 05/12 0700  05/13 0659 05/13 0700  05/14 0659    P.O.  1080     I.V. (mL/kg) 1257 (13.6) 2634 (28)     IV Piggyback  457.2     Total Intake(mL/kg) 1257 (13.6) 4171.2 (44.3)     Urine (mL/kg/hr) 2050 5200 (2.3)     Total Output 2050 5200     Net -793 -1028.8            Urine Occurrence 1 x               Physical Exam  Constitutional:       Appearance: He is well-developed.   HENT:      Head: Normocephalic and atraumatic.      Mouth/Throat:      Pharynx: No oropharyngeal exudate.   Eyes:      General:         Right eye: No discharge.         Left eye: No discharge.      Conjunctiva/sclera: Conjunctivae normal.      Pupils: Pupils are equal, round, and reactive to light.   Cardiovascular:      Rate and Rhythm: Normal rate and regular rhythm.      Heart sounds: Normal heart sounds. No murmur heard.  Pulmonary:      Effort: Pulmonary effort is normal. No respiratory distress.      Breath sounds: Normal breath sounds. No wheezing or rales.   Abdominal:      General: Bowel sounds are normal. There is no  distension.      Palpations: Abdomen is soft.      Tenderness: There is no abdominal tenderness.   Musculoskeletal:         General: No deformity. Normal range of motion.      Cervical back: Normal range of motion and neck supple.   Skin:     General: Skin is warm and dry.      Findings: No erythema or rash.      Comments: Right chest wall vas cath. Dressing c/d/i. No sign of infection to site.   Neurological:      Mental Status: He is alert and oriented to person, place, and time.   Psychiatric:         Behavior: Behavior normal.         Thought Content: Thought content normal.         Judgment: Judgment normal.            Significant Labs:   CBC:   Recent Labs   Lab 05/12/24  0347 05/13/24  0328   WBC 6.90 11.65   HGB 8.8* 8.6*   HCT 27.2* 26.0*    164    and CMP:   Recent Labs   Lab 05/12/24 0347 05/13/24  0328    141   K 3.9 4.5    111*   CO2 26 21*   GLU 94 106   BUN 35* 37*   CREATININE 1.8* 2.0*   CALCIUM 9.1 8.6*   PROT 5.3* 5.4*   ALBUMIN 3.4* 3.3*   BILITOT 0.4 0.4   ALKPHOS 77 71   AST 12 11   ALT 12 12   ANIONGAP 10 9       Diagnostic Results:  I have reviewed all pertinent imaging results/findings within the past 24 hours.

## 2024-05-13 NOTE — CONSULTS
[facility] Weber - Oncology ([facility])  Adult Nutrition  Consult Note    SUMMARY     Recommendations    1. Continue Regular Diet. Enocurage high-calorie, high-protein food sources. Small, frequent meals/snacks as tolerated.   2. If PO intake declines <50% consumed at meals, recommend Boost Plus (flavor per patient preference) BID.     3. Monitor I/O's, weight and labs.     4. RD to monitor.    Goals: Meet % EEN/EPN by follow-up date.  Nutrition Goal Status: new  Communication of RD Recs: other (comment) (POC)    Assessment and Plan    Endocrine  Mild malnutrition  Malnutrition Type:  Context: acute illness or injury  Level: mild    Related to (etiology):   Increased physiological demands    Signs and Symptoms (as evidenced by):   Auto SCT     Malnutrition Characteristic Summary:  Weight Loss (Malnutrition): 10% in 6 months  Subcutaneous Fat (Malnutrition): mild depletion  Muscle Mass (Malnutrition): mild depletion  Hand  Strength, Left (Malnutrition): Good  Hand  Strength, Right (Malnutrition): Good      Interventions/Recommendations (treatment strategy):     Collaboration of nutrition care with other providers  High-calorie, high-protein diet  Nutrition education    Nutrition Diagnosis Status:   New         Malnutrition Assessment  Malnutrition Context: acute illness or injury  Malnutrition Level: mild  Skin (Micronutrient): dry  Teeth (Micronutrient): broken dentition  Tongue (Micronutrient): none  Musculoskeletal/Lower Extremities: muscle control poor, muscle wasting   Micronutrient Evaluation Summary: suspected deficiency  Micronutrient Evaluation Comments: iron, calcium, zinc, phosphorus   Weight Loss (Malnutrition): 10% in 6 months  Subcutaneous Fat (Malnutrition): mild depletion  Muscle Mass (Malnutrition): mild depletion  Hand  Strength, Left (Malnutrition): Good  Hand  Strength, Right (Malnutrition): Good   Orbital Region (Subcutaneous Fat Loss): well nourished  Upper Arm Region  "(Subcutaneous Fat Loss): mild depletion  Thoracic and Lumbar Region: well nourished   Christianity Region (Muscle Loss): mild depletion  Clavicle Bone Region (Muscle Loss): mild depletion  Clavicle and Acromion Bone Region (Muscle Loss): mild depletion  Scapular Bone Region (Muscle Loss): mild depletion  Dorsal Hand (Muscle Loss): well nourished  Patellar Region (Muscle Loss): well nourished  Anterior Thigh Region (Muscle Loss): mild depletion  Posterior Calf Region (Muscle Loss): mild depletion                 Reason for Assessment    Reason For Assessment: consult  Diagnosis:  (Stem Cell Transplant)  Relevant Medical History: HTN, pericarditis, CKD, gout, GERD, ADD, BPH  Interdisciplinary Rounds: did not attend  General Information Comments: RD consulted for stem cell transplant. Spoke with patient and wife at bedside, reports good appetite, no c/o N/V/C/D, denies difficulty chewing/swallowing at this time. RD provided high-calorie, high-protein nutrition therapy and food safety education. Handout provided with RD contact information. All questions/concerns addressed. No other needs identified.  NFPE performed 5/13.  Nutrition Discharge Planning: Pending clinical course, High-Calorie, High-Protein and Food Safety nutrition education provided 5/13    Nutrition Risk Screen    Nutrition Risk Screen: no indicators present    Nutrition/Diet History    Patient Reported Diet/Restrictions/Preferences: general  Typical Food/Fluid Intake: 2-3 meals  Spiritual, Cultural Beliefs, Baptism Practices, Values that Affect Care: no  Supplemental Drinks or Food Habits: Other (see comments) (Muscle Milk occasionally)  Food Allergies: NKFA  Factors Affecting Nutritional Intake: None identified at this time    Anthropometrics    Temp: 99 °F (37.2 °C)  Height Method: Stated  Height: 5' 9" (175.3 cm)  Height (inches): 69 in  Weight Method: Standard Scale  Weight: 94.1 kg (207 lb 7.3 oz)  Weight (lb): 207.45 lb  Ideal Body Weight (IBW), Male: " 160 lb  % Ideal Body Weight, Male (lb): 127.25 %  BMI (Calculated): 30.6       Lab/Procedures/Meds    Pertinent Labs Reviewed: reviewed  Pertinent Labs Comments: H/H: 8.6/26.0, , MCH 34.4, Cl 111, TP 5.4, CO2 21, BUN 37, Cr 2.0, GFR 41.2, Ca 8.6  Pertinent Medications Reviewed: reviewed  Pertinent Medications Comments: allopurinol, benadryl, hydrocortisone, filgratim, pantoprazole,  levofloxacin, olanazapine, Na bicarb, lorazepam, metoprolol, NaCl w/ KCl      Estimated/Assessed Needs    Weight Used For Calorie Calculations: 93.9 kg (207 lb)  Energy Calorie Requirements (kcal): 2157-9032 kcal (20-23 kcal/kg)  Energy Need Method: Kcal/kg  Protein Requirements:  g (1.2-1.4 g/kg, IBW)  Weight Used For Protein Calculations: 72.6 kg (160 lb) (IBW)        RDA Method (mL): 1878         Nutrition Prescription Ordered    Current Diet Order: Regular    Evaluation of Received Nutrient/Fluid Intake    I/O: -1.02 L  Energy Calories Required: meeting needs  Protein Required: meeting needs  Comments: LBM: 5/13  Tolerance: tolerating  % Intake of Estimated Energy Needs: 75 - 100 %  % Meal Intake: 50 - 75 %    Nutrition Risk    Level of Risk/Frequency of Follow-up: low (1x/ week)       Monitor and Evaluation    Food and Nutrient Intake: energy intake, food and beverage intake  Food and Nutrient Adminstration: diet order  Physical Activity and Function: nutrition-related ADLs and IADLs, factors affecting access to physical activity  Anthropometric Measurements: body mass index, weight change, weight  Biochemical Data, Medical Tests and Procedures: lipid profile, inflammatory profile, glucose/endocrine profile, gastrointestinal profile, electrolyte and renal panel  Nutrition-Focused Physical Findings: skin, head and eyes, extremities, muscles and bones, overall appearance       Nutrition Follow-Up    RD Follow-up?: Yes    Godwin Salinas MS, RD, LDN

## 2024-05-13 NOTE — PLAN OF CARE
Recommendations    1. Continue Regular Diet. Enocurage high-calorie, high-protein food sources. Small, frequent meals/snacks as tolerated.   2. If PO intake declines <50% consumed at meals, recommend Boost Plus (flavor per patient preference) BID.     3. Monitor I/O's, weight and labs.     4. RD to monitor.    Goals: Meet % EEN/EPN by follow-up date.  Nutrition Goal Status: new  Communication of RD Recs: other (comment) (POC)

## 2024-05-13 NOTE — PLAN OF CARE
Patient calm and cooperative during shift.  Tolerated SCT well.  Ambulating frequently in room and ambulated in hallway.  Patient reminded to wear a mask.  No falls or injuries.  No s/s of distress.  Ariel light and belongings within reach, bed in low position, rails up x 2.  Will continue to monitor.

## 2024-05-13 NOTE — ASSESSMENT & PLAN NOTE
From Dr. Cook's most recent clinic note:   is a 42y/o M with no significant medical problems. He has been diagnosed with multiple myeloma. He is here for a virtual stem cell transplant consultation.   Per records,  presented to [facility] on 10/11/23 after 1 month of progressive weakness, abdominal pain, and intermittent confusion. At the time of his presentation, his labs, which had been normal  in 8/2023, were notable for new severe DOUG, transaminitis, and Ca of 18. He was admitted and started on IVF .  Hypercalcemia workup was initiated and he was ultimately found to have numerous osseous lesions on 10/15/23 CT CAP, FLC - Kappa 3.29, Lambda 744, Ratio 226 , SPEP with M spike 0.17, IgG lambda on sIFE. Bone marrow biopsy done 10/18/23 revealed 95% plasma cells and FISH with TP53 deletion, FGFR3/IGH (or NSD2/IGH) fusion, usually representing a t(4;14), and a tetraploid subclone was observed. After correction of his hypercalcemia and improvement in his renal function he was discharged on 10/22/23.   He was started on Beatriz + RVd on 11/2/23.   - Admitted for a Sandra 200 auto SCT.

## 2024-05-13 NOTE — PLAN OF CARE
Day 0 of atuo BMT. Plan of care discussed with patient at start of shift. Free from falls and injuries. Resting quietly with eyes closed. Respirations even, unlabored. Skin warm and dry. Denies pain. Nausea managed with scheduled antiemetics. Wife at bedside. Frequent checks for pain and safety maintained. Bed in lowest position, wheels locked, side rails up x's 2, call light in reach. Instructed to call for assistance as needed, verbalizes understanding.

## 2024-05-14 PROBLEM — E87.70 VOLUME OVERLOAD: Status: ACTIVE | Noted: 2024-05-14

## 2024-05-14 LAB
ALBUMIN SERPL BCP-MCNC: 3.4 G/DL (ref 3.5–5.2)
ALP SERPL-CCNC: 62 U/L (ref 55–135)
ALT SERPL W/O P-5'-P-CCNC: 11 U/L (ref 10–44)
ANION GAP SERPL CALC-SCNC: 10 MMOL/L (ref 8–16)
AST SERPL-CCNC: 16 U/L (ref 10–40)
BASOPHILS # BLD AUTO: 0.01 K/UL (ref 0–0.2)
BASOPHILS NFR BLD: 0.1 % (ref 0–1.9)
BILIRUB SERPL-MCNC: 0.5 MG/DL (ref 0.1–1)
BUN SERPL-MCNC: 32 MG/DL (ref 6–20)
CALCIUM SERPL-MCNC: 8.3 MG/DL (ref 8.7–10.5)
CHLORIDE SERPL-SCNC: 112 MMOL/L (ref 95–110)
CO2 SERPL-SCNC: 20 MMOL/L (ref 23–29)
CREAT SERPL-MCNC: 1.6 MG/DL (ref 0.5–1.4)
DIFFERENTIAL METHOD BLD: ABNORMAL
EOSINOPHIL # BLD AUTO: 0 K/UL (ref 0–0.5)
EOSINOPHIL NFR BLD: 0 % (ref 0–8)
ERYTHROCYTE [DISTWIDTH] IN BLOOD BY AUTOMATED COUNT: 16.3 % (ref 11.5–14.5)
EST. GFR  (NO RACE VARIABLE): 53.8 ML/MIN/1.73 M^2
GLUCOSE SERPL-MCNC: 96 MG/DL (ref 70–110)
HCT VFR BLD AUTO: 26.2 % (ref 40–54)
HGB BLD-MCNC: 8.6 G/DL (ref 14–18)
IMM GRANULOCYTES # BLD AUTO: 0.04 K/UL (ref 0–0.04)
IMM GRANULOCYTES NFR BLD AUTO: 0.5 % (ref 0–0.5)
LYMPHOCYTES # BLD AUTO: 0.5 K/UL (ref 1–4.8)
LYMPHOCYTES NFR BLD: 6.7 % (ref 18–48)
MAGNESIUM SERPL-MCNC: 2 MG/DL (ref 1.6–2.6)
MCH RBC QN AUTO: 34.7 PG (ref 27–31)
MCHC RBC AUTO-ENTMCNC: 32.8 G/DL (ref 32–36)
MCV RBC AUTO: 106 FL (ref 82–98)
MONOCYTES # BLD AUTO: 0.4 K/UL (ref 0.3–1)
MONOCYTES NFR BLD: 4.5 % (ref 4–15)
NEUTROPHILS # BLD AUTO: 6.8 K/UL (ref 1.8–7.7)
NEUTROPHILS NFR BLD: 88.2 % (ref 38–73)
NRBC BLD-RTO: 0 /100 WBC
PHOSPHATE SERPL-MCNC: 4.5 MG/DL (ref 2.7–4.5)
PLATELET # BLD AUTO: 189 K/UL (ref 150–450)
PMV BLD AUTO: 10.7 FL (ref 9.2–12.9)
POTASSIUM SERPL-SCNC: 4 MMOL/L (ref 3.5–5.1)
PROT SERPL-MCNC: 5.3 G/DL (ref 6–8.4)
RBC # BLD AUTO: 2.48 M/UL (ref 4.6–6.2)
SODIUM SERPL-SCNC: 142 MMOL/L (ref 136–145)
WBC # BLD AUTO: 7.75 K/UL (ref 3.9–12.7)

## 2024-05-14 PROCEDURE — 84100 ASSAY OF PHOSPHORUS: CPT | Performed by: NURSE PRACTITIONER

## 2024-05-14 PROCEDURE — 25000003 PHARM REV CODE 250: Performed by: NURSE PRACTITIONER

## 2024-05-14 PROCEDURE — 99233 SBSQ HOSP IP/OBS HIGH 50: CPT | Mod: FS,,, | Performed by: INTERNAL MEDICINE

## 2024-05-14 PROCEDURE — 25000003 PHARM REV CODE 250: Performed by: INTERNAL MEDICINE

## 2024-05-14 PROCEDURE — 63600175 PHARM REV CODE 636 W HCPCS: Performed by: INTERNAL MEDICINE

## 2024-05-14 PROCEDURE — 83735 ASSAY OF MAGNESIUM: CPT | Performed by: NURSE PRACTITIONER

## 2024-05-14 PROCEDURE — 85025 COMPLETE CBC W/AUTO DIFF WBC: CPT | Performed by: NURSE PRACTITIONER

## 2024-05-14 PROCEDURE — 80053 COMPREHEN METABOLIC PANEL: CPT | Performed by: NURSE PRACTITIONER

## 2024-05-14 PROCEDURE — 20600001 HC STEP DOWN PRIVATE ROOM

## 2024-05-14 RX ADMIN — SODIUM CHLORIDE AND POTASSIUM CHLORIDE: .9; .15 SOLUTION INTRAVENOUS at 04:05

## 2024-05-14 RX ADMIN — ACYCLOVIR 800 MG: 200 CAPSULE ORAL at 08:05

## 2024-05-14 RX ADMIN — Medication 1 DOSE: at 01:05

## 2024-05-14 RX ADMIN — TAMSULOSIN HYDROCHLORIDE 0.4 MG: 0.4 CAPSULE ORAL at 08:05

## 2024-05-14 RX ADMIN — CALCITRIOL CAPSULES 0.25 MCG 0.5 MCG: 0.25 CAPSULE ORAL at 08:05

## 2024-05-14 RX ADMIN — OLANZAPINE 5 MG: 2.5 TABLET, FILM COATED ORAL at 08:05

## 2024-05-14 RX ADMIN — SODIUM BICARBONATE 650 MG: 650 TABLET ORAL at 08:05

## 2024-05-14 RX ADMIN — FLUCONAZOLE 400 MG: 200 TABLET ORAL at 08:05

## 2024-05-14 RX ADMIN — Medication 1 DOSE: at 08:05

## 2024-05-14 RX ADMIN — ONDANSETRON 8 MG: 8 TABLET, ORALLY DISINTEGRATING ORAL at 08:05

## 2024-05-14 RX ADMIN — PANTOPRAZOLE SODIUM 40 MG: 40 TABLET, DELAYED RELEASE ORAL at 08:05

## 2024-05-14 RX ADMIN — ONDANSETRON 8 MG: 8 TABLET, ORALLY DISINTEGRATING ORAL at 04:05

## 2024-05-14 RX ADMIN — METOPROLOL TARTRATE 12.5 MG: 25 TABLET, FILM COATED ORAL at 08:05

## 2024-05-14 RX ADMIN — Medication 1 DOSE: at 04:05

## 2024-05-14 RX ADMIN — ALLOPURINOL 100 MG: 100 TABLET ORAL at 08:05

## 2024-05-14 RX ADMIN — ONDANSETRON 8 MG: 8 TABLET, ORALLY DISINTEGRATING ORAL at 01:05

## 2024-05-14 RX ADMIN — LEVOFLOXACIN 500 MG: 500 TABLET, FILM COATED ORAL at 08:05

## 2024-05-14 RX ADMIN — HEPARIN SODIUM 1600 UNITS: 1000 INJECTION, SOLUTION INTRAVENOUS; SUBCUTANEOUS at 08:05

## 2024-05-14 NOTE — ASSESSMENT & PLAN NOTE
- Weight up 8.5 lbs from admission  - Received large volume of fluid for transplant  - Stopping IVF and will allow patient to auto-diurese   DISPLAY PLAN FREE TEXT

## 2024-05-14 NOTE — ASSESSMENT & PLAN NOTE
- Patient of Dr. Cook  - Admitted (5/11/24) for a Sandra 200 auto SCT  - Today is Day +1  - He received 5 bags containing a CD34 dose of 4.47 x 10^6 5/13/24 at 1330.  - See treatment plan below    Planned conditioning regimen:  Melphalan 200 on Day -1      Antimicrobial Prophylaxis:  Acyclovir starting on Day -1  Levofloxacin starting on Day -1  Fluconazole starting on Day -1  Bactrim starting on Day +30     Growth Factor Support:  Neupogen starting on Day +7      Caregiver: wife and mother  Post-transplant discharge plans: Becca Martin

## 2024-05-14 NOTE — ASSESSMENT & PLAN NOTE
From Dr. Cook's most recent clinic note:   is a 42y/o M with no significant medical problems. He has been diagnosed with multiple myeloma. He is here for a virtual stem cell transplant consultation.   Per records,  presented to [facility] on 10/11/23 after 1 month of progressive weakness, abdominal pain, and intermittent confusion. At the time of his presentation, his labs, which had been normal  in 8/2023, were notable for new severe DOUG, transaminitis, and Ca of 18. He was admitted and started on IVF .  Hypercalcemia workup was initiated and he was ultimately found to have numerous osseous lesions on 10/15/23 CT CAP, FLC - Kappa 3.29, Lambda 744, Ratio 226 , SPEP with M spike 0.17, IgG lambda on sIFE. Bone marrow biopsy done 10/18/23 revealed 95% plasma cells and FISH with TP53 deletion, FGFR3/IGH (or NSD2/IGH) fusion, usually representing a t(4;14), and a tetraploid subclone was observed. After correction of his hypercalcemia and improvement in his renal function he was discharged on 10/22/23.   He was started on Beatriz + RVd on 11/2/23.   - Admitted for a Sandra 200 auto SCT.     Burow's Advancement Flap Text: Given the location of the defect and the proximity to free margins a Burow's advancement flap was deemed most appropriate.  Using a sterile surgical marker, the appropriate advancement flap was drawn incorporating the defect and placing the expected incisions within the relaxed skin tension lines where possible.    The area thus outlined was incised deep to adipose tissue with a #[id number] scalpel blade.  The skin margins were undermined to an appropriate distance in all directions utilizing iris scissors.

## 2024-05-14 NOTE — SUBJECTIVE & OBJECTIVE
Subjective:     Interval History: Day +1 from a Sandra 200 auto SCT for MM. Received stem cells yesterday, and tolerated well. Remains afebrile. VSS. Creatinine down to 1.6 today. Weight up 8.5 lbs from admission. Received large volume of fluid for transplant. Stopped IVF, and will allow patient to auto-diurese today.    Objective:     Vital Signs (Most Recent):  Temp: 97.7 °F (36.5 °C) (05/14/24 0851)  Pulse: 89 (05/14/24 0851)  Resp: 20 (05/14/24 0851)  BP: (!) 142/91 (05/14/24 0855)  SpO2: 99 % (05/14/24 0851) Vital Signs (24h Range):  Temp:  [97.7 °F (36.5 °C)-99 °F (37.2 °C)] 97.7 °F (36.5 °C)  Pulse:  [] 89  Resp:  [15-25] 20  SpO2:  [9 %-100 %] 99 %  BP: (135-161)/(74-91) 142/91     Weight: 95.4 kg (210 lb 3.3 oz)  Body mass index is 31.04 kg/m².  Body surface area is 2.16 meters squared.    ECOG SCORE           [unfilled]    Intake/Output - Last 3 Shifts         05/12 0700  05/13 0659 05/13 0700  05/14 0659 05/14 0700  05/15 0659    P.O. 1080 1020 380    I.V. (mL/kg) 2634 (28) 2809.8 (29.3)     IV Piggyback 457.2      Total Intake(mL/kg) 4171.2 (44.3) 3829.8 (39.9) 380 (4)    Urine (mL/kg/hr) 5200 (2.3) 4600 (2) 1800 (7.6)    Stool  0 0    Total Output 5200 4600 1800    Net -1028.8 -770.2 -1420           Urine Occurrence  1 x     Stool Occurrence  1 x 2 x             Physical Exam  Constitutional:       Appearance: He is well-developed.   HENT:      Head: Normocephalic and atraumatic.      Mouth/Throat:      Pharynx: No oropharyngeal exudate.   Eyes:      General:         Right eye: No discharge.         Left eye: No discharge.      Conjunctiva/sclera: Conjunctivae normal.      Pupils: Pupils are equal, round, and reactive to light.   Cardiovascular:      Rate and Rhythm: Normal rate and regular rhythm.      Heart sounds: Normal heart sounds. No murmur heard.  Pulmonary:      Effort: Pulmonary effort is normal. No respiratory distress.      Breath sounds: Normal breath sounds. No wheezing or rales.    Abdominal:      General: Bowel sounds are normal. There is no distension.      Palpations: Abdomen is soft.      Tenderness: There is no abdominal tenderness.   Musculoskeletal:         General: No deformity. Normal range of motion.      Cervical back: Normal range of motion and neck supple.   Skin:     General: Skin is warm and dry.      Findings: No erythema or rash.      Comments: Right chest wall vas cath. Dressing c/d/i. No sign of infection to site.   Neurological:      Mental Status: He is alert and oriented to person, place, and time.   Psychiatric:         Behavior: Behavior normal.         Thought Content: Thought content normal.         Judgment: Judgment normal.            Significant Labs:   CBC:   Recent Labs   Lab 05/13/24 0328 05/14/24 0449   WBC 11.65 7.75   HGB 8.6* 8.6*   HCT 26.0* 26.2*    189    and CMP:   Recent Labs   Lab 05/13/24 0328 05/14/24 0449    142   K 4.5 4.0   * 112*   CO2 21* 20*    96   BUN 37* 32*   CREATININE 2.0* 1.6*   CALCIUM 8.6* 8.3*   PROT 5.4* 5.3*   ALBUMIN 3.3* 3.4*   BILITOT 0.4 0.5   ALKPHOS 71 62   AST 11 16   ALT 12 11   ANIONGAP 9 10       Diagnostic Results:  I have reviewed all pertinent imaging results/findings within the past 24 hours.

## 2024-05-14 NOTE — PROGRESS NOTES
Admit Assessment    Patient Identification  Pete Fernandez   :  1980  Admit Date:  2024  Attending Provider:  Christel Cook MD              Referral:   Pt was admitted to  with a diagnosis of Stem cell transplant candidate, and was admitted this hospital stay due to Multiple myeloma not having achieved remission [C90.00]  Awaiting organ transplant status [Z76.82]  Multiple myeloma [C90.00].   is involved was referred to the Social Work Department via routine referral.  Patient presents as a 43 y.o. year old  male.    Persons interviewed: patient and spouse.    Living Situation:  Lives with spouse Rosalinda ([phone]) on first floor of own two-story home.  Independent with ADLs.      Resides at [address] [place] [id number],   phone: [phone] (home).      (RETIRED) Functional Status Prior  Ambulation Prior: 0-->independent  Transferrin-->independent  Toiletin-->independent    Current or Past Agencies and Description of Services/Supplies    DME  Agency Name: none  Equipment Currently Used at Home: none    Home Health  Agency Name: none    IV Infusion  Agency Name: none    Nutrition: Oral.      Outpatient Pharmacy:     [facility] CURRENT - [address] [address]  Phone: [phone] Fax: [phone]    [address] [address]  Phone: [phone] Fax: [phone]      Patient Preference of agencies include: none expressed.      Patient/Caregiver informed of right to choose providers or agencies.  Patient provides permission to release any necessary information to [facility] and to Non-[facility] agencies as needed to facilitate patient care, treatment planning, and patient discharge planning.  Written and verbal resources provided.      Coping   Coping well with support of family.  In good spirits today.       Adjustment to Diagnosis and  Treatment  Adequate.      Emotional/Behavioral/Cognitive Issues   Hx of anxiety NOS and ADHD.  Compliant with Adderall 20mg daily at home; held at present.           History/Current Symptoms of Anxiety/Depression: Yes  History/Current Substance Use:   Social History     Tobacco Use    Smoking status: Never    Smokeless tobacco: Never   Substance and Sexual Activity    Alcohol use: Yes    Drug use: Never    Sexual activity: Yes     Partners: Female       Indications of Abuse/Neglect: No:   Abuse Screen (yes response referral indicated)  Feels Unsafe at Home or Work/School: yes  Feels Threatened by Someone: no  Does anyone try to keep you from having contact with others or doing things outside your home?: no  Physical Signs of Abuse Present: no    Financial:  Payer/Plan Subscr  Sex Relation Sub. Ins. ID Effective Group Num   1. LAMBERTO MCGINNIS 1980 Male Self [id number] 5/10/24                                    [address] [address]   2. LAMBERTO MCGINNIS 1980 Male Self [id number] 1/1/15 [id number]                                   [address] [address]      Other identified concerns/needs: lodging.    Plan: to care of spouse at [facility].      Interventions/Referrals: Submitted estimated [facility] referral.    Patient/caregiver engaged in treatment planning process.     providing psychosocial and supportive counseling, resources, education, assistance and discharge planning as appropriate.  Patient/caregiver state understanding of  available resources,  following, remains available.  Given SWer contact info and encouraged to call with any needs or concerns.

## 2024-05-14 NOTE — PLAN OF CARE
Plan of care reviewed with patient. Pt is day +1 Auto SCT. Pt remains afebrile. Free from falls or injury. No complaints of pain. No complaints of nausea. NS20K infusing at 75. Zofran given as scheduled. Pt up independently. Bed locked in lowest position, non skid socks on, call light within reach. Pt instructed to call if any assistance is needed. Vitals stable. Wife at bedside. Will cont to jack pt.

## 2024-05-14 NOTE — PLAN OF CARE
Plan of care reviewed with patient and family.  Day +1 of auto stem cell .  Patient awake, alert and oriented x4.  Fall precautions maintained, side rails upx 2, call light in reach, bed in low position and locked, nonskid socks on.  Free from falls.  Afebrile.  Iv fluids discontinued.  Voiding without difficulty.  Encouraged to call for assistance as needed.  No complaints voiced.

## 2024-05-14 NOTE — PROGRESS NOTES
[facility] - Oncology ([facility])  Hematology  Bone Marrow Transplant  Progress Note    Patient Name: Pete Fernandez  Admission Date: 5/11/2024  Hospital Length of Stay: 3 days  Code Status: Full Code    Subjective:     Interval History: Day +1 from a Sandra 200 auto SCT for MM. Received stem cells yesterday, and tolerated well. Remains afebrile. VSS. Creatinine down to 1.6 today. Weight up 8.5 lbs from admission. Received large volume of fluid for transplant. Stopped IVF, and will allow patient to auto-diurese today.    Objective:     Vital Signs (Most Recent):  Temp: 97.7 °F (36.5 °C) (05/14/24 0851)  Pulse: 89 (05/14/24 0851)  Resp: 20 (05/14/24 0851)  BP: (!) 142/91 (05/14/24 0855)  SpO2: 99 % (05/14/24 0851) Vital Signs (24h Range):  Temp:  [97.7 °F (36.5 °C)-99 °F (37.2 °C)] 97.7 °F (36.5 °C)  Pulse:  [] 89  Resp:  [15-25] 20  SpO2:  [9 %-100 %] 99 %  BP: (135-161)/(74-91) 142/91     Weight: 95.4 kg (210 lb 3.3 oz)  Body mass index is 31.04 kg/m².  Body surface area is 2.16 meters squared.    ECOG SCORE           [unfilled]    Intake/Output - Last 3 Shifts         05/12 0700 05/13 0659 05/13 0700 05/14 0659 05/14 0700  05/15 0659    P.O. 1080 1020 380    I.V. (mL/kg) 2634 (28) 2809.8 (29.3)     IV Piggyback 457.2      Total Intake(mL/kg) 4171.2 (44.3) 3829.8 (39.9) 380 (4)    Urine (mL/kg/hr) 5200 (2.3) 4600 (2) 1800 (7.6)    Stool  0 0    Total Output 5200 4600 1800    Net -1028.8 -770.2 -1420           Urine Occurrence  1 x     Stool Occurrence  1 x 2 x             Physical Exam  Constitutional:       Appearance: He is well-developed.   HENT:      Head: Normocephalic and atraumatic.      Mouth/Throat:      Pharynx: No oropharyngeal exudate.   Eyes:      General:         Right eye: No discharge.         Left eye: No discharge.      Conjunctiva/sclera: Conjunctivae normal.      Pupils: Pupils are equal, round, and reactive to light.   Cardiovascular:      Rate and Rhythm: Normal rate and regular rhythm.       Heart sounds: Normal heart sounds. No murmur heard.  Pulmonary:      Effort: Pulmonary effort is normal. No respiratory distress.      Breath sounds: Normal breath sounds. No wheezing or rales.   Abdominal:      General: Bowel sounds are normal. There is no distension.      Palpations: Abdomen is soft.      Tenderness: There is no abdominal tenderness.   Musculoskeletal:         General: No deformity. Normal range of motion.      Cervical back: Normal range of motion and neck supple.   Skin:     General: Skin is warm and dry.      Findings: No erythema or rash.      Comments: Right chest wall vas cath. Dressing c/d/i. No sign of infection to site.   Neurological:      Mental Status: He is alert and oriented to person, place, and time.   Psychiatric:         Behavior: Behavior normal.         Thought Content: Thought content normal.         Judgment: Judgment normal.            Significant Labs:   CBC:   Recent Labs   Lab 05/13/24 0328 05/14/24  0449   WBC 11.65 7.75   HGB 8.6* 8.6*   HCT 26.0* 26.2*    189    and CMP:   Recent Labs   Lab 05/13/24 0328 05/14/24  0449    142   K 4.5 4.0   * 112*   CO2 21* 20*    96   BUN 37* 32*   CREATININE 2.0* 1.6*   CALCIUM 8.6* 8.3*   PROT 5.4* 5.3*   ALBUMIN 3.3* 3.4*   BILITOT 0.4 0.5   ALKPHOS 71 62   AST 11 16   ALT 12 11   ANIONGAP 9 10       Diagnostic Results:  I have reviewed all pertinent imaging results/findings within the past 24 hours.  Assessment/Plan:     * Stem cell transplant candidate  - Patient of Dr. Cook  - Admitted (5/11/24) for a Sandra 200 auto SCT  - Today is Day +1  - He received 5 bags containing a CD34 dose of 4.47 x 10^6 5/13/24 at 1330.  - See treatment plan below    Planned conditioning regimen:  Melphalan 200 on Day -1      Antimicrobial Prophylaxis:  Acyclovir starting on Day -1  Levofloxacin starting on Day -1  Fluconazole starting on Day -1  Bactrim starting on Day +30     Growth Factor Support:  Neupogen starting on  Day +7      Caregiver: wife and mother  Post-transplant discharge plans: Becca Martin    Multiple myeloma  From Dr. Cook's most recent clinic note:   is a 42y/o M with no significant medical problems. He has been diagnosed with multiple myeloma. He is here for a virtual stem cell transplant consultation.   Per records,  presented to [facility] on 10/11/23 after 1 month of progressive weakness, abdominal pain, and intermittent confusion. At the time of his presentation, his labs, which had been normal  in 8/2023, were notable for new severe DOUG, transaminitis, and Ca of 18. He was admitted and started on IVF .  Hypercalcemia workup was initiated and he was ultimately found to have numerous osseous lesions on 10/15/23 CT CAP, FLC - Kappa 3.29, Lambda 744, Ratio 226 , SPEP with M spike 0.17, IgG lambda on sIFE. Bone marrow biopsy done 10/18/23 revealed 95% plasma cells and FISH with TP53 deletion, FGFR3/IGH (or NSD2/IGH) fusion, usually representing a t(4;14), and a tetraploid subclone was observed. After correction of his hypercalcemia and improvement in his renal function he was discharged on 10/22/23.   He was started on Beatriz + RVd on 11/2/23.   - Admitted for a Sandra 200 auto SCT.      Anemia due to chemotherapy  - Anticipate pancytopenia following chemo  - Transfuse for hgb < 7  - Daily CBC while inpatient    Thrombocytopenia  - Anticipate pancytopenia following chemo  - Transfuse for plts < 10K  - Daily CBC while inpatient    Volume overload  - Weight up 8.5 lbs from admission  - Received large volume of fluid for transplant  - Stopping IVF and will allow patient to auto-diurese    Mild malnutrition  - Dietician following.    Hypertension  - Continue home metoprolol    GERD (gastroesophageal reflux disease)  - Takes protonix at home. Will receive inpatient as part of SCT protocol.    Gout  - Home febuxostat not on formulary. Substituting with allopurinol while inpatient.    BPH (benign prostatic  hyperplasia)  - Continue home Flomax    CKD (chronic kidney disease)  - Baseline creatinine appears to be ~ 1.8  - Renally dose meds as appropriate    ADD (attention deficit disorder)  - Holding home Aderall while inpatient    Pericarditis  - Will hold home colchicine as he has completed a 3 month course per PharmD rec    Cancer associated pain  - Continue home Oxy IR        VTE Risk Mitigation (From admission, onward)           Ordered     heparin (porcine) injection 1,600 Units  As needed (PRN)         05/11/24 2044     IP VTE HIGH RISK PATIENT  Once         05/11/24 1803     Place sequential compression device  Until discontinued         05/11/24 1803                    Disposition: Inpatient for autologous SCT.    Julianna Dalal, NP  Bone Marrow Transplant  [facility] ([facility])

## 2024-05-15 LAB
ABO + RH BLD: ABNORMAL
ALBUMIN SERPL BCP-MCNC: 3.3 G/DL (ref 3.5–5.2)
ALP SERPL-CCNC: 56 U/L (ref 55–135)
ALT SERPL W/O P-5'-P-CCNC: 11 U/L (ref 10–44)
ANION GAP SERPL CALC-SCNC: 11 MMOL/L (ref 8–16)
ANISOCYTOSIS BLD QL SMEAR: SLIGHT
AST SERPL-CCNC: 15 U/L (ref 10–40)
BASOPHILS # BLD AUTO: 0.01 K/UL (ref 0–0.2)
BASOPHILS NFR BLD: 0.3 % (ref 0–1.9)
BILIRUB SERPL-MCNC: 0.9 MG/DL (ref 0.1–1)
BLD GP AB SCN CELLS X3 SERPL QL: ABNORMAL
BLOOD GROUP ANTIBODIES SERPL: NORMAL
BUN SERPL-MCNC: 30 MG/DL (ref 6–20)
CALCIUM SERPL-MCNC: 8.9 MG/DL (ref 8.7–10.5)
CHLORIDE SERPL-SCNC: 109 MMOL/L (ref 95–110)
CO2 SERPL-SCNC: 20 MMOL/L (ref 23–29)
CREAT SERPL-MCNC: 1.7 MG/DL (ref 0.5–1.4)
DACRYOCYTES BLD QL SMEAR: ABNORMAL
DAT IGG-SP REAG RBC-IMP: NORMAL
DIFFERENTIAL METHOD BLD: ABNORMAL
EOSINOPHIL # BLD AUTO: 0 K/UL (ref 0–0.5)
EOSINOPHIL NFR BLD: 0.3 % (ref 0–8)
ERYTHROCYTE [DISTWIDTH] IN BLOOD BY AUTOMATED COUNT: 16 % (ref 11.5–14.5)
EST. GFR  (NO RACE VARIABLE): 50 ML/MIN/1.73 M^2
GLUCOSE SERPL-MCNC: 88 MG/DL (ref 70–110)
HCT VFR BLD AUTO: 26 % (ref 40–54)
HGB BLD-MCNC: 8.7 G/DL (ref 14–18)
HYPOCHROMIA BLD QL SMEAR: ABNORMAL
IMM GRANULOCYTES # BLD AUTO: 0.02 K/UL (ref 0–0.04)
IMM GRANULOCYTES NFR BLD AUTO: 0.7 % (ref 0–0.5)
LYMPHOCYTES # BLD AUTO: 0.2 K/UL (ref 1–4.8)
LYMPHOCYTES NFR BLD: 7.5 % (ref 18–48)
MAGNESIUM SERPL-MCNC: 1.9 MG/DL (ref 1.6–2.6)
MCH RBC QN AUTO: 35.2 PG (ref 27–31)
MCHC RBC AUTO-ENTMCNC: 33.5 G/DL (ref 32–36)
MCV RBC AUTO: 105 FL (ref 82–98)
MONOCYTES # BLD AUTO: 0 K/UL (ref 0.3–1)
MONOCYTES NFR BLD: 1 % (ref 4–15)
NEUTROPHILS # BLD AUTO: 2.6 K/UL (ref 1.8–7.7)
NEUTROPHILS NFR BLD: 90.2 % (ref 38–73)
NRBC BLD-RTO: 0 /100 WBC
OVALOCYTES BLD QL SMEAR: ABNORMAL
PHOSPHATE SERPL-MCNC: 4.5 MG/DL (ref 2.7–4.5)
PLATELET # BLD AUTO: 174 K/UL (ref 150–450)
PLATELET BLD QL SMEAR: ABNORMAL
PMV BLD AUTO: 10 FL (ref 9.2–12.9)
POIKILOCYTOSIS BLD QL SMEAR: SLIGHT
POLYCHROMASIA BLD QL SMEAR: ABNORMAL
POTASSIUM SERPL-SCNC: 4.3 MMOL/L (ref 3.5–5.1)
PROT SERPL-MCNC: 5.1 G/DL (ref 6–8.4)
RBC # BLD AUTO: 2.47 M/UL (ref 4.6–6.2)
SODIUM SERPL-SCNC: 140 MMOL/L (ref 136–145)
SPECIMEN OUTDATE: ABNORMAL
WBC # BLD AUTO: 2.93 K/UL (ref 3.9–12.7)

## 2024-05-15 PROCEDURE — 86880 COOMBS TEST DIRECT: CPT | Performed by: NURSE PRACTITIONER

## 2024-05-15 PROCEDURE — 80053 COMPREHEN METABOLIC PANEL: CPT | Performed by: NURSE PRACTITIONER

## 2024-05-15 PROCEDURE — 25000003 PHARM REV CODE 250: Performed by: NURSE PRACTITIONER

## 2024-05-15 PROCEDURE — 25000003 PHARM REV CODE 250: Performed by: INTERNAL MEDICINE

## 2024-05-15 PROCEDURE — 83735 ASSAY OF MAGNESIUM: CPT | Performed by: NURSE PRACTITIONER

## 2024-05-15 PROCEDURE — 86870 RBC ANTIBODY IDENTIFICATION: CPT | Performed by: NURSE PRACTITIONER

## 2024-05-15 PROCEDURE — 85025 COMPLETE CBC W/AUTO DIFF WBC: CPT | Performed by: NURSE PRACTITIONER

## 2024-05-15 PROCEDURE — 86901 BLOOD TYPING SEROLOGIC RH(D): CPT | Performed by: NURSE PRACTITIONER

## 2024-05-15 PROCEDURE — 20600001 HC STEP DOWN PRIVATE ROOM

## 2024-05-15 PROCEDURE — 94761 N-INVAS EAR/PLS OXIMETRY MLT: CPT

## 2024-05-15 PROCEDURE — 63600175 PHARM REV CODE 636 W HCPCS: Performed by: NURSE PRACTITIONER

## 2024-05-15 PROCEDURE — 63600175 PHARM REV CODE 636 W HCPCS: Performed by: INTERNAL MEDICINE

## 2024-05-15 PROCEDURE — 99233 SBSQ HOSP IP/OBS HIGH 50: CPT | Mod: FS,,, | Performed by: INTERNAL MEDICINE

## 2024-05-15 PROCEDURE — 84100 ASSAY OF PHOSPHORUS: CPT | Performed by: NURSE PRACTITIONER

## 2024-05-15 RX ORDER — ENOXAPARIN SODIUM 100 MG/ML
40 INJECTION SUBCUTANEOUS EVERY 24 HOURS
Status: DISCONTINUED | OUTPATIENT
Start: 2024-05-15 | End: 2024-05-19

## 2024-05-15 RX ADMIN — CALCITRIOL CAPSULES 0.25 MCG 0.5 MCG: 0.25 CAPSULE ORAL at 09:05

## 2024-05-15 RX ADMIN — Medication 1 DOSE: at 09:05

## 2024-05-15 RX ADMIN — METOPROLOL TARTRATE 12.5 MG: 25 TABLET, FILM COATED ORAL at 08:05

## 2024-05-15 RX ADMIN — ACYCLOVIR 800 MG: 200 CAPSULE ORAL at 08:05

## 2024-05-15 RX ADMIN — Medication 1 DOSE: at 08:05

## 2024-05-15 RX ADMIN — Medication 1 DOSE: at 12:05

## 2024-05-15 RX ADMIN — TAMSULOSIN HYDROCHLORIDE 0.4 MG: 0.4 CAPSULE ORAL at 08:05

## 2024-05-15 RX ADMIN — Medication 1 DOSE: at 04:05

## 2024-05-15 RX ADMIN — ACYCLOVIR 800 MG: 200 CAPSULE ORAL at 09:05

## 2024-05-15 RX ADMIN — METOPROLOL TARTRATE 12.5 MG: 25 TABLET, FILM COATED ORAL at 09:05

## 2024-05-15 RX ADMIN — ENOXAPARIN SODIUM 40 MG: 40 INJECTION SUBCUTANEOUS at 04:05

## 2024-05-15 RX ADMIN — PANTOPRAZOLE SODIUM 40 MG: 40 TABLET, DELAYED RELEASE ORAL at 09:05

## 2024-05-15 RX ADMIN — PROCHLORPERAZINE EDISYLATE 10 MG: 5 INJECTION INTRAMUSCULAR; INTRAVENOUS at 12:05

## 2024-05-15 RX ADMIN — SODIUM BICARBONATE 650 MG: 650 TABLET ORAL at 08:05

## 2024-05-15 RX ADMIN — FLUCONAZOLE 400 MG: 200 TABLET ORAL at 09:05

## 2024-05-15 RX ADMIN — SODIUM BICARBONATE 650 MG: 650 TABLET ORAL at 09:05

## 2024-05-15 RX ADMIN — LEVOFLOXACIN 500 MG: 500 TABLET, FILM COATED ORAL at 09:05

## 2024-05-15 RX ADMIN — ALLOPURINOL 100 MG: 100 TABLET ORAL at 09:05

## 2024-05-15 RX ADMIN — HEPARIN SODIUM 1600 UNITS: 1000 INJECTION, SOLUTION INTRAVENOUS; SUBCUTANEOUS at 12:05

## 2024-05-15 NOTE — ASSESSMENT & PLAN NOTE
- Baseline creatinine appears to be ~ 1.8  - Renally dose meds as appropriate  - Creatinine stable at 1.7 today

## 2024-05-15 NOTE — PLAN OF CARE
Plan of care reviewed with patient.  Day +2 of auto stem cell.  Patient is awake , alert and oriented x4.  Skin warm and dry to touch.  Patient has had several loose stools today.  I instructed the patient to keep me informed if he has total of 3.  Verbalized understanding .  Ambulating up and down the hallway today.  Tolerating a regular diet without difficulty.  Given compazine today after eating lunch.  No complaints of pain voiced.

## 2024-05-15 NOTE — ASSESSMENT & PLAN NOTE
- Patient of Dr. Cook  - Admitted (5/11/24) for a Sandra 200 auto SCT  - Today is Day +2  - He received 5 bags containing a CD34 dose of 4.47 x 10^6 5/13/24 at 1330.  - See treatment plan below    Planned conditioning regimen:  Melphalan 200 on Day -1      Antimicrobial Prophylaxis:  Acyclovir starting on Day -1  Levofloxacin starting on Day -1  Fluconazole starting on Day -1  Bactrim starting on Day +30     Growth Factor Support:  Neupogen starting on Day +7      Caregiver: wife and mother  Post-transplant discharge plans: Becca Martin

## 2024-05-15 NOTE — SUBJECTIVE & OBJECTIVE
Subjective:     Interval History: Day +2 from a Sandra 200 auto SCT for MM. Remains afebrile. VSS. Weight up 8.5 lbs from admission yesterday. Stopped IVF but deferred diuresis. Weight down 6.5 lbs and UOP 7 L in last 24 hours. Only complaint today is dry mouth. Multiple BMs documented yesterday, but patient states that stools are formed. Understands that he should report change in stool consistency.    Objective:     Vital Signs (Most Recent):  Temp: 98.4 °F (36.9 °C) (05/15/24 1143)  Pulse: 86 (05/15/24 1143)  Resp: 18 (05/15/24 1143)  BP: (!) 131/91 (05/15/24 1143)  SpO2: 100 % (05/15/24 1143) Vital Signs (24h Range):  Temp:  [97.8 °F (36.6 °C)-98.6 °F (37 °C)] 98.4 °F (36.9 °C)  Pulse:  [84-95] 86  Resp:  [15-18] 18  SpO2:  [97 %-100 %] 100 %  BP: (130-141)/(76-91) 131/91     Weight: 93 kg (205 lb 0.4 oz)  Body mass index is 30.28 kg/m².  Body surface area is 2.13 meters squared.    ECOG SCORE           [unfilled]    Intake/Output - Last 3 Shifts         05/13 0700  05/14 0659 05/14 0700  05/15 0659 05/15 0700  05/16 0659    P.O. 1020 1240 380    I.V. (mL/kg) 2809.8 (29.3)      IV Piggyback       Total Intake(mL/kg) 3829.8 (39.9) 1240 (13.3) 380 (4.1)    Urine (mL/kg/hr) 4600 (2) 7000 (3.1) 1000 (2.1)    Stool 0 1 0    Total Output 4600 7001 1000    Net -770.2 -5761 -620           Urine Occurrence 1 x      Stool Occurrence 1 x 5 x 1 x             Physical Exam  Constitutional:       Appearance: He is well-developed.   HENT:      Head: Normocephalic and atraumatic.      Mouth/Throat:      Pharynx: No oropharyngeal exudate.   Eyes:      General:         Right eye: No discharge.         Left eye: No discharge.      Conjunctiva/sclera: Conjunctivae normal.      Pupils: Pupils are equal, round, and reactive to light.   Cardiovascular:      Rate and Rhythm: Normal rate and regular rhythm.      Heart sounds: Normal heart sounds. No murmur heard.  Pulmonary:      Effort: Pulmonary effort is normal. No respiratory distress.       Breath sounds: Normal breath sounds. No wheezing or rales.   Abdominal:      General: Bowel sounds are normal. There is no distension.      Palpations: Abdomen is soft.      Tenderness: There is no abdominal tenderness.   Musculoskeletal:         General: No deformity. Normal range of motion.      Cervical back: Normal range of motion and neck supple.   Skin:     General: Skin is warm and dry.      Findings: No erythema or rash.      Comments: Right chest wall vas cath. Dressing c/d/i. No sign of infection to site.   Neurological:      Mental Status: He is alert and oriented to person, place, and time.   Psychiatric:         Behavior: Behavior normal.         Thought Content: Thought content normal.         Judgment: Judgment normal.            Significant Labs:   CBC:   Recent Labs   Lab 05/14/24  0449 05/15/24  0353   WBC 7.75 2.93*   HGB 8.6* 8.7*   HCT 26.2* 26.0*    174    and CMP:   Recent Labs   Lab 05/14/24  0449 05/15/24  0353    140   K 4.0 4.3   * 109   CO2 20* 20*   GLU 96 88   BUN 32* 30*   CREATININE 1.6* 1.7*   CALCIUM 8.3* 8.9   PROT 5.3* 5.1*   ALBUMIN 3.4* 3.3*   BILITOT 0.5 0.9   ALKPHOS 62 56   AST 16 15   ALT 11 11   ANIONGAP 10 11       Diagnostic Results:  I have reviewed all pertinent imaging results/findings within the past 24 hours.

## 2024-05-15 NOTE — ASSESSMENT & PLAN NOTE
- Weight up 8.5 lbs from admission on 5/14  - Received large volume of fluid for transplant  - Stopped IVF but deferred diuresis  - Weight down 6.5 lbs and UOP 7 L in last 24 hours   hard copy, drawn during this pregnancy

## 2024-05-15 NOTE — PROGRESS NOTES
Rohan Weber - Oncology ([facility])  Hematology  Bone Marrow Transplant  Progress Note    Patient Name: Pete Fernandez  Admission Date: 5/11/2024  Hospital Length of Stay: 4 days  Code Status: Full Code    Subjective:     Interval History: Day +2 from a Sandra 200 auto SCT for MM. Remains afebrile. VSS. Weight up 8.5 lbs from admission yesterday. Stopped IVF but deferred diuresis. Weight down 6.5 lbs and UOP 7 L in last 24 hours. Only complaint today is dry mouth. Multiple BMs documented yesterday, but patient states that stools are formed. Understands that he should report change in stool consistency.    Objective:     Vital Signs (Most Recent):  Temp: 98.4 °F (36.9 °C) (05/15/24 1143)  Pulse: 86 (05/15/24 1143)  Resp: 18 (05/15/24 1143)  BP: (!) 131/91 (05/15/24 1143)  SpO2: 100 % (05/15/24 1143) Vital Signs (24h Range):  Temp:  [97.8 °F (36.6 °C)-98.6 °F (37 °C)] 98.4 °F (36.9 °C)  Pulse:  [84-95] 86  Resp:  [15-18] 18  SpO2:  [97 %-100 %] 100 %  BP: (130-141)/(76-91) 131/91     Weight: 93 kg (205 lb 0.4 oz)  Body mass index is 30.28 kg/m².  Body surface area is 2.13 meters squared.    ECOG SCORE           [unfilled]    Intake/Output - Last 3 Shifts         05/13 0700 05/14 0659 05/14 0700  05/15 0659 05/15 0700 05/16 0659    P.O. 1020 1240 380    I.V. (mL/kg) 2809.8 (29.3)      IV Piggyback       Total Intake(mL/kg) 3829.8 (39.9) 1240 (13.3) 380 (4.1)    Urine (mL/kg/hr) 4600 (2) 7000 (3.1) 1000 (2.1)    Stool 0 1 0    Total Output 4600 7001 1000    Net -770.2 -5761 -620           Urine Occurrence 1 x      Stool Occurrence 1 x 5 x 1 x             Physical Exam  Constitutional:       Appearance: He is well-developed.   HENT:      Head: Normocephalic and atraumatic.      Mouth/Throat:      Pharynx: No oropharyngeal exudate.   Eyes:      General:         Right eye: No discharge.         Left eye: No discharge.      Conjunctiva/sclera: Conjunctivae normal.      Pupils: Pupils are equal, round, and reactive to light.    Cardiovascular:      Rate and Rhythm: Normal rate and regular rhythm.      Heart sounds: Normal heart sounds. No murmur heard.  Pulmonary:      Effort: Pulmonary effort is normal. No respiratory distress.      Breath sounds: Normal breath sounds. No wheezing or rales.   Abdominal:      General: Bowel sounds are normal. There is no distension.      Palpations: Abdomen is soft.      Tenderness: There is no abdominal tenderness.   Musculoskeletal:         General: No deformity. Normal range of motion.      Cervical back: Normal range of motion and neck supple.   Skin:     General: Skin is warm and dry.      Findings: No erythema or rash.      Comments: Right chest wall vas cath. Dressing c/d/i. No sign of infection to site.   Neurological:      Mental Status: He is alert and oriented to person, place, and time.   Psychiatric:         Behavior: Behavior normal.         Thought Content: Thought content normal.         Judgment: Judgment normal.            Significant Labs:   CBC:   Recent Labs   Lab 05/14/24  0449 05/15/24  0353   WBC 7.75 2.93*   HGB 8.6* 8.7*   HCT 26.2* 26.0*    174    and CMP:   Recent Labs   Lab 05/14/24 0449 05/15/24  0353    140   K 4.0 4.3   * 109   CO2 20* 20*   GLU 96 88   BUN 32* 30*   CREATININE 1.6* 1.7*   CALCIUM 8.3* 8.9   PROT 5.3* 5.1*   ALBUMIN 3.4* 3.3*   BILITOT 0.5 0.9   ALKPHOS 62 56   AST 16 15   ALT 11 11   ANIONGAP 10 11       Diagnostic Results:  I have reviewed all pertinent imaging results/findings within the past 24 hours.  Assessment/Plan:     * Stem cell transplant candidate  - Patient of Dr. Cook  - Admitted (5/11/24) for a Sandra 200 auto SCT  - Today is Day +2  - He received 5 bags containing a CD34 dose of 4.47 x 10^6 5/13/24 at 1330.  - See treatment plan below    Planned conditioning regimen:  Melphalan 200 on Day -1      Antimicrobial Prophylaxis:  Acyclovir starting on Day -1  Levofloxacin starting on Day -1  Fluconazole starting on Day  -1  Bactrim starting on Day +30     Growth Factor Support:  Neupogen starting on Day +7      Caregiver: wife and mother  Post-transplant discharge plans: Hope Chauvin    Multiple myeloma  From Dr. Cook's most recent clinic note:   is a 44y/o M with no significant medical problems. He has been diagnosed with multiple myeloma. He is here for a virtual stem cell transplant consultation.   Per records,  presented to [facility] on 10/11/23 after 1 month of progressive weakness, abdominal pain, and intermittent confusion. At the time of his presentation, his labs, which had been normal  in 8/2023, were notable for new severe DOUG, transaminitis, and Ca of 18. He was admitted and started on IVF .  Hypercalcemia workup was initiated and he was ultimately found to have numerous osseous lesions on 10/15/23 CT CAP, FLC - Kappa 3.29, Lambda 744, Ratio 226 , SPEP with M spike 0.17, IgG lambda on sIFE. Bone marrow biopsy done 10/18/23 revealed 95% plasma cells and FISH with TP53 deletion, FGFR3/IGH (or NSD2/IGH) fusion, usually representing a t(4;14), and a tetraploid subclone was observed. After correction of his hypercalcemia and improvement in his renal function he was discharged on 10/22/23.   He was started on Beatriz + RVd on 11/2/23.   - Admitted for a Sandra 200 auto SCT.      Anemia due to chemotherapy  - Anticipate pancytopenia following chemo  - Transfuse for hgb < 7  - Daily CBC while inpatient    Thrombocytopenia  - Anticipate pancytopenia following chemo  - Transfuse for plts < 10K  - Daily CBC while inpatient    Volume overload  - Weight up 8.5 lbs from admission on 5/14  - Received large volume of fluid for transplant  - Stopped IVF but deferred diuresis  - Weight down 6.5 lbs and UOP 7 L in last 24 hours    Mild malnutrition  - Dietician following.    Hypertension  - Continue home metoprolol    GERD (gastroesophageal reflux disease)  - Takes protonix at home. Will receive inpatient as part of SCT  protocol.    Gout  - Home febuxostat not on formulary. Substituting with allopurinol while inpatient.    BPH (benign prostatic hyperplasia)  - Continue home Flomax    CKD (chronic kidney disease)  - Baseline creatinine appears to be ~ 1.8  - Renally dose meds as appropriate  - Creatinine stable at 1.7 today    ADD (attention deficit disorder)  - Holding home Aderall while inpatient    Pericarditis  - Will hold home colchicine as he has completed a 3 month course per PharmD rec    Cancer associated pain  - Continue home Oxy IR        VTE Risk Mitigation (From admission, onward)           Ordered     enoxaparin injection 40 mg  Every 24 hours         05/15/24 0911     heparin (porcine) injection 1,600 Units  As needed (PRN)         05/11/24 2044     IP VTE HIGH RISK PATIENT  Once         05/11/24 1803     Place sequential compression device  Until discontinued         05/11/24 1803                    Disposition: Inpatient for autologous SCT,    Julianna Dalal NP  Bone Marrow Transplant  [facility] Weber - Oncology (Hospital)

## 2024-05-15 NOTE — ASSESSMENT & PLAN NOTE
From Dr. Cook's most recent clinic note:   is a 44y/o M with no significant medical problems. He has been diagnosed with multiple myeloma. He is here for a virtual stem cell transplant consultation.   Per records,  presented to [facility] on 10/11/23 after 1 month of progressive weakness, abdominal pain, and intermittent confusion. At the time of his presentation, his labs, which had been normal  in 8/2023, were notable for new severe DOUG, transaminitis, and Ca of 18. He was admitted and started on IVF .  Hypercalcemia workup was initiated and he was ultimately found to have numerous osseous lesions on 10/15/23 CT CAP, FLC - Kappa 3.29, Lambda 744, Ratio 226 , SPEP with M spike 0.17, IgG lambda on sIFE. Bone marrow biopsy done 10/18/23 revealed 95% plasma cells and FISH with TP53 deletion, FGFR3/IGH (or NSD2/IGH) fusion, usually representing a t(4;14), and a tetraploid subclone was observed. After correction of his hypercalcemia and improvement in his renal function he was discharged on 10/22/23.   He was started on Beatriz + RVd on 11/2/23.   - Admitted for a Sandra 200 auto SCT.

## 2024-05-15 NOTE — PLAN OF CARE
Day +2 Sandra Auto SCT. Pt involved in plan of care and communicating needs throughout shift. Up in room and to bathroom independently; voiding without difficulty. Tolerating diet. No c/o pain. Pt c/o dry mouth and throat; swish/spit and hydration encouraged. Pt showered. All VSS. Pt remaining free from falls or injury throughout shift. Bed locked and in lowest position, personal belongings and call light within reach, non skid socks on when OOB. Pt instructed to call for assistance as needed. Q2H rounding done on pt.

## 2024-05-16 LAB
ALBUMIN SERPL BCP-MCNC: 3.4 G/DL (ref 3.5–5.2)
ALP SERPL-CCNC: 59 U/L (ref 55–135)
ALT SERPL W/O P-5'-P-CCNC: 20 U/L (ref 10–44)
ANION GAP SERPL CALC-SCNC: 10 MMOL/L (ref 8–16)
ANISOCYTOSIS BLD QL SMEAR: SLIGHT
AST SERPL-CCNC: 19 U/L (ref 10–40)
BASOPHILS NFR BLD: 0 % (ref 0–1.9)
BILIRUB SERPL-MCNC: 1 MG/DL (ref 0.1–1)
BUN SERPL-MCNC: 32 MG/DL (ref 6–20)
CALCIUM SERPL-MCNC: 9 MG/DL (ref 8.7–10.5)
CHLORIDE SERPL-SCNC: 107 MMOL/L (ref 95–110)
CO2 SERPL-SCNC: 23 MMOL/L (ref 23–29)
CREAT SERPL-MCNC: 1.7 MG/DL (ref 0.5–1.4)
DIFFERENTIAL METHOD BLD: ABNORMAL
EOSINOPHIL NFR BLD: 2 % (ref 0–8)
ERYTHROCYTE [DISTWIDTH] IN BLOOD BY AUTOMATED COUNT: 15.5 % (ref 11.5–14.5)
EST. GFR  (NO RACE VARIABLE): 50 ML/MIN/1.73 M^2
GLUCOSE SERPL-MCNC: 97 MG/DL (ref 70–110)
HCT VFR BLD AUTO: 26.6 % (ref 40–54)
HGB BLD-MCNC: 8.9 G/DL (ref 14–18)
HYPOCHROMIA BLD QL SMEAR: ABNORMAL
IMM GRANULOCYTES # BLD AUTO: ABNORMAL K/UL (ref 0–0.04)
IMM GRANULOCYTES NFR BLD AUTO: ABNORMAL % (ref 0–0.5)
LYMPHOCYTES NFR BLD: 5 % (ref 18–48)
MAGNESIUM SERPL-MCNC: 2 MG/DL (ref 1.6–2.6)
MCH RBC QN AUTO: 34.8 PG (ref 27–31)
MCHC RBC AUTO-ENTMCNC: 33.5 G/DL (ref 32–36)
MCV RBC AUTO: 104 FL (ref 82–98)
MONOCYTES NFR BLD: 1 % (ref 4–15)
NEUTROPHILS NFR BLD: 92 % (ref 38–73)
NRBC BLD-RTO: 0 /100 WBC
OVALOCYTES BLD QL SMEAR: ABNORMAL
PHOSPHATE SERPL-MCNC: 4.8 MG/DL (ref 2.7–4.5)
PLATELET # BLD AUTO: 163 K/UL (ref 150–450)
PLATELET BLD QL SMEAR: ABNORMAL
PMV BLD AUTO: 10 FL (ref 9.2–12.9)
POIKILOCYTOSIS BLD QL SMEAR: SLIGHT
POLYCHROMASIA BLD QL SMEAR: ABNORMAL
POTASSIUM SERPL-SCNC: 4.1 MMOL/L (ref 3.5–5.1)
PROT SERPL-MCNC: 5.3 G/DL (ref 6–8.4)
RBC # BLD AUTO: 2.56 M/UL (ref 4.6–6.2)
SCHISTOCYTES BLD QL SMEAR: PRESENT
SODIUM SERPL-SCNC: 140 MMOL/L (ref 136–145)
SPHEROCYTES BLD QL SMEAR: ABNORMAL
WBC # BLD AUTO: 1.93 K/UL (ref 3.9–12.7)

## 2024-05-16 PROCEDURE — 80053 COMPREHEN METABOLIC PANEL: CPT | Performed by: NURSE PRACTITIONER

## 2024-05-16 PROCEDURE — 84100 ASSAY OF PHOSPHORUS: CPT | Performed by: NURSE PRACTITIONER

## 2024-05-16 PROCEDURE — 63600175 PHARM REV CODE 636 W HCPCS: Performed by: NURSE PRACTITIONER

## 2024-05-16 PROCEDURE — 99233 SBSQ HOSP IP/OBS HIGH 50: CPT | Mod: FS,,, | Performed by: INTERNAL MEDICINE

## 2024-05-16 PROCEDURE — 85007 BL SMEAR W/DIFF WBC COUNT: CPT | Performed by: NURSE PRACTITIONER

## 2024-05-16 PROCEDURE — 83735 ASSAY OF MAGNESIUM: CPT | Performed by: NURSE PRACTITIONER

## 2024-05-16 PROCEDURE — 25000003 PHARM REV CODE 250: Performed by: INTERNAL MEDICINE

## 2024-05-16 PROCEDURE — 63600175 PHARM REV CODE 636 W HCPCS: Performed by: INTERNAL MEDICINE

## 2024-05-16 PROCEDURE — 20600001 HC STEP DOWN PRIVATE ROOM

## 2024-05-16 PROCEDURE — 85027 COMPLETE CBC AUTOMATED: CPT | Performed by: NURSE PRACTITIONER

## 2024-05-16 PROCEDURE — 25000003 PHARM REV CODE 250: Performed by: NURSE PRACTITIONER

## 2024-05-16 PROCEDURE — 25000003 PHARM REV CODE 250: Performed by: STUDENT IN AN ORGANIZED HEALTH CARE EDUCATION/TRAINING PROGRAM

## 2024-05-16 RX ORDER — TALC
9 POWDER (GRAM) TOPICAL ONCE
Status: COMPLETED | OUTPATIENT
Start: 2024-05-16 | End: 2024-05-16

## 2024-05-16 RX ADMIN — HEPARIN SODIUM 1600 UNITS: 1000 INJECTION, SOLUTION INTRAVENOUS; SUBCUTANEOUS at 03:05

## 2024-05-16 RX ADMIN — ACYCLOVIR 800 MG: 200 CAPSULE ORAL at 08:05

## 2024-05-16 RX ADMIN — TAMSULOSIN HYDROCHLORIDE 0.4 MG: 0.4 CAPSULE ORAL at 08:05

## 2024-05-16 RX ADMIN — Medication 9 MG: at 09:05

## 2024-05-16 RX ADMIN — Medication 1 DOSE: at 08:05

## 2024-05-16 RX ADMIN — ENOXAPARIN SODIUM 40 MG: 40 INJECTION SUBCUTANEOUS at 04:05

## 2024-05-16 RX ADMIN — CALCITRIOL CAPSULES 0.25 MCG 0.5 MCG: 0.25 CAPSULE ORAL at 08:05

## 2024-05-16 RX ADMIN — PANTOPRAZOLE SODIUM 40 MG: 40 TABLET, DELAYED RELEASE ORAL at 08:05

## 2024-05-16 RX ADMIN — ALLOPURINOL 100 MG: 100 TABLET ORAL at 08:05

## 2024-05-16 RX ADMIN — METOPROLOL TARTRATE 12.5 MG: 25 TABLET, FILM COATED ORAL at 08:05

## 2024-05-16 RX ADMIN — Medication 1 DOSE: at 12:05

## 2024-05-16 RX ADMIN — LEVOFLOXACIN 500 MG: 500 TABLET, FILM COATED ORAL at 08:05

## 2024-05-16 RX ADMIN — FLUCONAZOLE 400 MG: 200 TABLET ORAL at 08:05

## 2024-05-16 RX ADMIN — Medication 1 DOSE: at 04:05

## 2024-05-16 RX ADMIN — SODIUM BICARBONATE 650 MG: 650 TABLET ORAL at 08:05

## 2024-05-16 NOTE — ASSESSMENT & PLAN NOTE
From Dr. Cook's most recent clinic note:   is a 42y/o M with no significant medical problems. He has been diagnosed with multiple myeloma. He is here for a virtual stem cell transplant consultation.   Per records,  presented to [facility] on 10/11/23 after 1 month of progressive weakness, abdominal pain, and intermittent confusion. At the time of his presentation, his labs, which had been normal  in 8/2023, were notable for new severe DOUG, transaminitis, and Ca of 18. He was admitted and started on IVF .  Hypercalcemia workup was initiated and he was ultimately found to have numerous osseous lesions on 10/15/23 CT CAP, FLC - Kappa 3.29, Lambda 744, Ratio 226 , SPEP with M spike 0.17, IgG lambda on sIFE. Bone marrow biopsy done 10/18/23 revealed 95% plasma cells and FISH with TP53 deletion, FGFR3/IGH (or NSD2/IGH) fusion, usually representing a t(4;14), and a tetraploid subclone was observed. After correction of his hypercalcemia and improvement in his renal function he was discharged on 10/22/23.   He was started on Beatriz + RVd on 11/2/23.   - Admitted for a Sandra 200 auto SCT.

## 2024-05-16 NOTE — ASSESSMENT & PLAN NOTE
- Patient of Dr. Cook  - Admitted (5/11/24) for a Sandra 200 auto SCT  - Today is Day +3  - He received 5 bags containing a CD34 dose of 4.47 x 10^6 5/13/24 at 1330.  - See treatment plan below    Planned conditioning regimen:  Melphalan 200 on Day -1      Antimicrobial Prophylaxis:  Acyclovir starting on Day -1  Levofloxacin starting on Day -1  Fluconazole starting on Day -1  Bactrim starting on Day +30     Growth Factor Support:  Neupogen starting on Day +7      Caregiver: wife and mother  Post-transplant discharge plans: Becca Martin

## 2024-05-16 NOTE — ASSESSMENT & PLAN NOTE
- Weight up 8.5 lbs from admission on 5/14  - Received large volume of fluid for transplant  - Stopped IVF but deferred diuresis  RESOLVED

## 2024-05-16 NOTE — PROGRESS NOTES
Rohan Weber - Oncology ([facility])  Hematology  Bone Marrow Transplant  Progress Note    Patient Name: Pete Fernandez  Admission Date: 5/11/2024  Hospital Length of Stay: 5 days  Code Status: Full Code    Subjective:     Interval History: Day +3 from a Sandra 200 auto SCT for MM. Remains afebrile. VSS. Blood counts dropping expectedly. Remains active. Oral intake is good. Weight down 5.25 lbs from admission, but patient states that today's weight is consistent with home weight. Will continue to hold IVF. Denies GI symptoms, including mucositis. Phos very mildly elevated at 4.8. Will trend for now.    Objective:     Vital Signs (Most Recent):  Temp: 98.6 °F (37 °C) (05/16/24 0821)  Pulse: 107 (05/16/24 0821)  Resp: 20 (05/16/24 0821)  BP: 129/78 (05/16/24 0829)  SpO2: 95 % (05/16/24 0821) Vital Signs (24h Range):  Temp:  [98.1 °F (36.7 °C)-98.9 °F (37.2 °C)] 98.6 °F (37 °C)  Pulse:  [] 107  Resp:  [15-20] 20  SpO2:  [95 %-100 %] 95 %  BP: (110-154)/(72-96) 129/78     Weight: 89.7 kg (197 lb 12 oz)  Body mass index is 29.2 kg/m².  Body surface area is 2.09 meters squared.    ECOG SCORE           [unfilled]    Intake/Output - Last 3 Shifts         05/14 0700  05/15 0659 05/15 0700 05/16 0659 05/16 0700 05/17 0659    P.O. 1240 2060 100    I.V. (mL/kg)       Total Intake(mL/kg) 1240 (13.3) 2060 (23) 100 (1.1)    Urine (mL/kg/hr) 7000 (3.1) 6125 (2.8) 900 (4.3)    Stool 1 0     Total Output 7001 6125 900    Net -5761 -4065 -800           Stool Occurrence 5 x 2 x              Physical Exam  Constitutional:       Appearance: He is well-developed.   HENT:      Head: Normocephalic and atraumatic.      Mouth/Throat:      Pharynx: No oropharyngeal exudate.   Eyes:      General:         Right eye: No discharge.         Left eye: No discharge.      Conjunctiva/sclera: Conjunctivae normal.      Pupils: Pupils are equal, round, and reactive to light.   Cardiovascular:      Rate and Rhythm: Normal rate and regular rhythm.      Heart  sounds: Normal heart sounds. No murmur heard.  Pulmonary:      Effort: Pulmonary effort is normal. No respiratory distress.      Breath sounds: Normal breath sounds. No wheezing or rales.   Abdominal:      General: Bowel sounds are normal. There is no distension.      Palpations: Abdomen is soft.      Tenderness: There is no abdominal tenderness.   Musculoskeletal:         General: No deformity. Normal range of motion.      Cervical back: Normal range of motion and neck supple.   Skin:     General: Skin is warm and dry.      Findings: No erythema or rash.      Comments: Right chest wall vas cath. Dressing c/d/i. No sign of infection to site.   Neurological:      Mental Status: He is alert and oriented to person, place, and time.   Psychiatric:         Behavior: Behavior normal.         Thought Content: Thought content normal.         Judgment: Judgment normal.            Significant Labs:   CBC:   Recent Labs   Lab 05/15/24  0353 05/16/24  0325   WBC 2.93* 1.93*   HGB 8.7* 8.9*   HCT 26.0* 26.6*    163    and CMP:   Recent Labs   Lab 05/15/24  0353 05/16/24  0325    140   K 4.3 4.1    107   CO2 20* 23   GLU 88 97   BUN 30* 32*   CREATININE 1.7* 1.7*   CALCIUM 8.9 9.0   PROT 5.1* 5.3*   ALBUMIN 3.3* 3.4*   BILITOT 0.9 1.0   ALKPHOS 56 59   AST 15 19   ALT 11 20   ANIONGAP 11 10       Diagnostic Results:  I have reviewed all pertinent imaging results/findings within the past 24 hours.  Assessment/Plan:     * Stem cell transplant candidate  - Patient of Dr. Cook  - Admitted (5/11/24) for a Sandra 200 auto SCT  - Today is Day +3  - He received 5 bags containing a CD34 dose of 4.47 x 10^6 5/13/24 at 1330.  - See treatment plan below    Planned conditioning regimen:  Melphalan 200 on Day -1      Antimicrobial Prophylaxis:  Acyclovir starting on Day -1  Levofloxacin starting on Day -1  Fluconazole starting on Day -1  Bactrim starting on Day +30     Growth Factor Support:  Neupogen starting on Day +7       Caregiver: wife and mother  Post-transplant discharge plans: Becca Antonyge    Multiple myeloma  From Dr. Cook's most recent clinic note:   is a 42y/o M with no significant medical problems. He has been diagnosed with multiple myeloma. He is here for a virtual stem cell transplant consultation.   Per records,  presented to [facility] on 10/11/23 after 1 month of progressive weakness, abdominal pain, and intermittent confusion. At the time of his presentation, his labs, which had been normal  in 8/2023, were notable for new severe DOUG, transaminitis, and Ca of 18. He was admitted and started on IVF .  Hypercalcemia workup was initiated and he was ultimately found to have numerous osseous lesions on 10/15/23 CT CAP, FLC - Kappa 3.29, Lambda 744, Ratio 226 , SPEP with M spike 0.17, IgG lambda on sIFE. Bone marrow biopsy done 10/18/23 revealed 95% plasma cells and FISH with TP53 deletion, FGFR3/IGH (or NSD2/IGH) fusion, usually representing a t(4;14), and a tetraploid subclone was observed. After correction of his hypercalcemia and improvement in his renal function he was discharged on 10/22/23.   He was started on Beatriz + RVd on 11/2/23.   - Admitted for a Sandra 200 auto SCT.      Anemia due to chemotherapy  - Anticipate pancytopenia following chemo  - Transfuse for hgb < 7  - Daily CBC while inpatient    Thrombocytopenia  - Anticipate pancytopenia following chemo  - Transfuse for plts < 10K  - Daily CBC while inpatient    Volume overload  - Weight up 8.5 lbs from admission on 5/14  - Received large volume of fluid for transplant  - Stopped IVF but deferred diuresis  RESOLVED    Mild malnutrition  - Dietician following.    Hypertension  - Continue home metoprolol    GERD (gastroesophageal reflux disease)  - Takes protonix at home. Will receive inpatient as part of SCT protocol.    Gout  - Home febuxostat not on formulary. Substituting with allopurinol while inpatient.    BPH (benign prostatic hyperplasia)  -  Continue home Flomax    CKD (chronic kidney disease)  - Baseline creatinine appears to be ~ 1.8  - Renally dose meds as appropriate  - Creatinine stable at 1.7 today    ADD (attention deficit disorder)  - Holding home Aderall while inpatient    Pericarditis  - Will hold home colchicine as he has completed a 3 month course per PharmD rec    Cancer associated pain  - Continue home Oxy IR        VTE Risk Mitigation (From admission, onward)           Ordered     enoxaparin injection 40 mg  Every 24 hours         05/15/24 0911     heparin (porcine) injection 1,600 Units  As needed (PRN)         05/11/24 2044     IP VTE HIGH RISK PATIENT  Once         05/11/24 1803     Place sequential compression device  Until discontinued         05/11/24 1803                    Disposition: Inpatient for autologous SCT.    Julianna Dalal, NP  Bone Marrow Transplant  Rohan Weber - Oncology ([facility])

## 2024-05-16 NOTE — SUBJECTIVE & OBJECTIVE
Subjective:     Interval History: Day +3 from a Sandra 200 auto SCT for MM. Remains afebrile. VSS. Blood counts dropping expectedly. Remains active. Oral intake is good. Weight down 5.25 lbs from admission, but patient states that today's weight is consistent with home weight. Will continue to hold IVF. Denies GI symptoms, including mucositis. Phos very mildly elevated at 4.8. Will trend for now.    Objective:     Vital Signs (Most Recent):  Temp: 98.6 °F (37 °C) (05/16/24 0821)  Pulse: 107 (05/16/24 0821)  Resp: 20 (05/16/24 0821)  BP: 129/78 (05/16/24 0829)  SpO2: 95 % (05/16/24 0821) Vital Signs (24h Range):  Temp:  [98.1 °F (36.7 °C)-98.9 °F (37.2 °C)] 98.6 °F (37 °C)  Pulse:  [] 107  Resp:  [15-20] 20  SpO2:  [95 %-100 %] 95 %  BP: (110-154)/(72-96) 129/78     Weight: 89.7 kg (197 lb 12 oz)  Body mass index is 29.2 kg/m².  Body surface area is 2.09 meters squared.    ECOG SCORE           [unfilled]    Intake/Output - Last 3 Shifts         05/14 0700  05/15 0659 05/15 0700  05/16 0659 05/16 0700  05/17 0659    P.O. 1240 2060 100    I.V. (mL/kg)       Total Intake(mL/kg) 1240 (13.3) 2060 (23) 100 (1.1)    Urine (mL/kg/hr) 7000 (3.1) 6125 (2.8) 900 (4.3)    Stool 1 0     Total Output 7001 6125 900    Net -5761 -4065 -800           Stool Occurrence 5 x 2 x              Physical Exam  Constitutional:       Appearance: He is well-developed.   HENT:      Head: Normocephalic and atraumatic.      Mouth/Throat:      Pharynx: No oropharyngeal exudate.   Eyes:      General:         Right eye: No discharge.         Left eye: No discharge.      Conjunctiva/sclera: Conjunctivae normal.      Pupils: Pupils are equal, round, and reactive to light.   Cardiovascular:      Rate and Rhythm: Normal rate and regular rhythm.      Heart sounds: Normal heart sounds. No murmur heard.  Pulmonary:      Effort: Pulmonary effort is normal. No respiratory distress.      Breath sounds: Normal breath sounds. No wheezing or rales.    Abdominal:      General: Bowel sounds are normal. There is no distension.      Palpations: Abdomen is soft.      Tenderness: There is no abdominal tenderness.   Musculoskeletal:         General: No deformity. Normal range of motion.      Cervical back: Normal range of motion and neck supple.   Skin:     General: Skin is warm and dry.      Findings: No erythema or rash.      Comments: Right chest wall vas cath. Dressing c/d/i. No sign of infection to site.   Neurological:      Mental Status: He is alert and oriented to person, place, and time.   Psychiatric:         Behavior: Behavior normal.         Thought Content: Thought content normal.         Judgment: Judgment normal.            Significant Labs:   CBC:   Recent Labs   Lab 05/15/24  0353 05/16/24  0325   WBC 2.93* 1.93*   HGB 8.7* 8.9*   HCT 26.0* 26.6*    163    and CMP:   Recent Labs   Lab 05/15/24  0353 05/16/24  0325    140   K 4.3 4.1    107   CO2 20* 23   GLU 88 97   BUN 30* 32*   CREATININE 1.7* 1.7*   CALCIUM 8.9 9.0   PROT 5.1* 5.3*   ALBUMIN 3.3* 3.4*   BILITOT 0.9 1.0   ALKPHOS 56 59   AST 15 19   ALT 11 20   ANIONGAP 11 10       Diagnostic Results:  I have reviewed all pertinent imaging results/findings within the past 24 hours.

## 2024-05-16 NOTE — PLAN OF CARE
Day +3 Sandra Auto SCT. Pt involved in plan of care and communicating needs throughout shift. Up in room and to bathroom independently; voiding without difficulty. Tolerating diet. No c/o pain. Pt c/o dry mouth and throat; swish/spit and hydration encouraged. All VSS. Pt remaining free from falls or injury throughout shift. Bed locked and in lowest position, personal belongings and call light within reach, non skid socks on when OOB. Pt instructed to call for assistance as needed. Q2H rounding done on pt.

## 2024-05-16 NOTE — PLAN OF CARE
Plan of care reviewed with patient .  Day +3 of auto stem cell.  FAll precautions maintained, side rails up x2, call light in reach , bed in low position and locked, nonskid socks on.  Tolerating a regular diet without difficulty.  Voiding without difficulty.  Rt chest vas cath heplocked x2.  Instructed to call for assistance as need .  Verbalized understanding of discharge orders.  No complaints voiced.

## 2024-05-17 PROBLEM — D61.810 PANCYTOPENIA DUE TO ANTINEOPLASTIC CHEMOTHERAPY: Status: ACTIVE | Noted: 2024-05-10

## 2024-05-17 PROBLEM — Z94.84 HISTORY OF AUTO STEM CELL TRANSPLANT: Status: ACTIVE | Noted: 2023-11-20

## 2024-05-17 PROBLEM — D69.6 THROMBOCYTOPENIA: Status: RESOLVED | Noted: 2023-11-20 | Resolved: 2024-05-17

## 2024-05-17 PROBLEM — E83.39 HYPERPHOSPHATEMIA: Status: ACTIVE | Noted: 2024-05-17

## 2024-05-17 PROBLEM — C90.01 MULTIPLE MYELOMA IN REMISSION: Status: ACTIVE | Noted: 2023-10-20

## 2024-05-17 PROBLEM — G47.00 INSOMNIA: Status: ACTIVE | Noted: 2024-05-17

## 2024-05-17 LAB
ALBUMIN SERPL BCP-MCNC: 3.7 G/DL (ref 3.5–5.2)
ALP SERPL-CCNC: 58 U/L (ref 55–135)
ALT SERPL W/O P-5'-P-CCNC: 23 U/L (ref 10–44)
ANION GAP SERPL CALC-SCNC: 11 MMOL/L (ref 8–16)
ANISOCYTOSIS BLD QL SMEAR: SLIGHT
AST SERPL-CCNC: 20 U/L (ref 10–40)
BASOPHILS # BLD AUTO: 0 K/UL (ref 0–0.2)
BASOPHILS NFR BLD: 0 % (ref 0–1.9)
BILIRUB SERPL-MCNC: 1.1 MG/DL (ref 0.1–1)
BUN SERPL-MCNC: 34 MG/DL (ref 6–20)
BURR CELLS BLD QL SMEAR: ABNORMAL
CALCIUM SERPL-MCNC: 9.1 MG/DL (ref 8.7–10.5)
CHLORIDE SERPL-SCNC: 105 MMOL/L (ref 95–110)
CO2 SERPL-SCNC: 22 MMOL/L (ref 23–29)
CREAT SERPL-MCNC: 1.7 MG/DL (ref 0.5–1.4)
DIFFERENTIAL METHOD BLD: ABNORMAL
EOSINOPHIL # BLD AUTO: 0 K/UL (ref 0–0.5)
EOSINOPHIL NFR BLD: 2 % (ref 0–8)
ERYTHROCYTE [DISTWIDTH] IN BLOOD BY AUTOMATED COUNT: 15 % (ref 11.5–14.5)
EST. GFR  (NO RACE VARIABLE): 50 ML/MIN/1.73 M^2
GLUCOSE SERPL-MCNC: 99 MG/DL (ref 70–110)
HCT VFR BLD AUTO: 26.7 % (ref 40–54)
HGB BLD-MCNC: 9.3 G/DL (ref 14–18)
HYPOCHROMIA BLD QL SMEAR: ABNORMAL
IMM GRANULOCYTES # BLD AUTO: 0 K/UL (ref 0–0.04)
IMM GRANULOCYTES NFR BLD AUTO: 0 % (ref 0–0.5)
LYMPHOCYTES # BLD AUTO: 0.1 K/UL (ref 1–4.8)
LYMPHOCYTES NFR BLD: 6.9 % (ref 18–48)
MAGNESIUM SERPL-MCNC: 2 MG/DL (ref 1.6–2.6)
MCH RBC QN AUTO: 35.1 PG (ref 27–31)
MCHC RBC AUTO-ENTMCNC: 34.8 G/DL (ref 32–36)
MCV RBC AUTO: 101 FL (ref 82–98)
MONOCYTES # BLD AUTO: 0 K/UL (ref 0.3–1)
MONOCYTES NFR BLD: 1 % (ref 4–15)
NEUTROPHILS # BLD AUTO: 0.9 K/UL (ref 1.8–7.7)
NEUTROPHILS NFR BLD: 90.1 % (ref 38–73)
NRBC BLD-RTO: 0 /100 WBC
OVALOCYTES BLD QL SMEAR: ABNORMAL
PHOSPHATE SERPL-MCNC: 5.1 MG/DL (ref 2.7–4.5)
PLATELET # BLD AUTO: 121 K/UL (ref 150–450)
PLATELET BLD QL SMEAR: ABNORMAL
PMV BLD AUTO: 9.8 FL (ref 9.2–12.9)
POIKILOCYTOSIS BLD QL SMEAR: SLIGHT
POTASSIUM SERPL-SCNC: 3.8 MMOL/L (ref 3.5–5.1)
PROT SERPL-MCNC: 5.6 G/DL (ref 6–8.4)
RBC # BLD AUTO: 2.65 M/UL (ref 4.6–6.2)
SODIUM SERPL-SCNC: 138 MMOL/L (ref 136–145)
WBC # BLD AUTO: 1.02 K/UL (ref 3.9–12.7)

## 2024-05-17 PROCEDURE — 99233 SBSQ HOSP IP/OBS HIGH 50: CPT | Mod: ,,, | Performed by: INTERNAL MEDICINE

## 2024-05-17 PROCEDURE — 83735 ASSAY OF MAGNESIUM: CPT | Performed by: NURSE PRACTITIONER

## 2024-05-17 PROCEDURE — 63600175 PHARM REV CODE 636 W HCPCS: Performed by: NURSE PRACTITIONER

## 2024-05-17 PROCEDURE — 84100 ASSAY OF PHOSPHORUS: CPT | Performed by: NURSE PRACTITIONER

## 2024-05-17 PROCEDURE — 85025 COMPLETE CBC W/AUTO DIFF WBC: CPT | Performed by: NURSE PRACTITIONER

## 2024-05-17 PROCEDURE — 25000003 PHARM REV CODE 250: Performed by: NURSE PRACTITIONER

## 2024-05-17 PROCEDURE — 20600001 HC STEP DOWN PRIVATE ROOM

## 2024-05-17 PROCEDURE — 80053 COMPREHEN METABOLIC PANEL: CPT | Performed by: NURSE PRACTITIONER

## 2024-05-17 PROCEDURE — 25000003 PHARM REV CODE 250: Performed by: INTERNAL MEDICINE

## 2024-05-17 RX ORDER — TALC
9 POWDER (GRAM) TOPICAL NIGHTLY PRN
Status: DISCONTINUED | OUTPATIENT
Start: 2024-05-17 | End: 2024-05-27 | Stop reason: HOSPADM

## 2024-05-17 RX ADMIN — ENOXAPARIN SODIUM 40 MG: 40 INJECTION SUBCUTANEOUS at 05:05

## 2024-05-17 RX ADMIN — PANTOPRAZOLE SODIUM 40 MG: 40 TABLET, DELAYED RELEASE ORAL at 08:05

## 2024-05-17 RX ADMIN — METOPROLOL TARTRATE 12.5 MG: 25 TABLET, FILM COATED ORAL at 08:05

## 2024-05-17 RX ADMIN — Medication 1 DOSE: at 01:05

## 2024-05-17 RX ADMIN — Medication 1 DOSE: at 08:05

## 2024-05-17 RX ADMIN — ALLOPURINOL 100 MG: 100 TABLET ORAL at 08:05

## 2024-05-17 RX ADMIN — SODIUM BICARBONATE 650 MG: 650 TABLET ORAL at 08:05

## 2024-05-17 RX ADMIN — TAMSULOSIN HYDROCHLORIDE 0.4 MG: 0.4 CAPSULE ORAL at 08:05

## 2024-05-17 RX ADMIN — LEVOFLOXACIN 500 MG: 500 TABLET, FILM COATED ORAL at 08:05

## 2024-05-17 RX ADMIN — ACYCLOVIR 800 MG: 200 CAPSULE ORAL at 08:05

## 2024-05-17 RX ADMIN — Medication 9 MG: at 08:05

## 2024-05-17 RX ADMIN — FLUCONAZOLE 400 MG: 200 TABLET ORAL at 08:05

## 2024-05-17 RX ADMIN — CALCITRIOL CAPSULES 0.25 MCG 0.5 MCG: 0.25 CAPSULE ORAL at 08:05

## 2024-05-17 RX ADMIN — Medication 1 DOSE: at 05:05

## 2024-05-17 NOTE — ASSESSMENT & PLAN NOTE
- monitoring daily phos levels  - mildly elevated at 5.1 today; likely due to diet; will continue to trend

## 2024-05-17 NOTE — PLAN OF CARE
Day + 4 auto SCT. Plan of care discussed with patient at start of shift. Free from falls and injuries. Refusing bed alarm. Resting quietly with eyes closed. Respirations even, unlabored. Skin warm and dry. Denies pain. Denies nausea. C/O insomnia, melatonin 9 mg PO x's 1 ordered. Frequent checks for pain and safety maintained. Bed in lowest position, wheels locked, side rails up x's 2, call light in reach. Instructed to call for assistance as needed, verbalizes understanding.

## 2024-05-17 NOTE — SUBJECTIVE & OBJECTIVE
Subjective:     Interval History: Day +4 s/p Sandra 200 Auto SCT for MM. Denies any pain, n/v/d/c. Continues with good oral intake. Using prn melatonin for insomnia. Phos remains mildly elevated, will continue to trend. Afebrile, VSS    Objective:     Vital Signs (Most Recent):  Temp: 99.5 °F (37.5 °C) (05/17/24 0727)  Pulse: (!) 113 (05/17/24 0727)  Resp: 20 (05/17/24 0727)  BP: (!) 141/91 (05/17/24 0727)  SpO2: 97 % (05/17/24 0727) Vital Signs (24h Range):  Temp:  [98.2 °F (36.8 °C)-99.5 °F (37.5 °C)] 99.5 °F (37.5 °C)  Pulse:  [] 113  Resp:  [19-20] 20  SpO2:  [97 %-100 %] 97 %  BP: (123-142)/(81-93) 141/91     Weight: 89.6 kg (197 lb 8.5 oz)  Body mass index is 29.17 kg/m².  Body surface area is 2.09 meters squared.      Intake/Output - Last 3 Shifts         05/15 0700  05/16 0659 05/16 0700  05/17 0659 05/17 0700  05/18 0659    P.O. 2060 1800     Total Intake(mL/kg) 2060 (23) 1800 (20.1)     Urine (mL/kg/hr) 6125 (2.8) 4000 (1.9)     Stool 0 0     Total Output 6125 4000     Net -4065 -2200            Stool Occurrence 2 x 3 x              Physical Exam  Vitals and nursing note reviewed.   Constitutional:       Appearance: Normal appearance. He is well-developed.   HENT:      Head: Normocephalic and atraumatic.      Mouth/Throat:      Mouth: Mucous membranes are moist.      Pharynx: No posterior oropharyngeal erythema.   Eyes:      General:         Right eye: No discharge.         Left eye: No discharge.      Extraocular Movements: Extraocular movements intact.      Conjunctiva/sclera: Conjunctivae normal.   Cardiovascular:      Rate and Rhythm: Normal rate and regular rhythm.      Pulses: Normal pulses.      Heart sounds: Normal heart sounds. No murmur heard.  Pulmonary:      Effort: Pulmonary effort is normal. No respiratory distress.      Breath sounds: Normal breath sounds. No wheezing or rales.   Abdominal:      General: Bowel sounds are normal. There is no distension.      Palpations: Abdomen is soft.       Tenderness: There is no abdominal tenderness.   Musculoskeletal:         General: No deformity. Normal range of motion.      Cervical back: Normal range of motion and neck supple.      Right lower leg: No edema.      Left lower leg: No edema.   Skin:     General: Skin is warm and dry.      Findings: No erythema or rash.      Comments: Right chest wall vas cath. Dressing c/d/i. No sign of infection to site.   Neurological:      General: No focal deficit present.      Mental Status: He is alert and oriented to person, place, and time.   Psychiatric:         Mood and Affect: Mood normal.         Behavior: Behavior normal.         Thought Content: Thought content normal.         Judgment: Judgment normal.            Significant Labs:   CBC:   Recent Labs   Lab 05/16/24  0325 05/17/24  0439   WBC 1.93* 1.02*   HGB 8.9* 9.3*   HCT 26.6* 26.7*    121*    and CMP:   Recent Labs   Lab 05/16/24 0325 05/17/24  0439    138   K 4.1 3.8    105   CO2 23 22*   GLU 97 99   BUN 32* 34*   CREATININE 1.7* 1.7*   CALCIUM 9.0 9.1   PROT 5.3* 5.6*   ALBUMIN 3.4* 3.7   BILITOT 1.0 1.1*   ALKPHOS 59 58   AST 19 20   ALT 20 23   ANIONGAP 10 11       Diagnostic Results:  I have reviewed all pertinent imaging results/findings within the past 24 hours.

## 2024-05-17 NOTE — ASSESSMENT & PLAN NOTE
- Patient of Dr. Cook  - Admitted (5/11/24) for a Sandra 200 auto SCT  - Today is Day +4   - He received 5 bags containing a CD34 dose of 4.47 x 10^6 5/13/24 at 1330.  - See treatment plan below    Planned conditioning regimen:  Melphalan 200 on Day -1      Antimicrobial Prophylaxis:  Acyclovir starting on Day -1  Levofloxacin starting on Day -1  Fluconazole starting on Day -1  Bactrim starting on Day +30     Growth Factor Support:  Neupogen starting on Day +7      Caregiver: wife and mother  Post-transplant discharge plans: Becca Martni

## 2024-05-17 NOTE — ASSESSMENT & PLAN NOTE
- Nutrition consulted. Most recent weight and BMI monitored-     Measurements:  Wt Readings from Last 1 Encounters:   05/17/24 89.6 kg (197 lb 8.5 oz)   Body mass index is 29.17 kg/m².    Patient has been screened and assessed by RD.    Malnutrition Type:  Context: acute illness or injury  Level: mild    Malnutrition Characteristic Summary:  Weight Loss (Malnutrition): 10% in 6 months  Subcutaneous Fat (Malnutrition): mild depletion  Muscle Mass (Malnutrition): mild depletion  Hand  Strength, Left (Malnutrition): Good  Hand  Strength, Right (Malnutrition): Good    Interventions/Recommendations (treatment strategy):  1. Continue Regular Diet. Enocurage high-calorie, high-protein food sources. Small, frequent meals/snacks as tolerated. 2. If PO intake declines <50% consumed at meals, recommend Boost Plus (flavor per patient preference) BID.   3. Monitor I/O's, weight and labs.   4. RD to monitor.

## 2024-05-17 NOTE — ASSESSMENT & PLAN NOTE
From Dr. Cook's most recent clinic note:   is a 42y/o M with no significant medical problems. He has been diagnosed with multiple myeloma. He is here for a virtual stem cell transplant consultation.   Per records,  presented to [facility] on 10/11/23 after 1 month of progressive weakness, abdominal pain, and intermittent confusion. At the time of his presentation, his labs, which had been normal  in 8/2023, were notable for new severe DOUG, transaminitis, and Ca of 18. He was admitted and started on IVF .  Hypercalcemia workup was initiated and he was ultimately found to have numerous osseous lesions on 10/15/23 CT CAP, FLC - Kappa 3.29, Lambda 744, Ratio 226 , SPEP with M spike 0.17, IgG lambda on sIFE. Bone marrow biopsy done 10/18/23 revealed 95% plasma cells and FISH with TP53 deletion, FGFR3/IGH (or NSD2/IGH) fusion, usually representing a t(4;14), and a tetraploid subclone was observed. After correction of his hypercalcemia and improvement in his renal function he was discharged on 10/22/23.   He was started on Beatriz + RVd on 11/2/23.   - Admitted for a Sandra 200 auto SCT on 5/11/24.

## 2024-05-17 NOTE — PLAN OF CARE
Recommendations    1. Continue Regular Diet. Enocurage high-calorie, high-protein food sources. Small, frequent meals/snacks as tolerated. If DOUG worsens consider Renal diet.   2. If PO intake declines <50% consumed at meals, recommend Boost Plus (flavor per patient preference) BID.     3. Monitor I/O's, weight and labs.     4. Consider a phosphate binder with all meals.   5. RD to monitor.    Goals: Meet % EEN/EPN by follow-up date.  Nutrition Goal Status: new  Communication of RD Recs: other (comment) (POC)

## 2024-05-17 NOTE — ASSESSMENT & PLAN NOTE
- Weight up 8.5 lbs from admission on 5/14  - Received large volume of fluid for transplant  - Stopped IVF but deferred diuresis  - weight now back to baseline  RESOLVED

## 2024-05-17 NOTE — PT/OT/SLP PROGRESS
"  Referring Physician: Ruben Martinez MD     PCP: Jorge Alonzo MD    CC: low back pain    HPI:   Beatriz Gan is a 64 y.o. female with PMH significant for hx of left rotator cuff repair (Dr. Martinez), hx of left arthroscopic knee surgery, hx of partial nephrectomy, hx of ETOH dependence, and HTN presents as a referral for the evaluation of low back pain. Patient reports that her pain approximately in 2014 after no inciting incident or trauma. The patient reports that she experienced a new type of mid back pain when she slipped and fell at home approximately 1 week prior to her kyphoplasty with me. Today, the patient wants her low back pain addressed as her mid back pain resolved s/p kyphoplasty. The patient localizes her pain to the area across her lower back. Patient denies of radiation of her pain (the patient reports of having pain previously that radiated towards her left foot but states that her injections via Dr. Leger resolved her radicular pain). Patient describes her pain as a shooting, throbbing, and burning type of pain. Patient reports that her pain current pain is a 10/10. Patient denies of any urinary/fecal incontinence, saddle anesthesia, or weakness.     Of note, the patient reports that she fell 1 week prior to her T12 kyphoplasty with me on 1/31/2020. Patient was in discussion to her lumbar spine surgery with Dr. Martinez but she fell prior to scheduling a procedure date.    Aggravating factors: constant pain; activity in general, sitting    Mitigating factors: ice    Relevant Surgeries: no    Interventional Therapies: yes; Dr. Leger; patient reports of getting approximately 4-5 injections and reports of approximately 3 months of relief with her injections. Patient reports of attempting to perform diagnostic MBB's with Dr. Leger but the patient reports that she could not tolerate the pain. Of note, the patient reports of getting "nerves burned" in [facility] with good relief " Physical Therapy      Patient Name:  Pete Fernandez   MRN:  [id number]    PT attempt at 1221pm, however active treatment not completed this date. Patient 4 days post-SCT and endorsing continued maintenance of functional baseline. Patient & RN reporting that he just completed hallway ambulation prior to writing therapist's attempt. Patient with no questions at this time. Will follow-up at next appropriate date.    Patient Education Provided on:  The role of physical therapy and how the patient can benefit from skilled services  The negative effects of prolonged bed rest/sedentary behavior, along with the importance of OOB activity & patient participation with PT  Discussed importance of maintaining increased level of functional activity & limit in-bed time during early phases of treatment  Continued to encouraged hallway ambulation & out of room activities throughout the day, as able  Pt white board updated with current therapists name and level of mobility assistance needed.     Patient Verbalized understanding of all topics touched on this date.       several years ago.    1/31/2020: T12 kyphoplasty - resolution of upper back pain    : Reviewed and consistent with medication use as prescribed.  2/4/2020: Percocet 7.5-325 mg; #21 tablets  1/29/2020: Percocet 7.5-325 mg; #42 tablets  1/23/2020: Norco 7.5-325 mg; #28 tablets  1/16/2020: Percocet 5-325 mg; #28 tablets  1/8/2020: Percocet 7.5-325 mg; #28 tablets  12/16/2019: Percocet 7.5-325 mg; #28 tablets  11/27/2019: Percocet 7.5-325 mg; #28 tablets  11/15/2019: Percocet 7.5-325 mg; #28 tablets  11/7/2019: Percocet  mg; #28 tablets  11/1/2019: Percocet  mg; #28 tablets    Non-pharmacologic Treatment:     · Physical Therapy: yes; currently participating in aqua therapy   · Ice/Heat: yes; ice is better than heat   · TENS: no; never used   · Massage: no  · Chiropractic care: yes; saw once a few years ago with benefit   · Acupuncture: no         Pain Medications:         · Currently taking: Gabapentin 400 mg PO TID, duloxetine 30 mg PO q day; OTC Tylenol BID/TID    · Has tried in the past:    · Opioids: yes; oxycodone - stopped taking secondary to side effects   · NSAIDS: no; cannot take secondary to hx of partial nephrectomy   · Tylenol: yes  · Muscle relaxants: no  · TCAs: no  · SNRIs: yes  · Anticonvulsants: yes  · topical creams: yes; lidocaine patches with some relief    Anticoagulation: no    ROS:  Review of Systems   Constitutional: Negative for chills and fever.   HENT: Negative for sore throat.    Eyes: Negative for visual disturbance.   Respiratory: Negative for shortness of breath.    Cardiovascular: Negative for chest pain.   Gastrointestinal: Negative for nausea and vomiting.   Genitourinary: Negative for difficulty urinating.   Musculoskeletal: Positive for back pain.   Skin: Negative for rash.   Allergic/Immunologic: Negative for immunocompromised state.   Neurological: Negative for syncope.   Hematological: Does not bruise/bleed easily.   Psychiatric/Behavioral: Negative for suicidal  ideas.        Past Medical History:   Diagnosis Date    Alcohol dependence     DDD (degenerative disc disease), lumbosacral     DJD (degenerative joint disease), lumbosacral     Encounter for blood transfusion     GERD (gastroesophageal reflux disease)     Gout     Hypercholesterolemia     Hypertension     NATHALIE (iron deficiency anemia)     questionable white coat hypertension    Kidney carcinoma, left 2014    Pneumonia     Renal cell carcinoma     Shingles 10/13/2012     Past Surgical History:   Procedure Laterality Date    APPENDECTOMY      ARTHROSCOPY OF SHOULDER WITH DECOMPRESSION OF SUBACROMIAL SPACE Left 10/31/2019    Procedure: ARTHROSCOPY, SHOULDER, WITH SUBACROMIAL SPACE DECOMPRESSION;  Surgeon: Ruben Martinez MD;  Location: [facility] OR;  Service: Orthopedics;  Laterality: Left;    BLADDER REPAIR      x 2    COLONOSCOPY  9/2011    DISTAL CLAVICLE EXCISION Left 10/31/2019    Procedure: EXCISION, CLAVICLE, DISTAL;  Surgeon: Ruben Martinez MD;  Location: [facility] OR;  Service: Orthopedics;  Laterality: Left;    ESOPHAGOGASTRODUODENOSCOPY  9/2011    EYE SURGERY      lasix bilateral    FIXATION KYPHOPLASTY N/A 1/31/2020    Procedure: Kyphoplasty T12;  Surgeon: Dk Rosales MD;  Location: [facility] OR;  Service: Pain Management;  Laterality: N/A;    HERNIA REPAIR      hiatal hernai    HYSTERECTOMY      KNEE ARTHROSCOPY W/ MENISCECTOMY Left 1/16/2020    Procedure: ARTHROSCOPY, KNEE, WITH MEDIAL MENISCECTOMY;  Surgeon: Ruben Martinez MD;  Location: [facility] OR;  Service: Orthopedics;  Laterality: Left;    LAPAROSCOPIC NISSEN FUNDOPLICATION      NEPHRECTOMY      LT partial    REFRACTIVE SURGERY      ROTATOR CUFF REPAIR Left 10/31/2019    Procedure: REPAIR, ROTATOR CUFF;  Surgeon: Ruben Martinez MD;  Location: [facility] OR;  Service: Orthopedics;  Laterality: Left;  arthrex    SKIN BIOPSY      TOTAL ABDOMINAL HYSTERECTOMY W/ BILATERAL SALPINGOOPHORECTOMY  age 48     Family History   Problem Relation  "Age of Onset    Hypertension Father     Glaucoma Neg Hx     Macular degeneration Neg Hx     Retinal detachment Neg Hx      Social History     Socioeconomic History    Marital status:      Spouse name: Not on file    Number of children: Not on file    Years of education: Not on file    Highest education level: Not on file   Occupational History     Employer: [employer]   Social Needs    Financial resource strain: Not on file    Food insecurity:     Worry: Not on file     Inability: Not on file    Transportation needs:     Medical: Not on file     Non-medical: Not on file   Tobacco Use    Smoking status: Former Smoker     Packs/day: 1.00     Years: 40.00     Pack years: 40.00     Types: Cigarettes     Last attempt to quit: 2018     Years since quittin.1    Smokeless tobacco: Never Used   Substance and Sexual Activity    Alcohol use: Yes     Alcohol/week: 28.0 standard drinks     Types: 14 Standard drinks or equivalent, 14 Cans of beer per week     Comment: 2-3 a day/ social    Drug use: No    Sexual activity: Yes     Partners: Male   Lifestyle    Physical activity:     Days per week: Not on file     Minutes per session: Not on file    Stress: Not on file   Relationships    Social connections:     Talks on phone: Not on file     Gets together: Not on file     Attends Evangelical service: Not on file     Active member of club or organization: Not on file     Attends meetings of clubs or organizations: Not on file     Relationship status: Not on file   Other Topics Concern    Not on file   Social History Narrative    Not on file         Allergies: See med card    Vitals:    20 0957   BP: (!) 162/92   Pulse: 89   Weight: 83.9 kg (184 lb 15.5 oz)   Height: 5' 4.5" (1.638 m)   PainSc: 10-Worst pain ever   PainLoc: Back         Physical exam:  Physical Exam   Constitutional: She is oriented to person, place, and time and well-developed, well-nourished, and in no distress.   HENT: "   Head: Normocephalic and atraumatic.   Eyes: Conjunctivae and EOM are normal. Right eye exhibits no discharge. Left eye exhibits no discharge.   Cardiovascular: Normal rate.   Pulmonary/Chest: Effort normal and breath sounds normal. No respiratory distress.   Abdominal: Soft.   Neurological: She is alert and oriented to person, place, and time.   Skin: Skin is warm and dry. No rash noted. She is not diaphoretic.   Psychiatric: Mood, memory, affect and judgment normal.   Nursing note and vitals reviewed.       UPPER EXTREMITIES: Normal alignment, normal range of motion, no atrophy, no skin changes,  hair growth and nail growth normal and equal bilaterally. No swelling, no tenderness.    LOWER EXTREMITIES:  Normal alignment, normal range of motion, no atrophy, no skin changes,  hair growth and nail growth normal and equal bilaterally. No swelling, no tenderness.    LUMBAR SPINE  Lumbar spine: ROM is severely limited with flexion extension and oblique extension with increased pain (worse with extension).     ((--)) Supine straight leg raise    ((+)) Facet loading   Internal and external rotation of the hip causes no increased pain on either side.  Myofascial exam: Tenderness to palpation across lumbar paraspinous muscles.    ((+)) TTP at the SI joint  ((+)) AYLEEN's test  One leg stand: cannot perform secondary to fall risk    ((+)) Distraction test    CRANIAL NERVES:  II:  PERRL bilaterally,   III,IV,VI: EOMI.    V:  Facial sensation equal bilaterally  VII:  Facial motor function normal.  VIII:  Hearing equal to finger rub bilaterally  IX/X: Gag normal, palate symmetric  XI:  Shoulder shrug equal, head turn equal  XII:  Tongue midline without fasciculations      MOTOR: Tone and bulk: normal bilateral upper and lower Strength: normal   Delt Bi Tri WE WF     R 5 5 5 5 5 5   L 5 5 5 5 5 5     IP ADD ABD Quad TA Gas HAM  R 5 5 5 5 5 5 5  L 5 5 5 5 5 5 5    SENSATION: Light touch and pinprick intact  bilaterally  REFLEXES: normal, symmetric, nonbrisk.  Toes down, no clonus. Negative nicholson's sign bilaterally.  GAIT: normal rise, base, steps, and arm swing.        Imaging: MRI Lumbar spine without contrast (10/21/2019):  The lumbar spine is in satisfactory alignment.  The vertebral bodies are of normal height.  There is diffuse disc desiccation with disc space narrowing at L1-2 and L2-3.  There is disc space narrowing and degenerative endplate changes at L5-S1.    The conus medullaris is normal in appearance and ends at L1.    At T12-L1 there is no significant abnormality.    At L1-2 there is broad-based disc bulge without central canal stenosis or foraminal narrowing    At L2-3 there is broad-based disc bulge asymmetric to the right resulting in mild right foraminal narrowing there is no nerve encroachment.    At L3-4 there is a shallow disc bulge without central canal stenosis or foraminal narrowing    At L4-5 there is broad-based disc bulge and facet hypertrophy with flattening of the thecal sac.  There is mild foraminal narrowing    At L5-S1 there is broad-based disc bulge and facet hypertrophy resulting in bilateral foraminal narrowing, right greater than left.  This is stable.    Assessment: Beatriz Gan is a 64 y.o. female with PMH significant for hx of left rotator cuff repair (Dr. Martinez), hx of left arthroscopic knee surgery, hx of partial nephrectomy, hx of ETOH dependence, and HTN presents as a referral for the evaluation of low back pain. The patient reports of resolution of her mid back pain s/p kyphoplasty on 1/31/2020 but now reports of low back pain. I suspect DDD and facet arthropathy are contributing to the patient's pain. Treatment plan outlined below.     Plan:  - Procedural records requested from [facility] pain management with Dr. Twan Becerra.    - Muscle Stimulator/TENS unit was ordered to prevent further disuse atrophy and help with pain. The patient  would likely benefit from this. It is to be use it at least twice a day for a minimum of 60 minutes each time.  - IM depo-medrol injection performed in clinic today as patient is requesting treatment for her acute pain.   - Continue Gabapentin 400 mg PO TID, duloxetine 30 mg PO q day; OTC Tylenol BID/TID as prescribed by PCP  - I encouraged continued participation with aquatherapy. I have stressed the importance of physical activity and a home exercise plan to help with chronic pain and improve health.   - Plan to schedule patient for RFA once procedural records reviewed; if I am unable to obtain those records, the patient will have to perform a diagnostic MBB prior. This was explained to the patient and she expressed understanding.     Thank you for referring this interesting patient, and I look forward to continuing to collaborate in her care.    Dk Rosales MD  Pain Management    Addendum: 2/14/2020 @ 1100    8/21/2017: Right sacroiliac joint injection   4/10/2017: C7-T1 cervical interlaminar epidural steroid injection - 50% relief per chart review  2/6/2017: Right sacroiliac joint injection   8/2016: Lumbar RFA - good relief

## 2024-05-17 NOTE — PROGRESS NOTES
[facility] - Oncology ([facility])  Hematology  Bone Marrow Transplant  Progress Note    Patient Name: Pete Fernandez  Admission Date: 5/11/2024  Hospital Length of Stay: 6 days  Code Status: Full Code    Subjective:     Interval History: Day +4 s/p Sandra 200 Auto SCT for MM. Denies any pain, n/v/d/c. Continues with good oral intake. Using prn melatonin for insomnia. Phos remains mildly elevated, will continue to trend. Afebrile, VSS    Objective:     Vital Signs (Most Recent):  Temp: 99.5 °F (37.5 °C) (05/17/24 0727)  Pulse: (!) 113 (05/17/24 0727)  Resp: 20 (05/17/24 0727)  BP: (!) 141/91 (05/17/24 0727)  SpO2: 97 % (05/17/24 0727) Vital Signs (24h Range):  Temp:  [98.2 °F (36.8 °C)-99.5 °F (37.5 °C)] 99.5 °F (37.5 °C)  Pulse:  [] 113  Resp:  [19-20] 20  SpO2:  [97 %-100 %] 97 %  BP: (123-142)/(81-93) 141/91     Weight: 89.6 kg (197 lb 8.5 oz)  Body mass index is 29.17 kg/m².  Body surface area is 2.09 meters squared.      Intake/Output - Last 3 Shifts         05/15 0700  05/16 0659 05/16 0700  05/17 0659 05/17 0700  05/18 0659    P.O. 2060 1800     Total Intake(mL/kg) 2060 (23) 1800 (20.1)     Urine (mL/kg/hr) 6125 (2.8) 4000 (1.9)     Stool 0 0     Total Output 6125 4000     Net -4065 -2200            Stool Occurrence 2 x 3 x              Physical Exam  Vitals and nursing note reviewed.   Constitutional:       Appearance: Normal appearance. He is well-developed.   HENT:      Head: Normocephalic and atraumatic.      Mouth/Throat:      Mouth: Mucous membranes are moist.      Pharynx: No posterior oropharyngeal erythema.   Eyes:      General:         Right eye: No discharge.         Left eye: No discharge.      Extraocular Movements: Extraocular movements intact.      Conjunctiva/sclera: Conjunctivae normal.   Cardiovascular:      Rate and Rhythm: Normal rate and regular rhythm.      Pulses: Normal pulses.      Heart sounds: Normal heart sounds. No murmur heard.  Pulmonary:      Effort: Pulmonary effort is normal. No  respiratory distress.      Breath sounds: Normal breath sounds. No wheezing or rales.   Abdominal:      General: Bowel sounds are normal. There is no distension.      Palpations: Abdomen is soft.      Tenderness: There is no abdominal tenderness.   Musculoskeletal:         General: No deformity. Normal range of motion.      Cervical back: Normal range of motion and neck supple.      Right lower leg: No edema.      Left lower leg: No edema.   Skin:     General: Skin is warm and dry.      Findings: No erythema or rash.      Comments: Right chest wall vas cath. Dressing c/d/i. No sign of infection to site.   Neurological:      General: No focal deficit present.      Mental Status: He is alert and oriented to person, place, and time.   Psychiatric:         Mood and Affect: Mood normal.         Behavior: Behavior normal.         Thought Content: Thought content normal.         Judgment: Judgment normal.            Significant Labs:   CBC:   Recent Labs   Lab 05/16/24 0325 05/17/24  0439   WBC 1.93* 1.02*   HGB 8.9* 9.3*   HCT 26.6* 26.7*    121*    and CMP:   Recent Labs   Lab 05/16/24 0325 05/17/24  0439    138   K 4.1 3.8    105   CO2 23 22*   GLU 97 99   BUN 32* 34*   CREATININE 1.7* 1.7*   CALCIUM 9.0 9.1   PROT 5.3* 5.6*   ALBUMIN 3.4* 3.7   BILITOT 1.0 1.1*   ALKPHOS 59 58   AST 19 20   ALT 20 23   ANIONGAP 10 11       Diagnostic Results:  I have reviewed all pertinent imaging results/findings within the past 24 hours.  Assessment/Plan:     * History of auto stem cell transplant  - Patient of Dr. Cook  - Admitted (5/11/24) for a Sandra 200 auto SCT  - Today is Day +4   - He received 5 bags containing a CD34 dose of 4.47 x 10^6 5/13/24 at 1330.  - See treatment plan below    Planned conditioning regimen:  Melphalan 200 on Day -1      Antimicrobial Prophylaxis:  Acyclovir starting on Day -1  Levofloxacin starting on Day -1  Fluconazole starting on Day -1  Bactrim starting on Day +30     Growth  Factor Support:  Neupogen starting on Day +7      Caregiver: wife and mother  Post-transplant discharge plans: [facility] [place]    Multiple myeloma in remission  From Dr. Cook's most recent clinic note:   is a 42y/o M with no significant medical problems. He has been diagnosed with multiple myeloma. He is here for a virtual stem cell transplant consultation.   Per records,  presented to [facility] on 10/11/23 after 1 month of progressive weakness, abdominal pain, and intermittent confusion. At the time of his presentation, his labs, which had been normal  in 8/2023, were notable for new severe DOUG, transaminitis, and Ca of 18. He was admitted and started on IVF .  Hypercalcemia workup was initiated and he was ultimately found to have numerous osseous lesions on 10/15/23 CT CAP, FLC - Kappa 3.29, Lambda 744, Ratio 226 , SPEP with M spike 0.17, IgG lambda on sIFE. Bone marrow biopsy done 10/18/23 revealed 95% plasma cells and FISH with TP53 deletion, FGFR3/IGH (or NSD2/IGH) fusion, usually representing a t(4;14), and a tetraploid subclone was observed. After correction of his hypercalcemia and improvement in his renal function he was discharged on 10/22/23.   He was started on Beatriz + RVd on 11/2/23.   - Admitted for a Sandra 200 auto SCT on 5/11/24.      Pancytopenia due to antineoplastic chemotherapy  - Transfuse for hgb < 7, Plt <10K or bleeding  - Daily CBC while inpatient  - will stop ppx lovenox when platelets near <50K    Insomnia  - continue prn melatonin    Hyperphosphatemia  - monitoring daily phos levels  - mildly elevated at 5.1 today; likely due to diet; will continue to trend    Volume overload  - Weight up 8.5 lbs from admission on 5/14  - Received large volume of fluid for transplant  - Stopped IVF but deferred diuresis  - weight now back to baseline  RESOLVED    Mild malnutrition  - Nutrition consulted. Most recent weight and BMI monitored-     Measurements:  Wt Readings from Last 1 Encounters:    05/17/24 89.6 kg (197 lb 8.5 oz)   Body mass index is 29.17 kg/m².    Patient has been screened and assessed by RD.    Malnutrition Type:  Context: acute illness or injury  Level: mild    Malnutrition Characteristic Summary:  Weight Loss (Malnutrition): 10% in 6 months  Subcutaneous Fat (Malnutrition): mild depletion  Muscle Mass (Malnutrition): mild depletion  Hand  Strength, Left (Malnutrition): Good  Hand  Strength, Right (Malnutrition): Good    Interventions/Recommendations (treatment strategy):  1. Continue Regular Diet. Enocurage high-calorie, high-protein food sources. Small, frequent meals/snacks as tolerated. 2. If PO intake declines <50% consumed at meals, recommend Boost Plus (flavor per patient preference) BID.   3. Monitor I/O's, weight and labs.   4. RD to monitor.     Hypertension  - Continue home metoprolol    GERD (gastroesophageal reflux disease)  - Takes protonix at home. Will receive inpatient as part of SCT protocol.    Gout  - Home febuxostat not on formulary. Substituting with allopurinol while inpatient.    BPH (benign prostatic hyperplasia)  - Continue home Flomax    CKD (chronic kidney disease)  - Baseline creatinine appears to be ~ 1.8  - Renally dose meds as appropriate  - Creatinine stable at 1.7 today    ADD (attention deficit disorder)  - Holding home Aderall while inpatient    Pericarditis  - Will hold home colchicine as he has completed a 3 month course per PharmD rec    Cancer associated pain  - Continue home Oxy IR        VTE Risk Mitigation (From admission, onward)           Ordered     enoxaparin injection 40 mg  Every 24 hours         05/15/24 0911     heparin (porcine) injection 1,600 Units  As needed (PRN)         05/11/24 2044     IP VTE HIGH RISK PATIENT  Once         05/11/24 1803     Place sequential compression device  Until discontinued         05/11/24 1803                    Disposition: Remains inpatient    Elvie Guevara NP  Bone Marrow Transplant  [facility]  [address] - Oncology ([facility])

## 2024-05-17 NOTE — PROGRESS NOTES
[facility] - Oncology ([facility])  Adult Nutrition  Progress Note    SUMMARY       Recommendations    1. Continue Regular Diet. Enocurage high-calorie, high-protein food sources. Small, frequent meals/snacks as tolerated. If DOUG worsens consider Renal diet.   2. If PO intake declines <50% consumed at meals, recommend Boost Plus (flavor per patient preference) BID.     3. Monitor I/O's, weight and labs.     4. Consider a phosphate binder with all meals.   5. RD to monitor.    Goals: Meet % EEN/EPN by follow-up date.  Nutrition Goal Status: new  Communication of RD Recs: other (comment) (POC)    Assessment and Plan    Endocrine  Mild malnutrition  Malnutrition Type:  Context: acute illness or injury  Level: mild    Related to (etiology):   Increased physiological demands    Signs and Symptoms (as evidenced by):   Auto SCT     Malnutrition Characteristic Summary:  Weight Loss (Malnutrition): 10% in 6 months  Subcutaneous Fat (Malnutrition): mild depletion  Muscle Mass (Malnutrition): mild depletion  Hand  Strength, Left (Malnutrition): Good  Hand  Strength, Right (Malnutrition): Good      Interventions/Recommendations (treatment strategy):     Collaboration of nutrition care with other providers  High-calorie, high-protein diet  Nutrition education    Nutrition Diagnosis Status:   Continues         Malnutrition Assessment  Malnutrition Context: acute illness or injury  Malnutrition Level: mild  Skin (Micronutrient): dry  Teeth (Micronutrient): broken dentition  Tongue (Micronutrient): none  Musculoskeletal/Lower Extremities: muscle control poor, muscle wasting   Micronutrient Evaluation Summary: suspected deficiency  Micronutrient Evaluation Comments: iron, calcium, zinc, phosphorus   Weight Loss (Malnutrition): 10% in 6 months  Subcutaneous Fat (Malnutrition): mild depletion  Muscle Mass (Malnutrition): mild depletion  Hand  Strength, Left (Malnutrition): Good  Hand  Strength, Right (Malnutrition):  "Good   Orbital Region (Subcutaneous Fat Loss): well nourished  Upper Arm Region (Subcutaneous Fat Loss): mild depletion  Thoracic and Lumbar Region: well nourished   [place] (Muscle Loss): mild depletion  Clavicle Bone Region (Muscle Loss): mild depletion  Clavicle and Acromion Bone Region (Muscle Loss): mild depletion  Scapular Bone Region (Muscle Loss): mild depletion  Dorsal Hand (Muscle Loss): well nourished  Patellar Region (Muscle Loss): well nourished  Anterior Thigh Region (Muscle Loss): mild depletion  Posterior Calf Region (Muscle Loss): mild depletion                 Reason for Assessment    Reason For Assessment: RD follow-up  Diagnosis:  (Stem Cell Transplant)  Relevant Medical History: HTN, pericarditis, CKD, gout, GERD, ADD, BPH  Interdisciplinary Rounds: did not attend  General Information Comments: RD follow-up. Reports waning appetite, sonsuming 50-75% at meals. C/o N/D, denies difficulty chewing/swallowing at this time. S/p auto SCT.  Nutrition Discharge Planning: Pending clinical course, High-Calorie, High-Protein and Food Safety nutrition education provided 5/13    Nutrition Risk Screen    Nutrition Risk Screen: no indicators present    Nutrition/Diet History    Patient Reported Diet/Restrictions/Preferences: general  Typical Food/Fluid Intake: 2-3 meals  Spiritual, Cultural Beliefs, Anglican Practices, Values that Affect Care: no  Supplemental Drinks or Food Habits: Other (see comments) (Muscle Milk occasionally)  Food Allergies: NKFA  Factors Affecting Nutritional Intake: decreased appetite    Anthropometrics    Temp: 98 °F (36.7 °C)  Height Method: Stated  Height: 5' 9" (175.3 cm)  Height (inches): 69 in  Weight Method: Standard Scale  Weight: 89.6 kg (197 lb 8.5 oz)  Weight (lb): 197.53 lb  Ideal Body Weight (IBW), Male: 160 lb  % Ideal Body Weight, Male (lb): 127.25 %  BMI (Calculated): 29.2       Lab/Procedures/Meds    Pertinent Labs Reviewed: reviewed  Pertinent Labs Comments: H/H: " 9.3/26.7, , MCH 35.1, BUN 34, Cr 1.7, GFR 50.0, P 5.1, TP 5.6, Tbili 1.1  Pertinent Medications Reviewed: reviewed  Pertinent Medications Comments: allopurinol, benadryl, hydrocortisone, filgratim, pantoprazole,  levofloxacin, olanazapine, Na bicarb, lorazepam, metoprolol, NaCl w/ KCl      Estimated/Assessed Needs    Weight Used For Calorie Calculations: 93.9 kg (207 lb)  Energy Calorie Requirements (kcal): 3406-2762 kcal (20-23 kcal/kg)  Energy Need Method: Kcal/kg  Protein Requirements:  g (1.2-1.4 g/kg, IBW)  Weight Used For Protein Calculations: 72.6 kg (160 lb) (IBW)        RDA Method (mL): 1878         Nutrition Prescription Ordered    Current Diet Order: Regular    Evaluation of Received Nutrient/Fluid Intake    I/O: -2.20 L  Energy Calories Required: meeting needs  Protein Required: meeting needs  Comments: LBM: 5/17  Tolerance: tolerating  % Intake of Estimated Energy Needs: 75 - 100 %  % Meal Intake: 50 - 75 %    Nutrition Risk    Level of Risk/Frequency of Follow-up: low (1x/ week)     Monitor and Evaluation    Food and Nutrient Intake: energy intake, food and beverage intake  Food and Nutrient Adminstration: diet order  Physical Activity and Function: nutrition-related ADLs and IADLs, factors affecting access to physical activity  Anthropometric Measurements: body mass index, weight change, weight  Biochemical Data, Medical Tests and Procedures: lipid profile, inflammatory profile, glucose/endocrine profile, gastrointestinal profile, electrolyte and renal panel  Nutrition-Focused Physical Findings: skin, head and eyes, extremities, muscles and bones, overall appearance     Nutrition Follow-Up    RD Follow-up?: Yes    Godwin Salinas MS, RD, LDN

## 2024-05-17 NOTE — ASSESSMENT & PLAN NOTE
- Transfuse for hgb < 7, Plt <10K or bleeding  - Daily CBC while inpatient  - will stop ppx lovenox when platelets near <50K

## 2024-05-18 LAB
ABO + RH BLD: ABNORMAL
ALBUMIN SERPL BCP-MCNC: 3.8 G/DL (ref 3.5–5.2)
ALP SERPL-CCNC: 56 U/L (ref 55–135)
ALT SERPL W/O P-5'-P-CCNC: 22 U/L (ref 10–44)
ANION GAP SERPL CALC-SCNC: 11 MMOL/L (ref 8–16)
ANISOCYTOSIS BLD QL SMEAR: SLIGHT
AST SERPL-CCNC: 18 U/L (ref 10–40)
BASOPHILS # BLD AUTO: 0 K/UL (ref 0–0.2)
BASOPHILS NFR BLD: 0 % (ref 0–1.9)
BILIRUB SERPL-MCNC: 0.9 MG/DL (ref 0.1–1)
BLD GP AB SCN CELLS X3 SERPL QL: ABNORMAL
BUN SERPL-MCNC: 30 MG/DL (ref 6–20)
CALCIUM SERPL-MCNC: 9.5 MG/DL (ref 8.7–10.5)
CHLORIDE SERPL-SCNC: 104 MMOL/L (ref 95–110)
CO2 SERPL-SCNC: 22 MMOL/L (ref 23–29)
CREAT SERPL-MCNC: 1.6 MG/DL (ref 0.5–1.4)
DACRYOCYTES BLD QL SMEAR: ABNORMAL
DIFFERENTIAL METHOD BLD: ABNORMAL
EOSINOPHIL # BLD AUTO: 0 K/UL (ref 0–0.5)
EOSINOPHIL NFR BLD: 1.6 % (ref 0–8)
ERYTHROCYTE [DISTWIDTH] IN BLOOD BY AUTOMATED COUNT: 14.8 % (ref 11.5–14.5)
EST. GFR  (NO RACE VARIABLE): 53.8 ML/MIN/1.73 M^2
GLUCOSE SERPL-MCNC: 101 MG/DL (ref 70–110)
HCT VFR BLD AUTO: 28 % (ref 40–54)
HGB BLD-MCNC: 9.4 G/DL (ref 14–18)
HYPOCHROMIA BLD QL SMEAR: ABNORMAL
IMM GRANULOCYTES # BLD AUTO: 0.01 K/UL (ref 0–0.04)
IMM GRANULOCYTES NFR BLD AUTO: 1.6 % (ref 0–0.5)
LYMPHOCYTES # BLD AUTO: 0.1 K/UL (ref 1–4.8)
LYMPHOCYTES NFR BLD: 9.8 % (ref 18–48)
MAGNESIUM SERPL-MCNC: 2 MG/DL (ref 1.6–2.6)
MCH RBC QN AUTO: 34.4 PG (ref 27–31)
MCHC RBC AUTO-ENTMCNC: 33.6 G/DL (ref 32–36)
MCV RBC AUTO: 103 FL (ref 82–98)
MONOCYTES # BLD AUTO: 0 K/UL (ref 0.3–1)
MONOCYTES NFR BLD: 1.6 % (ref 4–15)
NEUTROPHILS # BLD AUTO: 0.5 K/UL (ref 1.8–7.7)
NEUTROPHILS NFR BLD: 85.4 % (ref 38–73)
NRBC BLD-RTO: 0 /100 WBC
OVALOCYTES BLD QL SMEAR: ABNORMAL
PHOSPHATE SERPL-MCNC: 4.9 MG/DL (ref 2.7–4.5)
PLATELET # BLD AUTO: 97 K/UL (ref 150–450)
PLATELET BLD QL SMEAR: ABNORMAL
PMV BLD AUTO: 11 FL (ref 9.2–12.9)
POIKILOCYTOSIS BLD QL SMEAR: SLIGHT
POLYCHROMASIA BLD QL SMEAR: ABNORMAL
POTASSIUM SERPL-SCNC: 3.8 MMOL/L (ref 3.5–5.1)
PROT SERPL-MCNC: 5.7 G/DL (ref 6–8.4)
RBC # BLD AUTO: 2.73 M/UL (ref 4.6–6.2)
SODIUM SERPL-SCNC: 137 MMOL/L (ref 136–145)
SPECIMEN OUTDATE: ABNORMAL
WBC # BLD AUTO: 0.61 K/UL (ref 3.9–12.7)

## 2024-05-18 PROCEDURE — 25000003 PHARM REV CODE 250: Performed by: NURSE PRACTITIONER

## 2024-05-18 PROCEDURE — 85025 COMPLETE CBC W/AUTO DIFF WBC: CPT | Performed by: NURSE PRACTITIONER

## 2024-05-18 PROCEDURE — 86901 BLOOD TYPING SEROLOGIC RH(D): CPT | Performed by: INTERNAL MEDICINE

## 2024-05-18 PROCEDURE — 63600175 PHARM REV CODE 636 W HCPCS: Performed by: NURSE PRACTITIONER

## 2024-05-18 PROCEDURE — 80053 COMPREHEN METABOLIC PANEL: CPT | Performed by: NURSE PRACTITIONER

## 2024-05-18 PROCEDURE — 20600001 HC STEP DOWN PRIVATE ROOM

## 2024-05-18 PROCEDURE — 25000003 PHARM REV CODE 250: Performed by: INTERNAL MEDICINE

## 2024-05-18 PROCEDURE — 84100 ASSAY OF PHOSPHORUS: CPT | Performed by: NURSE PRACTITIONER

## 2024-05-18 PROCEDURE — 99233 SBSQ HOSP IP/OBS HIGH 50: CPT | Mod: ,,, | Performed by: INTERNAL MEDICINE

## 2024-05-18 PROCEDURE — 83735 ASSAY OF MAGNESIUM: CPT | Performed by: NURSE PRACTITIONER

## 2024-05-18 RX ADMIN — ENOXAPARIN SODIUM 40 MG: 40 INJECTION SUBCUTANEOUS at 05:05

## 2024-05-18 RX ADMIN — Medication 1 DOSE: at 09:05

## 2024-05-18 RX ADMIN — PANTOPRAZOLE SODIUM 40 MG: 40 TABLET, DELAYED RELEASE ORAL at 09:05

## 2024-05-18 RX ADMIN — ACYCLOVIR 800 MG: 200 CAPSULE ORAL at 09:05

## 2024-05-18 RX ADMIN — METOPROLOL TARTRATE 12.5 MG: 25 TABLET, FILM COATED ORAL at 09:05

## 2024-05-18 RX ADMIN — Medication 1 DOSE: at 02:05

## 2024-05-18 RX ADMIN — ALLOPURINOL 100 MG: 100 TABLET ORAL at 09:05

## 2024-05-18 RX ADMIN — TAMSULOSIN HYDROCHLORIDE 0.4 MG: 0.4 CAPSULE ORAL at 09:05

## 2024-05-18 RX ADMIN — SODIUM BICARBONATE 650 MG: 650 TABLET ORAL at 09:05

## 2024-05-18 RX ADMIN — Medication 9 MG: at 09:05

## 2024-05-18 RX ADMIN — CALCITRIOL CAPSULES 0.25 MCG 0.5 MCG: 0.25 CAPSULE ORAL at 09:05

## 2024-05-18 RX ADMIN — Medication 1 DOSE: at 05:05

## 2024-05-18 RX ADMIN — FLUCONAZOLE 400 MG: 200 TABLET ORAL at 09:05

## 2024-05-18 RX ADMIN — LEVOFLOXACIN 500 MG: 500 TABLET, FILM COATED ORAL at 09:05

## 2024-05-18 NOTE — ASSESSMENT & PLAN NOTE
From Dr. Cook's most recent clinic note:   is a 44y/o M with no significant medical problems. He has been diagnosed with multiple myeloma. He is here for a virtual stem cell transplant consultation.   Per records,  presented to [facility] on 10/11/23 after 1 month of progressive weakness, abdominal pain, and intermittent confusion. At the time of his presentation, his labs, which had been normal  in 8/2023, were notable for new severe DOUG, transaminitis, and Ca of 18. He was admitted and started on IVF .  Hypercalcemia workup was initiated and he was ultimately found to have numerous osseous lesions on 10/15/23 CT CAP, FLC - Kappa 3.29, Lambda 744, Ratio 226 , SPEP with M spike 0.17, IgG lambda on sIFE. Bone marrow biopsy done 10/18/23 revealed 95% plasma cells and FISH with TP53 deletion, FGFR3/IGH (or NSD2/IGH) fusion, usually representing a t(4;14), and a tetraploid subclone was observed. After correction of his hypercalcemia and improvement in his renal function he was discharged on 10/22/23.   He was started on Beatriz + RVd on 11/2/23.   - Admitted for a Sandra 200 auto SCT on 5/11/24.

## 2024-05-18 NOTE — ASSESSMENT & PLAN NOTE
- Patient of Dr. Cook  - Admitted (5/11/24) for a Sandra 200 auto SCT  - Today is Day +5   - He received 5 bags containing a CD34 dose of 4.47 x 10^6 5/13/24 at 1330.  - See treatment plan below    Planned conditioning regimen:  Melphalan 200 on Day -1      Antimicrobial Prophylaxis:  Acyclovir starting on Day -1  Levofloxacin starting on Day -1  Fluconazole starting on Day -1  Bactrim starting on Day +30     Growth Factor Support:  Neupogen starting on Day +7      Caregiver: wife and mother  Post-transplant discharge plans: [facility]

## 2024-05-18 NOTE — SUBJECTIVE & OBJECTIVE
Subjective:     Interval History: Day +5 s/p Sandra 200 Auto SCT for MM. Doing well. Eating without issue. Up in chair watching [employer].     Objective:     Vital Signs (Most Recent):  Temp: 99.4 °F (37.4 °C) (05/18/24 1240)  Pulse: 99 (05/18/24 1240)  Resp: 18 (05/18/24 1240)  BP: (!) 140/85 (05/18/24 1240)  SpO2: 96 % (05/18/24 1240) Vital Signs (24h Range):  Temp:  [97.9 °F (36.6 °C)-99.4 °F (37.4 °C)] 99.4 °F (37.4 °C)  Pulse:  [] 99  Resp:  [18-20] 18  SpO2:  [96 %-100 %] 96 %  BP: (122-150)/(71-94) 140/85     Weight: 89.9 kg (198 lb 4.9 oz)  Body mass index is 29.28 kg/m².  Body surface area is 2.09 meters squared.      Intake/Output - Last 3 Shifts         05/16 0700  05/17 0659 05/17 0700  05/18 0659 05/18 0700  05/19 0659    P.O. 1800 720     Total Intake(mL/kg) 1800 (20.1) 720 (8)     Urine (mL/kg/hr) 4000 (1.9) 1800 (0.8)     Stool 0 1     Total Output 4000 1801     Net -2200 -1081            Urine Occurrence  1 x     Stool Occurrence 3 x  2 x             Physical Exam  Vitals and nursing note reviewed.   Constitutional:       Appearance: Normal appearance. He is well-developed.   HENT:      Head: Normocephalic and atraumatic.      Mouth/Throat:      Mouth: Mucous membranes are moist.      Pharynx: No posterior oropharyngeal erythema.   Eyes:      General:         Right eye: No discharge.         Left eye: No discharge.      Extraocular Movements: Extraocular movements intact.      Conjunctiva/sclera: Conjunctivae normal.   Cardiovascular:      Rate and Rhythm: Normal rate and regular rhythm.      Pulses: Normal pulses.      Heart sounds: Normal heart sounds. No murmur heard.  Pulmonary:      Effort: Pulmonary effort is normal. No respiratory distress.      Breath sounds: Normal breath sounds. No wheezing or rales.   Abdominal:      General: Bowel sounds are normal. There is no distension.      Palpations: Abdomen is soft.      Tenderness: There is no abdominal tenderness.   Musculoskeletal:          General: No deformity. Normal range of motion.      Cervical back: Normal range of motion and neck supple.      Right lower leg: No edema.      Left lower leg: No edema.   Skin:     General: Skin is warm and dry.      Findings: No erythema or rash.      Comments: Right chest wall vas cath. Dressing c/d/i. No sign of infection to site.   Neurological:      General: No focal deficit present.      Mental Status: He is alert and oriented to person, place, and time.   Psychiatric:         Mood and Affect: Mood normal.         Behavior: Behavior normal.         Thought Content: Thought content normal.         Judgment: Judgment normal.            Significant Labs:   CBC:   Recent Labs   Lab 05/17/24 0439 05/18/24 0429   WBC 1.02* 0.61*   HGB 9.3* 9.4*   HCT 26.7* 28.0*   * 97*    and CMP:   Recent Labs   Lab 05/17/24 0439 05/18/24 0429    137   K 3.8 3.8    104   CO2 22* 22*   GLU 99 101   BUN 34* 30*   CREATININE 1.7* 1.6*   CALCIUM 9.1 9.5   PROT 5.6* 5.7*   ALBUMIN 3.7 3.8   BILITOT 1.1* 0.9   ALKPHOS 58 56   AST 20 18   ALT 23 22   ANIONGAP 11 11       Diagnostic Results:  I have reviewed all pertinent imaging results/findings within the past 24 hours.

## 2024-05-18 NOTE — PROGRESS NOTES
Rohan Weber - Oncology ([facility])  Hematology  Bone Marrow Transplant  Progress Note    Patient Name: Pete Fernandez  Admission Date: 5/11/2024  Hospital Length of Stay: 7 days  Code Status: Full Code    Subjective:     Interval History: Day +5 s/p Sandra 200 Auto SCT for MM. Doing well. Eating without issue. Up in chair watching [employer] on netflix.     Objective:     Vital Signs (Most Recent):  Temp: 99.4 °F (37.4 °C) (05/18/24 1240)  Pulse: 99 (05/18/24 1240)  Resp: 18 (05/18/24 1240)  BP: (!) 140/85 (05/18/24 1240)  SpO2: 96 % (05/18/24 1240) Vital Signs (24h Range):  Temp:  [97.9 °F (36.6 °C)-99.4 °F (37.4 °C)] 99.4 °F (37.4 °C)  Pulse:  [] 99  Resp:  [18-20] 18  SpO2:  [96 %-100 %] 96 %  BP: (122-150)/(71-94) 140/85     Weight: 89.9 kg (198 lb 4.9 oz)  Body mass index is 29.28 kg/m².  Body surface area is 2.09 meters squared.      Intake/Output - Last 3 Shifts         05/16 0700  05/17 0659 05/17 0700  05/18 0659 05/18 0700  05/19 0659    P.O. 1800 720     Total Intake(mL/kg) 1800 (20.1) 720 (8)     Urine (mL/kg/hr) 4000 (1.9) 1800 (0.8)     Stool 0 1     Total Output 4000 1801     Net -2200 -1081            Urine Occurrence  1 x     Stool Occurrence 3 x  2 x             Physical Exam  Vitals and nursing note reviewed.   Constitutional:       Appearance: Normal appearance. He is well-developed.   HENT:      Head: Normocephalic and atraumatic.      Mouth/Throat:      Mouth: Mucous membranes are moist.      Pharynx: No posterior oropharyngeal erythema.   Eyes:      General:         Right eye: No discharge.         Left eye: No discharge.      Extraocular Movements: Extraocular movements intact.      Conjunctiva/sclera: Conjunctivae normal.   Cardiovascular:      Rate and Rhythm: Normal rate and regular rhythm.      Pulses: Normal pulses.      Heart sounds: Normal heart sounds. No murmur heard.  Pulmonary:      Effort: Pulmonary effort is normal. No respiratory distress.      Breath sounds: Normal breath sounds. No  wheezing or rales.   Abdominal:      General: Bowel sounds are normal. There is no distension.      Palpations: Abdomen is soft.      Tenderness: There is no abdominal tenderness.   Musculoskeletal:         General: No deformity. Normal range of motion.      Cervical back: Normal range of motion and neck supple.      Right lower leg: No edema.      Left lower leg: No edema.   Skin:     General: Skin is warm and dry.      Findings: No erythema or rash.      Comments: Right chest wall vas cath. Dressing c/d/i. No sign of infection to site.   Neurological:      General: No focal deficit present.      Mental Status: He is alert and oriented to person, place, and time.   Psychiatric:         Mood and Affect: Mood normal.         Behavior: Behavior normal.         Thought Content: Thought content normal.         Judgment: Judgment normal.            Significant Labs:   CBC:   Recent Labs   Lab 05/17/24 0439 05/18/24 0429   WBC 1.02* 0.61*   HGB 9.3* 9.4*   HCT 26.7* 28.0*   * 97*    and CMP:   Recent Labs   Lab 05/17/24 0439 05/18/24 0429    137   K 3.8 3.8    104   CO2 22* 22*   GLU 99 101   BUN 34* 30*   CREATININE 1.7* 1.6*   CALCIUM 9.1 9.5   PROT 5.6* 5.7*   ALBUMIN 3.7 3.8   BILITOT 1.1* 0.9   ALKPHOS 58 56   AST 20 18   ALT 23 22   ANIONGAP 11 11       Diagnostic Results:  I have reviewed all pertinent imaging results/findings within the past 24 hours.  Assessment/Plan:     * History of auto stem cell transplant  - Patient of Dr. Cook  - Admitted (5/11/24) for a Sandra 200 auto SCT  - Today is Day +5   - He received 5 bags containing a CD34 dose of 4.47 x 10^6 5/13/24 at 1330.  - See treatment plan below    Planned conditioning regimen:  Melphalan 200 on Day -1      Antimicrobial Prophylaxis:  Acyclovir starting on Day -1  Levofloxacin starting on Day -1  Fluconazole starting on Day -1  Bactrim starting on Day +30     Growth Factor Support:  Neupogen starting on Day +7      Caregiver: wife and  mother  Post-transplant discharge plans: [facility] [place]    Insomnia  - continue prn melatonin    Hyperphosphatemia  - monitoring daily phos levels  - mildly elevated at 5.1 today; likely due to diet; will continue to trend    Volume overload  - Weight up 8.5 lbs from admission on 5/14  - Received large volume of fluid for transplant  - Stopped IVF but deferred diuresis  - weight now back to baseline  RESOLVED    Mild malnutrition  - Nutrition consulted. Most recent weight and BMI monitored-     Measurements:  Wt Readings from Last 1 Encounters:   05/17/24 89.6 kg (197 lb 8.5 oz)   Body mass index is 29.17 kg/m².    Patient has been screened and assessed by RD.    Malnutrition Type:  Context: acute illness or injury  Level: mild    Malnutrition Characteristic Summary:  Weight Loss (Malnutrition): 10% in 6 months  Subcutaneous Fat (Malnutrition): mild depletion  Muscle Mass (Malnutrition): mild depletion  Hand  Strength, Left (Malnutrition): Good  Hand  Strength, Right (Malnutrition): Good    Interventions/Recommendations (treatment strategy):  1. Continue Regular Diet. Enocurage high-calorie, high-protein food sources. Small, frequent meals/snacks as tolerated. 2. If PO intake declines <50% consumed at meals, recommend Boost Plus (flavor per patient preference) BID.   3. Monitor I/O's, weight and labs.   4. RD to monitor.     Hypertension  - Continue home metoprolol    GERD (gastroesophageal reflux disease)  - Takes protonix at home. Will receive inpatient as part of SCT protocol.    Gout  - Home febuxostat not on formulary. Substituting with allopurinol while inpatient.    BPH (benign prostatic hyperplasia)  - Continue home Flomax    CKD (chronic kidney disease)  - Baseline creatinine appears to be ~ 1.8  - Renally dose meds as appropriate  - Creatinine stable at 1.7 today    ADD (attention deficit disorder)  - Holding home Aderall while inpatient    Pericarditis  - Will hold home colchicine as he has completed  a 3 month course per PharmD rec    Pancytopenia due to antineoplastic chemotherapy  - Transfuse for hgb < 7, Plt <10K or bleeding  - Daily CBC while inpatient  - will stop ppx lovenox when platelets near <50K    Cancer associated pain  - Continue home Oxy IR    Multiple myeloma in remission  From Dr. Cook's most recent clinic note:   is a 42y/o M with no significant medical problems. He has been diagnosed with multiple myeloma. He is here for a virtual stem cell transplant consultation.   Per records,  presented to [facility] on 10/11/23 after 1 month of progressive weakness, abdominal pain, and intermittent confusion. At the time of his presentation, his labs, which had been normal  in 8/2023, were notable for new severe DOUG, transaminitis, and Ca of 18. He was admitted and started on IVF .  Hypercalcemia workup was initiated and he was ultimately found to have numerous osseous lesions on 10/15/23 CT CAP, FLC - Kappa 3.29, Lambda 744, Ratio 226 , SPEP with M spike 0.17, IgG lambda on sIFE. Bone marrow biopsy done 10/18/23 revealed 95% plasma cells and FISH with TP53 deletion, FGFR3/IGH (or NSD2/IGH) fusion, usually representing a t(4;14), and a tetraploid subclone was observed. After correction of his hypercalcemia and improvement in his renal function he was discharged on 10/22/23.   He was started on Beatriz + RVd on 11/2/23.   - Admitted for a Sandra 200 auto SCT on 5/11/24.          VTE Risk Mitigation (From admission, onward)           Ordered     enoxaparin injection 40 mg  Every 24 hours         05/15/24 0911     heparin (porcine) injection 1,600 Units  As needed (PRN)         05/11/24 2044     IP VTE HIGH RISK PATIENT  Once         05/11/24 1803     Place sequential compression device  Until discontinued         05/11/24 1803                    Disposition: Will remain inpatient until engraftment.    Aye Contreras MD  Bone Marrow Transplant  [facility] - Oncology ([facility])

## 2024-05-19 LAB
ALBUMIN SERPL BCP-MCNC: 3.6 G/DL (ref 3.5–5.2)
ALP SERPL-CCNC: 54 U/L (ref 55–135)
ALT SERPL W/O P-5'-P-CCNC: 17 U/L (ref 10–44)
ANION GAP SERPL CALC-SCNC: 9 MMOL/L (ref 8–16)
ANISOCYTOSIS BLD QL SMEAR: SLIGHT
AST SERPL-CCNC: 16 U/L (ref 10–40)
BASOPHILS # BLD AUTO: ABNORMAL K/UL (ref 0–0.2)
BASOPHILS NFR BLD: 0 % (ref 0–1.9)
BILIRUB SERPL-MCNC: 1 MG/DL (ref 0.1–1)
BUN SERPL-MCNC: 29 MG/DL (ref 6–20)
CALCIUM SERPL-MCNC: 9.4 MG/DL (ref 8.7–10.5)
CHLORIDE SERPL-SCNC: 103 MMOL/L (ref 95–110)
CO2 SERPL-SCNC: 24 MMOL/L (ref 23–29)
CREAT SERPL-MCNC: 1.6 MG/DL (ref 0.5–1.4)
DACRYOCYTES BLD QL SMEAR: ABNORMAL
DIFFERENTIAL METHOD BLD: ABNORMAL
EOSINOPHIL # BLD AUTO: ABNORMAL K/UL (ref 0–0.5)
EOSINOPHIL NFR BLD: 0 % (ref 0–8)
ERYTHROCYTE [DISTWIDTH] IN BLOOD BY AUTOMATED COUNT: 14.2 % (ref 11.5–14.5)
EST. GFR  (NO RACE VARIABLE): 53.8 ML/MIN/1.73 M^2
GLUCOSE SERPL-MCNC: 97 MG/DL (ref 70–110)
HCT VFR BLD AUTO: 25 % (ref 40–54)
HGB BLD-MCNC: 8.6 G/DL (ref 14–18)
HYPOCHROMIA BLD QL SMEAR: ABNORMAL
IMM GRANULOCYTES # BLD AUTO: ABNORMAL K/UL (ref 0–0.04)
IMM GRANULOCYTES NFR BLD AUTO: ABNORMAL % (ref 0–0.5)
LYMPHOCYTES # BLD AUTO: ABNORMAL K/UL (ref 1–4.8)
LYMPHOCYTES NFR BLD: 18.2 % (ref 18–48)
MAGNESIUM SERPL-MCNC: 2.1 MG/DL (ref 1.6–2.6)
MCH RBC QN AUTO: 34.1 PG (ref 27–31)
MCHC RBC AUTO-ENTMCNC: 34.4 G/DL (ref 32–36)
MCV RBC AUTO: 99 FL (ref 82–98)
MONOCYTES # BLD AUTO: ABNORMAL K/UL (ref 0.3–1)
MONOCYTES NFR BLD: 0 % (ref 4–15)
NEUTROPHILS NFR BLD: 81.8 % (ref 38–73)
NRBC BLD-RTO: 0 /100 WBC
OVALOCYTES BLD QL SMEAR: ABNORMAL
PHOSPHATE SERPL-MCNC: 4.8 MG/DL (ref 2.7–4.5)
PLATELET # BLD AUTO: 53 K/UL (ref 150–450)
PLATELET BLD QL SMEAR: ABNORMAL
PMV BLD AUTO: 10.6 FL (ref 9.2–12.9)
POIKILOCYTOSIS BLD QL SMEAR: SLIGHT
POLYCHROMASIA BLD QL SMEAR: ABNORMAL
POTASSIUM SERPL-SCNC: 3.6 MMOL/L (ref 3.5–5.1)
PROT SERPL-MCNC: 5.5 G/DL (ref 6–8.4)
RBC # BLD AUTO: 2.52 M/UL (ref 4.6–6.2)
SCHISTOCYTES BLD QL SMEAR: ABNORMAL
SODIUM SERPL-SCNC: 136 MMOL/L (ref 136–145)
WBC # BLD AUTO: 0.15 K/UL (ref 3.9–12.7)

## 2024-05-19 PROCEDURE — 99233 SBSQ HOSP IP/OBS HIGH 50: CPT | Mod: ,,, | Performed by: INTERNAL MEDICINE

## 2024-05-19 PROCEDURE — 20600001 HC STEP DOWN PRIVATE ROOM

## 2024-05-19 PROCEDURE — 25000003 PHARM REV CODE 250: Performed by: NURSE PRACTITIONER

## 2024-05-19 PROCEDURE — 83735 ASSAY OF MAGNESIUM: CPT | Performed by: NURSE PRACTITIONER

## 2024-05-19 PROCEDURE — 25000003 PHARM REV CODE 250: Performed by: STUDENT IN AN ORGANIZED HEALTH CARE EDUCATION/TRAINING PROGRAM

## 2024-05-19 PROCEDURE — 87449 NOS EACH ORGANISM AG IA: CPT | Performed by: STUDENT IN AN ORGANIZED HEALTH CARE EDUCATION/TRAINING PROGRAM

## 2024-05-19 PROCEDURE — 25000003 PHARM REV CODE 250: Performed by: INTERNAL MEDICINE

## 2024-05-19 PROCEDURE — 85007 BL SMEAR W/DIFF WBC COUNT: CPT | Performed by: NURSE PRACTITIONER

## 2024-05-19 PROCEDURE — 84100 ASSAY OF PHOSPHORUS: CPT | Performed by: NURSE PRACTITIONER

## 2024-05-19 PROCEDURE — 85027 COMPLETE CBC AUTOMATED: CPT | Performed by: NURSE PRACTITIONER

## 2024-05-19 PROCEDURE — 80053 COMPREHEN METABOLIC PANEL: CPT | Performed by: NURSE PRACTITIONER

## 2024-05-19 PROCEDURE — 27000207 HC ISOLATION

## 2024-05-19 RX ORDER — ONDANSETRON 8 MG/1
8 TABLET, ORALLY DISINTEGRATING ORAL EVERY 8 HOURS
Status: DISCONTINUED | OUTPATIENT
Start: 2024-05-19 | End: 2024-05-27 | Stop reason: HOSPADM

## 2024-05-19 RX ADMIN — ALUMINUM HYDROXIDE, MAGNESIUM HYDROXIDE, AND DIMETHICONE 10 ML: 400; 400; 40 SUSPENSION ORAL at 04:05

## 2024-05-19 RX ADMIN — Medication 1 DOSE: at 09:05

## 2024-05-19 RX ADMIN — SODIUM BICARBONATE 650 MG: 650 TABLET ORAL at 09:05

## 2024-05-19 RX ADMIN — PANTOPRAZOLE SODIUM 40 MG: 40 TABLET, DELAYED RELEASE ORAL at 09:05

## 2024-05-19 RX ADMIN — ACYCLOVIR 800 MG: 200 CAPSULE ORAL at 08:05

## 2024-05-19 RX ADMIN — LEVOFLOXACIN 500 MG: 500 TABLET, FILM COATED ORAL at 09:05

## 2024-05-19 RX ADMIN — Medication 1 DOSE: at 08:05

## 2024-05-19 RX ADMIN — TAMSULOSIN HYDROCHLORIDE 0.4 MG: 0.4 CAPSULE ORAL at 08:05

## 2024-05-19 RX ADMIN — ALLOPURINOL 100 MG: 100 TABLET ORAL at 09:05

## 2024-05-19 RX ADMIN — METOPROLOL TARTRATE 12.5 MG: 25 TABLET, FILM COATED ORAL at 09:05

## 2024-05-19 RX ADMIN — Medication 1 DOSE: at 04:05

## 2024-05-19 RX ADMIN — ALUMINUM HYDROXIDE, MAGNESIUM HYDROXIDE, AND DIMETHICONE 10 ML: 400; 400; 40 SUSPENSION ORAL at 08:05

## 2024-05-19 RX ADMIN — METOPROLOL TARTRATE 12.5 MG: 25 TABLET, FILM COATED ORAL at 08:05

## 2024-05-19 RX ADMIN — CALCITRIOL CAPSULES 0.25 MCG 0.5 MCG: 0.25 CAPSULE ORAL at 09:05

## 2024-05-19 RX ADMIN — ONDANSETRON 8 MG: 8 TABLET, ORALLY DISINTEGRATING ORAL at 06:05

## 2024-05-19 RX ADMIN — Medication 1 DOSE: at 01:05

## 2024-05-19 RX ADMIN — SODIUM BICARBONATE 650 MG: 650 TABLET ORAL at 08:05

## 2024-05-19 RX ADMIN — ACYCLOVIR 800 MG: 200 CAPSULE ORAL at 09:05

## 2024-05-19 RX ADMIN — FLUCONAZOLE 400 MG: 200 TABLET ORAL at 09:05

## 2024-05-19 NOTE — PLAN OF CARE
Plan of care reviewed with patient. Pt is day +6 Auto SCT. Pt remains afebrile. Free from falls or injury. No complaints of pain. No complaints of nausea. Pt up independently. Bed locked in lowest position, non skid socks on, call light within reach. Pt instructed to call if any assistance is needed. Vitals stable. Family at bedside. Will cont to jack pt.

## 2024-05-19 NOTE — ASSESSMENT & PLAN NOTE
- Patient of Dr. Cook  - Admitted (5/11/24) for a Sandra 200 auto SCT  - Today is Day +6   - He received 5 bags containing a CD34 dose of 4.47 x 10^6 5/13/24 at 1330.  - See treatment plan below    Planned conditioning regimen:  Melphalan 200 on Day -1      Antimicrobial Prophylaxis:  Acyclovir starting on Day -1  Levofloxacin starting on Day -1  Fluconazole starting on Day -1  Bactrim starting on Day +30     Growth Factor Support:  Neupogen starting on Day +7      Caregiver: wife and mother  Post-transplant discharge plans: [facility] [place]

## 2024-05-19 NOTE — PLAN OF CARE
Pt. Day +6 of AutoSCT.  Pt. with nonskid footwear on with bed in lowest position and locked with bed rails up x2.  Pt. ambulates independently and instructed to call if assistance is needed.  Pt. with call light within reach.  Pt. with c/o diarrhea today with C. Diff sample collected and pending results.  Pt. ambulated hallway multiple times today.  Pt. With a decreased appetite.  Will continue to monitor pt.

## 2024-05-19 NOTE — SUBJECTIVE & OBJECTIVE
Subjective:     Interval History: Day +6 s/p Sandra 200 Auto SCT for MM. Parents are with him. Doing well. Eating without issue. Up in chair after completing a walk around the floor.[id number]    Objective:     Vital Signs (Most Recent):  Temp: 97.8 °F (36.6 °C) (05/19/24 1152)  Pulse: 96 (05/19/24 1152)  Resp: 18 (05/19/24 1152)  BP: 131/83 (05/19/24 1152)  SpO2: 100 % (05/19/24 1152) Vital Signs (24h Range):  Temp:  [97.8 °F (36.6 °C)-98.8 °F (37.1 °C)] 97.8 °F (36.6 °C)  Pulse:  [] 96  Resp:  [16-18] 18  SpO2:  [98 %-100 %] 100 %  BP: (119-134)/(78-88) 131/83     Weight: 88.6 kg (195 lb 7 oz)  Body mass index is 28.86 kg/m².  Body surface area is 2.08 meters squared.      Intake/Output - Last 3 Shifts         05/17 0700  05/18 0659 05/18 0700  05/19 0659 05/19 0700  05/20 0659    P.O. 720 1260 240    Total Intake(mL/kg) 720 (8) 1260 (14.2) 240 (2.7)    Urine (mL/kg/hr) 1800 (0.8) 2550 (1.2)     Stool 1 3     Total Output 1801 2553     Net -1081 -1293 +240           Urine Occurrence 1 x      Stool Occurrence  2 x              Physical Exam  Vitals and nursing note reviewed.   Constitutional:       Appearance: Normal appearance. He is well-developed.   HENT:      Head: Normocephalic and atraumatic.      Mouth/Throat:      Mouth: Mucous membranes are moist.      Pharynx: No posterior oropharyngeal erythema.   Eyes:      General:         Right eye: No discharge.         Left eye: No discharge.      Extraocular Movements: Extraocular movements intact.      Conjunctiva/sclera: Conjunctivae normal.   Cardiovascular:      Rate and Rhythm: Normal rate and regular rhythm.      Pulses: Normal pulses.      Heart sounds: Normal heart sounds. No murmur heard.  Pulmonary:      Effort: Pulmonary effort is normal. No respiratory distress.      Breath sounds: Normal breath sounds. No wheezing or rales.   Abdominal:      General: Bowel sounds are normal. There is no distension.      Palpations: Abdomen is soft.      Tenderness:  There is no abdominal tenderness.   Musculoskeletal:         General: No deformity. Normal range of motion.      Cervical back: Normal range of motion and neck supple.      Right lower leg: No edema.      Left lower leg: No edema.   Skin:     General: Skin is warm and dry.      Findings: No erythema or rash.      Comments: Right chest wall vas cath. Dressing c/d/i. No sign of infection to site.   Neurological:      General: No focal deficit present.      Mental Status: He is alert and oriented to person, place, and time.   Psychiatric:         Mood and Affect: Mood normal.         Behavior: Behavior normal.         Thought Content: Thought content normal.         Judgment: Judgment normal.            Significant Labs:   CBC:   Recent Labs   Lab 05/18/24 0429 05/19/24 0422   WBC 0.61* 0.15*   HGB 9.4* 8.6*   HCT 28.0* 25.0*   PLT 97* 53*    and CMP:   Recent Labs   Lab 05/18/24 0429 05/19/24 0422    136   K 3.8 3.6    103   CO2 22* 24    97   BUN 30* 29*   CREATININE 1.6* 1.6*   CALCIUM 9.5 9.4   PROT 5.7* 5.5*   ALBUMIN 3.8 3.6   BILITOT 0.9 1.0   ALKPHOS 56 54*   AST 18 16   ALT 22 17   ANIONGAP 11 9       Diagnostic Results:  I have reviewed all pertinent imaging results/findings within the past 24 hours.

## 2024-05-19 NOTE — PROGRESS NOTES
Rohan Wbeer - Oncology ([facility])  Hematology  Bone Marrow Transplant  Progress Note    Patient Name: Pete Fernandez  Admission Date: 5/11/2024  Hospital Length of Stay: 8 days  Code Status: Full Code    Subjective:     Interval History: Day +6 s/p Sandra 200 Auto SCT for MM. Parents are with him. Doing well. Eating without issue. Up in chair after completing a walk around the floor.[id number]    Objective:     Vital Signs (Most Recent):  Temp: 97.8 °F (36.6 °C) (05/19/24 1152)  Pulse: 96 (05/19/24 1152)  Resp: 18 (05/19/24 1152)  BP: 131/83 (05/19/24 1152)  SpO2: 100 % (05/19/24 1152) Vital Signs (24h Range):  Temp:  [97.8 °F (36.6 °C)-98.8 °F (37.1 °C)] 97.8 °F (36.6 °C)  Pulse:  [] 96  Resp:  [16-18] 18  SpO2:  [98 %-100 %] 100 %  BP: (119-134)/(78-88) 131/83     Weight: 88.6 kg (195 lb 7 oz)  Body mass index is 28.86 kg/m².  Body surface area is 2.08 meters squared.      Intake/Output - Last 3 Shifts         05/17 0700  05/18 0659 05/18 0700  05/19 0659 05/19 0700  05/20 0659    P.O. 720 1260 240    Total Intake(mL/kg) 720 (8) 1260 (14.2) 240 (2.7)    Urine (mL/kg/hr) 1800 (0.8) 2550 (1.2)     Stool 1 3     Total Output 1801 2553     Net -1081 -1293 +240           Urine Occurrence 1 x      Stool Occurrence  2 x              Physical Exam  Vitals and nursing note reviewed.   Constitutional:       Appearance: Normal appearance. He is well-developed.   HENT:      Head: Normocephalic and atraumatic.      Mouth/Throat:      Mouth: Mucous membranes are moist.      Pharynx: No posterior oropharyngeal erythema.   Eyes:      General:         Right eye: No discharge.         Left eye: No discharge.      Extraocular Movements: Extraocular movements intact.      Conjunctiva/sclera: Conjunctivae normal.   Cardiovascular:      Rate and Rhythm: Normal rate and regular rhythm.      Pulses: Normal pulses.      Heart sounds: Normal heart sounds. No murmur heard.  Pulmonary:      Effort: Pulmonary effort is normal. No respiratory  distress.      Breath sounds: Normal breath sounds. No wheezing or rales.   Abdominal:      General: Bowel sounds are normal. There is no distension.      Palpations: Abdomen is soft.      Tenderness: There is no abdominal tenderness.   Musculoskeletal:         General: No deformity. Normal range of motion.      Cervical back: Normal range of motion and neck supple.      Right lower leg: No edema.      Left lower leg: No edema.   Skin:     General: Skin is warm and dry.      Findings: No erythema or rash.      Comments: Right chest wall vas cath. Dressing c/d/i. No sign of infection to site.   Neurological:      General: No focal deficit present.      Mental Status: He is alert and oriented to person, place, and time.   Psychiatric:         Mood and Affect: Mood normal.         Behavior: Behavior normal.         Thought Content: Thought content normal.         Judgment: Judgment normal.            Significant Labs:   CBC:   Recent Labs   Lab 05/18/24 0429 05/19/24 0422   WBC 0.61* 0.15*   HGB 9.4* 8.6*   HCT 28.0* 25.0*   PLT 97* 53*    and CMP:   Recent Labs   Lab 05/18/24 0429 05/19/24 0422    136   K 3.8 3.6    103   CO2 22* 24    97   BUN 30* 29*   CREATININE 1.6* 1.6*   CALCIUM 9.5 9.4   PROT 5.7* 5.5*   ALBUMIN 3.8 3.6   BILITOT 0.9 1.0   ALKPHOS 56 54*   AST 18 16   ALT 22 17   ANIONGAP 11 9       Diagnostic Results:  I have reviewed all pertinent imaging results/findings within the past 24 hours.  Assessment/Plan:     * History of auto stem cell transplant  - Patient of Dr. Cook  - Admitted (5/11/24) for a Sandra 200 auto SCT  - Today is Day +6   - He received 5 bags containing a CD34 dose of 4.47 x 10^6 5/13/24 at 1330.  - See treatment plan below    Planned conditioning regimen:  Melphalan 200 on Day -1      Antimicrobial Prophylaxis:  Acyclovir starting on Day -1  Levofloxacin starting on Day -1  Fluconazole starting on Day -1  Bactrim starting on Day +30     Growth Factor  Support:  Neupogen starting on Day +7      Caregiver: wife and mother  Post-transplant discharge plans: [facility] [place]    Insomnia  - continue prn melatonin    Hyperphosphatemia  - monitoring daily phos levels  - mildly elevated at 5.1 today; likely due to diet; will continue to trend    Volume overload  - Weight up 8.5 lbs from admission on 5/14  - Received large volume of fluid for transplant  - Stopped IVF but deferred diuresis  - weight now back to baseline  RESOLVED    Mild malnutrition  - Nutrition consulted. Most recent weight and BMI monitored-     Measurements:  Wt Readings from Last 1 Encounters:   05/17/24 89.6 kg (197 lb 8.5 oz)   Body mass index is 29.17 kg/m².    Patient has been screened and assessed by RD.    Malnutrition Type:  Context: acute illness or injury  Level: mild    Malnutrition Characteristic Summary:  Weight Loss (Malnutrition): 10% in 6 months  Subcutaneous Fat (Malnutrition): mild depletion  Muscle Mass (Malnutrition): mild depletion  Hand  Strength, Left (Malnutrition): Good  Hand  Strength, Right (Malnutrition): Good    Interventions/Recommendations (treatment strategy):  1. Continue Regular Diet. Enocurage high-calorie, high-protein food sources. Small, frequent meals/snacks as tolerated. 2. If PO intake declines <50% consumed at meals, recommend Boost Plus (flavor per patient preference) BID.   3. Monitor I/O's, weight and labs.   4. RD to monitor.     Hypertension  - Continue home metoprolol    GERD (gastroesophageal reflux disease)  - Takes protonix at home. Will receive inpatient as part of SCT protocol.    Gout  - Home febuxostat not on formulary. Substituting with allopurinol while inpatient.    BPH (benign prostatic hyperplasia)  - Continue home Flomax    CKD (chronic kidney disease)  - Baseline creatinine appears to be ~ 1.8  - Renally dose meds as appropriate  - Creatinine stable at 1.6 today    ADD (attention deficit disorder)  - Holding home Aderall while  inpatient    Pericarditis  - Will hold home colchicine as he has completed a 3 month course per PharmD rec    Pancytopenia due to antineoplastic chemotherapy  - Transfuse for hgb < 7, Plt <10K or bleeding  - Daily CBC while inpatient  - Stopped lovenox today with platelets near 50K    Cancer associated pain  - Continue home Oxy IR    Multiple myeloma in remission  From Dr. Cook's most recent clinic note:   is a 42y/o M with no significant medical problems. He has been diagnosed with multiple myeloma. He is here for a virtual stem cell transplant consultation.   Per records,  presented to [facility] on 10/11/23 after 1 month of progressive weakness, abdominal pain, and intermittent confusion. At the time of his presentation, his labs, which had been normal  in 8/2023, were notable for new severe DOUG, transaminitis, and Ca of 18. He was admitted and started on IVF .  Hypercalcemia workup was initiated and he was ultimately found to have numerous osseous lesions on 10/15/23 CT CAP, FLC - Kappa 3.29, Lambda 744, Ratio 226 , SPEP with M spike 0.17, IgG lambda on sIFE. Bone marrow biopsy done 10/18/23 revealed 95% plasma cells and FISH with TP53 deletion, FGFR3/IGH (or NSD2/IGH) fusion, usually representing a t(4;14), and a tetraploid subclone was observed. After correction of his hypercalcemia and improvement in his renal function he was discharged on 10/22/23.   He was started on Beatriz + RVd on 11/2/23.   - Admitted for a Sandra 200 auto SCT on 5/11/24.          VTE Risk Mitigation (From admission, onward)           Ordered     heparin (porcine) injection 1,600 Units  As needed (PRN)         05/11/24 2044     IP VTE HIGH RISK PATIENT  Once         05/11/24 1803     Place sequential compression device  Until discontinued         05/11/24 1803                    Disposition: Will remain inpatient through count recovery    Aye Contreras MD  Bone Marrow Transplant  [facility] - Oncology ([facility])

## 2024-05-19 NOTE — ASSESSMENT & PLAN NOTE
- Transfuse for hgb < 7, Plt <10K or bleeding  - Daily CBC while inpatient  - Stopped lovenox today with platelets near 50K

## 2024-05-19 NOTE — ASSESSMENT & PLAN NOTE
From Dr. Cook's most recent clinic note:   is a 42y/o M with no significant medical problems. He has been diagnosed with multiple myeloma. He is here for a virtual stem cell transplant consultation.   Per records,  presented to [facility] on 10/11/23 after 1 month of progressive weakness, abdominal pain, and intermittent confusion. At the time of his presentation, his labs, which had been normal  in 8/2023, were notable for new severe DOUG, transaminitis, and Ca of 18. He was admitted and started on IVF .  Hypercalcemia workup was initiated and he was ultimately found to have numerous osseous lesions on 10/15/23 CT CAP, FLC - Kappa 3.29, Lambda 744, Ratio 226 , SPEP with M spike 0.17, IgG lambda on sIFE. Bone marrow biopsy done 10/18/23 revealed 95% plasma cells and FISH with TP53 deletion, FGFR3/IGH (or NSD2/IGH) fusion, usually representing a t(4;14), and a tetraploid subclone was observed. After correction of his hypercalcemia and improvement in his renal function he was discharged on 10/22/23.   He was started on Beatriz + RVd on 11/2/23.   - Admitted for a Sandra 200 auto SCT on 5/11/24.

## 2024-05-19 NOTE — ASSESSMENT & PLAN NOTE
- Baseline creatinine appears to be ~ 1.8  - Renally dose meds as appropriate  - Creatinine stable at 1.6 today

## 2024-05-20 PROBLEM — E79.0 ASYMPTOMATIC HYPERURICEMIA: Status: ACTIVE | Noted: 2024-05-10

## 2024-05-20 PROBLEM — T45.1X5A CHEMOTHERAPY INDUCED DIARRHEA: Status: ACTIVE | Noted: 2024-05-20

## 2024-05-20 PROBLEM — K52.1 CHEMOTHERAPY INDUCED DIARRHEA: Status: ACTIVE | Noted: 2024-05-20

## 2024-05-20 LAB
ALBUMIN SERPL BCP-MCNC: 3.5 G/DL (ref 3.5–5.2)
ALP SERPL-CCNC: 54 U/L (ref 55–135)
ALT SERPL W/O P-5'-P-CCNC: 16 U/L (ref 10–44)
ANION GAP SERPL CALC-SCNC: 11 MMOL/L (ref 8–16)
ANISOCYTOSIS BLD QL SMEAR: SLIGHT
AST SERPL-CCNC: 12 U/L (ref 10–40)
BASOPHILS # BLD AUTO: 0 K/UL (ref 0–0.2)
BASOPHILS NFR BLD: 0 % (ref 0–1.9)
BILIRUB SERPL-MCNC: 0.9 MG/DL (ref 0.1–1)
BUN SERPL-MCNC: 32 MG/DL (ref 6–20)
C DIFF GDH STL QL: NEGATIVE
C DIFF TOX A+B STL QL IA: NEGATIVE
CALCIUM SERPL-MCNC: 9.3 MG/DL (ref 8.7–10.5)
CHLORIDE SERPL-SCNC: 102 MMOL/L (ref 95–110)
CO2 SERPL-SCNC: 23 MMOL/L (ref 23–29)
CREAT SERPL-MCNC: 1.7 MG/DL (ref 0.5–1.4)
DIFFERENTIAL METHOD BLD: ABNORMAL
EOSINOPHIL # BLD AUTO: 0 K/UL (ref 0–0.5)
EOSINOPHIL NFR BLD: 0 % (ref 0–8)
ERYTHROCYTE [DISTWIDTH] IN BLOOD BY AUTOMATED COUNT: 13.8 % (ref 11.5–14.5)
EST. GFR  (NO RACE VARIABLE): 50 ML/MIN/1.73 M^2
GLUCOSE SERPL-MCNC: 96 MG/DL (ref 70–110)
HCT VFR BLD AUTO: 23.7 % (ref 40–54)
HGB BLD-MCNC: 8.4 G/DL (ref 14–18)
HYPOCHROMIA BLD QL SMEAR: ABNORMAL
IMM GRANULOCYTES # BLD AUTO: 0 K/UL (ref 0–0.04)
IMM GRANULOCYTES NFR BLD AUTO: 0 % (ref 0–0.5)
LYMPHOCYTES # BLD AUTO: 0 K/UL (ref 1–4.8)
LYMPHOCYTES NFR BLD: 75 % (ref 18–48)
MAGNESIUM SERPL-MCNC: 1.9 MG/DL (ref 1.6–2.6)
MCH RBC QN AUTO: 34.9 PG (ref 27–31)
MCHC RBC AUTO-ENTMCNC: 35.4 G/DL (ref 32–36)
MCV RBC AUTO: 98 FL (ref 82–98)
MONOCYTES # BLD AUTO: 0 K/UL (ref 0.3–1)
MONOCYTES NFR BLD: 0 % (ref 4–15)
NEUTROPHILS # BLD AUTO: 0 K/UL (ref 1.8–7.7)
NEUTROPHILS NFR BLD: 25 % (ref 38–73)
NRBC BLD-RTO: 0 /100 WBC
OVALOCYTES BLD QL SMEAR: ABNORMAL
PHOSPHATE SERPL-MCNC: 5.1 MG/DL (ref 2.7–4.5)
PLATELET # BLD AUTO: 32 K/UL (ref 150–450)
PLATELET BLD QL SMEAR: ABNORMAL
PMV BLD AUTO: 11 FL (ref 9.2–12.9)
POIKILOCYTOSIS BLD QL SMEAR: SLIGHT
POLYCHROMASIA BLD QL SMEAR: ABNORMAL
POTASSIUM SERPL-SCNC: 3.6 MMOL/L (ref 3.5–5.1)
PROT SERPL-MCNC: 5.4 G/DL (ref 6–8.4)
RBC # BLD AUTO: 2.41 M/UL (ref 4.6–6.2)
SODIUM SERPL-SCNC: 136 MMOL/L (ref 136–145)
URATE SERPL-MCNC: 5.2 MG/DL (ref 3.4–7)
WBC # BLD AUTO: 0.04 K/UL (ref 3.9–12.7)

## 2024-05-20 PROCEDURE — 25000003 PHARM REV CODE 250: Performed by: INTERNAL MEDICINE

## 2024-05-20 PROCEDURE — 85025 COMPLETE CBC W/AUTO DIFF WBC: CPT | Performed by: NURSE PRACTITIONER

## 2024-05-20 PROCEDURE — 20600001 HC STEP DOWN PRIVATE ROOM

## 2024-05-20 PROCEDURE — 25000003 PHARM REV CODE 250: Performed by: STUDENT IN AN ORGANIZED HEALTH CARE EDUCATION/TRAINING PROGRAM

## 2024-05-20 PROCEDURE — 63600175 PHARM REV CODE 636 W HCPCS: Mod: JZ,JG | Performed by: INTERNAL MEDICINE

## 2024-05-20 PROCEDURE — 83735 ASSAY OF MAGNESIUM: CPT | Performed by: NURSE PRACTITIONER

## 2024-05-20 PROCEDURE — 84550 ASSAY OF BLOOD/URIC ACID: CPT | Performed by: NURSE PRACTITIONER

## 2024-05-20 PROCEDURE — 25000003 PHARM REV CODE 250: Performed by: NURSE PRACTITIONER

## 2024-05-20 PROCEDURE — 63600175 PHARM REV CODE 636 W HCPCS: Performed by: NURSE PRACTITIONER

## 2024-05-20 PROCEDURE — 97110 THERAPEUTIC EXERCISES: CPT

## 2024-05-20 PROCEDURE — 84100 ASSAY OF PHOSPHORUS: CPT | Performed by: NURSE PRACTITIONER

## 2024-05-20 PROCEDURE — 80053 COMPREHEN METABOLIC PANEL: CPT | Performed by: NURSE PRACTITIONER

## 2024-05-20 PROCEDURE — 99233 SBSQ HOSP IP/OBS HIGH 50: CPT | Mod: ,,, | Performed by: INTERNAL MEDICINE

## 2024-05-20 PROCEDURE — 97530 THERAPEUTIC ACTIVITIES: CPT

## 2024-05-20 PROCEDURE — 27000207 HC ISOLATION

## 2024-05-20 RX ORDER — SODIUM CHLORIDE AND POTASSIUM CHLORIDE 150; 900 MG/100ML; MG/100ML
INJECTION, SOLUTION INTRAVENOUS CONTINUOUS
Status: DISCONTINUED | OUTPATIENT
Start: 2024-05-20 | End: 2024-05-26

## 2024-05-20 RX ADMIN — ONDANSETRON 8 MG: 8 TABLET, ORALLY DISINTEGRATING ORAL at 05:05

## 2024-05-20 RX ADMIN — ONDANSETRON 8 MG: 8 TABLET, ORALLY DISINTEGRATING ORAL at 01:05

## 2024-05-20 RX ADMIN — TAMSULOSIN HYDROCHLORIDE 0.4 MG: 0.4 CAPSULE ORAL at 08:05

## 2024-05-20 RX ADMIN — SODIUM BICARBONATE 650 MG: 650 TABLET ORAL at 08:05

## 2024-05-20 RX ADMIN — METOPROLOL TARTRATE 12.5 MG: 25 TABLET, FILM COATED ORAL at 08:05

## 2024-05-20 RX ADMIN — Medication 1 DOSE: at 01:05

## 2024-05-20 RX ADMIN — Medication 1 DOSE: at 05:05

## 2024-05-20 RX ADMIN — ALUMINUM HYDROXIDE, MAGNESIUM HYDROXIDE, AND DIMETHICONE 10 ML: 400; 400; 40 SUSPENSION ORAL at 09:05

## 2024-05-20 RX ADMIN — PANTOPRAZOLE SODIUM 40 MG: 40 TABLET, DELAYED RELEASE ORAL at 09:05

## 2024-05-20 RX ADMIN — Medication 1 DOSE: at 09:05

## 2024-05-20 RX ADMIN — ONDANSETRON 8 MG: 8 TABLET, ORALLY DISINTEGRATING ORAL at 09:05

## 2024-05-20 RX ADMIN — SODIUM CHLORIDE AND POTASSIUM CHLORIDE: .9; .15 SOLUTION INTRAVENOUS at 09:05

## 2024-05-20 RX ADMIN — FILGRASTIM 480 MCG: 480 INJECTION, SOLUTION INTRAVENOUS; SUBCUTANEOUS at 09:05

## 2024-05-20 RX ADMIN — FLUCONAZOLE 400 MG: 200 TABLET ORAL at 09:05

## 2024-05-20 RX ADMIN — ALLOPURINOL 100 MG: 100 TABLET ORAL at 09:05

## 2024-05-20 RX ADMIN — Medication 9 MG: at 09:05

## 2024-05-20 RX ADMIN — SODIUM BICARBONATE 650 MG: 650 TABLET ORAL at 09:05

## 2024-05-20 RX ADMIN — CALCITRIOL CAPSULES 0.25 MCG 0.5 MCG: 0.25 CAPSULE ORAL at 09:05

## 2024-05-20 RX ADMIN — SODIUM CHLORIDE AND POTASSIUM CHLORIDE: .9; .15 SOLUTION INTRAVENOUS at 08:05

## 2024-05-20 RX ADMIN — METOPROLOL TARTRATE 12.5 MG: 25 TABLET, FILM COATED ORAL at 09:05

## 2024-05-20 RX ADMIN — ACYCLOVIR 800 MG: 200 CAPSULE ORAL at 09:05

## 2024-05-20 RX ADMIN — Medication 1 DOSE: at 08:05

## 2024-05-20 RX ADMIN — ALUMINUM HYDROXIDE, MAGNESIUM HYDROXIDE, AND DIMETHICONE 10 ML: 400; 400; 40 SUSPENSION ORAL at 01:05

## 2024-05-20 RX ADMIN — LEVOFLOXACIN 500 MG: 500 TABLET, FILM COATED ORAL at 09:05

## 2024-05-20 RX ADMIN — ACYCLOVIR 800 MG: 200 CAPSULE ORAL at 08:05

## 2024-05-20 RX ADMIN — ALUMINUM HYDROXIDE, MAGNESIUM HYDROXIDE, AND DIMETHICONE 10 ML: 400; 400; 40 SUSPENSION ORAL at 05:05

## 2024-05-20 NOTE — PROGRESS NOTES
"[facility] - Oncology ([facility])  Hematology  Bone Marrow Transplant  Progress Note    Patient Name: Pete Fernandez  Admission Date: 5/11/2024  Hospital Length of Stay: 9 days  Code Status: Full Code    Subjective:     Interval History: Day +7 s/p Sandra 200 Auto SCT for MM. Still having diarrhea. Cdiff from yesterday still pending. Reports "tightness" to oral mucosa and tongue. Denies any n/v. Restarting IVF with diarrhea and downtrending Na+/K+. Afebrile, VSS    Objective:     Vital Signs (Most Recent):  Temp: 98.3 °F (36.8 °C) (05/20/24 0754)  Pulse: 109 (05/20/24 0754)  Resp: 17 (05/20/24 0754)  BP: 121/84 (05/20/24 0754)  SpO2: 98 % (05/20/24 0754) Vital Signs (24h Range):  Temp:  [97.8 °F (36.6 °C)-98.6 °F (37 °C)] 98.3 °F (36.8 °C)  Pulse:  [] 109  Resp:  [17-20] 17  SpO2:  [94 %-100 %] 98 %  BP: (114-143)/(78-86) 121/84     Weight: 86 kg (189 lb 9.5 oz)  Body mass index is 28 kg/m².  Body surface area is 2.05 meters squared.      Intake/Output - Last 3 Shifts         05/18 0700  05/19 0659 05/19 0700  05/20 0659 05/20 0700  05/21 0659    P.O. 1260 2120     Total Intake(mL/kg) 1260 (14.2) 2120 (24.7)     Urine (mL/kg/hr) 2550 (1.2) 2750 (1.3)     Stool 3 0     Total Output 2553 2750     Net -1293 -630            Stool Occurrence 2 x 7 x              Physical Exam  Vitals and nursing note reviewed.   Constitutional:       Appearance: Normal appearance. He is well-developed.   HENT:      Head: Normocephalic and atraumatic.      Mouth/Throat:      Mouth: Mucous membranes are moist.      Pharynx: No posterior oropharyngeal erythema (no noted tongue lesions).   Eyes:      General:         Right eye: No discharge.         Left eye: No discharge.      Extraocular Movements: Extraocular movements intact.      Conjunctiva/sclera: Conjunctivae normal.   Cardiovascular:      Rate and Rhythm: Normal rate and regular rhythm.      Pulses: Normal pulses.      Heart sounds: Normal heart sounds. No murmur heard.  Pulmonary:    "   Effort: Pulmonary effort is normal. No respiratory distress.      Breath sounds: Normal breath sounds. No wheezing or rales.   Abdominal:      General: Bowel sounds are normal. There is no distension.      Palpations: Abdomen is soft.      Tenderness: There is no abdominal tenderness.   Musculoskeletal:         General: No deformity. Normal range of motion.      Cervical back: Normal range of motion and neck supple.      Right lower leg: No edema.      Left lower leg: No edema.   Skin:     General: Skin is warm and dry.      Findings: No erythema or rash.      Comments: Right chest wall vas cath. Dressing c/d/i. No sign of infection to site.   Neurological:      General: No focal deficit present.      Mental Status: He is alert and oriented to person, place, and time.   Psychiatric:         Mood and Affect: Mood normal.         Behavior: Behavior normal.         Thought Content: Thought content normal.         Judgment: Judgment normal.            Significant Labs:   CBC:   Recent Labs   Lab 05/19/24  0422 05/20/24  0409   WBC 0.15* 0.04*   HGB 8.6* 8.4*   HCT 25.0* 23.7*   PLT 53* 32*    and CMP:   Recent Labs   Lab 05/19/24  0422 05/20/24  0409    136   K 3.6 3.6    102   CO2 24 23   GLU 97 96   BUN 29* 32*   CREATININE 1.6* 1.7*   CALCIUM 9.4 9.3   PROT 5.5* 5.4*   ALBUMIN 3.6 3.5   BILITOT 1.0 0.9   ALKPHOS 54* 54*   AST 16 12   ALT 17 16   ANIONGAP 9 11       Diagnostic Results:  None  Assessment/Plan:     * History of auto stem cell transplant  - Patient of Dr. Cook  - Admitted (5/11/24) for a Sandra 200 auto SCT  - Today is Day +7   - He received 5 bags containing a CD34 dose of 4.47 x 10^6 5/13/24 at 1330.  - See treatment plan below    Planned conditioning regimen:  Melphalan 200 on Day -1      Antimicrobial Prophylaxis:  Acyclovir starting on Day -1  Levofloxacin starting on Day -1  Fluconazole starting on Day -1  Bactrim starting on Day +30     Growth Factor Support:  Neupogen starting on Day  +7      Caregiver: wife and mother  Post-transplant discharge plans: Becca Martin    Multiple myeloma in remission  From Dr. Cook's most recent clinic note:   is a 44y/o M with no significant medical problems. He has been diagnosed with multiple myeloma. He is here for a virtual stem cell transplant consultation.   Per records,  presented to [facility] on 10/11/23 after 1 month of progressive weakness, abdominal pain, and intermittent confusion. At the time of his presentation, his labs, which had been normal  in 8/2023, were notable for new severe DOUG, transaminitis, and Ca of 18. He was admitted and started on IVF .  Hypercalcemia workup was initiated and he was ultimately found to have numerous osseous lesions on 10/15/23 CT CAP, FLC - Kappa 3.29, Lambda 744, Ratio 226 , SPEP with M spike 0.17, IgG lambda on sIFE. Bone marrow biopsy done 10/18/23 revealed 95% plasma cells and FISH with TP53 deletion, FGFR3/IGH (or NSD2/IGH) fusion, usually representing a t(4;14), and a tetraploid subclone was observed. After correction of his hypercalcemia and improvement in his renal function he was discharged on 10/22/23.   He was started on Beatriz + RVd on 11/2/23.   - Admitted for a Sandra 200 auto SCT on 5/11/24.      Pancytopenia due to antineoplastic chemotherapy  - Transfuse for hgb < 7, Plt <10K or bleeding  - Daily CBC while inpatient  - Stopped lovenox with platelets <50K    Chemotherapy induced diarrhea  - Cdiff sent 5/19; results pending  - if negative, can start PRN imodium    Insomnia  - continue prn melatonin    Hyperphosphatemia  - monitoring daily phos levels  - mildly elevated at 5.1 today; likely due to diet; will continue to trend    Volume overload  - Weight up 8.5 lbs from admission on 5/14  - Received large volume of fluid for transplant  - Stopped IVF but deferred diuresis  - weight now back to baseline  RESOLVED    Mild malnutrition  - Nutrition consulted. Most recent weight and BMI monitored-      Measurements:  Wt Readings from Last 1 Encounters:   05/20/24 86 kg (189 lb 9.5 oz)   Body mass index is 28 kg/m².    Patient has been screened and assessed by RD.    Malnutrition Type:  Context: acute illness or injury  Level: mild    Malnutrition Characteristic Summary:  Weight Loss (Malnutrition): 10% in 6 months  Subcutaneous Fat (Malnutrition): mild depletion  Muscle Mass (Malnutrition): mild depletion  Hand  Strength, Left (Malnutrition): Good  Hand  Strength, Right (Malnutrition): Good    Interventions/Recommendations (treatment strategy):  1. Continue Regular Diet. Enocurage high-calorie, high-protein food sources. Small, frequent meals/snacks as tolerated. If DOUG worsens consider Renal diet. 2. If PO intake declines <50% consumed at meals, recommend Boost Plus (flavor per patient preference) BID.   3. Monitor I/O's, weight and labs.   4. Consider a phosphate binder with all meals. 5. RD to monitor.     Hypertension  - Continue home metoprolol    GERD (gastroesophageal reflux disease)  - Takes protonix at home. Will receive inpatient as part of SCT protocol.    Gout  - Home febuxostat not on formulary. Substituting with allopurinol while inpatient.    BPH (benign prostatic hyperplasia)  - Continue home Flomax    CKD (chronic kidney disease)  - Baseline creatinine appears to be ~ 1.8  - Renally dose meds as appropriate  - Creatinine stable at 1.7 today    ADD (attention deficit disorder)  - Holding home Aderall while inpatient    Pericarditis  - Will hold home colchicine as he has completed a 3 month course per PharmD rec    Cancer associated pain  - Continue home Oxy IR        VTE Risk Mitigation (From admission, onward)           Ordered     heparin (porcine) injection 1,600 Units  As needed (PRN)         05/11/24 2044     IP VTE HIGH RISK PATIENT  Once         05/11/24 1803     Place sequential compression device  Until discontinued         05/11/24 1803                    Disposition: Remains  inpatient    Elvie Guevara NP  Bone Marrow Transplant  [facility] - Oncology ([facility])

## 2024-05-20 NOTE — ASSESSMENT & PLAN NOTE
- Transfuse for hgb < 7, Plt <10K or bleeding  - Daily CBC while inpatient  - Stopped lovenox with platelets <50K

## 2024-05-20 NOTE — SUBJECTIVE & OBJECTIVE
"  Subjective:     Interval History: Day +7 s/p Sandra 200 Auto SCT for MM. Still having diarrhea. Cdiff from yesterday still pending. Reports "tightness" to oral mucosa and tongue. Denies any n/v. Restarting IVF with diarrhea and downtrending Na+/K+. Afebrile, VSS    Objective:     Vital Signs (Most Recent):  Temp: 98.3 °F (36.8 °C) (05/20/24 0754)  Pulse: 109 (05/20/24 0754)  Resp: 17 (05/20/24 0754)  BP: 121/84 (05/20/24 0754)  SpO2: 98 % (05/20/24 0754) Vital Signs (24h Range):  Temp:  [97.8 °F (36.6 °C)-98.6 °F (37 °C)] 98.3 °F (36.8 °C)  Pulse:  [] 109  Resp:  [17-20] 17  SpO2:  [94 %-100 %] 98 %  BP: (114-143)/(78-86) 121/84     Weight: 86 kg (189 lb 9.5 oz)  Body mass index is 28 kg/m².  Body surface area is 2.05 meters squared.      Intake/Output - Last 3 Shifts         05/18 0700  05/19 0659 05/19 0700  05/20 0659 05/20 0700  05/21 0659    P.O. 1260 2120     Total Intake(mL/kg) 1260 (14.2) 2120 (24.7)     Urine (mL/kg/hr) 2550 (1.2) 2750 (1.3)     Stool 3 0     Total Output 2553 2750     Net -1293 -630            Stool Occurrence 2 x 7 x              Physical Exam  Vitals and nursing note reviewed.   Constitutional:       Appearance: Normal appearance. He is well-developed.   HENT:      Head: Normocephalic and atraumatic.      Mouth/Throat:      Mouth: Mucous membranes are moist.      Pharynx: No posterior oropharyngeal erythema (no noted tongue lesions).   Eyes:      General:         Right eye: No discharge.         Left eye: No discharge.      Extraocular Movements: Extraocular movements intact.      Conjunctiva/sclera: Conjunctivae normal.   Cardiovascular:      Rate and Rhythm: Normal rate and regular rhythm.      Pulses: Normal pulses.      Heart sounds: Normal heart sounds. No murmur heard.  Pulmonary:      Effort: Pulmonary effort is normal. No respiratory distress.      Breath sounds: Normal breath sounds. No wheezing or rales.   Abdominal:      General: Bowel sounds are normal. There is no " distension.      Palpations: Abdomen is soft.      Tenderness: There is no abdominal tenderness.   Musculoskeletal:         General: No deformity. Normal range of motion.      Cervical back: Normal range of motion and neck supple.      Right lower leg: No edema.      Left lower leg: No edema.   Skin:     General: Skin is warm and dry.      Findings: No erythema or rash.      Comments: Right chest wall vas cath. Dressing c/d/i. No sign of infection to site.   Neurological:      General: No focal deficit present.      Mental Status: He is alert and oriented to person, place, and time.   Psychiatric:         Mood and Affect: Mood normal.         Behavior: Behavior normal.         Thought Content: Thought content normal.         Judgment: Judgment normal.            Significant Labs:   CBC:   Recent Labs   Lab 05/19/24  0422 05/20/24  0409   WBC 0.15* 0.04*   HGB 8.6* 8.4*   HCT 25.0* 23.7*   PLT 53* 32*    and CMP:   Recent Labs   Lab 05/19/24  0422 05/20/24  0409    136   K 3.6 3.6    102   CO2 24 23   GLU 97 96   BUN 29* 32*   CREATININE 1.6* 1.7*   CALCIUM 9.4 9.3   PROT 5.5* 5.4*   ALBUMIN 3.6 3.5   BILITOT 1.0 0.9   ALKPHOS 54* 54*   AST 16 12   ALT 17 16   ANIONGAP 9 11       Diagnostic Results:  None

## 2024-05-20 NOTE — ASSESSMENT & PLAN NOTE
- Nutrition consulted. Most recent weight and BMI monitored-     Measurements:  Wt Readings from Last 1 Encounters:   05/20/24 86 kg (189 lb 9.5 oz)   Body mass index is 28 kg/m².    Patient has been screened and assessed by RD.    Malnutrition Type:  Context: acute illness or injury  Level: mild    Malnutrition Characteristic Summary:  Weight Loss (Malnutrition): 10% in 6 months  Subcutaneous Fat (Malnutrition): mild depletion  Muscle Mass (Malnutrition): mild depletion  Hand  Strength, Left (Malnutrition): Good  Hand  Strength, Right (Malnutrition): Good    Interventions/Recommendations (treatment strategy):  1. Continue Regular Diet. Enocurage high-calorie, high-protein food sources. Small, frequent meals/snacks as tolerated. If DOUG worsens consider Renal diet. 2. If PO intake declines <50% consumed at meals, recommend Boost Plus (flavor per patient preference) BID.   3. Monitor I/O's, weight and labs.   4. Consider a phosphate binder with all meals. 5. RD to monitor.

## 2024-05-20 NOTE — ASSESSMENT & PLAN NOTE
- Patient of Dr. Cook  - Admitted (5/11/24) for a Sandra 200 auto SCT  - Today is Day +7   - He received 5 bags containing a CD34 dose of 4.47 x 10^6 5/13/24 at 1330.  - See treatment plan below    Planned conditioning regimen:  Melphalan 200 on Day -1      Antimicrobial Prophylaxis:  Acyclovir starting on Day -1  Levofloxacin starting on Day -1  Fluconazole starting on Day -1  Bactrim starting on Day +30     Growth Factor Support:  Neupogen starting on Day +7      Caregiver: wife and mother  Post-transplant discharge plans: Becca Martin

## 2024-05-20 NOTE — ASSESSMENT & PLAN NOTE
- Patient with chronically high uric acid levels. Home febuxostat not on formulary. Substituting with allopurinol while inpatient.

## 2024-05-20 NOTE — PLAN OF CARE
POC reviewed with patient; understanding verbalized. Pt is Day+7 SCT. No complaints this shift. Pt. with nonskid footwear on, bed in lowest position, and locked with bed rails up x2.  Pt. instructed to call prior to getting OOB.  Pt. has call light and personal items within reach. Patient ambulates in room independently. VSS and afebrile this shift. All questions and concerns addressed at this time. Will continue to monitor.

## 2024-05-20 NOTE — ASSESSMENT & PLAN NOTE
From Dr. Cook's most recent clinic note:   is a 44y/o M with no significant medical problems. He has been diagnosed with multiple myeloma. He is here for a virtual stem cell transplant consultation.   Per records,  presented to [facility] on 10/11/23 after 1 month of progressive weakness, abdominal pain, and intermittent confusion. At the time of his presentation, his labs, which had been normal  in 8/2023, were notable for new severe DOUG, transaminitis, and Ca of 18. He was admitted and started on IVF .  Hypercalcemia workup was initiated and he was ultimately found to have numerous osseous lesions on 10/15/23 CT CAP, FLC - Kappa 3.29, Lambda 744, Ratio 226 , SPEP with M spike 0.17, IgG lambda on sIFE. Bone marrow biopsy done 10/18/23 revealed 95% plasma cells and FISH with TP53 deletion, FGFR3/IGH (or NSD2/IGH) fusion, usually representing a t(4;14), and a tetraploid subclone was observed. After correction of his hypercalcemia and improvement in his renal function he was discharged on 10/22/23.   He was started on Beatriz + RVd on 11/2/23.   - Admitted for a Sandra 200 auto SCT on 5/11/24.

## 2024-05-20 NOTE — PT/OT/SLP PROGRESS
Occupational Therapy   Treatment    Name: Pete Fernandez  MRN: [id number]  Admitting Diagnosis:  History of auto stem cell transplant       Recommendations:     Discharge Recommendations: No Therapy Indicated  Discharge Equipment Recommendations:  none  Barriers to discharge:  None    Assessment:     Pete Fernandez is a 43 y.o. male with a medical diagnosis of History of auto stem cell transplant.  He presents with high level of functioning at this time with mobility and ADL tasks. Pt. Participated well in therapy on this date and demonstrated good understanding of HEP with red theraband. Performance deficits affecting function are other (comment) (potential for decline).     Rehab Prognosis:  Good; patient would benefit from acute skilled OT services to address these deficits and reach maximum level of function.       Plan:     Patient to be seen 1 x/week to address the above listed problems via self-care/home management, therapeutic activities, therapeutic exercises  Plan of Care Expires: 06/12/24  Plan of Care Reviewed with: patient    Subjective     Chief Complaint: Pt. With no complaints on this date  Patient/Family Comments/goals: to continue to improve  Pain/Comfort:  Pain Rating 1: 0/10  Pain Rating Post-Intervention 1: 0/10    Objective:     Communicated with: nurse prior to session.  Patient found up in chair with  (port) upon OT entry to room.    General Precautions: Standard, fall    Orthopedic Precautions:N/A  Braces: N/A  Respiratory Status: Room air     Occupational Performance:     Bed Mobility:    Not tested as pt. Was up in chair     Functional Mobility/Transfers:  Patient completed Sit <> Stand Transfer with independence  with  no assistive device   Functional Mobility: not tested    Activities of Daily Living:  Not performed      [facility] 6 Click ADL: 24    Treatment & Education:  Pt. Educated on HEP to be performed 2 to 3 x per day x 2 sets 20 reps in sit or  front of bed or chair. HEP is for all  major planes of BUE motion.   All there ex performed x 2 sets 10 reps    Patient left up in chair with all lines intact and call button in reach    GOALS:   Multidisciplinary Problems       Occupational Therapy Goals          Problem: Occupational Therapy    Goal Priority Disciplines Outcome Interventions   Occupational Therapy Goal     OT, PT/OT Progressing    Description: Goals to be met by: 6/12/24     Patient will maintain functional independence with ADLs by performing:    UE Dressing with [place].  LE Dressing with [place].  Grooming while standing at sink with [place].  Toileting from toilet with [place] for hygiene and clothing management.   Sitting at edge of bed x20+ minutes with [place].  Upper extremity exercise program x10 reps per handout, with independence.                           Time Tracking:     OT Date of Treatment: 05/20/24  OT Start Time: 0912  OT Stop Time: 0924  OT Total Time (min): 12 min    Billable Minutes:Therapeutic Exercise 12    OT/TATY: OT          5/20/2024

## 2024-05-20 NOTE — PT/OT/SLP PROGRESS
"Physical Therapy Treatment    Patient Name:  Pete Fernandez   MRN:  [id number]    Recommendations:     Discharge Recommendations: No Therapy Indicated  Discharge Equipment Recommendations: none  Barriers to discharge: None    Assessment:     Pete Fernandez is a 43 y.o. male admitted with a medical diagnosis of History of auto stem cell transplant.  He presents with the following impairments/functional limitations:  (at risk for deconditioning). Patient demonstrating high motivation to participate in PT session, with good response to completed activities and provided education. Patient continuing to perform functional mobility as baseline level. Will decreased frequency to 1x/wk at this time.    Rehab Prognosis: Good; patient would benefit from acute skilled PT services to address these deficits and reach maximum level of function.    Recent Surgery: * No surgery found *      Plan:     During this hospitalization, patient to be seen 1 x/week to address the identified rehab impairments via gait training, therapeutic activities, therapeutic exercises, neuromuscular re-education and progress toward the following goals:    Plan of Care Expires:  06/12/24    Subjective     Chief Complaint: None stated  Patient/Family Comments/goals: "I just finished up with a walk, but I'll do another with you"  Pain/Comfort:  Pain Rating 1: 0/10  Pain Rating Post-Intervention 1: 0/10      Objective:     Communicated with RN prior to session.  Patient found up in chair with central line upon PT entry to room.     General Precautions: Standard, fall   Orthopedic Precautions:N/A   Braces: N/A   Body mass index is 28 kg/m².  Oxygen Device: Room Air  Vitals: /77 (Patient Position: Sitting)   Pulse 101   Temp 97.9 °F (36.6 °C) (Oral)   Resp 18   Ht 5' 9" (1.753 m)   Wt 86 kg (189 lb 9.5 oz)   SpO2 100%   BMI 28.00 kg/m²      Functional Mobility:  Transfers:     Sit<>Stand: x1, Independent from Bedside Chair with No AD  Chair Transfer: " x1, Independent with No AD using step transfer technique    Gait: x~620ft,  Supervision-Independent  with  IV pole    Total Distance: ~620ft  Gait Assessment: No obvious gait deviations observed    Balance:   Static Sitting: Independent  Dynamic Sitting: Independent    Static Standing: Independent  Dynamic Standing:  Supervision-Independent    Stairs: Pt ascended/descended  5 stair(s) with  SBA-Supervision  and Unilateral HR using No AD  Patient Negotiated Stairs with Reciprocal Pattern    AM-PAC 6 CLICK MOBILITY  Turning over in bed (including adjusting bedclothes, sheets and blankets)?: 4  Sitting down on and standing up from a chair with arms (e.g., wheelchair, bedside commode, etc.): 4  Moving to and from a bed to a chair (including a wheelchair)?: 4  Need to walk in hospital room?: 4  Climbing 3-5 steps with a railing?: 4     Treatment & Education:  Patient Education Provided on:  The role of physical therapy and how the patient can benefit from skilled services  The negative effects of prolonged bed rest/sedentary behavior, along with the importance of OOB activity & patient participation with PT  The importance of contacting RN, via call light, for mobility throughout the day  Pt white board updated with current therapists name and level of mobility assistance needed.     Patient Verbalized understanding of all topics touched on this date. All patient questions answered within the PT scope of practice    Patient left up in chair with all lines intact, call button in reach, and RN notified..    GOALS:   Multidisciplinary Problems       Physical Therapy Goals          Problem: Physical Therapy    Goal Priority Disciplines Outcome Goal Variances Interventions   Physical Therapy Goal     PT, PT/OT Progressing     Description: Goals to be met by: 24     Patient will maintain functional independence with mobility, without evidence of deconditioning, by performin. Supine <> sit with [place] with  HOB flat  2. Sit <> stand transfer with [place]  3. Gait  x 500 feet with [place] using No Assistive Device.   4. Ascend/descend 1 flight of stairs with [place] using No Assistive Device  5. Lower extremity exercise program x30 reps per handout, with independence                         Time Tracking:     PT Received On: 05/20/24  PT Start Time: 1318     PT Stop Time: 1329  PT Total Time (min): 11 min     Billable Minutes: Therapeutic Activity 11    Treatment Type: Treatment  PT/PTA: PT     Number of PTA visits since last PT visit: 0     05/20/2024

## 2024-05-20 NOTE — PLAN OF CARE
AAOx4; POC reviewed & verbalizes understanding.  Chemo: Day +7 of Sandra 200 AUTO SCT  Afebrile. No acute events on shift. No deficits noted  IV access & IVF: R vas cath, infusing NS w/ 20meq K+ @ 100  BM: 5/20/24 formed stool  : WNL  Appetite: poor  Bed alarm set; fall precautions maintained.   Bed locked in lowest position, side rails up x2, call light within reach, environment clear. Encouraged pt to call nurse with any concerns.  Safety measures maintained

## 2024-05-20 NOTE — ASSESSMENT & PLAN NOTE
- Weight up 8.5 lbs from admission on 5/14  - Received large volume of fluid for transplant  - Stopped IVF but deferred diuresis  - weight now back to baseline  RESOLVED   [FreeTextEntry1] : The patient appears to be doing well on screening tests are done.\par The family history of prostate cancer and getting a PSA as a baseline. eKG shows a sinus bradycardia but otherwise within normal limits

## 2024-05-20 NOTE — PLAN OF CARE
Problem: Physical Therapy  Goal: Physical Therapy Goal  Description: Goals to be met by: 24     Patient will maintain functional independence with mobility, without evidence of deconditioning, by performin. Supine <> sit with [place] with HOB flat  2. Sit <> stand transfer with [place]  3. Gait  x 500 feet with [place] using No Assistive Device.   4. Ascend/descend 1 flight of stairs with [place] using No Assistive Device  5. Lower extremity exercise program x30 reps per handout, with independence    Outcome: Progressing

## 2024-05-21 LAB
ABO + RH BLD: ABNORMAL
ALBUMIN SERPL BCP-MCNC: 3.3 G/DL (ref 3.5–5.2)
ALP SERPL-CCNC: 52 U/L (ref 55–135)
ALT SERPL W/O P-5'-P-CCNC: 14 U/L (ref 10–44)
ANION GAP SERPL CALC-SCNC: 9 MMOL/L (ref 8–16)
AST SERPL-CCNC: 10 U/L (ref 10–40)
BASOPHILS # BLD AUTO: 0 K/UL (ref 0–0.2)
BASOPHILS NFR BLD: 0 % (ref 0–1.9)
BILIRUB SERPL-MCNC: 0.7 MG/DL (ref 0.1–1)
BLD GP AB SCN CELLS X3 SERPL QL: ABNORMAL
BUN SERPL-MCNC: 29 MG/DL (ref 6–20)
CALCIUM SERPL-MCNC: 8.9 MG/DL (ref 8.7–10.5)
CHLORIDE SERPL-SCNC: 107 MMOL/L (ref 95–110)
CO2 SERPL-SCNC: 22 MMOL/L (ref 23–29)
CREAT SERPL-MCNC: 1.5 MG/DL (ref 0.5–1.4)
DIFFERENTIAL METHOD BLD: ABNORMAL
EOSINOPHIL # BLD AUTO: 0 K/UL (ref 0–0.5)
EOSINOPHIL NFR BLD: 0 % (ref 0–8)
ERYTHROCYTE [DISTWIDTH] IN BLOOD BY AUTOMATED COUNT: 13.6 % (ref 11.5–14.5)
EST. GFR  (NO RACE VARIABLE): 58.1 ML/MIN/1.73 M^2
GLUCOSE SERPL-MCNC: 101 MG/DL (ref 70–110)
HCT VFR BLD AUTO: 22.5 % (ref 40–54)
HGB BLD-MCNC: 7.9 G/DL (ref 14–18)
IMM GRANULOCYTES # BLD AUTO: 0 K/UL (ref 0–0.04)
IMM GRANULOCYTES NFR BLD AUTO: 0 % (ref 0–0.5)
LYMPHOCYTES # BLD AUTO: 0 K/UL (ref 1–4.8)
LYMPHOCYTES NFR BLD: 50 % (ref 18–48)
MAGNESIUM SERPL-MCNC: 1.7 MG/DL (ref 1.6–2.6)
MCH RBC QN AUTO: 34.1 PG (ref 27–31)
MCHC RBC AUTO-ENTMCNC: 35.1 G/DL (ref 32–36)
MCV RBC AUTO: 97 FL (ref 82–98)
MONOCYTES # BLD AUTO: 0 K/UL (ref 0.3–1)
MONOCYTES NFR BLD: 25 % (ref 4–15)
NEUTROPHILS # BLD AUTO: 0 K/UL (ref 1.8–7.7)
NEUTROPHILS NFR BLD: 25 % (ref 38–73)
NRBC BLD-RTO: 0 /100 WBC
OVALOCYTES BLD QL SMEAR: ABNORMAL
PHOSPHATE SERPL-MCNC: 4.7 MG/DL (ref 2.7–4.5)
PLATELET # BLD AUTO: 14 K/UL (ref 150–450)
PLATELET BLD QL SMEAR: ABNORMAL
PMV BLD AUTO: 10.2 FL (ref 9.2–12.9)
POTASSIUM SERPL-SCNC: 3.8 MMOL/L (ref 3.5–5.1)
PROT SERPL-MCNC: 5.1 G/DL (ref 6–8.4)
RBC # BLD AUTO: 2.32 M/UL (ref 4.6–6.2)
SODIUM SERPL-SCNC: 138 MMOL/L (ref 136–145)
SPECIMEN OUTDATE: ABNORMAL
WBC # BLD AUTO: 0.04 K/UL (ref 3.9–12.7)

## 2024-05-21 PROCEDURE — 25000003 PHARM REV CODE 250: Performed by: NURSE PRACTITIONER

## 2024-05-21 PROCEDURE — 25000003 PHARM REV CODE 250: Performed by: INTERNAL MEDICINE

## 2024-05-21 PROCEDURE — 84100 ASSAY OF PHOSPHORUS: CPT | Performed by: NURSE PRACTITIONER

## 2024-05-21 PROCEDURE — 99233 SBSQ HOSP IP/OBS HIGH 50: CPT | Mod: ,,, | Performed by: INTERNAL MEDICINE

## 2024-05-21 PROCEDURE — 80053 COMPREHEN METABOLIC PANEL: CPT | Performed by: NURSE PRACTITIONER

## 2024-05-21 PROCEDURE — 83735 ASSAY OF MAGNESIUM: CPT | Performed by: NURSE PRACTITIONER

## 2024-05-21 PROCEDURE — 86850 RBC ANTIBODY SCREEN: CPT | Performed by: INTERNAL MEDICINE

## 2024-05-21 PROCEDURE — 25000003 PHARM REV CODE 250: Performed by: STUDENT IN AN ORGANIZED HEALTH CARE EDUCATION/TRAINING PROGRAM

## 2024-05-21 PROCEDURE — 63600175 PHARM REV CODE 636 W HCPCS: Mod: JZ,JG | Performed by: INTERNAL MEDICINE

## 2024-05-21 PROCEDURE — 85025 COMPLETE CBC W/AUTO DIFF WBC: CPT | Performed by: NURSE PRACTITIONER

## 2024-05-21 PROCEDURE — 20600001 HC STEP DOWN PRIVATE ROOM

## 2024-05-21 PROCEDURE — 63600175 PHARM REV CODE 636 W HCPCS: Performed by: NURSE PRACTITIONER

## 2024-05-21 RX ORDER — CALCITRIOL 0.25 UG/1
0.25 CAPSULE ORAL DAILY
Status: DISCONTINUED | OUTPATIENT
Start: 2024-05-22 | End: 2024-05-27 | Stop reason: HOSPADM

## 2024-05-21 RX ORDER — DICYCLOMINE HYDROCHLORIDE 10 MG/1
20 CAPSULE ORAL 4 TIMES DAILY PRN
Status: DISCONTINUED | OUTPATIENT
Start: 2024-05-21 | End: 2024-05-27 | Stop reason: HOSPADM

## 2024-05-21 RX ORDER — ACETAMINOPHEN 325 MG/1
650 TABLET ORAL EVERY 6 HOURS PRN
Status: DISCONTINUED | OUTPATIENT
Start: 2024-05-21 | End: 2024-05-22

## 2024-05-21 RX ORDER — LOPERAMIDE HYDROCHLORIDE 2 MG/1
2 CAPSULE ORAL 4 TIMES DAILY PRN
Status: DISCONTINUED | OUTPATIENT
Start: 2024-05-21 | End: 2024-05-27 | Stop reason: HOSPADM

## 2024-05-21 RX ADMIN — SODIUM BICARBONATE 650 MG: 650 TABLET ORAL at 08:05

## 2024-05-21 RX ADMIN — Medication 1 DOSE: at 08:05

## 2024-05-21 RX ADMIN — ALUMINUM HYDROXIDE, MAGNESIUM HYDROXIDE, AND DIMETHICONE 10 ML: 400; 400; 40 SUSPENSION ORAL at 01:05

## 2024-05-21 RX ADMIN — ALUMINUM HYDROXIDE, MAGNESIUM HYDROXIDE, AND DIMETHICONE 10 ML: 400; 400; 40 SUSPENSION ORAL at 08:05

## 2024-05-21 RX ADMIN — FLUCONAZOLE 400 MG: 200 TABLET ORAL at 08:05

## 2024-05-21 RX ADMIN — Medication 1 DOSE: at 05:05

## 2024-05-21 RX ADMIN — DICYCLOMINE HYDROCHLORIDE 20 MG: 10 CAPSULE ORAL at 05:05

## 2024-05-21 RX ADMIN — PANTOPRAZOLE SODIUM 40 MG: 40 TABLET, DELAYED RELEASE ORAL at 08:05

## 2024-05-21 RX ADMIN — ALLOPURINOL 100 MG: 100 TABLET ORAL at 08:05

## 2024-05-21 RX ADMIN — ONDANSETRON 8 MG: 8 TABLET, ORALLY DISINTEGRATING ORAL at 05:05

## 2024-05-21 RX ADMIN — ONDANSETRON 8 MG: 8 TABLET, ORALLY DISINTEGRATING ORAL at 01:05

## 2024-05-21 RX ADMIN — TAMSULOSIN HYDROCHLORIDE 0.4 MG: 0.4 CAPSULE ORAL at 08:05

## 2024-05-21 RX ADMIN — HEPARIN SODIUM 1600 UNITS: 1000 INJECTION, SOLUTION INTRAVENOUS; SUBCUTANEOUS at 04:05

## 2024-05-21 RX ADMIN — METOPROLOL TARTRATE 12.5 MG: 25 TABLET, FILM COATED ORAL at 08:05

## 2024-05-21 RX ADMIN — FILGRASTIM 480 MCG: 480 INJECTION, SOLUTION INTRAVENOUS; SUBCUTANEOUS at 08:05

## 2024-05-21 RX ADMIN — ALUMINUM HYDROXIDE, MAGNESIUM HYDROXIDE, AND DIMETHICONE 10 ML: 400; 400; 40 SUSPENSION ORAL at 09:05

## 2024-05-21 RX ADMIN — CALCITRIOL CAPSULES 0.25 MCG 0.5 MCG: 0.25 CAPSULE ORAL at 08:05

## 2024-05-21 RX ADMIN — Medication 9 MG: at 08:05

## 2024-05-21 RX ADMIN — ALUMINUM HYDROXIDE, MAGNESIUM HYDROXIDE, AND DIMETHICONE 10 ML: 400; 400; 40 SUSPENSION ORAL at 05:05

## 2024-05-21 RX ADMIN — ACYCLOVIR 800 MG: 200 CAPSULE ORAL at 08:05

## 2024-05-21 RX ADMIN — LOPERAMIDE HYDROCHLORIDE 2 MG: 2 CAPSULE ORAL at 06:05

## 2024-05-21 RX ADMIN — ACETAMINOPHEN 650 MG: 325 TABLET ORAL at 11:05

## 2024-05-21 RX ADMIN — ONDANSETRON 8 MG: 8 TABLET, ORALLY DISINTEGRATING ORAL at 09:05

## 2024-05-21 RX ADMIN — LEVOFLOXACIN 500 MG: 500 TABLET, FILM COATED ORAL at 08:05

## 2024-05-21 RX ADMIN — SODIUM CHLORIDE AND POTASSIUM CHLORIDE: .9; .15 SOLUTION INTRAVENOUS at 04:05

## 2024-05-21 RX ADMIN — Medication 1 DOSE: at 01:05

## 2024-05-21 NOTE — ASSESSMENT & PLAN NOTE
- Patient of Dr. Cook  - Admitted (5/11/24) for a Sandra 200 auto SCT  - Today is Day +8   - He received 5 bags containing a CD34 dose of 4.47 x 10^6 5/13/24 at 1330.  - See treatment plan below    Planned conditioning regimen:  Melphalan 200 on Day -1      Antimicrobial Prophylaxis:  Acyclovir starting on Day -1  Levofloxacin starting on Day -1  Fluconazole starting on Day -1  Bactrim starting on Day +30     Growth Factor Support:  Neupogen starting on Day +7      Caregiver: wife and mother  Post-transplant discharge plans: Becca Martin

## 2024-05-21 NOTE — ASSESSMENT & PLAN NOTE
From Dr. Cook's most recent clinic note:   is a 44y/o M with no significant medical problems. He has been diagnosed with multiple myeloma. He is here for a virtual stem cell transplant consultation.   Per records,  presented to [facility] on 10/11/23 after 1 month of progressive weakness, abdominal pain, and intermittent confusion. At the time of his presentation, his labs, which had been normal  in 8/2023, were notable for new severe DOUG, transaminitis, and Ca of 18. He was admitted and started on IVF .  Hypercalcemia workup was initiated and he was ultimately found to have numerous osseous lesions on 10/15/23 CT CAP, FLC - Kappa 3.29, Lambda 744, Ratio 226 , SPEP with M spike 0.17, IgG lambda on sIFE. Bone marrow biopsy done 10/18/23 revealed 95% plasma cells and FISH with TP53 deletion, FGFR3/IGH (or NSD2/IGH) fusion, usually representing a t(4;14), and a tetraploid subclone was observed. After correction of his hypercalcemia and improvement in his renal function he was discharged on 10/22/23.   He was started on Beatriz + RVd on 11/2/23.   - Admitted for a Sandra 200 auto SCT on 5/11/24.

## 2024-05-21 NOTE — PLAN OF CARE
Day +8 MA Auto tx. Pt involved in plan of care and communicating needs throughout shift. PRN bentyl given x1 PRN imodium given x1. Pt swallowing medication w/o issue at this time. All VSS; no acute events so far this shift.  Pt remaining free from falls or injury throughout shift; bed locked and in lowest position; call light within reach.  Pt instructed to call for assistance as needed.  Q1H rounding done on pt.

## 2024-05-21 NOTE — ASSESSMENT & PLAN NOTE
- Baseline creatinine appears to be ~ 1.8  - Renally dose meds as appropriate  - Creatinine stable at 1.5 today

## 2024-05-21 NOTE — ASSESSMENT & PLAN NOTE
- Cdiff neg  - PRN imodium available   Apligraf Treatment Note    NAME:  Shanta Carrizales  YOB: 1948  MEDICAL RECORD NUMBER:  [id number]  DATE:  1/13/2021    Goal: Patient will receive safe and proper application of skin substitute. Patient will comply with caring for dressing, offloading and reporting complications. Expiration date and pH of Apligraf checked immediately prior to use. Package intact prior to use and no damage noted. Transport temperature controlled and acceptable. Apligraf was removed from protective sterile packaging by physician and applied to prepared wound bed. Apligraf was applied to right lower leg and affixed with steri-strips by the provider. Apligraf was covered with non-adherent ulcer dressing. Applied Kerramax coban 2 over non-adherent. Applied dry gauze and/or roll gauze. Coban or ace wrap was applied to secure graft and decrease edema. Patient/caregiver was instructed not to remove dressing and to keep it clean and dry. Pt/family/caregiver was instructed on signs and symptoms of complications to report such as draining through dressing, dressing falling down/slipping, getting wet, or severe pain or tingling in toes   Pt/family/caregiver was instructed on need for offloading and elevation of affected extremity and on use of prescribed offloading device. Apligraf may be applied a total of 5 times per wound over a 12 week period. Date of first application of Apligraf for this current wound is December 16, 2020.        Guidelines followed    Electronically signed by Xiomara Carver RN on 1/13/2021 at 2:47 PM

## 2024-05-21 NOTE — ASSESSMENT & PLAN NOTE
- Nutrition consulted. Most recent weight and BMI monitored-     Measurements:  Wt Readings from Last 1 Encounters:   05/21/24 87.5 kg (192 lb 14.4 oz)   Body mass index is 28.49 kg/m².    Patient has been screened and assessed by RD.    Malnutrition Type:  Context: acute illness or injury  Level: mild    Malnutrition Characteristic Summary:  Weight Loss (Malnutrition): 10% in 6 months  Subcutaneous Fat (Malnutrition): mild depletion  Muscle Mass (Malnutrition): mild depletion  Hand  Strength, Left (Malnutrition): Good  Hand  Strength, Right (Malnutrition): Good    Interventions/Recommendations (treatment strategy):  1. Continue Regular Diet. Enocurage high-calorie, high-protein food sources. Small, frequent meals/snacks as tolerated. If DOUG worsens consider Renal diet. 2. If PO intake declines <50% consumed at meals, recommend Boost Plus (flavor per patient preference) BID.   3. Monitor I/O's, weight and labs.   4. Consider a phosphate binder with all meals. 5. RD to monitor.

## 2024-05-21 NOTE — SUBJECTIVE & OBJECTIVE
Subjective:     Interval History: Day +8 from a Sandra 200 auto SCT for MM. Remains afebrile. VSS. Denies diarrhea today. PRN imodium available. Phos remains mildly elevated, so dose-reduced calcitriol.    Objective:     Vital Signs (Most Recent):  Temp: 98.9 °F (37.2 °C) (05/21/24 1145)  Pulse: 87 (05/21/24 1145)  Resp: 17 (05/21/24 1145)  BP: 132/77 (05/21/24 1145)  SpO2: 99 % (05/21/24 1145) Vital Signs (24h Range):  Temp:  [98.4 °F (36.9 °C)-99.4 °F (37.4 °C)] 98.9 °F (37.2 °C)  Pulse:  [] 87  Resp:  [17-20] 17  SpO2:  [96 %-100 %] 99 %  BP: (118-145)/(66-85) 132/77     Weight: 87.5 kg (192 lb 14.4 oz)  Body mass index is 28.49 kg/m².  Body surface area is 2.06 meters squared.    ECOG SCORE           [unfilled]    Intake/Output - Last 3 Shifts         05/19 0700  05/20 0659 05/20 0700  05/21 0659 05/21 0700  05/22 0659    P.O. 2120 330 200    Total Intake(mL/kg) 2120 (24.7) 330 (3.8) 200 (2.3)    Urine (mL/kg/hr) 2750 (1.3) 800 (0.4) 1300 (2.3)    Stool 0      Total Output 2750 800 1300    Net -630 -470 -1100           Stool Occurrence 7 x               Physical Exam  Constitutional:       Appearance: He is well-developed.   HENT:      Head: Normocephalic and atraumatic.      Mouth/Throat:      Pharynx: No oropharyngeal exudate.   Eyes:      General:         Right eye: No discharge.         Left eye: No discharge.      Conjunctiva/sclera: Conjunctivae normal.      Pupils: Pupils are equal, round, and reactive to light.   Cardiovascular:      Rate and Rhythm: Normal rate and regular rhythm.      Heart sounds: Normal heart sounds. No murmur heard.  Pulmonary:      Effort: Pulmonary effort is normal. No respiratory distress.      Breath sounds: Normal breath sounds. No wheezing or rales.   Abdominal:      General: Bowel sounds are normal. There is no distension.      Palpations: Abdomen is soft.      Tenderness: There is no abdominal tenderness.   Musculoskeletal:         General: No deformity. Normal range of  motion.      Cervical back: Normal range of motion and neck supple.   Skin:     General: Skin is warm and dry.      Findings: No erythema or rash.      Comments: Right chest wall vas cath. Dressing c/d/i. No sign of infection to site.   Neurological:      Mental Status: He is alert and oriented to person, place, and time.   Psychiatric:         Behavior: Behavior normal.         Thought Content: Thought content normal.         Judgment: Judgment normal.            Significant Labs:   CBC:   Recent Labs   Lab 05/20/24  0409 05/21/24  0400   WBC 0.04* 0.04*   HGB 8.4* 7.9*   HCT 23.7* 22.5*   PLT 32* 14*    and CMP:   Recent Labs   Lab 05/20/24  0409 05/21/24  0400    138   K 3.6 3.8    107   CO2 23 22*   GLU 96 101   BUN 32* 29*   CREATININE 1.7* 1.5*   CALCIUM 9.3 8.9   PROT 5.4* 5.1*   ALBUMIN 3.5 3.3*   BILITOT 0.9 0.7   ALKPHOS 54* 52*   AST 12 10   ALT 16 14   ANIONGAP 11 9       Diagnostic Results:  I have reviewed all pertinent imaging results/findings within the past 24 hours.

## 2024-05-21 NOTE — PLAN OF CARE
AAOX4, verbal and able to make needs known. Afebrile, independent. C/o pain mucositis, gave duke's sol. No loose BM. N/S with 20meq K+ infusing 100ml/hr. Bed in lowest position, side rails up x2, call light in reach. Bed in lowest position, side rails up x2, call light in reach.

## 2024-05-21 NOTE — PROGRESS NOTES
[facility] - Oncology ([facility])  Hematology  Bone Marrow Transplant  Progress Note    Patient Name: Pete Fernandez  Admission Date: 5/11/2024  Hospital Length of Stay: 10 days  Code Status: Full Code    Subjective:     Interval History: Day +8 from a Sandra 200 auto SCT for MM. Remains afebrile. VSS. Denies diarrhea today. PRN imodium available. Phos remains mildly elevated, so dose-reduced calcitriol.    Objective:     Vital Signs (Most Recent):  Temp: 98.9 °F (37.2 °C) (05/21/24 1145)  Pulse: 87 (05/21/24 1145)  Resp: 17 (05/21/24 1145)  BP: 132/77 (05/21/24 1145)  SpO2: 99 % (05/21/24 1145) Vital Signs (24h Range):  Temp:  [98.4 °F (36.9 °C)-99.4 °F (37.4 °C)] 98.9 °F (37.2 °C)  Pulse:  [] 87  Resp:  [17-20] 17  SpO2:  [96 %-100 %] 99 %  BP: (118-145)/(66-85) 132/77     Weight: 87.5 kg (192 lb 14.4 oz)  Body mass index is 28.49 kg/m².  Body surface area is 2.06 meters squared.    ECOG SCORE           [unfilled]    Intake/Output - Last 3 Shifts         05/19 0700  05/20 0659 05/20 0700  05/21 0659 05/21 0700  05/22 0659    P.O. 2120 330 200    Total Intake(mL/kg) 2120 (24.7) 330 (3.8) 200 (2.3)    Urine (mL/kg/hr) 2750 (1.3) 800 (0.4) 1300 (2.3)    Stool 0      Total Output 2750 800 1300    Net -630 -470 -1100           Stool Occurrence 7 x               Physical Exam  Constitutional:       Appearance: He is well-developed.   HENT:      Head: Normocephalic and atraumatic.      Mouth/Throat:      Pharynx: No oropharyngeal exudate.   Eyes:      General:         Right eye: No discharge.         Left eye: No discharge.      Conjunctiva/sclera: Conjunctivae normal.      Pupils: Pupils are equal, round, and reactive to light.   Cardiovascular:      Rate and Rhythm: Normal rate and regular rhythm.      Heart sounds: Normal heart sounds. No murmur heard.  Pulmonary:      Effort: Pulmonary effort is normal. No respiratory distress.      Breath sounds: Normal breath sounds. No wheezing or rales.   Abdominal:      General:  Bowel sounds are normal. There is no distension.      Palpations: Abdomen is soft.      Tenderness: There is no abdominal tenderness.   Musculoskeletal:         General: No deformity. Normal range of motion.      Cervical back: Normal range of motion and neck supple.   Skin:     General: Skin is warm and dry.      Findings: No erythema or rash.      Comments: Right chest wall vas cath. Dressing c/d/i. No sign of infection to site.   Neurological:      Mental Status: He is alert and oriented to person, place, and time.   Psychiatric:         Behavior: Behavior normal.         Thought Content: Thought content normal.         Judgment: Judgment normal.            Significant Labs:   CBC:   Recent Labs   Lab 05/20/24  0409 05/21/24  0400   WBC 0.04* 0.04*   HGB 8.4* 7.9*   HCT 23.7* 22.5*   PLT 32* 14*    and CMP:   Recent Labs   Lab 05/20/24  0409 05/21/24  0400    138   K 3.6 3.8    107   CO2 23 22*   GLU 96 101   BUN 32* 29*   CREATININE 1.7* 1.5*   CALCIUM 9.3 8.9   PROT 5.4* 5.1*   ALBUMIN 3.5 3.3*   BILITOT 0.9 0.7   ALKPHOS 54* 52*   AST 12 10   ALT 16 14   ANIONGAP 11 9       Diagnostic Results:  I have reviewed all pertinent imaging results/findings within the past 24 hours.  Assessment/Plan:     * History of auto stem cell transplant  - Patient of Dr. Cook  - Admitted (5/11/24) for a Sandra 200 auto SCT  - Today is Day +8   - He received 5 bags containing a CD34 dose of 4.47 x 10^6 5/13/24 at 1330.  - See treatment plan below    Planned conditioning regimen:  Melphalan 200 on Day -1      Antimicrobial Prophylaxis:  Acyclovir starting on Day -1  Levofloxacin starting on Day -1  Fluconazole starting on Day -1  Bactrim starting on Day +30     Growth Factor Support:  Neupogen starting on Day +7      Caregiver: wife and mother  Post-transplant discharge plans: Becca Martin    Multiple myeloma in remission  From Dr. Cook's most recent clinic note:   is a 42y/o M with no significant medical  problems. He has been diagnosed with multiple myeloma. He is here for a virtual stem cell transplant consultation.   Per records,  presented to [facility] on 10/11/23 after 1 month of progressive weakness, abdominal pain, and intermittent confusion. At the time of his presentation, his labs, which had been normal  in 8/2023, were notable for new severe DOUG, transaminitis, and Ca of 18. He was admitted and started on IVF .  Hypercalcemia workup was initiated and he was ultimately found to have numerous osseous lesions on 10/15/23 CT CAP, FLC - Kappa 3.29, Lambda 744, Ratio 226 , SPEP with M spike 0.17, IgG lambda on sIFE. Bone marrow biopsy done 10/18/23 revealed 95% plasma cells and FISH with TP53 deletion, FGFR3/IGH (or NSD2/IGH) fusion, usually representing a t(4;14), and a tetraploid subclone was observed. After correction of his hypercalcemia and improvement in his renal function he was discharged on 10/22/23.   He was started on Beatriz + RVd on 11/2/23.   - Admitted for a Sandra 200 auto SCT on 5/11/24.    Pancytopenia due to antineoplastic chemotherapy  - Transfuse for hgb < 7, Plt <10K or bleeding  - Daily CBC while inpatient  - Stopped lovenox with platelets <50K    Chemotherapy induced diarrhea  - Cdiff neg  - PRN imodium available    Insomnia  - Continue PRN melatonin    Hyperphosphatemia  - Monitoring daily phos levels  - Mildly elevated at 4.7 today  - Dose-reduced calcitriol    Volume overload  - Weight up 8.5 lbs from admission on 5/14  - Received large volume of fluid for transplant  - Stopped IVF but deferred diuresis  - weight now back to baseline  RESOLVED    Mild malnutrition  - Nutrition consulted. Most recent weight and BMI monitored-     Measurements:  Wt Readings from Last 1 Encounters:   05/21/24 87.5 kg (192 lb 14.4 oz)   Body mass index is 28.49 kg/m².    Patient has been screened and assessed by RD.    Malnutrition Type:  Context: acute illness or injury  Level: mild    Malnutrition  Characteristic Summary:  Weight Loss (Malnutrition): 10% in 6 months  Subcutaneous Fat (Malnutrition): mild depletion  Muscle Mass (Malnutrition): mild depletion  Hand  Strength, Left (Malnutrition): Good  Hand  Strength, Right (Malnutrition): Good    Interventions/Recommendations (treatment strategy):  1. Continue Regular Diet. Enocurage high-calorie, high-protein food sources. Small, frequent meals/snacks as tolerated. If DOUG worsens consider Renal diet. 2. If PO intake declines <50% consumed at meals, recommend Boost Plus (flavor per patient preference) BID.   3. Monitor I/O's, weight and labs.   4. Consider a phosphate binder with all meals. 5. RD to monitor.     Hypertension  - Continue home metoprolol    GERD (gastroesophageal reflux disease)  - Takes protonix at home. Will receive inpatient as part of SCT protocol.    Asymptomatic hyperuricemia  - Patient with chronically high uric acid levels. Home febuxostat not on formulary. Substituting with allopurinol while inpatient.    BPH (benign prostatic hyperplasia)  - Continue home Flomax    CKD (chronic kidney disease)  - Baseline creatinine appears to be ~ 1.8  - Renally dose meds as appropriate  - Creatinine stable at 1.5 today    ADD (attention deficit disorder)  - Holding home Aderall while inpatient    Pericarditis  - Will hold home colchicine as he has completed a 3 month course per PharmD rec    Cancer associated pain  - Continue home Oxy IR        VTE Risk Mitigation (From admission, onward)           Ordered     heparin (porcine) injection 1,600 Units  As needed (PRN)         05/11/24 2044     IP VTE HIGH RISK PATIENT  Once         05/11/24 1803     Place sequential compression device  Until discontinued         05/11/24 1803                    Disposition: Inpatient for autologous SCT. Awaiting neutrophil engraftment.    Julianna Dalal, NP  Bone Marrow Transplant  [facility] - On[facility] ([facility])

## 2024-05-22 ENCOUNTER — TELEPHONE (OUTPATIENT)
Dept: HEMATOLOGY/ONCOLOGY | Facility: CLINIC | Age: 44
End: 2024-05-22
Payer: COMMERCIAL

## 2024-05-22 PROBLEM — D70.9 NEUTROPENIC FEVER: Status: ACTIVE | Noted: 2024-05-22

## 2024-05-22 PROBLEM — E83.42 HYPOMAGNESEMIA: Status: ACTIVE | Noted: 2024-05-22

## 2024-05-22 PROBLEM — R50.81 NEUTROPENIC FEVER: Status: ACTIVE | Noted: 2024-05-22

## 2024-05-22 LAB
ADENOVIRUS: NOT DETECTED
ALBUMIN SERPL BCP-MCNC: 3 G/DL (ref 3.5–5.2)
ALP SERPL-CCNC: 45 U/L (ref 55–135)
ALT SERPL W/O P-5'-P-CCNC: 12 U/L (ref 10–44)
ANION GAP SERPL CALC-SCNC: 8 MMOL/L (ref 8–16)
ANISOCYTOSIS BLD QL SMEAR: SLIGHT
AST SERPL-CCNC: 9 U/L (ref 10–40)
BASOPHILS # BLD AUTO: 0 K/UL (ref 0–0.2)
BASOPHILS NFR BLD: 0 % (ref 0–1.9)
BILIRUB SERPL-MCNC: 0.6 MG/DL (ref 0.1–1)
BLD PROD TYP BPU: NORMAL
BLOOD UNIT EXPIRATION DATE: NORMAL
BLOOD UNIT TYPE CODE: 6200
BLOOD UNIT TYPE: NORMAL
BORDETELLA PARAPERTUSSIS (IS1001): NOT DETECTED
BORDETELLA PERTUSSIS (PTXP): NOT DETECTED
BUN SERPL-MCNC: 26 MG/DL (ref 6–20)
CALCIUM SERPL-MCNC: 8.8 MG/DL (ref 8.7–10.5)
CHLAMYDIA PNEUMONIAE: NOT DETECTED
CHLORIDE SERPL-SCNC: 108 MMOL/L (ref 95–110)
CO2 SERPL-SCNC: 22 MMOL/L (ref 23–29)
CODING SYSTEM: NORMAL
CORONAVIRUS 229E, COMMON COLD VIRUS: NOT DETECTED
CORONAVIRUS HKU1, COMMON COLD VIRUS: NOT DETECTED
CORONAVIRUS NL63, COMMON COLD VIRUS: NOT DETECTED
CORONAVIRUS OC43, COMMON COLD VIRUS: NOT DETECTED
CREAT SERPL-MCNC: 1.6 MG/DL (ref 0.5–1.4)
CROSSMATCH INTERPRETATION: NORMAL
DIFFERENTIAL METHOD BLD: ABNORMAL
DISPENSE STATUS: NORMAL
EOSINOPHIL # BLD AUTO: 0 K/UL (ref 0–0.5)
EOSINOPHIL NFR BLD: 0 % (ref 0–8)
ERYTHROCYTE [DISTWIDTH] IN BLOOD BY AUTOMATED COUNT: 13.3 % (ref 11.5–14.5)
EST. GFR  (NO RACE VARIABLE): 53.5 ML/MIN/1.73 M^2
FLUBV RNA NPH QL NAA+NON-PROBE: NOT DETECTED
GLUCOSE SERPL-MCNC: 105 MG/DL (ref 70–110)
HCT VFR BLD AUTO: 19.9 % (ref 40–54)
HGB BLD-MCNC: 7.1 G/DL (ref 14–18)
HPIV1 RNA NPH QL NAA+NON-PROBE: NOT DETECTED
HPIV2 RNA NPH QL NAA+NON-PROBE: NOT DETECTED
HPIV3 RNA NPH QL NAA+NON-PROBE: NOT DETECTED
HPIV4 RNA NPH QL NAA+NON-PROBE: NOT DETECTED
HUMAN METAPNEUMOVIRUS: NOT DETECTED
HYPOCHROMIA BLD QL SMEAR: ABNORMAL
IMM GRANULOCYTES # BLD AUTO: 0 K/UL (ref 0–0.04)
IMM GRANULOCYTES NFR BLD AUTO: 0 % (ref 0–0.5)
INFLUENZA A (SUBTYPES H1,H1-2009,H3): NOT DETECTED
LYMPHOCYTES # BLD AUTO: 0.1 K/UL (ref 1–4.8)
LYMPHOCYTES NFR BLD: 83.3 % (ref 18–48)
MAGNESIUM SERPL-MCNC: 1.5 MG/DL (ref 1.6–2.6)
MCH RBC QN AUTO: 34.6 PG (ref 27–31)
MCHC RBC AUTO-ENTMCNC: 35.7 G/DL (ref 32–36)
MCV RBC AUTO: 97 FL (ref 82–98)
MONOCYTES # BLD AUTO: 0 K/UL (ref 0.3–1)
MONOCYTES NFR BLD: 16.7 % (ref 4–15)
MYCOPLASMA PNEUMONIAE: NOT DETECTED
NEUTROPHILS # BLD AUTO: 0 K/UL (ref 1.8–7.7)
NEUTROPHILS NFR BLD: 0 % (ref 38–73)
NRBC BLD-RTO: 0 /100 WBC
OVALOCYTES BLD QL SMEAR: ABNORMAL
PHOSPHATE SERPL-MCNC: 4.5 MG/DL (ref 2.7–4.5)
PLATELET # BLD AUTO: 4 K/UL (ref 150–450)
PMV BLD AUTO: ABNORMAL FL (ref 9.2–12.9)
POIKILOCYTOSIS BLD QL SMEAR: SLIGHT
POLYCHROMASIA BLD QL SMEAR: ABNORMAL
POTASSIUM SERPL-SCNC: 3.7 MMOL/L (ref 3.5–5.1)
PROT SERPL-MCNC: 4.9 G/DL (ref 6–8.4)
RBC # BLD AUTO: 2.05 M/UL (ref 4.6–6.2)
RESPIRATORY INFECTION PANEL SOURCE: NORMAL
RSV RNA NPH QL NAA+NON-PROBE: NOT DETECTED
RV+EV RNA NPH QL NAA+NON-PROBE: NOT DETECTED
SARS-COV-2 RNA RESP QL NAA+PROBE: NOT DETECTED
SODIUM SERPL-SCNC: 138 MMOL/L (ref 136–145)
SPHEROCYTES BLD QL SMEAR: ABNORMAL
UNIT NUMBER: NORMAL
WBC # BLD AUTO: 0.06 K/UL (ref 3.9–12.7)

## 2024-05-22 PROCEDURE — 25000003 PHARM REV CODE 250: Performed by: INTERNAL MEDICINE

## 2024-05-22 PROCEDURE — P9037 PLATE PHERES LEUKOREDU IRRAD: HCPCS | Performed by: NURSE PRACTITIONER

## 2024-05-22 PROCEDURE — 87086 URINE CULTURE/COLONY COUNT: CPT | Performed by: INTERNAL MEDICINE

## 2024-05-22 PROCEDURE — 25000003 PHARM REV CODE 250: Performed by: NURSE PRACTITIONER

## 2024-05-22 PROCEDURE — 63600175 PHARM REV CODE 636 W HCPCS: Performed by: NURSE PRACTITIONER

## 2024-05-22 PROCEDURE — 83735 ASSAY OF MAGNESIUM: CPT | Performed by: NURSE PRACTITIONER

## 2024-05-22 PROCEDURE — 25000003 PHARM REV CODE 250: Performed by: STUDENT IN AN ORGANIZED HEALTH CARE EDUCATION/TRAINING PROGRAM

## 2024-05-22 PROCEDURE — 36430 TRANSFUSION BLD/BLD COMPNT: CPT

## 2024-05-22 PROCEDURE — 85025 COMPLETE CBC W/AUTO DIFF WBC: CPT | Performed by: NURSE PRACTITIONER

## 2024-05-22 PROCEDURE — 99233 SBSQ HOSP IP/OBS HIGH 50: CPT | Mod: ,,, | Performed by: INTERNAL MEDICINE

## 2024-05-22 PROCEDURE — 87798 DETECT AGENT NOS DNA AMP: CPT | Performed by: NURSE PRACTITIONER

## 2024-05-22 PROCEDURE — 80053 COMPREHEN METABOLIC PANEL: CPT | Performed by: NURSE PRACTITIONER

## 2024-05-22 PROCEDURE — 63600175 PHARM REV CODE 636 W HCPCS: Mod: JZ,JG | Performed by: INTERNAL MEDICINE

## 2024-05-22 PROCEDURE — 87040 BLOOD CULTURE FOR BACTERIA: CPT | Performed by: INTERNAL MEDICINE

## 2024-05-22 PROCEDURE — 84100 ASSAY OF PHOSPHORUS: CPT | Performed by: NURSE PRACTITIONER

## 2024-05-22 PROCEDURE — 36415 COLL VENOUS BLD VENIPUNCTURE: CPT | Performed by: INTERNAL MEDICINE

## 2024-05-22 PROCEDURE — 30233R1 TRANSFUSION OF NONAUTOLOGOUS PLATELETS INTO PERIPHERAL VEIN, PERCUTANEOUS APPROACH: ICD-10-PCS | Performed by: INTERNAL MEDICINE

## 2024-05-22 PROCEDURE — 63600175 PHARM REV CODE 636 W HCPCS: Performed by: INTERNAL MEDICINE

## 2024-05-22 PROCEDURE — 20600001 HC STEP DOWN PRIVATE ROOM

## 2024-05-22 RX ORDER — ACETAMINOPHEN 325 MG/1
650 TABLET ORAL ONCE
Status: COMPLETED | OUTPATIENT
Start: 2024-05-22 | End: 2024-05-22

## 2024-05-22 RX ORDER — LANOLIN ALCOHOL/MO/W.PET/CERES
800 CREAM (GRAM) TOPICAL EVERY 4 HOURS
Status: COMPLETED | OUTPATIENT
Start: 2024-05-22 | End: 2024-05-22

## 2024-05-22 RX ORDER — ACETAMINOPHEN 325 MG/1
650 TABLET ORAL ONCE AS NEEDED
Status: DISCONTINUED | OUTPATIENT
Start: 2024-05-22 | End: 2024-05-22

## 2024-05-22 RX ORDER — HYDROCODONE BITARTRATE AND ACETAMINOPHEN 500; 5 MG/1; MG/1
TABLET ORAL
Status: DISCONTINUED | OUTPATIENT
Start: 2024-05-22 | End: 2024-05-23

## 2024-05-22 RX ORDER — SIMETHICONE 80 MG
1 TABLET,CHEWABLE ORAL 3 TIMES DAILY PRN
Status: DISCONTINUED | OUTPATIENT
Start: 2024-05-22 | End: 2024-05-27 | Stop reason: HOSPADM

## 2024-05-22 RX ORDER — DIPHENHYDRAMINE HCL 25 MG
25 CAPSULE ORAL ONCE AS NEEDED
Status: COMPLETED | OUTPATIENT
Start: 2024-05-22 | End: 2024-05-25

## 2024-05-22 RX ORDER — ACETAMINOPHEN 500 MG
1000 TABLET ORAL ONCE
Status: COMPLETED | OUTPATIENT
Start: 2024-05-22 | End: 2024-05-22

## 2024-05-22 RX ADMIN — FLUCONAZOLE 400 MG: 200 TABLET ORAL at 08:05

## 2024-05-22 RX ADMIN — METOPROLOL TARTRATE 12.5 MG: 25 TABLET, FILM COATED ORAL at 08:05

## 2024-05-22 RX ADMIN — ACETAMINOPHEN 650 MG: 325 TABLET ORAL at 01:05

## 2024-05-22 RX ADMIN — ACETAMINOPHEN 1000 MG: 500 TABLET ORAL at 08:05

## 2024-05-22 RX ADMIN — Medication 1 DOSE: at 04:05

## 2024-05-22 RX ADMIN — LOPERAMIDE HYDROCHLORIDE 2 MG: 2 CAPSULE ORAL at 01:05

## 2024-05-22 RX ADMIN — ACYCLOVIR 800 MG: 200 CAPSULE ORAL at 08:05

## 2024-05-22 RX ADMIN — FILGRASTIM 480 MCG: 480 INJECTION, SOLUTION INTRAVENOUS; SUBCUTANEOUS at 08:05

## 2024-05-22 RX ADMIN — CALCITRIOL CAPSULES 0.25 MCG 0.25 MCG: 0.25 CAPSULE ORAL at 08:05

## 2024-05-22 RX ADMIN — ALUMINUM HYDROXIDE, MAGNESIUM HYDROXIDE, AND DIMETHICONE 10 ML: 400; 400; 40 SUSPENSION ORAL at 01:05

## 2024-05-22 RX ADMIN — CEFEPIME 2 G: 2 INJECTION, POWDER, FOR SOLUTION INTRAVENOUS at 08:05

## 2024-05-22 RX ADMIN — CEFEPIME 2 G: 2 INJECTION, POWDER, FOR SOLUTION INTRAVENOUS at 03:05

## 2024-05-22 RX ADMIN — DICYCLOMINE HYDROCHLORIDE 20 MG: 10 CAPSULE ORAL at 08:05

## 2024-05-22 RX ADMIN — SODIUM BICARBONATE 650 MG: 650 TABLET ORAL at 08:05

## 2024-05-22 RX ADMIN — Medication 9 MG: at 09:05

## 2024-05-22 RX ADMIN — ALUMINUM HYDROXIDE, MAGNESIUM HYDROXIDE, AND DIMETHICONE 10 ML: 400; 400; 40 SUSPENSION ORAL at 09:05

## 2024-05-22 RX ADMIN — Medication 1 DOSE: at 09:05

## 2024-05-22 RX ADMIN — ONDANSETRON 8 MG: 8 TABLET, ORALLY DISINTEGRATING ORAL at 09:05

## 2024-05-22 RX ADMIN — TAMSULOSIN HYDROCHLORIDE 0.4 MG: 0.4 CAPSULE ORAL at 08:05

## 2024-05-22 RX ADMIN — ONDANSETRON 8 MG: 8 TABLET, ORALLY DISINTEGRATING ORAL at 01:05

## 2024-05-22 RX ADMIN — Medication 800 MG: at 10:05

## 2024-05-22 RX ADMIN — ACETAMINOPHEN 650 MG: 325 TABLET ORAL at 08:05

## 2024-05-22 RX ADMIN — Medication 1 DOSE: at 01:05

## 2024-05-22 RX ADMIN — PANTOPRAZOLE SODIUM 40 MG: 40 TABLET, DELAYED RELEASE ORAL at 08:05

## 2024-05-22 RX ADMIN — ALUMINUM HYDROXIDE, MAGNESIUM HYDROXIDE, AND DIMETHICONE 10 ML: 400; 400; 40 SUSPENSION ORAL at 08:05

## 2024-05-22 RX ADMIN — SODIUM CHLORIDE AND POTASSIUM CHLORIDE: .9; .15 SOLUTION INTRAVENOUS at 03:05

## 2024-05-22 RX ADMIN — DIPHENHYDRAMINE HYDROCHLORIDE 25 MG: 25 CAPSULE ORAL at 09:05

## 2024-05-22 RX ADMIN — DICYCLOMINE HYDROCHLORIDE 20 MG: 10 CAPSULE ORAL at 01:05

## 2024-05-22 RX ADMIN — CEFEPIME 2 G: 2 INJECTION, POWDER, FOR SOLUTION INTRAVENOUS at 01:05

## 2024-05-22 RX ADMIN — Medication 800 MG: at 01:05

## 2024-05-22 RX ADMIN — ONDANSETRON 8 MG: 8 TABLET, ORALLY DISINTEGRATING ORAL at 05:05

## 2024-05-22 RX ADMIN — ALUMINUM HYDROXIDE, MAGNESIUM HYDROXIDE, AND DIMETHICONE 10 ML: 400; 400; 40 SUSPENSION ORAL at 04:05

## 2024-05-22 RX ADMIN — ALLOPURINOL 100 MG: 100 TABLET ORAL at 09:05

## 2024-05-22 NOTE — PROGRESS NOTES
Returned call to JEFF Ly , ([phone]) regarding discharge planning; left voicemail requesting callback.  Will follow.

## 2024-05-22 NOTE — CLINICAL REVIEW
"RAPID RESPONSE NURSE CHART REVIEW        Chart Reviewed: 05/22/2024, 12:09 PM    MRN: [id number]  Bed: 847/847 A    Dx: History of auto stem cell transplant    Pete Fernandez has a past medical history of Anxiety disorder, unspecified and Hypercalcemia.    Last VS: BP (!) 151/70 (Patient Position: Lying)   Pulse 95   Temp 98.8 °F (37.1 °C) (Oral)   Resp 20   Ht 5' 9" (1.753 m)   Wt 88.8 kg (195 lb 12.3 oz)   SpO2 99%   BMI 28.91 kg/m²     24H Vital Sign Range:  Temp:  [98.8 °F (37.1 °C)-102.7 °F (39.3 °C)]   Pulse:  []   Resp:  [17-20]   BP: (133-151)/(70-84)   SpO2:  [98 %-100 %]     Level of Consciousness (AVPU): alert    Recent Labs     05/20/24  0409 05/21/24  0400 05/22/24  0340   WBC 0.04* 0.04* 0.06*   HGB 8.4* 7.9* 7.1*   HCT 23.7* 22.5* 19.9*   PLT 32* 14* 4*       Recent Labs     05/20/24  0409 05/21/24  0400 05/22/24  0340    138 138   K 3.6 3.8 3.7    107 108   CO2 23 22* 22*   BUN 32* 29* 26*   CREATININE 1.7* 1.5* 1.6*   GLU 96 101 105   PHOS 5.1* 4.7* 4.5   MG 1.9 1.7 1.5*        OXYGEN:    MEWS score: 1    Rounding completed w/ charge BETSY Neville. Discussed temperature of 102.7F and tachycardia w/ HR in the 120s. Acetaminophen given per MD orders. Also discussed PLT count of 4. 1u PLTs ordered by primary team.  No additional concerns verbalized at this time. Instructed to call [phone] for further concerns or assistance.    Natali Pimentel RN       "

## 2024-05-22 NOTE — ASSESSMENT & PLAN NOTE
- Monitoring mag level daily while inpatient  - Repleting for mag level of 1.5 today  - Avoiding PRN electrolyte order set due to CKD

## 2024-05-22 NOTE — ASSESSMENT & PLAN NOTE
- Patient of Dr. Cook  - Admitted (5/11/24) for a Sandra 200 auto SCT  - Today is Day +9   - He received 5 bags containing a CD34 dose of 4.47 x 10^6 5/13/24 at 1330.  - See treatment plan below    Planned conditioning regimen:  Melphalan 200 on Day -1      Antimicrobial Prophylaxis:  Acyclovir starting on Day -1  Levofloxacin starting on Day -1  Fluconazole starting on Day -1  Bactrim starting on Day +30     Growth Factor Support:  Neupogen starting on Day +7      Caregiver: wife and mother  Post-transplant discharge plans: Becca Martin

## 2024-05-22 NOTE — PLAN OF CARE
Pt is Day +9 Sandra Auto SCT. Pt febrile twice this shift, tylenol given x2. Tmax 103.1 this afternoon unresponsive to tylenol. Ice packs placed and temp down to 99.1. IV cefepime continued. Resp inf panel neg. 1u PLT transfused. Pt with c/o abd discomfort. Simethicone and bentyl given for relief. Nausea controlled with scheduled antiemetics. Two loose stools this shift. Imodium given. NS 20K @ 100. Pt up ad scar, refusing bed alarm. Pt educated on falls risk with low PLTs. Mag replaced. Pt remains afebrile and VSS. WCTM.

## 2024-05-22 NOTE — SUBJECTIVE & OBJECTIVE
Subjective:     Interval History:  Day +9 from a Sandra 200 auto SCT for MM. Had temp of 101.8 over night. HR 110s with fever, but vitals otherwise stable. Infection w/u ordered and unremarkable thus far. Started on Cefepime. Repeat fever of 102.7 this morning for which he was given Tylenol. Noted to be coughing on am exam, so checking RIP. ANC is 0 today. Transfusing 1 unit of plts for plt count of 4K. Repleting mag.    Objective:     Vital Signs (Most Recent):  Temp: 98.8 °F (37.1 °C) (05/22/24 1142)  Pulse: 95 (05/22/24 1142)  Resp: 20 (05/22/24 1142)  BP: (!) 151/70 (05/22/24 1142)  SpO2: 99 % (05/22/24 1142) Vital Signs (24h Range):  Temp:  [98.8 °F (37.1 °C)-102.7 °F (39.3 °C)] 98.8 °F (37.1 °C)  Pulse:  [] 95  Resp:  [17-20] 20  SpO2:  [98 %-100 %] 99 %  BP: (133-151)/(70-84) 151/70     Weight: 88.8 kg (195 lb 12.3 oz)  Body mass index is 28.91 kg/m².  Body surface area is 2.08 meters squared.    ECOG SCORE           [unfilled]    Intake/Output - Last 3 Shifts         05/20 0700  05/21 0659 05/21 0700 05/22 0659 05/22 0700  05/23 0659    P.O. 330 1070     I.V. (mL/kg)  4389.8 (49.4)     Total Intake(mL/kg) 330 (3.8) 5459.8 (61.5)     Urine (mL/kg/hr) 800 (0.4) 3600 (1.7) 650 (1.2)    Stool  0     Total Output 800 3600 650    Net -470 +1859.8 -650           Stool Occurrence  1 x              Physical Exam  Constitutional:       Appearance: He is well-developed.   HENT:      Head: Normocephalic and atraumatic.      Mouth/Throat:      Pharynx: No oropharyngeal exudate.   Eyes:      General:         Right eye: No discharge.         Left eye: No discharge.      Conjunctiva/sclera: Conjunctivae normal.      Pupils: Pupils are equal, round, and reactive to light.   Cardiovascular:      Rate and Rhythm: Normal rate and regular rhythm.      Heart sounds: Normal heart sounds. No murmur heard.  Pulmonary:      Effort: Pulmonary effort is normal. No respiratory distress.      Breath sounds: Normal breath sounds. No  wheezing or rales.   Abdominal:      General: Bowel sounds are normal. There is no distension.      Palpations: Abdomen is soft.      Tenderness: There is no abdominal tenderness.   Musculoskeletal:         General: No deformity. Normal range of motion.      Cervical back: Normal range of motion and neck supple.   Skin:     General: Skin is warm and dry.      Findings: No erythema or rash.      Comments: Right chest wall vas cath. Dressing c/d/i. No sign of infection to site.   Neurological:      Mental Status: He is alert and oriented to person, place, and time.   Psychiatric:         Behavior: Behavior normal.         Thought Content: Thought content normal.         Judgment: Judgment normal.            Significant Labs:   CBC:   Recent Labs   Lab 05/21/24  0400 05/22/24  0340   WBC 0.04* 0.06*   HGB 7.9* 7.1*   HCT 22.5* 19.9*   PLT 14* 4*    and CMP:   Recent Labs   Lab 05/21/24  0400 05/22/24  0340    138   K 3.8 3.7    108   CO2 22* 22*    105   BUN 29* 26*   CREATININE 1.5* 1.6*   CALCIUM 8.9 8.8   PROT 5.1* 4.9*   ALBUMIN 3.3* 3.0*   BILITOT 0.7 0.6   ALKPHOS 52* 45*   AST 10 9*   ALT 14 12   ANIONGAP 9 8       Diagnostic Results:  I have reviewed all pertinent imaging results/findings within the past 24 hours.

## 2024-05-22 NOTE — PROGRESS NOTES
Rohan Weber - Oncology ([facility])  Hematology  Bone Marrow Transplant  Progress Note    Patient Name: Pete Fernandez  Admission Date: 5/11/2024  Hospital Length of Stay: 11 days  Code Status: Full Code    Subjective:     Interval History:  Day +9 from a Sandra 200 auto SCT for MM. Had temp of 101.8 over night. HR 110s with fever, but vitals otherwise stable. Infection w/u ordered and unremarkable thus far. Started on Cefepime. Repeat fever of 102.7 this morning for which he was given Tylenol. Noted to be coughing on am exam, so checking RIP. ANC is 0 today. Transfusing 1 unit of plts for plt count of 4K. Repleting mag.    Objective:     Vital Signs (Most Recent):  Temp: 98.8 °F (37.1 °C) (05/22/24 1142)  Pulse: 95 (05/22/24 1142)  Resp: 20 (05/22/24 1142)  BP: (!) 151/70 (05/22/24 1142)  SpO2: 99 % (05/22/24 1142) Vital Signs (24h Range):  Temp:  [98.8 °F (37.1 °C)-102.7 °F (39.3 °C)] 98.8 °F (37.1 °C)  Pulse:  [] 95  Resp:  [17-20] 20  SpO2:  [98 %-100 %] 99 %  BP: (133-151)/(70-84) 151/70     Weight: 88.8 kg (195 lb 12.3 oz)  Body mass index is 28.91 kg/m².  Body surface area is 2.08 meters squared.    ECOG SCORE           [unfilled]    Intake/Output - Last 3 Shifts         05/20 0700  05/21 0659 05/21 0700 05/22 0659 05/22 0700  05/23 0659    P.O. 330 1070     I.V. (mL/kg)  4389.8 (49.4)     Total Intake(mL/kg) 330 (3.8) 5459.8 (61.5)     Urine (mL/kg/hr) 800 (0.4) 3600 (1.7) 650 (1.2)    Stool  0     Total Output 800 3600 650    Net -470 +1859.8 -650           Stool Occurrence  1 x              Physical Exam  Constitutional:       Appearance: He is well-developed.   HENT:      Head: Normocephalic and atraumatic.      Mouth/Throat:      Pharynx: No oropharyngeal exudate.   Eyes:      General:         Right eye: No discharge.         Left eye: No discharge.      Conjunctiva/sclera: Conjunctivae normal.      Pupils: Pupils are equal, round, and reactive to light.   Cardiovascular:      Rate and Rhythm: Normal rate and  regular rhythm.      Heart sounds: Normal heart sounds. No murmur heard.  Pulmonary:      Effort: Pulmonary effort is normal. No respiratory distress.      Breath sounds: Normal breath sounds. No wheezing or rales.   Abdominal:      General: Bowel sounds are normal. There is no distension.      Palpations: Abdomen is soft.      Tenderness: There is no abdominal tenderness.   Musculoskeletal:         General: No deformity. Normal range of motion.      Cervical back: Normal range of motion and neck supple.   Skin:     General: Skin is warm and dry.      Findings: No erythema or rash.      Comments: Right chest wall vas cath. Dressing c/d/i. No sign of infection to site.   Neurological:      Mental Status: He is alert and oriented to person, place, and time.   Psychiatric:         Behavior: Behavior normal.         Thought Content: Thought content normal.         Judgment: Judgment normal.            Significant Labs:   CBC:   Recent Labs   Lab 05/21/24  0400 05/22/24  0340   WBC 0.04* 0.06*   HGB 7.9* 7.1*   HCT 22.5* 19.9*   PLT 14* 4*    and CMP:   Recent Labs   Lab 05/21/24  0400 05/22/24  0340    138   K 3.8 3.7    108   CO2 22* 22*    105   BUN 29* 26*   CREATININE 1.5* 1.6*   CALCIUM 8.9 8.8   PROT 5.1* 4.9*   ALBUMIN 3.3* 3.0*   BILITOT 0.7 0.6   ALKPHOS 52* 45*   AST 10 9*   ALT 14 12   ANIONGAP 9 8       Diagnostic Results:  I have reviewed all pertinent imaging results/findings within the past 24 hours.  Assessment/Plan:     * History of auto stem cell transplant  - Patient of Dr. Cook  - Admitted (5/11/24) for a Sandra 200 auto SCT  - Today is Day +9   - He received 5 bags containing a CD34 dose of 4.47 x 10^6 5/13/24 at 1330.  - See treatment plan below    Planned conditioning regimen:  Melphalan 200 on Day -1      Antimicrobial Prophylaxis:  Acyclovir starting on Day -1  Levofloxacin starting on Day -1  Fluconazole starting on Day -1  Bactrim starting on Day +30     Growth Factor  Support:  Neupogen starting on Day +7      Caregiver: wife and mother  Post-transplant discharge plans: [facility] [place]    Multiple myeloma in remission  From Dr. Cook's most recent clinic note:   is a 42y/o M with no significant medical problems. He has been diagnosed with multiple myeloma. He is here for a virtual stem cell transplant consultation.   Per records,  presented to [facility] on 10/11/23 after 1 month of progressive weakness, abdominal pain, and intermittent confusion. At the time of his presentation, his labs, which had been normal  in 8/2023, were notable for new severe DOUG, transaminitis, and Ca of 18. He was admitted and started on IVF .  Hypercalcemia workup was initiated and he was ultimately found to have numerous osseous lesions on 10/15/23 CT CAP, FLC - Kappa 3.29, Lambda 744, Ratio 226 , SPEP with M spike 0.17, IgG lambda on sIFE. Bone marrow biopsy done 10/18/23 revealed 95% plasma cells and FISH with TP53 deletion, FGFR3/IGH (or NSD2/IGH) fusion, usually representing a t(4;14), and a tetraploid subclone was observed. After correction of his hypercalcemia and improvement in his renal function he was discharged on 10/22/23.   He was started on Beatriz + RVd on 11/2/23.   - Admitted for a Sandra 200 auto SCT on 5/11/24. Transplanted 5/13/24.    Neutropenic fever  - Had temp of 101.8 over night. HR 110s with fever, but vitals otherwise stable.   - Infection w/u ordered and unremarkable thus far.   - Started on Cefepime.   - Repeat fever of 102.7 this morning for which he was given Tylenol.   - Noted to be coughing on am exam, so checking RIP.     Pancytopenia due to antineoplastic chemotherapy  - Transfuse for hgb < 7, Plt <10K or bleeding  - Daily CBC while inpatient  - Stopped lovenox with platelets <50K  - Transfusing 1 unit plts today    Chemotherapy induced diarrhea  - Cdiff neg  - PRN imodium available    Hypomagnesemia  - Monitoring mag level daily while inpatient  - Repleting for mag  level of 1.5 today  - Avoiding PRN electrolyte order set due to CKD    Insomnia  - Continue PRN melatonin    Hyperphosphatemia  - Monitoring daily phos levels  - Dose-reduced calcitriol  - Phos wnl at 4.5 today    Volume overload  - Weight up 8.5 lbs from admission on 5/14  - Received large volume of fluid for transplant  - Stopped IVF but deferred diuresis  RESOLVED    Mild malnutrition  - Nutrition consulted. Most recent weight and BMI monitored-     Measurements:  Wt Readings from Last 1 Encounters:   05/22/24 88.8 kg (195 lb 12.3 oz)   Body mass index is 28.91 kg/m².    Patient has been screened and assessed by RD.    Malnutrition Type:  Context: acute illness or injury  Level: mild    Malnutrition Characteristic Summary:  Weight Loss (Malnutrition): 10% in 6 months  Subcutaneous Fat (Malnutrition): mild depletion  Muscle Mass (Malnutrition): mild depletion  Hand  Strength, Left (Malnutrition): Good  Hand  Strength, Right (Malnutrition): Good    Interventions/Recommendations (treatment strategy):  1. Continue Regular Diet. Enocurage high-calorie, high-protein food sources. Small, frequent meals/snacks as tolerated. If DOUG worsens consider Renal diet. 2. If PO intake declines <50% consumed at meals, recommend Boost Plus (flavor per patient preference) BID.   3. Monitor I/O's, weight and labs.   4. Consider a phosphate binder with all meals. 5. RD to monitor.     Hypertension  - Continue home metoprolol    GERD (gastroesophageal reflux disease)  - Takes protonix at home. Will receive inpatient as part of SCT protocol.    Asymptomatic hyperuricemia  - Patient with chronically high uric acid levels. Home febuxostat not on formulary. Substituting with allopurinol while inpatient.    BPH (benign prostatic hyperplasia)  - Continue home Flomax    CKD (chronic kidney disease)  - Baseline creatinine appears to be ~ 1.8  - Renally dose meds as appropriate  - Creatinine stable at 1.6 today    ADD (attention deficit  disorder)  - Holding home Aderall while inpatient    Pericarditis  - Will hold home colchicine as he has completed a 3 month course per PharmD rec    Cancer associated pain  - Continue home Oxy IR        VTE Risk Mitigation (From admission, onward)           Ordered     heparin (porcine) injection 1,600 Units  As needed (PRN)         05/11/24 2044     IP VTE HIGH RISK PATIENT  Once         05/11/24 1803     Place sequential compression device  Until discontinued         05/11/24 1803                    Disposition: Inpatient for autologous SCT. Awaiting neutrophil engraftment.    Julianna Dalal, NP  Bone Marrow Transplant  Rohan sheila - Oncology ([facility])

## 2024-05-22 NOTE — PLAN OF CARE
AAOX4, verbal and able to make needs known. Day 9 Sandra Auto SCT. Independent. Tmax 101.8, notified Dr Contreras, and ordered blood cultures,Tylenol, chest x-ray and urine culture.  Temp down to 99.3N/S with 20meq K+ infusing 100ml/hr. C/o mucositis. Duke's sol given. Bed alarm on. Bed in lowest position, side rails up x2, call light in reach.

## 2024-05-22 NOTE — TELEPHONE ENCOUNTER
----- Message from Elvis Ji sent at 5/22/2024  9:57 AM CDT -----  Regarding: Consult/Advisory  Contact: Vee @ [facility]  Consult/Advisory    Name Of Caller: Vee @ [facility]      Contact Preference: [phone]     REF#[id number]    Nature of call: Vee @ [facility] calling to verify date of service on 5/13/2024 stem cell transplant for this patient. Requesting a call back.

## 2024-05-22 NOTE — ASSESSMENT & PLAN NOTE
- Transfuse for hgb < 7, Plt <10K or bleeding  - Daily CBC while inpatient  - Stopped lovenox with platelets <50K  - Transfusing 1 unit plts today

## 2024-05-22 NOTE — ASSESSMENT & PLAN NOTE
- Had temp of 101.8 over night. HR 110s with fever, but vitals otherwise stable.   - Infection w/u ordered and unremarkable thus far.   - Started on Cefepime.   - Repeat fever of 102.7 this morning for which he was given Tylenol.   - Noted to be coughing on am exam, so checking RIP.

## 2024-05-22 NOTE — ASSESSMENT & PLAN NOTE
From Dr. Cook's most recent clinic note:   is a 42y/o M with no significant medical problems. He has been diagnosed with multiple myeloma. He is here for a virtual stem cell transplant consultation.   Per records,  presented to [facility] on 10/11/23 after 1 month of progressive weakness, abdominal pain, and intermittent confusion. At the time of his presentation, his labs, which had been normal  in 8/2023, were notable for new severe DOUG, transaminitis, and Ca of 18. He was admitted and started on IVF .  Hypercalcemia workup was initiated and he was ultimately found to have numerous osseous lesions on 10/15/23 CT CAP, FLC - Kappa 3.29, Lambda 744, Ratio 226 , SPEP with M spike 0.17, IgG lambda on sIFE. Bone marrow biopsy done 10/18/23 revealed 95% plasma cells and FISH with TP53 deletion, FGFR3/IGH (or NSD2/IGH) fusion, usually representing a t(4;14), and a tetraploid subclone was observed. After correction of his hypercalcemia and improvement in his renal function he was discharged on 10/22/23.   He was started on Beatriz + RVd on 11/2/23.   - Admitted for a Sandra 200 auto SCT on 5/11/24. Transplanted 5/13/24.

## 2024-05-22 NOTE — PROGRESS NOTES
Attempted to see patient seen for follow up. Sleeping soundly at this time.  Will re-attempt.  Will continue to follow and assist as needed.

## 2024-05-22 NOTE — ASSESSMENT & PLAN NOTE
- Nutrition consulted. Most recent weight and BMI monitored-     Measurements:  Wt Readings from Last 1 Encounters:   05/22/24 88.8 kg (195 lb 12.3 oz)   Body mass index is 28.91 kg/m².    Patient has been screened and assessed by RD.    Malnutrition Type:  Context: acute illness or injury  Level: mild    Malnutrition Characteristic Summary:  Weight Loss (Malnutrition): 10% in 6 months  Subcutaneous Fat (Malnutrition): mild depletion  Muscle Mass (Malnutrition): mild depletion  Hand  Strength, Left (Malnutrition): Good  Hand  Strength, Right (Malnutrition): Good    Interventions/Recommendations (treatment strategy):  1. Continue Regular Diet. Enocurage high-calorie, high-protein food sources. Small, frequent meals/snacks as tolerated. If DOUG worsens consider Renal diet. 2. If PO intake declines <50% consumed at meals, recommend Boost Plus (flavor per patient preference) BID.   3. Monitor I/O's, weight and labs.   4. Consider a phosphate binder with all meals. 5. RD to monitor.

## 2024-05-23 PROBLEM — E44.0 MODERATE MALNUTRITION: Status: ACTIVE | Noted: 2024-05-23

## 2024-05-23 LAB
ALBUMIN SERPL BCP-MCNC: 2.8 G/DL (ref 3.5–5.2)
ALP SERPL-CCNC: 41 U/L (ref 55–135)
ALT SERPL W/O P-5'-P-CCNC: 12 U/L (ref 10–44)
ANION GAP SERPL CALC-SCNC: 7 MMOL/L (ref 8–16)
ANISOCYTOSIS BLD QL SMEAR: SLIGHT
AST SERPL-CCNC: 7 U/L (ref 10–40)
BACTERIA UR CULT: NO GROWTH
BASOPHILS # BLD AUTO: 0 K/UL (ref 0–0.2)
BASOPHILS NFR BLD: 0 % (ref 0–1.9)
BILIRUB SERPL-MCNC: 0.6 MG/DL (ref 0.1–1)
BILIRUB UR QL STRIP: NEGATIVE
BLD PROD TYP BPU: NORMAL
BLOOD UNIT EXPIRATION DATE: NORMAL
BLOOD UNIT TYPE CODE: 6200
BLOOD UNIT TYPE: NORMAL
BUN SERPL-MCNC: 20 MG/DL (ref 6–20)
CALCIUM SERPL-MCNC: 8.5 MG/DL (ref 8.7–10.5)
CHLORIDE SERPL-SCNC: 109 MMOL/L (ref 95–110)
CLARITY UR REFRACT.AUTO: CLEAR
CO2 SERPL-SCNC: 21 MMOL/L (ref 23–29)
CODING SYSTEM: NORMAL
COLOR UR AUTO: COLORLESS
CREAT SERPL-MCNC: 1.5 MG/DL (ref 0.5–1.4)
CROSSMATCH INTERPRETATION: NORMAL
DACRYOCYTES BLD QL SMEAR: ABNORMAL
DIFFERENTIAL METHOD BLD: ABNORMAL
DISPENSE STATUS: NORMAL
EOSINOPHIL # BLD AUTO: 0 K/UL (ref 0–0.5)
EOSINOPHIL NFR BLD: 0 % (ref 0–8)
ERYTHROCYTE [DISTWIDTH] IN BLOOD BY AUTOMATED COUNT: 13.4 % (ref 11.5–14.5)
EST. GFR  (NO RACE VARIABLE): 57.8 ML/MIN/1.73 M^2
GLUCOSE SERPL-MCNC: 104 MG/DL (ref 70–110)
GLUCOSE UR QL STRIP: ABNORMAL
HCT VFR BLD AUTO: 18.2 % (ref 40–54)
HGB BLD-MCNC: 6.6 G/DL (ref 14–18)
HGB UR QL STRIP: NEGATIVE
IMM GRANULOCYTES # BLD AUTO: 0 K/UL (ref 0–0.04)
IMM GRANULOCYTES NFR BLD AUTO: 0 % (ref 0–0.5)
KETONES UR QL STRIP: NEGATIVE
LEUKOCYTE ESTERASE UR QL STRIP: NEGATIVE
LYMPHOCYTES # BLD AUTO: 0.1 K/UL (ref 1–4.8)
LYMPHOCYTES NFR BLD: 72.7 % (ref 18–48)
MAGNESIUM SERPL-MCNC: 1.7 MG/DL (ref 1.6–2.6)
MCH RBC QN AUTO: 34.9 PG (ref 27–31)
MCHC RBC AUTO-ENTMCNC: 36.3 G/DL (ref 32–36)
MCV RBC AUTO: 96 FL (ref 82–98)
MONOCYTES # BLD AUTO: 0 K/UL (ref 0.3–1)
MONOCYTES NFR BLD: 27.3 % (ref 4–15)
NEUTROPHILS # BLD AUTO: 0 K/UL (ref 1.8–7.7)
NEUTROPHILS NFR BLD: 0 % (ref 38–73)
NITRITE UR QL STRIP: NEGATIVE
NRBC BLD-RTO: 0 /100 WBC
NUM UNITS TRANS PACKED RBC: NORMAL
OVALOCYTES BLD QL SMEAR: ABNORMAL
PH UR STRIP: 6 [PH] (ref 5–8)
PHOSPHATE SERPL-MCNC: 3 MG/DL (ref 2.7–4.5)
PLATELET # BLD AUTO: 12 K/UL (ref 150–450)
PMV BLD AUTO: 10.1 FL (ref 9.2–12.9)
POIKILOCYTOSIS BLD QL SMEAR: SLIGHT
POLYCHROMASIA BLD QL SMEAR: ABNORMAL
POTASSIUM SERPL-SCNC: 3.6 MMOL/L (ref 3.5–5.1)
PROT SERPL-MCNC: 4.8 G/DL (ref 6–8.4)
PROT UR QL STRIP: ABNORMAL
RBC # BLD AUTO: 1.89 M/UL (ref 4.6–6.2)
SCHISTOCYTES BLD QL SMEAR: ABNORMAL
SODIUM SERPL-SCNC: 137 MMOL/L (ref 136–145)
SP GR UR STRIP: 1.01 (ref 1–1.03)
URN SPEC COLLECT METH UR: ABNORMAL
WBC # BLD AUTO: 0.11 K/UL (ref 3.9–12.7)

## 2024-05-23 PROCEDURE — 25000003 PHARM REV CODE 250: Performed by: INTERNAL MEDICINE

## 2024-05-23 PROCEDURE — 25000003 PHARM REV CODE 250: Performed by: NURSE PRACTITIONER

## 2024-05-23 PROCEDURE — 86922 COMPATIBILITY TEST ANTIGLOB: CPT | Performed by: NURSE PRACTITIONER

## 2024-05-23 PROCEDURE — 36415 COLL VENOUS BLD VENIPUNCTURE: CPT | Performed by: STUDENT IN AN ORGANIZED HEALTH CARE EDUCATION/TRAINING PROGRAM

## 2024-05-23 PROCEDURE — 63600175 PHARM REV CODE 636 W HCPCS: Performed by: INTERNAL MEDICINE

## 2024-05-23 PROCEDURE — 20600001 HC STEP DOWN PRIVATE ROOM

## 2024-05-23 PROCEDURE — 81003 URINALYSIS AUTO W/O SCOPE: CPT | Performed by: STUDENT IN AN ORGANIZED HEALTH CARE EDUCATION/TRAINING PROGRAM

## 2024-05-23 PROCEDURE — 87086 URINE CULTURE/COLONY COUNT: CPT | Performed by: STUDENT IN AN ORGANIZED HEALTH CARE EDUCATION/TRAINING PROGRAM

## 2024-05-23 PROCEDURE — P9040 RBC LEUKOREDUCED IRRADIATED: HCPCS | Performed by: NURSE PRACTITIONER

## 2024-05-23 PROCEDURE — 83735 ASSAY OF MAGNESIUM: CPT | Performed by: NURSE PRACTITIONER

## 2024-05-23 PROCEDURE — 99233 SBSQ HOSP IP/OBS HIGH 50: CPT | Mod: ,,, | Performed by: INTERNAL MEDICINE

## 2024-05-23 PROCEDURE — 85025 COMPLETE CBC W/AUTO DIFF WBC: CPT | Performed by: NURSE PRACTITIONER

## 2024-05-23 PROCEDURE — 30233N1 TRANSFUSION OF NONAUTOLOGOUS RED BLOOD CELLS INTO PERIPHERAL VEIN, PERCUTANEOUS APPROACH: ICD-10-PCS | Performed by: INTERNAL MEDICINE

## 2024-05-23 PROCEDURE — 25000003 PHARM REV CODE 250: Performed by: STUDENT IN AN ORGANIZED HEALTH CARE EDUCATION/TRAINING PROGRAM

## 2024-05-23 PROCEDURE — 36430 TRANSFUSION BLD/BLD COMPNT: CPT

## 2024-05-23 PROCEDURE — 63600175 PHARM REV CODE 636 W HCPCS: Mod: JZ,JG | Performed by: INTERNAL MEDICINE

## 2024-05-23 PROCEDURE — 87040 BLOOD CULTURE FOR BACTERIA: CPT | Performed by: STUDENT IN AN ORGANIZED HEALTH CARE EDUCATION/TRAINING PROGRAM

## 2024-05-23 PROCEDURE — 84100 ASSAY OF PHOSPHORUS: CPT | Performed by: NURSE PRACTITIONER

## 2024-05-23 PROCEDURE — 63600175 PHARM REV CODE 636 W HCPCS: Performed by: NURSE PRACTITIONER

## 2024-05-23 PROCEDURE — 80053 COMPREHEN METABOLIC PANEL: CPT | Performed by: NURSE PRACTITIONER

## 2024-05-23 RX ORDER — HYDROCODONE BITARTRATE AND ACETAMINOPHEN 500; 5 MG/1; MG/1
TABLET ORAL
Status: DISCONTINUED | OUTPATIENT
Start: 2024-05-23 | End: 2024-05-27 | Stop reason: HOSPADM

## 2024-05-23 RX ORDER — ACETAMINOPHEN 325 MG/1
650 TABLET ORAL ONCE
Status: COMPLETED | OUTPATIENT
Start: 2024-05-23 | End: 2024-05-23

## 2024-05-23 RX ORDER — DIPHENHYDRAMINE HCL 25 MG
25 CAPSULE ORAL ONCE AS NEEDED
Status: DISCONTINUED | OUTPATIENT
Start: 2024-05-23 | End: 2024-05-27

## 2024-05-23 RX ORDER — ACETAMINOPHEN 325 MG/1
650 TABLET ORAL ONCE AS NEEDED
Status: COMPLETED | OUTPATIENT
Start: 2024-05-23 | End: 2024-05-23

## 2024-05-23 RX ADMIN — Medication 1 DOSE: at 01:05

## 2024-05-23 RX ADMIN — ACETAMINOPHEN 650 MG: 325 TABLET ORAL at 10:05

## 2024-05-23 RX ADMIN — ALUMINUM HYDROXIDE, MAGNESIUM HYDROXIDE, AND DIMETHICONE 10 ML: 400; 400; 40 SUSPENSION ORAL at 01:05

## 2024-05-23 RX ADMIN — DICYCLOMINE HYDROCHLORIDE 20 MG: 10 CAPSULE ORAL at 08:05

## 2024-05-23 RX ADMIN — Medication 1 DOSE: at 05:05

## 2024-05-23 RX ADMIN — ONDANSETRON 8 MG: 8 TABLET, ORALLY DISINTEGRATING ORAL at 01:05

## 2024-05-23 RX ADMIN — METOPROLOL TARTRATE 12.5 MG: 25 TABLET, FILM COATED ORAL at 08:05

## 2024-05-23 RX ADMIN — CEFEPIME 2 G: 2 INJECTION, POWDER, FOR SOLUTION INTRAVENOUS at 04:05

## 2024-05-23 RX ADMIN — ALLOPURINOL 100 MG: 100 TABLET ORAL at 10:05

## 2024-05-23 RX ADMIN — SODIUM CHLORIDE AND POTASSIUM CHLORIDE: .9; .15 SOLUTION INTRAVENOUS at 10:05

## 2024-05-23 RX ADMIN — FLUCONAZOLE 400 MG: 200 TABLET ORAL at 10:05

## 2024-05-23 RX ADMIN — SODIUM BICARBONATE 650 MG: 650 TABLET ORAL at 10:05

## 2024-05-23 RX ADMIN — ONDANSETRON 8 MG: 8 TABLET, ORALLY DISINTEGRATING ORAL at 06:05

## 2024-05-23 RX ADMIN — DICYCLOMINE HYDROCHLORIDE 20 MG: 10 CAPSULE ORAL at 10:05

## 2024-05-23 RX ADMIN — CALCITRIOL CAPSULES 0.25 MCG 0.25 MCG: 0.25 CAPSULE ORAL at 10:05

## 2024-05-23 RX ADMIN — DIPHENHYDRAMINE HYDROCHLORIDE 25 MG: 25 CAPSULE ORAL at 10:05

## 2024-05-23 RX ADMIN — ACYCLOVIR 800 MG: 200 CAPSULE ORAL at 08:05

## 2024-05-23 RX ADMIN — TAMSULOSIN HYDROCHLORIDE 0.4 MG: 0.4 CAPSULE ORAL at 08:05

## 2024-05-23 RX ADMIN — METOPROLOL TARTRATE 12.5 MG: 25 TABLET, FILM COATED ORAL at 10:05

## 2024-05-23 RX ADMIN — ALUMINUM HYDROXIDE, MAGNESIUM HYDROXIDE, AND DIMETHICONE 10 ML: 400; 400; 40 SUSPENSION ORAL at 10:05

## 2024-05-23 RX ADMIN — ONDANSETRON 8 MG: 8 TABLET, ORALLY DISINTEGRATING ORAL at 09:05

## 2024-05-23 RX ADMIN — ACETAMINOPHEN 650 MG: 325 TABLET ORAL at 06:05

## 2024-05-23 RX ADMIN — ALUMINUM HYDROXIDE, MAGNESIUM HYDROXIDE, AND DIMETHICONE 10 ML: 400; 400; 40 SUSPENSION ORAL at 09:05

## 2024-05-23 RX ADMIN — Medication 9 MG: at 08:05

## 2024-05-23 RX ADMIN — ACYCLOVIR 800 MG: 200 CAPSULE ORAL at 10:05

## 2024-05-23 RX ADMIN — LOPERAMIDE HYDROCHLORIDE 2 MG: 2 CAPSULE ORAL at 10:05

## 2024-05-23 RX ADMIN — ALUMINUM HYDROXIDE, MAGNESIUM HYDROXIDE, AND SIMETHICONE 30 ML: 200; 200; 20 SUSPENSION ORAL at 08:05

## 2024-05-23 RX ADMIN — Medication 1 DOSE: at 10:05

## 2024-05-23 RX ADMIN — CEFEPIME 2 G: 2 INJECTION, POWDER, FOR SOLUTION INTRAVENOUS at 01:05

## 2024-05-23 RX ADMIN — CEFEPIME 2 G: 2 INJECTION, POWDER, FOR SOLUTION INTRAVENOUS at 09:05

## 2024-05-23 RX ADMIN — PROCHLORPERAZINE EDISYLATE 10 MG: 5 INJECTION INTRAMUSCULAR; INTRAVENOUS at 04:05

## 2024-05-23 RX ADMIN — SIMETHICONE 80 MG: 80 TABLET, CHEWABLE ORAL at 10:05

## 2024-05-23 RX ADMIN — PANTOPRAZOLE SODIUM 40 MG: 40 TABLET, DELAYED RELEASE ORAL at 10:05

## 2024-05-23 RX ADMIN — SODIUM BICARBONATE 650 MG: 650 TABLET ORAL at 08:05

## 2024-05-23 RX ADMIN — FILGRASTIM 480 MCG: 480 INJECTION, SOLUTION INTRAVENOUS; SUBCUTANEOUS at 10:05

## 2024-05-23 RX ADMIN — SIMETHICONE 80 MG: 80 TABLET, CHEWABLE ORAL at 04:05

## 2024-05-23 NOTE — ASSESSMENT & PLAN NOTE
- Had temp of 101.8 over night on 5/22. HR 110s with fever, but vitals otherwise stable.   - Infection w/u ordered and unremarkable thus far.   - Started on Cefepime. Today is D2.  - C/o abdominal pain. Suspect chemo-induced mucositis, but will get abdominal imaging with next fever. Continue Mylanta and simethicone.

## 2024-05-23 NOTE — PROGRESS NOTES
Attempted to see patient seen for follow up. Indisposed in restroom.  Will re-attempt.  Will continue to follow and assist as needed.

## 2024-05-23 NOTE — PROGRESS NOTES
Rohan Weber - Oncology ([facility])  Adult Nutrition  Progress Note    SUMMARY       Recommendations    1. Continue Regular Diet. Enocurage high-calorie, high-protein food sources. Small, frequent meals/snacks as tolerated. If DOUG worsens consider Renal diet.   2.  Boost Plus (or alternative) TID.     3. Monitor I/O's, weight and labs.     4. Consider a phosphate binder with all meals.   5. RD to monitor.    Goals: Meet % EEN/EPN by follow-up date.  Nutrition Goal Status: new  Communication of RD Recs: other (comment) (POC)    Assessment and Plan    Endocrine  Moderate malnutrition  Malnutrition Type:  Context: acute illness or injury, chronic illness  Level: moderate    Related to (etiology):   Inadequate energy intake    Signs and Symptoms (as evidenced by):   Wt loss continuous, lack of appetite     Malnutrition Characteristic Summary:  Weight Loss (Malnutrition): 10% in 6 months  Subcutaneous Fat (Malnutrition): mild depletion  Muscle Mass (Malnutrition): mild depletion  Hand  Strength, Left (Malnutrition): Good  Hand  Strength, Right (Malnutrition): Good      Interventions/Recommendations (treatment strategy):  1. Continue Regular Diet. Enocurage high-calorie, high-protein food sources. Small, frequent meals/snacks as tolerated. If DOUG worsens consider Renal diet. 2.  Boost Plus (or alternative) TID.   3. Monitor I/O's, weight and labs.   4. Consider a phosphate binder with all meals. 5. RD to monitor.    Nutrition Diagnosis Status:   New    Mild malnutrition  Malnutrition Type:  Context: acute illness or injury  Level: mild    Related to (etiology):   Increased physiological demands    Signs and Symptoms (as evidenced by):   Auto SCT     Malnutrition Characteristic Summary:  Weight Loss (Malnutrition): 10% in 6 months  Subcutaneous Fat (Malnutrition): mild depletion  Muscle Mass (Malnutrition): mild depletion  Hand  Strength, Left (Malnutrition): Good  Hand  Strength, Right (Malnutrition):  Good      Interventions/Recommendations (treatment strategy):     Collaboration of nutrition care with other providers  High-calorie, high-protein diet  Nutrition education    Nutrition Diagnosis Status:   New         Malnutrition Assessment  Malnutrition Context: acute illness or injury, chronic illness  Malnutrition Level: moderate  Skin (Micronutrient): dry  Teeth (Micronutrient): broken dentition  Tongue (Micronutrient): none  Musculoskeletal/Lower Extremities: muscle control poor, muscle wasting   Micronutrient Evaluation Summary: suspected deficiency  Micronutrient Evaluation Comments: iron, calcium, zinc, phosphorus   Weight Loss (Malnutrition): 10% in 6 months  Subcutaneous Fat (Malnutrition): mild depletion  Muscle Mass (Malnutrition): mild depletion  Hand  Strength, Left (Malnutrition): Good  Hand  Strength, Right (Malnutrition): Good   Orbital Region (Subcutaneous Fat Loss): well nourished  Upper Arm Region (Subcutaneous Fat Loss): mild depletion  Thoracic and Lumbar Region: well nourished   [place] Region (Muscle Loss): mild depletion  Clavicle Bone Region (Muscle Loss): mild depletion  Clavicle and Acromion Bone Region (Muscle Loss): mild depletion  Scapular Bone Region (Muscle Loss): mild depletion  Dorsal Hand (Muscle Loss): well nourished  Patellar Region (Muscle Loss): well nourished  Anterior Thigh Region (Muscle Loss): mild depletion  Posterior Calf Region (Muscle Loss): mild depletion                 Reason for Assessment    Reason For Assessment: RD follow-up  Diagnosis:  (Stem Cell Transplant)  Relevant Medical History: HTN, pericarditis, CKD, gout, GERD, ADD, BPH  Interdisciplinary Rounds: did not attend  General Information Comments: RD follow-up. Reports decreased appetite but drinks ONS provided and has muscle milkd available at bedside. RD noted patient's birthday yesterday, $25 gift card provided to assist with poor appetite. No N/V/C/D, denies difficulty chewing/swallowing at this  "time. C/o dry mouth but no altered taste. ~10lb wt change since admission. D/t poor PO intake patient likely meets criteria for moderate malnutrition  Nutrition Discharge Planning: Pending clinical course, High-Calorie, High-Protein and Food Safety nutrition education provided 5/13    Nutrition Risk Screen    Nutrition Risk Screen: no indicators present    Nutrition/Diet History    Patient Reported Diet/Restrictions/Preferences: general  Typical Food/Fluid Intake: 2-3 meals  Spiritual, Cultural Beliefs, Ep[employer]alian Practices, Values that Affect Care: no  Supplemental Drinks or Food Habits: Other (see comments) (Muscle Milk occasionally)  Food Allergies: NKFA  Factors Affecting Nutritional Intake: decreased appetite    Anthropometrics    Temp: 98.5 °F (36.9 °C)  Height Method: Stated  Height: 5' 9" (175.3 cm)  Height (inches): 69 in  Weight Method: Standard Scale  Weight: 89.4 kg (197 lb 3.2 oz)  Weight (lb): 197.2 lb  Ideal Body Weight (IBW), Male: 160 lb  % Ideal Body Weight, Male (lb): 127.25 %  BMI (Calculated): 29.1       Lab/Procedures/Meds    Pertinent Labs Reviewed: reviewed  Pertinent Labs Comments: H/H: 6.6/18.2, MCH 34.9, MCHC 36.3, Ca 8.5, ALP 41, CO2 21, Cr 1.5, GFR 57.8, TP 4.8  Pertinent Medications Reviewed: reviewed  Pertinent Medications Comments: allopurinol, benadryl, hydrocortisone, filgratim, pantoprazole,  levofloxacin, olanzapine, Na bicarb, lorazepam, metoprolol, NaCl w/ KCl, calcitriol, cefepime, duke's soln, filgrastim, ondansetron      Estimated/Assessed Needs    Weight Used For Calorie Calculations: 93.9 kg (207 lb)  Energy Calorie Requirements (kcal): 2886-0990 kcal (20-23 kcal/kg)  Energy Need Method: Kcal/kg  Protein Requirements:  g (1.2-1.4 g/kg, IBW)  Weight Used For Protein Calculations: 72.6 kg (160 lb) (IBW)        RDA Method (mL): 1878         Nutrition Prescription Ordered    Current Diet Order: Regular  Oral Nutrition Supplement: Boost Breeze/ VHC (All meals)    Evaluation " of Received Nutrient/Fluid Intake    I/O: -589 mL  Energy Calories Required: not meeting needs  Protein Required: meeting needs  Comments: LBM: 5/22  Tolerance: tolerating  % Intake of Estimated Energy Needs: 75 - 100 %  % Meal Intake: 0 - 25 %    Nutrition Risk    Level of Risk/Frequency of Follow-up: low (1x/ week)     Monitor and Evaluation    Food and Nutrient Intake: energy intake, food and beverage intake  Food and Nutrient Adminstration: diet order  Physical Activity and Function: nutrition-related ADLs and IADLs, factors affecting access to physical activity  Anthropometric Measurements: body mass index, weight change, weight  Biochemical Data, Medical Tests and Procedures: lipid profile, inflammatory profile, glucose/endocrine profile, gastrointestinal profile, electrolyte and renal panel  Nutrition-Focused Physical Findings: skin, head and eyes, extremities, muscles and bones, overall appearance     Nutrition Follow-Up    RD Follow-up?: Yes    Godwin Salinas MS, RD, LDN

## 2024-05-23 NOTE — ASSESSMENT & PLAN NOTE
- Patient of Dr. Cook  - Admitted (5/11/24) for a Sandra 200 auto SCT  - Today is Day +10   - He received 5 bags containing a CD34 dose of 4.47 x 10^6 5/13/24 at 1330.  - See treatment plan below    Planned conditioning regimen:  Melphalan 200 on Day -1      Antimicrobial Prophylaxis:  Acyclovir starting on Day -1  Levofloxacin starting on Day -1  Fluconazole starting on Day -1  Bactrim starting on Day +30     Growth Factor Support:  Neupogen starting on Day +7      Caregiver: wife and mother  Post-transplant discharge plans: [facility]

## 2024-05-23 NOTE — SUBJECTIVE & OBJECTIVE
Subjective:     Interval History:  Day +10 from a Sandra 200 auto SCT for MM. Had persistent neutropenic fevers yesterday. Afebrile thus far today. VSS. Infection w/u neg thus far. D2 of Cefepime. Main complaint today is abdominal pain. Suspect chemo-induced mucositis, but will get abdominal imaging with next fever. Transfusing 1 unit prbc for hgb of 6.6. ANC remains 0 today.    Objective:     Vital Signs (Most Recent):  Temp: 97 °F (36.1 °C) (05/23/24 0415)  Pulse: (!) 111 (05/23/24 0415)  Resp: 18 (05/23/24 0415)  BP: 131/77 (05/23/24 0415)  SpO2: 98 % (05/23/24 0415) Vital Signs (24h Range):  Temp:  [97 °F (36.1 °C)-103.1 °F (39.5 °C)] 97 °F (36.1 °C)  Pulse:  [] 111  Resp:  [18-20] 18  SpO2:  [97 %-100 %] 98 %  BP: (122-156)/(64-85) 131/77     Weight: 89.4 kg (197 lb 3.2 oz)  Body mass index is 29.12 kg/m².  Body surface area is 2.09 meters squared.    ECOG SCORE           [unfilled]    Intake/Output - Last 3 Shifts         05/21 0700  05/22 0659 05/22 0700  05/23 0659 05/23 0700  05/24 0659    P.O. 1070 650     I.V. (mL/kg) 4389.8 (49.4) 2238 (25)     IV Piggyback  297.1     Total Intake(mL/kg) 5459.8 (61.5) 3185.1 (35.6)     Urine (mL/kg/hr) 3600 (1.7) 3775 (1.8)     Stool 0 0     Total Output 3600 3775     Net +1859.8 -590            Stool Occurrence 1 x 3 x              Physical Exam  Constitutional:       Appearance: He is well-developed.   HENT:      Head: Normocephalic and atraumatic.      Mouth/Throat:      Pharynx: No oropharyngeal exudate.   Eyes:      General:         Right eye: No discharge.         Left eye: No discharge.      Conjunctiva/sclera: Conjunctivae normal.      Pupils: Pupils are equal, round, and reactive to light.   Cardiovascular:      Rate and Rhythm: Normal rate and regular rhythm.      Heart sounds: Normal heart sounds. No murmur heard.  Pulmonary:      Effort: Pulmonary effort is normal. No respiratory distress.      Breath sounds: Normal breath sounds. No wheezing or rales.    Abdominal:      General: Bowel sounds are normal. There is no distension.      Palpations: Abdomen is soft.      Tenderness: There is no abdominal tenderness.   Musculoskeletal:         General: No deformity. Normal range of motion.      Cervical back: Normal range of motion and neck supple.   Skin:     General: Skin is warm and dry.      Findings: No erythema or rash.      Comments: Right chest wall vas cath. Dressing c/d/i. No sign of infection to site.   Neurological:      Mental Status: He is alert and oriented to person, place, and time.   Psychiatric:         Behavior: Behavior normal.         Thought Content: Thought content normal.         Judgment: Judgment normal.            Significant Labs:   CBC:   Recent Labs   Lab 05/22/24  0340 05/23/24  0433   WBC 0.06* 0.11*   HGB 7.1* 6.6*   HCT 19.9* 18.2*   PLT 4* 12*    and CMP:   Recent Labs   Lab 05/22/24  0340 05/23/24  0433    137   K 3.7 3.6    109   CO2 22* 21*    104   BUN 26* 20   CREATININE 1.6* 1.5*   CALCIUM 8.8 8.5*   PROT 4.9* 4.8*   ALBUMIN 3.0* 2.8*   BILITOT 0.6 0.6   ALKPHOS 45* 41*   AST 9* 7*   ALT 12 12   ANIONGAP 8 7*       Diagnostic Results:  I have reviewed all pertinent imaging results/findings within the past 24 hours.

## 2024-05-23 NOTE — ASSESSMENT & PLAN NOTE
Malnutrition Type:  Context: acute illness or injury, chronic illness  Level: moderate    Related to (etiology):   Inadequate energy intake    Signs and Symptoms (as evidenced by):   Wt loss continuous, lack of appetite     Malnutrition Characteristic Summary:  Weight Loss (Malnutrition): 10% in 6 months  Subcutaneous Fat (Malnutrition): mild depletion  Muscle Mass (Malnutrition): mild depletion  Hand  Strength, Left (Malnutrition): Good  Hand  Strength, Right (Malnutrition): Good      Interventions/Recommendations (treatment strategy):  1. Continue Regular Diet. Enocurage high-calorie, high-protein food sources. Small, frequent meals/snacks as tolerated. If DOUG worsens consider Renal diet. 2.  Boost Plus (or alternative) TID.   3. Monitor I/O's, weight and labs.   4. Consider a phosphate binder with all meals. 5. RD to monitor.    Nutrition Diagnosis Status:   New

## 2024-05-23 NOTE — ASSESSMENT & PLAN NOTE
- Transfuse for hgb < 7, Plt <10K or bleeding  - Daily CBC while inpatient  - Stopped lovenox with platelets <50K  - Transfusing 1 unit prbc today

## 2024-05-23 NOTE — ASSESSMENT & PLAN NOTE
- Nutrition consulted. Most recent weight and BMI monitored-     Measurements:  Wt Readings from Last 1 Encounters:   05/23/24 89.4 kg (197 lb 3.2 oz)   Body mass index is 29.12 kg/m².    Patient has been screened and assessed by RD.    Malnutrition Type:  Context: acute illness or injury  Level: mild    Malnutrition Characteristic Summary:  Weight Loss (Malnutrition): 10% in 6 months  Subcutaneous Fat (Malnutrition): mild depletion  Muscle Mass (Malnutrition): mild depletion  Hand  Strength, Left (Malnutrition): Good  Hand  Strength, Right (Malnutrition): Good    Interventions/Recommendations (treatment strategy):  1. Continue Regular Diet. Enocurage high-calorie, high-protein food sources. Small, frequent meals/snacks as tolerated. If DOUG worsens consider Renal diet. 2. If PO intake declines <50% consumed at meals, recommend Boost Plus (flavor per patient preference) BID.   3. Monitor I/O's, weight and labs.   4. Consider a phosphate binder with all meals. 5. RD to monitor.

## 2024-05-23 NOTE — ASSESSMENT & PLAN NOTE
From Dr. Cook's most recent clinic note:   is a 42y/o M with no significant medical problems. He has been diagnosed with multiple myeloma. He is here for a virtual stem cell transplant consultation.   Per records,  presented to [facility] on 10/11/23 after 1 month of progressive weakness, abdominal pain, and intermittent confusion. At the time of his presentation, his labs, which had been normal  in 8/2023, were notable for new severe DOUG, transaminitis, and Ca of 18. He was admitted and started on IVF .  Hypercalcemia workup was initiated and he was ultimately found to have numerous osseous lesions on 10/15/23 CT CAP, FLC - Kappa 3.29, Lambda 744, Ratio 226 , SPEP with M spike 0.17, IgG lambda on sIFE. Bone marrow biopsy done 10/18/23 revealed 95% plasma cells and FISH with TP53 deletion, FGFR3/IGH (or NSD2/IGH) fusion, usually representing a t(4;14), and a tetraploid subclone was observed. After correction of his hypercalcemia and improvement in his renal function he was discharged on 10/22/23.   He was started on Beatriz + RVd on 11/2/23.   - Admitted for a Sandra 200 auto SCT on 5/11/24. Transplanted 5/13/24.

## 2024-05-23 NOTE — PLAN OF CARE
Recommendations    1. Continue Regular Diet. Enocurage high-calorie, high-protein food sources. Small, frequent meals/snacks as tolerated. If DOUG worsens consider Renal diet.   2.  Boost Plus (or alternative) TID.     3. Monitor I/O's, weight and labs.     4. Consider a phosphate binder with all meals.   5. RD to monitor.    Goals: Meet % EEN/EPN by follow-up date.  Nutrition Goal Status: new  Communication of RD Recs: other (comment) (POC)

## 2024-05-23 NOTE — PLAN OF CARE
POC reviewed with patient; understanding verbalized. Sandra Auto Day +10. T max of 101.9. 1x dose of 1 gram of tylenol given. Fever resolved. Pt on NS with 20k @ 100 mL/hr. Pt voids independently via urinal. Prn bentyl given for stomach cramps. PRN compazine and simethicone given for flatulence as well as nausea. Mild relief of symptoms. Bed alarm refused. Pt. with nonskid footwear on, bed in lowest position, and locked with bed rails up x2.  Pt. instructed to call prior to getting OOB.  Pt. has call light and personal items within reach. Patient ambulates in room independently. All questions and concerns addressed at this time.

## 2024-05-23 NOTE — PROGRESS NOTES
[facility] - Oncology ([facility])  Hematology  Bone Marrow Transplant  Progress Note    Patient Name: Pete Fernandez  Admission Date: 5/11/2024  Hospital Length of Stay: 12 days  Code Status: Full Code    Subjective:     Interval History:  Day +10 from a Sandra 200 auto SCT for MM. Had persistent neutropenic fevers yesterday. Afebrile thus far today. VSS. Infection w/u neg thus far. D2 of Cefepime. Main complaint today is abdominal pain. Suspect chemo-induced mucositis, but will get abdominal imaging with next fever. Transfusing 1 unit prbc for hgb of 6.6. ANC remains 0 today.    Objective:     Vital Signs (Most Recent):  Temp: 97 °F (36.1 °C) (05/23/24 0415)  Pulse: (!) 111 (05/23/24 0415)  Resp: 18 (05/23/24 0415)  BP: 131/77 (05/23/24 0415)  SpO2: 98 % (05/23/24 0415) Vital Signs (24h Range):  Temp:  [97 °F (36.1 °C)-103.1 °F (39.5 °C)] 97 °F (36.1 °C)  Pulse:  [] 111  Resp:  [18-20] 18  SpO2:  [97 %-100 %] 98 %  BP: (122-156)/(64-85) 131/77     Weight: 89.4 kg (197 lb 3.2 oz)  Body mass index is 29.12 kg/m².  Body surface area is 2.09 meters squared.    ECOG SCORE           [unfilled]    Intake/Output - Last 3 Shifts         05/21 0700 05/22 0659 05/22 0700 05/23 0659 05/23 0700 05/24 0659    P.O. 1070 650     I.V. (mL/kg) 4389.8 (49.4) 2238 (25)     IV Piggyback  297.1     Total Intake(mL/kg) 5459.8 (61.5) 3185.1 (35.6)     Urine (mL/kg/hr) 3600 (1.7) 3775 (1.8)     Stool 0 0     Total Output 3600 3775     Net +1859.8 -590            Stool Occurrence 1 x 3 x              Physical Exam  Constitutional:       Appearance: He is well-developed.   HENT:      Head: Normocephalic and atraumatic.      Mouth/Throat:      Pharynx: No oropharyngeal exudate.   Eyes:      General:         Right eye: No discharge.         Left eye: No discharge.      Conjunctiva/sclera: Conjunctivae normal.      Pupils: Pupils are equal, round, and reactive to light.   Cardiovascular:      Rate and Rhythm: Normal rate and regular rhythm.       Heart sounds: Normal heart sounds. No murmur heard.  Pulmonary:      Effort: Pulmonary effort is normal. No respiratory distress.      Breath sounds: Normal breath sounds. No wheezing or rales.   Abdominal:      General: Bowel sounds are normal. There is no distension.      Palpations: Abdomen is soft.      Tenderness: There is no abdominal tenderness.   Musculoskeletal:         General: No deformity. Normal range of motion.      Cervical back: Normal range of motion and neck supple.   Skin:     General: Skin is warm and dry.      Findings: No erythema or rash.      Comments: Right chest wall vas cath. Dressing c/d/i. No sign of infection to site.   Neurological:      Mental Status: He is alert and oriented to person, place, and time.   Psychiatric:         Behavior: Behavior normal.         Thought Content: Thought content normal.         Judgment: Judgment normal.            Significant Labs:   CBC:   Recent Labs   Lab 05/22/24  0340 05/23/24  0433   WBC 0.06* 0.11*   HGB 7.1* 6.6*   HCT 19.9* 18.2*   PLT 4* 12*    and CMP:   Recent Labs   Lab 05/22/24  0340 05/23/24  0433    137   K 3.7 3.6    109   CO2 22* 21*    104   BUN 26* 20   CREATININE 1.6* 1.5*   CALCIUM 8.8 8.5*   PROT 4.9* 4.8*   ALBUMIN 3.0* 2.8*   BILITOT 0.6 0.6   ALKPHOS 45* 41*   AST 9* 7*   ALT 12 12   ANIONGAP 8 7*       Diagnostic Results:  I have reviewed all pertinent imaging results/findings within the past 24 hours.  Assessment/Plan:     * History of auto stem cell transplant  - Patient of Dr. Cook  - Admitted (5/11/24) for a Sandra 200 auto SCT  - Today is Day +10   - He received 5 bags containing a CD34 dose of 4.47 x 10^6 5/13/24 at 1330.  - See treatment plan below    Planned conditioning regimen:  Melphalan 200 on Day -1      Antimicrobial Prophylaxis:  Acyclovir starting on Day -1  Levofloxacin starting on Day -1  Fluconazole starting on Day -1  Bactrim starting on Day +30     Growth Factor Support:  Neupogen  starting on Day +7      Caregiver: wife and mother  Post-transplant discharge plans: Becca Martin    Multiple myeloma in remission  From Dr. Cook's most recent clinic note:   is a 42y/o M with no significant medical problems. He has been diagnosed with multiple myeloma. He is here for a virtual stem cell transplant consultation.   Per records,  presented to [facility] on 10/11/23 after 1 month of progressive weakness, abdominal pain, and intermittent confusion. At the time of his presentation, his labs, which had been normal  in 8/2023, were notable for new severe DOUG, transaminitis, and Ca of 18. He was admitted and started on IVF .  Hypercalcemia workup was initiated and he was ultimately found to have numerous osseous lesions on 10/15/23 CT CAP, FLC - Kappa 3.29, Lambda 744, Ratio 226 , SPEP with M spike 0.17, IgG lambda on sIFE. Bone marrow biopsy done 10/18/23 revealed 95% plasma cells and FISH with TP53 deletion, FGFR3/IGH (or NSD2/IGH) fusion, usually representing a t(4;14), and a tetraploid subclone was observed. After correction of his hypercalcemia and improvement in his renal function he was discharged on 10/22/23.   He was started on Beatriz + RVd on 11/2/23.   - Admitted for a Sandra 200 auto SCT on 5/11/24. Transplanted 5/13/24.    Neutropenic fever  - Had temp of 101.8 over night on 5/22. HR 110s with fever, but vitals otherwise stable.   - Infection w/u ordered and unremarkable thus far.   - Started on Cefepime. Today is D2.  - C/o abdominal pain. Suspect chemo-induced mucositis, but will get abdominal imaging with next fever. Continue Mylanta and simethicone.    Pancytopenia due to antineoplastic chemotherapy  - Transfuse for hgb < 7, Plt <10K or bleeding  - Daily CBC while inpatient  - Stopped lovenox with platelets <50K  - Transfusing 1 unit prbc today    Chemotherapy induced diarrhea  - C-diff neg  - PRN imodium available    Hypomagnesemia  - Monitoring mag level daily while inpatient  -  Avoiding PRN electrolyte order set due to CKD    Insomnia  - Continue PRN melatonin    Hyperphosphatemia  - Monitoring daily phos levels  - Dose-reduced calcitriol  - Phos wnl at 3.0 today    Volume overload  - Weight up 8.5 lbs from admission on 5/14  - Received large volume of fluid for transplant  - Stopped IVF but deferred diuresis  RESOLVED    Mild malnutrition  - Nutrition consulted. Most recent weight and BMI monitored-     Measurements:  Wt Readings from Last 1 Encounters:   05/23/24 89.4 kg (197 lb 3.2 oz)   Body mass index is 29.12 kg/m².    Patient has been screened and assessed by RD.    Malnutrition Type:  Context: acute illness or injury  Level: mild    Malnutrition Characteristic Summary:  Weight Loss (Malnutrition): 10% in 6 months  Subcutaneous Fat (Malnutrition): mild depletion  Muscle Mass (Malnutrition): mild depletion  Hand  Strength, Left (Malnutrition): Good  Hand  Strength, Right (Malnutrition): Good    Interventions/Recommendations (treatment strategy):  1. Continue Regular Diet. Enocurage high-calorie, high-protein food sources. Small, frequent meals/snacks as tolerated. If DOUG worsens consider Renal diet. 2. If PO intake declines <50% consumed at meals, recommend Boost Plus (flavor per patient preference) BID.   3. Monitor I/O's, weight and labs.   4. Consider a phosphate binder with all meals. 5. RD to monitor.     Hypertension  - Continue home metoprolol    GERD (gastroesophageal reflux disease)  - Takes protonix at home. Will receive inpatient as part of SCT protocol.    Asymptomatic hyperuricemia  - Patient with chronically high uric acid levels. Home febuxostat not on formulary. Substituting with allopurinol while inpatient.    BPH (benign prostatic hyperplasia)  - Continue home Flomax    CKD (chronic kidney disease)  - Baseline creatinine appears to be ~ 1.8  - Renally dose meds as appropriate  - Creatinine stable at 1.5 today    ADD (attention deficit disorder)  - Holding  home Aderall while inpatient    Pericarditis  - Will hold home colchicine as he has completed a 3 month course per PharmD rec    Cancer associated pain  - Continue home Oxy IR        VTE Risk Mitigation (From admission, onward)           Ordered     heparin (porcine) injection 1,600 Units  As needed (PRN)         05/11/24 2044     IP VTE HIGH RISK PATIENT  Once         05/11/24 1803     Place sequential compression device  Until discontinued         05/11/24 1803                    Disposition: Inpatient for autologous SCT. Awaiting neutrophil engraftment.    Julianna Dalal, NP  Bone Marrow Transplant  [facility] - Oncology ([facility])

## 2024-05-24 LAB
ABO + RH BLD: ABNORMAL
ALBUMIN SERPL BCP-MCNC: 2.6 G/DL (ref 3.5–5.2)
ALP SERPL-CCNC: 37 U/L (ref 55–135)
ALT SERPL W/O P-5'-P-CCNC: 10 U/L (ref 10–44)
ANION GAP SERPL CALC-SCNC: 6 MMOL/L (ref 8–16)
AST SERPL-CCNC: 6 U/L (ref 10–40)
BACTERIA UR CULT: NO GROWTH
BASOPHILS # BLD AUTO: 0 K/UL (ref 0–0.2)
BASOPHILS NFR BLD: 0 % (ref 0–1.9)
BILIRUB SERPL-MCNC: 0.9 MG/DL (ref 0.1–1)
BLD GP AB SCN CELLS X3 SERPL QL: ABNORMAL
BLD PROD TYP BPU: NORMAL
BLOOD GROUP ANTIBODIES SERPL: NORMAL
BLOOD UNIT EXPIRATION DATE: NORMAL
BLOOD UNIT TYPE CODE: 600
BLOOD UNIT TYPE: NORMAL
BUN SERPL-MCNC: 22 MG/DL (ref 6–20)
CALCIUM SERPL-MCNC: 8.2 MG/DL (ref 8.7–10.5)
CHLORIDE SERPL-SCNC: 108 MMOL/L (ref 95–110)
CO2 SERPL-SCNC: 21 MMOL/L (ref 23–29)
CODING SYSTEM: NORMAL
CREAT SERPL-MCNC: 1.4 MG/DL (ref 0.5–1.4)
CROSSMATCH INTERPRETATION: NORMAL
DAT IGG-SP REAG RBC-IMP: NORMAL
DIFFERENTIAL METHOD BLD: ABNORMAL
DISPENSE STATUS: NORMAL
EOSINOPHIL # BLD AUTO: 0 K/UL (ref 0–0.5)
EOSINOPHIL NFR BLD: 0 % (ref 0–8)
ERYTHROCYTE [DISTWIDTH] IN BLOOD BY AUTOMATED COUNT: 14.7 % (ref 11.5–14.5)
EST. GFR  (NO RACE VARIABLE): >60 ML/MIN/1.73 M^2
GLUCOSE SERPL-MCNC: 96 MG/DL (ref 70–110)
HCT VFR BLD AUTO: 19.6 % (ref 40–54)
HGB BLD-MCNC: 7.1 G/DL (ref 14–18)
IMM GRANULOCYTES # BLD AUTO: 0 K/UL (ref 0–0.04)
IMM GRANULOCYTES NFR BLD AUTO: 0 % (ref 0–0.5)
LYMPHOCYTES # BLD AUTO: 0.2 K/UL (ref 1–4.8)
LYMPHOCYTES NFR BLD: 63.3 % (ref 18–48)
MAGNESIUM SERPL-MCNC: 1.5 MG/DL (ref 1.6–2.6)
MCH RBC QN AUTO: 34.1 PG (ref 27–31)
MCHC RBC AUTO-ENTMCNC: 36.2 G/DL (ref 32–36)
MCV RBC AUTO: 94 FL (ref 82–98)
MONOCYTES # BLD AUTO: 0.1 K/UL (ref 0.3–1)
MONOCYTES NFR BLD: 30 % (ref 4–15)
NEUTROPHILS # BLD AUTO: 0 K/UL (ref 1.8–7.7)
NEUTROPHILS NFR BLD: 6.7 % (ref 38–73)
NRBC BLD-RTO: 0 /100 WBC
PHOSPHATE SERPL-MCNC: 2.5 MG/DL (ref 2.7–4.5)
PLATELET # BLD AUTO: 6 K/UL (ref 150–450)
PLATELET BLD QL SMEAR: ABNORMAL
PMV BLD AUTO: ABNORMAL FL (ref 9.2–12.9)
POIKILOCYTOSIS BLD QL SMEAR: SLIGHT
POTASSIUM SERPL-SCNC: 3.3 MMOL/L (ref 3.5–5.1)
PROT SERPL-MCNC: 4.7 G/DL (ref 6–8.4)
RBC # BLD AUTO: 2.08 M/UL (ref 4.6–6.2)
SODIUM SERPL-SCNC: 135 MMOL/L (ref 136–145)
SPECIMEN OUTDATE: ABNORMAL
UNIT NUMBER: NORMAL
WBC # BLD AUTO: 0.3 K/UL (ref 3.9–12.7)

## 2024-05-24 PROCEDURE — 86880 COOMBS TEST DIRECT: CPT | Performed by: INTERNAL MEDICINE

## 2024-05-24 PROCEDURE — 99233 SBSQ HOSP IP/OBS HIGH 50: CPT | Mod: FS,,, | Performed by: INTERNAL MEDICINE

## 2024-05-24 PROCEDURE — 20600001 HC STEP DOWN PRIVATE ROOM

## 2024-05-24 PROCEDURE — 80053 COMPREHEN METABOLIC PANEL: CPT | Performed by: NURSE PRACTITIONER

## 2024-05-24 PROCEDURE — 25000003 PHARM REV CODE 250: Performed by: NURSE PRACTITIONER

## 2024-05-24 PROCEDURE — 25000003 PHARM REV CODE 250: Performed by: INTERNAL MEDICINE

## 2024-05-24 PROCEDURE — 63600175 PHARM REV CODE 636 W HCPCS: Performed by: NURSE PRACTITIONER

## 2024-05-24 PROCEDURE — 84100 ASSAY OF PHOSPHORUS: CPT | Performed by: NURSE PRACTITIONER

## 2024-05-24 PROCEDURE — 25000003 PHARM REV CODE 250: Performed by: STUDENT IN AN ORGANIZED HEALTH CARE EDUCATION/TRAINING PROGRAM

## 2024-05-24 PROCEDURE — 36430 TRANSFUSION BLD/BLD COMPNT: CPT

## 2024-05-24 PROCEDURE — 83735 ASSAY OF MAGNESIUM: CPT | Performed by: NURSE PRACTITIONER

## 2024-05-24 PROCEDURE — 86870 RBC ANTIBODY IDENTIFICATION: CPT | Performed by: INTERNAL MEDICINE

## 2024-05-24 PROCEDURE — P9037 PLATE PHERES LEUKOREDU IRRAD: HCPCS | Performed by: NURSE PRACTITIONER

## 2024-05-24 PROCEDURE — 86901 BLOOD TYPING SEROLOGIC RH(D): CPT | Performed by: INTERNAL MEDICINE

## 2024-05-24 PROCEDURE — 85025 COMPLETE CBC W/AUTO DIFF WBC: CPT | Performed by: NURSE PRACTITIONER

## 2024-05-24 PROCEDURE — 63600175 PHARM REV CODE 636 W HCPCS: Performed by: INTERNAL MEDICINE

## 2024-05-24 RX ORDER — LANOLIN ALCOHOL/MO/W.PET/CERES
800 CREAM (GRAM) TOPICAL ONCE
Status: COMPLETED | OUTPATIENT
Start: 2024-05-24 | End: 2024-05-24

## 2024-05-24 RX ORDER — DIPHENHYDRAMINE HCL 25 MG
25 CAPSULE ORAL ONCE AS NEEDED
Status: DISCONTINUED | OUTPATIENT
Start: 2024-05-24 | End: 2024-05-27

## 2024-05-24 RX ORDER — POTASSIUM CHLORIDE 20 MEQ/1
40 TABLET, EXTENDED RELEASE ORAL ONCE
Status: COMPLETED | OUTPATIENT
Start: 2024-05-24 | End: 2024-05-24

## 2024-05-24 RX ORDER — ACETAMINOPHEN 325 MG/1
650 TABLET ORAL ONCE AS NEEDED
Status: COMPLETED | OUTPATIENT
Start: 2024-05-24 | End: 2024-05-24

## 2024-05-24 RX ORDER — HYDROCODONE BITARTRATE AND ACETAMINOPHEN 500; 5 MG/1; MG/1
TABLET ORAL
Status: DISCONTINUED | OUTPATIENT
Start: 2024-05-24 | End: 2024-05-27 | Stop reason: HOSPADM

## 2024-05-24 RX ORDER — OXYCODONE HYDROCHLORIDE 5 MG/1
5 TABLET ORAL EVERY 4 HOURS PRN
Status: DISCONTINUED | OUTPATIENT
Start: 2024-05-24 | End: 2024-05-27 | Stop reason: HOSPADM

## 2024-05-24 RX ADMIN — ALLOPURINOL 100 MG: 100 TABLET ORAL at 08:05

## 2024-05-24 RX ADMIN — Medication 1 DOSE: at 05:05

## 2024-05-24 RX ADMIN — FLUCONAZOLE 400 MG: 200 TABLET ORAL at 08:05

## 2024-05-24 RX ADMIN — CEFEPIME 2 G: 2 INJECTION, POWDER, FOR SOLUTION INTRAVENOUS at 12:05

## 2024-05-24 RX ADMIN — Medication 9 MG: at 09:05

## 2024-05-24 RX ADMIN — ACETAMINOPHEN 650 MG: 325 TABLET ORAL at 08:05

## 2024-05-24 RX ADMIN — CEFEPIME 2 G: 2 INJECTION, POWDER, FOR SOLUTION INTRAVENOUS at 03:05

## 2024-05-24 RX ADMIN — SODIUM BICARBONATE 650 MG: 650 TABLET ORAL at 08:05

## 2024-05-24 RX ADMIN — ALUMINUM HYDROXIDE, MAGNESIUM HYDROXIDE, AND DIMETHICONE 10 ML: 400; 400; 40 SUSPENSION ORAL at 12:05

## 2024-05-24 RX ADMIN — Medication 800 MG: at 08:05

## 2024-05-24 RX ADMIN — Medication 1 DOSE: at 08:05

## 2024-05-24 RX ADMIN — ALUMINUM HYDROXIDE, MAGNESIUM HYDROXIDE, AND DIMETHICONE 10 ML: 400; 400; 40 SUSPENSION ORAL at 09:05

## 2024-05-24 RX ADMIN — PROCHLORPERAZINE EDISYLATE 10 MG: 5 INJECTION INTRAMUSCULAR; INTRAVENOUS at 06:05

## 2024-05-24 RX ADMIN — SODIUM CHLORIDE AND POTASSIUM CHLORIDE: .9; .15 SOLUTION INTRAVENOUS at 02:05

## 2024-05-24 RX ADMIN — POTASSIUM CHLORIDE 40 MEQ: 1500 TABLET, EXTENDED RELEASE ORAL at 08:05

## 2024-05-24 RX ADMIN — PANTOPRAZOLE SODIUM 40 MG: 40 TABLET, DELAYED RELEASE ORAL at 08:05

## 2024-05-24 RX ADMIN — METOPROLOL TARTRATE 12.5 MG: 25 TABLET, FILM COATED ORAL at 08:05

## 2024-05-24 RX ADMIN — ONDANSETRON 8 MG: 8 TABLET, ORALLY DISINTEGRATING ORAL at 02:05

## 2024-05-24 RX ADMIN — CALCITRIOL CAPSULES 0.25 MCG 0.25 MCG: 0.25 CAPSULE ORAL at 08:05

## 2024-05-24 RX ADMIN — ACYCLOVIR 800 MG: 200 CAPSULE ORAL at 08:05

## 2024-05-24 RX ADMIN — CEFEPIME 2 G: 2 INJECTION, POWDER, FOR SOLUTION INTRAVENOUS at 08:05

## 2024-05-24 RX ADMIN — TAMSULOSIN HYDROCHLORIDE 0.4 MG: 0.4 CAPSULE ORAL at 08:05

## 2024-05-24 RX ADMIN — LOPERAMIDE HYDROCHLORIDE 2 MG: 2 CAPSULE ORAL at 06:05

## 2024-05-24 RX ADMIN — ONDANSETRON 8 MG: 8 TABLET, ORALLY DISINTEGRATING ORAL at 06:05

## 2024-05-24 RX ADMIN — ONDANSETRON 8 MG: 8 TABLET, ORALLY DISINTEGRATING ORAL at 09:05

## 2024-05-24 RX ADMIN — FILGRASTIM 480 MCG: 480 INJECTION, SOLUTION INTRAVENOUS; SUBCUTANEOUS at 08:05

## 2024-05-24 RX ADMIN — Medication 1 DOSE: at 12:05

## 2024-05-24 RX ADMIN — ALUMINUM HYDROXIDE, MAGNESIUM HYDROXIDE, AND DIMETHICONE 10 ML: 400; 400; 40 SUSPENSION ORAL at 05:05

## 2024-05-24 RX ADMIN — OXYCODONE 5 MG: 5 TABLET ORAL at 08:05

## 2024-05-24 RX ADMIN — DIPHENHYDRAMINE HYDROCHLORIDE 25 MG: 25 CAPSULE ORAL at 08:05

## 2024-05-24 RX ADMIN — DIBASIC SODIUM PHOSPHATE, MONOBASIC POTASSIUM PHOSPHATE AND MONOBASIC SODIUM PHOSPHATE 2 TABLET: 852; 155; 130 TABLET ORAL at 08:05

## 2024-05-24 NOTE — ASSESSMENT & PLAN NOTE
- Nutrition consulted. Most recent weight and BMI monitored-     Measurements:  Wt Readings from Last 1 Encounters:   05/24/24 89.5 kg (197 lb 5 oz)   Body mass index is 29.14 kg/m².    Patient has been screened and assessed by RD.    Malnutrition Type:  Context: acute illness or injury, chronic illness  Level: moderate    Malnutrition Characteristic Summary:  Weight Loss (Malnutrition): 10% in 6 months  Subcutaneous Fat (Malnutrition): mild depletion  Muscle Mass (Malnutrition): mild depletion  Hand  Strength, Left (Malnutrition): Good  Hand  Strength, Right (Malnutrition): Good    Interventions/Recommendations (treatment strategy):  1. Continue Regular Diet. Enocurage high-calorie, high-protein food sources. Small, frequent meals/snacks as tolerated. If DOUG worsens consider Renal diet. 2.  Boost Plus (or alternative) TID.   3. Monitor I/O's, weight and labs.   4. Consider a phosphate binder with all meals. 5. RD to monitor.

## 2024-05-24 NOTE — ASSESSMENT & PLAN NOTE
From Dr. Cook's most recent clinic note:   is a 42y/o M with no significant medical problems. He has been diagnosed with multiple myeloma. He is here for a virtual stem cell transplant consultation.   Per records,  presented to [facility] on 10/11/23 after 1 month of progressive weakness, abdominal pain, and intermittent confusion. At the time of his presentation, his labs, which had been normal  in 8/2023, were notable for new severe DOUG, transaminitis, and Ca of 18. He was admitted and started on IVF .  Hypercalcemia workup was initiated and he was ultimately found to have numerous osseous lesions on 10/15/23 CT CAP, FLC - Kappa 3.29, Lambda 744, Ratio 226 , SPEP with M spike 0.17, IgG lambda on sIFE. Bone marrow biopsy done 10/18/23 revealed 95% plasma cells and FISH with TP53 deletion, FGFR3/IGH (or NSD2/IGH) fusion, usually representing a t(4;14), and a tetraploid subclone was observed. After correction of his hypercalcemia and improvement in his renal function he was discharged on 10/22/23.   He was started on Beatriz + RVd on 11/2/23.   - Admitted for a Sandra 200 auto SCT on 5/11/24. Transplanted 5/13/24.

## 2024-05-24 NOTE — ASSESSMENT & PLAN NOTE
- Patient of Dr. Cook  - Admitted (5/11/24) for a Sandra 200 auto SCT  - Today is Day +11   - He received 5 bags containing a CD34 dose of 4.47 x 10^6 5/13/24 at 1330.  - See treatment plan below    Planned conditioning regimen:  Melphalan 200 on Day -1      Antimicrobial Prophylaxis:  Acyclovir starting on Day -1  Levofloxacin starting on Day -1  Fluconazole starting on Day -1  Bactrim starting on Day +30     Growth Factor Support:  Neupogen starting on Day +7      Caregiver: wife and mother  Post-transplant discharge plans: Becca Martin

## 2024-05-24 NOTE — PLAN OF CARE
POC reviewed with patient; understanding verbalized. Sandra Auto Day +11. PRN  bentyl given 1x for abd discomfort. NS w 20K @ 100 mL/hr. Pt voids independently via urinal. Bed alarm refused. Wife to remain at bedside. Pt. with nonskid footwear on, bed in lowest position, and locked with bed rails up x2.  Pt. instructed to call prior to getting OOB.  Pt. has call light and personal items within reach. Patient ambulates in room independently. VSS and afebrile this shift. All questions and concerns addressed at this time.

## 2024-05-24 NOTE — ASSESSMENT & PLAN NOTE
Nutrition consulted. Most recent weight and BMI monitored-     Measurements:  Wt Readings from Last 1 Encounters:   05/24/24 89.5 kg (197 lb 5 oz)   Body mass index is 29.14 kg/m².    Patient has been screened and assessed by RD.    Malnutrition Type:  Context: acute illness or injury, chronic illness  Level: moderate    Malnutrition Characteristic Summary:  Weight Loss (Malnutrition): 10% in 6 months  Subcutaneous Fat (Malnutrition): mild depletion  Muscle Mass (Malnutrition): mild depletion  Hand  Strength, Left (Malnutrition): Good  Hand  Strength, Right (Malnutrition): Good    Interventions/Recommendations (treatment strategy):  1. Continue Regular Diet. Enocurage high-calorie, high-protein food sources. Small, frequent meals/snacks as tolerated. If DOUG worsens consider Renal diet. 2.  Boost Plus (or alternative) TID.   3. Monitor I/O's, weight and labs.   4. Consider a phosphate binder with all meals. 5. RD to monitor.

## 2024-05-24 NOTE — ASSESSMENT & PLAN NOTE
- Baseline creatinine appears to be ~ 1.8  - Renally dose meds as appropriate  - Creatinine stable at 1.4 today

## 2024-05-24 NOTE — PROGRESS NOTES
Rohan Weber - Oncology ([facility])  Hematology  Bone Marrow Transplant  Progress Note    Patient Name: Pete Fernandez  Admission Date: 5/11/2024  Hospital Length of Stay: 13 days  Code Status: Full Code    Subjective:     Interval History:  Day +11 from a Sandra 200 auto SCT for MM. Had temp of 103 yesterday evening. VS otherwise stable. No fevers since. Infection w/u ordered and unremarkable thus far. Abx not changed. May be having engraftment fevers. ANC 20 today. Not ill-appearing. Transfusing 1 unit plts for plt count of 6K. Repleting electrolytes.    Objective:     Vital Signs (Most Recent):  Temp: 98.3 °F (36.8 °C) (05/24/24 1152)  Pulse: 88 (05/24/24 1152)  Resp: 18 (05/24/24 1152)  BP: 135/85 (05/24/24 1152)  SpO2: (!) 94 % (05/24/24 1152) Vital Signs (24h Range):  Temp:  [98.1 °F (36.7 °C)-103 °F (39.4 °C)] 98.3 °F (36.8 °C)  Pulse:  [] 88  Resp:  [16-18] 18  SpO2:  [94 %-100 %] 94 %  BP: (115-135)/(66-85) 135/85     Weight: 89.5 kg (197 lb 5 oz)  Body mass index is 29.14 kg/m².  Body surface area is 2.09 meters squared.    ECOG SCORE           [unfilled]    Intake/Output - Last 3 Shifts         05/22 0700 05/23 0659 05/23 0700 05/24 0659 05/24 0700 05/25 0659    P.O. 650 550     I.V. (mL/kg) 2238 (25) 1929.4 (21.6) 573.7 (6.4)    Blood  350     IV Piggyback 297.1 294.1     Total Intake(mL/kg) 3185.1 (35.6) 3123.5 (34.9) 573.7 (6.4)    Urine (mL/kg/hr) 3775 (1.8) 3775 (1.8) 375 (0.7)    Stool 0 0 0    Total Output 3775 3775 375    Net -590 -651.5 +198.7           Stool Occurrence 3 x 1 x 1 x             Physical Exam  Constitutional:       Appearance: He is well-developed.   HENT:      Head: Normocephalic and atraumatic.      Mouth/Throat:      Pharynx: No oropharyngeal exudate.   Eyes:      General:         Right eye: No discharge.         Left eye: No discharge.      Conjunctiva/sclera: Conjunctivae normal.      Pupils: Pupils are equal, round, and reactive to light.   Cardiovascular:      Rate and Rhythm:  Normal rate and regular rhythm.      Heart sounds: Normal heart sounds. No murmur heard.  Pulmonary:      Effort: Pulmonary effort is normal. No respiratory distress.      Breath sounds: Normal breath sounds. No wheezing or rales.   Abdominal:      General: Bowel sounds are normal. There is no distension.      Palpations: Abdomen is soft.      Tenderness: There is no abdominal tenderness.   Musculoskeletal:         General: No deformity. Normal range of motion.      Cervical back: Normal range of motion and neck supple.   Skin:     General: Skin is warm and dry.      Findings: No erythema or rash.      Comments: Right chest wall vas cath. Dressing c/d/i. No sign of infection to site.   Neurological:      Mental Status: He is alert and oriented to person, place, and time.   Psychiatric:         Behavior: Behavior normal.         Thought Content: Thought content normal.         Judgment: Judgment normal.            Significant Labs:   CBC:   Recent Labs   Lab 05/23/24  0433 05/24/24  0317   WBC 0.11* 0.30*   HGB 6.6* 7.1*   HCT 18.2* 19.6*   PLT 12* 6*    and CMP:   Recent Labs   Lab 05/23/24  0433 05/24/24  0317    135*   K 3.6 3.3*    108   CO2 21* 21*    96   BUN 20 22*   CREATININE 1.5* 1.4   CALCIUM 8.5* 8.2*   PROT 4.8* 4.7*   ALBUMIN 2.8* 2.6*   BILITOT 0.6 0.9   ALKPHOS 41* 37*   AST 7* 6*   ALT 12 10   ANIONGAP 7* 6*       Diagnostic Results:  I have reviewed all pertinent imaging results/findings within the past 24 hours.  Assessment/Plan:     * History of auto stem cell transplant  - Patient of Dr. Cook  - Admitted (5/11/24) for a Sandra 200 auto SCT  - Today is Day +11   - He received 5 bags containing a CD34 dose of 4.47 x 10^6 5/13/24 at 1330.  - See treatment plan below    Planned conditioning regimen:  Melphalan 200 on Day -1      Antimicrobial Prophylaxis:  Acyclovir starting on Day -1  Levofloxacin starting on Day -1  Fluconazole starting on Day -1  Bactrim starting on Day +30      Growth Factor Support:  Neupogen starting on Day +7      Caregiver: wife and mother  Post-transplant discharge plans: [facility]    Multiple myeloma in remission  From Dr. Cook's most recent clinic note:   is a 44y/o M with no significant medical problems. He has been diagnosed with multiple myeloma. He is here for a virtual stem cell transplant consultation.   Per records,  presented to [facility] on 10/11/23 after 1 month of progressive weakness, abdominal pain, and intermittent confusion. At the time of his presentation, his labs, which had been normal  in 8/2023, were notable for new severe DOUG, transaminitis, and Ca of 18. He was admitted and started on IVF .  Hypercalcemia workup was initiated and he was ultimately found to have numerous osseous lesions on 10/15/23 CT CAP, FLC - Kappa 3.29, Lambda 744, Ratio 226 , SPEP with M spike 0.17, IgG lambda on sIFE. Bone marrow biopsy done 10/18/23 revealed 95% plasma cells and FISH with TP53 deletion, FGFR3/IGH (or NSD2/IGH) fusion, usually representing a t(4;14), and a tetraploid subclone was observed. After correction of his hypercalcemia and improvement in his renal function he was discharged on 10/22/23.   He was started on Beatriz + RVd on 11/2/23.   - Admitted for a Sandra 200 auto SCT on 5/11/24. Transplanted 5/13/24.    Neutropenic fever  - Had temp of 101.8 over night on 5/22. HR 110s with fever, but vitals otherwise stable.   - Infection w/u ordered and unremarkable thus far.   - Started on Cefepime. Today is D2.  - C/o abdominal pain. Suspect chemo-induced mucositis, but will get abdominal imaging with next fever.   - Continue Mylanta and simethicone.    Pancytopenia due to antineoplastic chemotherapy  - Transfuse for hgb < 7, Plt <10K or bleeding  - Daily CBC while inpatient  - Stopped lovenox with platelets <50K  - Transfusing 1 unit plts today    Chemotherapy induced diarrhea  - C-diff neg  - PRN imodium available    Moderate  malnutrition  Nutrition consulted. Most recent weight and BMI monitored-     Measurements:  Wt Readings from Last 1 Encounters:   05/24/24 89.5 kg (197 lb 5 oz)   Body mass index is 29.14 kg/m².    Patient has been screened and assessed by RD.    Malnutrition Type:  Context: acute illness or injury, chronic illness  Level: moderate    Malnutrition Characteristic Summary:  Weight Loss (Malnutrition): 10% in 6 months  Subcutaneous Fat (Malnutrition): mild depletion  Muscle Mass (Malnutrition): mild depletion  Hand  Strength, Left (Malnutrition): Good  Hand  Strength, Right (Malnutrition): Good    Interventions/Recommendations (treatment strategy):  1. Continue Regular Diet. Enocurage high-calorie, high-protein food sources. Small, frequent meals/snacks as tolerated. If DOUG worsens consider Renal diet. 2.  Boost Plus (or alternative) TID.   3. Monitor I/O's, weight and labs.   4. Consider a phosphate binder with all meals. 5. RD to monitor.      Hypomagnesemia  - Monitoring mag level daily while inpatient  - Avoiding PRN electrolyte order set due to CKD    Insomnia  - Continue PRN melatonin    Hyperphosphatemia  - Monitoring daily phos levels  - Dose-reduced calcitriol    Volume overload  - Weight up 8.5 lbs from admission on 5/14  - Received large volume of fluid for transplant  - Stopped IVF but deferred diuresis  RESOLVED    Mild malnutrition  - Nutrition consulted. Most recent weight and BMI monitored-     Measurements:  Wt Readings from Last 1 Encounters:   05/24/24 89.5 kg (197 lb 5 oz)   Body mass index is 29.14 kg/m².    Patient has been screened and assessed by RD.    Malnutrition Type:  Context: acute illness or injury, chronic illness  Level: moderate    Malnutrition Characteristic Summary:  Weight Loss (Malnutrition): 10% in 6 months  Subcutaneous Fat (Malnutrition): mild depletion  Muscle Mass (Malnutrition): mild depletion  Hand  Strength, Left (Malnutrition): Good  Hand  Strength, Right  (Malnutrition): Good    Interventions/Recommendations (treatment strategy):  1. Continue Regular Diet. Enocurage high-calorie, high-protein food sources. Small, frequent meals/snacks as tolerated. If DOUG worsens consider Renal diet. 2.  Boost Plus (or alternative) TID.   3. Monitor I/O's, weight and labs.   4. Consider a phosphate binder with all meals. 5. RD to monitor.     Hypertension  - Continue home metoprolol    GERD (gastroesophageal reflux disease)  - Takes protonix at home. Will receive inpatient as part of SCT protocol.    Asymptomatic hyperuricemia  - Patient with chronically high uric acid levels. Home febuxostat not on formulary. Substituting with allopurinol while inpatient.    BPH (benign prostatic hyperplasia)  - Continue home Flomax    CKD (chronic kidney disease)  - Baseline creatinine appears to be ~ 1.8  - Renally dose meds as appropriate  - Creatinine stable at 1.4 today    ADD (attention deficit disorder)  - Holding home Aderall while inpatient    Pericarditis  - Will hold home colchicine as he has completed a 3 month course per PharmD rec    Cancer associated pain  - Continue home Oxy IR        VTE Risk Mitigation (From admission, onward)           Ordered     heparin (porcine) injection 1,600 Units  As needed (PRN)         05/11/24 2044     IP VTE HIGH RISK PATIENT  Once         05/11/24 1803     Place sequential compression device  Until discontinued         05/11/24 1803                    Disposition: Inpatient for autologous SCT. Awaiting neutrophile engraftment.    Julianna Dalal, NP  Bone Marrow Transplant  Rohan Weber - Oncology ([facility])

## 2024-05-24 NOTE — ASSESSMENT & PLAN NOTE
- Had temp of 101.8 over night on 5/22. HR 110s with fever, but vitals otherwise stable.   - Infection w/u ordered and unremarkable thus far.   - Started on Cefepime. Today is D2.  - C/o abdominal pain. Suspect chemo-induced mucositis, but will get abdominal imaging with next fever.   - Continue Mylanta and simethicone.

## 2024-05-24 NOTE — PLAN OF CARE
Pt is Day +10 Sandra Auto SCT. 1u PRBC transfused without issue. Pt with c/o intermittent abd discomfort. Simethicone and bentyl given for relief. Declines pain meds at this time. Nausea controlled. BM x2. Imodium given. NS 20K @ 100. Pt up ad scar, refusing bed alarm. Pt educated on falls risk with low PLTs.    1747: Pt with Tmax 103. BMT fellow notified. Resolved with tylenol x1. Pt remains on IV cefepime. Repeat blood cx and CXR obtained. UA/cx to be collected.

## 2024-05-24 NOTE — PROGRESS NOTES
Pt seen for follow up. Feeling well today.  Updated [facility] on estimated discharge date; they have not responded to previous referral.  No other needs identified at this time. Will continue to follow and assist as needed.

## 2024-05-24 NOTE — PLAN OF CARE
Day +11 Sandra Auto SCT. 1 unit of platelets given this shift, premedications given, pt tolerated well. C/o of headache 5/10 pain, relief with PRN oxycodone 5mg. Emesis x 1, PRN compazine given. Diarrhea x 1, PRN imodium given. No other complaints this shift. POC reviewed with patient; understanding verbalized. Pt. with nonskid footwear on, bed in lowest position, and locked with bed rails up x2.  Pt. instructed to call prior to getting OOB.  Pt. has call light and personal items within reach. Patient ambulates in room independently. VSS and afebrile this shift. All questions and concerns addressed at this time. Will continue to monitor.

## 2024-05-24 NOTE — SUBJECTIVE & OBJECTIVE
Subjective:     Interval History:  Day +11 from a Sandra 200 auto SCT for MM. Had temp of 103 yesterday evening. VS otherwise stable. No fevers since. Infection w/u ordered and unremarkable thus far. Abx not changed. May be having engraftment fevers. ANC 20 today. Not ill-appearing. Transfusing 1 unit plts for plt count of 6K. Repleting electrolytes.    Objective:     Vital Signs (Most Recent):  Temp: 98.3 °F (36.8 °C) (05/24/24 1152)  Pulse: 88 (05/24/24 1152)  Resp: 18 (05/24/24 1152)  BP: 135/85 (05/24/24 1152)  SpO2: (!) 94 % (05/24/24 1152) Vital Signs (24h Range):  Temp:  [98.1 °F (36.7 °C)-103 °F (39.4 °C)] 98.3 °F (36.8 °C)  Pulse:  [] 88  Resp:  [16-18] 18  SpO2:  [94 %-100 %] 94 %  BP: (115-135)/(66-85) 135/85     Weight: 89.5 kg (197 lb 5 oz)  Body mass index is 29.14 kg/m².  Body surface area is 2.09 meters squared.    ECOG SCORE           [unfilled]    Intake/Output - Last 3 Shifts         05/22 0700  05/23 0659 05/23 0700  05/24 0659 05/24 0700  05/25 0659    P.O. 650 550     I.V. (mL/kg) 2238 (25) 1929.4 (21.6) 573.7 (6.4)    Blood  350     IV Piggyback 297.1 294.1     Total Intake(mL/kg) 3185.1 (35.6) 3123.5 (34.9) 573.7 (6.4)    Urine (mL/kg/hr) 3775 (1.8) 3775 (1.8) 375 (0.7)    Stool 0 0 0    Total Output 3775 3775 375    Net -590 -651.5 +198.7           Stool Occurrence 3 x 1 x 1 x             Physical Exam  Constitutional:       Appearance: He is well-developed.   HENT:      Head: Normocephalic and atraumatic.      Mouth/Throat:      Pharynx: No oropharyngeal exudate.   Eyes:      General:         Right eye: No discharge.         Left eye: No discharge.      Conjunctiva/sclera: Conjunctivae normal.      Pupils: Pupils are equal, round, and reactive to light.   Cardiovascular:      Rate and Rhythm: Normal rate and regular rhythm.      Heart sounds: Normal heart sounds. No murmur heard.  Pulmonary:      Effort: Pulmonary effort is normal. No respiratory distress.      Breath sounds: Normal  breath sounds. No wheezing or rales.   Abdominal:      General: Bowel sounds are normal. There is no distension.      Palpations: Abdomen is soft.      Tenderness: There is no abdominal tenderness.   Musculoskeletal:         General: No deformity. Normal range of motion.      Cervical back: Normal range of motion and neck supple.   Skin:     General: Skin is warm and dry.      Findings: No erythema or rash.      Comments: Right chest wall vas cath. Dressing c/d/i. No sign of infection to site.   Neurological:      Mental Status: He is alert and oriented to person, place, and time.   Psychiatric:         Behavior: Behavior normal.         Thought Content: Thought content normal.         Judgment: Judgment normal.            Significant Labs:   CBC:   Recent Labs   Lab 05/23/24  0433 05/24/24  0317   WBC 0.11* 0.30*   HGB 6.6* 7.1*   HCT 18.2* 19.6*   PLT 12* 6*    and CMP:   Recent Labs   Lab 05/23/24  0433 05/24/24  0317    135*   K 3.6 3.3*    108   CO2 21* 21*    96   BUN 20 22*   CREATININE 1.5* 1.4   CALCIUM 8.5* 8.2*   PROT 4.8* 4.7*   ALBUMIN 2.8* 2.6*   BILITOT 0.6 0.9   ALKPHOS 41* 37*   AST 7* 6*   ALT 12 10   ANIONGAP 7* 6*       Diagnostic Results:  I have reviewed all pertinent imaging results/findings within the past 24 hours.

## 2024-05-25 LAB
ALBUMIN SERPL BCP-MCNC: 2.6 G/DL (ref 3.5–5.2)
ALP SERPL-CCNC: 36 U/L (ref 55–135)
ALT SERPL W/O P-5'-P-CCNC: 11 U/L (ref 10–44)
ANION GAP SERPL CALC-SCNC: 7 MMOL/L (ref 8–16)
ANISOCYTOSIS BLD QL SMEAR: SLIGHT
AST SERPL-CCNC: 7 U/L (ref 10–40)
BASOPHILS NFR BLD: 0 % (ref 0–1.9)
BILIRUB SERPL-MCNC: 0.6 MG/DL (ref 0.1–1)
BLD PROD TYP BPU: NORMAL
BLD PROD TYP BPU: NORMAL
BLOOD UNIT EXPIRATION DATE: NORMAL
BLOOD UNIT EXPIRATION DATE: NORMAL
BLOOD UNIT TYPE CODE: 6200
BLOOD UNIT TYPE CODE: 7300
BLOOD UNIT TYPE: NORMAL
BLOOD UNIT TYPE: NORMAL
BUN SERPL-MCNC: 19 MG/DL (ref 6–20)
CALCIUM SERPL-MCNC: 8.4 MG/DL (ref 8.7–10.5)
CHLORIDE SERPL-SCNC: 110 MMOL/L (ref 95–110)
CO2 SERPL-SCNC: 22 MMOL/L (ref 23–29)
CODING SYSTEM: NORMAL
CODING SYSTEM: NORMAL
CREAT SERPL-MCNC: 1.2 MG/DL (ref 0.5–1.4)
CROSSMATCH INTERPRETATION: NORMAL
CROSSMATCH INTERPRETATION: NORMAL
DIFFERENTIAL METHOD BLD: ABNORMAL
DISPENSE STATUS: NORMAL
DISPENSE STATUS: NORMAL
EOSINOPHIL NFR BLD: 0 % (ref 0–8)
ERYTHROCYTE [DISTWIDTH] IN BLOOD BY AUTOMATED COUNT: 14.4 % (ref 11.5–14.5)
EST. GFR  (NO RACE VARIABLE): >60 ML/MIN/1.73 M^2
GLUCOSE SERPL-MCNC: 93 MG/DL (ref 70–110)
HCT VFR BLD AUTO: 19.5 % (ref 40–54)
HGB BLD-MCNC: 6.9 G/DL (ref 14–18)
IMM GRANULOCYTES # BLD AUTO: ABNORMAL K/UL (ref 0–0.04)
IMM GRANULOCYTES NFR BLD AUTO: ABNORMAL % (ref 0–0.5)
LYMPHOCYTES NFR BLD: 66 % (ref 18–48)
MAGNESIUM SERPL-MCNC: 1.5 MG/DL (ref 1.6–2.6)
MCH RBC QN AUTO: 33.5 PG (ref 27–31)
MCHC RBC AUTO-ENTMCNC: 35.4 G/DL (ref 32–36)
MCV RBC AUTO: 95 FL (ref 82–98)
MONOCYTES NFR BLD: 20 % (ref 4–15)
NEUTROPHILS NFR BLD: 14 % (ref 38–73)
NRBC BLD-RTO: 0 /100 WBC
NUM UNITS TRANS PACKED RBC: NORMAL
OVALOCYTES BLD QL SMEAR: ABNORMAL
PHOSPHATE SERPL-MCNC: 2.4 MG/DL (ref 2.7–4.5)
PLATELET # BLD AUTO: 8 K/UL (ref 150–450)
PMV BLD AUTO: ABNORMAL FL (ref 9.2–12.9)
POIKILOCYTOSIS BLD QL SMEAR: SLIGHT
POLYCHROMASIA BLD QL SMEAR: ABNORMAL
POTASSIUM SERPL-SCNC: 3.5 MMOL/L (ref 3.5–5.1)
PROT SERPL-MCNC: 4.7 G/DL (ref 6–8.4)
RBC # BLD AUTO: 2.06 M/UL (ref 4.6–6.2)
SMUDGE CELLS BLD QL SMEAR: PRESENT
SODIUM SERPL-SCNC: 139 MMOL/L (ref 136–145)
UNIT NUMBER: NORMAL
WBC # BLD AUTO: 0.81 K/UL (ref 3.9–12.7)

## 2024-05-25 PROCEDURE — 25000003 PHARM REV CODE 250: Performed by: INTERNAL MEDICINE

## 2024-05-25 PROCEDURE — 84100 ASSAY OF PHOSPHORUS: CPT | Performed by: NURSE PRACTITIONER

## 2024-05-25 PROCEDURE — 25000003 PHARM REV CODE 250: Performed by: STUDENT IN AN ORGANIZED HEALTH CARE EDUCATION/TRAINING PROGRAM

## 2024-05-25 PROCEDURE — P9040 RBC LEUKOREDUCED IRRADIATED: HCPCS | Performed by: STUDENT IN AN ORGANIZED HEALTH CARE EDUCATION/TRAINING PROGRAM

## 2024-05-25 PROCEDURE — 85027 COMPLETE CBC AUTOMATED: CPT | Performed by: NURSE PRACTITIONER

## 2024-05-25 PROCEDURE — 85007 BL SMEAR W/DIFF WBC COUNT: CPT | Performed by: NURSE PRACTITIONER

## 2024-05-25 PROCEDURE — 63600175 PHARM REV CODE 636 W HCPCS: Mod: JZ,JG | Performed by: INTERNAL MEDICINE

## 2024-05-25 PROCEDURE — 86922 COMPATIBILITY TEST ANTIGLOB: CPT | Performed by: STUDENT IN AN ORGANIZED HEALTH CARE EDUCATION/TRAINING PROGRAM

## 2024-05-25 PROCEDURE — P9073 PLATELETS PHERESIS PATH REDU: HCPCS | Performed by: STUDENT IN AN ORGANIZED HEALTH CARE EDUCATION/TRAINING PROGRAM

## 2024-05-25 PROCEDURE — 25000003 PHARM REV CODE 250: Performed by: NURSE PRACTITIONER

## 2024-05-25 PROCEDURE — 80053 COMPREHEN METABOLIC PANEL: CPT | Performed by: NURSE PRACTITIONER

## 2024-05-25 PROCEDURE — 63600175 PHARM REV CODE 636 W HCPCS: Performed by: STUDENT IN AN ORGANIZED HEALTH CARE EDUCATION/TRAINING PROGRAM

## 2024-05-25 PROCEDURE — 63600175 PHARM REV CODE 636 W HCPCS: Performed by: INTERNAL MEDICINE

## 2024-05-25 PROCEDURE — 63600175 PHARM REV CODE 636 W HCPCS: Performed by: NURSE PRACTITIONER

## 2024-05-25 PROCEDURE — P9073 PLATELETS PHERESIS PATH REDU: HCPCS | Mod: 91 | Performed by: STUDENT IN AN ORGANIZED HEALTH CARE EDUCATION/TRAINING PROGRAM

## 2024-05-25 PROCEDURE — 20600001 HC STEP DOWN PRIVATE ROOM

## 2024-05-25 PROCEDURE — 99233 SBSQ HOSP IP/OBS HIGH 50: CPT | Mod: ,,, | Performed by: INTERNAL MEDICINE

## 2024-05-25 PROCEDURE — 83735 ASSAY OF MAGNESIUM: CPT | Performed by: NURSE PRACTITIONER

## 2024-05-25 RX ORDER — HYDROCODONE BITARTRATE AND ACETAMINOPHEN 500; 5 MG/1; MG/1
TABLET ORAL
Status: DISCONTINUED | OUTPATIENT
Start: 2024-05-25 | End: 2024-05-27 | Stop reason: HOSPADM

## 2024-05-25 RX ORDER — POTASSIUM CHLORIDE 20 MEQ/1
40 TABLET, EXTENDED RELEASE ORAL ONCE
Status: COMPLETED | OUTPATIENT
Start: 2024-05-25 | End: 2024-05-25

## 2024-05-25 RX ORDER — MAGNESIUM SULFATE HEPTAHYDRATE 40 MG/ML
2 INJECTION, SOLUTION INTRAVENOUS ONCE
Status: COMPLETED | OUTPATIENT
Start: 2024-05-25 | End: 2024-05-25

## 2024-05-25 RX ORDER — SODIUM,POTASSIUM PHOSPHATES 280-250MG
2 POWDER IN PACKET (EA) ORAL ONCE
Status: COMPLETED | OUTPATIENT
Start: 2024-05-25 | End: 2024-05-25

## 2024-05-25 RX ORDER — ACETAMINOPHEN 325 MG/1
650 TABLET ORAL ONCE
Status: COMPLETED | OUTPATIENT
Start: 2024-05-25 | End: 2024-05-25

## 2024-05-25 RX ADMIN — SODIUM CHLORIDE AND POTASSIUM CHLORIDE: .9; .15 SOLUTION INTRAVENOUS at 02:05

## 2024-05-25 RX ADMIN — Medication 1 DOSE: at 05:05

## 2024-05-25 RX ADMIN — ALUMINUM HYDROXIDE, MAGNESIUM HYDROXIDE, AND DIMETHICONE 10 ML: 400; 400; 40 SUSPENSION ORAL at 10:05

## 2024-05-25 RX ADMIN — ACETAMINOPHEN 650 MG: 325 TABLET ORAL at 10:05

## 2024-05-25 RX ADMIN — ONDANSETRON 8 MG: 8 TABLET, ORALLY DISINTEGRATING ORAL at 05:05

## 2024-05-25 RX ADMIN — CEFEPIME 2 G: 2 INJECTION, POWDER, FOR SOLUTION INTRAVENOUS at 04:05

## 2024-05-25 RX ADMIN — FLUCONAZOLE 400 MG: 200 TABLET ORAL at 10:05

## 2024-05-25 RX ADMIN — Medication 1 DOSE: at 10:05

## 2024-05-25 RX ADMIN — ACYCLOVIR 800 MG: 200 CAPSULE ORAL at 08:05

## 2024-05-25 RX ADMIN — CALCITRIOL CAPSULES 0.25 MCG 0.25 MCG: 0.25 CAPSULE ORAL at 10:05

## 2024-05-25 RX ADMIN — HEPARIN SODIUM 1600 UNITS: 1000 INJECTION, SOLUTION INTRAVENOUS; SUBCUTANEOUS at 08:05

## 2024-05-25 RX ADMIN — ONDANSETRON 8 MG: 8 TABLET, ORALLY DISINTEGRATING ORAL at 09:05

## 2024-05-25 RX ADMIN — FILGRASTIM 480 MCG: 480 INJECTION, SOLUTION INTRAVENOUS; SUBCUTANEOUS at 10:05

## 2024-05-25 RX ADMIN — OXYCODONE 5 MG: 5 TABLET ORAL at 05:05

## 2024-05-25 RX ADMIN — Medication 1 DOSE: at 01:05

## 2024-05-25 RX ADMIN — SODIUM BICARBONATE 650 MG: 650 TABLET ORAL at 10:05

## 2024-05-25 RX ADMIN — ALUMINUM HYDROXIDE, MAGNESIUM HYDROXIDE, AND DIMETHICONE 10 ML: 400; 400; 40 SUSPENSION ORAL at 05:05

## 2024-05-25 RX ADMIN — SODIUM CHLORIDE AND POTASSIUM CHLORIDE: .9; .15 SOLUTION INTRAVENOUS at 01:05

## 2024-05-25 RX ADMIN — SODIUM BICARBONATE 650 MG: 650 TABLET ORAL at 08:05

## 2024-05-25 RX ADMIN — CEFEPIME 2 G: 2 INJECTION, POWDER, FOR SOLUTION INTRAVENOUS at 07:05

## 2024-05-25 RX ADMIN — Medication 9 MG: at 08:05

## 2024-05-25 RX ADMIN — DIPHENHYDRAMINE HYDROCHLORIDE 25 MG: 25 CAPSULE ORAL at 10:05

## 2024-05-25 RX ADMIN — PANTOPRAZOLE SODIUM 40 MG: 40 TABLET, DELAYED RELEASE ORAL at 10:05

## 2024-05-25 RX ADMIN — CEFEPIME 2 G: 2 INJECTION, POWDER, FOR SOLUTION INTRAVENOUS at 12:05

## 2024-05-25 RX ADMIN — POTASSIUM & SODIUM PHOSPHATES POWDER PACK 280-160-250 MG 2 PACKET: 280-160-250 PACK at 05:05

## 2024-05-25 RX ADMIN — METOPROLOL TARTRATE 12.5 MG: 25 TABLET, FILM COATED ORAL at 08:05

## 2024-05-25 RX ADMIN — ALUMINUM HYDROXIDE, MAGNESIUM HYDROXIDE, AND DIMETHICONE 10 ML: 400; 400; 40 SUSPENSION ORAL at 01:05

## 2024-05-25 RX ADMIN — ALLOPURINOL 100 MG: 100 TABLET ORAL at 10:05

## 2024-05-25 RX ADMIN — ACYCLOVIR 800 MG: 200 CAPSULE ORAL at 10:05

## 2024-05-25 RX ADMIN — ONDANSETRON 8 MG: 8 TABLET, ORALLY DISINTEGRATING ORAL at 01:05

## 2024-05-25 RX ADMIN — METOPROLOL TARTRATE 12.5 MG: 25 TABLET, FILM COATED ORAL at 10:05

## 2024-05-25 RX ADMIN — TAMSULOSIN HYDROCHLORIDE 0.4 MG: 0.4 CAPSULE ORAL at 08:05

## 2024-05-25 RX ADMIN — POTASSIUM CHLORIDE 40 MEQ: 1500 TABLET, EXTENDED RELEASE ORAL at 05:05

## 2024-05-25 RX ADMIN — ALUMINUM HYDROXIDE, MAGNESIUM HYDROXIDE, AND DIMETHICONE 10 ML: 400; 400; 40 SUSPENSION ORAL at 08:05

## 2024-05-25 RX ADMIN — Medication 1 DOSE: at 08:05

## 2024-05-25 RX ADMIN — MAGNESIUM SULFATE HEPTAHYDRATE 2 G: 40 INJECTION, SOLUTION INTRAVENOUS at 05:05

## 2024-05-25 NOTE — PLAN OF CARE
Day +12 Sandra 200 auto SCT .  Went over POC with patient at the beginning of shift.  Questions were asked and answered. Stated understanding of POC.  AAO x 4 throughout shift. T-max 100.2  No complaints of pain, nausea, or loose stool.  PRN melatonin given to aide in sleep, which was effective.   Pt remained free from falls or injury throughout shift; bed locked and in lowest position; call light within reach.  Denies needs at this time at this time.

## 2024-05-25 NOTE — ASSESSMENT & PLAN NOTE
- Baseline creatinine appears to be ~ 1.8  - Renally dose meds as appropriate  - Creatinine stable at 1.2 today

## 2024-05-25 NOTE — ASSESSMENT & PLAN NOTE
- Had temp of 101.8 over night on 5/22. HR 110s with fever, but vitals otherwise stable.   - Infection w/u ordered and unremarkable thus far.   - Started on Cefepime. Today is D3.  - C/o abdominal pain. Suspect chemo-induced mucositis, but will get abdominal imaging if repeat fever.  - Continue Mylanta and simethicone.

## 2024-05-25 NOTE — SUBJECTIVE & OBJECTIVE
Subjective:     Interval History: Day +12 from melphalan 200 conditioned autologous stem cell transplant for multiple myeloma. Feeling better this morning with an appetite and hunger for solid food. . Afebrile. 1 unit blood and platelets today.    Objective:     Vital Signs (Most Recent):  Temp: 98.7 °F (37.1 °C) (05/25/24 0812)  Pulse: 87 (05/25/24 0812)  Resp: 16 (05/25/24 0812)  BP: 133/73 (05/25/24 0812)  SpO2: 99 % (05/25/24 0812) Vital Signs (24h Range):  Temp:  [98.3 °F (36.8 °C)-100.2 °F (37.9 °C)] 98.7 °F (37.1 °C)  Pulse:  [] 87  Resp:  [16-20] 16  SpO2:  [94 %-99 %] 99 %  BP: (108-139)/(63-85) 133/73     Weight: 89.9 kg (198 lb 3.1 oz)  Body mass index is 29.27 kg/m².  Body surface area is 2.09 meters squared.    ECOG SCORE           ECOG PS 1    Intake/Output - Last 3 Shifts         05/23 0700  05/24 0659 05/24 0700  05/25 0659 05/25 0700  05/26 0659    P.O. 550 2140     I.V. (mL/kg) 1929.4 (21.6) 1664.5 (18.5)     Blood 350      IV Piggyback 294.1      Total Intake(mL/kg) 3123.5 (34.9) 3804.5 (42.3)     Urine (mL/kg/hr) 3775 (1.8) 3775 (1.7)     Emesis/NG output  0     Other  0     Stool 0 0     Total Output 3775 3775     Net -651.5 +29.5            Urine Occurrence  0 x     Stool Occurrence 1 x 3 x     Emesis Occurrence  0 x              Physical Exam  Vitals and nursing note reviewed.   Constitutional:       Appearance: He is well-developed.   HENT:      Head: Normocephalic and atraumatic.   Eyes:      General: No scleral icterus.     Conjunctiva/sclera: Conjunctivae normal.   Cardiovascular:      Rate and Rhythm: Normal rate.   Pulmonary:      Effort: Pulmonary effort is normal. No respiratory distress.   Abdominal:      General: There is no distension.      Palpations: Abdomen is soft.      Tenderness: There is no abdominal tenderness.   Musculoskeletal:         General: Normal range of motion.      Cervical back: Normal range of motion and neck supple.   Skin:     General: Skin is  warm and dry.   Neurological:      Mental Status: He is alert and oriented to person, place, and time.      Cranial Nerves: No cranial nerve deficit.   Psychiatric:         Behavior: Behavior normal.            Significant Labs:   CBC:   Recent Labs   Lab 05/24/24 0317 05/25/24 0153   WBC 0.30* 0.81*   HGB 7.1* 6.9*   HCT 19.6* 19.5*   PLT 6* 8*    and CMP:   Recent Labs   Lab 05/24/24 0317 05/25/24 0153   * 139   K 3.3* 3.5    110   CO2 21* 22*   GLU 96 93   BUN 22* 19   CREATININE 1.4 1.2   CALCIUM 8.2* 8.4*   PROT 4.7* 4.7*   ALBUMIN 2.6* 2.6*   BILITOT 0.9 0.6   ALKPHOS 37* 36*   AST 6* 7*   ALT 10 11   ANIONGAP 6* 7*       Diagnostic Results:  NA

## 2024-05-25 NOTE — ASSESSMENT & PLAN NOTE
Nutrition consulted. Most recent weight and BMI monitored-     Measurements:  Wt Readings from Last 1 Encounters:   05/25/24 89.9 kg (198 lb 3.1 oz)   Body mass index is 29.27 kg/m².    Patient has been screened and assessed by RD.    Malnutrition Type:  Context: acute illness or injury, chronic illness  Level: moderate    Malnutrition Characteristic Summary:  Weight Loss (Malnutrition): 10% in 6 months  Subcutaneous Fat (Malnutrition): mild depletion  Muscle Mass (Malnutrition): mild depletion  Hand  Strength, Left (Malnutrition): Good  Hand  Strength, Right (Malnutrition): Good    Interventions/Recommendations (treatment strategy):  1. Continue Regular Diet. Enocurage high-calorie, high-protein food sources. Small, frequent meals/snacks as tolerated. If DOUG worsens consider Renal diet. 2.  Boost Plus (or alternative) TID.   3. Monitor I/O's, weight and labs.   4. Consider a phosphate binder with all meals. 5. RD to monitor.

## 2024-05-25 NOTE — PROGRESS NOTES
Rohan Weber - Oncology ([facility])  Hematology  Bone Marrow Transplant  Progress Note    Patient Name: Pete Fernandez  Admission Date: 5/11/2024  Hospital Length of Stay: 14 days  Code Status: Full Code    Subjective:     Interval History: Day +12 from melphalan 200 conditioned autologous stem cell transplant for multiple myeloma. Feeling better this morning with an appetite and hunger for solid food. . Afebrile. 1 unit blood and platelets today.    Objective:     Vital Signs (Most Recent):  Temp: 98.7 °F (37.1 °C) (05/25/24 0812)  Pulse: 87 (05/25/24 0812)  Resp: 16 (05/25/24 0812)  BP: 133/73 (05/25/24 0812)  SpO2: 99 % (05/25/24 0812) Vital Signs (24h Range):  Temp:  [98.3 °F (36.8 °C)-100.2 °F (37.9 °C)] 98.7 °F (37.1 °C)  Pulse:  [] 87  Resp:  [16-20] 16  SpO2:  [94 %-99 %] 99 %  BP: (108-139)/(63-85) 133/73     Weight: 89.9 kg (198 lb 3.1 oz)  Body mass index is 29.27 kg/m².  Body surface area is 2.09 meters squared.    ECOG SCORE           ECOG PS 1    Intake/Output - Last 3 Shifts         05/23 0700  05/24 0659 05/24 0700  05/25 0659 05/25 0700  05/26 0659    P.O. 550 2140     I.V. (mL/kg) 1929.4 (21.6) 1664.5 (18.5)     Blood 350      IV Piggyback 294.1      Total Intake(mL/kg) 3123.5 (34.9) 3804.5 (42.3)     Urine (mL/kg/hr) 3775 (1.8) 3775 (1.7)     Emesis/NG output  0     Other  0     Stool 0 0     Total Output 3775 3775     Net -651.5 +29.5            Urine Occurrence  0 x     Stool Occurrence 1 x 3 x     Emesis Occurrence  0 x              Physical Exam  Vitals and nursing note reviewed.   Constitutional:       Appearance: He is well-developed.   HENT:      Head: Normocephalic and atraumatic.   Eyes:      General: No scleral icterus.     Conjunctiva/sclera: Conjunctivae normal.   Cardiovascular:      Rate and Rhythm: Normal rate.   Pulmonary:      Effort: Pulmonary effort is normal. No respiratory distress.   Abdominal:      General: There is no distension.      Palpations: Abdomen is soft.       Tenderness: There is no abdominal tenderness.   Musculoskeletal:         General: Normal range of motion.      Cervical back: Normal range of motion and neck supple.   Skin:     General: Skin is warm and dry.   Neurological:      Mental Status: He is alert and oriented to person, place, and time.      Cranial Nerves: No cranial nerve deficit.   Psychiatric:         Behavior: Behavior normal.            Significant Labs:   CBC:   Recent Labs   Lab 05/24/24  0317 05/25/24  0153   WBC 0.30* 0.81*   HGB 7.1* 6.9*   HCT 19.6* 19.5*   PLT 6* 8*    and CMP:   Recent Labs   Lab 05/24/24  0317 05/25/24  0153   * 139   K 3.3* 3.5    110   CO2 21* 22*   GLU 96 93   BUN 22* 19   CREATININE 1.4 1.2   CALCIUM 8.2* 8.4*   PROT 4.7* 4.7*   ALBUMIN 2.6* 2.6*   BILITOT 0.9 0.6   ALKPHOS 37* 36*   AST 6* 7*   ALT 10 11   ANIONGAP 6* 7*       Diagnostic Results:  NA  Assessment/Plan:     * History of auto stem cell transplant  - Patient of Dr. Cook  - Admitted (5/11/24) for a Sandra 200 auto SCT  - Today is Day +12   - He received 5 bags containing a CD34 dose of 4.47 x 10^6 5/13/24 at 1330.  - See treatment plan below    Planned conditioning regimen:  Melphalan 200 on Day -1      Antimicrobial Prophylaxis:  Acyclovir starting on Day -1  Levofloxacin starting on Day -1  Fluconazole starting on Day -1  Bactrim starting on Day +30     Growth Factor Support:  Neupogen starting on Day +7      Caregiver: wife and mother  Post-transplant discharge plans: Becca Martin    Moderate malnutrition  Nutrition consulted. Most recent weight and BMI monitored-     Measurements:  Wt Readings from Last 1 Encounters:   05/25/24 89.9 kg (198 lb 3.1 oz)   Body mass index is 29.27 kg/m².    Patient has been screened and assessed by RD.    Malnutrition Type:  Context: acute illness or injury, chronic illness  Level: moderate    Malnutrition Characteristic Summary:  Weight Loss (Malnutrition): 10% in 6 months  Subcutaneous Fat (Malnutrition): mild  depletion  Muscle Mass (Malnutrition): mild depletion  Hand  Strength, Left (Malnutrition): Good  Hand  Strength, Right (Malnutrition): Good    Interventions/Recommendations (treatment strategy):  1. Continue Regular Diet. Enocurage high-calorie, high-protein food sources. Small, frequent meals/snacks as tolerated. If DOUG worsens consider Renal diet. 2.  Boost Plus (or alternative) TID.   3. Monitor I/O's, weight and labs.   4. Consider a phosphate binder with all meals. 5. RD to monitor.      Hypomagnesemia  - Monitoring mag level daily while inpatient  - Avoiding PRN electrolyte order set due to CKD    Neutropenic fever  - Had temp of 101.8 over night on 5/22. HR 110s with fever, but vitals otherwise stable.   - Infection w/u ordered and unremarkable thus far.   - Started on Cefepime. Today is D3.  - C/o abdominal pain. Suspect chemo-induced mucositis, but will get abdominal imaging if repeat fever.  - Continue Mylanta and simethicone.    Chemotherapy induced diarrhea  - C-diff neg  - PRN imodium available    Insomnia  - Continue PRN melatonin    Hyperphosphatemia  - Monitoring daily phos levels  - Dose-reduced calcitriol    Volume overload  - Weight up 8.5 lbs from admission on 5/14  - Received large volume of fluid for transplant  - Stopped IVF but deferred diuresis  RESOLVED    Mild malnutrition  - Nutrition consulted. Most recent weight and BMI monitored-     Measurements:  Wt Readings from Last 1 Encounters:   05/25/24 89.9 kg (198 lb 3.1 oz)   Body mass index is 29.27 kg/m².    Patient has been screened and assessed by RD.    Malnutrition Type:  Context: acute illness or injury, chronic illness  Level: moderate    Malnutrition Characteristic Summary:  Weight Loss (Malnutrition): 10% in 6 months  Subcutaneous Fat (Malnutrition): mild depletion  Muscle Mass (Malnutrition): mild depletion  Hand  Strength, Left (Malnutrition): Good  Hand  Strength, Right (Malnutrition):  Good    Interventions/Recommendations (treatment strategy):  1. Continue Regular Diet. Enocurage high-calorie, high-protein food sources. Small, frequent meals/snacks as tolerated. If DOUG worsens consider Renal diet. 2.  Boost Plus (or alternative) TID.   3. Monitor I/O's, weight and labs.   4. Consider a phosphate binder with all meals. 5. RD to monitor.     Hypertension  - Continue home metoprolol    GERD (gastroesophageal reflux disease)  - Takes protonix at home. Will receive inpatient as part of SCT protocol.    Asymptomatic hyperuricemia  - Patient with chronically high uric acid levels. Home febuxostat not on formulary. Substituting with allopurinol while inpatient.    BPH (benign prostatic hyperplasia)  - Continue home Flomax    CKD (chronic kidney disease)  - Baseline creatinine appears to be ~ 1.8  - Renally dose meds as appropriate  - Creatinine stable at 1.2 today    ADD (attention deficit disorder)  - Holding home Aderall while inpatient    Pericarditis  - Will hold home colchicine as he has completed a 3 month course per PharmD rec    Pancytopenia due to antineoplastic chemotherapy  - Transfuse for hgb < 7, Plt <10K or bleeding  - Daily CBC while inpatient  - Stopped lovenox with platelets <50K  - Transfusing 1 unit blood and plts today    Cancer associated pain  - Continue home Oxy IR    Multiple myeloma in remission  From Dr. Cook's most recent clinic note:   is a 44y/o M with no significant medical problems. He has been diagnosed with multiple myeloma. He is here for a virtual stem cell transplant consultation.   Per records,  presented to [facility] on 10/11/23 after 1 month of progressive weakness, abdominal pain, and intermittent confusion. At the time of his presentation, his labs, which had been normal  in 8/2023, were notable for new severe DOUG, transaminitis, and Ca of 18. He was admitted and started on IVF .  Hypercalcemia workup was initiated and he was ultimately found to have  numerous osseous lesions on 10/15/23 CT CAP, FLC - Kappa 3.29, Lambda 744, Ratio 226 , SPEP with M spike 0.17, IgG lambda on sIFE. Bone marrow biopsy done 10/18/23 revealed 95% plasma cells and FISH with TP53 deletion, FGFR3/IGH (or NSD2/IGH) fusion, usually representing a t(4;14), and a tetraploid subclone was observed. After correction of his hypercalcemia and improvement in his renal function he was discharged on 10/22/23.   He was started on Beatriz + RVd on 11/2/23.   - Admitted for a Sandra 200 auto SCT on 5/11/24. Transplanted 5/13/24.        VTE Risk Mitigation (From admission, onward)           Ordered     heparin (porcine) injection 1,600 Units  As needed (PRN)         05/11/24 2044     IP VTE HIGH RISK PATIENT  Once         05/11/24 1803     Place sequential compression device  Until discontinued         05/11/24 1803                    Disposition: inpatient for stem cell transplant    Janet Arceo MD  Bone Marrow Transplant  [facility] - Oncology ([facility])

## 2024-05-25 NOTE — ASSESSMENT & PLAN NOTE
- Nutrition consulted. Most recent weight and BMI monitored-     Measurements:  Wt Readings from Last 1 Encounters:   05/25/24 89.9 kg (198 lb 3.1 oz)   Body mass index is 29.27 kg/m².    Patient has been screened and assessed by RD.    Malnutrition Type:  Context: acute illness or injury, chronic illness  Level: moderate    Malnutrition Characteristic Summary:  Weight Loss (Malnutrition): 10% in 6 months  Subcutaneous Fat (Malnutrition): mild depletion  Muscle Mass (Malnutrition): mild depletion  Hand  Strength, Left (Malnutrition): Good  Hand  Strength, Right (Malnutrition): Good    Interventions/Recommendations (treatment strategy):  1. Continue Regular Diet. Enocurage high-calorie, high-protein food sources. Small, frequent meals/snacks as tolerated. If DOUG worsens consider Renal diet. 2.  Boost Plus (or alternative) TID.   3. Monitor I/O's, weight and labs.   4. Consider a phosphate binder with all meals. 5. RD to monitor.

## 2024-05-25 NOTE — ASSESSMENT & PLAN NOTE
From Dr. Cook's most recent clinic note:   is a 44y/o M with no significant medical problems. He has been diagnosed with multiple myeloma. He is here for a virtual stem cell transplant consultation.   Per records,  presented to [facility] on 10/11/23 after 1 month of progressive weakness, abdominal pain, and intermittent confusion. At the time of his presentation, his labs, which had been normal  in 8/2023, were notable for new severe DOUG, transaminitis, and Ca of 18. He was admitted and started on IVF .  Hypercalcemia workup was initiated and he was ultimately found to have numerous osseous lesions on 10/15/23 CT CAP, FLC - Kappa 3.29, Lambda 744, Ratio 226 , SPEP with M spike 0.17, IgG lambda on sIFE. Bone marrow biopsy done 10/18/23 revealed 95% plasma cells and FISH with TP53 deletion, FGFR3/IGH (or NSD2/IGH) fusion, usually representing a t(4;14), and a tetraploid subclone was observed. After correction of his hypercalcemia and improvement in his renal function he was discharged on 10/22/23.   He was started on Beatriz + RVd on 11/2/23.   - Admitted for a Sandra 200 auto SCT on 5/11/24. Transplanted 5/13/24.

## 2024-05-25 NOTE — ASSESSMENT & PLAN NOTE
- Patient of Dr. Cook  - Admitted (5/11/24) for a Sandra 200 auto SCT  - Today is Day +12   - He received 5 bags containing a CD34 dose of 4.47 x 10^6 5/13/24 at 1330.  - See treatment plan below    Planned conditioning regimen:  Melphalan 200 on Day -1      Antimicrobial Prophylaxis:  Acyclovir starting on Day -1  Levofloxacin starting on Day -1  Fluconazole starting on Day -1  Bactrim starting on Day +30     Growth Factor Support:  Neupogen starting on Day +7      Caregiver: wife and mother  Post-transplant discharge plans: Becca Martin

## 2024-05-25 NOTE — ASSESSMENT & PLAN NOTE
- Transfuse for hgb < 7, Plt <10K or bleeding  - Daily CBC while inpatient  - Stopped lovenox with platelets <50K  - Transfusing 1 unit blood and plts today

## 2024-05-26 LAB
ALBUMIN SERPL BCP-MCNC: 2.7 G/DL (ref 3.5–5.2)
ALP SERPL-CCNC: 43 U/L (ref 55–135)
ALT SERPL W/O P-5'-P-CCNC: 15 U/L (ref 10–44)
ANION GAP SERPL CALC-SCNC: 11 MMOL/L (ref 8–16)
ANISOCYTOSIS BLD QL SMEAR: SLIGHT
AST SERPL-CCNC: 10 U/L (ref 10–40)
BASOPHILS # BLD AUTO: 0.01 K/UL (ref 0–0.2)
BASOPHILS NFR BLD: 0.5 % (ref 0–1.9)
BILIRUB SERPL-MCNC: 0.8 MG/DL (ref 0.1–1)
BUN SERPL-MCNC: 21 MG/DL (ref 6–20)
CALCIUM SERPL-MCNC: 8.4 MG/DL (ref 8.7–10.5)
CHLORIDE SERPL-SCNC: 106 MMOL/L (ref 95–110)
CO2 SERPL-SCNC: 22 MMOL/L (ref 23–29)
CREAT SERPL-MCNC: 1.1 MG/DL (ref 0.5–1.4)
DIFFERENTIAL METHOD BLD: ABNORMAL
EOSINOPHIL # BLD AUTO: 0 K/UL (ref 0–0.5)
EOSINOPHIL NFR BLD: 0 % (ref 0–8)
ERYTHROCYTE [DISTWIDTH] IN BLOOD BY AUTOMATED COUNT: 15.1 % (ref 11.5–14.5)
EST. GFR  (NO RACE VARIABLE): >60 ML/MIN/1.73 M^2
GLUCOSE SERPL-MCNC: 92 MG/DL (ref 70–110)
HCT VFR BLD AUTO: 20.1 % (ref 40–54)
HGB BLD-MCNC: 7.1 G/DL (ref 14–18)
IMM GRANULOCYTES # BLD AUTO: 0.03 K/UL (ref 0–0.04)
IMM GRANULOCYTES NFR BLD AUTO: 1.5 % (ref 0–0.5)
LYMPHOCYTES # BLD AUTO: 0.5 K/UL (ref 1–4.8)
LYMPHOCYTES NFR BLD: 25.2 % (ref 18–48)
MAGNESIUM SERPL-MCNC: 1.9 MG/DL (ref 1.6–2.6)
MCH RBC QN AUTO: 33 PG (ref 27–31)
MCHC RBC AUTO-ENTMCNC: 35.3 G/DL (ref 32–36)
MCV RBC AUTO: 94 FL (ref 82–98)
MONOCYTES # BLD AUTO: 0.9 K/UL (ref 0.3–1)
MONOCYTES NFR BLD: 43.6 % (ref 4–15)
NEUTROPHILS # BLD AUTO: 0.6 K/UL (ref 1.8–7.7)
NEUTROPHILS NFR BLD: 29.2 % (ref 38–73)
NRBC BLD-RTO: 0 /100 WBC
OVALOCYTES BLD QL SMEAR: ABNORMAL
PHOSPHATE SERPL-MCNC: 2.6 MG/DL (ref 2.7–4.5)
PLATELET # BLD AUTO: 19 K/UL (ref 150–450)
PLATELET BLD QL SMEAR: ABNORMAL
PMV BLD AUTO: 12.7 FL (ref 9.2–12.9)
POIKILOCYTOSIS BLD QL SMEAR: SLIGHT
POTASSIUM SERPL-SCNC: 3.6 MMOL/L (ref 3.5–5.1)
PROT SERPL-MCNC: 4.8 G/DL (ref 6–8.4)
RBC # BLD AUTO: 2.15 M/UL (ref 4.6–6.2)
SODIUM SERPL-SCNC: 139 MMOL/L (ref 136–145)
TOXIC GRANULES BLD QL SMEAR: PRESENT
WBC # BLD AUTO: 2.02 K/UL (ref 3.9–12.7)

## 2024-05-26 PROCEDURE — 25000003 PHARM REV CODE 250: Performed by: STUDENT IN AN ORGANIZED HEALTH CARE EDUCATION/TRAINING PROGRAM

## 2024-05-26 PROCEDURE — 25000003 PHARM REV CODE 250: Performed by: NURSE PRACTITIONER

## 2024-05-26 PROCEDURE — 80053 COMPREHEN METABOLIC PANEL: CPT | Performed by: NURSE PRACTITIONER

## 2024-05-26 PROCEDURE — 85025 COMPLETE CBC W/AUTO DIFF WBC: CPT | Performed by: NURSE PRACTITIONER

## 2024-05-26 PROCEDURE — 20600001 HC STEP DOWN PRIVATE ROOM

## 2024-05-26 PROCEDURE — 99233 SBSQ HOSP IP/OBS HIGH 50: CPT | Mod: ,,, | Performed by: INTERNAL MEDICINE

## 2024-05-26 PROCEDURE — 94761 N-INVAS EAR/PLS OXIMETRY MLT: CPT

## 2024-05-26 PROCEDURE — 63600175 PHARM REV CODE 636 W HCPCS: Performed by: INTERNAL MEDICINE

## 2024-05-26 PROCEDURE — 25000003 PHARM REV CODE 250: Performed by: INTERNAL MEDICINE

## 2024-05-26 PROCEDURE — 83735 ASSAY OF MAGNESIUM: CPT | Performed by: NURSE PRACTITIONER

## 2024-05-26 PROCEDURE — 63600175 PHARM REV CODE 636 W HCPCS: Performed by: NURSE PRACTITIONER

## 2024-05-26 PROCEDURE — 84100 ASSAY OF PHOSPHORUS: CPT | Performed by: NURSE PRACTITIONER

## 2024-05-26 RX ORDER — SODIUM,POTASSIUM PHOSPHATES 280-250MG
2 POWDER IN PACKET (EA) ORAL ONCE
Status: COMPLETED | OUTPATIENT
Start: 2024-05-26 | End: 2024-05-26

## 2024-05-26 RX ORDER — LEVOFLOXACIN 750 MG/1
750 TABLET ORAL DAILY
Status: DISCONTINUED | OUTPATIENT
Start: 2024-05-26 | End: 2024-05-26

## 2024-05-26 RX ORDER — HYDROXYZINE HYDROCHLORIDE 25 MG/1
25 TABLET, FILM COATED ORAL 3 TIMES DAILY PRN
Status: DISCONTINUED | OUTPATIENT
Start: 2024-05-26 | End: 2024-05-27 | Stop reason: HOSPADM

## 2024-05-26 RX ORDER — LEVOFLOXACIN 500 MG/1
500 TABLET, FILM COATED ORAL DAILY
Status: DISCONTINUED | OUTPATIENT
Start: 2024-05-26 | End: 2024-05-27 | Stop reason: HOSPADM

## 2024-05-26 RX ADMIN — HYDROXYZINE HYDROCHLORIDE 25 MG: 25 TABLET, FILM COATED ORAL at 01:05

## 2024-05-26 RX ADMIN — METOPROLOL TARTRATE 12.5 MG: 25 TABLET, FILM COATED ORAL at 09:05

## 2024-05-26 RX ADMIN — CEFEPIME 2 G: 2 INJECTION, POWDER, FOR SOLUTION INTRAVENOUS at 03:05

## 2024-05-26 RX ADMIN — HEPARIN SODIUM 1600 UNITS: 1000 INJECTION, SOLUTION INTRAVENOUS; SUBCUTANEOUS at 01:05

## 2024-05-26 RX ADMIN — TAMSULOSIN HYDROCHLORIDE 0.4 MG: 0.4 CAPSULE ORAL at 09:05

## 2024-05-26 RX ADMIN — FILGRASTIM 480 MCG: 480 INJECTION, SOLUTION INTRAVENOUS; SUBCUTANEOUS at 09:05

## 2024-05-26 RX ADMIN — ALUMINUM HYDROXIDE, MAGNESIUM HYDROXIDE, AND DIMETHICONE 10 ML: 400; 400; 40 SUSPENSION ORAL at 09:05

## 2024-05-26 RX ADMIN — Medication 9 MG: at 09:05

## 2024-05-26 RX ADMIN — ONDANSETRON 8 MG: 8 TABLET, ORALLY DISINTEGRATING ORAL at 05:05

## 2024-05-26 RX ADMIN — Medication 1 DOSE: at 09:05

## 2024-05-26 RX ADMIN — ALLOPURINOL 100 MG: 100 TABLET ORAL at 09:05

## 2024-05-26 RX ADMIN — FLUCONAZOLE 400 MG: 200 TABLET ORAL at 09:05

## 2024-05-26 RX ADMIN — SODIUM CHLORIDE AND POTASSIUM CHLORIDE: .9; .15 SOLUTION INTRAVENOUS at 03:05

## 2024-05-26 RX ADMIN — LEVOFLOXACIN 500 MG: 500 TABLET, FILM COATED ORAL at 12:05

## 2024-05-26 RX ADMIN — ACYCLOVIR 800 MG: 200 CAPSULE ORAL at 09:05

## 2024-05-26 RX ADMIN — ONDANSETRON 8 MG: 8 TABLET, ORALLY DISINTEGRATING ORAL at 01:05

## 2024-05-26 RX ADMIN — ALUMINUM HYDROXIDE, MAGNESIUM HYDROXIDE, AND DIMETHICONE 10 ML: 400; 400; 40 SUSPENSION ORAL at 10:05

## 2024-05-26 RX ADMIN — PANTOPRAZOLE SODIUM 40 MG: 40 TABLET, DELAYED RELEASE ORAL at 09:05

## 2024-05-26 RX ADMIN — CALCITRIOL CAPSULES 0.25 MCG 0.25 MCG: 0.25 CAPSULE ORAL at 09:05

## 2024-05-26 RX ADMIN — SODIUM BICARBONATE 650 MG: 650 TABLET ORAL at 09:05

## 2024-05-26 RX ADMIN — POTASSIUM & SODIUM PHOSPHATES POWDER PACK 280-160-250 MG 2 PACKET: 280-160-250 PACK at 03:05

## 2024-05-26 RX ADMIN — Medication 1 DOSE: at 12:05

## 2024-05-26 RX ADMIN — ONDANSETRON 8 MG: 8 TABLET, ORALLY DISINTEGRATING ORAL at 09:05

## 2024-05-26 NOTE — ASSESSMENT & PLAN NOTE
- Patient of Dr. Cook  - Admitted (5/11/24) for a Sandra 200 auto SCT  - Today is Day +13   - He received 5 bags containing a CD34 dose of 4.47 x 10^6 5/13/24 at 1330.  - See treatment plan below    Planned conditioning regimen:  Melphalan 200 on Day -1      Antimicrobial Prophylaxis:  Acyclovir starting on Day -1  Levofloxacin starting on Day -1  Fluconazole starting on Day -1  Bactrim starting on Day +30     Growth Factor Support:  Neupogen starting on Day +7      Caregiver: wife and mother  Post-transplant discharge plans: Becca Martin   Consult PT/OT. Will need SNF. PPD and social work consult

## 2024-05-26 NOTE — ASSESSMENT & PLAN NOTE
From Dr. Cook's most recent clinic note:   is a 42y/o M with no significant medical problems. He has been diagnosed with multiple myeloma. He is here for a virtual stem cell transplant consultation.   Per records,  presented to [facility] on 10/11/23 after 1 month of progressive weakness, abdominal pain, and intermittent confusion. At the time of his presentation, his labs, which had been normal  in 8/2023, were notable for new severe DOUG, transaminitis, and Ca of 18. He was admitted and started on IVF .  Hypercalcemia workup was initiated and he was ultimately found to have numerous osseous lesions on 10/15/23 CT CAP, FLC - Kappa 3.29, Lambda 744, Ratio 226 , SPEP with M spike 0.17, IgG lambda on sIFE. Bone marrow biopsy done 10/18/23 revealed 95% plasma cells and FISH with TP53 deletion, FGFR3/IGH (or NSD2/IGH) fusion, usually representing a t(4;14), and a tetraploid subclone was observed. After correction of his hypercalcemia and improvement in his renal function he was discharged on 10/22/23.   He was started on Beatriz + RVd on 11/2/23.   - Admitted for a Sandra 200 auto SCT on 5/11/24. Transplanted 5/13/24.

## 2024-05-26 NOTE — ASSESSMENT & PLAN NOTE
- Had temp of 101.8 over night on 5/22. HR 110s with fever, but vitals otherwise stable.   - Infection w/u ordered and unremarkable   - Started on Cefepime. Today is D4. Afebrile for 48 hours 5/26 and , changed to Levaquin prophylaxis until ANC 1000  - C/o abdominal pain. Suspect chemo-induced mucositis, but will get abdominal imaging if repeat fever.  - Continue Mylanta and simethicone.

## 2024-05-26 NOTE — PLAN OF CARE
Pt is Day +13 Sandra Auto SCT. . Planning for possible DC tomorrow. Pt reports feeling anxious regarding his DC to [facility]. PRN atarax ordered and given x1. Pt states he would like to be discharged with anxiety med if possible. Continuous IVF and IV cefepime d/c'd. Transitioned back to PO levaquin. Pt up ad scar and ambulating hallway without difficulty. Denies any N/V/D. Pt remains afebrile and VSS. WCTM.

## 2024-05-26 NOTE — PROGRESS NOTES
Rohan Weber - Oncology ([facility])  Hematology  Bone Marrow Transplant  Progress Note    Patient Name: Pete Fernandez  Admission Date: 5/11/2024  Hospital Length of Stay: 15 days  Code Status: Full Code    Subjective:     Interval History: Day +12 from melphalan 200 conditioned autologous stem cell transplant for multiple myeloma. Feeling well this morning. Up in recliner. No issues overnight. Discussed ANC now 600 and first day of engraftment. Probable discharge 5/27/2024. Continue to escalate diet. Continue Neupogen injections. Afebrile; deescalate antibiotics to Levaquin prophylaxis until ANC 1000 or greater.    Objective:     Vital Signs (Most Recent):  Temp: 98.5 °F (36.9 °C) (05/26/24 0812)  Pulse: 93 (05/26/24 0812)  Resp: 16 (05/26/24 0812)  BP: 139/71 (05/26/24 0812)  SpO2: 96 % (05/26/24 0812) Vital Signs (24h Range):  Temp:  [98.3 °F (36.8 °C)-99.7 °F (37.6 °C)] 98.5 °F (36.9 °C)  Pulse:  [] 93  Resp:  [16-18] 16  SpO2:  [96 %-100 %] 96 %  BP: (107-141)/(58-88) 139/71     Weight: 89.8 kg (197 lb 15.6 oz)  Body mass index is 29.24 kg/m².  Body surface area is 2.09 meters squared.    ECOG SCORE           ECOG PS 1    Intake/Output - Last 3 Shifts         05/24 0700 05/25 0659 05/25 0700 05/26 0659 05/26 0700  05/27 0659    P.O. 2140 550     I.V. (mL/kg) 1664.5 (18.5) 1977 (22)     Blood  666     IV Piggyback  292.9     Total Intake(mL/kg) 3804.5 (42.3) 3485.9 (38.8)     Urine (mL/kg/hr) 3775 (1.7) 5325 (2.5)     Emesis/NG output 0      Other 0      Stool 0 0     Total Output 3775 5325     Net +29.5 -1839.1            Urine Occurrence 0 x      Stool Occurrence 3 x 4 x     Emesis Occurrence 0 x               Physical Exam  Vitals and nursing note reviewed.   Constitutional:       Appearance: He is well-developed.   HENT:      Head: Normocephalic and atraumatic.   Eyes:      General: No scleral icterus.     Conjunctiva/sclera: Conjunctivae normal.   Cardiovascular:      Rate and Rhythm: Normal rate.       Comments: Right chest central line CDI  Pulmonary:      Effort: Pulmonary effort is normal. No respiratory distress.   Abdominal:      General: There is no distension.      Palpations: Abdomen is soft.      Tenderness: There is no abdominal tenderness.   Musculoskeletal:         General: Normal range of motion.      Cervical back: Normal range of motion and neck supple.   Skin:     General: Skin is warm and dry.   Neurological:      Mental Status: He is alert and oriented to person, place, and time.      Cranial Nerves: No cranial nerve deficit.   Psychiatric:         Behavior: Behavior normal.            Significant Labs:   CBC:   Recent Labs   Lab 05/25/24  0153 05/26/24 0319   WBC 0.81* 2.02*   HGB 6.9* 7.1*   HCT 19.5* 20.1*   PLT 8* 19*    and CMP:   Recent Labs   Lab 05/25/24  0153 05/26/24 0319    139   K 3.5 3.6    106   CO2 22* 22*   GLU 93 92   BUN 19 21*   CREATININE 1.2 1.1   CALCIUM 8.4* 8.4*   PROT 4.7* 4.8*   ALBUMIN 2.6* 2.7*   BILITOT 0.6 0.8   ALKPHOS 36* 43*   AST 7* 10   ALT 11 15   ANIONGAP 7* 11       Diagnostic Results:  I have reviewed all pertinent imaging results/findings within the past 24 hours.  Assessment/Plan:     * History of auto stem cell transplant  - Patient of Dr. Cook  - Admitted (5/11/24) for a Sandra 200 auto SCT  - Today is Day +13   - He received 5 bags containing a CD34 dose of 4.47 x 10^6 5/13/24 at 1330.  - See treatment plan below    Planned conditioning regimen:  Melphalan 200 on Day -1      Antimicrobial Prophylaxis:  Acyclovir starting on Day -1  Levofloxacin starting on Day -1  Fluconazole starting on Day -1  Bactrim starting on Day +30     Growth Factor Support:  Neupogen starting on Day +7      Caregiver: wife and mother  Post-transplant discharge plans: Becca Martin    Moderate malnutrition  Nutrition consulted. Most recent weight and BMI monitored-     Measurements:  Wt Readings from Last 1 Encounters:   05/25/24 89.9 kg (198 lb 3.1 oz)   Body mass  index is 29.27 kg/m².    Patient has been screened and assessed by RD.    Malnutrition Type:  Context: acute illness or injury, chronic illness  Level: moderate    Malnutrition Characteristic Summary:  Weight Loss (Malnutrition): 10% in 6 months  Subcutaneous Fat (Malnutrition): mild depletion  Muscle Mass (Malnutrition): mild depletion  Hand  Strength, Left (Malnutrition): Good  Hand  Strength, Right (Malnutrition): Good    Interventions/Recommendations (treatment strategy):  1. Continue Regular Diet. Enocurage high-calorie, high-protein food sources. Small, frequent meals/snacks as tolerated. If DOUG worsens consider Renal diet. 2.  Boost Plus (or alternative) TID.   3. Monitor I/O's, weight and labs.   4. Consider a phosphate binder with all meals. 5. RD to monitor.      Hypomagnesemia  - Monitoring mag level daily while inpatient  - Avoiding PRN electrolyte order set due to CKD    Neutropenic fever  - Had temp of 101.8 over night on 5/22. HR 110s with fever, but vitals otherwise stable.   - Infection w/u ordered and unremarkable   - Started on Cefepime. Today is D4. Afebrile for 48 hours 5/26 and , changed to Levaquin prophylaxis until ANC 1000  - C/o abdominal pain. Suspect chemo-induced mucositis, but will get abdominal imaging if repeat fever.  - Continue Mylanta and simethicone.    Chemotherapy induced diarrhea  - C-diff neg  - PRN imodium available    Insomnia  - Continue PRN melatonin    Hyperphosphatemia  - Monitoring daily phos levels  - Dose-reduced calcitriol    Volume overload  - Weight up 8.5 lbs from admission on 5/14  - Received large volume of fluid for transplant  - Stopped IVF but deferred diuresis  RESOLVED    Mild malnutrition  - Nutrition consulted. Most recent weight and BMI monitored-     Measurements:  Wt Readings from Last 1 Encounters:   05/25/24 89.9 kg (198 lb 3.1 oz)   Body mass index is 29.27 kg/m².    Patient has been screened and assessed by RD.    Malnutrition  Type:  Context: acute illness or injury, chronic illness  Level: moderate    Malnutrition Characteristic Summary:  Weight Loss (Malnutrition): 10% in 6 months  Subcutaneous Fat (Malnutrition): mild depletion  Muscle Mass (Malnutrition): mild depletion  Hand  Strength, Left (Malnutrition): Good  Hand  Strength, Right (Malnutrition): Good    Interventions/Recommendations (treatment strategy):  1. Continue Regular Diet. Enocurage high-calorie, high-protein food sources. Small, frequent meals/snacks as tolerated. If DOUG worsens consider Renal diet. 2.  Boost Plus (or alternative) TID.   3. Monitor I/O's, weight and labs.   4. Consider a phosphate binder with all meals. 5. RD to monitor.     Hypertension  - Continue home metoprolol    GERD (gastroesophageal reflux disease)  - Takes protonix at home. Will receive inpatient as part of SCT protocol.    Asymptomatic hyperuricemia  - Patient with chronically high uric acid levels. Home febuxostat not on formulary. Substituting with allopurinol while inpatient.    BPH (benign prostatic hyperplasia)  - Continue home Flomax    CKD (chronic kidney disease)  - Baseline creatinine appears to be ~ 1.8  - Renally dose meds as appropriate  - Creatinine stable at 1.2 today    ADD (attention deficit disorder)  - Holding home Aderall while inpatient    Pericarditis  - Will hold home colchicine as he has completed a 3 month course per PharmD rec    Pancytopenia due to antineoplastic chemotherapy  - Transfuse for hgb < 7, Plt <10K or bleeding  - Daily CBC while inpatient  - Stopped lovenox with platelets <50K      Cancer associated pain  - Continue home Oxy IR    Multiple myeloma in remission  From Dr. Cook's most recent clinic note:   is a 44y/o M with no significant medical problems. He has been diagnosed with multiple myeloma. He is here for a virtual stem cell transplant consultation.   Per records,  presented to [facility] on 10/11/23 after 1 month of progressive  weakness, abdominal pain, and intermittent confusion. At the time of his presentation, his labs, which had been normal  in 8/2023, were notable for new severe DOUG, transaminitis, and Ca of 18. He was admitted and started on IVF .  Hypercalcemia workup was initiated and he was ultimately found to have numerous osseous lesions on 10/15/23 CT CAP, FLC - Kappa 3.29, Lambda 744, Ratio 226 , SPEP with M spike 0.17, IgG lambda on sIFE. Bone marrow biopsy done 10/18/23 revealed 95% plasma cells and FISH with TP53 deletion, FGFR3/IGH (or NSD2/IGH) fusion, usually representing a t(4;14), and a tetraploid subclone was observed. After correction of his hypercalcemia and improvement in his renal function he was discharged on 10/22/23.   He was started on Beatriz + RVd on 11/2/23.   - Admitted for a Sandra 200 auto SCT on 5/11/24. Transplanted 5/13/24.        VTE Risk Mitigation (From admission, onward)           Ordered     heparin (porcine) injection 1,600 Units  As needed (PRN)         05/11/24 2044     IP VTE HIGH RISK PATIENT  Once         05/11/24 1803     Place sequential compression device  Until discontinued         05/11/24 1803                    Disposition: inpatient for stem cell transplant    Janet Arceo MD  Bone Marrow Transplant  [facility] - Oncology ([facility])

## 2024-05-26 NOTE — SUBJECTIVE & OBJECTIVE
Subjective:     Interval History: Day +12 from melphalan 200 conditioned autologous stem cell transplant for multiple myeloma. Feeling well this morning. Up in recliner. No issues overnight. Discussed ANC now 600 and first day of engraftment. Probable discharge 5/27/2024. Continue to escalate diet. Continue Neupogen injections. Afebrile; deescalate antibiotics to Levaquin prophylaxis until ANC 1000 or greater.    Objective:     Vital Signs (Most Recent):  Temp: 98.5 °F (36.9 °C) (05/26/24 0812)  Pulse: 93 (05/26/24 0812)  Resp: 16 (05/26/24 0812)  BP: 139/71 (05/26/24 0812)  SpO2: 96 % (05/26/24 0812) Vital Signs (24h Range):  Temp:  [98.3 °F (36.8 °C)-99.7 °F (37.6 °C)] 98.5 °F (36.9 °C)  Pulse:  [] 93  Resp:  [16-18] 16  SpO2:  [96 %-100 %] 96 %  BP: (107-141)/(58-88) 139/71     Weight: 89.8 kg (197 lb 15.6 oz)  Body mass index is 29.24 kg/m².  Body surface area is 2.09 meters squared.    ECOG SCORE           ECOG PS 1    Intake/Output - Last 3 Shifts         05/24 0700  05/25 0659 05/25 0700  05/26 0659 05/26 0700  05/27 0659    P.O. 2140 550     I.V. (mL/kg) 1664.5 (18.5) 1977 (22)     Blood  666     IV Piggyback  292.9     Total Intake(mL/kg) 3804.5 (42.3) 3485.9 (38.8)     Urine (mL/kg/hr) 3775 (1.7) 5325 (2.5)     Emesis/NG output 0      Other 0      Stool 0 0     Total Output 3775 5325     Net +29.5 -1839.1            Urine Occurrence 0 x      Stool Occurrence 3 x 4 x     Emesis Occurrence 0 x               Physical Exam  Vitals and nursing note reviewed.   Constitutional:       Appearance: He is well-developed.   HENT:      Head: Normocephalic and atraumatic.   Eyes:      General: No scleral icterus.     Conjunctiva/sclera: Conjunctivae normal.   Cardiovascular:      Rate and Rhythm: Normal rate.      Comments: Right chest central line CDI  Pulmonary:      Effort: Pulmonary effort is normal. No respiratory distress.   Abdominal:      General: There is no distension.      Palpations: Abdomen is soft.       Tenderness: There is no abdominal tenderness.   Musculoskeletal:         General: Normal range of motion.      Cervical back: Normal range of motion and neck supple.   Skin:     General: Skin is warm and dry.   Neurological:      Mental Status: He is alert and oriented to person, place, and time.      Cranial Nerves: No cranial nerve deficit.   Psychiatric:         Behavior: Behavior normal.            Significant Labs:   CBC:   Recent Labs   Lab 05/25/24  0153 05/26/24  0319   WBC 0.81* 2.02*   HGB 6.9* 7.1*   HCT 19.5* 20.1*   PLT 8* 19*    and CMP:   Recent Labs   Lab 05/25/24  0153 05/26/24  0319    139   K 3.5 3.6    106   CO2 22* 22*   GLU 93 92   BUN 19 21*   CREATININE 1.2 1.1   CALCIUM 8.4* 8.4*   PROT 4.7* 4.8*   ALBUMIN 2.6* 2.7*   BILITOT 0.6 0.8   ALKPHOS 36* 43*   AST 7* 10   ALT 11 15   ANIONGAP 7* 11       Diagnostic Results:  I have reviewed all pertinent imaging results/findings within the past 24 hours.

## 2024-05-26 NOTE — PLAN OF CARE
Day +13 Sandra AUTO SCT. Patient AAOx4. Afebrile and VSS on room air. NaCl with KCl infusing @100. No complaints overnight. PRN melatonin given for sleep. Patient is up independently to the bathroom. Free from falls and injury this shift. Tolerating diet. Voiding spontaneously. Monitoring I&Os, CHG wipes given for independent use. Receiving Cefepime as ordered. Bed is locked and in lowest position, non-skid socks on, and call light within reach. Patient educated on using the call light when needing assistance and verbalizes understanding. Plan of care reviewed and is ongoing. Patient expresses no needs at this time.

## 2024-05-27 VITALS
WEIGHT: 192.38 LBS | HEART RATE: 116 BPM | OXYGEN SATURATION: 100 % | BODY MASS INDEX: 28.49 KG/M2 | DIASTOLIC BLOOD PRESSURE: 77 MMHG | TEMPERATURE: 98 F | SYSTOLIC BLOOD PRESSURE: 129 MMHG | RESPIRATION RATE: 18 BRPM | HEIGHT: 69 IN

## 2024-05-27 LAB
ABO + RH BLD: ABNORMAL
ALBUMIN SERPL BCP-MCNC: 2.9 G/DL (ref 3.5–5.2)
ALP SERPL-CCNC: 48 U/L (ref 55–135)
ALT SERPL W/O P-5'-P-CCNC: 24 U/L (ref 10–44)
ANION GAP SERPL CALC-SCNC: 8 MMOL/L (ref 8–16)
AST SERPL-CCNC: 14 U/L (ref 10–40)
BACTERIA BLD CULT: NORMAL
BASOPHILS # BLD AUTO: 0.01 K/UL (ref 0–0.2)
BASOPHILS NFR BLD: 0.2 % (ref 0–1.9)
BILIRUB SERPL-MCNC: 0.6 MG/DL (ref 0.1–1)
BLD GP AB SCN CELLS X3 SERPL QL: ABNORMAL
BLD PROD TYP BPU: NORMAL
BLOOD UNIT EXPIRATION DATE: NORMAL
BLOOD UNIT TYPE CODE: 6200
BLOOD UNIT TYPE: NORMAL
BUN SERPL-MCNC: 17 MG/DL (ref 6–20)
CALCIUM SERPL-MCNC: 8.7 MG/DL (ref 8.7–10.5)
CHLORIDE SERPL-SCNC: 106 MMOL/L (ref 95–110)
CO2 SERPL-SCNC: 25 MMOL/L (ref 23–29)
CODING SYSTEM: NORMAL
CREAT SERPL-MCNC: 1.2 MG/DL (ref 0.5–1.4)
CROSSMATCH INTERPRETATION: NORMAL
DIFFERENTIAL METHOD BLD: ABNORMAL
DISPENSE STATUS: NORMAL
EOSINOPHIL # BLD AUTO: 0 K/UL (ref 0–0.5)
EOSINOPHIL NFR BLD: 0 % (ref 0–8)
ERYTHROCYTE [DISTWIDTH] IN BLOOD BY AUTOMATED COUNT: 15 % (ref 11.5–14.5)
EST. GFR  (NO RACE VARIABLE): >60 ML/MIN/1.73 M^2
GLUCOSE SERPL-MCNC: 94 MG/DL (ref 70–110)
HCT VFR BLD AUTO: 21.9 % (ref 40–54)
HGB BLD-MCNC: 7.5 G/DL (ref 14–18)
IMM GRANULOCYTES # BLD AUTO: 0.06 K/UL (ref 0–0.04)
IMM GRANULOCYTES NFR BLD AUTO: 1.5 % (ref 0–0.5)
LYMPHOCYTES # BLD AUTO: 0.6 K/UL (ref 1–4.8)
LYMPHOCYTES NFR BLD: 14.7 % (ref 18–48)
MAGNESIUM SERPL-MCNC: 1.9 MG/DL (ref 1.6–2.6)
MCH RBC QN AUTO: 33 PG (ref 27–31)
MCHC RBC AUTO-ENTMCNC: 34.2 G/DL (ref 32–36)
MCV RBC AUTO: 97 FL (ref 82–98)
MONOCYTES # BLD AUTO: 1.6 K/UL (ref 0.3–1)
MONOCYTES NFR BLD: 39.6 % (ref 4–15)
NEUTROPHILS # BLD AUTO: 1.8 K/UL (ref 1.8–7.7)
NEUTROPHILS NFR BLD: 44 % (ref 38–73)
NRBC BLD-RTO: 0 /100 WBC
PHOSPHATE SERPL-MCNC: 3.3 MG/DL (ref 2.7–4.5)
PLATELET # BLD AUTO: 14 K/UL (ref 150–450)
PLATELET BLD QL SMEAR: ABNORMAL
PMV BLD AUTO: ABNORMAL FL (ref 9.2–12.9)
POTASSIUM SERPL-SCNC: 3.4 MMOL/L (ref 3.5–5.1)
PROT SERPL-MCNC: 4.9 G/DL (ref 6–8.4)
RBC # BLD AUTO: 2.27 M/UL (ref 4.6–6.2)
SODIUM SERPL-SCNC: 139 MMOL/L (ref 136–145)
SPECIMEN OUTDATE: ABNORMAL
UNIT NUMBER: NORMAL
WBC # BLD AUTO: 4.07 K/UL (ref 3.9–12.7)

## 2024-05-27 PROCEDURE — 80053 COMPREHEN METABOLIC PANEL: CPT | Performed by: NURSE PRACTITIONER

## 2024-05-27 PROCEDURE — 86901 BLOOD TYPING SEROLOGIC RH(D): CPT | Performed by: INTERNAL MEDICINE

## 2024-05-27 PROCEDURE — 85025 COMPLETE CBC W/AUTO DIFF WBC: CPT | Performed by: NURSE PRACTITIONER

## 2024-05-27 PROCEDURE — 25000003 PHARM REV CODE 250: Performed by: INTERNAL MEDICINE

## 2024-05-27 PROCEDURE — 84100 ASSAY OF PHOSPHORUS: CPT | Performed by: NURSE PRACTITIONER

## 2024-05-27 PROCEDURE — 25000003 PHARM REV CODE 250: Performed by: STUDENT IN AN ORGANIZED HEALTH CARE EDUCATION/TRAINING PROGRAM

## 2024-05-27 PROCEDURE — 63600175 PHARM REV CODE 636 W HCPCS: Performed by: INTERNAL MEDICINE

## 2024-05-27 PROCEDURE — 36430 TRANSFUSION BLD/BLD COMPNT: CPT

## 2024-05-27 PROCEDURE — 25000003 PHARM REV CODE 250

## 2024-05-27 PROCEDURE — P9037 PLATE PHERES LEUKOREDU IRRAD: HCPCS | Performed by: NURSE PRACTITIONER

## 2024-05-27 PROCEDURE — 99239 HOSP IP/OBS DSCHRG MGMT >30: CPT | Mod: ,,, | Performed by: INTERNAL MEDICINE

## 2024-05-27 PROCEDURE — 25000003 PHARM REV CODE 250: Performed by: NURSE PRACTITIONER

## 2024-05-27 PROCEDURE — 83735 ASSAY OF MAGNESIUM: CPT | Performed by: NURSE PRACTITIONER

## 2024-05-27 RX ORDER — HYDROCODONE BITARTRATE AND ACETAMINOPHEN 500; 5 MG/1; MG/1
TABLET ORAL
Status: DISCONTINUED | OUTPATIENT
Start: 2024-05-27 | End: 2024-05-27 | Stop reason: HOSPADM

## 2024-05-27 RX ORDER — ACYCLOVIR 800 MG/1
800 TABLET ORAL 2 TIMES DAILY
Qty: 60 TABLET | Refills: 11 | Status: SHIPPED | OUTPATIENT
Start: 2024-05-27 | End: 2025-05-27

## 2024-05-27 RX ORDER — HYDROXYZINE HYDROCHLORIDE 25 MG/1
25 TABLET, FILM COATED ORAL 3 TIMES DAILY PRN
Qty: 30 TABLET | Refills: 3 | Status: SHIPPED | OUTPATIENT
Start: 2024-05-27

## 2024-05-27 RX ORDER — CALCITRIOL 0.25 UG/1
0.25 CAPSULE ORAL DAILY
Qty: 30 CAPSULE | Refills: 3 | Status: SHIPPED | OUTPATIENT
Start: 2024-05-27 | End: 2024-05-27

## 2024-05-27 RX ORDER — LIDOCAINE HYDROCHLORIDE 10 MG/ML
10 INJECTION INFILTRATION; PERINEURAL ONCE
Status: COMPLETED | OUTPATIENT
Start: 2024-05-27 | End: 2024-05-27

## 2024-05-27 RX ORDER — SULFAMETHOXAZOLE AND TRIMETHOPRIM 800; 160 MG/1; MG/1
1 TABLET ORAL
Qty: 12 TABLET | Refills: 5 | Status: SHIPPED | OUTPATIENT
Start: 2024-06-12

## 2024-05-27 RX ORDER — OXYCODONE HYDROCHLORIDE 10 MG/1
10 TABLET ORAL EVERY 4 HOURS PRN
Qty: 90 TABLET | Refills: 0 | Status: SHIPPED | OUTPATIENT
Start: 2024-05-27

## 2024-05-27 RX ORDER — POTASSIUM CHLORIDE 20 MEQ/1
40 TABLET, EXTENDED RELEASE ORAL ONCE
Status: COMPLETED | OUTPATIENT
Start: 2024-05-27 | End: 2024-05-27

## 2024-05-27 RX ORDER — NAPROXEN SODIUM 220 MG/1
81 TABLET, FILM COATED ORAL NIGHTLY
Start: 2024-05-27

## 2024-05-27 RX ORDER — ACETAMINOPHEN 325 MG/1
650 TABLET ORAL ONCE AS NEEDED
Status: COMPLETED | OUTPATIENT
Start: 2024-05-27 | End: 2024-05-27

## 2024-05-27 RX ORDER — DIPHENHYDRAMINE HCL 25 MG
25 CAPSULE ORAL ONCE AS NEEDED
Status: COMPLETED | OUTPATIENT
Start: 2024-05-27 | End: 2024-05-27

## 2024-05-27 RX ORDER — CALCITRIOL 0.5 UG/1
0.5 CAPSULE ORAL DAILY
Qty: 30 CAPSULE | Refills: 0 | Status: SHIPPED | OUTPATIENT
Start: 2024-05-27 | End: 2024-06-26

## 2024-05-27 RX ORDER — ACYCLOVIR 200 MG/1
800 CAPSULE ORAL 2 TIMES DAILY
Qty: 240 EACH | Refills: 11 | Status: SHIPPED | OUTPATIENT
Start: 2024-05-27 | End: 2024-05-27 | Stop reason: HOSPADM

## 2024-05-27 RX ORDER — TALC
9 POWDER (GRAM) TOPICAL NIGHTLY PRN
Qty: 30 TABLET | Refills: 3 | Status: SHIPPED | OUTPATIENT
Start: 2024-05-27

## 2024-05-27 RX ADMIN — PANTOPRAZOLE SODIUM 40 MG: 40 TABLET, DELAYED RELEASE ORAL at 09:05

## 2024-05-27 RX ADMIN — POTASSIUM CHLORIDE 40 MEQ: 1500 TABLET, EXTENDED RELEASE ORAL at 10:05

## 2024-05-27 RX ADMIN — OXYCODONE 5 MG: 5 TABLET ORAL at 12:05

## 2024-05-27 RX ADMIN — Medication 1 DOSE: at 09:05

## 2024-05-27 RX ADMIN — FLUCONAZOLE 400 MG: 200 TABLET ORAL at 09:05

## 2024-05-27 RX ADMIN — ALUMINUM HYDROXIDE, MAGNESIUM HYDROXIDE, AND DIMETHICONE 10 ML: 400; 400; 40 SUSPENSION ORAL at 09:05

## 2024-05-27 RX ADMIN — ALLOPURINOL 100 MG: 100 TABLET ORAL at 09:05

## 2024-05-27 RX ADMIN — Medication 1 DOSE: at 01:05

## 2024-05-27 RX ADMIN — DIPHENHYDRAMINE HYDROCHLORIDE 25 MG: 25 CAPSULE ORAL at 10:05

## 2024-05-27 RX ADMIN — SODIUM BICARBONATE 650 MG: 650 TABLET ORAL at 09:05

## 2024-05-27 RX ADMIN — ACETAMINOPHEN 650 MG: 325 TABLET ORAL at 10:05

## 2024-05-27 RX ADMIN — LEVOFLOXACIN 500 MG: 500 TABLET, FILM COATED ORAL at 09:05

## 2024-05-27 RX ADMIN — CALCITRIOL CAPSULES 0.25 MCG 0.25 MCG: 0.25 CAPSULE ORAL at 09:05

## 2024-05-27 RX ADMIN — ALUMINUM HYDROXIDE, MAGNESIUM HYDROXIDE, AND DIMETHICONE 10 ML: 400; 400; 40 SUSPENSION ORAL at 01:05

## 2024-05-27 RX ADMIN — HEPARIN SODIUM 1600 UNITS: 1000 INJECTION, SOLUTION INTRAVENOUS; SUBCUTANEOUS at 04:05

## 2024-05-27 RX ADMIN — ONDANSETRON 8 MG: 8 TABLET, ORALLY DISINTEGRATING ORAL at 01:05

## 2024-05-27 RX ADMIN — METOPROLOL TARTRATE 12.5 MG: 25 TABLET, FILM COATED ORAL at 09:05

## 2024-05-27 RX ADMIN — FILGRASTIM 480 MCG: 480 INJECTION, SOLUTION INTRAVENOUS; SUBCUTANEOUS at 09:05

## 2024-05-27 RX ADMIN — ACYCLOVIR 800 MG: 200 CAPSULE ORAL at 09:05

## 2024-05-27 RX ADMIN — LIDOCAINE HYDROCHLORIDE 10 ML: 10 INJECTION, SOLUTION INFILTRATION; PERINEURAL at 03:05

## 2024-05-27 NOTE — PROGRESS NOTES
[facility] - Oncology ([facility])  Hematology  Bone Marrow Transplant  Progress Note    Patient Name: Pete Fernandez  Admission Date: 5/11/2024  Hospital Length of Stay: 16 days  Code Status: Full Code    Subjective:     Interval History:  Day +14 from a Sandra 200 auto SCT for MM. Day 2 of engraftment. Remains afebrile. VSS. Last fever was 4 days ago. Infection w/u remains unremarkable. Off IV abx. Oral intake, diarrhea, and abdominal pain improved. Will plan to discharge to [facility] today following discharge teaching and line removal.    Objective:     Vital Signs (Most Recent):  Temp: 98.4 °F (36.9 °C) (05/27/24 0902)  Pulse: 92 (05/27/24 0902)  Resp: 18 (05/27/24 0902)  BP: 124/70 (05/27/24 0902)  SpO2: 97 % (05/27/24 0902) Vital Signs (24h Range):  Temp:  [98.4 °F (36.9 °C)-99.5 °F (37.5 °C)] 98.4 °F (36.9 °C)  Pulse:  [78-96] 92  Resp:  [17-18] 18  SpO2:  [96 %-100 %] 97 %  BP: (118-139)/(61-73) 124/70     Weight: 87.2 kg (192 lb 5.6 oz)  Body mass index is 28.41 kg/m².  Body surface area is 2.06 meters squared.    ECOG SCORE           [unfilled]    Intake/Output - Last 3 Shifts         05/25 0700 05/26 0659 05/26 0700 05/27 0659 05/27 0700  05/28 0659    P.O. 550 850     I.V. (mL/kg) 1977 (22) 623.1 (7.1)     Blood 666      IV Piggyback 292.9      Total Intake(mL/kg) 3485.9 (38.8) 1473.1 (16.9)     Urine (mL/kg/hr) 5325 (2.5) 3150 (1.5)     Emesis/NG output  0     Other  0     Stool 0 0     Total Output 5325 3150     Net -1839.1 -1676.9            Urine Occurrence  0 x     Stool Occurrence 4 x 0 x     Emesis Occurrence  0 x              Physical Exam  Constitutional:       Appearance: He is well-developed.   HENT:      Head: Normocephalic and atraumatic.      Mouth/Throat:      Pharynx: No oropharyngeal exudate.   Eyes:      General:         Right eye: No discharge.         Left eye: No discharge.      Conjunctiva/sclera: Conjunctivae normal.      Pupils: Pupils are equal, round, and reactive to light.    Cardiovascular:      Rate and Rhythm: Normal rate and regular rhythm.      Heart sounds: Normal heart sounds. No murmur heard.  Pulmonary:      Effort: Pulmonary effort is normal. No respiratory distress.      Breath sounds: Normal breath sounds. No wheezing or rales.   Abdominal:      General: Bowel sounds are normal. There is no distension.      Palpations: Abdomen is soft.      Tenderness: There is no abdominal tenderness.   Musculoskeletal:         General: No deformity. Normal range of motion.      Cervical back: Normal range of motion and neck supple.   Skin:     General: Skin is warm and dry.      Findings: No erythema or rash.      Comments: Right chest wall vas cath. Dressing c/d/i. No sign of infection to site.   Neurological:      Mental Status: He is alert and oriented to person, place, and time.   Psychiatric:         Behavior: Behavior normal.         Thought Content: Thought content normal.         Judgment: Judgment normal.            Significant Labs:   CBC:   Recent Labs   Lab 05/26/24 0319 05/27/24  0413   WBC 2.02* 4.07   HGB 7.1* 7.5*   HCT 20.1* 21.9*   PLT 19*  --     and CMP:   Recent Labs   Lab 05/26/24 0319 05/27/24  0413    139   K 3.6 3.4*    106   CO2 22* 25   GLU 92 94   BUN 21* 17   CREATININE 1.1 1.2   CALCIUM 8.4* 8.7   PROT 4.8* 4.9*   ALBUMIN 2.7* 2.9*   BILITOT 0.8 0.6   ALKPHOS 43* 48*   AST 10 14   ALT 15 24   ANIONGAP 11 8       Diagnostic Results:  I have reviewed all pertinent imaging results/findings within the past 24 hours.  Assessment/Plan:     * History of auto stem cell transplant  - Patient of Dr. Cook  - Admitted (5/11/24) for a Sandra 200 auto SCT  - Today is Day +14   - Engrafted Day +13 with an ANC of 590. Day 2 of engraftment today.  - He received 5 bags containing a CD34 dose of 4.47 x 10^6 5/13/24 at 1330.  - See treatment plan below    Planned conditioning regimen:  Melphalan 200 on Day -1      Antimicrobial Prophylaxis:  Acyclovir starting on  Day -1  Levofloxacin starting on Day -1  Fluconazole starting on Day -1  Bactrim starting on Day +30     Growth Factor Support:  Neupogen starting on Day +7      Caregiver: wife and mother  Post-transplant discharge plans: Becca Martin    Multiple myeloma in remission  From Dr. Cook's most recent clinic note:   is a 42y/o M with no significant medical problems. He has been diagnosed with multiple myeloma. He is here for a virtual stem cell transplant consultation.   Per records,  presented to [facility] on 10/11/23 after 1 month of progressive weakness, abdominal pain, and intermittent confusion. At the time of his presentation, his labs, which had been normal  in 8/2023, were notable for new severe DOUG, transaminitis, and Ca of 18. He was admitted and started on IVF .  Hypercalcemia workup was initiated and he was ultimately found to have numerous osseous lesions on 10/15/23 CT CAP, FLC - Kappa 3.29, Lambda 744, Ratio 226 , SPEP with M spike 0.17, IgG lambda on sIFE. Bone marrow biopsy done 10/18/23 revealed 95% plasma cells and FISH with TP53 deletion, FGFR3/IGH (or NSD2/IGH) fusion, usually representing a t(4;14), and a tetraploid subclone was observed. After correction of his hypercalcemia and improvement in his renal function he was discharged on 10/22/23.   He was started on Beatriz + RVd on 11/2/23.   - Admitted for a Sandra 200 auto SCT on 5/11/24. Transplanted 5/13/24.    Neutropenic fever  - Had temp of 101.8 over night on 5/22. HR 110s with fever, but vitals otherwise stable.   - Infection w/u ordered and unremarkable   - Started on Cefepime. Today is D4. Afebrile for 48 hours 5/26 and , changed to Levaquin prophylaxis until ANC 1000  - C/o abdominal pain. Suspect chemo-induced mucositis, but will get abdominal imaging if repeat fever.  - Continue Mylanta and simethicone.  RESOLVED    Pancytopenia due to antineoplastic chemotherapy  - Transfuse for hgb < 7, Plt <10K or bleeding  - Daily CBC  while inpatient  - Stopped lovenox with platelets <50K      Chemotherapy induced diarrhea  - C-diff neg  - PRN imodium available  RESOLVED    Moderate malnutrition  Nutrition consulted. Most recent weight and BMI monitored-     Measurements:  Wt Readings from Last 1 Encounters:   05/27/24 87.2 kg (192 lb 5.6 oz)   Body mass index is 28.41 kg/m².    Patient has been screened and assessed by RD.    Malnutrition Type:  Context: acute illness or injury, chronic illness  Level: moderate    Malnutrition Characteristic Summary:  Weight Loss (Malnutrition): 10% in 6 months  Subcutaneous Fat (Malnutrition): mild depletion  Muscle Mass (Malnutrition): mild depletion  Hand  Strength, Left (Malnutrition): Good  Hand  Strength, Right (Malnutrition): Good    Interventions/Recommendations (treatment strategy):  1. Continue Regular Diet. Enocurage high-calorie, high-protein food sources. Small, frequent meals/snacks as tolerated. If DOUG worsens consider Renal diet. 2.  Boost Plus (or alternative) TID.   3. Monitor I/O's, weight and labs.   4. Consider a phosphate binder with all meals. 5. RD to monitor.      Hypomagnesemia  - Monitoring mag level daily while inpatient  - Avoiding PRN electrolyte order set due to CKD    Insomnia  - Continue PRN melatonin    Hyperphosphatemia  - Monitoring daily phos levels  - Dose-reduced calcitriol    Volume overload  - Weight up 8.5 lbs from admission on 5/14  - Received large volume of fluid for transplant  - Stopped IVF but deferred diuresis  RESOLVED    Mild malnutrition  - Nutrition consulted. Most recent weight and BMI monitored-     Measurements:  Wt Readings from Last 1 Encounters:   05/27/24 87.2 kg (192 lb 5.6 oz)   Body mass index is 28.41 kg/m².    Patient has been screened and assessed by RD.    Malnutrition Type:  Context: acute illness or injury, chronic illness  Level: moderate    Malnutrition Characteristic Summary:  Weight Loss (Malnutrition): 10% in 6 months  Subcutaneous  Fat (Malnutrition): mild depletion  Muscle Mass (Malnutrition): mild depletion  Hand  Strength, Left (Malnutrition): Good  Hand  Strength, Right (Malnutrition): Good    Interventions/Recommendations (treatment strategy):  1. Continue Regular Diet. Enocurage high-calorie, high-protein food sources. Small, frequent meals/snacks as tolerated. If DOUG worsens consider Renal diet. 2.  Boost Plus (or alternative) TID.   3. Monitor I/O's, weight and labs.   4. Consider a phosphate binder with all meals. 5. RD to monitor.     Hypertension  - Continue home metoprolol    GERD (gastroesophageal reflux disease)  - Takes protonix at home. Will receive inpatient as part of SCT protocol.    Asymptomatic hyperuricemia  - Patient with chronically high uric acid levels. Home febuxostat not on formulary. Substituting with allopurinol while inpatient.    BPH (benign prostatic hyperplasia)  - Continue home Flomax    CKD (chronic kidney disease)  - Baseline creatinine appears to be ~ 1.8  - Renally dose meds as appropriate  - Creatinine stable at 1.2 today    ADD (attention deficit disorder)  - Holding home Aderall while inpatient  - Can resume at discharge    Pericarditis  - Will hold home colchicine as he has completed a 3 month course per PharmD rec    Cancer associated pain  - Continue home Oxy IR        VTE Risk Mitigation (From admission, onward)           Ordered     heparin (porcine) injection 1,600 Units  As needed (PRN)         05/11/24 2044     IP VTE HIGH RISK PATIENT  Once         05/11/24 1803     Place sequential compression device  Until discontinued         05/11/24 1803                    Disposition: Inpatient for autologous SCT. Will discharge today.    Julianna Dalal, NP  Bone Marrow Transplant  [facility] - Oncology ([facility])

## 2024-05-27 NOTE — PROGRESS NOTES
Received verbal report from nurse that PCT took patient's temp, and temp was 101. Nurse rechecked, and temp was 99.4. VS otherwise stable. Patient has engrafted and is not ill-appearing. Okay to discharge.    Julianna Dalal, FNP  Hematology/Oncology/Bone Marrow Transplant

## 2024-05-27 NOTE — ASSESSMENT & PLAN NOTE
From Dr. Cook's most recent clinic note:   is a 42y/o M with no significant medical problems. He has been diagnosed with multiple myeloma. He is here for a virtual stem cell transplant consultation.   Per records,  presented to [facility] on 10/11/23 after 1 month of progressive weakness, abdominal pain, and intermittent confusion. At the time of his presentation, his labs, which had been normal  in 8/2023, were notable for new severe DOUG, transaminitis, and Ca of 18. He was admitted and started on IVF .  Hypercalcemia workup was initiated and he was ultimately found to have numerous osseous lesions on 10/15/23 CT CAP, FLC - Kappa 3.29, Lambda 744, Ratio 226 , SPEP with M spike 0.17, IgG lambda on sIFE. Bone marrow biopsy done 10/18/23 revealed 95% plasma cells and FISH with TP53 deletion, FGFR3/IGH (or NSD2/IGH) fusion, usually representing a t(4;14), and a tetraploid subclone was observed. After correction of his hypercalcemia and improvement in his renal function he was discharged on 10/22/23.   He was started on Beatriz + RVd on 11/2/23.   - Admitted for a Sandra 200 auto SCT on 5/11/24. Transplanted 5/13/24.

## 2024-05-27 NOTE — PT/OT/SLP PROGRESS
Physical Therapy      Patient Name:  Pete Fernandez   MRN:  [id number]    PT attempt at 1130am due to anticipated D/C this date, however active treatment not provided due to patient having no mobility concerns. Per chart review and patient & RN endorsement, he remains independent at this time. Patient with no questions during attempt.    Patient Education Provided on:  The role of physical therapy and how the patient can benefit from skilled services  The negative effects of prolonged bed rest/sedentary behavior, along with the importance of OOB activity & patient participation with PT

## 2024-05-27 NOTE — PROGRESS NOTES
Discharge teaching done with patient and patient's caregiver on BMT unit.  Approximately 30 minutes spent on discussion of post-transplant guidelines including:  Low microbial diet, safe food handling, dining out, home sanitation, outpatient plan of care, progression of recovery of cells and immune system, ways to prevent infection, fatigue, ways to avoid bleeding, pet and plant exposure and care, returning to work, smoking and alcohol consumption, sexual activity, sexual desire and response, immunizations, traveling, nutrition, personal hygiene, hand washing, oral care, eye care, signs and symptoms to report immediately, and emotional changes post transplant.  Also discussed who to contact during business hours, after hours, and holidays.  Written materials supplied.  Patient and family given the opportunity to ask questions.  Verbalizes understanding.  All questions answered to their satisfaction.  Patient and family instructed to call with any questions or concerns.  Patient and caregiver were given handouts which reiterate all the above teaching.     Julianna Dalal, FNP  Hematology/Oncology/Bone Marrow Transplant

## 2024-05-27 NOTE — ASSESSMENT & PLAN NOTE
- Nutrition consulted. Most recent weight and BMI monitored-     Measurements:  Wt Readings from Last 1 Encounters:   05/27/24 87.2 kg (192 lb 5.6 oz)   Body mass index is 28.41 kg/m².    Patient has been screened and assessed by RD.    Malnutrition Type:  Context: acute illness or injury, chronic illness  Level: moderate    Malnutrition Characteristic Summary:  Weight Loss (Malnutrition): 10% in 6 months  Subcutaneous Fat (Malnutrition): mild depletion  Muscle Mass (Malnutrition): mild depletion  Hand  Strength, Left (Malnutrition): Good  Hand  Strength, Right (Malnutrition): Good    Interventions/Recommendations (treatment strategy):  1. Continue Regular Diet. Enocurage high-calorie, high-protein food sources. Small, frequent meals/snacks as tolerated. If DOUG worsens consider Renal diet. 2.  Boost Plus (or alternative) TID.   3. Monitor I/O's, weight and labs.   4. Consider a phosphate binder with all meals. 5. RD to monitor.

## 2024-05-27 NOTE — PLAN OF CARE
Day +14 Sandra 200 auto SCT .  Went over POC with patient at the beginning of shift.  Questions were asked and answered. Stated understanding of POC.  AAO x 4 throughout shift. Afebrile.  No complaints of pain, nausea, or loose stool.  PRN melatonin given to aide in sleep, which was effective.   Up independently.  Pt remained free from falls or injury throughout shift; bed locked and in lowest position; call light within reach.  Denies needs at this time at this time.

## 2024-05-27 NOTE — SUBJECTIVE & OBJECTIVE
Subjective:     Interval History:  Day +14 from a Sandra 200 auto SCT for MM. Day 2 of engraftment. Remains afebrile. VSS. Last fever was 4 days ago. Infection w/u remains unremarkable. Off IV abx. Oral intake, diarrhea, and abdominal pain improved. Will plan to discharge to [facility] today following discharge teaching and line removal.    Objective:     Vital Signs (Most Recent):  Temp: 98.4 °F (36.9 °C) (05/27/24 0902)  Pulse: 92 (05/27/24 0902)  Resp: 18 (05/27/24 0902)  BP: 124/70 (05/27/24 0902)  SpO2: 97 % (05/27/24 0902) Vital Signs (24h Range):  Temp:  [98.4 °F (36.9 °C)-99.5 °F (37.5 °C)] 98.4 °F (36.9 °C)  Pulse:  [78-96] 92  Resp:  [17-18] 18  SpO2:  [96 %-100 %] 97 %  BP: (118-139)/(61-73) 124/70     Weight: 87.2 kg (192 lb 5.6 oz)  Body mass index is 28.41 kg/m².  Body surface area is 2.06 meters squared.    ECOG SCORE           [unfilled]    Intake/Output - Last 3 Shifts         05/25 0700 05/26 0659 05/26 0700 05/27 0659 05/27 0700  05/28 0659    P.O. 550 850     I.V. (mL/kg) 1977 (22) 623.1 (7.1)     Blood 666      IV Piggyback 292.9      Total Intake(mL/kg) 3485.9 (38.8) 1473.1 (16.9)     Urine (mL/kg/hr) 5325 (2.5) 3150 (1.5)     Emesis/NG output  0     Other  0     Stool 0 0     Total Output 5325 3150     Net -1839.1 -1676.9            Urine Occurrence  0 x     Stool Occurrence 4 x 0 x     Emesis Occurrence  0 x              Physical Exam  Constitutional:       Appearance: He is well-developed.   HENT:      Head: Normocephalic and atraumatic.      Mouth/Throat:      Pharynx: No oropharyngeal exudate.   Eyes:      General:         Right eye: No discharge.         Left eye: No discharge.      Conjunctiva/sclera: Conjunctivae normal.      Pupils: Pupils are equal, round, and reactive to light.   Cardiovascular:      Rate and Rhythm: Normal rate and regular rhythm.      Heart sounds: Normal heart sounds. No murmur heard.  Pulmonary:      Effort: Pulmonary effort is normal. No respiratory distress.       Breath sounds: Normal breath sounds. No wheezing or rales.   Abdominal:      General: Bowel sounds are normal. There is no distension.      Palpations: Abdomen is soft.      Tenderness: There is no abdominal tenderness.   Musculoskeletal:         General: No deformity. Normal range of motion.      Cervical back: Normal range of motion and neck supple.   Skin:     General: Skin is warm and dry.      Findings: No erythema or rash.      Comments: Right chest wall vas cath. Dressing c/d/i. No sign of infection to site.   Neurological:      Mental Status: He is alert and oriented to person, place, and time.   Psychiatric:         Behavior: Behavior normal.         Thought Content: Thought content normal.         Judgment: Judgment normal.            Significant Labs:   CBC:   Recent Labs   Lab 05/26/24 0319 05/27/24  0413   WBC 2.02* 4.07   HGB 7.1* 7.5*   HCT 20.1* 21.9*   PLT 19*  --     and CMP:   Recent Labs   Lab 05/26/24 0319 05/27/24  0413    139   K 3.6 3.4*    106   CO2 22* 25   GLU 92 94   BUN 21* 17   CREATININE 1.1 1.2   CALCIUM 8.4* 8.7   PROT 4.8* 4.9*   ALBUMIN 2.7* 2.9*   BILITOT 0.8 0.6   ALKPHOS 43* 48*   AST 10 14   ALT 15 24   ANIONGAP 11 8       Diagnostic Results:  I have reviewed all pertinent imaging results/findings within the past 24 hours.

## 2024-05-27 NOTE — CONSULTS
Permacath Removal Procedure Note         Procedure:   Right permacath removal  Date: 05/27/2024  Location: right internal jugular vein   Anesthesia: Local       Procedure:  Written consent obtained and timeout was performed. Patient was placed supine. Permacath cuff was dissected and catheter was removed without any resistance. Pressure was held at the IJ insertion site for 5 minutes. Dressings were applied. Patient tolerated the procedure well.       Complications/Comments: None  Estimated Blood Loss: None

## 2024-05-27 NOTE — PT/OT/SLP PROGRESS
Occupational Therapy      Patient Name:  Pete Fernandez   MRN:  [id number]    OT attempted this PM. Pt politely declined 2/2 to no mobility and self-care concerns at this time. Per pt and chart review, pt is independent with ADLs and functional ambulation. Reported he has been walking to bathroom without difficulty and packed his belongings in preparation for discharge himself. Pt educated on importance of OOB activity.     5/27/2024

## 2024-05-27 NOTE — ASSESSMENT & PLAN NOTE
Nutrition consulted. Most recent weight and BMI monitored-     Measurements:  Wt Readings from Last 1 Encounters:   05/27/24 87.2 kg (192 lb 5.6 oz)   Body mass index is 28.41 kg/m².    Patient has been screened and assessed by RD.    Malnutrition Type:  Context: acute illness or injury, chronic illness  Level: moderate    Malnutrition Characteristic Summary:  Weight Loss (Malnutrition): 10% in 6 months  Subcutaneous Fat (Malnutrition): mild depletion  Muscle Mass (Malnutrition): mild depletion  Hand  Strength, Left (Malnutrition): Good  Hand  Strength, Right (Malnutrition): Good    Interventions/Recommendations (treatment strategy):  1. Continue Regular Diet. Enocurage high-calorie, high-protein food sources. Small, frequent meals/snacks as tolerated. If DOUG worsens consider Renal diet. 2.  Boost Plus (or alternative) TID.   3. Monitor I/O's, weight and labs.   4. Consider a phosphate binder with all meals. 5. RD to monitor.

## 2024-05-27 NOTE — ASSESSMENT & PLAN NOTE
- Had temp of 101.8 over night on 5/22. HR 110s with fever, but vitals otherwise stable.   - Infection w/u ordered and unremarkable   - Started on Cefepime. Today is D4. Afebrile for 48 hours 5/26 and , changed to Levaquin prophylaxis until ANC 1000  - C/o abdominal pain. Suspect chemo-induced mucositis, but will get abdominal imaging if repeat fever.  - Continue Mylanta and simethicone.  RESOLVED

## 2024-05-27 NOTE — PROGRESS NOTES
BMT Pharmacist Discharge Note     All discharge medications were reviewed with the patient and his caregiver. 5 medications were sent to the [facility] pharmacy for bedside delivery: Hydroxyzine, Melatonin, Calcitriol, Acyclovir, Bactrim DS.     Medication  Indication  Morning  Afternoon  Evening/Night    **Acyclovir 800mg   Viral infection prevention  1 tablet    1 tablet    **Sulfamethoxazole-trimethoprim (Bactrim DS) 800-160mg  PJP infection prevention   (to start on 06/12/24)  1 tablet on Mondays, Wednesdays, and Fridays    Calcitriol 0.5 mcg Kidneys  1 capsule     Dextroamphetamine-Amphetamine (Adderall) 20 mg ADHD 1 tablet     Febuxostat (Uloric) 40 mg Uric Acid 1 tablet     Metoprolol tartrate (Lopressor) 25 mg Blood pressure/Heart rate ½ tablet   (12.5 mg)  ½ tablet   (12.5 mg)   Pantoprazole (Protonix) 20 mg Acid Reflux 1 tablet      Sodium Bicarbonate 650 mg  Kidneys 1 tablet   1 tablet    Tamsulosin (Flomax) 0.4 mg Urine Retention     1 capsule   **new medication or change in medication at discharge      AS NEEDED MEDICATIONS:   **Ondansetron (Zofran) 8mg every 8 hours as needed for nausea   Prochlorperazine 5 mg tablet every 6 hours as needed for nausea.   **Hydroxyzine 25 mg tablet three times daily as needed for anxiety.   **Melatonin 3 mg: Take 3 tablets (9 mg) at bedtime as needed for insomnia.   Oxycodone 10 mg tablet every 4 hours as needed for pain.      STOP UNTIL TOLD TO RESTART:     Aspirin     NO LONGER TAKE:    Colchicine  Dexamethasone  Lenalidomide (Revlimid)  Thiamine      Notes:     I discussed the importance of taking acyclovir up until day +365 to prevent viral infections. The patient is also aware to start taking MWF Bactrim DS on D+30 and to continue this for 6 months post transplant to prevent PJP infection. Recommended the patient avoid OTC NSAIDS/APAP due to the increased risk of bleeding and fever masking.        The patient and his caregiver had the opportunity to ask questions and  express concerns. All questions were answered.      Marianna Martínez, PharmD  Clinical Pharmacist-BMT/Hematology  [facility]

## 2024-05-27 NOTE — DISCHARGE SUMMARY
[facility] - Oncology ([facility])  Hematology  Bone Marrow Transplant  Discharge Summary      Patient Name: Pete Fernandez  MRN: [id number]  Admission Date: 5/11/2024  Hospital Length of Stay: 16 days  Discharge Date and Time: 05/27/2024 1800  Attending Physician: Janet Arceo MD   Discharging Provider: Julianna Dalal NP  Primary Care Provider: Eric Kohli MD    HPI: Mr. Fernandez is a 43-y-o patient of Dr. Cook with multiple myeloma. Other medical history includes pericarditis, CKD, HTN, BPH, gout, GERD, ADD, and cancer pain. He is admitting today for a Sandra 200 auto SCT. He is feeling well today. Denies fevers, chills, cough, SOB, chest pain, bleeding or bruising, and n/v/d/c.    * No surgery found *     Hospital Course: Admitted 5/11/24 for a Sandra 200 auto SCT for MM. He received 5 bags with a CD34 dose of 4.47 x 10^6 on 5/13/24. Tolerated transplant well. Admission complicated by expected GI toxicities including nausea, c-diff neg diarrhea, esophageal mucositis, and anorexia, cytopenias requiring transfusion of blood products, neutropenic fever for which no infection source was found (likely engraftment fever), headache relieved by PRN Oxy IR, insomnia relieved by Melatonin, and electrolyte derangements requiring repletion. He engrafted on Day +13 with an ANC of 590 and discharged to the [facility] in the care of his wife on 5/27/24 (Day +14). Discharge teaching performed and vas cath removed prior to discharge. He will f/u in BMT clinic with Dr. Cook on 5/30/24.    History of auto stem cell transplant  - Patient of Dr. Cook  - Admitted (5/11/24) for a Sandra 200 auto SCT  - Today is Day +14   - Engrafted Day +13 with an ANC of 590. Day 2 of engraftment today.  - He received 5 bags containing a CD34 dose of 4.47 x 10^6 5/13/24 at 1330.  - See treatment plan below     Planned conditioning regimen:  Melphalan 200 on Day -1      Antimicrobial Prophylaxis:  Acyclovir starting on Day -1  Levofloxacin starting on Day  -1  Fluconazole starting on Day -1  Bactrim starting on Day +30     Growth Factor Support:  Neupogen starting on Day +7      Caregiver: wife and mother  Post-transplant discharge plans: Becca Martin     Multiple myeloma in remission  From Dr. Cook's most recent clinic note:   is a 44y/o M with no significant medical problems. He has been diagnosed with multiple myeloma. He is here for a virtual stem cell transplant consultation.   Per records,  presented to [facility] on 10/11/23 after 1 month of progressive weakness, abdominal pain, and intermittent confusion. At the time of his presentation, his labs, which had been normal  in 8/2023, were notable for new severe DOUG, transaminitis, and Ca of 18. He was admitted and started on IVF .  Hypercalcemia workup was initiated and he was ultimately found to have numerous osseous lesions on 10/15/23 CT CAP, FLC - Kappa 3.29, Lambda 744, Ratio 226 , SPEP with M spike 0.17, IgG lambda on sIFE. Bone marrow biopsy done 10/18/23 revealed 95% plasma cells and FISH with TP53 deletion, FGFR3/IGH (or NSD2/IGH) fusion, usually representing a t(4;14), and a tetraploid subclone was observed. After correction of his hypercalcemia and improvement in his renal function he was discharged on 10/22/23.   He was started on Beatriz + RVd on 11/2/23.   - Admitted for a Sandra 200 auto SCT on 5/11/24. Transplanted 5/13/24.     Neutropenic fever  - Had temp of 101.8 over night on 5/22. HR 110s with fever, but vitals otherwise stable.   - Infection w/u ordered and unremarkable   - Started on Cefepime. Today is D4. Afebrile for 48 hours 5/26 and , changed to Levaquin prophylaxis until ANC 1000  - C/o abdominal pain. Suspect chemo-induced mucositis, but will get abdominal imaging if repeat fever.  - Continue Mylanta and simethicone.  RESOLVED     Pancytopenia due to antineoplastic chemotherapy  - Transfuse for hgb < 7, Plt <10K or bleeding  - Daily CBC while inpatient  - Stopped lovenox  with platelets <50K      Chemotherapy induced diarrhea  - C-diff neg  - PRN imodium available  RESOLVED     Moderate malnutrition  Nutrition consulted. Most recent weight and BMI monitored-      Measurements:      Wt Readings from Last 1 Encounters:   05/27/24 87.2 kg (192 lb 5.6 oz)   Body mass index is 28.41 kg/m².     Patient has been screened and assessed by RD.     Malnutrition Type:  Context: acute illness or injury, chronic illness  Level: moderate     Malnutrition Characteristic Summary:  Weight Loss (Malnutrition): 10% in 6 months  Subcutaneous Fat (Malnutrition): mild depletion  Muscle Mass (Malnutrition): mild depletion  Hand  Strength, Left (Malnutrition): Good  Hand  Strength, Right (Malnutrition): Good     Interventions/Recommendations (treatment strategy):  1. Continue Regular Diet. Enocurage high-calorie, high-protein food sources. Small, frequent meals/snacks as tolerated. If DOUG worsens consider Renal diet. 2.  Boost Plus (or alternative) TID.   3. Monitor I/O's, weight and labs.   4. Consider a phosphate binder with all meals. 5. RD to monitor.      Hypomagnesemia  - Monitoring mag level daily while inpatient  - Avoiding PRN electrolyte order set due to CKD     Insomnia  - Continue PRN melatonin     Hyperphosphatemia  - Monitoring daily phos levels  - Dose-reduced calcitriol     Volume overload  - Weight up 8.5 lbs from admission on 5/14  - Received large volume of fluid for transplant  - Stopped IVF but deferred diuresis  RESOLVED     Mild malnutrition  - Nutrition consulted. Most recent weight and BMI monitored-      Measurements:      Wt Readings from Last 1 Encounters:   05/27/24 87.2 kg (192 lb 5.6 oz)   Body mass index is 28.41 kg/m².     Patient has been screened and assessed by RD.     Malnutrition Type:  Context: acute illness or injury, chronic illness  Level: moderate     Malnutrition Characteristic Summary:  Weight Loss (Malnutrition): 10% in 6 months  Subcutaneous Fat  (Malnutrition): mild depletion  Muscle Mass (Malnutrition): mild depletion  Hand  Strength, Left (Malnutrition): Good  Hand  Strength, Right (Malnutrition): Good     Interventions/Recommendations (treatment strategy):  1. Continue Regular Diet. Enocurage high-calorie, high-protein food sources. Small, frequent meals/snacks as tolerated. If DOUG worsens consider Renal diet. 2.  Boost Plus (or alternative) TID.   3. Monitor I/O's, weight and labs.   4. Consider a phosphate binder with all meals. 5. RD to monitor.      Hypertension  - Continue home metoprolol     GERD (gastroesophageal reflux disease)  - Takes protonix at home. Will receive inpatient as part of SCT protocol.     Asymptomatic hyperuricemia  - Patient with chronically high uric acid levels. Home febuxostat not on formulary. Substituting with allopurinol while inpatient.     BPH (benign prostatic hyperplasia)  - Continue home Flomax     CKD (chronic kidney disease)  - Baseline creatinine appears to be ~ 1.8  - Renally dose meds as appropriate  - Creatinine stable at 1.2 today     ADD (attention deficit disorder)  - Holding home Aderall while inpatient  - Can resume at discharge     Pericarditis  - Will hold home colchicine as he has completed a 3 month course per PharmD rec     Cancer associated pain  - Continue home Oxy IR    Goals of Care Treatment Preferences:  Code Status: Full Code      Consults (From admission, onward)          Status Ordering Provider     Inpatient consult to General Surgery  Once        Provider:  (Not yet assigned)    Acknowledged MOLLY MCGRATH     Inpatient consult to Registered Dietitian/Nutritionist  Once        Provider:  (Not yet assigned)    Completed MOLLY MCGRATH            Significant Diagnostic Studies: Labs: CMP   Recent Labs   Lab 05/26/24  0319 05/27/24  0413    139   K 3.6 3.4*    106   CO2 22* 25   GLU 92 94   BUN 21* 17   CREATININE 1.1 1.2   CALCIUM 8.4* 8.7   PROT 4.8* 4.9*   ALBUMIN 2.7*  2.9*   BILITOT 0.8 0.6   ALKPHOS 43* 48*   AST 10 14   ALT 15 24   ANIONGAP 11 8    and CBC   Recent Labs   Lab 05/26/24  0319 05/27/24  0413   WBC 2.02* 4.07   HGB 7.1* 7.5*   HCT 20.1* 21.9*   PLT 19* 14*       Pending Diagnostic Studies:       None          Final Active Diagnoses:    Diagnosis Date Noted POA    PRINCIPAL PROBLEM:  History of auto stem cell transplant [Z94.84] 11/20/2023 Not Applicable    Multiple myeloma in remission [C90.01] 10/20/2023 Yes    Neutropenic fever [D70.9, R50.81] 05/22/2024 Yes    Pancytopenia due to antineoplastic chemotherapy [D61.810, T45.1X5A] 05/10/2024 Yes    Chemotherapy induced diarrhea [K52.1, T45.1X5A] 05/20/2024 Yes    Moderate malnutrition [E44.0] 05/23/2024 No    Hypomagnesemia [E83.42] 05/22/2024 Yes    Hyperphosphatemia [E83.39] 05/17/2024 Yes    Insomnia [G47.00] 05/17/2024 Yes    Volume overload [E87.70] 05/14/2024 Yes    Mild malnutrition [E44.1] 05/13/2024 Yes    Pericarditis [I31.9] 05/10/2024 Yes    ADD (attention deficit disorder) [F98.8] 05/10/2024 Yes    CKD (chronic kidney disease) [N18.9] 05/10/2024 Yes    BPH (benign prostatic hyperplasia) [N40.0] 05/10/2024 Yes    Asymptomatic hyperuricemia [E79.0] 05/10/2024 Yes    GERD (gastroesophageal reflux disease) [K21.9] 05/10/2024 Yes    Hypertension [I10] 05/10/2024 Yes    Cancer associated pain [G89.3] 11/20/2023 Yes      Problems Resolved During this Admission:    Diagnosis Date Noted Date Resolved POA    Thrombocytopenia [D69.6] 11/20/2023 05/17/2024 Yes      Discharged Condition: stable    Disposition: Home or Self Care    Follow Up:    Patient Instructions:      Diet Adult Regular     Notify your health care provider if you experience any of the following:  temperature >100.4     Notify your health care provider if you experience any of the following:  persistent nausea and vomiting or diarrhea     Notify your health care provider if you experience any of the following:  severe uncontrolled pain     Notify  your health care provider if you experience any of the following:  redness, tenderness, or signs of infection (pain, swelling, redness, odor or green/yellow discharge around incision site)     Notify your health care provider if you experience any of the following:  difficulty breathing or increased cough     Notify your health care provider if you experience any of the following:  severe persistent headache     Notify your health care provider if you experience any of the following:  persistent dizziness, light-headedness, or visual disturbances     Notify your health care provider if you experience any of the following:  increased confusion or weakness     Activity as tolerated     Medications:  Reconciled Home Medications:      Medication List        START taking these medications      hydrOXYzine HCL 25 MG tablet  Commonly known as: ATARAX  Take 1 tablet (25 mg total) by mouth 3 (three) times daily as needed for Anxiety.     melatonin 3 mg tablet  Commonly known as: MELATIN  Take 3 tablets (9 mg total) by mouth nightly as needed for Insomnia.            CHANGE how you take these medications      acyclovir 800 MG Tab  Commonly known as: ZOVIRAX  Take 1 tablet (800 mg total) by mouth 2 (two) times daily.  What changed:   medication strength  how much to take     aspirin 81 MG Chew  Take 1 tablet (81 mg total) by mouth every evening. Hold until instructed to resume by provider.  What changed: additional instructions     pantoprazole 20 MG tablet  Commonly known as: PROTONIX  TAKE 1 TABLET (20 MG TOTAL) BY MOUTH ONCE DAILY  What changed: when to take this     sulfamethoxazole-trimethoprim 800-160mg 800-160 mg Tab  Commonly known as: BACTRIM DS  Take 1 tablet by mouth 3 (three) times a week. To start on 06/12/24.  Start taking on: June 12, 2024  What changed:   when to take this  additional instructions  These instructions start on June 12, 2024. If you are unsure what to do until then, ask your doctor or other care  provider.     tamsulosin 0.4 mg Cap  Commonly known as: FLOMAX  Take 1 capsule (0.4 mg total) by mouth once daily.  What changed: when to take this            CONTINUE taking these medications      calcitRIOL 0.5 MCG Cap  Commonly known as: ROCALTROL  Take 1 capsule (0.5 mcg total) by mouth once daily.     dextroamphetamine-amphetamine 20 mg tablet  Commonly known as: ADDERALL  Take 1 tablet by mouth once daily.     febuxostat 40 mg Tab  Commonly known as: ULORIC  Take 1 tablet (40 mg total) by mouth once daily.     metoprolol tartrate 25 MG tablet  Commonly known as: LOPRESSOR  Take 12.5 mg by mouth 2 (two) times daily.     ondansetron 8 MG tablet  Commonly known as: ZOFRAN  Take 1 tablet (8 mg total) by mouth every 8 (eight) hours as needed for Nausea.     oxyCODONE 10 mg Tab immediate release tablet  Commonly known as: ROXICODONE  Take 1 tablet (10 mg total) by mouth every 4 (four) hours as needed for Pain.     prochlorperazine 5 MG tablet  Commonly known as: COMPAZINE  Take 5 mg by mouth every 6 (six) hours as needed for Nausea.     sodium bicarbonate 650 MG tablet  Take 650 mg by mouth 2 (two) times daily.            STOP taking these medications      colchicine 0.6 mg tablet  Commonly known as: COLCRYS     dexAMETHasone 4 MG Tab  Commonly known as: DECADRON     REVLIMID 10 mg Cap  Generic drug: lenalidomide     thiamine 100 MG tablet              Julianna Dalal, NP  Bone Marrow Transplant  [facility] - Oncology ([facility])

## 2024-05-27 NOTE — ASSESSMENT & PLAN NOTE
- Patient of Dr. Cook  - Admitted (5/11/24) for a Sandra 200 auto SCT  - Today is Day +14   - Engrafted Day +13 with an ANC of 590. Day 2 of engraftment today.  - He received 5 bags containing a CD34 dose of 4.47 x 10^6 5/13/24 at 1330.  - See treatment plan below    Planned conditioning regimen:  Melphalan 200 on Day -1      Antimicrobial Prophylaxis:  Acyclovir starting on Day -1  Levofloxacin starting on Day -1  Fluconazole starting on Day -1  Bactrim starting on Day +30     Growth Factor Support:  Neupogen starting on Day +7      Caregiver: wife and mother  Post-transplant discharge plans: Becca Martin

## 2024-05-28 LAB
BACTERIA BLD CULT: NORMAL
BACTERIA BLD CULT: NORMAL

## 2024-05-29 ENCOUNTER — PATIENT OUTREACH (OUTPATIENT)
Dept: ADMINISTRATIVE | Facility: CLINIC | Age: 44
End: 2024-05-29
Payer: COMMERCIAL

## 2024-05-29 NOTE — PROGRESS NOTES
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 He has been diagnosed with multiple myeloma. He is here for a virtual stem cell transplant consultation.   Per records,  presented to [facility] on 10/11/23 after 1 month of progressive weakness, abdominal pain, and intermittent confusion. At the time of his presentation, his labs, which had been normal  in 8/2023, were notable for new severe DOUG, transaminitis, and Ca of 18. He was admitted and started on IVF .  Hypercalcemia workup was initiated and he was ultimately found to have numerous osseous lesions on 10/15/23 CT CAP, FLC - Kappa 3.29, Lambda 744, Ratio 226 , SPEP with M spike 0.17, IgG lambda on sIFE. Bone marrow biopsy done 10/18/23 revealed 95% plasma cells and FISH with TP53 deletion, FGFR3/IGH (or NSD2/IGH) fusion, usually representing a t(4;14), and a tetraploid subclone was observed. After correction of his hypercalcemia and improvement in his renal function he was discharged on 10/22/23.   He was started on Beatriz + RVd on 11/2/23.      Interval History: He is here for follow up.  He feels well today. He continues to have fatigue. He received melphalan 200mg/m2 conditioning and CD34 dose of 4.47 x 10^6 on 5/13/24. Tolerated transplant well. Admission complicated by expected GI toxicities including nausea, c-diff neg diarrhea, esophageal mucositis, and anorexia, cytopenias requiring transfusion of blood products, neutropenic fever for which no infection source was found (likely engraftment fever), headache relieved by PRN Oxy IR, insomnia relieved by melatonin, and electrolyte derangements requiring repletion. He engrafted on Day +13 with an ANC of 590.  Day+17 today.           Review of patient's allergies indicates:  No  Known Allergies      Current Outpatient Medications   Medication Sig    acyclovir (ZOVIRAX) 800 MG Tab Take 1 tablet (800 mg total) by mouth 2 (two) times daily.    aspirin 81 MG Chew Take 1 tablet (81 mg total) by mouth every evening. Hold until instructed to resume by provider.    calcitRIOL (ROCALTROL) 0.5 MCG Cap Take 1 capsule (0.5 mcg total) by mouth once daily.    dextroamphetamine-amphetamine (ADDERALL) 20 mg tablet Take 1 tablet by mouth once daily.    febuxostat (ULORIC) 40 mg Tab Take 1 tablet (40 mg total) by mouth once daily.    hydrOXYzine HCL (ATARAX) 25 MG tablet Take 1 tablet (25 mg total) by mouth 3 (three) times daily as needed for Anxiety.    melatonin (MELATIN) 3 mg tablet Take 3 tablets (9 mg total) by mouth nightly as needed for Insomnia.    metoprolol tartrate (LOPRESSOR) 25 MG tablet Take 12.5 mg by mouth 2 (two) times daily.    ondansetron (ZOFRAN) 8 MG tablet Take 1 tablet (8 mg total) by mouth every 8 (eight) hours as needed for Nausea.    oxyCODONE (ROXICODONE) 10 mg Tab immediate release tablet Take 1 tablet (10 mg total) by mouth every 4 (four) hours as needed for Pain.    pantoprazole (PROTONIX) 20 MG tablet TAKE 1 TABLET (20 MG TOTAL) BY MOUTH ONCE DAILY (Patient taking differently: Take 20 mg by mouth every evening.)    prochlorperazine (COMPAZINE) 5 MG tablet Take 5 mg by mouth every 6 (six) hours as needed for Nausea.    sodium bicarbonate 650 MG tablet Take 650 mg by mouth 2 (two) times daily.    [START ON 6/12/2024] sulfamethoxazole-trimethoprim 800-160mg (BACTRIM DS) 800-160 mg Tab Take 1 tablet by mouth 3 (three) times a week. To start on 06/12/24.    tamsulosin (FLOMAX) 0.4 mg Cap Take 1 capsule (0.4 mg total) by mouth once daily. (Patient taking differently: Take 0.4 mg by mouth every evening.)     No current facility-administered medications for this visit.         Review of Systems   Constitutional:  Positive for malaise/fatigue. Negative for chills, fever  and weight loss.   HENT:  Negative for congestion, ear discharge, ear pain, hearing loss, nosebleeds, sinus pain and tinnitus.    Eyes:  Negative for blurred vision, photophobia, pain, discharge and redness.   Respiratory:  Negative for sputum production.    Cardiovascular:  Negative for chest pain, palpitations, orthopnea and claudication.   Gastrointestinal:  Negative for blood in stool, constipation, diarrhea, heartburn, melena and vomiting.   Genitourinary:  Negative for dysuria, frequency, hematuria and urgency.   Musculoskeletal:  Positive for back pain and myalgias. Negative for joint pain and neck pain.   Neurological:  Negative for tremors, sensory change, speech change, focal weakness, weakness and headaches.   Endo/Heme/Allergies:  Negative for environmental allergies and polydipsia. Does not bruise/bleed easily.   Psychiatric/Behavioral:  Negative for depression, hallucinations and substance abuse. The patient is not nervous/anxious.               PS: ECOG 1 / KPS 80  Response at transplant: VGPR  Risk at transplant: low  HCT CI score: 3 ( 2 points for DLCO < 80%; 1 point for anxiety)        Vitals:    05/30/24 0755   BP: 114/83   Pulse: (!) 118   Resp: 18   Temp: 98.6 °F (37 °C)         Physical Exam  HENT:      Head: Normocephalic and atraumatic.      Mouth/Throat:      Pharynx: No posterior oropharyngeal erythema.   Eyes:      General: No scleral icterus.  Cardiovascular:      Rate and Rhythm: Tachycardia present.   Pulmonary:      Effort: Pulmonary effort is normal.      Breath sounds: Normal breath sounds.   Abdominal:      General: There is no distension.   Musculoskeletal:      Right lower leg: No edema.      Left lower leg: No edema.   Lymphadenopathy:      Cervical: No cervical adenopathy.   Skin:     Coloration: Skin is not jaundiced.   Neurological:      General: No focal deficit present.      Mental Status: He is alert.      Cranial Nerves: No cranial nerve deficit.          10/14/23 CT  C/A/P    COMPARISON  None available at the time of initial interpretation.     FINDINGS  Images were reviewed in soft tissue, lung, and bone windows.     Exam quality: Inherently limited vascular and soft tissue assessment due to utilization of non-contrast protocol.  Quality further limited by patient movement throughout image acquisition with resulting widespread artifact particularly through the abdomen.     Lines/tubes: none visualized     Cardiovascular: Normal heart chamber size. No pericardial effusion.  Normal vessel caliber and contour through the chest, abdomen, and pelvis.     Lungs/Pleura: Central airways are widely patent.  No organized airspace consolidation or focal pulmonary lesion is identified.  However, there is diffuse ill-defined ground-glass attenuation throughout both lungs of overall nonspecific appearance.  Small layering bilateral pleural effusions are also evident.  No evidence of loculated component or pleural thickening identified.  There is no pneumothorax.     Abdominal Solid Organs: Unremarkable appearance for limited non-contrast CT assessment.  No evidence of acute abnormality appreciated.     Gallbladder/Biliary: No acute process, calcified stone, or evidence of biliary obstruction.     Gastrointestinal: No evidence of acute esophageal or abdominal hollow viscus injury. No active inflammatory process.     Pelvic Organs: No focal abnormality of the urinary bladder or adjacent structures.     Peritoneal Cavity/Retroperitoneum: No free fluid or air, and no drainable collections.     Musculoskeletal: No acute or focal subcutaneous or muscular abnormality.  There is no evidence of acute osseous displacement.  However, widespread heterogeneity and innumerable lucent foci are appreciated throughout the spinal column, as well as the bony thorax and pelvis.  A more pronounced lytic lesion that is poorly marginated is noted at the inferior right posterolateral T11 vertebral body measuring  2.3 cm x 1.3 cm with overlying cortical disruption (series 3, image 96).  An additional focal expansile soft tissue density lesion is present at the right anterolateral 7th ribs with overlying cortical destruction and dimensions of approximately 3.6 cm x 2.3 cm (series 2, image 89).  Multiple nondisplaced, likely subacute/chronic right rib fractures also incidentally identified.     Other findings: Scattered mediastinal lymph nodes are enlarged.  For example, right posterolateral upper paratracheal node measures 1.4 cm short axis (series 2, image 19).  Subcarinal node short axis dimension 1.4 cm (image 53).  No necrotic adenopathy is identified.  The thyroid is unremarkable.     IMPRESSION  1. Widespread osseous findings suggestive of extensive metastatic skeletal disease, to include expansile destructive soft tissue component at the anterior right 7th rib.  2. Enlarged intrathoracic lymph nodes could also be of metastatic etiology.  3. Diffuse ground-glass attenuation through both lungs is nonspecific, could be secondary to atypical infectious or inflammatory process.  However, possibility of metastatic involvement cannot be excluded.      10/18/23 bone marrow biopsy       1. BONE MARROW, RIGHT POSTERIOR ILIAC CREST, BIOPSY AND ASPIRATE:      PLASMA CELL MYELOMA, LAMBDA-RESTRICTED.      LAMBDA-RESTRICTED ATYPICAL PLASMA CELLS COMPRISE 95% OF MARROW NUCLEATED   CELLULARITY AND ARE ARRANGED IN DIFFUSE SHEETS.      REDUCED MARROW STORES.         PERIPHERAL BLOOD:   MILD NORMOCHROMIC NORMOCYTIC ANEMIA.      2. SEE DIAGNOSIS #1.       Comment   A. The patient's clinical history of hypercalcemia, weakness and acute kidney injury with CT showing extensive osseous lesions, SPEP showing monoclonal spike   0.17 g/dL, IgG lambda by OSVALDO is noted.      B. The current bone marrow is 100% cellular and the vast majority (95%) of the cellularity is composed of atypical plasma cells which are Lambda-restricted by   flow cytometry  and immunohistochemical staining.      C. A representative sample of this patient's bone marrow was submitted for flow cytometry, which revealed a population of monotypic plasma cells (43.71%),   restricted to the production of Lambda light chains, with coexpression of CD38 and 138, negative for CD19 and CD45. No monotypic B cell population or increase   in blasts are identified. See below and separate scanned report.      D. A representative sample was also submitted for Myeloma FISH panel, which was abnormal, indicating a plasma cell clone with a TP53 deletion and FGFR3/IGH (or   NSD2/IGH) fusion, usually representing a t(4;14).  In addition, a tetraploid subclone was observed. In plasma cell dyscrasias, a TP53 deletion represents a   high risk cytogenetic abnormality.  See below and separate scanned report.       12/14/23 SPEP: Serum protein electrophoresis shows a faint monoclonal peak (0.02 g/dL) in the gamma zone, consistent with a monoclonal gammopathy.     12/14/23 OSVALDO: A faint band is present in the IgG kaitlin with a corresponding faint band in the kappa kaitlin.     The immunofixation electrophoresis are consistent with monoclonal gammopathy of IgG-kappa isotype.       4/9/24 LEFT ILIAC CREST BONE MARROW ASPIRATE (INADEQUATE), BONE MARROW CLOT (INADEQUATE), AND BONE MARROW CORE BIOPSY WITH:     CELLULARITY=20%, TRILINEAGE HEMATOPOIETIC ACTIVITY .   NO DEFINITIVE MORPHOLOGIC OR IMMUNOPHENOTYPIC EVIDENCE OF RESIDUAL PLASMA CELL NEOPLASM.  SEE COMMENT.   CONGO RED NEGATIVE.   GRADE 1-2 RETICULAR FIBROSIS.   INCREASED STORAGE IRON.   ADEQUATE NUMBER OF MEGAKARYOCYTES.     Immunohistochemical studies were performed on the  core biopsy for greater sensitivity and further architecture evaluation with adequate positive and negative controls.  Scattered mixed predominantly T cells (CD3 positive) and B cells (CD20 positive) are    evident.  About 1% plasma cells ( positive) are noted.  In situ hybridization for  kappa and lambda light chain is noncontributory.   highlights scattered mast cells.     There is no definitive morphologic or immunophenotypic evidence of residual plasma cell neoplasm.  Correlate clinically and with a cytogenetics report.       Interp, Plasma Cell Prolif (PCPD), FISH: Plasma Cell Prolif, High Risk, FISH was cancelled on 04/12/2024 at 11:10; Quantity was not sufficient for testing. The FISH test was cancelled due to an insufficient number of plasma cells within the provided   specimen.         4/9/24 PET CT      COMPARISON:  [facility] PET-CT 02/07/2024     FINDINGS:  Quality of the study: Adequate.     In the head and neck, there are no hypermetabolic lesions worrisome for malignancy. There are no hypermetabolic mucosal lesions, and there are no pathologically enlarged or hypermetabolic lymph nodes.     In the chest, there are no hypermetabolic lesions worrisome for malignancy.  There are no concerning pulmonary nodules or masses, and there are no pathologically enlarged or hypermetabolic lymph nodes.     In the abdomen and pelvis, there is physiologic tracer distribution within the abdominal organs and excretion into the genitourinary system.     In the bones, there are no hypermetabolic lesions worrisome for malignancy.  Index sclerotic lesion in the anterior right 7th rib demonstrates background level uptake.  Index lytic lesion in the anterior left ilium demonstrates background level uptake.  There is mild uptake associated with a right posterolateral 4th rib fracture, slightly improved compared to prior.  Numerous additional non tracer avid lytic foci and remote bilateral rib fractures.     In the extremities, there are no hypermetabolic lesions worrisome for malignancy.     Additional CT findings: Stable subcentimeter right lower lobe nodules, below size threshold for PET.     Impression:     No focal tracer avid lesions to suggest active multiple myeloma.           4/16/24 SPEP: Decreased  total protein. A linear irregularity in near gamma approximately = 0.05 G/dL.   4/16/24 sIFE: Faint IgG kappa specific monoclonal protein band identified in gamma.   4/16/24 UIFE: No monoclonal peaks identified.     4/16/24 PFT:Spirometry is normal. DLCO is borderline decreased. Adj DLCO 70.4%      4/16/24 EKG: Normal sinus rhythm .Normal ECG       4/16/24 ECHO      Left Ventricle: The left ventricle is normal in size. Moderately increased ventricular mass. Normal wall thickness. There is eccentric hypertrophy. Normal wall motion. There is normal systolic function with a visually estimated ejection fraction of 55 - 60%. Biplane (2D) method of discs ejection fraction is 55%. Global longitudinal strain is -17.0%.    Right Ventricle: Normal right ventricular cavity size. Wall thickness is normal. Right ventricle wall motion  is normal. Systolic function is normal.    Left Atrium: Left atrium is severely dilated.    Right Atrium: Right atrium is severely dilated.    Aortic Valve: The aortic valve is a trileaflet valve.    Tricuspid Valve: There is mild regurgitation.    Pulmonary Artery: There is borderline elevated pulmonary hypertension. The estimated pulmonary artery systolic pressure is 40 mmHg.    IVC/SVC: Normal venous pressure at 3 mmHg.           Component      Latest Ref Rng 4/16/2024   Urine Total Volume      mL 3200    Urine Collection Duration      Hr 24    CREATININE, URINE (SEND OUT)      23.0 - 375.0 mg/dL 31.0    Creatinine Clearance      70 - 110 mL/min 38 (L)    Urine Creatinine Absolute      mg/Spec 992.0    Creatinine      0.5 - 1.4 mg/dL 1.8 (H)       Component      Latest Ref Rng 4/16/2024   Urine Total Volume      mL 3200    Urine Collection Duration      Hr 24    PROTEIN URINE      0 - 15 mg/dL <7    Urine Protein, Timed      0 - 100 mg/Spec Unable to calculate        Component      Latest Ref Rng 5/30/2024   WBC      3.90 - 12.70 K/uL 3.24 (L)    RBC      4.60 - 6.20 M/uL 2.68 (L)     Hemoglobin      14.0 - 18.0 g/dL 8.8 (L)    Hematocrit      40.0 - 54.0 % 26.2 (L)    MCV      82 - 98 fL 98    MCH      27.0 - 31.0 pg 32.8 (H)    MCHC      32.0 - 36.0 g/dL 33.6    RDW      11.5 - 14.5 % 14.6 (H)    Platelet Count      150 - 450 K/uL 74 (L)    MPV      9.2 - 12.9 fL 12.5    Immature Granulocytes      0.0 - 0.5 % 3.7 (H)    Gran # (ANC)      1.8 - 7.7 K/uL 1.3 (L)    Immature Grans (Abs)      0.00 - 0.04 K/uL 0.12 (H)    Lymph #      1.0 - 4.8 K/uL 0.6 (L)    Mono #      0.3 - 1.0 K/uL 1.2 (H)    Eos #      0.0 - 0.5 K/uL 0.0    Baso #      0.00 - 0.20 K/uL 0.01    nRBC      0 /100 WBC 0    Gran %      38.0 - 73.0 % 40.5    Lymph %      18.0 - 48.0 % 19.1    Mono %      4.0 - 15.0 % 36.4 (H)    Eos %      0.0 - 8.0 % 0.0    Basophil %      0.0 - 1.9 % 0.3    Differential Method Automated    Sodium      136 - 145 mmol/L 139    Potassium      3.5 - 5.1 mmol/L 3.6    Chloride      95 - 110 mmol/L 103    CO2      23 - 29 mmol/L 27    Glucose      70 - 110 mg/dL 135 (H)    BUN      6 - 20 mg/dL 15    Creatinine      0.5 - 1.4 mg/dL 1.3    Calcium      8.7 - 10.5 mg/dL 9.6    PROTEIN TOTAL      6.0 - 8.4 g/dL 6.0    Albumin      3.5 - 5.2 g/dL 3.5    BILIRUBIN TOTAL      0.1 - 1.0 mg/dL 0.5    ALP      55 - 135 U/L 64    AST      10 - 40 U/L 17    ALT      10 - 44 U/L 35    eGFR      >60 mL/min/1.73 m^2 >60.0    Anion Gap      8 - 16 mmol/L 9       Legend:  (L) Low  (H) High        Assessment:      1. R-ISS stage III IgG lambda multiple myeloma not in remission  2. Stem cell transplant candidate  3. DOUG /CKD  4. Fatigue  5. Cancer associated pain  6. Anemia in neoplastic disease  7. thrombocytopenia      Plan:    1. He has high risk features on bone marrow aspirate FISH,  including TP53 deletion and FGFR3/IGH (or NSD2/IGH) fusion, usually representing a t(4;14). He had presented with DOUG and hypercalcemia.    He has started mira VRd induction. In view of high risk features on FISH and good performance  status I had recommended switching bortezomib to carfilzomib.   I discussed the role, timing, risks and benefits of autologous stem cell transplant in multiple myeloma. I explained that although the autologous stem cell transplant is NOT curative, it has progression free survival and overall survival benefits, when the transplant is followed by maintenance therapy. I discussed that he will andi to be in  [place] for 4 weeks for the transplant , with approximately 2 weeks in the hospital, and 2 weeks post discharge locally and will need an adult care-giver for all four weeks.   He has good performance status with limited co-morbid conditions and will possibly be transplanted in March-April 2024 if he responds well to induction.  He has not had a baseline PET CT. CT C/A/P done on 10/14/23 demonstrated scattered mediastinal lymph nodes. Unclear if this is extramedullary involvement  by his myeloma.  He is on zometa.   He is in cycle 3 mira VRd . His serum free light chain ratio is normal as of 1/11/24. 12/14/23 SPEP/ OSVALDO with a  faint monoclonal peak (0.02 g/dL) in the gamma zone, consistent with a monoclonal gammopathy. A faint band was present in the IgG kaitlin with a corresponding faint band in the kappa kaitlin, possibly due to daratumumab interference ( original paraprotein was IgG lambda).         2.   Autologous stem cell transplant in multiple myeloma NOT curative.   He received melphalan 200mg/m2 conditioning and CD34 dose of 4.47 x 10^6 on 5/13/24. Tolerated transplant well. Admission complicated by expected GI toxicities including nausea, c-diff neg diarrhea, esophageal mucositis, and anorexia, cytopenias requiring transfusion of blood products, neutropenic fever for which no infection source was found (likely engraftment fever), headache relieved by PRN Oxy IR, insomnia relieved by melatonin, and electrolyte derangements requiring repletion. He engrafted on Day +13 with an ANC of 590.  Day+17 today.            3. Secondary to multiple myeloma, Cr improving. Serum Cr 1.3mg/dl today.       4. Secondary to multiple myeloma    5. He has widespread bone involvement. He is on zometa, oxycodone.    6. Most recent Hgb 8.8 g/dl, secondary to chemotherapy    7,. Most recent platelet count 74 k, secondary to chemotherapy              BMT Chart Routing      Follow up with physician 1 week. on 6/7   Follow up with ROMY    Provider visit type    Infusion scheduling note    Injection scheduling note    Labs CBC, CMP, magnesium and phosphorus   Scheduling:  Preferred lab:  Lab interval:  labs on 6/7   Imaging    Pharmacy appointment    Other referrals

## 2024-05-30 ENCOUNTER — OFFICE VISIT (OUTPATIENT)
Dept: HEMATOLOGY/ONCOLOGY | Facility: CLINIC | Age: 44
End: 2024-05-30
Payer: COMMERCIAL

## 2024-05-30 ENCOUNTER — LAB VISIT (OUTPATIENT)
Dept: LAB | Facility: HOSPITAL | Age: 44
End: 2024-05-30
Attending: INTERNAL MEDICINE
Payer: COMMERCIAL

## 2024-05-30 VITALS
DIASTOLIC BLOOD PRESSURE: 83 MMHG | HEART RATE: 118 BPM | WEIGHT: 190.94 LBS | BODY MASS INDEX: 28.28 KG/M2 | HEIGHT: 69 IN | SYSTOLIC BLOOD PRESSURE: 114 MMHG | RESPIRATION RATE: 18 BRPM | TEMPERATURE: 99 F | OXYGEN SATURATION: 97 %

## 2024-05-30 DIAGNOSIS — Z76.82 STEM CELL TRANSPLANT CANDIDATE: ICD-10-CM

## 2024-05-30 DIAGNOSIS — C90.00 MULTIPLE MYELOMA NOT HAVING ACHIEVED REMISSION: ICD-10-CM

## 2024-05-30 DIAGNOSIS — G89.3 CANCER ASSOCIATED PAIN: ICD-10-CM

## 2024-05-30 DIAGNOSIS — D63.0 ANEMIA IN NEOPLASTIC DISEASE: ICD-10-CM

## 2024-05-30 DIAGNOSIS — D84.821 IMMUNODEFICIENCY DUE TO DRUG THERAPY: Primary | ICD-10-CM

## 2024-05-30 DIAGNOSIS — Z79.899 IMMUNODEFICIENCY DUE TO DRUG THERAPY: Primary | ICD-10-CM

## 2024-05-30 DIAGNOSIS — D61.810 PANCYTOPENIA DUE TO ANTINEOPLASTIC CHEMOTHERAPY: ICD-10-CM

## 2024-05-30 DIAGNOSIS — I10 PRIMARY HYPERTENSION: ICD-10-CM

## 2024-05-30 DIAGNOSIS — Z94.84 HISTORY OF AUTO STEM CELL TRANSPLANT: ICD-10-CM

## 2024-05-30 DIAGNOSIS — C90.01 MULTIPLE MYELOMA IN REMISSION: ICD-10-CM

## 2024-05-30 DIAGNOSIS — T45.1X5A PANCYTOPENIA DUE TO ANTINEOPLASTIC CHEMOTHERAPY: ICD-10-CM

## 2024-05-30 LAB
ALBUMIN SERPL BCP-MCNC: 3.5 G/DL (ref 3.5–5.2)
ALP SERPL-CCNC: 64 U/L (ref 55–135)
ALT SERPL W/O P-5'-P-CCNC: 35 U/L (ref 10–44)
ANION GAP SERPL CALC-SCNC: 9 MMOL/L (ref 8–16)
AST SERPL-CCNC: 17 U/L (ref 10–40)
BASOPHILS # BLD AUTO: 0.01 K/UL (ref 0–0.2)
BASOPHILS NFR BLD: 0.3 % (ref 0–1.9)
BILIRUB SERPL-MCNC: 0.5 MG/DL (ref 0.1–1)
BUN SERPL-MCNC: 15 MG/DL (ref 6–20)
CALCIUM SERPL-MCNC: 9.6 MG/DL (ref 8.7–10.5)
CHLORIDE SERPL-SCNC: 103 MMOL/L (ref 95–110)
CO2 SERPL-SCNC: 27 MMOL/L (ref 23–29)
CREAT SERPL-MCNC: 1.3 MG/DL (ref 0.5–1.4)
DIFFERENTIAL METHOD BLD: ABNORMAL
EOSINOPHIL # BLD AUTO: 0 K/UL (ref 0–0.5)
EOSINOPHIL NFR BLD: 0 % (ref 0–8)
ERYTHROCYTE [DISTWIDTH] IN BLOOD BY AUTOMATED COUNT: 14.6 % (ref 11.5–14.5)
EST. GFR  (NO RACE VARIABLE): >60 ML/MIN/1.73 M^2
GLUCOSE SERPL-MCNC: 135 MG/DL (ref 70–110)
HCT VFR BLD AUTO: 26.2 % (ref 40–54)
HGB BLD-MCNC: 8.8 G/DL (ref 14–18)
IMM GRANULOCYTES # BLD AUTO: 0.12 K/UL (ref 0–0.04)
IMM GRANULOCYTES NFR BLD AUTO: 3.7 % (ref 0–0.5)
LYMPHOCYTES # BLD AUTO: 0.6 K/UL (ref 1–4.8)
LYMPHOCYTES NFR BLD: 19.1 % (ref 18–48)
MAGNESIUM SERPL-MCNC: 1.9 MG/DL (ref 1.6–2.6)
MCH RBC QN AUTO: 32.8 PG (ref 27–31)
MCHC RBC AUTO-ENTMCNC: 33.6 G/DL (ref 32–36)
MCV RBC AUTO: 98 FL (ref 82–98)
MONOCYTES # BLD AUTO: 1.2 K/UL (ref 0.3–1)
MONOCYTES NFR BLD: 36.4 % (ref 4–15)
NEUTROPHILS # BLD AUTO: 1.3 K/UL (ref 1.8–7.7)
NEUTROPHILS NFR BLD: 40.5 % (ref 38–73)
NRBC BLD-RTO: 0 /100 WBC
PHOSPHATE SERPL-MCNC: 3.6 MG/DL (ref 2.7–4.5)
PLATELET # BLD AUTO: 74 K/UL (ref 150–450)
PMV BLD AUTO: 12.5 FL (ref 9.2–12.9)
POTASSIUM SERPL-SCNC: 3.6 MMOL/L (ref 3.5–5.1)
PROT SERPL-MCNC: 6 G/DL (ref 6–8.4)
RBC # BLD AUTO: 2.68 M/UL (ref 4.6–6.2)
SODIUM SERPL-SCNC: 139 MMOL/L (ref 136–145)
WBC # BLD AUTO: 3.24 K/UL (ref 3.9–12.7)

## 2024-05-30 PROCEDURE — 1111F DSCHRG MED/CURRENT MED MERGE: CPT | Mod: CPTII,S$GLB,, | Performed by: INTERNAL MEDICINE

## 2024-05-30 PROCEDURE — 36415 COLL VENOUS BLD VENIPUNCTURE: CPT | Performed by: INTERNAL MEDICINE

## 2024-05-30 PROCEDURE — 1159F MED LIST DOCD IN RCRD: CPT | Mod: CPTII,S$GLB,, | Performed by: INTERNAL MEDICINE

## 2024-05-30 PROCEDURE — 3079F DIAST BP 80-89 MM HG: CPT | Mod: CPTII,S$GLB,, | Performed by: INTERNAL MEDICINE

## 2024-05-30 PROCEDURE — 3008F BODY MASS INDEX DOCD: CPT | Mod: CPTII,S$GLB,, | Performed by: INTERNAL MEDICINE

## 2024-05-30 PROCEDURE — 85025 COMPLETE CBC W/AUTO DIFF WBC: CPT | Performed by: INTERNAL MEDICINE

## 2024-05-30 PROCEDURE — 84100 ASSAY OF PHOSPHORUS: CPT | Performed by: INTERNAL MEDICINE

## 2024-05-30 PROCEDURE — 99214 OFFICE O/P EST MOD 30 MIN: CPT | Mod: S$GLB,,, | Performed by: INTERNAL MEDICINE

## 2024-05-30 PROCEDURE — 83735 ASSAY OF MAGNESIUM: CPT | Performed by: INTERNAL MEDICINE

## 2024-05-30 PROCEDURE — 80053 COMPREHEN METABOLIC PANEL: CPT | Performed by: INTERNAL MEDICINE

## 2024-05-30 PROCEDURE — 99999 PR PBB SHADOW E&M-EST. PATIENT-LVL IV: CPT | Mod: PBBFAC,,, | Performed by: INTERNAL MEDICINE

## 2024-05-30 PROCEDURE — 3074F SYST BP LT 130 MM HG: CPT | Mod: CPTII,S$GLB,, | Performed by: INTERNAL MEDICINE

## 2024-05-31 DIAGNOSIS — Z76.82 STEM CELL TRANSPLANT CANDIDATE: Primary | ICD-10-CM

## 2024-05-31 DIAGNOSIS — C90.01 MULTIPLE MYELOMA IN REMISSION: ICD-10-CM

## 2024-06-07 ENCOUNTER — LAB VISIT (OUTPATIENT)
Dept: LAB | Facility: HOSPITAL | Age: 44
End: 2024-06-07
Payer: COMMERCIAL

## 2024-06-07 ENCOUNTER — OFFICE VISIT (OUTPATIENT)
Dept: HEMATOLOGY/ONCOLOGY | Facility: CLINIC | Age: 44
End: 2024-06-07
Payer: COMMERCIAL

## 2024-06-07 VITALS
RESPIRATION RATE: 20 BRPM | HEIGHT: 69 IN | DIASTOLIC BLOOD PRESSURE: 86 MMHG | BODY MASS INDEX: 28.36 KG/M2 | SYSTOLIC BLOOD PRESSURE: 129 MMHG | WEIGHT: 191.5 LBS | TEMPERATURE: 98 F | OXYGEN SATURATION: 100 % | HEART RATE: 86 BPM

## 2024-06-07 DIAGNOSIS — Z79.899 IMMUNODEFICIENCY DUE TO DRUG THERAPY: Primary | ICD-10-CM

## 2024-06-07 DIAGNOSIS — D84.821 IMMUNODEFICIENCY DUE TO DRUG THERAPY: Primary | ICD-10-CM

## 2024-06-07 DIAGNOSIS — Z94.84 HISTORY OF AUTO STEM CELL TRANSPLANT: ICD-10-CM

## 2024-06-07 DIAGNOSIS — C90.01 MULTIPLE MYELOMA IN REMISSION: ICD-10-CM

## 2024-06-07 DIAGNOSIS — T45.1X5A CHEMOTHERAPY-INDUCED NEUTROPENIA: ICD-10-CM

## 2024-06-07 DIAGNOSIS — D70.1 CHEMOTHERAPY-INDUCED NEUTROPENIA: ICD-10-CM

## 2024-06-07 DIAGNOSIS — Z76.82 STEM CELL TRANSPLANT CANDIDATE: ICD-10-CM

## 2024-06-07 DIAGNOSIS — C90.00 MULTIPLE MYELOMA NOT HAVING ACHIEVED REMISSION: ICD-10-CM

## 2024-06-07 DIAGNOSIS — D63.0 ANEMIA IN NEOPLASTIC DISEASE: ICD-10-CM

## 2024-06-07 DIAGNOSIS — K21.9 GASTROESOPHAGEAL REFLUX DISEASE, UNSPECIFIED WHETHER ESOPHAGITIS PRESENT: ICD-10-CM

## 2024-06-07 PROBLEM — E44.0 MODERATE MALNUTRITION: Status: RESOLVED | Noted: 2024-05-23 | Resolved: 2024-06-07

## 2024-06-07 PROBLEM — R74.01 TRANSAMINITIS: Status: RESOLVED | Noted: 2023-10-10 | Resolved: 2024-06-07

## 2024-06-07 LAB
ABO + RH BLD: ABNORMAL
ALBUMIN SERPL BCP-MCNC: 3.6 G/DL (ref 3.5–5.2)
ALP SERPL-CCNC: 51 U/L (ref 55–135)
ALT SERPL W/O P-5'-P-CCNC: 25 U/L (ref 10–44)
ANION GAP SERPL CALC-SCNC: 8 MMOL/L (ref 8–16)
AST SERPL-CCNC: 21 U/L (ref 10–40)
BASOPHILS # BLD AUTO: 0.04 K/UL (ref 0–0.2)
BASOPHILS NFR BLD: 1.2 % (ref 0–1.9)
BILIRUB SERPL-MCNC: 0.5 MG/DL (ref 0.1–1)
BLD GP AB SCN CELLS X3 SERPL QL: ABNORMAL
BLOOD GROUP ANTIBODIES SERPL: NORMAL
BUN SERPL-MCNC: 24 MG/DL (ref 6–20)
CALCIUM SERPL-MCNC: 9.6 MG/DL (ref 8.7–10.5)
CHLORIDE SERPL-SCNC: 101 MMOL/L (ref 95–110)
CO2 SERPL-SCNC: 31 MMOL/L (ref 23–29)
CREAT SERPL-MCNC: 1.4 MG/DL (ref 0.5–1.4)
DAT IGG-SP REAG RBC-IMP: NORMAL
DIFFERENTIAL METHOD BLD: ABNORMAL
EOSINOPHIL # BLD AUTO: 0.1 K/UL (ref 0–0.5)
EOSINOPHIL NFR BLD: 2.3 % (ref 0–8)
ERYTHROCYTE [DISTWIDTH] IN BLOOD BY AUTOMATED COUNT: 15.5 % (ref 11.5–14.5)
EST. GFR  (NO RACE VARIABLE): >60 ML/MIN/1.73 M^2
GLUCOSE SERPL-MCNC: 120 MG/DL (ref 70–110)
HCT VFR BLD AUTO: 26.7 % (ref 40–54)
HGB BLD-MCNC: 9.1 G/DL (ref 14–18)
IMM GRANULOCYTES # BLD AUTO: 0.01 K/UL (ref 0–0.04)
IMM GRANULOCYTES NFR BLD AUTO: 0.3 % (ref 0–0.5)
LYMPHOCYTES # BLD AUTO: 1.4 K/UL (ref 1–4.8)
LYMPHOCYTES NFR BLD: 39 % (ref 18–48)
MAGNESIUM SERPL-MCNC: 1.8 MG/DL (ref 1.6–2.6)
MCH RBC QN AUTO: 33.8 PG (ref 27–31)
MCHC RBC AUTO-ENTMCNC: 34.1 G/DL (ref 32–36)
MCV RBC AUTO: 99 FL (ref 82–98)
MONOCYTES # BLD AUTO: 1 K/UL (ref 0.3–1)
MONOCYTES NFR BLD: 28 % (ref 4–15)
NEUTROPHILS # BLD AUTO: 1 K/UL (ref 1.8–7.7)
NEUTROPHILS NFR BLD: 29.2 % (ref 38–73)
NRBC BLD-RTO: 0 /100 WBC
PHOSPHATE SERPL-MCNC: 4.6 MG/DL (ref 2.7–4.5)
PLATELET # BLD AUTO: 265 K/UL (ref 150–450)
PMV BLD AUTO: 10.1 FL (ref 9.2–12.9)
POTASSIUM SERPL-SCNC: 4 MMOL/L (ref 3.5–5.1)
PROT SERPL-MCNC: 5.8 G/DL (ref 6–8.4)
RBC # BLD AUTO: 2.69 M/UL (ref 4.6–6.2)
SODIUM SERPL-SCNC: 140 MMOL/L (ref 136–145)
SPECIMEN OUTDATE: ABNORMAL
WBC # BLD AUTO: 3.46 K/UL (ref 3.9–12.7)

## 2024-06-07 PROCEDURE — 80053 COMPREHEN METABOLIC PANEL: CPT | Performed by: INTERNAL MEDICINE

## 2024-06-07 PROCEDURE — 1111F DSCHRG MED/CURRENT MED MERGE: CPT | Mod: CPTII,S$GLB,, | Performed by: INTERNAL MEDICINE

## 2024-06-07 PROCEDURE — 85025 COMPLETE CBC W/AUTO DIFF WBC: CPT | Performed by: INTERNAL MEDICINE

## 2024-06-07 PROCEDURE — 3074F SYST BP LT 130 MM HG: CPT | Mod: CPTII,S$GLB,, | Performed by: INTERNAL MEDICINE

## 2024-06-07 PROCEDURE — 99999 PR PBB SHADOW E&M-EST. PATIENT-LVL IV: CPT | Mod: PBBFAC,,, | Performed by: INTERNAL MEDICINE

## 2024-06-07 PROCEDURE — 83735 ASSAY OF MAGNESIUM: CPT | Performed by: INTERNAL MEDICINE

## 2024-06-07 PROCEDURE — 3079F DIAST BP 80-89 MM HG: CPT | Mod: CPTII,S$GLB,, | Performed by: INTERNAL MEDICINE

## 2024-06-07 PROCEDURE — 99214 OFFICE O/P EST MOD 30 MIN: CPT | Mod: S$GLB,,, | Performed by: INTERNAL MEDICINE

## 2024-06-07 PROCEDURE — 86880 COOMBS TEST DIRECT: CPT | Performed by: INTERNAL MEDICINE

## 2024-06-07 PROCEDURE — 3008F BODY MASS INDEX DOCD: CPT | Mod: CPTII,S$GLB,, | Performed by: INTERNAL MEDICINE

## 2024-06-07 PROCEDURE — 1159F MED LIST DOCD IN RCRD: CPT | Mod: CPTII,S$GLB,, | Performed by: INTERNAL MEDICINE

## 2024-06-07 PROCEDURE — 84100 ASSAY OF PHOSPHORUS: CPT | Performed by: INTERNAL MEDICINE

## 2024-06-07 PROCEDURE — 86870 RBC ANTIBODY IDENTIFICATION: CPT | Performed by: INTERNAL MEDICINE

## 2024-06-07 PROCEDURE — 86850 RBC ANTIBODY SCREEN: CPT | Performed by: INTERNAL MEDICINE

## 2024-06-07 PROCEDURE — 36415 COLL VENOUS BLD VENIPUNCTURE: CPT | Performed by: INTERNAL MEDICINE

## 2024-06-07 NOTE — PROGRESS NOTES
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 He has been diagnosed with multiple myeloma. He is here for a virtual stem cell transplant consultation.   Per records,  presented to [facility] on 10/11/23 after 1 month of progressive weakness, abdominal pain, and intermittent confusion. At the time of his presentation, his labs, which had been normal  in 8/2023, were notable for new severe DOUG, transaminitis, and Ca of 18. He was admitted and started on IVF .  Hypercalcemia workup was initiated and he was ultimately found to have numerous osseous lesions on 10/15/23 CT CAP, FLC - Kappa 3.29, Lambda 744, Ratio 226 , SPEP with M spike 0.17, IgG lambda on sIFE. Bone marrow biopsy done 10/18/23 revealed 95% plasma cells and FISH with TP53 deletion, FGFR3/IGH (or NSD2/IGH) fusion, usually representing a t(4;14), and a tetraploid subclone was observed. After correction of his hypercalcemia and improvement in his renal function he was discharged on 10/22/23.   He was started on Beatriz + RVd on 11/2/23.      Interval History: He is here for follow up.  He feels well today. He continues to have fatigue. He received melphalan 200mg/m2 conditioning and CD34 dose of 4.47 x 10^6 on 5/13/24. Tolerated transplant well. Admission complicated by expected GI toxicities including nausea, c-diff neg diarrhea, esophageal mucositis, and anorexia, cytopenias requiring transfusion of blood products, neutropenic fever for which no infection source was found (likely engraftment fever), headache relieved by PRN Oxy IR, insomnia relieved by melatonin, and electrolyte derangements requiring repletion. He engrafted on Day +13 with an ANC of 590.  Day+ 25 today.           Review of patient's allergies indicates:  No  Known Allergies      Current Outpatient Medications   Medication Sig    acyclovir (ZOVIRAX) 800 MG Tab Take 1 tablet (800 mg total) by mouth 2 (two) times daily.    aspirin 81 MG Chew Take 1 tablet (81 mg total) by mouth every evening. Hold until instructed to resume by provider.    calcitRIOL (ROCALTROL) 0.5 MCG Cap Take 1 capsule (0.5 mcg total) by mouth once daily.    dextroamphetamine-amphetamine (ADDERALL) 20 mg tablet Take 1 tablet by mouth once daily.    febuxostat (ULORIC) 40 mg Tab Take 1 tablet (40 mg total) by mouth once daily.    hydrOXYzine HCL (ATARAX) 25 MG tablet Take 1 tablet (25 mg total) by mouth 3 (three) times daily as needed for Anxiety.    melatonin (MELATIN) 3 mg tablet Take 3 tablets (9 mg total) by mouth nightly as needed for Insomnia.    metoprolol tartrate (LOPRESSOR) 25 MG tablet Take 12.5 mg by mouth 2 (two) times daily.    ondansetron (ZOFRAN) 8 MG tablet Take 1 tablet (8 mg total) by mouth every 8 (eight) hours as needed for Nausea.    oxyCODONE (ROXICODONE) 10 mg Tab immediate release tablet Take 1 tablet (10 mg total) by mouth every 4 (four) hours as needed for Pain.    pantoprazole (PROTONIX) 20 MG tablet TAKE 1 TABLET (20 MG TOTAL) BY MOUTH ONCE DAILY (Patient taking differently: Take 20 mg by mouth every evening.)    prochlorperazine (COMPAZINE) 5 MG tablet Take 5 mg by mouth every 6 (six) hours as needed for Nausea.    sodium bicarbonate 650 MG tablet Take 650 mg by mouth 2 (two) times daily.    [START ON 6/12/2024] sulfamethoxazole-trimethoprim 800-160mg (BACTRIM DS) 800-160 mg Tab Take 1 tablet by mouth 3 (three) times a week. To start on 06/12/24.    tamsulosin (FLOMAX) 0.4 mg Cap Take 1 capsule (0.4 mg total) by mouth once daily. (Patient taking differently: Take 0.4 mg by mouth every evening.)     No current facility-administered medications for this visit.         Review of Systems   Constitutional:  Positive for malaise/fatigue. Negative for chills, fever and weight loss.    HENT:  Negative for congestion, ear discharge, ear pain, hearing loss, nosebleeds, sinus pain and tinnitus.    Eyes:  Negative for blurred vision, photophobia, pain, discharge and redness.   Respiratory:  Negative for sputum production.    Cardiovascular:  Negative for chest pain, palpitations, orthopnea and claudication.   Gastrointestinal:  Negative for blood in stool, constipation, diarrhea, heartburn, melena and vomiting.   Genitourinary:  Negative for dysuria, frequency, hematuria and urgency.   Musculoskeletal:  Positive for back pain and myalgias. Negative for joint pain and neck pain.   Neurological:  Negative for tremors, sensory change, speech change, focal weakness, weakness and headaches.   Endo/Heme/Allergies:  Negative for environmental allergies and polydipsia. Does not bruise/bleed easily.   Psychiatric/Behavioral:  Negative for depression, hallucinations and substance abuse. The patient is not nervous/anxious.               PS: ECOG 1 / KPS 80  Response at transplant: VGPR  Risk at transplant: low  HCT CI score: 3 ( 2 points for DLCO < 80%; 1 point for anxiety)        Vitals:    06/07/24 1318   BP: 129/86   Pulse: 86   Resp: 20   Temp: 98.1 °F (36.7 °C)           Physical Exam  HENT:      Head: Normocephalic and atraumatic.      Mouth/Throat:      Pharynx: No posterior oropharyngeal erythema.   Eyes:      General: No scleral icterus.  Cardiovascular:      Rate and Rhythm: Normal rate.   Pulmonary:      Effort: Pulmonary effort is normal.      Breath sounds: Normal breath sounds.   Abdominal:      General: There is no distension.   Musculoskeletal:      Right lower leg: No edema.      Left lower leg: No edema.   Lymphadenopathy:      Cervical: No cervical adenopathy.   Skin:     Coloration: Skin is not jaundiced.   Neurological:      General: No focal deficit present.      Mental Status: He is alert.      Cranial Nerves: No cranial nerve deficit.            10/14/23 CT C/A/P    COMPARISON  None  available at the time of initial interpretation.     FINDINGS  Images were reviewed in soft tissue, lung, and bone windows.     Exam quality: Inherently limited vascular and soft tissue assessment due to utilization of non-contrast protocol.  Quality further limited by patient movement throughout image acquisition with resulting widespread artifact particularly through the abdomen.     Lines/tubes: none visualized     Cardiovascular: Normal heart chamber size. No pericardial effusion.  Normal vessel caliber and contour through the chest, abdomen, and pelvis.     Lungs/Pleura: Central airways are widely patent.  No organized airspace consolidation or focal pulmonary lesion is identified.  However, there is diffuse ill-defined ground-glass attenuation throughout both lungs of overall nonspecific appearance.  Small layering bilateral pleural effusions are also evident.  No evidence of loculated component or pleural thickening identified.  There is no pneumothorax.     Abdominal Solid Organs: Unremarkable appearance for limited non-contrast CT assessment.  No evidence of acute abnormality appreciated.     Gallbladder/Biliary: No acute process, calcified stone, or evidence of biliary obstruction.     Gastrointestinal: No evidence of acute esophageal or abdominal hollow viscus injury. No active inflammatory process.     Pelvic Organs: No focal abnormality of the urinary bladder or adjacent structures.     Peritoneal Cavity/Retroperitoneum: No free fluid or air, and no drainable collections.     Musculoskeletal: No acute or focal subcutaneous or muscular abnormality.  There is no evidence of acute osseous displacement.  However, widespread heterogeneity and innumerable lucent foci are appreciated throughout the spinal column, as well as the bony thorax and pelvis.  A more pronounced lytic lesion that is poorly marginated is noted at the inferior right posterolateral T11 vertebral body measuring 2.3 cm x 1.3 cm with  overlying cortical disruption (series 3, image 96).  An additional focal expansile soft tissue density lesion is present at the right anterolateral 7th ribs with overlying cortical destruction and dimensions of approximately 3.6 cm x 2.3 cm (series 2, image 89).  Multiple nondisplaced, likely subacute/chronic right rib fractures also incidentally identified.     Other findings: Scattered mediastinal lymph nodes are enlarged.  For example, right posterolateral upper paratracheal node measures 1.4 cm short axis (series 2, image 19).  Subcarinal node short axis dimension 1.4 cm (image 53).  No necrotic adenopathy is identified.  The thyroid is unremarkable.     IMPRESSION  1. Widespread osseous findings suggestive of extensive metastatic skeletal disease, to include expansile destructive soft tissue component at the anterior right 7th rib.  2. Enlarged intrathoracic lymph nodes could also be of metastatic etiology.  3. Diffuse ground-glass attenuation through both lungs is nonspecific, could be secondary to atypical infectious or inflammatory process.  However, possibility of metastatic involvement cannot be excluded.      10/18/23 bone marrow biopsy       1. BONE MARROW, RIGHT POSTERIOR ILIAC CREST, BIOPSY AND ASPIRATE:      PLASMA CELL MYELOMA, LAMBDA-RESTRICTED.      LAMBDA-RESTRICTED ATYPICAL PLASMA CELLS COMPRISE 95% OF MARROW NUCLEATED   CELLULARITY AND ARE ARRANGED IN DIFFUSE SHEETS.      REDUCED MARROW STORES.         PERIPHERAL BLOOD:   MILD NORMOCHROMIC NORMOCYTIC ANEMIA.      2. SEE DIAGNOSIS #1.       Comment   A. The patient's clinical history of hypercalcemia, weakness and acute kidney injury with CT showing extensive osseous lesions, SPEP showing monoclonal spike   0.17 g/dL, IgG lambda by OSVALDO is noted.      B. The current bone marrow is 100% cellular and the vast majority (95%) of the cellularity is composed of atypical plasma cells which are Lambda-restricted by   flow cytometry and immunohistochemical  staining.      C. A representative sample of this patient's bone marrow was submitted for flow cytometry, which revealed a population of monotypic plasma cells (43.71%),   restricted to the production of Lambda light chains, with coexpression of CD38 and 138, negative for CD19 and CD45. No monotypic B cell population or increase   in blasts are identified. See below and separate scanned report.      D. A representative sample was also submitted for Myeloma FISH panel, which was abnormal, indicating a plasma cell clone with a TP53 deletion and FGFR3/IGH (or   NSD2/IGH) fusion, usually representing a t(4;14).  In addition, a tetraploid subclone was observed. In plasma cell dyscrasias, a TP53 deletion represents a   high risk cytogenetic abnormality.  See below and separate scanned report.       12/14/23 SPEP: Serum protein electrophoresis shows a faint monoclonal peak (0.02 g/dL) in the gamma zone, consistent with a monoclonal gammopathy.     12/14/23 OSVALDO: A faint band is present in the IgG kaitlin with a corresponding faint band in the kappa kaitlin.     The immunofixation electrophoresis are consistent with monoclonal gammopathy of IgG-kappa isotype.       4/9/24 LEFT ILIAC CREST BONE MARROW ASPIRATE (INADEQUATE), BONE MARROW CLOT (INADEQUATE), AND BONE MARROW CORE BIOPSY WITH:     CELLULARITY=20%, TRILINEAGE HEMATOPOIETIC ACTIVITY .   NO DEFINITIVE MORPHOLOGIC OR IMMUNOPHENOTYPIC EVIDENCE OF RESIDUAL PLASMA CELL NEOPLASM.  SEE COMMENT.   CONGO RED NEGATIVE.   GRADE 1-2 RETICULAR FIBROSIS.   INCREASED STORAGE IRON.   ADEQUATE NUMBER OF MEGAKARYOCYTES.     Immunohistochemical studies were performed on the  core biopsy for greater sensitivity and further architecture evaluation with adequate positive and negative controls.  Scattered mixed predominantly T cells (CD3 positive) and B cells (CD20 positive) are    evident.  About 1% plasma cells ( positive) are noted.  In situ hybridization for kappa and lambda light chain  is noncontributory.   highlights scattered mast cells.     There is no definitive morphologic or immunophenotypic evidence of residual plasma cell neoplasm.  Correlate clinically and with a cytogenetics report.       Interp, Plasma Cell Prolif (PCPD), FISH: Plasma Cell Prolif, High Risk, FISH was cancelled on 04/12/2024 at 11:10; Quantity was not sufficient for testing. The FISH test was cancelled due to an insufficient number of plasma cells within the provided   specimen.         4/9/24 PET CT      COMPARISON:  [facility] PET-CT 02/07/2024     FINDINGS:  Quality of the study: Adequate.     In the head and neck, there are no hypermetabolic lesions worrisome for malignancy. There are no hypermetabolic mucosal lesions, and there are no pathologically enlarged or hypermetabolic lymph nodes.     In the chest, there are no hypermetabolic lesions worrisome for malignancy.  There are no concerning pulmonary nodules or masses, and there are no pathologically enlarged or hypermetabolic lymph nodes.     In the abdomen and pelvis, there is physiologic tracer distribution within the abdominal organs and excretion into the genitourinary system.     In the bones, there are no hypermetabolic lesions worrisome for malignancy.  Index sclerotic lesion in the anterior right 7th rib demonstrates background level uptake.  Index lytic lesion in the anterior left ilium demonstrates background level uptake.  There is mild uptake associated with a right posterolateral 4th rib fracture, slightly improved compared to prior.  Numerous additional non tracer avid lytic foci and remote bilateral rib fractures.     In the extremities, there are no hypermetabolic lesions worrisome for malignancy.     Additional CT findings: Stable subcentimeter right lower lobe nodules, below size threshold for PET.     Impression:     No focal tracer avid lesions to suggest active multiple myeloma.           4/16/24 SPEP: Decreased total protein. A linear  irregularity in near gamma approximately = 0.05 G/dL.   4/16/24 sIFE: Faint IgG kappa specific monoclonal protein band identified in gamma.   4/16/24 UIFE: No monoclonal peaks identified.     4/16/24 PFT:Spirometry is normal. DLCO is borderline decreased. Adj DLCO 70.4%      4/16/24 EKG: Normal sinus rhythm .Normal ECG       4/16/24 ECHO      Left Ventricle: The left ventricle is normal in size. Moderately increased ventricular mass. Normal wall thickness. There is eccentric hypertrophy. Normal wall motion. There is normal systolic function with a visually estimated ejection fraction of 55 - 60%. Biplane (2D) method of discs ejection fraction is 55%. Global longitudinal strain is -17.0%.    Right Ventricle: Normal right ventricular cavity size. Wall thickness is normal. Right ventricle wall motion  is normal. Systolic function is normal.    Left Atrium: Left atrium is severely dilated.    Right Atrium: Right atrium is severely dilated.    Aortic Valve: The aortic valve is a trileaflet valve.    Tricuspid Valve: There is mild regurgitation.    Pulmonary Artery: There is borderline elevated pulmonary hypertension. The estimated pulmonary artery systolic pressure is 40 mmHg.    IVC/SVC: Normal venous pressure at 3 mmHg.           Component      Latest Ref Rng 6/7/2024   WBC      3.90 - 12.70 K/uL 3.46 (L)    RBC      4.60 - 6.20 M/uL 2.69 (L)    Hemoglobin      14.0 - 18.0 g/dL 9.1 (L)    Hematocrit      40.0 - 54.0 % 26.7 (L)    MCV      82 - 98 fL 99 (H)    MCH      27.0 - 31.0 pg 33.8 (H)    MCHC      32.0 - 36.0 g/dL 34.1    RDW      11.5 - 14.5 % 15.5 (H)    Platelet Count      150 - 450 K/uL 265    MPV      9.2 - 12.9 fL 10.1    Immature Granulocytes      0.0 - 0.5 % 0.3    Gran # (ANC)      1.8 - 7.7 K/uL 1.0 (L)    Immature Grans (Abs)      0.00 - 0.04 K/uL 0.01    Lymph #      1.0 - 4.8 K/uL 1.4    Mono #      0.3 - 1.0 K/uL 1.0    Eos #      0.0 - 0.5 K/uL 0.1    Baso #      0.00 - 0.20 K/uL 0.04    nRBC       0 /100 WBC 0    Gran %      38.0 - 73.0 % 29.2 (L)    Lymph %      18.0 - 48.0 % 39.0    Mono %      4.0 - 15.0 % 28.0 (H)    Eos %      0.0 - 8.0 % 2.3    Basophil %      0.0 - 1.9 % 1.2    Differential Method Automated    Sodium      136 - 145 mmol/L 140    Potassium      3.5 - 5.1 mmol/L 4.0    Chloride      95 - 110 mmol/L 101    CO2      23 - 29 mmol/L 31 (H)    Glucose      70 - 110 mg/dL 120 (H)    BUN      6 - 20 mg/dL 24 (H)    Creatinine      0.5 - 1.4 mg/dL 1.4    Calcium      8.7 - 10.5 mg/dL 9.6    PROTEIN TOTAL      6.0 - 8.4 g/dL 5.8 (L)    Albumin      3.5 - 5.2 g/dL 3.6    BILIRUBIN TOTAL      0.1 - 1.0 mg/dL 0.5    ALP      55 - 135 U/L 51 (L)    AST      10 - 40 U/L 21    ALT      10 - 44 U/L 25    eGFR      >60 mL/min/1.73 m^2 >60.0    Anion Gap      8 - 16 mmol/L 8    Magnesium       1.6 - 2.6 mg/dL 1.8    Phosphorus Level      2.7 - 4.5 mg/dL 4.6 (H)       Legend:  (L) Low  (H) High      Assessment:      1. R-ISS stage III IgG lambda multiple myeloma not in remission  2. Stem cell transplant candidate  3. DOUG /CKD  4. Fatigue  5. Cancer associated pain  6. Anemia in neoplastic disease  7. Leukopenia  8. neutropenia      Plan:    1. He has high risk features on bone marrow aspirate FISH,  including TP53 deletion and FGFR3/IGH (or NSD2/IGH) fusion, usually representing a t(4;14). He had presented with DOUG and hypercalcemia.    He has started mira VRd induction. In view of high risk features on FISH and good performance status I had recommended switching bortezomib to carfilzomib.   I discussed the role, timing, risks and benefits of autologous stem cell transplant in multiple myeloma. I explained that although the autologous stem cell transplant is NOT curative, it has progression free survival and overall survival benefits, when the transplant is followed by maintenance therapy. I discussed that he will andi to be in  [place] for 4 weeks for the transplant , with approximately 2 weeks in the  hospital, and 2 weeks post discharge locally and will need an adult care-giver for all four weeks.   He has good performance status with limited co-morbid conditions and will possibly be transplanted in March-April 2024 if he responds well to induction.  He has not had a baseline PET CT. CT C/A/P done on 10/14/23 demonstrated scattered mediastinal lymph nodes. Unclear if this is extramedullary involvement  by his myeloma.  He is on zometa.   He is in cycle 3 mira VRd . His serum free light chain ratio is normal as of 1/11/24. 12/14/23 SPEP/ OSVALDO with a  faint monoclonal peak (0.02 g/dL) in the gamma zone, consistent with a monoclonal gammopathy. A faint band was present in the IgG kaitlin with a corresponding faint band in the kappa kaitlin, possibly due to daratumumab interference ( original paraprotein was IgG lambda).         2.   Autologous stem cell transplant in multiple myeloma NOT curative.   He received melphalan 200mg/m2 conditioning and CD34 dose of 4.47 x 10^6 on 5/13/24. Tolerated transplant well. Admission complicated by expected GI toxicities including nausea, c-diff neg diarrhea, esophageal mucositis, and anorexia, cytopenias requiring transfusion of blood products, neutropenic fever for which no infection source was found (likely engraftment fever), headache relieved by PRN Oxy IR, insomnia relieved by melatonin, and electrolyte derangements requiring repletion. He engrafted on Day +13 with an ANC of 590.  Day +25 today.           3. Secondary to multiple myeloma, Cr improving. Serum Cr 1.3mg/dl today.       4. Secondary to multiple myeloma    5. He has widespread bone involvement. He is on zometa, oxycodone.    6. Most recent Hgb 9.1 g/dl, secondary to chemotherapy    7,8 ANC 1000 today. Likely secondary to chemotherapy. Asymptomatic        BMT Chart Routing      Follow up with physician . F/u 8/27   Follow up with ROMY    Provider visit type    Infusion scheduling note    Injection scheduling note    Labs    Scheduling:  Preferred lab:  Lab interval:  Labs -cbc, cmp, sppe, light chains, immunofixation, igg, igm, iga on 8/20 . No labs on 8/27   Imaging    Pharmacy appointment    Other referrals       Additional referrals needed  for infectious disease clinic on 8/27

## 2024-06-12 NOTE — PROGRESS NOTES
Subjective:       Patient ID: Pete Fernandez is a 44 y.o. male.    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 Beatriz+ KrD for cycle 2 on 11/30/23; Change at request of [facility]  Stopped after 1 dose due to severe pleural and pericardial effusion development   Switched back to BEATRIZ + RVD with increase of Rev to 25mg  2. Beatriz+ RVD - 11/2/23 x 1 cycle, plan to restart for C2 on 12/14 potentially  C1 - Rev 10mg dose reduction due to CrCl  C2 - Rev increased to 25mg due to renal function improvement  C3 - D22 held 1 week due to covid  C4-  D22 Rev decreased to 10mg due to decline in renal function  C5 - Hold Beatriz this cycle in preparation for upcoming transplant. Rev remains at 10mg due to renal function  Zometa 10/17/23 - 1/11/2024 (3 treatments total)  Currently on hold due to ONJ  3. Stem Cell Transplant 5/6/24    HPI  44yo M presented to [facility] on 10/11 after 1 month of progressive weakness, abdominal pain, and intermittent confusion. At the time of his presentation, his labs which had been normal with PCP in 8/2023 were notable for new severe DOUG, transaminitis, and Ca of 18. He was admitted and started on IVF resuscitation.  Hypercalcemia workup was initiated and he was ultimately found to have numerous osseous lesions on 10/15/23 CT CAP, FLC - Kappa 3.29, Lambda 744, Ratio 226 , SPEP with Mspike 0.17 IgG lambda. Bone marrow biopsy done 10/18/23 revealed 95% plasma cells and FISH with TP53 deletion, FGFR3/IGH (or NSD2/IGH) fusion, usually representing a t(4;14), and a tetraploid subclone was observed. After correction of his hypercalcemia and improvement in his renal function he was discharged on 10/22/23.   He started C1 Beatriz + RVD + Zometa on 11/2/23. He was referred to [facility] for consideration of bone marrow transplant, they recommended change to Beatriz + Krd, however patient  developed life threatening pleural and pericardial edema several days after 1st infusion requiring hospitalization and placement of a pericardial window. Therefore he transitioned back to Beatriz - D for C2.     Interval History:   He returns to the clinic today for a follow-up post SCT D31+. He feels well today. He reports some stomach upset from time to time. Weight stable. Labs reviewed in detail with him. He has PET/CT and BMBx scheduled for 8/20. Follow up with Dr. Cook on 8/27 for PET and BMBx results. Denies any fevers, chills, NS, headaches, bleeding, bruising, neuropathy.       Past Medical History:   Diagnosis Date    Anxiety disorder, unspecified     Hypercalcemia       Past Surgical History:   Procedure Laterality Date    BONE MARROW BIOPSY N/A 10/18/2023    Procedure: Biopsy-bone marrow;  Surgeon: Nhan Marte MD;  Location: [facility];  Service: General;  Laterality: N/A;    INSERTION OF TUNNELED CENTRAL VENOUS HEMODIALYSIS CATHETER Left 5/6/2024    Procedure: INSERTION, CATHETER, HEMODIALYSIS, DUAL LUMEN, left poss right, Bard 14.5 fr hemosplit catheter, model [id number];  Surgeon: Alvin White MD;  Location: [facility] OR [address] [place] [address];  Service: General;  Laterality: Left;    TONSILLECTOMY       Social History     Socioeconomic History    Marital status:      Spouse name: Rosalinda    Number of children: 2   Tobacco Use    Smoking status: Never    Smokeless tobacco: Never   Substance and Sexual Activity    Alcohol use: Yes    Drug use: Never    Sexual activity: Yes     Partners: Female     Social Determinants of Health     Financial Resource Strain: Medium Risk (11/15/2023)    Overall Financial Resource Strain (CARDIA)     Difficulty of Paying Living Expenses: Somewhat hard   Food Insecurity: Food Insecurity Present (11/15/2023)    Hunger Vital Sign     Worried About Running Out of Food in the Last Year: Sometimes true     Ran Out of Food in the Last Year: Never true   Transportation Needs: No  Transportation Needs (11/15/2023)    PRAPARE - Transportation     Lack of Transportation (Medical): No     Lack of Transportation (Non-Medical): No   Physical Activity: Insufficiently Active (11/15/2023)    Exercise Vital Sign     Days of Exercise per Week: 3 days     Minutes of Exercise per Session: 30 min   Stress: Stress Concern Present (11/15/2023)    [employer] - Occupational Stress Questionnaire     Feeling of Stress : To some extent   Housing Stability: Low Risk  (11/15/2023)    Housing Stability Vital Sign     Unable to Pay for Housing in the Last Year: No     Number of Places Lived in the Last Year: 1     Unstable Housing in the Last Year: No      Family History   Problem Relation Name Age of Onset    Breast cancer Mother      Pancreatic cancer Father        Review of patient's allergies indicates:  No Known Allergies   Review of Systems   Constitutional:  Negative for activity change, appetite change, chills, fatigue, fever and unexpected weight change.   HENT:  Negative for mouth sores and sore throat.    Eyes:  Negative for visual disturbance.   Respiratory:  Negative for cough and shortness of breath.    Cardiovascular:  Negative for chest pain.   Gastrointestinal:  Negative for abdominal pain, constipation, diarrhea, nausea and vomiting.   Endocrine: Negative for polyuria.   Genitourinary:  Negative for dysuria and frequency.   Musculoskeletal:  Negative for back pain.   Integumentary:  Negative for rash.   Allergic/Immunologic: Negative for frequent infections.   Neurological:  Negative for weakness and headaches.   Hematological:  Negative for adenopathy. Does not bruise/bleed easily.   Psychiatric/Behavioral:  The patient is not nervous/anxious.          Objective:      Vitals:    06/13/24 1036   BP: 120/84   Pulse: 82   Resp: 20   Temp: 98 °F (36.7 °C)     Physical Exam  Constitutional:       General: He is not in acute distress.     Appearance: Normal appearance. He  is not ill-appearing.   HENT:      Head: Normocephalic and atraumatic.      Nose: Nose normal.      Mouth/Throat:      Mouth: Mucous membranes are moist.      Pharynx: Oropharynx is clear.   Eyes:      Extraocular Movements: Extraocular movements intact.      Conjunctiva/sclera: Conjunctivae normal.      Pupils: Pupils are equal, round, and reactive to light.   Cardiovascular:      Rate and Rhythm: Normal rate and regular rhythm.      Pulses: Normal pulses.      Heart sounds: Normal heart sounds. No murmur heard.  Pulmonary:      Effort: Pulmonary effort is normal. No respiratory distress.      Breath sounds: Normal breath sounds.   Abdominal:      General: There is no distension.      Palpations: Abdomen is soft.      Tenderness: There is no abdominal tenderness.   Musculoskeletal:         General: Normal range of motion.      Cervical back: Normal range of motion and neck supple.   Lymphadenopathy:      Cervical: No cervical adenopathy.   Skin:     General: Skin is warm and dry.      Findings: No rash.   Neurological:      General: No focal deficit present.      Mental Status: He is alert and oriented to person, place, and time.         LABS AND IMAGING REVIEWED IN EPIC  10/11/23 Xray Metastatic Survey  1. Scattered small defects at the calvarium measuring up to 5 mm too numerous to count.  2. Degene mild rative changes at the spine and extremities  3. MR exam and bone scan would allow further evaluation.  10/12/23 MRI Brain  1. Motion artifact  2. Mild scattered mucosal thickening throughout the paranasal sinuses  3. Somewhat limited evaluation of brain metastases without the administration of IV contrast  4. Otherwise unremarkable MR exam of the head without the administration of IV contrast  10/12/23 NM Bone Scan  1. Abnormal intense increased activity throughout the lungs of uncertain etiology.  This may related to hyperparathyroidism, hematologic malignancy, and or metabolic abnormality such as renal failure,  vasculitis, or pulmonary infection.  Chest x-ray 10/10/2023 reveals the lungs to be relatively clear and well aerated.  2. Suspect scattered mild degenerative changes throughout the joint spaces  3. Increased activity nasal maxillary region suggesting mucoperiosteal disease  4. Mild activity at the stomach suggesting free pertechnetate  10/14/23 CT CAP  1. Widespread osseous findings suggestive of extensive metastatic skeletal disease, to include expansile destructive soft tissue component at the anterior right 7th rib.  2. Enlarged intrathoracic lymph nodes could also be of metastatic etiology.  3. Diffuse ground-glass attenuation through both lungs is nonspecific, could be secondary to atypical infectious or inflammatory process.  However, possibility of metastatic involvement cannot be excluded.  12/7/23 CXR:   1. Hazy opacification lower lungs bilaterally suspicious for infiltrate and pleural reaction which has progressed since the prior exam  2. Cardiomegaly  3. Thoracic spondylosis  12/11/23 CXR:  1. Mild cardiomegaly  2. Persistent bibasilar infiltrate and or pleural reaction  3. Thoracic spondylosis with anterior wedging again evident at a lower thoracic vertebral body  12/13/23 ECHO: EF 50-55%  2/7/24 PET/CT:  1. Interval improved appearance of lytic osseous lesions compared to previous exam with sub threshold uptake. The bones are demineralized with numerous tiny lytic foci at the axial and appendicular skeleton.  2. Subacute appearing bilateral rib and right clavicle fractures with mild FDG uptake.  3. Nonspecific patchy ground-glass and interstitial opacities with mild FDG uptake.       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 Displayed all CRAB criteria at time of  diagnosis in October. Underwent 1st line Beatriz + RVD + Zometa. Transplant done 5/13/24      Plan:   Labs adequate  Continue to hold zometa due to ONJ.   Follow-up with transplant as scheduled.  Will reach out to [place] to see if patient can do labs in [place] on 6/20/24  RTC in 1 month with NP with labs   Cbc, cmp, MM labs- 1 hr prior @ [facility]    I spent a total of 35 minutes on the day of the visit.This includes face to face time and non-face to face time preparing to see the patient (eg, review of tests), obtaining and/or reviewing separately obtained history, documenting clinical information in the electronic or other health record, independently interpreting results and communicating results to the patient/family/caregiver, or care coordinator.     **All blood products must be irradiated and CMV negative**    Elizabeth Lejeune MD  Hematology /Oncology  [facility]        Professional Services:  Claudia PATTERSON LPN, acted solely as a scribe for and in the presence of Dr. Elizabeth Lejeune, who performed these services.

## 2024-06-13 ENCOUNTER — TELEPHONE (OUTPATIENT)
Dept: HEMATOLOGY/ONCOLOGY | Facility: CLINIC | Age: 44
End: 2024-06-13
Payer: COMMERCIAL

## 2024-06-13 ENCOUNTER — OFFICE VISIT (OUTPATIENT)
Dept: HEMATOLOGY/ONCOLOGY | Facility: CLINIC | Age: 44
End: 2024-06-13
Payer: COMMERCIAL

## 2024-06-13 ENCOUNTER — LAB VISIT (OUTPATIENT)
Dept: LAB | Facility: HOSPITAL | Age: 44
End: 2024-06-13
Payer: COMMERCIAL

## 2024-06-13 VITALS
OXYGEN SATURATION: 100 % | SYSTOLIC BLOOD PRESSURE: 120 MMHG | DIASTOLIC BLOOD PRESSURE: 84 MMHG | BODY MASS INDEX: 28.47 KG/M2 | HEART RATE: 82 BPM | RESPIRATION RATE: 20 BRPM | TEMPERATURE: 98 F | HEIGHT: 69 IN | WEIGHT: 192.19 LBS

## 2024-06-13 DIAGNOSIS — C90.00 METASTATIC MULTIPLE MYELOMA TO BONE: ICD-10-CM

## 2024-06-13 DIAGNOSIS — C90.00 MULTIPLE MYELOMA, REMISSION STATUS UNSPECIFIED: ICD-10-CM

## 2024-06-13 DIAGNOSIS — D84.821 IMMUNODEFICIENCY DUE TO DRUG THERAPY: Primary | ICD-10-CM

## 2024-06-13 DIAGNOSIS — C90.01 MULTIPLE MYELOMA IN REMISSION: Primary | ICD-10-CM

## 2024-06-13 DIAGNOSIS — D70.1 CHEMOTHERAPY-INDUCED NEUTROPENIA: ICD-10-CM

## 2024-06-13 DIAGNOSIS — T45.1X5A CHEMOTHERAPY-INDUCED NEUTROPENIA: ICD-10-CM

## 2024-06-13 DIAGNOSIS — Z76.82 STEM CELL TRANSPLANT CANDIDATE: ICD-10-CM

## 2024-06-13 DIAGNOSIS — R11.0 NAUSEA: ICD-10-CM

## 2024-06-13 DIAGNOSIS — C90.01 MULTIPLE MYELOMA IN REMISSION: ICD-10-CM

## 2024-06-13 DIAGNOSIS — Z79.899 IMMUNODEFICIENCY DUE TO DRUG THERAPY: Primary | ICD-10-CM

## 2024-06-13 DIAGNOSIS — Z94.84 HISTORY OF AUTO STEM CELL TRANSPLANT: ICD-10-CM

## 2024-06-13 LAB
ALBUMIN SERPL-MCNC: 3.7 G/DL (ref 3.5–5)
ALBUMIN/GLOB SERPL: 1.8 RATIO (ref 1.1–2)
ALP SERPL-CCNC: 43 UNIT/L (ref 40–150)
ALT SERPL-CCNC: 22 UNIT/L (ref 0–55)
ANION GAP SERPL CALC-SCNC: 7 MEQ/L
AST SERPL-CCNC: 22 UNIT/L (ref 5–34)
BASOPHILS # BLD AUTO: 0.03 X10(3)/MCL
BASOPHILS NFR BLD AUTO: 0.9 %
BILIRUB SERPL-MCNC: 0.6 MG/DL
BUN SERPL-MCNC: 14 MG/DL (ref 8.9–20.6)
CALCIUM SERPL-MCNC: 9.4 MG/DL (ref 8.4–10.2)
CHLORIDE SERPL-SCNC: 105 MMOL/L (ref 98–107)
CO2 SERPL-SCNC: 29 MMOL/L (ref 22–29)
CREAT SERPL-MCNC: 1.62 MG/DL (ref 0.73–1.18)
CREAT/UREA NIT SERPL: 9
EOSINOPHIL # BLD AUTO: 0.09 X10(3)/MCL (ref 0–0.9)
EOSINOPHIL NFR BLD AUTO: 2.8 %
ERYTHROCYTE [DISTWIDTH] IN BLOOD BY AUTOMATED COUNT: 15.9 % (ref 11.5–17)
GFR SERPLBLD CREATININE-BSD FMLA CKD-EPI: 53 ML/MIN/1.73/M2
GLOBULIN SER-MCNC: 2.1 GM/DL (ref 2.4–3.5)
GLUCOSE SERPL-MCNC: 99 MG/DL (ref 74–100)
HCT VFR BLD AUTO: 26.7 % (ref 42–52)
HGB BLD-MCNC: 8.9 G/DL (ref 14–18)
IGA SERPL-MCNC: 8 MG/DL (ref 63–484)
IGG SERPL-MCNC: 205 MG/DL (ref 540–1822)
IGM SERPL-MCNC: 9 MG/DL (ref 22–240)
IMM GRANULOCYTES # BLD AUTO: 0 X10(3)/MCL (ref 0–0.04)
IMM GRANULOCYTES NFR BLD AUTO: 0 %
LYMPHOCYTES # BLD AUTO: 0.94 X10(3)/MCL (ref 0.6–4.6)
LYMPHOCYTES NFR BLD AUTO: 29.7 %
MAGNESIUM SERPL-MCNC: 2 MG/DL (ref 1.6–2.6)
MCH RBC QN AUTO: 33.6 PG (ref 27–31)
MCHC RBC AUTO-ENTMCNC: 33.3 G/DL (ref 33–36)
MCV RBC AUTO: 100.8 FL (ref 80–94)
MONOCYTES # BLD AUTO: 0.7 X10(3)/MCL (ref 0.1–1.3)
MONOCYTES NFR BLD AUTO: 22.2 %
NEUTROPHILS # BLD AUTO: 1.4 X10(3)/MCL (ref 2.1–9.2)
NEUTROPHILS NFR BLD AUTO: 44.4 %
PHOSPHATE SERPL-MCNC: 4.3 MG/DL (ref 2.3–4.7)
PLATELET # BLD AUTO: 182 X10(3)/MCL (ref 130–400)
PMV BLD AUTO: 10.2 FL (ref 7.4–10.4)
POTASSIUM SERPL-SCNC: 3.8 MMOL/L (ref 3.5–5.1)
PROT SERPL-MCNC: 5.8 GM/DL (ref 6.4–8.3)
RBC # BLD AUTO: 2.65 X10(6)/MCL (ref 4.7–6.1)
SODIUM SERPL-SCNC: 141 MMOL/L (ref 136–145)
WBC # SPEC AUTO: 3.16 X10(3)/MCL (ref 4.5–11.5)

## 2024-06-13 PROCEDURE — 83735 ASSAY OF MAGNESIUM: CPT

## 2024-06-13 PROCEDURE — 3079F DIAST BP 80-89 MM HG: CPT | Mod: CPTII,S$GLB,, | Performed by: STUDENT IN AN ORGANIZED HEALTH CARE EDUCATION/TRAINING PROGRAM

## 2024-06-13 PROCEDURE — 3074F SYST BP LT 130 MM HG: CPT | Mod: CPTII,S$GLB,, | Performed by: STUDENT IN AN ORGANIZED HEALTH CARE EDUCATION/TRAINING PROGRAM

## 2024-06-13 PROCEDURE — 99214 OFFICE O/P EST MOD 30 MIN: CPT | Mod: S$GLB,,, | Performed by: STUDENT IN AN ORGANIZED HEALTH CARE EDUCATION/TRAINING PROGRAM

## 2024-06-13 PROCEDURE — 83521 IG LIGHT CHAINS FREE EACH: CPT

## 2024-06-13 PROCEDURE — 3008F BODY MASS INDEX DOCD: CPT | Mod: CPTII,S$GLB,, | Performed by: STUDENT IN AN ORGANIZED HEALTH CARE EDUCATION/TRAINING PROGRAM

## 2024-06-13 PROCEDURE — 36415 COLL VENOUS BLD VENIPUNCTURE: CPT

## 2024-06-13 PROCEDURE — 1160F RVW MEDS BY RX/DR IN RCRD: CPT | Mod: CPTII,S$GLB,, | Performed by: STUDENT IN AN ORGANIZED HEALTH CARE EDUCATION/TRAINING PROGRAM

## 2024-06-13 PROCEDURE — 80053 COMPREHEN METABOLIC PANEL: CPT

## 2024-06-13 PROCEDURE — 85025 COMPLETE CBC W/AUTO DIFF WBC: CPT

## 2024-06-13 PROCEDURE — 99999 PR PBB SHADOW E&M-EST. PATIENT-LVL IV: CPT | Mod: PBBFAC,,, | Performed by: STUDENT IN AN ORGANIZED HEALTH CARE EDUCATION/TRAINING PROGRAM

## 2024-06-13 PROCEDURE — 82784 ASSAY IGA/IGD/IGG/IGM EACH: CPT

## 2024-06-13 PROCEDURE — 84100 ASSAY OF PHOSPHORUS: CPT

## 2024-06-13 PROCEDURE — 84165 PROTEIN E-PHORESIS SERUM: CPT

## 2024-06-13 PROCEDURE — 1159F MED LIST DOCD IN RCRD: CPT | Mod: CPTII,S$GLB,, | Performed by: STUDENT IN AN ORGANIZED HEALTH CARE EDUCATION/TRAINING PROGRAM

## 2024-06-13 RX ORDER — PROCHLORPERAZINE MALEATE 5 MG
5 TABLET ORAL EVERY 6 HOURS PRN
Qty: 30 TABLET | Refills: 2 | Status: SHIPPED | OUTPATIENT
Start: 2024-06-13

## 2024-06-13 RX ORDER — ONDANSETRON HYDROCHLORIDE 8 MG/1
8 TABLET, FILM COATED ORAL EVERY 8 HOURS PRN
Qty: 30 TABLET | Refills: 2 | Status: SHIPPED | OUTPATIENT
Start: 2024-06-13 | End: 2025-06-13

## 2024-06-13 NOTE — TELEPHONE ENCOUNTER
Spoke to [email or web address] regarding pt labs. She stated that pt was seen today and had labs drawn as well. She was advised that labs okay to cancel for 6/20/24. Claudia asked if pt could continue getting labs drawn at [facility]. I advised that if patient needs type and screen it may pose difficulties. Claudia stated that they see 's pts and if they need blood that they follow the orders.  I advised that I would ask regarding those situations. Claudia verbalized agreement and understanding.

## 2024-06-13 NOTE — TELEPHONE ENCOUNTER
----- Message from Margaret Jordan sent at 6/13/2024 11:02 AM CDT -----  Regarding: Consult/Advisory  [place]: :[email or web address]     Consult/Advisory     Name Of Caller:[email or web address]        Contact Preference:[phone]     Nature of call:Claudia is calling to find out if labs are because of patient doing labs at his appt today with them. Requesting a call back to discuss

## 2024-06-14 LAB
ALBUMIN % SPEP (OHS): 57.18 (ref 48.1–59.5)
ALBUMIN SERPL-MCNC: 3 G/DL (ref 3.5–5)
ALBUMIN/GLOB SERPL: 1.4 RATIO (ref 1.1–2)
ALPHA 1 GLOB (OHS): 0.31 GM/DL (ref 0–0.4)
ALPHA 1 GLOB% (OHS): 6.02 (ref 2.3–4.9)
ALPHA 2 GLOB % (OHS): 15.56 (ref 6.9–13)
ALPHA 2 GLOB (OHS): 0.81 GM/DL (ref 0.4–1)
BETA GLOB (OHS): 0.85 GM/DL (ref 0.7–1.3)
BETA GLOB% (OHS): 16.25 (ref 13.8–19.7)
GAMMA GLOBULIN % (OHS): 4.99 (ref 10.1–21.9)
GAMMA GLOBULIN (OHS): 0.26 GM/DL (ref 0.4–1.8)
GLOBULIN SER-MCNC: 2.2 GM/DL (ref 2.4–3.5)
KAPPA LC FREE SER NEPH-MCNC: 0.14 MG/DL (ref 0.33–1.94)
KAPPA LC FREE/LAMBDA FREE SER NEPH: 0.93 {RATIO} (ref 0.26–1.65)
LAMBDA LC FREE SER NEPH-MCNC: 0.15 MG/DL (ref 0.57–2.63)
M SPIKE % (OHS): ABNORMAL
M SPIKE (OHS): ABNORMAL
PATH REV: NORMAL
PROT SERPL-MCNC: 5.2 GM/DL (ref 6.4–8.3)

## 2024-07-05 ENCOUNTER — LAB VISIT (OUTPATIENT)
Dept: LAB | Facility: HOSPITAL | Age: 44
End: 2024-07-05
Attending: INTERNAL MEDICINE
Payer: COMMERCIAL

## 2024-07-05 DIAGNOSIS — E85.9 MYELOMA ASSOCIATED AMYLOIDOSIS: Primary | ICD-10-CM

## 2024-07-05 DIAGNOSIS — I48.0 PAROXYSMAL ATRIAL FIBRILLATION: ICD-10-CM

## 2024-07-05 DIAGNOSIS — E55.9 AVITAMINOSIS D: ICD-10-CM

## 2024-07-05 DIAGNOSIS — C90.00 MULTIPLE MYELOMA NOT HAVING ACHIEVED REMISSION: ICD-10-CM

## 2024-07-05 DIAGNOSIS — R80.9 PROTEINURIA, UNSPECIFIED TYPE: ICD-10-CM

## 2024-07-05 DIAGNOSIS — E83.52 HYPERCALCEMIA: ICD-10-CM

## 2024-07-05 DIAGNOSIS — C90.00 MYELOMA ASSOCIATED AMYLOIDOSIS: Primary | ICD-10-CM

## 2024-07-05 DIAGNOSIS — N18.9 CHRONIC KIDNEY DISEASE, UNSPECIFIED: ICD-10-CM

## 2024-07-05 DIAGNOSIS — B39.4: ICD-10-CM

## 2024-07-05 DIAGNOSIS — I32: ICD-10-CM

## 2024-07-05 LAB
FOLATE SERPL-MCNC: 4.9 NG/ML (ref 7–31.4)
T4 FREE SERPL-MCNC: 1.05 NG/DL (ref 0.7–1.48)
TSH SERPL-ACNC: 0.07 UIU/ML (ref 0.35–4.94)
VIT B12 SERPL-MCNC: 595 PG/ML (ref 213–816)

## 2024-07-05 PROCEDURE — 82747 ASSAY OF FOLIC ACID RBC: CPT

## 2024-07-05 PROCEDURE — 82607 VITAMIN B-12: CPT

## 2024-07-05 PROCEDURE — 36415 COLL VENOUS BLD VENIPUNCTURE: CPT

## 2024-07-05 PROCEDURE — 82746 ASSAY OF FOLIC ACID SERUM: CPT

## 2024-07-05 PROCEDURE — 84479 ASSAY OF THYROID (T3 OR T4): CPT

## 2024-07-05 PROCEDURE — 84439 ASSAY OF FREE THYROXINE: CPT

## 2024-07-05 PROCEDURE — 84443 ASSAY THYROID STIM HORMONE: CPT

## 2024-07-05 PROCEDURE — 36415 COLL VENOUS BLD VENIPUNCTURE: CPT | Mod: 91

## 2024-07-06 LAB — T3RU NFR SERPL: 34.81 % (ref 31–39)

## 2024-07-10 LAB — BEAKER SEE SCANNED REPORT: NORMAL

## 2024-07-12 ENCOUNTER — OFFICE VISIT (OUTPATIENT)
Dept: HEMATOLOGY/ONCOLOGY | Facility: CLINIC | Age: 44
End: 2024-07-12
Payer: COMMERCIAL

## 2024-07-12 ENCOUNTER — LAB VISIT (OUTPATIENT)
Dept: LAB | Facility: HOSPITAL | Age: 44
End: 2024-07-12
Payer: COMMERCIAL

## 2024-07-12 VITALS
RESPIRATION RATE: 20 BRPM | OXYGEN SATURATION: 100 % | TEMPERATURE: 98 F | HEART RATE: 69 BPM | SYSTOLIC BLOOD PRESSURE: 126 MMHG | HEIGHT: 69 IN | DIASTOLIC BLOOD PRESSURE: 79 MMHG | WEIGHT: 185.38 LBS | BODY MASS INDEX: 27.46 KG/M2

## 2024-07-12 DIAGNOSIS — C90.01 MULTIPLE MYELOMA IN REMISSION: ICD-10-CM

## 2024-07-12 DIAGNOSIS — Z94.84 HISTORY OF AUTO STEM CELL TRANSPLANT: ICD-10-CM

## 2024-07-12 DIAGNOSIS — C90.00 MULTIPLE MYELOMA, REMISSION STATUS UNSPECIFIED: Primary | ICD-10-CM

## 2024-07-12 LAB
ALBUMIN SERPL-MCNC: 4 G/DL (ref 3.5–5)
ALBUMIN/GLOB SERPL: 2 RATIO (ref 1.1–2)
ALP SERPL-CCNC: 36 UNIT/L (ref 40–150)
ALT SERPL-CCNC: 13 UNIT/L (ref 0–55)
ANION GAP SERPL CALC-SCNC: 9 MEQ/L
AST SERPL-CCNC: 18 UNIT/L (ref 5–34)
BASOPHILS # BLD AUTO: 0.03 X10(3)/MCL
BASOPHILS NFR BLD AUTO: 0.6 %
BILIRUB SERPL-MCNC: 0.7 MG/DL
BUN SERPL-MCNC: 21 MG/DL (ref 8.9–20.6)
CALCIUM SERPL-MCNC: 10.2 MG/DL (ref 8.4–10.2)
CHLORIDE SERPL-SCNC: 106 MMOL/L (ref 98–107)
CO2 SERPL-SCNC: 28 MMOL/L (ref 22–29)
CREAT SERPL-MCNC: 1.76 MG/DL (ref 0.73–1.18)
CREAT/UREA NIT SERPL: 12
EOSINOPHIL # BLD AUTO: 0.4 X10(3)/MCL (ref 0–0.9)
EOSINOPHIL NFR BLD AUTO: 8.5 %
ERYTHROCYTE [DISTWIDTH] IN BLOOD BY AUTOMATED COUNT: 14.8 % (ref 11.5–17)
GFR SERPLBLD CREATININE-BSD FMLA CKD-EPI: 48 ML/MIN/1.73/M2
GLOBULIN SER-MCNC: 2 GM/DL (ref 2.4–3.5)
GLUCOSE SERPL-MCNC: 85 MG/DL (ref 74–100)
HCT VFR BLD AUTO: 30.8 % (ref 42–52)
HGB BLD-MCNC: 10.6 G/DL (ref 14–18)
IGA SERPL-MCNC: 9 MG/DL (ref 63–484)
IGG SERPL-MCNC: 206 MG/DL (ref 540–1822)
IGM SERPL-MCNC: 6 MG/DL (ref 22–240)
IMM GRANULOCYTES # BLD AUTO: 0.01 X10(3)/MCL (ref 0–0.04)
IMM GRANULOCYTES NFR BLD AUTO: 0.2 %
LYMPHOCYTES # BLD AUTO: 0.82 X10(3)/MCL (ref 0.6–4.6)
LYMPHOCYTES NFR BLD AUTO: 17.5 %
MAGNESIUM SERPL-MCNC: 2.1 MG/DL (ref 1.6–2.6)
MCH RBC QN AUTO: 34.5 PG (ref 27–31)
MCHC RBC AUTO-ENTMCNC: 34.4 G/DL (ref 33–36)
MCV RBC AUTO: 100.3 FL (ref 80–94)
MONOCYTES # BLD AUTO: 0.56 X10(3)/MCL (ref 0.1–1.3)
MONOCYTES NFR BLD AUTO: 12 %
NEUTROPHILS # BLD AUTO: 2.86 X10(3)/MCL (ref 2.1–9.2)
NEUTROPHILS NFR BLD AUTO: 61.2 %
PHOSPHATE SERPL-MCNC: 4.2 MG/DL (ref 2.3–4.7)
PLATELET # BLD AUTO: 167 X10(3)/MCL (ref 130–400)
PMV BLD AUTO: 10.2 FL (ref 7.4–10.4)
POTASSIUM SERPL-SCNC: 4 MMOL/L (ref 3.5–5.1)
PROT SERPL-MCNC: 6 GM/DL (ref 6.4–8.3)
RBC # BLD AUTO: 3.07 X10(6)/MCL (ref 4.7–6.1)
SODIUM SERPL-SCNC: 143 MMOL/L (ref 136–145)
WBC # BLD AUTO: 4.68 X10(3)/MCL (ref 4.5–11.5)

## 2024-07-12 PROCEDURE — 99999 PR PBB SHADOW E&M-EST. PATIENT-LVL IV: CPT | Mod: PBBFAC,,,

## 2024-07-12 PROCEDURE — 82784 ASSAY IGA/IGD/IGG/IGM EACH: CPT

## 2024-07-12 PROCEDURE — 85025 COMPLETE CBC W/AUTO DIFF WBC: CPT

## 2024-07-12 PROCEDURE — 84100 ASSAY OF PHOSPHORUS: CPT

## 2024-07-12 PROCEDURE — 83735 ASSAY OF MAGNESIUM: CPT

## 2024-07-12 PROCEDURE — 80053 COMPREHEN METABOLIC PANEL: CPT

## 2024-07-12 PROCEDURE — 36415 COLL VENOUS BLD VENIPUNCTURE: CPT

## 2024-07-12 PROCEDURE — 83521 IG LIGHT CHAINS FREE EACH: CPT

## 2024-07-12 PROCEDURE — 86334 IMMUNOFIX E-PHORESIS SERUM: CPT

## 2024-07-12 PROCEDURE — 84165 PROTEIN E-PHORESIS SERUM: CPT

## 2024-07-12 NOTE — PROGRESS NOTES
Subjective:       Patient ID: Pete Fernandez is a 44 y.o. male.    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 Beatriz+ KrD for cycle 2 on 11/30/23; Change at request of [facility]  Stopped after 1 dose due to severe pleural and pericardial effusion development   Switched back to BEATRIZ + RVD with increase of Rev to 25mg  2. Beatriz+ RVD - 11/2/23 x 1 cycle, plan to restart for C2 on 12/14 potentially  C1 - Rev 10mg dose reduction due to CrCl  C2 - Rev increased to 25mg due to renal function improvement  C3 - D22 held 1 week due to covid  C4-  D22 Rev decreased to 10mg due to decline in renal function  C5 - Hold Beatriz this cycle in preparation for upcoming transplant. Rev remains at 10mg due to renal function  Zometa 10/17/23 - 1/11/2024 (3 treatments total)  Currently on hold due to ONJ  3. Stem Cell Transplant 5/6/24    HPI  42yo M presented to [facility] on 10/11 after 1 month of progressive weakness, abdominal pain, and intermittent confusion. At the time of his presentation, his labs which had been normal with PCP in 8/2023 were notable for new severe DOUG, transaminitis, and Ca of 18. He was admitted and started on IVF resuscitation.  Hypercalcemia workup was initiated and he was ultimately found to have numerous osseous lesions on 10/15/23 CT CAP, FLC - Kappa 3.29, Lambda 744, Ratio 226 , SPEP with Mspike 0.17 IgG lambda. Bone marrow biopsy done 10/18/23 revealed 95% plasma cells and FISH with TP53 deletion, FGFR3/IGH (or NSD2/IGH) fusion, usually representing a t(4;14), and a tetraploid subclone was observed. After correction of his hypercalcemia and improvement in his renal function he was discharged on 10/22/23.   He started C1 Beatriz + RVD + Zometa on 11/2/23. He was referred to [facility] for consideration of bone marrow transplant, they recommended change to Beatriz + Krd, however patient  developed life threatening pleural and pericardial edema several days after 1st infusion requiring hospitalization and placement of a pericardial window. Therefore he transitioned back to [facility] for C2.     Interval History:   He returns to the clinic today for a follow-up post SCT D61+. He feels well today. He reports occasional stomach upset from time to time. Labs reviewed in detail with him. He has PET/CT and BMBx scheduled for 8/20. Follow up with Dr. Cook on 8/27 for PET and BMBx results. Denies any fevers, chills, NS, headaches, bleeding, bruising, neuropathy.       Past Medical History:   Diagnosis Date    Anxiety disorder, unspecified     Hypercalcemia       Past Surgical History:   Procedure Laterality Date    BONE MARROW BIOPSY N/A 10/18/2023    Procedure: Biopsy-bone marrow;  Surgeon: Nhan Marte MD;  Location: [facility];  Service: General;  Laterality: N/A;    INSERTION OF TUNNELED CENTRAL VENOUS HEMODIALYSIS CATHETER Left 5/6/2024    Procedure: INSERTION, CATHETER, HEMODIALYSIS, DUAL LUMEN, left poss right, Bard 14.5 fr hemosplit catheter, model [id number];  Surgeon: Alvin White MD;  Location: [facility] OR [address] [place] [address];  Service: General;  Laterality: Left;    TONSILLECTOMY       Social History     Socioeconomic History    Marital status:      Spouse name: Rosalinda    Number of children: 2   Tobacco Use    Smoking status: Never    Smokeless tobacco: Never   Substance and Sexual Activity    Alcohol use: Yes    Drug use: Never    Sexual activity: Yes     Partners: Female     Social Determinants of Health     Financial Resource Strain: Medium Risk (11/15/2023)    Overall Financial Resource Strain (CARDIA)     Difficulty of Paying Living Expenses: Somewhat hard   Food Insecurity: Food Insecurity Present (11/15/2023)    Hunger Vital Sign     Worried About Running Out of Food in the Last Year: Sometimes true     Ran Out of Food in the Last Year: Never true   Transportation Needs: No Transportation  Needs (11/15/2023)    PRAPARE - Transportation     Lack of Transportation (Medical): No     Lack of Transportation (Non-Medical): No   Physical Activity: Insufficiently Active (11/15/2023)    Exercise Vital Sign     Days of Exercise per Week: 3 days     Minutes of Exercise per Session: 30 min   Stress: Stress Concern Present (11/15/2023)    [employer] - Occupational Stress Questionnaire     Feeling of Stress : To some extent   Housing Stability: Low Risk  (11/15/2023)    Housing Stability Vital Sign     Unable to Pay for Housing in the Last Year: No     Number of Places Lived in the Last Year: 1     Unstable Housing in the Last Year: No      Family History   Problem Relation Name Age of Onset    Breast cancer Mother      Pancreatic cancer Father        Review of patient's allergies indicates:  No Known Allergies   Review of Systems   Constitutional:  Negative for activity change, appetite change, chills, fatigue, fever and unexpected weight change.   HENT:  Negative for mouth sores and sore throat.    Eyes:  Negative for visual disturbance.   Respiratory:  Negative for cough and shortness of breath.    Cardiovascular:  Negative for chest pain.   Gastrointestinal:  Negative for abdominal pain, constipation, diarrhea, nausea and vomiting.   Endocrine: Negative for polyuria.   Genitourinary:  Negative for dysuria and frequency.   Musculoskeletal:  Negative for back pain.   Integumentary:  Negative for rash.   Allergic/Immunologic: Negative for frequent infections.   Neurological:  Negative for weakness and headaches.   Hematological:  Negative for adenopathy. Does not bruise/bleed easily.   Psychiatric/Behavioral:  The patient is not nervous/anxious.          Objective:      Vitals:    07/12/24 0933   BP: 126/79   Pulse: 69   Resp: 20   Temp: 97.9 °F (36.6 °C)     Physical Exam  Constitutional:       General: He is not in acute distress.     Appearance: Normal appearance. He is not  ill-appearing.   HENT:      Head: Normocephalic and atraumatic.      Nose: Nose normal.      Mouth/Throat:      Mouth: Mucous membranes are moist.      Pharynx: Oropharynx is clear.   Eyes:      Extraocular Movements: Extraocular movements intact.      Conjunctiva/sclera: Conjunctivae normal.      Pupils: Pupils are equal, round, and reactive to light.   Cardiovascular:      Rate and Rhythm: Normal rate and regular rhythm.      Pulses: Normal pulses.      Heart sounds: Normal heart sounds. No murmur heard.  Pulmonary:      Effort: Pulmonary effort is normal. No respiratory distress.      Breath sounds: Normal breath sounds.   Abdominal:      General: There is no distension.      Palpations: Abdomen is soft.      Tenderness: There is no abdominal tenderness.   Musculoskeletal:         General: Normal range of motion.      Cervical back: Normal range of motion and neck supple.   Lymphadenopathy:      Cervical: No cervical adenopathy.   Skin:     General: Skin is warm and dry.      Findings: No rash.   Neurological:      General: No focal deficit present.      Mental Status: He is alert and oriented to person, place, and time.         LABS AND IMAGING REVIEWED IN EPIC  10/11/23 Xray Metastatic Survey  1. Scattered small defects at the calvarium measuring up to 5 mm too numerous to count.  2. Degene mild rative changes at the spine and extremities  3. MR exam and bone scan would allow further evaluation.  10/12/23 MRI Brain  1. Motion artifact  2. Mild scattered mucosal thickening throughout the paranasal sinuses  3. Somewhat limited evaluation of brain metastases without the administration of IV contrast  4. Otherwise unremarkable MR exam of the head without the administration of IV contrast  10/12/23 NM Bone Scan  1. Abnormal intense increased activity throughout the lungs of uncertain etiology.  This may related to hyperparathyroidism, hematologic malignancy, and or metabolic abnormality such as renal failure,  vasculitis, or pulmonary infection.  Chest x-ray 10/10/2023 reveals the lungs to be relatively clear and well aerated.  2. Suspect scattered mild degenerative changes throughout the joint spaces  3. Increased activity nasal maxillary region suggesting mucoperiosteal disease  4. Mild activity at the stomach suggesting free pertechnetate  10/14/23 CT CAP  1. Widespread osseous findings suggestive of extensive metastatic skeletal disease, to include expansile destructive soft tissue component at the anterior right 7th rib.  2. Enlarged intrathoracic lymph nodes could also be of metastatic etiology.  3. Diffuse ground-glass attenuation through both lungs is nonspecific, could be secondary to atypical infectious or inflammatory process.  However, possibility of metastatic involvement cannot be excluded.  12/7/23 CXR:   1. Hazy opacification lower lungs bilaterally suspicious for infiltrate and pleural reaction which has progressed since the prior exam  2. Cardiomegaly  3. Thoracic spondylosis  12/11/23 CXR:  1. Mild cardiomegaly  2. Persistent bibasilar infiltrate and or pleural reaction  3. Thoracic spondylosis with anterior wedging again evident at a lower thoracic vertebral body  12/13/23 ECHO: EF 50-55%  2/7/24 PET/CT:  1. Interval improved appearance of lytic osseous lesions compared to previous exam with sub threshold uptake. The bones are demineralized with numerous tiny lytic foci at the axial and appendicular skeleton.  2. Subacute appearing bilateral rib and right clavicle fractures with mild FDG uptake.  3. Nonspecific patchy ground-glass and interstitial opacities with mild FDG uptake.       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 Displayed all CRAB criteria at time of  diagnosis in October. Underwent 1st line Beatriz + RVD + Zometa. Transplant done 5/13/24      Plan:   Labs adequate  Continue to hold zometa due to ONJ.   Follow-up with transplant as scheduled on 8/27/2024  PET and bone marrow biopsy scheduled on 8/20/2024  Will reach out to [place] to see if patient can do labs in [place] on 6/20/24  RTC in 1 month with NP with labs   Cbc, cmp, MM labs- 1 hr prior @ [facility]    I spent a total of 35 minutes on the day of the visit.This includes face to face time and non-face to face time preparing to see the patient (eg, review of tests), obtaining and/or reviewing separately obtained history, documenting clinical information in the electronic or other health record, independently interpreting results and communicating results to the patient/family/caregiver, or care coordinator.     **All blood products must be irradiated and CMV negative**    Maren Ku, JONN-C  Oncology/Hematology  [facility]          Professional Services:  Claudia PATTERSON LPN, acted solely as a scribe for and in the presence of Dr. Elizabeth Lejeune, who performed these services.

## 2024-07-15 LAB
ALBUMIN % SPEP (OHS): 64.77 (ref 48.1–59.5)
ALBUMIN SERPL-MCNC: 3.5 G/DL (ref 3.5–5)
ALBUMIN/GLOB SERPL: 1.8 RATIO (ref 1.1–2)
ALPHA 1 GLOB (OHS): 0.21 GM/DL (ref 0–0.4)
ALPHA 1 GLOB% (OHS): 3.86 (ref 2.3–4.9)
ALPHA 2 GLOB % (OHS): 12.54 (ref 6.9–13)
ALPHA 2 GLOB (OHS): 0.68 GM/DL (ref 0.4–1)
BETA GLOB (OHS): 0.7 GM/DL (ref 0.7–1.3)
BETA GLOB% (OHS): 13 (ref 13.8–19.7)
GAMMA GLOBULIN % (OHS): 5.83 (ref 10.1–21.9)
GAMMA GLOBULIN (OHS): 0.31 GM/DL (ref 0.4–1.8)
GLOBULIN SER-MCNC: 1.9 GM/DL (ref 2.4–3.5)
KAPPA LC FREE SER NEPH-MCNC: 0.23 MG/DL (ref 0.33–1.94)
KAPPA LC FREE/LAMBDA FREE SER NEPH: 0.92 {RATIO} (ref 0.26–1.65)
LAMBDA LC FREE SER NEPH-MCNC: 0.25 MG/DL (ref 0.57–2.63)
M SPIKE % (OHS): ABNORMAL
M SPIKE (OHS): ABNORMAL
PATH REV: NORMAL
PROT SERPL-MCNC: 5.4 GM/DL (ref 6.4–8.3)

## 2024-07-17 ENCOUNTER — PATIENT MESSAGE (OUTPATIENT)
Dept: HEMATOLOGY/ONCOLOGY | Facility: CLINIC | Age: 44
End: 2024-07-17
Payer: COMMERCIAL

## 2024-07-17 DIAGNOSIS — R07.9 CHEST PAIN, UNSPECIFIED TYPE: Primary | ICD-10-CM

## 2024-07-18 ENCOUNTER — HOSPITAL ENCOUNTER (OUTPATIENT)
Dept: RADIOLOGY | Facility: HOSPITAL | Age: 44
Discharge: HOME | End: 2024-07-18
Payer: COMMERCIAL

## 2024-07-18 DIAGNOSIS — R07.9 CHEST PAIN, UNSPECIFIED TYPE: ICD-10-CM

## 2024-07-18 DIAGNOSIS — M79.2 NERVE PAIN: Primary | ICD-10-CM

## 2024-07-18 DIAGNOSIS — M62.838 MUSCLE SPASM: ICD-10-CM

## 2024-07-18 PROCEDURE — 71046 X-RAY EXAM CHEST 2 VIEWS: CPT | Mod: TC

## 2024-07-18 RX ORDER — GABAPENTIN 100 MG/1
300 CAPSULE ORAL 2 TIMES DAILY
Qty: 180 CAPSULE | Refills: 2 | Status: SHIPPED | OUTPATIENT
Start: 2024-07-18 | End: 2025-07-18

## 2024-07-18 RX ORDER — CYCLOBENZAPRINE HCL 5 MG
5 TABLET ORAL NIGHTLY
Qty: 30 TABLET | Refills: 0 | Status: SHIPPED | OUTPATIENT
Start: 2024-07-18 | End: 2024-08-17

## 2024-07-24 ENCOUNTER — PATIENT MESSAGE (OUTPATIENT)
Dept: HEMATOLOGY/ONCOLOGY | Facility: CLINIC | Age: 44
End: 2024-07-24
Payer: COMMERCIAL

## 2024-07-24 DIAGNOSIS — N40.1 BPH ASSOCIATED WITH NOCTURIA: ICD-10-CM

## 2024-07-24 DIAGNOSIS — C90.00 MULTIPLE MYELOMA, REMISSION STATUS UNSPECIFIED: ICD-10-CM

## 2024-07-24 DIAGNOSIS — R35.1 BPH ASSOCIATED WITH NOCTURIA: ICD-10-CM

## 2024-07-24 RX ORDER — OXYCODONE HYDROCHLORIDE 10 MG/1
10 TABLET ORAL EVERY 4 HOURS PRN
Qty: 90 TABLET | Refills: 0 | Status: SHIPPED | OUTPATIENT
Start: 2024-07-24

## 2024-07-25 RX ORDER — TAMSULOSIN HYDROCHLORIDE 0.4 MG/1
0.4 CAPSULE ORAL NIGHTLY
Qty: 30 CAPSULE | Refills: 6 | Status: SHIPPED | OUTPATIENT
Start: 2024-07-25 | End: 2025-07-25

## 2024-08-02 ENCOUNTER — LAB VISIT (OUTPATIENT)
Dept: LAB | Facility: HOSPITAL | Age: 44
End: 2024-08-02
Payer: COMMERCIAL

## 2024-08-02 DIAGNOSIS — Z94.84 HISTORY OF AUTO STEM CELL TRANSPLANT: ICD-10-CM

## 2024-08-02 DIAGNOSIS — C90.00 MULTIPLE MYELOMA, REMISSION STATUS UNSPECIFIED: ICD-10-CM

## 2024-08-02 LAB
ALBUMIN SERPL-MCNC: 3.8 G/DL (ref 3.5–5)
ALBUMIN/GLOB SERPL: 2.1 RATIO (ref 1.1–2)
ALP SERPL-CCNC: 40 UNIT/L (ref 40–150)
ALT SERPL-CCNC: 13 UNIT/L (ref 0–55)
ANION GAP SERPL CALC-SCNC: 7 MEQ/L
AST SERPL-CCNC: 15 UNIT/L (ref 5–34)
BASOPHILS # BLD AUTO: 0.01 X10(3)/MCL
BASOPHILS NFR BLD AUTO: 0.3 %
BILIRUB SERPL-MCNC: 0.6 MG/DL
BUN SERPL-MCNC: 22 MG/DL (ref 8.9–20.6)
CALCIUM SERPL-MCNC: 9.3 MG/DL (ref 8.4–10.2)
CHLORIDE SERPL-SCNC: 107 MMOL/L (ref 98–107)
CO2 SERPL-SCNC: 27 MMOL/L (ref 22–29)
CREAT SERPL-MCNC: 1.56 MG/DL (ref 0.73–1.18)
CREAT/UREA NIT SERPL: 14
EOSINOPHIL # BLD AUTO: 0.09 X10(3)/MCL (ref 0–0.9)
EOSINOPHIL NFR BLD AUTO: 2.8 %
ERYTHROCYTE [DISTWIDTH] IN BLOOD BY AUTOMATED COUNT: 13.6 % (ref 11.5–17)
GFR SERPLBLD CREATININE-BSD FMLA CKD-EPI: 55 ML/MIN/1.73/M2
GLOBULIN SER-MCNC: 1.8 GM/DL (ref 2.4–3.5)
GLUCOSE SERPL-MCNC: 99 MG/DL (ref 74–100)
HCT VFR BLD AUTO: 30.2 % (ref 42–52)
HGB BLD-MCNC: 10.3 G/DL (ref 14–18)
IGA SERPL-MCNC: 8 MG/DL (ref 63–484)
IGG SERPL-MCNC: 204 MG/DL (ref 540–1822)
IGM SERPL-MCNC: 9 MG/DL (ref 22–240)
IMM GRANULOCYTES # BLD AUTO: 0.02 X10(3)/MCL (ref 0–0.04)
IMM GRANULOCYTES NFR BLD AUTO: 0.6 %
LYMPHOCYTES # BLD AUTO: 0.75 X10(3)/MCL (ref 0.6–4.6)
LYMPHOCYTES NFR BLD AUTO: 23.7 %
MAGNESIUM SERPL-MCNC: 2 MG/DL (ref 1.6–2.6)
MCH RBC QN AUTO: 33.9 PG (ref 27–31)
MCHC RBC AUTO-ENTMCNC: 34.1 G/DL (ref 33–36)
MCV RBC AUTO: 99.3 FL (ref 80–94)
MONOCYTES # BLD AUTO: 0.36 X10(3)/MCL (ref 0.1–1.3)
MONOCYTES NFR BLD AUTO: 11.4 %
NEUTROPHILS # BLD AUTO: 1.93 X10(3)/MCL (ref 2.1–9.2)
NEUTROPHILS NFR BLD AUTO: 61.2 %
PHOSPHATE SERPL-MCNC: 3.7 MG/DL (ref 2.3–4.7)
PLATELET # BLD AUTO: 174 X10(3)/MCL (ref 130–400)
PMV BLD AUTO: 9.2 FL (ref 7.4–10.4)
POTASSIUM SERPL-SCNC: 4.2 MMOL/L (ref 3.5–5.1)
PROT SERPL-MCNC: 5.6 GM/DL (ref 6.4–8.3)
RBC # BLD AUTO: 3.04 X10(6)/MCL (ref 4.7–6.1)
SODIUM SERPL-SCNC: 141 MMOL/L (ref 136–145)
WBC # BLD AUTO: 3.16 X10(3)/MCL (ref 4.5–11.5)

## 2024-08-02 PROCEDURE — 82784 ASSAY IGA/IGD/IGG/IGM EACH: CPT

## 2024-08-02 PROCEDURE — 83521 IG LIGHT CHAINS FREE EACH: CPT

## 2024-08-02 PROCEDURE — 84165 PROTEIN E-PHORESIS SERUM: CPT

## 2024-08-02 PROCEDURE — 83735 ASSAY OF MAGNESIUM: CPT

## 2024-08-02 PROCEDURE — 36415 COLL VENOUS BLD VENIPUNCTURE: CPT

## 2024-08-02 PROCEDURE — 80053 COMPREHEN METABOLIC PANEL: CPT

## 2024-08-02 PROCEDURE — 85025 COMPLETE CBC W/AUTO DIFF WBC: CPT

## 2024-08-02 PROCEDURE — 86334 IMMUNOFIX E-PHORESIS SERUM: CPT

## 2024-08-02 PROCEDURE — 84100 ASSAY OF PHOSPHORUS: CPT

## 2024-08-05 LAB
ALBUMIN % SPEP (OHS): 63.63 (ref 48.1–59.5)
ALBUMIN SERPL-MCNC: 3.4 G/DL (ref 3.5–5)
ALBUMIN/GLOB SERPL: 1.8 RATIO (ref 1.1–2)
ALPHA 1 GLOB (OHS): 0.25 GM/DL (ref 0–0.4)
ALPHA 1 GLOB% (OHS): 4.68 (ref 2.3–4.9)
ALPHA 2 GLOB % (OHS): 11.45 (ref 6.9–13)
ALPHA 2 GLOB (OHS): 0.61 GM/DL (ref 0.4–1)
BETA GLOB (OHS): 0.77 GM/DL (ref 0.7–1.3)
BETA GLOB% (OHS): 14.51 (ref 13.8–19.7)
GAMMA GLOBULIN % (OHS): 5.73 (ref 10.1–21.9)
GAMMA GLOBULIN (OHS): 0.3 GM/DL (ref 0.4–1.8)
GLOBULIN SER-MCNC: 1.9 GM/DL (ref 2.4–3.5)
KAPPA LC FREE SER NEPH-MCNC: 0.23 MG/DL (ref 0.33–1.94)
KAPPA LC FREE/LAMBDA FREE SER NEPH: 0.85 {RATIO} (ref 0.26–1.65)
LAMBDA LC FREE SER NEPH-MCNC: 0.27 MG/DL (ref 0.57–2.63)
M SPIKE % (OHS): ABNORMAL
M SPIKE (OHS): ABNORMAL
PATH REV: NORMAL
PROT SERPL-MCNC: 5.3 GM/DL (ref 6.4–8.3)

## 2024-08-08 ENCOUNTER — OFFICE VISIT (OUTPATIENT)
Dept: HEMATOLOGY/ONCOLOGY | Facility: CLINIC | Age: 44
End: 2024-08-08
Payer: COMMERCIAL

## 2024-08-08 VITALS
HEART RATE: 75 BPM | OXYGEN SATURATION: 100 % | BODY MASS INDEX: 28.05 KG/M2 | HEIGHT: 69 IN | TEMPERATURE: 98 F | WEIGHT: 189.38 LBS | DIASTOLIC BLOOD PRESSURE: 90 MMHG | RESPIRATION RATE: 18 BRPM | SYSTOLIC BLOOD PRESSURE: 124 MMHG

## 2024-08-08 DIAGNOSIS — M79.2 NERVE PAIN: ICD-10-CM

## 2024-08-08 DIAGNOSIS — D63.0 ANEMIA IN NEOPLASTIC DISEASE: ICD-10-CM

## 2024-08-08 DIAGNOSIS — C90.01 MULTIPLE MYELOMA IN REMISSION: ICD-10-CM

## 2024-08-08 DIAGNOSIS — C90.00 MULTIPLE MYELOMA, REMISSION STATUS UNSPECIFIED: Primary | ICD-10-CM

## 2024-08-08 DIAGNOSIS — D70.1 CHEMOTHERAPY-INDUCED NEUTROPENIA: ICD-10-CM

## 2024-08-08 DIAGNOSIS — K21.9 GASTROESOPHAGEAL REFLUX DISEASE, UNSPECIFIED WHETHER ESOPHAGITIS PRESENT: ICD-10-CM

## 2024-08-08 DIAGNOSIS — D84.821 IMMUNODEFICIENCY DUE TO DRUG THERAPY: ICD-10-CM

## 2024-08-08 DIAGNOSIS — Z94.84 HISTORY OF AUTO STEM CELL TRANSPLANT: ICD-10-CM

## 2024-08-08 DIAGNOSIS — T45.1X5A CHEMOTHERAPY-INDUCED NEUTROPENIA: ICD-10-CM

## 2024-08-08 DIAGNOSIS — Z79.899 IMMUNODEFICIENCY DUE TO DRUG THERAPY: ICD-10-CM

## 2024-08-08 DIAGNOSIS — C90.00 METASTATIC MULTIPLE MYELOMA TO BONE: ICD-10-CM

## 2024-08-08 DIAGNOSIS — M62.838 MUSCLE SPASM: ICD-10-CM

## 2024-08-08 PROCEDURE — 1159F MED LIST DOCD IN RCRD: CPT | Mod: CPTII,S$GLB,,

## 2024-08-08 PROCEDURE — 3080F DIAST BP >= 90 MM HG: CPT | Mod: CPTII,S$GLB,,

## 2024-08-08 PROCEDURE — 3074F SYST BP LT 130 MM HG: CPT | Mod: CPTII,S$GLB,,

## 2024-08-08 PROCEDURE — 99999 PR PBB SHADOW E&M-EST. PATIENT-LVL V: CPT | Mod: PBBFAC,,,

## 2024-08-08 PROCEDURE — 1160F RVW MEDS BY RX/DR IN RCRD: CPT | Mod: CPTII,S$GLB,,

## 2024-08-08 PROCEDURE — 3008F BODY MASS INDEX DOCD: CPT | Mod: CPTII,S$GLB,,

## 2024-08-08 PROCEDURE — 99215 OFFICE O/P EST HI 40 MIN: CPT | Mod: S$GLB,,,

## 2024-08-14 ENCOUNTER — PATIENT MESSAGE (OUTPATIENT)
Dept: HEMATOLOGY/ONCOLOGY | Facility: CLINIC | Age: 44
End: 2024-08-14
Payer: COMMERCIAL

## 2024-08-15 ENCOUNTER — PATIENT MESSAGE (OUTPATIENT)
Dept: HEMATOLOGY/ONCOLOGY | Facility: CLINIC | Age: 44
End: 2024-08-15
Payer: COMMERCIAL

## 2024-08-15 DIAGNOSIS — M79.2 NERVE PAIN: ICD-10-CM

## 2024-08-15 DIAGNOSIS — M54.12 CERVICAL RADICULOPATHY: ICD-10-CM

## 2024-08-15 DIAGNOSIS — C90.00 MULTIPLE MYELOMA, REMISSION STATUS UNSPECIFIED: Primary | ICD-10-CM

## 2024-08-15 RX ORDER — GABAPENTIN 100 MG/1
300 CAPSULE ORAL 2 TIMES DAILY
Qty: 180 CAPSULE | Refills: 2 | Status: SHIPPED | OUTPATIENT
Start: 2024-08-15 | End: 2025-08-15

## 2024-08-20 ENCOUNTER — OFFICE VISIT (OUTPATIENT)
Dept: INFECTIOUS DISEASES | Facility: CLINIC | Age: 44
End: 2024-08-20
Payer: COMMERCIAL

## 2024-08-20 ENCOUNTER — PROCEDURE VISIT (OUTPATIENT)
Dept: HEMATOLOGY/ONCOLOGY | Facility: CLINIC | Age: 44
End: 2024-08-20
Payer: COMMERCIAL

## 2024-08-20 ENCOUNTER — CLINICAL SUPPORT (OUTPATIENT)
Dept: INFECTIOUS DISEASES | Facility: CLINIC | Age: 44
End: 2024-08-20
Payer: COMMERCIAL

## 2024-08-20 ENCOUNTER — HOSPITAL ENCOUNTER (OUTPATIENT)
Dept: RADIOLOGY | Facility: HOSPITAL | Age: 44
Discharge: HOME | End: 2024-08-20
Attending: INTERNAL MEDICINE
Payer: COMMERCIAL

## 2024-08-20 VITALS
HEART RATE: 67 BPM | SYSTOLIC BLOOD PRESSURE: 125 MMHG | WEIGHT: 192 LBS | TEMPERATURE: 98 F | BODY MASS INDEX: 28.44 KG/M2 | DIASTOLIC BLOOD PRESSURE: 80 MMHG | HEIGHT: 69 IN

## 2024-08-20 VITALS
OXYGEN SATURATION: 100 % | TEMPERATURE: 98 F | HEART RATE: 79 BPM | DIASTOLIC BLOOD PRESSURE: 85 MMHG | SYSTOLIC BLOOD PRESSURE: 133 MMHG

## 2024-08-20 DIAGNOSIS — C90.01 MULTIPLE MYELOMA IN REMISSION: ICD-10-CM

## 2024-08-20 DIAGNOSIS — C90.00 MULTIPLE MYELOMA, REMISSION STATUS UNSPECIFIED: Primary | ICD-10-CM

## 2024-08-20 DIAGNOSIS — Z00.00 HEALTH CARE MAINTENANCE: Primary | ICD-10-CM

## 2024-08-20 DIAGNOSIS — Z76.82 STEM CELL TRANSPLANT CANDIDATE: ICD-10-CM

## 2024-08-20 DIAGNOSIS — Z94.84 HISTORY OF AUTO STEM CELL TRANSPLANT: ICD-10-CM

## 2024-08-20 LAB
POCT GLUCOSE: 90 MG/DL (ref 70–110)
REASON FOR REFERRAL, PLASMA CELL PROLIF (PCPD), FISH: NORMAL

## 2024-08-20 PROCEDURE — A9552 F18 FDG: HCPCS | Performed by: INTERNAL MEDICINE

## 2024-08-20 PROCEDURE — 88184 FLOWCYTOMETRY/ TC 1 MARKER: CPT | Mod: 91 | Performed by: NURSE PRACTITIONER

## 2024-08-20 PROCEDURE — 90677 PCV20 VACCINE IM: CPT | Mod: S$GLB,,, | Performed by: INTERNAL MEDICINE

## 2024-08-20 PROCEDURE — 88185 FLOWCYTOMETRY/TC ADD-ON: CPT | Mod: 59 | Performed by: PATHOLOGY

## 2024-08-20 PROCEDURE — 90471 IMMUNIZATION ADMIN: CPT | Mod: S$GLB,,, | Performed by: INTERNAL MEDICINE

## 2024-08-20 PROCEDURE — 88184 FLOWCYTOMETRY/ TC 1 MARKER: CPT | Mod: 59 | Performed by: PATHOLOGY

## 2024-08-20 PROCEDURE — 3079F DIAST BP 80-89 MM HG: CPT | Mod: CPTII,S$GLB,, | Performed by: INTERNAL MEDICINE

## 2024-08-20 PROCEDURE — 3074F SYST BP LT 130 MM HG: CPT | Mod: CPTII,S$GLB,, | Performed by: INTERNAL MEDICINE

## 2024-08-20 PROCEDURE — 78816 PET IMAGE W/CT FULL BODY: CPT | Mod: TC

## 2024-08-20 PROCEDURE — 99204 OFFICE O/P NEW MOD 45 MIN: CPT | Mod: S$GLB,,, | Performed by: INTERNAL MEDICINE

## 2024-08-20 PROCEDURE — 3008F BODY MASS INDEX DOCD: CPT | Mod: CPTII,S$GLB,, | Performed by: INTERNAL MEDICINE

## 2024-08-20 PROCEDURE — 88271 CYTOGENETICS DNA PROBE: CPT | Performed by: NURSE PRACTITIONER

## 2024-08-20 PROCEDURE — 88184 FLOWCYTOMETRY/ TC 1 MARKER: CPT | Performed by: NURSE PRACTITIONER

## 2024-08-20 PROCEDURE — 99999 PR PBB SHADOW E&M-EST. PATIENT-LVL I: CPT | Mod: PBBFAC,,,

## 2024-08-20 PROCEDURE — 88274 CYTOGENETICS 25-99: CPT | Performed by: NURSE PRACTITIONER

## 2024-08-20 PROCEDURE — 1160F RVW MEDS BY RX/DR IN RCRD: CPT | Mod: CPTII,S$GLB,, | Performed by: INTERNAL MEDICINE

## 2024-08-20 PROCEDURE — 38222 DX BONE MARROW BX & ASPIR: CPT | Mod: LT,S$GLB,, | Performed by: NURSE PRACTITIONER

## 2024-08-20 PROCEDURE — 99999 PR PBB SHADOW E&M-EST. PATIENT-LVL IV: CPT | Mod: PBBFAC,,, | Performed by: INTERNAL MEDICINE

## 2024-08-20 PROCEDURE — 88313 SPECIAL STAINS GROUP 2: CPT | Performed by: PATHOLOGY

## 2024-08-20 PROCEDURE — 78816 PET IMAGE W/CT FULL BODY: CPT | Mod: 26,PS,, | Performed by: STUDENT IN AN ORGANIZED HEALTH CARE EDUCATION/TRAINING PROGRAM

## 2024-08-20 PROCEDURE — 88305 TISSUE EXAM BY PATHOLOGIST: CPT | Performed by: PATHOLOGY

## 2024-08-20 PROCEDURE — 1159F MED LIST DOCD IN RCRD: CPT | Mod: CPTII,S$GLB,, | Performed by: INTERNAL MEDICINE

## 2024-08-20 PROCEDURE — 88185 FLOWCYTOMETRY/TC ADD-ON: CPT | Mod: 91 | Performed by: NURSE PRACTITIONER

## 2024-08-20 PROCEDURE — 88342 IMHCHEM/IMCYTCHM 1ST ANTB: CPT | Mod: 59 | Performed by: PATHOLOGY

## 2024-08-20 PROCEDURE — 88185 FLOWCYTOMETRY/TC ADD-ON: CPT | Mod: 59 | Performed by: NURSE PRACTITIONER

## 2024-08-20 PROCEDURE — 88299 UNLISTED CYTOGENETIC STUDY: CPT | Performed by: NURSE PRACTITIONER

## 2024-08-20 PROCEDURE — 88311 DECALCIFY TISSUE: CPT | Performed by: PATHOLOGY

## 2024-08-20 RX ORDER — FLUDEOXYGLUCOSE F18 500 MCI/ML
12 INJECTION INTRAVENOUS ONCE
Status: COMPLETED | OUTPATIENT
Start: 2024-08-20 | End: 2024-08-20

## 2024-08-20 RX ORDER — LIDOCAINE HYDROCHLORIDE 20 MG/ML
8 INJECTION, SOLUTION INFILTRATION; PERINEURAL
Status: COMPLETED | OUTPATIENT
Start: 2024-08-20 | End: 2024-08-20

## 2024-08-20 RX ADMIN — LIDOCAINE HYDROCHLORIDE 8 ML: 20 INJECTION, SOLUTION INFILTRATION; PERINEURAL at 12:08

## 2024-08-20 RX ADMIN — FLUDEOXYGLUCOSE F-18 10.65 MILLICURIE: 500 INJECTION INTRAVENOUS at 08:08

## 2024-08-20 NOTE — PROGRESS NOTES
Subjective     Patient ID: Pete Fernandez is a 44 y.o. male.    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    Patient remains on acyclovir, TMP-SMX.    Patient reports fatigue since transplant. Reports having right arm tingling, on gabapentin.    He lives about 3 hours away, closest [facility] is in [place].        Review of Systems   Constitutional: Positive for malaise/fatigue. Negative for chills, decreased appetite, fever, night sweats, weight gain and weight loss.   HENT:  Negative for congestion, ear pain, hearing loss, hoarse voice, sore throat and tinnitus.    Eyes:  Negative for blurred vision, redness and visual disturbance.   Cardiovascular:  Negative for chest pain, leg swelling and palpitations.   Respiratory:  Negative for cough, hemoptysis, shortness of breath, sputum production and wheezing.    Hematologic/Lymphatic: Negative for adenopathy. Does not bruise/bleed easily.   Skin:  Negative for dry skin, itching, rash and suspicious lesions.   Musculoskeletal:  Positive for back pain, joint pain and myalgias. Negative for neck pain.   Gastrointestinal:  Positive for nausea. Negative for abdominal pain, constipation, diarrhea, heartburn and vomiting.   Genitourinary:  Positive for dysuria. Negative for flank pain, frequency, hematuria, hesitancy and urgency.   Neurological:  Positive for numbness and paresthesias. Negative for dizziness, headaches and weakness.   Psychiatric/Behavioral:  Positive for memory loss. Negative for depression. The patient is nervous/anxious. The patient does not have insomnia.       Objective   Physical Exam  Constitutional:       General: He is not in acute distress.     Appearance: He is well-developed. He is not diaphoretic.   HENT:      Head: Normocephalic and atraumatic.      Nose: Nose normal.   Eyes:      Conjunctiva/sclera: Conjunctivae normal.   Pulmonary:       Effort: Pulmonary effort is normal. No respiratory distress.   Abdominal:      General: Abdomen is flat. There is no distension.   Musculoskeletal:      Cervical back: Normal range of motion.      Right lower leg: No edema.      Left lower leg: No edema.   Skin:     General: Skin is warm and dry.      Findings: No erythema or rash.   Neurological:      Mental Status: He is alert.   Psychiatric:         Behavior: Behavior normal.         Sodium   Date Value Ref Range Status   08/02/2024 141 136 - 145 mmol/L Final   07/12/2024 143 136 - 145 mmol/L Final   06/13/2024 141 136 - 145 mmol/L Final   06/07/2024 140 136 - 145 mmol/L Final   05/30/2024 139 136 - 145 mmol/L Final   05/27/2024 139 136 - 145 mmol/L Final      Potassium   Date Value Ref Range Status   08/02/2024 4.2 3.5 - 5.1 mmol/L Final   07/12/2024 4.0 3.5 - 5.1 mmol/L Final   06/13/2024 3.8 3.5 - 5.1 mmol/L Final   06/07/2024 4.0 3.5 - 5.1 mmol/L Final   05/30/2024 3.6 3.5 - 5.1 mmol/L Final   05/27/2024 3.4 (L) 3.5 - 5.1 mmol/L Final      Chloride   Date Value Ref Range Status   08/02/2024 107 98 - 107 mmol/L Final   07/12/2024 106 98 - 107 mmol/L Final   06/13/2024 105 98 - 107 mmol/L Final   06/07/2024 101 95 - 110 mmol/L Final   05/30/2024 103 95 - 110 mmol/L Final   05/27/2024 106 95 - 110 mmol/L Final      CO2   Date Value Ref Range Status   08/02/2024 27 22 - 29 mmol/L Final   07/12/2024 28 22 - 29 mmol/L Final   06/13/2024 29 22 - 29 mmol/L Final   06/07/2024 31 (H) 23 - 29 mmol/L Final   05/30/2024 27 23 - 29 mmol/L Final   05/27/2024 25 23 - 29 mmol/L Final      BUN   Date Value Ref Range Status   06/07/2024 24 (H) 6 - 20 mg/dL Final   05/30/2024 15 6 - 20 mg/dL Final   05/27/2024 17 6 - 20 mg/dL Final     Blood Urea Nitrogen   Date Value Ref Range Status   08/02/2024 22.0 (H) 8.9 - 20.6 mg/dL Final   07/12/2024 21.0 (H) 8.9 - 20.6 mg/dL Final   06/13/2024 14.0 8.9 - 20.6 mg/dL Final      Creatinine   Date Value Ref Range Status   08/02/2024  1.56 (H) 0.73 - 1.18 mg/dL Final   07/12/2024 1.76 (H) 0.73 - 1.18 mg/dL Final   06/13/2024 1.62 (H) 0.73 - 1.18 mg/dL Final   06/07/2024 1.4 0.5 - 1.4 mg/dL Final   05/30/2024 1.3 0.5 - 1.4 mg/dL Final   05/27/2024 1.2 0.5 - 1.4 mg/dL Final      Glucose   Date Value Ref Range Status   06/07/2024 120 (H) 70 - 110 mg/dL Final   05/30/2024 135 (H) 70 - 110 mg/dL Final   05/27/2024 94 70 - 110 mg/dL Final       ALT   Date Value Ref Range Status   08/02/2024 13 0 - 55 unit/L Final   07/12/2024 13 0 - 55 unit/L Final   06/13/2024 22 0 - 55 unit/L Final   06/07/2024 25 10 - 44 U/L Final   05/30/2024 35 10 - 44 U/L Final   05/27/2024 24 10 - 44 U/L Final      AST   Date Value Ref Range Status   08/02/2024 15 5 - 34 unit/L Final   07/12/2024 18 5 - 34 unit/L Final   06/13/2024 22 5 - 34 unit/L Final   06/07/2024 21 10 - 40 U/L Final   05/30/2024 17 10 - 40 U/L Final   05/27/2024 14 10 - 40 U/L Final      Total Bilirubin   Date Value Ref Range Status   06/07/2024 0.5 0.1 - 1.0 mg/dL Final     Comment:     For infants and newborns, interpretation of results should be based  on gestational age, weight and in agreement with clinical  observations.    Premature Infant recommended reference ranges:  Up to 24 hours.............<8.0 mg/dL  Up to 48 hours............<12.0 mg/dL  3-5 days..................<15.0 mg/dL  6-29 days.................<15.0 mg/dL     05/30/2024 0.5 0.1 - 1.0 mg/dL Final     Comment:     For infants and newborns, interpretation of results should be based  on gestational age, weight and in agreement with clinical  observations.    Premature Infant recommended reference ranges:  Up to 24 hours.............<8.0 mg/dL  Up to 48 hours............<12.0 mg/dL  3-5 days..................<15.0 mg/dL  6-29 days.................<15.0 mg/dL     05/27/2024 0.6 0.1 - 1.0 mg/dL Final     Comment:     For infants and newborns, interpretation of results should be based  on gestational age, weight and in agreement with  clinical  observations.    Premature Infant recommended reference ranges:  Up to 24 hours.............<8.0 mg/dL  Up to 48 hours............<12.0 mg/dL  3-5 days..................<15.0 mg/dL  6-29 days.................<15.0 mg/dL       Bilirubin Total   Date Value Ref Range Status   08/02/2024 0.6 <=1.5 mg/dL Final   07/12/2024 0.7 <=1.5 mg/dL Final   06/13/2024 0.6 <=1.5 mg/dL Final      Albumin   Date Value Ref Range Status   08/02/2024 3.8 3.5 - 5.0 g/dL Final   08/02/2024 3.4 (L) 3.5 - 5.0 g/dL Final   07/12/2024 4.0 3.5 - 5.0 g/dL Final   07/12/2024 3.5 3.5 - 5.0 g/dL Final   06/07/2024 3.6 3.5 - 5.2 g/dL Final   05/30/2024 3.5 3.5 - 5.2 g/dL Final   05/27/2024 2.9 (L) 3.5 - 5.2 g/dL Final      Total Protein   Date Value Ref Range Status   06/07/2024 5.8 (L) 6.0 - 8.4 g/dL Final   05/30/2024 6.0 6.0 - 8.4 g/dL Final   05/27/2024 4.9 (L) 6.0 - 8.4 g/dL Final      Alkaline Phosphatase   Date Value Ref Range Status   06/07/2024 51 (L) 55 - 135 U/L Final   05/30/2024 64 55 - 135 U/L Final   05/27/2024 48 (L) 55 - 135 U/L Final     ALP   Date Value Ref Range Status   08/02/2024 40 40 - 150 unit/L Final   07/12/2024 36 (L) 40 - 150 unit/L Final   06/13/2024 43 40 - 150 unit/L Final        WBC   Date Value Ref Range Status   08/02/2024 3.16 (L) 4.50 - 11.50 x10(3)/mcL Final   07/12/2024 4.68 4.50 - 11.50 x10(3)/mcL Final   06/13/2024 3.16 (L) 4.50 - 11.50 x10(3)/mcL Final   06/07/2024 3.46 (L) 3.90 - 12.70 K/uL Final   05/30/2024 3.24 (L) 3.90 - 12.70 K/uL Final   05/27/2024 4.07 3.90 - 12.70 K/uL Final      Hgb   Date Value Ref Range Status   08/02/2024 10.3 (L) 14.0 - 18.0 g/dL Final   07/12/2024 10.6 (L) 14.0 - 18.0 g/dL Final   06/13/2024 8.9 (L) 14.0 - 18.0 g/dL Final     Hemoglobin   Date Value Ref Range Status   06/07/2024 9.1 (L) 14.0 - 18.0 g/dL Final   05/30/2024 8.8 (L) 14.0 - 18.0 g/dL Final   05/27/2024 7.5 (L) 14.0 - 18.0 g/dL Final      Hematocrit   Date Value Ref Range Status   06/07/2024 26.7 (L) 40.0  - 54.0 % Final   05/30/2024 26.2 (L) 40.0 - 54.0 % Final   05/27/2024 21.9 (L) 40.0 - 54.0 % Final     Hct   Date Value Ref Range Status   08/02/2024 30.2 (L) 42.0 - 52.0 % Final   07/12/2024 30.8 (L) 42.0 - 52.0 % Final   06/13/2024 26.7 (L) 42.0 - 52.0 % Final      Platelets   Date Value Ref Range Status   06/07/2024 265 150 - 450 K/uL Final   05/30/2024 74 (L) 150 - 450 K/uL Final   05/27/2024 14 (LL) 150 - 450 K/uL Final     Comment:     PLT   critical result(s) called and verbal readback obtained from   Erin Umbehr RN by DANNY 05/27/2024 09:24       Platelet   Date Value Ref Range Status   08/02/2024 174 130 - 400 x10(3)/mcL Final   07/12/2024 167 130 - 400 x10(3)/mcL Final   06/13/2024 182 130 - 400 x10(3)/mcL Final             Assessment and Plan   44M with history of MM s/p autoSCT. 5/13/2024 presents for vaccine counseling.      Plan:  Patient would like to get all vaccines locally.    Oncologist: Joyce    Type of Transplant: auto    Date of Transplant: 5/13/2024      Table I: Refusing covid vaccines      Table II: Inactivated High-Dose Influenza Vaccine     Month Time After Transplant   September - March ?4m Annually       TABLE III: Adult Inactivated Vaccine Schedule    Inactivated Vaccine ?6m ?8m ?10m ?12m ?24m   Pneumococcal-conjugate (Prevnar 20)* PCV20 PCV20 PCV20 PCV20   S.pneumo IgG serotypes   Haemophilus influenzae type B  HiB HiB HiB     Inactivated Polio IPV IPV IPV     Diphtheria-Tetanus-acellular Pertussis DTaP DTaP DTaP     Twinrix (Hepatitis A/B) HAV/HBV HAV/HBV  HAV/HBV HepBsAb   Meningococcal ACWY (Menveo or Menactra) MCV4 MCV4      Vaccines for Select Indications   RSV  (Coverage may be limited to age>60 years) RSV           Adult Live Vaccine Schedule After 24 Months - All Stem Cell Transplants     2-1-5 Rule - Not until:  2 years post-HSCT  >1 year off all immunosuppressive therapy  >5 months since last dose of IVIg, VZIg, plasma transfusion    Live Vaccine <24m ?24m ?25m    Measles/Mumps/Rubella (MMR)  MMR MMR   Varicella Zoster (Varivax) VZV IgG titers  If NEGATIVE, vaccinate Varivax Varivax       Adult Non-Live Adjuvant Vaccine Schedule - Shingrix    Shingles Non-Live Adjuvant Vaccine (Shingrix)  (Insurance may not cover if age <50 years)   HSCT    Autologous    Not until:  Immunotherapy (PD-1 inhibitors) completed   VZV IgG titers  If POSITIVE, vaccinate ?Day 100   Shingrix #1 >Day 100   Shingrix #2 (2-6 months after #1) >Day 160        45 minutes of total time spent on the encounter, which includes face to face time and non-face to face time preparing to see the patient (eg, review of tests), obtaining and reviewing separately obtained history, documenting clinical information in the electronic or other health record, independently interpreting results (not separately reported) and communicating results to the patient/family/caregiver, and care coordination (not separately reported).     This patient's visit complexity is inherent to evaluation and management associated with medical care services that are part of ongoing care related to this patient's single, serious condition or complex condition.

## 2024-08-20 NOTE — PROCEDURES
Bone marrow    Date/Time: 8/20/2024 12:00 PM    Performed by: Elvie Guevara NP  Authorized by: Elvie Guevara NP    Consent Done?: Yes (Written)   Immediately prior to procedure a time out was called to verify the correct patient, procedure, equipment, support staff and site/side marked as required. .      Position: prone  Aspiration?: Yes   Biopsy?: Yes        PROCEDURE NOTE:  Date of Procedure: 08/20/2024  Bone Marrow Biopsy and Aspiration  Indication: MM day +99 s/p Auto SCT  Consent: Informed consent was obtained from patient.  Timeout: Done and documented. Allergies reviewed.  Position: prone  Site: Left posterior illiac crest.  Prep: Betadine.  Needle used: 11 gauge Jamshidi needle.  Anesthetic: 2% lidocaine 8 cc.  Biopsy: The biopsy needle was introduced into the marrow cavity and an aspirate was obtained without complications and sent for flow cytometry, PCPD fish, MRDMM, DNA/RNA hold, and cytogenetics. Core biopsy obtained without difficulty and sent for routine histologic examination.  Complications: None.  Disposition: The patient was placed supine for 15min following procedure. MA to assess bandaid for bleeding prior to discharge home. Patient aware to keep bandaid dry and intact for 24hrs and to call clinic for any bleeding, fevers, pain, or signs of infection.  Blood loss: Minimal.     Elvie Guevara NP  Hematology/Oncology/BMT

## 2024-08-21 LAB
PCPDS FINAL DIAGNOSIS: NORMAL
PCPDS PRE-ANALYSIS PRE-SORT: NORMAL

## 2024-08-22 ENCOUNTER — HOSPITAL ENCOUNTER (OUTPATIENT)
Dept: RADIOLOGY | Facility: HOSPITAL | Age: 44
Discharge: HOME | End: 2024-08-22
Payer: COMMERCIAL

## 2024-08-22 DIAGNOSIS — M79.2 NERVE PAIN: ICD-10-CM

## 2024-08-22 DIAGNOSIS — M54.12 CERVICAL RADICULOPATHY: ICD-10-CM

## 2024-08-22 DIAGNOSIS — C90.00 MULTIPLE MYELOMA, REMISSION STATUS UNSPECIFIED: ICD-10-CM

## 2024-08-22 DIAGNOSIS — C90.00 MULTIPLE MYELOMA, REMISSION STATUS UNSPECIFIED: Primary | ICD-10-CM

## 2024-08-22 LAB
% B-CELL PRECURSORS: 2.68 %
% MAST CELLS: 0.03 %
ABNORMAL PLASMA CELL EVENTS: 0
BODY SITE - BONE MARROW: NORMAL
CLINICAL DIAGNOSIS - BONE MARROW: NORMAL
COMMENT: NORMAL
DNA/RNA EXTRACT AND HOLD RESULT: NORMAL
DNA/RNA EXTRACTION: NORMAL
EXHR SPECIMEN TYPE: NORMAL
FINAL PATHOLOGIC DIAGNOSIS: NORMAL
FLOW CYTOMETRY ANTIBODIES ANALYZED - BONE MARROW: NORMAL
FLOW CYTOMETRY COMMENT - BONE MARROW: NORMAL
FLOW CYTOMETRY INTERPRETATION - BONE MARROW: NORMAL
GROSS: NORMAL
LOWER LIMIT OF QUANTITATION (LLOQ): 1 X10(-5)
Lab: NORMAL
MICROSCOPIC EXAM: NORMAL
MINIMAL RESIDUAL DISEASE DETECTION (MRD), BONE MARROW: 0 %
PATIENT / SAMPLE THEORETICAL LOQ: 2 X10(-6)
PERCENT NORMAL PLASMA CELLS (OF TOTAL PC): 100 %
PLASMA CELLS ASSESS MAR: NORMAL
POLY PLASMA CELL EVENTS: 290
TOTAL CELLS COUNTED MAR: NORMAL
TOTAL PLASMA CELL EVENTS: 290
VALIDATED ASSAY SENSITIVITY: 2.92 X10(-6)

## 2024-08-22 PROCEDURE — 72125 CT NECK SPINE W/O DYE: CPT | Mod: TC

## 2024-08-28 ENCOUNTER — TELEPHONE (OUTPATIENT)
Dept: INFECTIOUS DISEASES | Facility: CLINIC | Age: 44
End: 2024-08-28
Payer: COMMERCIAL

## 2024-08-28 NOTE — TELEPHONE ENCOUNTER
Sent message to [facility] to assess if post-transplant vaccines can be administered

## 2024-08-30 ENCOUNTER — OFFICE VISIT (OUTPATIENT)
Dept: HEMATOLOGY/ONCOLOGY | Facility: CLINIC | Age: 44
End: 2024-08-30
Payer: COMMERCIAL

## 2024-08-30 ENCOUNTER — LAB VISIT (OUTPATIENT)
Dept: LAB | Facility: HOSPITAL | Age: 44
End: 2024-08-30
Attending: INTERNAL MEDICINE
Payer: COMMERCIAL

## 2024-08-30 VITALS
DIASTOLIC BLOOD PRESSURE: 78 MMHG | SYSTOLIC BLOOD PRESSURE: 126 MMHG | HEART RATE: 67 BPM | RESPIRATION RATE: 18 BRPM | BODY MASS INDEX: 28.87 KG/M2 | TEMPERATURE: 99 F | HEIGHT: 69 IN | WEIGHT: 194.88 LBS | OXYGEN SATURATION: 99 %

## 2024-08-30 DIAGNOSIS — C90.01 MULTIPLE MYELOMA IN REMISSION: ICD-10-CM

## 2024-08-30 DIAGNOSIS — C90.01 MULTIPLE MYELOMA IN REMISSION: Primary | ICD-10-CM

## 2024-08-30 DIAGNOSIS — M87.9 OSTEONECROSIS OF JAW: ICD-10-CM

## 2024-08-30 DIAGNOSIS — Z71.85 VACCINE COUNSELING: ICD-10-CM

## 2024-08-30 DIAGNOSIS — Z79.899 IMMUNODEFICIENCY DUE TO DRUGS: ICD-10-CM

## 2024-08-30 DIAGNOSIS — Z76.82 STEM CELL TRANSPLANT CANDIDATE: ICD-10-CM

## 2024-08-30 DIAGNOSIS — Z94.84 HISTORY OF AUTO STEM CELL TRANSPLANT: ICD-10-CM

## 2024-08-30 DIAGNOSIS — D84.821 IMMUNODEFICIENCY DUE TO DRUGS: ICD-10-CM

## 2024-08-30 LAB
ALBUMIN SERPL BCP-MCNC: 3.8 G/DL (ref 3.5–5.2)
ALP SERPL-CCNC: 58 U/L (ref 55–135)
ALT SERPL W/O P-5'-P-CCNC: 14 U/L (ref 10–44)
ANION GAP SERPL CALC-SCNC: 9 MMOL/L (ref 8–16)
AST SERPL-CCNC: 18 U/L (ref 10–40)
BASOPHILS # BLD AUTO: 0.02 K/UL (ref 0–0.2)
BASOPHILS NFR BLD: 0.5 % (ref 0–1.9)
BILIRUB SERPL-MCNC: 0.3 MG/DL (ref 0.1–1)
BUN SERPL-MCNC: 26 MG/DL (ref 6–20)
CALCIUM SERPL-MCNC: 9.5 MG/DL (ref 8.7–10.5)
CHLORIDE SERPL-SCNC: 105 MMOL/L (ref 95–110)
CO2 SERPL-SCNC: 25 MMOL/L (ref 23–29)
CREAT SERPL-MCNC: 1.6 MG/DL (ref 0.5–1.4)
DIFFERENTIAL METHOD BLD: ABNORMAL
EOSINOPHIL # BLD AUTO: 0.2 K/UL (ref 0–0.5)
EOSINOPHIL NFR BLD: 4 % (ref 0–8)
ERYTHROCYTE [DISTWIDTH] IN BLOOD BY AUTOMATED COUNT: 13.1 % (ref 11.5–14.5)
EST. GFR  (NO RACE VARIABLE): 53.5 ML/MIN/1.73 M^2
GLUCOSE SERPL-MCNC: 80 MG/DL (ref 70–110)
HCT VFR BLD AUTO: 34.2 % (ref 40–54)
HGB BLD-MCNC: 11.3 G/DL (ref 14–18)
IGA SERPL-MCNC: 9 MG/DL (ref 40–350)
IGG SERPL-MCNC: 223 MG/DL (ref 650–1600)
IGM SERPL-MCNC: 16 MG/DL (ref 50–300)
IMM GRANULOCYTES # BLD AUTO: 0 K/UL (ref 0–0.04)
IMM GRANULOCYTES NFR BLD AUTO: 0 % (ref 0–0.5)
LYMPHOCYTES # BLD AUTO: 0.9 K/UL (ref 1–4.8)
LYMPHOCYTES NFR BLD: 22.3 % (ref 18–48)
MCH RBC QN AUTO: 33.3 PG (ref 27–31)
MCHC RBC AUTO-ENTMCNC: 33 G/DL (ref 32–36)
MCV RBC AUTO: 101 FL (ref 82–98)
MONOCYTES # BLD AUTO: 0.6 K/UL (ref 0.3–1)
MONOCYTES NFR BLD: 14.1 % (ref 4–15)
NEUTROPHILS # BLD AUTO: 2.4 K/UL (ref 1.8–7.7)
NEUTROPHILS NFR BLD: 59.1 % (ref 38–73)
NRBC BLD-RTO: 0 /100 WBC
PLATELET # BLD AUTO: 175 K/UL (ref 150–450)
PMV BLD AUTO: 10.1 FL (ref 9.2–12.9)
POTASSIUM SERPL-SCNC: 4.5 MMOL/L (ref 3.5–5.1)
PROT SERPL-MCNC: 6.1 G/DL (ref 6–8.4)
RBC # BLD AUTO: 3.39 M/UL (ref 4.6–6.2)
SODIUM SERPL-SCNC: 139 MMOL/L (ref 136–145)
VARICELLA INTERPRETATION: POSITIVE
VARICELLA ZOSTER IGG: 302
WBC # BLD AUTO: 4.04 K/UL (ref 3.9–12.7)

## 2024-08-30 PROCEDURE — 36415 COLL VENOUS BLD VENIPUNCTURE: CPT | Performed by: INTERNAL MEDICINE

## 2024-08-30 PROCEDURE — 3074F SYST BP LT 130 MM HG: CPT | Mod: CPTII,S$GLB,, | Performed by: INTERNAL MEDICINE

## 2024-08-30 PROCEDURE — 82784 ASSAY IGA/IGD/IGG/IGM EACH: CPT | Mod: 59 | Performed by: INTERNAL MEDICINE

## 2024-08-30 PROCEDURE — 86334 IMMUNOFIX E-PHORESIS SERUM: CPT | Performed by: INTERNAL MEDICINE

## 2024-08-30 PROCEDURE — G2211 COMPLEX E/M VISIT ADD ON: HCPCS | Mod: S$GLB,,, | Performed by: INTERNAL MEDICINE

## 2024-08-30 PROCEDURE — 99999 PR PBB SHADOW E&M-EST. PATIENT-LVL IV: CPT | Mod: PBBFAC,,, | Performed by: INTERNAL MEDICINE

## 2024-08-30 PROCEDURE — 86334 IMMUNOFIX E-PHORESIS SERUM: CPT | Mod: 26,,, | Performed by: PATHOLOGY

## 2024-08-30 PROCEDURE — 3078F DIAST BP <80 MM HG: CPT | Mod: CPTII,S$GLB,, | Performed by: INTERNAL MEDICINE

## 2024-08-30 PROCEDURE — 84165 PROTEIN E-PHORESIS SERUM: CPT | Performed by: INTERNAL MEDICINE

## 2024-08-30 PROCEDURE — 3008F BODY MASS INDEX DOCD: CPT | Mod: CPTII,S$GLB,, | Performed by: INTERNAL MEDICINE

## 2024-08-30 PROCEDURE — 99215 OFFICE O/P EST HI 40 MIN: CPT | Mod: S$GLB,,, | Performed by: INTERNAL MEDICINE

## 2024-08-30 PROCEDURE — 84165 PROTEIN E-PHORESIS SERUM: CPT | Mod: 26,,, | Performed by: PATHOLOGY

## 2024-08-30 PROCEDURE — 85025 COMPLETE CBC W/AUTO DIFF WBC: CPT | Performed by: INTERNAL MEDICINE

## 2024-08-30 PROCEDURE — 86787 VARICELLA-ZOSTER ANTIBODY: CPT | Performed by: INTERNAL MEDICINE

## 2024-08-30 PROCEDURE — 80053 COMPREHEN METABOLIC PANEL: CPT | Performed by: INTERNAL MEDICINE

## 2024-08-30 PROCEDURE — 83521 IG LIGHT CHAINS FREE EACH: CPT | Performed by: INTERNAL MEDICINE

## 2024-08-30 NOTE — PROGRESS NOTES
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 M with no significant medical problems. He has been diagnosed with multiple myeloma. He is here for a virtual stem cell transplant consultation.   Per records,  presented to [facility] on 10/11/23 after 1 month of progressive weakness, abdominal pain, and intermittent confusion. At the time of his presentation, his labs, which had been normal  in 8/2023, were notable for new severe DOUG, transaminitis, and Ca of 18. He was admitted and started on IVF .  Hypercalcemia workup was initiated and he was ultimately found to have numerous osseous lesions on 10/15/23 CT CAP, FLC - Kappa 3.29, Lambda 744, Ratio 226 , SPEP with M spike 0.17, IgG lambda on sIFE. Bone marrow biopsy done 10/18/23 revealed 95% plasma cells and FISH with TP53 deletion, FGFR3/IGH (or NSD2/IGH) fusion, usually representing a t(4;14), and a tetraploid subclone was observed. After correction of his hypercalcemia and improvement in his renal function he was discharged on 10/22/23.   He was started on Beatriz + RVd on 11/2/23.      Interval History -8/30/24      Presents today at day +109 post SCT from joan autoSCT.  He has completely recovered from transplant.   He had day +100 restaging and presents as well to do this as well.       Review of patient's allergies indicates:  No Known Allergies      Current Outpatient Medications   Medication Sig    acyclovir (ZOVIRAX) 800 MG Tab Take 1 tablet (800 mg total) by mouth 2 (two) times daily.    aspirin 81 MG Chew Take 1 tablet (81 mg total) by mouth every evening. Hold until instructed to resume by provider.    dextroamphetamine-amphetamine (ADDERALL) 20 mg tablet Take 1 tablet by mouth once daily.    febuxostat (ULORIC) 40 mg Tab Take 1 tablet (40 mg total) by mouth once  daily.    gabapentin (NEURONTIN) 100 MG capsule Take 3 capsules (300 mg total) by mouth 2 (two) times daily. Begin with 100mg BID and increase to 300mg BID as tolerated.    hydrOXYzine HCL (ATARAX) 25 MG tablet Take 1 tablet (25 mg total) by mouth 3 (three) times daily as needed for Anxiety.    melatonin (MELATIN) 3 mg tablet Take 3 tablets (9 mg total) by mouth nightly as needed for Insomnia.    metoprolol tartrate (LOPRESSOR) 25 MG tablet Take 12.5 mg by mouth 2 (two) times daily.    ondansetron (ZOFRAN) 8 MG tablet Take 1 tablet (8 mg total) by mouth every 8 (eight) hours as needed for Nausea.    oxyCODONE (ROXICODONE) 10 mg Tab immediate release tablet Take 1 tablet (10 mg total) by mouth every 4 (four) hours as needed for Pain.    pantoprazole (PROTONIX) 20 MG tablet TAKE 1 TABLET (20 MG TOTAL) BY MOUTH ONCE DAILY (Patient taking differently: Take 20 mg by mouth every evening.)    prochlorperazine (COMPAZINE) 5 MG tablet Take 1 tablet (5 mg total) by mouth every 6 (six) hours as needed for Nausea.    sodium bicarbonate 650 MG tablet Take 650 mg by mouth 2 (two) times daily.    sulfamethoxazole-trimethoprim 800-160mg (BACTRIM DS) 800-160 mg Tab Take 1 tablet by mouth 3 (three) times a week. To start on 06/12/24.    tamsulosin (FLOMAX) 0.4 mg Cap Take 1 capsule (0.4 mg total) by mouth every evening.     No current facility-administered medications for this visit.          See HPI       PS: ECOG 1 / KPS 80  Response at transplant: VGPR  Risk at transplant: low  HCT CI score: 3 ( 2 points for DLCO < 80%; 1 point for anxiety)        Vitals:    08/30/24 1341   BP: 126/78   Pulse: 67   Resp: 18   Temp: 98.7 °F (37.1 °C)     General - well developed, well nourished, no apparent distress  HEENT - oropharynx clear  Chest and Lung - clear to auscultation bilaterally   Cardiovascular - RRR with no MGR, normal S1 and S2  Abdomen-  soft, nontender, no palpable hepatomegaly or splenomegaly  Lymph - no palpable  lymphadenopathy  Heme - no bruising, petechiae, pallor  Skin - no rashes or lesions  Psych - appropriate mood and affect    Lab Results   Component Value Date    WBC 4.96 09/05/2024    HGB 12.5 (L) 09/05/2024    HCT 37.0 (L) 09/05/2024    MCV 98.9 (H) 09/05/2024     09/05/2024       Gran # (ANC)   Date Value Ref Range Status   08/30/2024 2.4 1.8 - 7.7 K/uL Final     Gran %   Date Value Ref Range Status   08/30/2024 59.1 38.0 - 73.0 % Final     CMP  Sodium   Date Value Ref Range Status   09/05/2024 141 136 - 145 mmol/L Final   08/30/2024 139 136 - 145 mmol/L Final     Potassium   Date Value Ref Range Status   09/05/2024 4.1 3.5 - 5.1 mmol/L Final   08/30/2024 4.5 3.5 - 5.1 mmol/L Final     Chloride   Date Value Ref Range Status   09/05/2024 108 (H) 98 - 107 mmol/L Final   08/30/2024 105 95 - 110 mmol/L Final     CO2   Date Value Ref Range Status   09/05/2024 25 22 - 29 mmol/L Final   08/30/2024 25 23 - 29 mmol/L Final     Glucose   Date Value Ref Range Status   08/30/2024 80 70 - 110 mg/dL Final     BUN   Date Value Ref Range Status   08/30/2024 26 (H) 6 - 20 mg/dL Final     Blood Urea Nitrogen   Date Value Ref Range Status   09/05/2024 24.0 (H) 8.9 - 20.6 mg/dL Final     Creatinine   Date Value Ref Range Status   09/05/2024 1.40 (H) 0.73 - 1.18 mg/dL Final   08/30/2024 1.6 (H) 0.5 - 1.4 mg/dL Final     Calcium   Date Value Ref Range Status   09/05/2024 9.3 8.4 - 10.2 mg/dL Final   08/30/2024 9.5 8.7 - 10.5 mg/dL Final     Total Protein   Date Value Ref Range Status   08/30/2024 6.1 6.0 - 8.4 g/dL Final     Albumin   Date Value Ref Range Status   09/05/2024 3.9 3.5 - 5.0 g/dL Final   08/30/2024 3.8 3.5 - 5.2 g/dL Final     Total Bilirubin   Date Value Ref Range Status   08/30/2024 0.3 0.1 - 1.0 mg/dL Final     Comment:     For infants and newborns, interpretation of results should be based  on gestational age, weight and in agreement with clinical  observations.    Premature Infant recommended reference  ranges:  Up to 24 hours.............<8.0 mg/dL  Up to 48 hours............<12.0 mg/dL  3-5 days..................<15.0 mg/dL  6-29 days.................<15.0 mg/dL       Bilirubin Total   Date Value Ref Range Status   09/05/2024 0.3 <=1.5 mg/dL Final     Alkaline Phosphatase   Date Value Ref Range Status   08/30/2024 58 55 - 135 U/L Final     ALP   Date Value Ref Range Status   09/05/2024 58 40 - 150 unit/L Final     AST   Date Value Ref Range Status   09/05/2024 19 5 - 34 unit/L Final   08/30/2024 18 10 - 40 U/L Final     ALT   Date Value Ref Range Status   09/05/2024 19 0 - 55 unit/L Final   08/30/2024 14 10 - 44 U/L Final     Anion Gap   Date Value Ref Range Status   08/30/2024 9 8 - 16 mmol/L Final     eGFR   Date Value Ref Range Status   09/05/2024 >60 mL/min/1.73/m2 Final   08/30/2024 53.5 (A) >60 mL/min/1.73 m^2 Final     IgG   Date Value Ref Range Status   08/30/2024 223 (L) 650 - 1600 mg/dL Final     Comment:     IgG Cord Blood Reference Range: 650-1600 mg/dL.     IgA   Date Value Ref Range Status   08/30/2024 9 (L) 40 - 350 mg/dL Final     Comment:     IgA Cord Blood Reference Range: <5 mg/dL.     IgM   Date Value Ref Range Status   08/30/2024 16 (L) 50 - 300 mg/dL Final     Comment:     IgM Cord Blood Reference Range: <25 mg/dL.     Kappa Free Light Chains   Date Value Ref Range Status   08/30/2024 0.37 0.33 - 1.94 mg/dL Final   04/16/2024 0.44 0.33 - 1.94 mg/dL Final     Lambda Free Light Chains   Date Value Ref Range Status   08/30/2024 0.43 (L) 0.57 - 2.63 mg/dL Final   04/16/2024 0.42 (L) 0.57 - 2.63 mg/dL Final     Kappa/Lambda FLC Ratio   Date Value Ref Range Status   08/30/2024 0.86 0.26 - 1.65 Final     Comment:     Undetected antigen excess is a rare event but cannot   be excluded. If these free light chain results do not   agree with other clinical or laboratory findings or   if the sample is from a patient that has previously   demonstrated antigen excess, discuss with the testing   laboratory.    Results should always be interpreted in conjunction   with other laboratory tests and clinical evidence.     04/16/2024 1.05 0.26 - 1.65 Final     Comment:     Undetected antigen excess is a rare event but cannot   be excluded. If these free light chain results do not   agree with other clinical or laboratory findings or   if the sample is from a patient that has previously   demonstrated antigen excess, discuss with the testing   laboratory.   Results should always be interpreted in conjunction   with other laboratory tests and clinical evidence.       Pathologist Interpretation SPE   Date Value Ref Range Status   08/30/2024 REVIEWED  Final     Comment:       Electronically reviewed and signed by:  Yaquelin Jim MD  Signed on 09/04/24 at 09:30  Decreased total protein.  Decreased gamma globulins.    No discrete paraprotein bands identified.     04/16/2024 REVIEWED  Final     Comment:       Electronically reviewed and signed by:  Bethanie Bains MD  Signed on 04/17/24 at 15:48  Decreased total protein.  A linear irregularity in near gamma approximately = 0.05 G/dL.       Pathologist Interpretation OSVALDO   Date Value Ref Range Status   08/30/2024 REVIEWED  Final     Comment:       Electronically reviewed and signed by:  Yaquelin Jim MD  Signed on 09/04/24 at 09:30  No monoclonal peaks identified.        Results for orders placed or performed during the hospital encounter of 08/20/24 (from the past 2160 hour(s))   NM PET CT FDG Whole Body    Impression    1. No focal increased radiotracer uptake to suggest active multiple myeloma.  Background uptake within index osseous lesions.  2. Additional findings, as above.    Electronically signed by resident: Radha Borja  Date:    08/20/2024  Time:    10:19    Electronically signed by: Shadi Kumar  Date:    08/20/2024  Time:    10:53     Bone marrow biopsy - 8/20/24  Final Pathologic Diagnosis     BONE MARROW, LEFT ILIAC CREST (ASPIRATE SMEAR, TOUCH IMPRINT, CLOT  SECTION, AND CORE BIOPSY):  -- HYPOCELLULAR MARROW (20-30%) WITH TRILINEAGE HEMATOPOIESIS.  -- NO MORPHOLOGIC OR IMMUNOPHENOTYPIC EVIDENCE OF RESIDUAL/RELAPSED PLASMA CELL MYELOMA.  -- INCREASED STORAGE IRON.  -- SEE COMMENT       Multiple Myeloma MRD by Flow, BM  Order: [id number]  Status: Final result       Visible to patient: Yes (seen)       Next appt: 10/03/2024 at 08:40 AM in Hematology and Oncology (Elizabeth K Lejeune, MD)       Dx: Multiple myeloma in remission; Histor...    0 Result Notes       Component Ref Range & Units 2 wk ago   Minimal Residual Disease Detection, Bone Marrow % 0.0000   PERCENT NORMAL PLASMA CELLS (OF TOTAL PC) % 100.0   NON-AGGREGATE EVENTS  [id number]   TOTAL PLASMA CELL EVENTS  290   POLY PLASMA CELL EVENTS  290   ABNORMAL PLASMA CELL EVENTS  0   % B-CELL PRECURSORS % 2.684   % MAST CELLS % 0.028   VALIDATED ASSAY SENSITIVITY x10(-6) 2.92   Comment: LOQ is calculated as a minimum of 20 abnormal PC events  divided by the actual number of non-aggregate events  collected for this sample.   LOWER LIMIT OF QUANTITATION (LLOQ) x10(-5) 1.0   PATIENT / SAMPLE THEORETICAL LOQ x10(-6) 2.0   Comment: LLOQ is defined as a minimum of 20 abnormal PC events  detected in 10 million non-aggregate events, based on  literature and internal validation data.   Final Diagnosis  SEE BELOW   Comment: Bone marrow, flow cytometric immunophenotyping:    No monotypic plasma cells identified (MRD-negative).    Reviewed by: Tin Paredes M.D.        Plan:    1. 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, R-ISS III; double hit FISH with tp53 mutation and t(4;14)  -sp DVRd induction and now joan autoSCT on 5/13/24  -attempt to made early in induction to transition to D-KRd but did not tolerate kyprolis so transitioned back to velcade   -currently day +109  -please see dc summary for his relatively uncomplicated transplant course  -his day +100 restaging as above cw with MRD negative stringent Complete  remission; good news shared with pt today  -in light of high risk FISH studies recommend doublet maintenance with velcade q 2 weeks/revlimid 10mg day 1-21 of 28 day cycle; likely until intolerance or progression   -pt desires therapy locally with   -supportive meds:  acyclovir while on PI, bactrim through day +180 post SCT, Ca+vit d, bisphosphonate  contraindicated due to hx of ONJ  -recommend monthly cbc, cmp, serum free light chains, quantitative immunoglobulins, serum electropheresis, serum immunofixation while on maintenance  -will fu with pt virtually in 3 months  -plan for 1 and 2 year post SCT restaging with MRD studies at [facility]     2. Immunosuppression   -acyc and bactrim as above  -will refer to ID for post transplant vaccine counseling         3. Right hand pain/numbness  -pet with no correlate and in remission so dont think myelomatous related  -upcoming c-spine MRI scheduled    FU:  -refer to ID for virtual for vaccines counseling post transplant  -virtual MD appt in 3 months (no labs at [facility] needed)

## 2024-08-30 NOTE — Clinical Note
-refer to ID for virtual for vaccines counseling post transplant -virtual MD appt in 3 months (no labs at [facility] [place] [facility] needed)

## 2024-09-03 LAB
ALBUMIN SERPL ELPH-MCNC: 4 G/DL (ref 3.35–5.55)
ALPHA1 GLOB SERPL ELPH-MCNC: 0.29 G/DL (ref 0.17–0.41)
ALPHA2 GLOB SERPL ELPH-MCNC: 0.7 G/DL (ref 0.43–0.99)
B-GLOBULIN SERPL ELPH-MCNC: 0.51 G/DL (ref 0.5–1.1)
GAMMA GLOB SERPL ELPH-MCNC: 0.21 G/DL (ref 0.67–1.58)
INTERPRETATION SERPL IFE-IMP: NORMAL
KAPPA LC SER QL IA: 0.37 MG/DL (ref 0.33–1.94)
KAPPA LC/LAMBDA SER IA: 0.86 (ref 0.26–1.65)
LAMBDA LC SER QL IA: 0.43 MG/DL (ref 0.57–2.63)
PROT SERPL-MCNC: 5.7 G/DL (ref 6–8.4)

## 2024-09-04 ENCOUNTER — HOSPITAL ENCOUNTER (OUTPATIENT)
Dept: RADIOLOGY | Facility: HOSPITAL | Age: 44
Discharge: HOME | End: 2024-09-04
Payer: COMMERCIAL

## 2024-09-04 ENCOUNTER — PATIENT MESSAGE (OUTPATIENT)
Dept: HEMATOLOGY/ONCOLOGY | Facility: CLINIC | Age: 44
End: 2024-09-04
Payer: COMMERCIAL

## 2024-09-04 DIAGNOSIS — C90.00 MULTIPLE MYELOMA, REMISSION STATUS UNSPECIFIED: ICD-10-CM

## 2024-09-04 DIAGNOSIS — M54.12 CERVICAL RADICULOPATHY: ICD-10-CM

## 2024-09-04 LAB
PATHOLOGIST INTERPRETATION IFE: NORMAL
PATHOLOGIST INTERPRETATION SPE: NORMAL

## 2024-09-04 PROCEDURE — 25500020 PHARM REV CODE 255

## 2024-09-04 PROCEDURE — A9577 INJ MULTIHANCE: HCPCS

## 2024-09-04 PROCEDURE — 72156 MRI NECK SPINE W/O & W/DYE: CPT | Mod: TC

## 2024-09-04 RX ADMIN — GADOBENATE DIMEGLUMINE 15 ML: 529 INJECTION, SOLUTION INTRAVENOUS at 12:09

## 2024-09-04 NOTE — PROGRESS NOTES
Subjective:       Patient ID: Pete Fernandez is a 44 y.o. male.    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 Beatriz+ KrD for cycle 2 on 11/30/23; Change at request of [facility]  Stopped after 1 dose due to severe pleural and pericardial effusion development   Switched back to BEATRIZ + RVD with increase of Rev to 25mg  2. Beatriz+ RVD - 11/2/23 x 1 cycle, plan to restart for C2 on 12/14 potentially  C1 - Rev 10mg dose reduction due to CrCl  C2 - Rev increased to 25mg due to renal function improvement  C3 - D22 held 1 week due to covid  C4-  D22 Rev decreased to 10mg due to decline in renal function  C5 - Hold Beatriz this cycle in preparation for upcoming transplant. Rev remains at 10mg due to renal function  Zometa 10/17/23 - 1/11/2024 (3 treatments total)  Currently on hold due to ONJ  3. Stem Cell Transplant 5/6/24    HPI  44yo M presented to [facility] on 10/11 after 1 month of progressive weakness, abdominal pain, and intermittent confusion. At the time of his presentation, his labs which had been normal with PCP in 8/2023 were notable for new severe DOUG, transaminitis, and Ca of 18. He was admitted and started on IVF resuscitation.  Hypercalcemia workup was initiated and he was ultimately found to have numerous osseous lesions on 10/15/23 CT CAP, FLC - Kappa 3.29, Lambda 744, Ratio 226 , SPEP with Mspike 0.17 IgG lambda. Bone marrow biopsy done 10/18/23 revealed 95% plasma cells and FISH with TP53 deletion, FGFR3/IGH (or NSD2/IGH) fusion, usually representing a t(4;14), and a tetraploid subclone was observed. After correction of his hypercalcemia and improvement in his renal function he was discharged on 10/22/23.   He started C1 Beatriz + RVD + Zometa on 11/2/23. He was referred to [facility] for consideration of bone marrow transplant, they recommended change to Beatriz + Krd, however patient  developed life threatening pleural and pericardial edema several days after 1st infusion requiring hospitalization and placement of a pericardial window. Therefore he transitioned back to [facility] - D for C2.     Interval History:   He returns to the clinic today for a follow-up post SCT. He feels well today. He does have occasional arthralgias that is controlled with his pain regimen as needed. Labs reviewed in detail with him. He had a follow up with Dr. Crowell on 8/30 for PET and BMBx results, states that he was told he is doing okay and can resume normal activity. He also states that per Dr. Crowell office visit on 8/30/24, plan is to start maintenance revlimid and velcade, office note not complete at this time He admits to cutting his grass a few days ago with a mask but has since developed allergy like symptoms. He is taking mucinex for his cough, states that this is helping. Denies any fevers, chills, NS, headaches, bleeding, bruising, neuropathy.       Past Medical History:   Diagnosis Date    Anxiety disorder, unspecified     Hypercalcemia       Past Surgical History:   Procedure Laterality Date    BONE MARROW BIOPSY N/A 10/18/2023    Procedure: Biopsy-bone marrow;  Surgeon: Nhan Marte MD;  Location: [facility];  Service: General;  Laterality: N/A;    INSERTION OF TUNNELED CENTRAL VENOUS HEMODIALYSIS CATHETER Left 5/6/2024    Procedure: INSERTION, CATHETER, HEMODIALYSIS, DUAL LUMEN, left poss right, Bard 14.5 fr hemosplit catheter, model [id number];  Surgeon: Alvin White MD;  Location: [address];  Service: General;  Laterality: Left;    TONSILLECTOMY       Social History     Socioeconomic History    Marital status:      Spouse name: Rosalinda    Number of children: 2   Tobacco Use    Smoking status: Never    Smokeless tobacco: Never   Substance and Sexual Activity    Alcohol use: Yes    Drug use: Never    Sexual activity: Yes     Partners: Female     Social Determinants of Health      Financial Resource Strain: Medium Risk (11/15/2023)    Overall Financial Resource Strain (CARDIA)     Difficulty of Paying Living Expenses: Somewhat hard   Food Insecurity: Food Insecurity Present (11/15/2023)    Hunger Vital Sign     Worried About Running Out of Food in the Last Year: Sometimes true     Ran Out of Food in the Last Year: Never true   Transportation Needs: No Transportation Needs (11/15/2023)    PRAPARE - Transportation     Lack of Transportation (Medical): No     Lack of Transportation (Non-Medical): No   Physical Activity: Insufficiently Active (11/15/2023)    Exercise Vital Sign     Days of Exercise per Week: 3 days     Minutes of Exercise per Session: 30 min   Stress: Stress Concern Present (11/15/2023)    [employer] [place] [employer] - Occupational Stress Questionnaire     Feeling of Stress : To some extent   Housing Stability: Low Risk  (11/15/2023)    Housing Stability Vital Sign     Unable to Pay for Housing in the Last Year: No     Number of Places Lived in the Last Year: 1     Unstable Housing in the Last Year: No      Family History   Problem Relation Name Age of Onset    Breast cancer Mother      Pancreatic cancer Father        Review of patient's allergies indicates:  No Known Allergies   Review of Systems   Constitutional:  Negative for activity change, appetite change, chills, fatigue, fever and unexpected weight change.   HENT:  Negative for mouth sores and sore throat.    Eyes:  Negative for visual disturbance.   Respiratory:  Negative for cough and shortness of breath.    Cardiovascular:  Negative for chest pain.   Gastrointestinal:  Negative for abdominal pain, constipation, diarrhea, nausea and vomiting.   Endocrine: Negative for polyuria.   Genitourinary:  Negative for dysuria and frequency.   Musculoskeletal:  Negative for back pain.   Integumentary:  Negative for rash.   Allergic/Immunologic: Negative for frequent infections.   Neurological:  Negative for  weakness and headaches.   Hematological:  Negative for adenopathy. Does not bruise/bleed easily.   Psychiatric/Behavioral:  The patient is not nervous/anxious.          Objective:      Vitals:    09/05/24 1014   BP: (!) 125/92   Pulse: 87   Resp: 20   Temp: 98.2 °F (36.8 °C)         Physical Exam  Constitutional:       General: He is not in acute distress.     Appearance: Normal appearance. He is not ill-appearing.   HENT:      Head: Normocephalic and atraumatic.      Nose: Nose normal.      Mouth/Throat:      Mouth: Mucous membranes are moist.      Pharynx: Oropharynx is clear.   Eyes:      Extraocular Movements: Extraocular movements intact.      Conjunctiva/sclera: Conjunctivae normal.      Pupils: Pupils are equal, round, and reactive to light.   Cardiovascular:      Rate and Rhythm: Normal rate and regular rhythm.      Pulses: Normal pulses.      Heart sounds: Normal heart sounds. No murmur heard.  Pulmonary:      Effort: Pulmonary effort is normal. No respiratory distress.      Breath sounds: Normal breath sounds.   Abdominal:      General: There is no distension.      Palpations: Abdomen is soft.      Tenderness: There is no abdominal tenderness.   Musculoskeletal:         General: Normal range of motion.      Cervical back: Normal range of motion and neck supple.   Lymphadenopathy:      Cervical: No cervical adenopathy.   Skin:     General: Skin is warm and dry.      Findings: No rash.   Neurological:      General: No focal deficit present.      Mental Status: He is alert and oriented to person, place, and time.         LABS AND IMAGING REVIEWED IN EPIC  10/11/23 Xray Metastatic Survey  1. Scattered small defects at the calvarium measuring up to 5 mm too numerous to count.  2. Degene mild rative changes at the spine and extremities  3. MR exam and bone scan would allow further evaluation.  10/12/23 MRI Brain  1. Motion artifact  2. Mild scattered mucosal thickening throughout the paranasal sinuses  3.  Somewhat limited evaluation of brain metastases without the administration of IV contrast  4. Otherwise unremarkable MR exam of the head without the administration of IV contrast  10/12/23 NM Bone Scan  1. Abnormal intense increased activity throughout the lungs of uncertain etiology.  This may related to hyperparathyroidism, hematologic malignancy, and or metabolic abnormality such as renal failure, vasculitis, or pulmonary infection.  Chest x-ray 10/10/2023 reveals the lungs to be relatively clear and well aerated.  2. Suspect scattered mild degenerative changes throughout the joint spaces  3. Increased activity nasal maxillary region suggesting mucoperiosteal disease  4. Mild activity at the stomach suggesting free pertechnetate  10/14/23 CT CAP  1. Widespread osseous findings suggestive of extensive metastatic skeletal disease, to include expansile destructive soft tissue component at the anterior right 7th rib.  2. Enlarged intrathoracic lymph nodes could also be of metastatic etiology.  3. Diffuse ground-glass attenuation through both lungs is nonspecific, could be secondary to atypical infectious or inflammatory process.  However, possibility of metastatic involvement cannot be excluded.  12/7/23 CXR:   1. Hazy opacification lower lungs bilaterally suspicious for infiltrate and pleural reaction which has progressed since the prior exam  2. Cardiomegaly  3. Thoracic spondylosis  12/11/23 CXR:  1. Mild cardiomegaly  2. Persistent bibasilar infiltrate and or pleural reaction  3. Thoracic spondylosis with anterior wedging again evident at a lower thoracic vertebral body  12/13/23 ECHO: EF 50-55%  2/7/24 PET/CT:  1. Interval improved appearance of lytic osseous lesions compared to previous exam with sub threshold uptake. The bones are demineralized with numerous tiny lytic foci at the axial and appendicular skeleton.  2. Subacute appearing bilateral rib and right clavicle fractures with mild FDG uptake.  3.  Nonspecific patchy ground-glass and interstitial opacities with mild FDG uptake.  8/20/24 PET/CT:  1. No focal increased radiotracer uptake to suggest active multiple myeloma.  Background uptake within index osseous lesions.  2. Additional findings, as above  8/22/24 CT Cervical Spine:  1. Multiple 2-4 mm round defects scattered throughout the visualized vertebral bodies.  A neoplastic etiology must be considered  2. Cervical spondylosis  3. Findings and other details as above  4. MR examination would allow further evaluation if clinically indicated  9/4/24 MRI Cervical Spine:  1. Mild to moderate encroachment into the neural foramina and to a lesser extent the central spinal canal at C5-6 as described  2. Mild cervical spondylosis  3. Pinpoint posterior annular tear at C5-6       Free Light Chains  10/17//23 - Lambda , Kappa FLC 3.29, K/L Ratio .0044  11/22/23 - Lambda FLC 0.66, Kappa FLC 0.66, K/L Ratio 1.0  01/18/24 - lambda FLC 0.33, kappa FLC 0.39, K/L ratio 1.18      Pathology:  8/20/24 BMBx:  BONE MARROW, LEFT ILIAC CREST (ASPIRATE SMEAR, TOUCH IMPRINT, CLOT SECTION, AND CORE BIOPSY):   -- HYPOCELLULAR MARROW (20-30%) WITH TRILINEAGE HEMATOPOIESIS.   -- NO MORPHOLOGIC OR IMMUNOPHENOTYPIC EVIDENCE OF RESIDUAL/RELAPSED PLASMA CELL MYELOMA.   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 Displayed all CRAB criteria at time of diagnosis in October. Underwent 1st line Beatriz + RVD + Zometa. Transplant done 5/13/24      Plan:   Labs overall stable.   Continue to hold zometa due to ONJ.   Continue follow-up with transplant, Dr. Crowell, no follow up at this time; plan for maintenance revlimid and velcade, pending office note completion   Nerve conduction test to evaluate right hand neuropathy  RTC in 4 weeks with MD for FU/lab/treatment planning  Cbc, cmp, MM labs- 5 days prior @ [facility]      Addendum - Will plan to undergo maintenance therapy give  high risk disease with Velcade Q2w and Revlimid 10mg D1-21 Q28 day cycle. Needs to remain on Aspirin and Acyclovir while on this therapy. Continue Bactrim until +D180 post SCT. Plan to start therapy on 9/19 with NP visit, labs same day. No chemo ed required.     I spent a total of 35 minutes on the day of the visit.This includes face to face time and non-face to face time preparing to see the patient (eg, review of tests), obtaining and/or reviewing separately obtained history, documenting clinical information in the electronic or other health record, independently interpreting results and communicating results to the patient/family/caregiver, or care coordinator.     **All blood products must be irradiated and CMV negative**    Elizabeth Lejeune MD  Hematology /Oncology  [facility]      Professional Services:  Claudia PATTERSON LPN, acted solely as a scribe for and in the presence of Dr. Elizabeth Lejeune, who performed these services.

## 2024-09-05 ENCOUNTER — OFFICE VISIT (OUTPATIENT)
Dept: HEMATOLOGY/ONCOLOGY | Facility: CLINIC | Age: 44
End: 2024-09-05
Payer: COMMERCIAL

## 2024-09-05 ENCOUNTER — LAB VISIT (OUTPATIENT)
Dept: LAB | Facility: HOSPITAL | Age: 44
End: 2024-09-05
Payer: COMMERCIAL

## 2024-09-05 VITALS
HEART RATE: 87 BPM | OXYGEN SATURATION: 98 % | WEIGHT: 193.31 LBS | DIASTOLIC BLOOD PRESSURE: 92 MMHG | RESPIRATION RATE: 20 BRPM | BODY MASS INDEX: 28.63 KG/M2 | TEMPERATURE: 98 F | SYSTOLIC BLOOD PRESSURE: 125 MMHG | HEIGHT: 69 IN

## 2024-09-05 DIAGNOSIS — C90.01 MULTIPLE MYELOMA IN REMISSION: ICD-10-CM

## 2024-09-05 DIAGNOSIS — Z94.84 HISTORY OF AUTO STEM CELL TRANSPLANT: ICD-10-CM

## 2024-09-05 DIAGNOSIS — C90.00 METASTATIC MULTIPLE MYELOMA TO BONE: ICD-10-CM

## 2024-09-05 DIAGNOSIS — M79.641 RIGHT HAND PAIN: Primary | ICD-10-CM

## 2024-09-05 DIAGNOSIS — C90.01 MULTIPLE MYELOMA IN REMISSION: Primary | ICD-10-CM

## 2024-09-05 DIAGNOSIS — C90.00 MULTIPLE MYELOMA, REMISSION STATUS UNSPECIFIED: ICD-10-CM

## 2024-09-05 LAB
ALBUMIN SERPL-MCNC: 3.9 G/DL (ref 3.5–5)
ALBUMIN/GLOB SERPL: 1.6 RATIO (ref 1.1–2)
ALP SERPL-CCNC: 58 UNIT/L (ref 40–150)
ALT SERPL-CCNC: 19 UNIT/L (ref 0–55)
ANION GAP SERPL CALC-SCNC: 8 MEQ/L
AST SERPL-CCNC: 19 UNIT/L (ref 5–34)
BASOPHILS # BLD AUTO: 0.02 X10(3)/MCL
BASOPHILS NFR BLD AUTO: 0.4 %
BILIRUB SERPL-MCNC: 0.3 MG/DL
BUN SERPL-MCNC: 24 MG/DL (ref 8.9–20.6)
CALCIUM SERPL-MCNC: 9.3 MG/DL (ref 8.4–10.2)
CHLORIDE SERPL-SCNC: 108 MMOL/L (ref 98–107)
CO2 SERPL-SCNC: 25 MMOL/L (ref 22–29)
CREAT SERPL-MCNC: 1.4 MG/DL (ref 0.73–1.18)
CREAT/UREA NIT SERPL: 17
EOSINOPHIL # BLD AUTO: 0.35 X10(3)/MCL (ref 0–0.9)
EOSINOPHIL NFR BLD AUTO: 7.1 %
ERYTHROCYTE [DISTWIDTH] IN BLOOD BY AUTOMATED COUNT: 13 % (ref 11.5–17)
GFR SERPLBLD CREATININE-BSD FMLA CKD-EPI: >60 ML/MIN/1.73/M2
GLOBULIN SER-MCNC: 2.4 GM/DL (ref 2.4–3.5)
GLUCOSE SERPL-MCNC: 84 MG/DL (ref 74–100)
HCT VFR BLD AUTO: 37 % (ref 42–52)
HGB BLD-MCNC: 12.5 G/DL (ref 14–18)
IGA SERPL-MCNC: <25 MG/DL (ref 63–484)
IGG SERPL-MCNC: 238 MG/DL (ref 540–1822)
IGM SERPL-MCNC: 18 MG/DL (ref 22–240)
IMM GRANULOCYTES # BLD AUTO: 0.01 X10(3)/MCL (ref 0–0.04)
IMM GRANULOCYTES NFR BLD AUTO: 0.2 %
LYMPHOCYTES # BLD AUTO: 0.55 X10(3)/MCL (ref 0.6–4.6)
LYMPHOCYTES NFR BLD AUTO: 11.1 %
MAGNESIUM SERPL-MCNC: 2 MG/DL (ref 1.6–2.6)
MCH RBC QN AUTO: 33.4 PG (ref 27–31)
MCHC RBC AUTO-ENTMCNC: 33.8 G/DL (ref 33–36)
MCV RBC AUTO: 98.9 FL (ref 80–94)
MONOCYTES # BLD AUTO: 0.58 X10(3)/MCL (ref 0.1–1.3)
MONOCYTES NFR BLD AUTO: 11.7 %
NEUTROPHILS # BLD AUTO: 3.45 X10(3)/MCL (ref 2.1–9.2)
NEUTROPHILS NFR BLD AUTO: 69.5 %
PLATELET # BLD AUTO: 180 X10(3)/MCL (ref 130–400)
PMV BLD AUTO: 8.9 FL (ref 7.4–10.4)
POTASSIUM SERPL-SCNC: 4.1 MMOL/L (ref 3.5–5.1)
PROT SERPL-MCNC: 6.3 GM/DL (ref 6.4–8.3)
RBC # BLD AUTO: 3.74 X10(6)/MCL (ref 4.7–6.1)
SODIUM SERPL-SCNC: 141 MMOL/L (ref 136–145)
WBC # BLD AUTO: 4.96 X10(3)/MCL (ref 4.5–11.5)

## 2024-09-05 PROCEDURE — 83521 IG LIGHT CHAINS FREE EACH: CPT

## 2024-09-05 PROCEDURE — 80053 COMPREHEN METABOLIC PANEL: CPT

## 2024-09-05 PROCEDURE — 3008F BODY MASS INDEX DOCD: CPT | Mod: CPTII,S$GLB,, | Performed by: STUDENT IN AN ORGANIZED HEALTH CARE EDUCATION/TRAINING PROGRAM

## 2024-09-05 PROCEDURE — 86334 IMMUNOFIX E-PHORESIS SERUM: CPT

## 2024-09-05 PROCEDURE — 85025 COMPLETE CBC W/AUTO DIFF WBC: CPT

## 2024-09-05 PROCEDURE — 84165 PROTEIN E-PHORESIS SERUM: CPT

## 2024-09-05 PROCEDURE — 99999 PR PBB SHADOW E&M-EST. PATIENT-LVL V: CPT | Mod: PBBFAC,,, | Performed by: STUDENT IN AN ORGANIZED HEALTH CARE EDUCATION/TRAINING PROGRAM

## 2024-09-05 PROCEDURE — 99214 OFFICE O/P EST MOD 30 MIN: CPT | Mod: S$GLB,,, | Performed by: STUDENT IN AN ORGANIZED HEALTH CARE EDUCATION/TRAINING PROGRAM

## 2024-09-05 PROCEDURE — 3080F DIAST BP >= 90 MM HG: CPT | Mod: CPTII,S$GLB,, | Performed by: STUDENT IN AN ORGANIZED HEALTH CARE EDUCATION/TRAINING PROGRAM

## 2024-09-05 PROCEDURE — 36415 COLL VENOUS BLD VENIPUNCTURE: CPT

## 2024-09-05 PROCEDURE — 83735 ASSAY OF MAGNESIUM: CPT

## 2024-09-05 PROCEDURE — 3074F SYST BP LT 130 MM HG: CPT | Mod: CPTII,S$GLB,, | Performed by: STUDENT IN AN ORGANIZED HEALTH CARE EDUCATION/TRAINING PROGRAM

## 2024-09-05 PROCEDURE — 1160F RVW MEDS BY RX/DR IN RCRD: CPT | Mod: CPTII,S$GLB,, | Performed by: STUDENT IN AN ORGANIZED HEALTH CARE EDUCATION/TRAINING PROGRAM

## 2024-09-05 PROCEDURE — 82784 ASSAY IGA/IGD/IGG/IGM EACH: CPT

## 2024-09-05 PROCEDURE — 1159F MED LIST DOCD IN RCRD: CPT | Mod: CPTII,S$GLB,, | Performed by: STUDENT IN AN ORGANIZED HEALTH CARE EDUCATION/TRAINING PROGRAM

## 2024-09-05 RX ORDER — CALCITRIOL 0.5 UG/1
0.5 CAPSULE ORAL
COMMUNITY
Start: 2024-08-20

## 2024-09-06 DIAGNOSIS — C90.01 MULTIPLE MYELOMA IN REMISSION: Primary | ICD-10-CM

## 2024-09-06 LAB
ALBUMIN % SPEP (OHS): 62.84 (ref 48.1–59.5)
ALBUMIN SERPL-MCNC: 3.6 G/DL (ref 3.5–5)
ALBUMIN/GLOB SERPL: 1.6 RATIO (ref 1.1–2)
ALPHA 1 GLOB (OHS): 0.25 GM/DL (ref 0–0.4)
ALPHA 1 GLOB% (OHS): 4.27 (ref 2.3–4.9)
ALPHA 2 GLOB % (OHS): 13.49 (ref 6.9–13)
ALPHA 2 GLOB (OHS): 0.78 GM/DL (ref 0.4–1)
BETA GLOB (OHS): 0.78 GM/DL (ref 0.7–1.3)
BETA GLOB% (OHS): 13.43 (ref 13.8–19.7)
GAMMA GLOBULIN % (OHS): 5.96 (ref 10.1–21.9)
GAMMA GLOBULIN (OHS): 0.35 GM/DL (ref 0.4–1.8)
GLOBULIN SER-MCNC: 2.2 GM/DL (ref 2.4–3.5)
KAPPA LC FREE SER NEPH-MCNC: 0.44 MG/DL (ref 0.33–1.94)
KAPPA LC FREE/LAMBDA FREE SER NEPH: 0.75 {RATIO} (ref 0.26–1.65)
LAMBDA LC FREE SER NEPH-MCNC: 0.59 MG/DL (ref 0.57–2.63)
M SPIKE % (OHS): ABNORMAL
M SPIKE (OHS): ABNORMAL
PATH REV: NORMAL
PROT SERPL-MCNC: 5.8 GM/DL (ref 6.4–8.3)

## 2024-09-06 RX ORDER — LENALIDOMIDE 10 MG/1
10 CAPSULE ORAL DAILY
Qty: 21 EACH | Refills: 11 | Status: SHIPPED | OUTPATIENT
Start: 2024-09-06 | End: 2024-09-06

## 2024-09-06 RX ORDER — SODIUM CHLORIDE 0.9 % (FLUSH) 0.9 %
10 SYRINGE (ML) INJECTION
OUTPATIENT
Start: 2024-09-19

## 2024-09-06 RX ORDER — BORTEZOMIB 3.5 MG/1
1.3 INJECTION, POWDER, LYOPHILIZED, FOR SOLUTION INTRAVENOUS; SUBCUTANEOUS
OUTPATIENT
Start: 2024-09-19

## 2024-09-06 RX ORDER — HEPARIN 100 UNIT/ML
500 SYRINGE INTRAVENOUS
OUTPATIENT
Start: 2024-09-19

## 2024-09-06 RX ORDER — LENALIDOMIDE 10 MG/1
10 CAPSULE ORAL DAILY
Qty: 21 EACH | Refills: 0 | Status: ACTIVE | OUTPATIENT
Start: 2024-09-06

## 2024-09-09 ENCOUNTER — PATIENT MESSAGE (OUTPATIENT)
Dept: HEMATOLOGY/ONCOLOGY | Facility: CLINIC | Age: 44
End: 2024-09-09
Payer: COMMERCIAL

## 2024-09-09 DIAGNOSIS — C90.00 MULTIPLE MYELOMA, REMISSION STATUS UNSPECIFIED: ICD-10-CM

## 2024-09-09 RX ORDER — OXYCODONE HYDROCHLORIDE 10 MG/1
10 TABLET ORAL EVERY 4 HOURS PRN
Qty: 90 TABLET | Refills: 0 | Status: SHIPPED | OUTPATIENT
Start: 2024-09-09

## 2024-09-10 NOTE — PLAN OF CARE
Patient will receive injection in a timely manor. Patient completed injection without difficulty   Physical Therapy Daily Treatment Note    [facility]  [address] [place] [address]  [phone] (phone)  [phone] (fax)    Patient: Bailee Garza   : 1943  Diagnosis/ICD-10 Code:  Bilateral primary osteoarthritis of knee [M17.0]  Referring practitioner: TERESA Jacobs  Date of Initial Visit: Type: THERAPY  Noted: 2024  Today's Date: 9/10/2024  Patient seen for 13 sessions             Subjective Evaluation    History of Present Illness    Subjective comment: I go for pre op testing this week.  My surgery is scheduled for the .  Getting nervous about the surgery.       Objective     AQUATIC EX:     Water Walk                 Forward, backward, sideways x 2 laps ea - on own  March Walk                 2 laps  Tandem Walk              1 lap w/ noodle support - *deferred  Stretch 1                      calf 20 sec x 2 ea  Stretch 2                      -  Stretch 3                      -  Stretch Other 1           -  Stretch Other 2           -  Vertical Traction          -  Abdominals                 LN x 12  Clams                          15x, seated  Hip Abd/Add                15x ea  Hip Flex (SLR)            15x ea  Hip Ext                        15x ea, tighten thigh / buttock  March in Place            15x  Mini Squat                   15x  Toe/Heel Raises         20/20  Alt HS curls                 15x ea  Leg Press                    15x ea w/ large blue ring  Uni-Squat                    -  Hip Circles                   10x ea direction  Uni-Clock                    10x ea   Alt step taps                10x ea to bottom pool step, 1 UE rail support prn  Step Ups                     10x ea, bottom pool step, 1 UE rail support prn  STS from pool bench  10x, no hands   Bicycle                         2 min, seated  Flutter/Scissor             15 / 15, seated  Exercise 1                   seated LAQ + ankle DF x 10 ea  Exercise 2                    SLS Balance R/L        10-15 sec x 2 ea  Exercise 3                   KB push/pull x 10, back at wall  Exercise 4                   Alt UE flex/ext x 10 ea (hands only)  Exercise 5                   UE horiz abd x 10 (hands only)  Exercise 6                   -      9/4/2024 - Bailee Garza reports: knees are worse since starting PT in pool- Bought some KT tape - taping her knee -   Saw JOHN SILVERIO 8/5/2024 -   Very limited going stairs - more pain down vs up   Joined [employer] recently for 3 months -   Pain medial knee and feels like her knee cap is catching - (R) knee -  (L) knee swells, but has no pain -  Knee (R) is buckling at times - more often - getting up off sofa, turning / twisting -      Objective   Pt ambulating w/o AD -  Swelling anterior/ant-medial (R) knee -   Tender medial knee and PF jt (R)         Tenderness Details  Severely tender medial and ant/med joint line (R) > (L)   Tender popliteal region (R) knee -      Active Range of Motion   Left Knee   Flexion: 150 degrees   Extension: 0 (Passive HE) degrees      Right Knee   Flexion: 130 (stiffness - no pain   Extension: 0 (Passive HE) degrees      Strength/Myotome Testing      Left Knee   Normal strength     Right Knee   Flexion: 4+  Extension: 4+  Quadriceps contraction: fair     Additional Strength Details  Hip Flexion (R) 4+/5;  (L) 55  Hip ABDuction 4/5 (B)      Tests      Additional Tests Details  (+) Nii's Medial knee (R)  (+) Thessally's Medial knee (R)  (-) Valgus/Varus testing  (R)  (-) Ant/Post Drawer  (R)      TREATMENT - .  Up / down 2 flights of stairs w/ HR reciprocally   BERNADETTE LEG PRESS 90# 2x 15  NUSTEP Seat 8 Arms 10 L6 x 5 min     Functional / Therapeutic Activities:    TAPING / BRACING: K-Tape to Unload PF jt and medial knee joint x 2 strips (R)   FUNCTIONAL ASSESSMENT  SEE EXERCISE FLOW SHEET -   Reviewed Jt protection, ADL modification;       KOS: 33/80    8/5 pain level is down to a 3 on a scale of 10 in regards  to the right knee and the left knee has no pain.  States that she still has a constant daily ache but overall it is pretty tolerable.  States that she joined at [facility] and is doing her exercises there.  She does report that she has recently had both of her knees injected with cortisone and this is also giving her some relief.  Patient denies any instability or buckling issues.  Denies any sign or symptoms of infection, she is without any other significant complaints today.      8/13 - STATES THAT PATIENT FEELS LIKE HER KNEE IS GOING TO GIVE OUT ALL OF THE TIME, AND SHE'S WORRIED SHE IS GOING TO FALL.     8/29/2024  severe constant right knee pain. The pain is progressed over the last few years and now limits basic activities of daily living and walking tolerance. It has not improved with injection physical therapy or anti-inflammatories. It limits her to very short walking distances     Assessment/Plan  81 y.o. year-old female  (B) knee osteoarthritis.  Bailee would benefit from continued Physical Therapy including addition of land therapy w/ Aquatic Therapy to include patellar taping which greatly reduced her pain and allowed improved tolerance to PT and walking, stairs and functional activities - 2x/wk x 6 weeks      GOAL STATUS:   STG 1 Patient will perform aquatic therapy exercises for hip, knee, core, and balance ROM/flexibility, strength and conditioning without increased pain.-ONGOING     STG 2 Patient will demonstrate good postural awareness with standing and walking in pool.- MET w/ cuing     STG 3 Patient will report decreased pain 25% at rest and w/ ADLs, / LE activities - NOT MET     ADDENDUM GOAL:  STG 4: INITIATE HEP for LAND PT for strengthening / balance/proprioception, functional activities;      LTGs:  12 weeks or at time of DISCHARGE     LTG 1 Patient will demonstrate an independent aquatic HEP for hip, knee, core , and balance, strength,  ROM/flexibility, conditioning and balance with  community resources -ONGOING     LTG 2 Patient will demonstrate increased core strength and balance with use of added resistive equipment.-ONGOING     LTG 3 Patient will report decreased functional disability on KOS ADL to > 60/80 -NOT MET     GOAL ADDENDUM:  LTG 4 Pt able to ambulate up/down > 2 flights of stairs reciprocally w/ HR prn        Assessment & Plan       Assessment  Assessment details: Patient is pleased with the progress she has made with strengthening but continues to have knee pain so she is scheduled to proceed with TKA surgery (scheduled next week - 9/18/24).  Continued with previous aquatic ex/activity for mobility, flexibility, and strength / stabilization.  She used printed copy of aquatic ex as a guide for performance of therapy ex today with minimal cuing and / or clarification with regard to printed instructions.  She demonstrates good knowledge / recall of ~ 80-90% of aquatic exercises.  Emphasis on upright posture, actively engaging abdominals / gluts / quads, and performing ex with good form / quality and control to minimize compensation / strain on joints and maximize exercise benefit.  Patient was given printed copy of aquatic exercises so that she can continue on her own as a member once her surgical incision is fully healed and she is able to return to water exercises.      Plan:  Patient to have TKA surgery next week so her treatment will be suspended and she will return for post op treatment when appropriate following her surgery.                   Timed:  Aquatic Therapy    39     mins [id number];    Kenisha Lorenz PT  Physical Therapist    [place] License: [id number]

## 2024-09-17 ENCOUNTER — TELEPHONE (OUTPATIENT)
Dept: NEUROLOGY | Facility: CLINIC | Age: 44
End: 2024-09-17
Payer: COMMERCIAL

## 2024-09-17 NOTE — TELEPHONE ENCOUNTER
----- Message from Barbie Madrid MA sent at 9/12/2024  3:26 PM CDT -----  Hello,     I have received request to schedule EMG with Dr. Fernando White.     I have attempted to reach patient with no success. I was able to identify him as well as alternate contact on voicemail, therefore I left appt time and date and call back phone number on  in order to inform of next available.     He has been scheduled on October 25, 2024 @ 8:30. I will be mailing new patient packet to patient with this information as well.     Please let me know if there is anything I can do to assist. Thank you  ----- Message -----  From: Brigida Mcdaniel MA  Sent: 9/12/2024   2:35 PM CDT  To: Christopher JULIEN Staff    Would  be able to take this referral?  ----- Message -----  From: Elena Arthur  Sent: 9/12/2024   2:23 PM CDT  To: Brigida Mcdaniel MA    I called this morning and spoke w/ someone who told me that there were 2 physicians who did EMG studies and that they accepted new patients and to fax the order over.    Is there another physician that patient could possibly see to have this done?  ----- Message -----  From: Brigida Mcdaniel MA  Sent: 9/12/2024   1:22 PM CDT  To: Elena Arthur    Good afternoon,  We received a referral for an EMG for this patient for .  is currently not accepting any new patients for EMG studies.

## 2024-09-18 NOTE — PROGRESS NOTES
Subjective:       Patient ID: Pete Fernandez is a 44 y.o. male.    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 Beatriz+ KrD for cycle 2 on 11/30/23; Change at request of [facility]  Stopped after 1 dose due to severe pleural and pericardial effusion development   Switched back to BEATRIZ + RVD with increase of Rev to 25mg  2. Beatriz+ RVD - 11/2/23 x 1 cycle, plan to restart for C2 on 12/14 potentially  C1 - Rev 10mg dose reduction due to CrCl  C2 - Rev increased to 25mg due to renal function improvement  C3 - D22 held 1 week due to covid  C4-  D22 Rev decreased to 10mg due to decline in renal function  C5 - Hold Beatriz this cycle in preparation for upcoming transplant. Rev remains at 10mg due to renal function  Zometa 10/17/23 - 1/11/2024 (3 treatments total)  Currently on hold due to ONJ  3. Stem Cell Transplant 5/6/24    HPI  42yo M presented to [facility] on 10[facility]11 after 1 month of progressive weakness, abdominal pain, and intermittent confusion. At the time of his presentation, his labs which had been normal with PCP in 8/2023 were notable for new severe DOUG, transaminitis, and Ca of 18. He was admitted and started on IVF resuscitation.  Hypercalcemia workup was initiated and he was ultimately found to have numerous osseous lesions on 10/15/23 CT CAP, FLC - Kappa 3.29, Lambda 744, Ratio 226 , SPEP with Mspike 0.17 IgG lambda. Bone marrow biopsy done 10/18/23 revealed 95% plasma cells and FISH with TP53 deletion, FGFR3/IGH (or NSD2/IGH) fusion, usually representing a t(4;14), and a tetraploid subclone was observed. After correction of his hypercalcemia and improvement in his renal function he was discharged on 10/22/23.   He started C1 Beatriz + RVD + Zometa on 11/2/23. He was referred to [facility] for consideration of bone marrow transplant, they recommended change to Beatriz + Krd, however patient  developed life threatening pleural and pericardial edema several days after 1st infusion requiring hospitalization and placement of a pericardial window. Therefore he transitioned back to Beatriz - RVD for C2.     Interval History:       9/19/2024:  Mr. Fernandez is here today for follow-up, labs, and cycle 1, day 1 of Bortezomib Q 2 week treatment.  Today, he reports feeling well.  He expressed concern regarding lack of sexual desire.  We discussed this can happen after transplant and is reported in the literature, 50% of men have lower sexual activity.  He will contact the Transplant advisor and let them know.  I advised him to contact our office, and I would be glad to send prescription for Viagra.  He will start his maintenance Revlimid 10mg D1-21 Q28 day cycle, today, 9/19/2024.  He continues Acyclovir 800 mg PO BID along with Bactrim DS.  He denies any fever, chills, SOB, chest pain, cough, abdominal pain, change in bowel or bladder habits, abnormal bleeding or bruising, joint or bone pain, no edema of the lower extremities.  He has occasional numbness in the hands and arthralgias.  Labs reviewed with patient.  Appetite is still diminished since transplant and drinking.  Weight is slowly increasing.  He continues to drink plenty of fluids. No recent hospitalizations, infections, or illnesses     He returns to the clinic today for a follow-up post SCT. He feels well today. He does have occasional arthralgias that is controlled with his pain regimen as needed. Labs reviewed in detail with him. He had a follow up with Dr. Crowell on 8/30 for PET and BMBx results, states that he was told he is doing okay and can resume normal activity. He also states that per Dr. Crowell office visit on 8/30/24, plan is to start maintenance revlimid and velcade, office note not complete at this time He admits to cutting his grass a few days ago with a mask but has since developed allergy like symptoms. He is taking mucinex for his  cough, states that this is helping. Denies any fevers, chills, NS, headaches, bleeding, bruising, neuropathy.       Past Medical History:   Diagnosis Date    Anxiety disorder, unspecified     Encounter for antineoplastic chemotherapy 9/19/2024    Hypercalcemia       Past Surgical History:   Procedure Laterality Date    BONE MARROW BIOPSY N/A 10/18/2023    Procedure: Biopsy-bone marrow;  Surgeon: Nhan Marte MD;  Location: [facility] OR;  Service: General;  Laterality: N/A;    INSERTION OF TUNNELED CENTRAL VENOUS HEMODIALYSIS CATHETER Left 5/6/2024    Procedure: INSERTION, CATHETER, HEMODIALYSIS, DUAL LUMEN, left poss right, Bard 14.5 fr hemosplit catheter, model [id number];  Surgeon: Alvin White MD;  Location: [facility] OR [address] [place] [address];  Service: General;  Laterality: Left;    TONSILLECTOMY       Social History     Socioeconomic History    Marital status:      Spouse name: Rosalinda    Number of children: 2   Tobacco Use    Smoking status: Never    Smokeless tobacco: Never   Substance and Sexual Activity    Alcohol use: Yes    Drug use: Never    Sexual activity: Yes     Partners: Female     Social Determinants of Health     Financial Resource Strain: Medium Risk (11/15/2023)    Overall Financial Resource Strain (CARDIA)     Difficulty of Paying Living Expenses: Somewhat hard   Food Insecurity: Food Insecurity Present (11/15/2023)    Hunger Vital Sign     Worried About Running Out of Food in the Last Year: Sometimes true     Ran Out of Food in the Last Year: Never true   Transportation Needs: No Transportation Needs (11/15/2023)    PRAPARE - Transportation     Lack of Transportation (Medical): No     Lack of Transportation (Non-Medical): No   Physical Activity: Insufficiently Active (11/15/2023)    Exercise Vital Sign     Days of Exercise per Week: 3 days     Minutes of Exercise per Session: 30 min   Stress: Stress Concern Present (11/15/2023)    [place] [employer] - Occupational Stress  Questionnaire     Feeling of Stress : To some extent   Housing Stability: Low Risk  (11/15/2023)    Housing Stability Vital Sign     Unable to Pay for Housing in the Last Year: No     Number of Places Lived in the Last Year: 1     Unstable Housing in the Last Year: No      Family History   Problem Relation Name Age of Onset    Breast cancer Mother      Pancreatic cancer Father        Review of patient's allergies indicates:  No Known Allergies   Review of Systems   Constitutional:  Negative for activity change, appetite change, chills, fatigue, fever and unexpected weight change.   HENT:  Negative for mouth sores and sore throat.    Eyes:  Negative for visual disturbance.   Respiratory:  Negative for cough and shortness of breath.    Cardiovascular:  Negative for chest pain.   Gastrointestinal:  Negative for abdominal pain, constipation, diarrhea, nausea and vomiting.   Endocrine: Negative for polyuria.   Genitourinary:  Negative for dysuria and frequency.   Musculoskeletal:  Negative for back pain.   Integumentary:  Negative for rash.   Allergic/Immunologic: Negative for frequent infections.   Neurological:  Negative for weakness and headaches.   Hematological:  Negative for adenopathy. Does not bruise/bleed easily.   Psychiatric/Behavioral:  The patient is not nervous/anxious.          Objective:      Vitals:    09/19/24 1119   BP: 132/87   Pulse: 75   Resp: 16   Temp: 97.8 °F (36.6 °C)           Physical Exam  Constitutional:       General: He is not in acute distress.     Appearance: Normal appearance. He is not ill-appearing.   HENT:      Head: Normocephalic and atraumatic.      Nose: Nose normal.      Mouth/Throat:      Mouth: Mucous membranes are moist.      Pharynx: Oropharynx is clear.   Eyes:      Extraocular Movements: Extraocular movements intact.      Conjunctiva/sclera: Conjunctivae normal.      Pupils: Pupils are equal, round, and reactive to light.   Cardiovascular:      Rate and Rhythm: Normal rate  and regular rhythm.      Pulses: Normal pulses.      Heart sounds: Normal heart sounds. No murmur heard.  Pulmonary:      Effort: Pulmonary effort is normal. No respiratory distress.      Breath sounds: Normal breath sounds.   Abdominal:      General: There is no distension.      Palpations: Abdomen is soft.      Tenderness: There is no abdominal tenderness.   Musculoskeletal:         General: Normal range of motion.      Cervical back: Normal range of motion and neck supple.   Lymphadenopathy:      Cervical: No cervical adenopathy.   Skin:     General: Skin is warm and dry.      Findings: No rash.   Neurological:      General: No focal deficit present.      Mental Status: He is alert and oriented to person, place, and time.         LABS AND IMAGING REVIEWED IN EPIC  10/11/23 Xray Metastatic Survey  1. Scattered small defects at the calvarium measuring up to 5 mm too numerous to count.  2. Degene mild rative changes at the spine and extremities  3. MR exam and bone scan would allow further evaluation.  10/12/23 MRI Brain  1. Motion artifact  2. Mild scattered mucosal thickening throughout the paranasal sinuses  3. Somewhat limited evaluation of brain metastases without the administration of IV contrast  4. Otherwise unremarkable MR exam of the head without the administration of IV contrast  10/12/23 NM Bone Scan  1. Abnormal intense increased activity throughout the lungs of uncertain etiology.  This may related to hyperparathyroidism, hematologic malignancy, and or metabolic abnormality such as renal failure, vasculitis, or pulmonary infection.  Chest x-ray 10/10/2023 reveals the lungs to be relatively clear and well aerated.  2. Suspect scattered mild degenerative changes throughout the joint spaces  3. Increased activity nasal maxillary region suggesting mucoperiosteal disease  4. Mild activity at the stomach suggesting free pertechnetate  10/14/23 CT CAP  1. Widespread osseous findings suggestive of extensive  metastatic skeletal disease, to include expansile destructive soft tissue component at the anterior right 7th rib.  2. Enlarged intrathoracic lymph nodes could also be of metastatic etiology.  3. Diffuse ground-glass attenuation through both lungs is nonspecific, could be secondary to atypical infectious or inflammatory process.  However, possibility of metastatic involvement cannot be excluded.  12/7/23 CXR:   1. Hazy opacification lower lungs bilaterally suspicious for infiltrate and pleural reaction which has progressed since the prior exam  2. Cardiomegaly  3. Thoracic spondylosis  12/11/23 CXR:  1. Mild cardiomegaly  2. Persistent bibasilar infiltrate and or pleural reaction  3. Thoracic spondylosis with anterior wedging again evident at a lower thoracic vertebral body  12/13/23 ECHO: EF 50-55%  2/7/24 PET/CT:  1. Interval improved appearance of lytic osseous lesions compared to previous exam with sub threshold uptake. The bones are demineralized with numerous tiny lytic foci at the axial and appendicular skeleton.  2. Subacute appearing bilateral rib and right clavicle fractures with mild FDG uptake.  3. Nonspecific patchy ground-glass and interstitial opacities with mild FDG uptake.  8/20/24 PET/CT:  1. No focal increased radiotracer uptake to suggest active multiple myeloma.  Background uptake within index osseous lesions.  2. Additional findings, as above  8/22/24 CT Cervical Spine:  1. Multiple 2-4 mm round defects scattered throughout the visualized vertebral bodies.  A neoplastic etiology must be considered  2. Cervical spondylosis  3. Findings and other details as above  4. MR examination would allow further evaluation if clinically indicated  9/4/24 MRI Cervical Spine:  1. Mild to moderate encroachment into the neural foramina and to a lesser extent the central spinal canal at C5-6 as described  2. Mild cervical spondylosis  3. Pinpoint posterior annular tear at C5-6       Free Light Chains  10/17//23  - Lambda , Kappa FLC 3.29, K/L Ratio .0044  11/22/23 - Lambda FLC 0.66, Kappa FLC 0.66, K/L Ratio 1.0  01/18/24 - lambda FLC 0.33, kappa FLC 0.39, K/L ratio 1.18      Pathology:  8/20/24 BMBx:  BONE MARROW, LEFT ILIAC CREST (ASPIRATE SMEAR, TOUCH IMPRINT, CLOT SECTION, AND CORE BIOPSY):   -- HYPOCELLULAR MARROW (20-30%) WITH TRILINEAGE HEMATOPOIESIS.   -- NO MORPHOLOGIC OR IMMUNOPHENOTYPIC EVIDENCE OF RESIDUAL/RELAPSED PLASMA CELL MYELOMA.   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 Displayed all CRAB criteria at time of diagnosis in October. Underwent 1st line Beatriz + RVD + Zometa. Transplant done 5/13/24      Plan:   Cycle 1, day 1 of Bortezomib today based on labs and physical assessment.Q 2 Weeks  Labs overall stable.   Continue to hold zometa due to ONJ.   Continue follow-up with transplant, Dr. Crowell, no follow up at this time; plan for maintenance revlimid and velcade, pending office note completion   Nerve conduction test to evaluate right hand neuropathy  Revlimid 10mg D1-21 Q28 day cycle.  Started today, 9/19/2024  Continue Acyclovir 800 mg PO BID.  RTC in 4 weeks with MD for FU/lab/treatment   Cbc, cmp, MM labs- 5 days prior @ [facility]      Addendum - Will plan to undergo maintenance therapy give high risk disease with Velcade Q2w and Revlimid 10mg D1-21 Q28 day cycle. Needs to remain on Aspirin and Acyclovir while on this therapy. Continue Bactrim until +D180 post SCT. Plan to start therapy on 9/19 with NP visit, labs same day. No chemo ed required.        **All blood products must be irradiated and CMV negative**    The patient is in agreement with today's plan of care.  All questions answered.  Patient is aware to contact our office for any problems or concerns prior to next visit.      Nithya Beltran, MSN-ED, APRN, AGACNP-BC, OCN

## 2024-09-19 ENCOUNTER — INFUSION (OUTPATIENT)
Dept: INFUSION THERAPY | Facility: HOSPITAL | Age: 44
End: 2024-09-19
Attending: INTERNAL MEDICINE
Payer: COMMERCIAL

## 2024-09-19 ENCOUNTER — LAB VISIT (OUTPATIENT)
Dept: LAB | Facility: HOSPITAL | Age: 44
End: 2024-09-19
Attending: STUDENT IN AN ORGANIZED HEALTH CARE EDUCATION/TRAINING PROGRAM
Payer: COMMERCIAL

## 2024-09-19 ENCOUNTER — OFFICE VISIT (OUTPATIENT)
Dept: HEMATOLOGY/ONCOLOGY | Facility: CLINIC | Age: 44
End: 2024-09-19
Payer: COMMERCIAL

## 2024-09-19 VITALS
HEIGHT: 69 IN | OXYGEN SATURATION: 100 % | BODY MASS INDEX: 28.88 KG/M2 | TEMPERATURE: 98 F | WEIGHT: 195 LBS | SYSTOLIC BLOOD PRESSURE: 132 MMHG | DIASTOLIC BLOOD PRESSURE: 87 MMHG | HEART RATE: 75 BPM | RESPIRATION RATE: 16 BRPM

## 2024-09-19 DIAGNOSIS — R53.82 CHRONIC FATIGUE: ICD-10-CM

## 2024-09-19 DIAGNOSIS — Z94.84 HISTORY OF AUTO STEM CELL TRANSPLANT: Primary | ICD-10-CM

## 2024-09-19 DIAGNOSIS — Z51.11 ENCOUNTER FOR ANTINEOPLASTIC CHEMOTHERAPY: ICD-10-CM

## 2024-09-19 DIAGNOSIS — C90.01 MULTIPLE MYELOMA IN REMISSION: Primary | ICD-10-CM

## 2024-09-19 DIAGNOSIS — C90.01 MULTIPLE MYELOMA IN REMISSION: ICD-10-CM

## 2024-09-19 LAB
ALBUMIN SERPL-MCNC: 4 G/DL (ref 3.5–5)
ALBUMIN/GLOB SERPL: 2 RATIO (ref 1.1–2)
ALP SERPL-CCNC: 52 UNIT/L (ref 40–150)
ALT SERPL-CCNC: 14 UNIT/L (ref 0–55)
ANION GAP SERPL CALC-SCNC: 7 MEQ/L
AST SERPL-CCNC: 18 UNIT/L (ref 5–34)
BASOPHILS # BLD AUTO: 0.02 X10(3)/MCL
BASOPHILS NFR BLD AUTO: 0.4 %
BILIRUB SERPL-MCNC: 0.5 MG/DL
BUN SERPL-MCNC: 25 MG/DL (ref 8.9–20.6)
CALCIUM SERPL-MCNC: 9.5 MG/DL (ref 8.4–10.2)
CHLORIDE SERPL-SCNC: 107 MMOL/L (ref 98–107)
CO2 SERPL-SCNC: 29 MMOL/L (ref 22–29)
CREAT SERPL-MCNC: 1.63 MG/DL (ref 0.73–1.18)
CREAT/UREA NIT SERPL: 15
EOSINOPHIL # BLD AUTO: 0.22 X10(3)/MCL (ref 0–0.9)
EOSINOPHIL NFR BLD AUTO: 4.5 %
ERYTHROCYTE [DISTWIDTH] IN BLOOD BY AUTOMATED COUNT: 12.8 % (ref 11.5–17)
GFR SERPLBLD CREATININE-BSD FMLA CKD-EPI: 52 ML/MIN/1.73/M2
GLOBULIN SER-MCNC: 2 GM/DL (ref 2.4–3.5)
GLUCOSE SERPL-MCNC: 76 MG/DL (ref 74–100)
HCT VFR BLD AUTO: 35.3 % (ref 42–52)
HGB BLD-MCNC: 11.9 G/DL (ref 14–18)
IMM GRANULOCYTES # BLD AUTO: 0.02 X10(3)/MCL (ref 0–0.04)
IMM GRANULOCYTES NFR BLD AUTO: 0.4 %
LYMPHOCYTES # BLD AUTO: 0.91 X10(3)/MCL (ref 0.6–4.6)
LYMPHOCYTES NFR BLD AUTO: 18.6 %
MCH RBC QN AUTO: 32.7 PG (ref 27–31)
MCHC RBC AUTO-ENTMCNC: 33.7 G/DL (ref 33–36)
MCV RBC AUTO: 97 FL (ref 80–94)
MONOCYTES # BLD AUTO: 0.52 X10(3)/MCL (ref 0.1–1.3)
MONOCYTES NFR BLD AUTO: 10.6 %
NEUTROPHILS # BLD AUTO: 3.21 X10(3)/MCL (ref 2.1–9.2)
NEUTROPHILS NFR BLD AUTO: 65.5 %
PLATELET # BLD AUTO: 194 X10(3)/MCL (ref 130–400)
PMV BLD AUTO: 9.2 FL (ref 7.4–10.4)
POTASSIUM SERPL-SCNC: 3.7 MMOL/L (ref 3.5–5.1)
PROT SERPL-MCNC: 6 GM/DL (ref 6.4–8.3)
RBC # BLD AUTO: 3.64 X10(6)/MCL (ref 4.7–6.1)
SODIUM SERPL-SCNC: 143 MMOL/L (ref 136–145)
WBC # BLD AUTO: 4.9 X10(3)/MCL (ref 4.5–11.5)

## 2024-09-19 PROCEDURE — 99999 PR PBB SHADOW E&M-EST. PATIENT-LVL V: CPT | Mod: PBBFAC,,, | Performed by: NURSE PRACTITIONER

## 2024-09-19 PROCEDURE — 63600175 PHARM REV CODE 636 W HCPCS: Mod: JZ,JG | Performed by: STUDENT IN AN ORGANIZED HEALTH CARE EDUCATION/TRAINING PROGRAM

## 2024-09-19 PROCEDURE — 85025 COMPLETE CBC W/AUTO DIFF WBC: CPT

## 2024-09-19 PROCEDURE — 80053 COMPREHEN METABOLIC PANEL: CPT

## 2024-09-19 PROCEDURE — 36415 COLL VENOUS BLD VENIPUNCTURE: CPT

## 2024-09-19 PROCEDURE — 96401 CHEMO ANTI-NEOPL SQ/IM: CPT

## 2024-09-19 RX ORDER — EPINEPHRINE 0.3 MG/.3ML
0.3 INJECTION SUBCUTANEOUS ONCE AS NEEDED
Status: CANCELLED | OUTPATIENT
Start: 2024-09-19

## 2024-09-19 RX ORDER — PROCHLORPERAZINE EDISYLATE 5 MG/ML
10 INJECTION INTRAMUSCULAR; INTRAVENOUS ONCE AS NEEDED
Status: DISCONTINUED | OUTPATIENT
Start: 2024-09-19 | End: 2024-09-19 | Stop reason: HOSPADM

## 2024-09-19 RX ORDER — SODIUM CHLORIDE 0.9 % (FLUSH) 0.9 %
10 SYRINGE (ML) INJECTION
Status: DISCONTINUED | OUTPATIENT
Start: 2024-09-19 | End: 2024-09-19 | Stop reason: HOSPADM

## 2024-09-19 RX ORDER — BORTEZOMIB 3.5 MG/1
1.3 INJECTION, POWDER, LYOPHILIZED, FOR SOLUTION INTRAVENOUS; SUBCUTANEOUS
Status: COMPLETED | OUTPATIENT
Start: 2024-09-19 | End: 2024-09-19

## 2024-09-19 RX ORDER — EPINEPHRINE 0.3 MG/.3ML
0.3 INJECTION SUBCUTANEOUS ONCE AS NEEDED
Status: DISCONTINUED | OUTPATIENT
Start: 2024-09-19 | End: 2024-09-19 | Stop reason: HOSPADM

## 2024-09-19 RX ORDER — DIPHENHYDRAMINE HYDROCHLORIDE 50 MG/ML
50 INJECTION INTRAMUSCULAR; INTRAVENOUS ONCE AS NEEDED
Status: CANCELLED | OUTPATIENT
Start: 2024-09-19

## 2024-09-19 RX ORDER — PROCHLORPERAZINE EDISYLATE 5 MG/ML
10 INJECTION INTRAMUSCULAR; INTRAVENOUS ONCE AS NEEDED
Status: CANCELLED
Start: 2024-09-19

## 2024-09-19 RX ORDER — HEPARIN 100 UNIT/ML
500 SYRINGE INTRAVENOUS
Status: DISCONTINUED | OUTPATIENT
Start: 2024-09-19 | End: 2024-09-19 | Stop reason: HOSPADM

## 2024-09-19 RX ORDER — DIPHENHYDRAMINE HYDROCHLORIDE 50 MG/ML
50 INJECTION INTRAMUSCULAR; INTRAVENOUS ONCE AS NEEDED
Status: DISCONTINUED | OUTPATIENT
Start: 2024-09-19 | End: 2024-09-19 | Stop reason: HOSPADM

## 2024-09-19 RX ADMIN — BORTEZOMIB 2.75 MG: 3.5 INJECTION, POWDER, LYOPHILIZED, FOR SOLUTION INTRAVENOUS; SUBCUTANEOUS at 12:09

## 2024-10-02 ENCOUNTER — TELEPHONE (OUTPATIENT)
Dept: INFECTIOUS DISEASES | Facility: CLINIC | Age: 44
End: 2024-10-02
Payer: COMMERCIAL

## 2024-10-02 ENCOUNTER — PATIENT MESSAGE (OUTPATIENT)
Dept: INFECTIOUS DISEASES | Facility: CLINIC | Age: 44
End: 2024-10-02
Payer: COMMERCIAL

## 2024-10-03 ENCOUNTER — LAB VISIT (OUTPATIENT)
Dept: LAB | Facility: HOSPITAL | Age: 44
End: 2024-10-03
Attending: STUDENT IN AN ORGANIZED HEALTH CARE EDUCATION/TRAINING PROGRAM
Payer: COMMERCIAL

## 2024-10-03 ENCOUNTER — INFUSION (OUTPATIENT)
Dept: INFUSION THERAPY | Facility: HOSPITAL | Age: 44
End: 2024-10-03
Attending: INTERNAL MEDICINE
Payer: COMMERCIAL

## 2024-10-03 VITALS
HEART RATE: 64 BPM | SYSTOLIC BLOOD PRESSURE: 151 MMHG | HEIGHT: 69 IN | TEMPERATURE: 99 F | RESPIRATION RATE: 18 BRPM | BODY MASS INDEX: 28.29 KG/M2 | DIASTOLIC BLOOD PRESSURE: 72 MMHG | WEIGHT: 191 LBS | OXYGEN SATURATION: 98 %

## 2024-10-03 DIAGNOSIS — C90.01 MULTIPLE MYELOMA IN REMISSION: Primary | ICD-10-CM

## 2024-10-03 DIAGNOSIS — C90.01 MULTIPLE MYELOMA IN REMISSION: ICD-10-CM

## 2024-10-03 LAB
ALBUMIN SERPL-MCNC: 3.8 G/DL (ref 3.5–5)
ALBUMIN/GLOB SERPL: 1.8 RATIO (ref 1.1–2)
ALP SERPL-CCNC: 49 UNIT/L (ref 40–150)
ALT SERPL-CCNC: 18 UNIT/L (ref 0–55)
ANION GAP SERPL CALC-SCNC: 7 MEQ/L
AST SERPL-CCNC: 17 UNIT/L (ref 5–34)
BASOPHILS # BLD AUTO: 0.04 X10(3)/MCL
BASOPHILS NFR BLD AUTO: 0.6 %
BILIRUB SERPL-MCNC: 0.6 MG/DL
BUN SERPL-MCNC: 23 MG/DL (ref 8.9–20.6)
CALCIUM SERPL-MCNC: 9.1 MG/DL (ref 8.4–10.2)
CHLORIDE SERPL-SCNC: 107 MMOL/L (ref 98–107)
CO2 SERPL-SCNC: 25 MMOL/L (ref 22–29)
CREAT SERPL-MCNC: 1.67 MG/DL (ref 0.73–1.18)
CREAT/UREA NIT SERPL: 14
EOSINOPHIL # BLD AUTO: 0.35 X10(3)/MCL (ref 0–0.9)
EOSINOPHIL NFR BLD AUTO: 5 %
ERYTHROCYTE [DISTWIDTH] IN BLOOD BY AUTOMATED COUNT: 13.2 % (ref 11.5–17)
GFR SERPLBLD CREATININE-BSD FMLA CKD-EPI: 51 ML/MIN/1.73/M2
GLOBULIN SER-MCNC: 2.1 GM/DL (ref 2.4–3.5)
GLUCOSE SERPL-MCNC: 78 MG/DL (ref 74–100)
HCT VFR BLD AUTO: 36.7 % (ref 42–52)
HGB BLD-MCNC: 12.7 G/DL (ref 14–18)
IMM GRANULOCYTES # BLD AUTO: 0.01 X10(3)/MCL (ref 0–0.04)
IMM GRANULOCYTES NFR BLD AUTO: 0.1 %
LYMPHOCYTES # BLD AUTO: 1.09 X10(3)/MCL (ref 0.6–4.6)
LYMPHOCYTES NFR BLD AUTO: 15.6 %
MCH RBC QN AUTO: 33 PG (ref 27–31)
MCHC RBC AUTO-ENTMCNC: 34.6 G/DL (ref 33–36)
MCV RBC AUTO: 95.3 FL (ref 80–94)
MONOCYTES # BLD AUTO: 0.85 X10(3)/MCL (ref 0.1–1.3)
MONOCYTES NFR BLD AUTO: 12.2 %
NEUTROPHILS # BLD AUTO: 4.65 X10(3)/MCL (ref 2.1–9.2)
NEUTROPHILS NFR BLD AUTO: 66.5 %
PLATELET # BLD AUTO: 145 X10(3)/MCL (ref 130–400)
PMV BLD AUTO: 10.3 FL (ref 7.4–10.4)
POTASSIUM SERPL-SCNC: 4 MMOL/L (ref 3.5–5.1)
PROT SERPL-MCNC: 5.9 GM/DL (ref 6.4–8.3)
RBC # BLD AUTO: 3.85 X10(6)/MCL (ref 4.7–6.1)
SODIUM SERPL-SCNC: 139 MMOL/L (ref 136–145)
WBC # BLD AUTO: 6.99 X10(3)/MCL (ref 4.5–11.5)

## 2024-10-03 PROCEDURE — 80053 COMPREHEN METABOLIC PANEL: CPT

## 2024-10-03 PROCEDURE — 96401 CHEMO ANTI-NEOPL SQ/IM: CPT

## 2024-10-03 PROCEDURE — 36415 COLL VENOUS BLD VENIPUNCTURE: CPT

## 2024-10-03 PROCEDURE — 85025 COMPLETE CBC W/AUTO DIFF WBC: CPT

## 2024-10-03 PROCEDURE — 63600175 PHARM REV CODE 636 W HCPCS: Mod: JZ,JG

## 2024-10-03 RX ORDER — DIPHENHYDRAMINE HYDROCHLORIDE 50 MG/ML
50 INJECTION INTRAMUSCULAR; INTRAVENOUS ONCE AS NEEDED
Status: DISCONTINUED | OUTPATIENT
Start: 2024-10-03 | End: 2024-10-03 | Stop reason: HOSPADM

## 2024-10-03 RX ORDER — SODIUM CHLORIDE 0.9 % (FLUSH) 0.9 %
10 SYRINGE (ML) INJECTION
Status: DISCONTINUED | OUTPATIENT
Start: 2024-10-03 | End: 2024-10-03 | Stop reason: HOSPADM

## 2024-10-03 RX ORDER — PROCHLORPERAZINE EDISYLATE 5 MG/ML
10 INJECTION INTRAMUSCULAR; INTRAVENOUS ONCE AS NEEDED
Status: DISCONTINUED | OUTPATIENT
Start: 2024-10-03 | End: 2024-10-03 | Stop reason: HOSPADM

## 2024-10-03 RX ORDER — BORTEZOMIB 3.5 MG/1
1.3 INJECTION, POWDER, LYOPHILIZED, FOR SOLUTION INTRAVENOUS; SUBCUTANEOUS
Status: COMPLETED | OUTPATIENT
Start: 2024-10-03 | End: 2024-10-03

## 2024-10-03 RX ORDER — EPINEPHRINE 0.3 MG/.3ML
0.3 INJECTION SUBCUTANEOUS ONCE AS NEEDED
Status: DISCONTINUED | OUTPATIENT
Start: 2024-10-03 | End: 2024-10-03 | Stop reason: HOSPADM

## 2024-10-03 RX ORDER — HEPARIN 100 UNIT/ML
500 SYRINGE INTRAVENOUS
Status: DISCONTINUED | OUTPATIENT
Start: 2024-10-03 | End: 2024-10-03 | Stop reason: HOSPADM

## 2024-10-03 RX ADMIN — BORTEZOMIB 2.75 MG: 3.5 INJECTION, POWDER, LYOPHILIZED, FOR SOLUTION INTRAVENOUS; SUBCUTANEOUS at 01:10

## 2024-10-07 ENCOUNTER — DOCUMENTATION ONLY (OUTPATIENT)
Dept: HEMATOLOGY/ONCOLOGY | Facility: CLINIC | Age: 44
End: 2024-10-07
Payer: COMMERCIAL

## 2024-10-07 ENCOUNTER — LAB VISIT (OUTPATIENT)
Dept: LAB | Facility: HOSPITAL | Age: 44
End: 2024-10-07
Attending: INTERNAL MEDICINE
Payer: COMMERCIAL

## 2024-10-07 DIAGNOSIS — R80.9 PROTEINURIA, UNSPECIFIED TYPE: ICD-10-CM

## 2024-10-07 DIAGNOSIS — C90.00 MYELOMA ASSOCIATED AMYLOIDOSIS: Primary | ICD-10-CM

## 2024-10-07 DIAGNOSIS — E83.52 HYPERCALCEMIA: ICD-10-CM

## 2024-10-07 DIAGNOSIS — N18.9 CHRONIC KIDNEY DISEASE, UNSPECIFIED: ICD-10-CM

## 2024-10-07 DIAGNOSIS — E85.9 MYELOMA ASSOCIATED AMYLOIDOSIS: Primary | ICD-10-CM

## 2024-10-07 DIAGNOSIS — E55.9 VITAMIN D DEFICIENCY: ICD-10-CM

## 2024-10-07 LAB
25(OH)D3+25(OH)D2 SERPL-MCNC: 65 NG/ML (ref 30–80)
BACTERIA #/AREA URNS AUTO: NORMAL /HPF
BILIRUB UR QL STRIP.AUTO: NEGATIVE
CALCIUM SERPL-MCNC: 8.9 MG/DL (ref 8.4–10.2)
CLARITY UR: CLEAR
COLOR UR AUTO: YELLOW
CREAT UR-MCNC: 67.2 MG/DL (ref 63–166)
GLUCOSE UR QL STRIP: ABNORMAL
HGB UR QL STRIP: NEGATIVE
KETONES UR QL STRIP: NEGATIVE
LEUKOCYTE ESTERASE UR QL STRIP: NEGATIVE
NITRITE UR QL STRIP: NEGATIVE
PH UR STRIP: 5.5 [PH]
PROT UR QL STRIP: ABNORMAL
PROT UR STRIP-MCNC: 15.8 MG/DL
PTH-INTACT SERPL-MCNC: 30.9 PG/ML (ref 8.7–77)
RBC #/AREA URNS AUTO: NORMAL /HPF
SP GR UR STRIP.AUTO: 1.02 (ref 1–1.03)
SQUAMOUS #/AREA URNS AUTO: NORMAL /HPF
UROBILINOGEN UR STRIP-ACNC: 0.2
WBC #/AREA URNS AUTO: NORMAL /HPF

## 2024-10-07 PROCEDURE — 82570 ASSAY OF URINE CREATININE: CPT

## 2024-10-07 PROCEDURE — 82310 ASSAY OF CALCIUM: CPT

## 2024-10-07 PROCEDURE — 82652 VIT D 1 25-DIHYDROXY: CPT

## 2024-10-07 PROCEDURE — 82306 VITAMIN D 25 HYDROXY: CPT

## 2024-10-07 PROCEDURE — 84156 ASSAY OF PROTEIN URINE: CPT

## 2024-10-07 PROCEDURE — 36415 COLL VENOUS BLD VENIPUNCTURE: CPT

## 2024-10-07 PROCEDURE — 81001 URINALYSIS AUTO W/SCOPE: CPT

## 2024-10-07 PROCEDURE — 81003 URINALYSIS AUTO W/O SCOPE: CPT

## 2024-10-07 PROCEDURE — 83970 ASSAY OF PARATHORMONE: CPT

## 2024-10-11 ENCOUNTER — PATIENT MESSAGE (OUTPATIENT)
Dept: HEMATOLOGY/ONCOLOGY | Facility: CLINIC | Age: 44
End: 2024-10-11
Payer: COMMERCIAL

## 2024-10-11 DIAGNOSIS — C90.01 MULTIPLE MYELOMA IN REMISSION: ICD-10-CM

## 2024-10-11 LAB — 1,25(OH)2D SERPL-MCNC: 40 PG/ML (ref 18–64)

## 2024-10-11 RX ORDER — LENALIDOMIDE 10 MG/1
10 CAPSULE ORAL DAILY
Qty: 21 EACH | Refills: 0 | Status: SHIPPED | OUTPATIENT
Start: 2024-10-11

## 2024-10-14 DIAGNOSIS — C90.00 MULTIPLE MYELOMA, REMISSION STATUS UNSPECIFIED: ICD-10-CM

## 2024-10-14 RX ORDER — OXYCODONE HYDROCHLORIDE 10 MG/1
10 TABLET ORAL EVERY 4 HOURS PRN
Qty: 90 TABLET | Refills: 0 | Status: SHIPPED | OUTPATIENT
Start: 2024-10-14 | End: 2024-10-14 | Stop reason: SDUPTHER

## 2024-10-14 RX ORDER — OXYCODONE HYDROCHLORIDE 10 MG/1
10 TABLET ORAL EVERY 4 HOURS PRN
Qty: 90 TABLET | Refills: 0 | Status: SHIPPED | OUTPATIENT
Start: 2024-10-14

## 2024-10-17 ENCOUNTER — INFUSION (OUTPATIENT)
Dept: INFUSION THERAPY | Facility: HOSPITAL | Age: 44
End: 2024-10-17
Attending: INTERNAL MEDICINE
Payer: COMMERCIAL

## 2024-10-17 ENCOUNTER — OFFICE VISIT (OUTPATIENT)
Dept: HEMATOLOGY/ONCOLOGY | Facility: CLINIC | Age: 44
End: 2024-10-17
Payer: COMMERCIAL

## 2024-10-17 ENCOUNTER — LAB VISIT (OUTPATIENT)
Dept: LAB | Facility: HOSPITAL | Age: 44
End: 2024-10-17
Attending: STUDENT IN AN ORGANIZED HEALTH CARE EDUCATION/TRAINING PROGRAM
Payer: COMMERCIAL

## 2024-10-17 VITALS
OXYGEN SATURATION: 99 % | HEART RATE: 72 BPM | SYSTOLIC BLOOD PRESSURE: 120 MMHG | HEIGHT: 69 IN | HEIGHT: 69 IN | TEMPERATURE: 98 F | BODY MASS INDEX: 28.24 KG/M2 | RESPIRATION RATE: 16 BRPM | TEMPERATURE: 98 F | OXYGEN SATURATION: 99 % | DIASTOLIC BLOOD PRESSURE: 85 MMHG | SYSTOLIC BLOOD PRESSURE: 120 MMHG | DIASTOLIC BLOOD PRESSURE: 85 MMHG | HEART RATE: 72 BPM | RESPIRATION RATE: 16 BRPM | WEIGHT: 190.69 LBS | WEIGHT: 190.69 LBS | BODY MASS INDEX: 28.24 KG/M2

## 2024-10-17 DIAGNOSIS — C90.01 MULTIPLE MYELOMA IN REMISSION: ICD-10-CM

## 2024-10-17 DIAGNOSIS — N52.9 ERECTILE DYSFUNCTION, UNSPECIFIED ERECTILE DYSFUNCTION TYPE: ICD-10-CM

## 2024-10-17 DIAGNOSIS — C90.00 METASTATIC MULTIPLE MYELOMA TO BONE: ICD-10-CM

## 2024-10-17 DIAGNOSIS — Z94.84 HISTORY OF AUTO STEM CELL TRANSPLANT: ICD-10-CM

## 2024-10-17 DIAGNOSIS — Z51.11 ENCOUNTER FOR ANTINEOPLASTIC CHEMOTHERAPY: ICD-10-CM

## 2024-10-17 DIAGNOSIS — C90.01 MULTIPLE MYELOMA IN REMISSION: Primary | ICD-10-CM

## 2024-10-17 DIAGNOSIS — D84.821 IMMUNODEFICIENCY DUE TO DRUGS: ICD-10-CM

## 2024-10-17 DIAGNOSIS — Z79.899 IMMUNODEFICIENCY DUE TO DRUGS: ICD-10-CM

## 2024-10-17 LAB
ALBUMIN SERPL-MCNC: 3.8 G/DL (ref 3.5–5)
ALBUMIN/GLOB SERPL: 1.7 RATIO (ref 1.1–2)
ALP SERPL-CCNC: 49 UNIT/L (ref 40–150)
ALT SERPL-CCNC: 20 UNIT/L (ref 0–55)
ANION GAP SERPL CALC-SCNC: 5 MEQ/L
AST SERPL-CCNC: 22 UNIT/L (ref 5–34)
BASOPHILS # BLD AUTO: 0.09 X10(3)/MCL
BASOPHILS NFR BLD AUTO: 2.5 %
BILIRUB SERPL-MCNC: 0.7 MG/DL
BUN SERPL-MCNC: 26 MG/DL (ref 8.9–20.6)
CALCIUM SERPL-MCNC: 9.5 MG/DL (ref 8.4–10.2)
CHLORIDE SERPL-SCNC: 105 MMOL/L (ref 98–107)
CO2 SERPL-SCNC: 28 MMOL/L (ref 22–29)
CREAT SERPL-MCNC: 1.43 MG/DL (ref 0.73–1.18)
CREAT/UREA NIT SERPL: 18
EOSINOPHIL # BLD AUTO: 0.12 X10(3)/MCL (ref 0–0.9)
EOSINOPHIL NFR BLD AUTO: 3.4 %
ERYTHROCYTE [DISTWIDTH] IN BLOOD BY AUTOMATED COUNT: 13.2 % (ref 11.5–17)
GFR SERPLBLD CREATININE-BSD FMLA CKD-EPI: >60 ML/MIN/1.73/M2
GLOBULIN SER-MCNC: 2.2 GM/DL (ref 2.4–3.5)
GLUCOSE SERPL-MCNC: 62 MG/DL (ref 74–100)
HCT VFR BLD AUTO: 37.4 % (ref 42–52)
HGB BLD-MCNC: 12.5 G/DL (ref 14–18)
IGA SERPL-MCNC: 28 MG/DL (ref 63–484)
IGG SERPL-MCNC: 310 MG/DL (ref 540–1822)
IGM SERPL-MCNC: 14 MG/DL (ref 22–240)
IMM GRANULOCYTES # BLD AUTO: 0.01 X10(3)/MCL (ref 0–0.04)
IMM GRANULOCYTES NFR BLD AUTO: 0.3 %
LYMPHOCYTES # BLD AUTO: 1.19 X10(3)/MCL (ref 0.6–4.6)
LYMPHOCYTES NFR BLD AUTO: 33.7 %
MCH RBC QN AUTO: 32.1 PG (ref 27–31)
MCHC RBC AUTO-ENTMCNC: 33.4 G/DL (ref 33–36)
MCV RBC AUTO: 95.9 FL (ref 80–94)
MONOCYTES # BLD AUTO: 0.47 X10(3)/MCL (ref 0.1–1.3)
MONOCYTES NFR BLD AUTO: 13.3 %
NEUTROPHILS # BLD AUTO: 1.65 X10(3)/MCL (ref 2.1–9.2)
NEUTROPHILS NFR BLD AUTO: 46.8 %
PLATELET # BLD AUTO: 197 X10(3)/MCL (ref 130–400)
PMV BLD AUTO: 9.3 FL (ref 7.4–10.4)
POTASSIUM SERPL-SCNC: 4.1 MMOL/L (ref 3.5–5.1)
PROT SERPL-MCNC: 6 GM/DL (ref 6.4–8.3)
RBC # BLD AUTO: 3.9 X10(6)/MCL (ref 4.7–6.1)
SODIUM SERPL-SCNC: 138 MMOL/L (ref 136–145)
WBC # BLD AUTO: 3.53 X10(3)/MCL (ref 4.5–11.5)

## 2024-10-17 PROCEDURE — 83521 IG LIGHT CHAINS FREE EACH: CPT

## 2024-10-17 PROCEDURE — 63600175 PHARM REV CODE 636 W HCPCS: Mod: JZ,JG

## 2024-10-17 PROCEDURE — 99999 PR PBB SHADOW E&M-EST. PATIENT-LVL V: CPT | Mod: PBBFAC,,,

## 2024-10-17 PROCEDURE — 80053 COMPREHEN METABOLIC PANEL: CPT

## 2024-10-17 PROCEDURE — 82784 ASSAY IGA/IGD/IGG/IGM EACH: CPT

## 2024-10-17 PROCEDURE — 85025 COMPLETE CBC W/AUTO DIFF WBC: CPT

## 2024-10-17 PROCEDURE — 36415 COLL VENOUS BLD VENIPUNCTURE: CPT

## 2024-10-17 PROCEDURE — 84165 PROTEIN E-PHORESIS SERUM: CPT

## 2024-10-17 PROCEDURE — 86334 IMMUNOFIX E-PHORESIS SERUM: CPT

## 2024-10-17 PROCEDURE — 96401 CHEMO ANTI-NEOPL SQ/IM: CPT

## 2024-10-17 RX ORDER — SODIUM CHLORIDE 0.9 % (FLUSH) 0.9 %
10 SYRINGE (ML) INJECTION
OUTPATIENT
Start: 2024-10-17

## 2024-10-17 RX ORDER — PROCHLORPERAZINE EDISYLATE 5 MG/ML
10 INJECTION INTRAMUSCULAR; INTRAVENOUS ONCE AS NEEDED
Start: 2024-10-17

## 2024-10-17 RX ORDER — EPINEPHRINE 0.3 MG/.3ML
0.3 INJECTION SUBCUTANEOUS ONCE AS NEEDED
OUTPATIENT
Start: 2024-10-17

## 2024-10-17 RX ORDER — HEPARIN 100 UNIT/ML
500 SYRINGE INTRAVENOUS
OUTPATIENT
Start: 2024-10-17

## 2024-10-17 RX ORDER — TADALAFIL 2.5 MG/1
2.5 TABLET ORAL DAILY PRN
Qty: 30 TABLET | Refills: 0 | Status: SHIPPED | OUTPATIENT
Start: 2024-10-17 | End: 2024-11-16

## 2024-10-17 RX ORDER — BORTEZOMIB 3.5 MG/1
1.3 INJECTION, POWDER, LYOPHILIZED, FOR SOLUTION INTRAVENOUS; SUBCUTANEOUS
Status: CANCELLED | OUTPATIENT
Start: 2024-10-17

## 2024-10-17 RX ORDER — BORTEZOMIB 3.5 MG/1
1.3 INJECTION, POWDER, LYOPHILIZED, FOR SOLUTION INTRAVENOUS; SUBCUTANEOUS
Status: COMPLETED | OUTPATIENT
Start: 2024-10-17 | End: 2024-10-17

## 2024-10-17 RX ORDER — DIPHENHYDRAMINE HYDROCHLORIDE 50 MG/ML
50 INJECTION INTRAMUSCULAR; INTRAVENOUS ONCE AS NEEDED
OUTPATIENT
Start: 2024-10-17

## 2024-10-17 RX ADMIN — BORTEZOMIB 2.75 MG: 3.5 INJECTION, POWDER, LYOPHILIZED, FOR SOLUTION INTRAVENOUS; SUBCUTANEOUS at 01:10

## 2024-10-17 NOTE — PROGRESS NOTES
Subjective:       Patient ID: Pete Fernandez is a 44 y.o. male.    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 Beatriz+ KrD for cycle 2 on 11/30/23; Change at request of [facility]  Stopped after 1 dose due to severe pleural and pericardial effusion development   Switched back to BEATRIZ + RVD with increase of Rev to 25mg  2. Beatriz+ RVD - 11/2/23 x 1 cycle, plan to restart for C2 on 12/14 potentially  C1 - Rev 10mg dose reduction due to CrCl  C2 - Rev increased to 25mg due to renal function improvement  C3 - D22 held 1 week due to covid  C4-  D22 Rev decreased to 10mg due to decline in renal function  C5 - Hold Beatriz this cycle in preparation for upcoming transplant. Rev remains at 10mg due to renal function  Zometa 10/17/23 - 1/11/2024 (3 treatments total)  Currently on hold due to ONJ  3. Stem Cell Transplant 5/6/24    HPI  42yo M presented to [facility] on 10/11 after 1 month of progressive weakness, abdominal pain, and intermittent confusion. At the time of his presentation, his labs which had been normal with PCP in 8/2023 were notable for new severe DOUG, transaminitis, and Ca of 18. He was admitted and started on IVF resuscitation.  Hypercalcemia workup was initiated and he was ultimately found to have numerous osseous lesions on 10/15/23 CT CAP, FLC - Kappa 3.29, Lambda 744, Ratio 226 , SPEP with Mspike 0.17 IgG lambda. Bone marrow biopsy done 10/18/23 revealed 95% plasma cells and FISH with TP53 deletion, FGFR3/IGH (or NSD2/IGH) fusion, usually representing a t(4;14), and a tetraploid subclone was observed. After correction of his hypercalcemia and improvement in his renal function he was discharged on 10/22/23.   He started C1 Beatriz + RVD + Zometa on 11/2/23. He was referred to [facility] for consideration of bone  marrow transplant, they recommended change to Beatriz + Krd, however patient developed life threatening pleural and pericardial edema several days after 1st infusion requiring hospitalization and placement of a pericardial window. Therefore he transitioned back to Beatriz - RVD for C2.     Interval History:       10/17/2024:  Mr. Fernandez is here today for follow-up, labs, and cycle 3, day 1 of Bortezomib Q 2 week treatment.  Today, he reports feeling well other than mild body aches and fatigue. He expressed concern regarding lack of sexual desire that started after transplant. He will start his maintenance Revlimid 10mg D1-21 Q28 day cycle, today, 10/17/2024.  He continues Acyclovir 800 mg PO BID along with Bactrim DS.  He denies any fever, chills, SOB, chest pain, cough, abdominal pain, change in bowel or bladder habits, abnormal bleeding or bruising, joint or bone pain, no edema of the lower extremities.  He has occasional numbness in the hands and arthralgias.  Labs reviewed with patient.  Appetite is still diminished since transplant and drinking.  Weight is slowly increasing.  He continues to drink plenty of fluids. No recent hospitalizations, infections, or illnesses         Past Medical History:   Diagnosis Date    Anxiety disorder, unspecified     Encounter for antineoplastic chemotherapy 9/19/2024    Hypercalcemia       Past Surgical History:   Procedure Laterality Date    BONE MARROW BIOPSY N/A 10/18/2023    Procedure: Biopsy-bone marrow;  Surgeon: Nhan Marte MD;  Location: [facility];  Service: General;  Laterality: N/A;    INSERTION OF TUNNELED CENTRAL VENOUS HEMODIALYSIS CATHETER Left 5/6/2024    Procedure: INSERTION, CATHETER, HEMODIALYSIS, DUAL LUMEN, left poss right, Bard 14.5 fr hemosplit catheter, model [id number];  Surgeon: Alvin White MD;  Location: [address];  Service: General;  Laterality: Left;    TONSILLECTOMY       Social History     Socioeconomic History    Marital status:       Spouse name: Rosalinda    Number of children: 2   Tobacco Use    Smoking status: Never    Smokeless tobacco: Never   Substance and Sexual Activity    Alcohol use: Yes    Drug use: Never    Sexual activity: Yes     Partners: Female     Social Drivers of Health     Financial Resource Strain: Medium Risk (11/15/2023)    Overall Financial Resource Strain (CARDIA)     Difficulty of Paying Living Expenses: Somewhat hard   Food Insecurity: Food Insecurity Present (11/15/2023)    Hunger Vital Sign     Worried About Running Out of Food in the Last Year: Sometimes true     Ran Out of Food in the Last Year: Never true   Transportation Needs: No Transportation Needs (11/15/2023)    PRAPARE - Transportation     Lack of Transportation (Medical): No     Lack of Transportation (Non-Medical): No   Physical Activity: Insufficiently Active (11/15/2023)    Exercise Vital Sign     Days of Exercise per Week: 3 days     Minutes of Exercise per Session: 30 min   Stress: Stress Concern Present (11/15/2023)    [employer] [place] [employer] - Occupational Stress Questionnaire     Feeling of Stress : To some extent   Housing Stability: Low Risk  (11/15/2023)    Housing Stability Vital Sign     Unable to Pay for Housing in the Last Year: No     Number of Places Lived in the Last Year: 1     Unstable Housing in the Last Year: No      Family History   Problem Relation Name Age of Onset    Breast cancer Mother      Pancreatic cancer Father        Review of patient's allergies indicates:  No Known Allergies   Review of Systems   Constitutional:  Positive for fatigue. Negative for activity change, appetite change, chills, fever and unexpected weight change.   HENT:  Negative for mouth sores and sore throat.    Eyes:  Negative for visual disturbance.   Respiratory:  Negative for cough and shortness of breath.    Cardiovascular:  Negative for chest pain.   Gastrointestinal:  Negative for abdominal pain, constipation, diarrhea, nausea and  vomiting.   Endocrine: Negative for polyuria.   Genitourinary:  Positive for erectile dysfunction. Negative for dysuria and frequency.   Musculoskeletal:  Negative for back pain.   Integumentary:  Negative for rash.   Allergic/Immunologic: Negative for frequent infections.   Neurological:  Negative for weakness and headaches.   Hematological:  Negative for adenopathy. Does not bruise/bleed easily.   Psychiatric/Behavioral:  The patient is not nervous/anxious.          Objective:      Vitals:    10/17/24 1258   BP: 120/85   Pulse: 72   Resp: 16   Temp: 98.4 °F (36.9 °C)           Physical Exam  Constitutional:       General: He is not in acute distress.     Appearance: Normal appearance. He is not ill-appearing.   HENT:      Head: Normocephalic and atraumatic.      Nose: Nose normal.      Mouth/Throat:      Mouth: Mucous membranes are moist.      Pharynx: Oropharynx is clear.   Eyes:      Extraocular Movements: Extraocular movements intact.      Conjunctiva/sclera: Conjunctivae normal.      Pupils: Pupils are equal, round, and reactive to light.   Cardiovascular:      Rate and Rhythm: Normal rate and regular rhythm.      Pulses: Normal pulses.      Heart sounds: Normal heart sounds. No murmur heard.  Pulmonary:      Effort: Pulmonary effort is normal. No respiratory distress.      Breath sounds: Normal breath sounds.   Abdominal:      General: There is no distension.      Palpations: Abdomen is soft.      Tenderness: There is no abdominal tenderness.   Musculoskeletal:         General: Normal range of motion.      Cervical back: Normal range of motion and neck supple.   Lymphadenopathy:      Cervical: No cervical adenopathy.   Skin:     General: Skin is warm and dry.      Findings: No rash.   Neurological:      General: No focal deficit present.      Mental Status: He is alert and oriented to person, place, and time.         LABS AND IMAGING REVIEWED IN EPIC  10/11/23 Xray Metastatic Survey  1. Scattered small  defects at the calvarium measuring up to 5 mm too numerous to count.  2. Degene mild rative changes at the spine and extremities  3. MR exam and bone scan would allow further evaluation.  10/12/23 MRI Brain  1. Motion artifact  2. Mild scattered mucosal thickening throughout the paranasal sinuses  3. Somewhat limited evaluation of brain metastases without the administration of IV contrast  4. Otherwise unremarkable MR exam of the head without the administration of IV contrast  10/12/23 NM Bone Scan  1. Abnormal intense increased activity throughout the lungs of uncertain etiology.  This may related to hyperparathyroidism, hematologic malignancy, and or metabolic abnormality such as renal failure, vasculitis, or pulmonary infection.  Chest x-ray 10/10/2023 reveals the lungs to be relatively clear and well aerated.  2. Suspect scattered mild degenerative changes throughout the joint spaces  3. Increased activity nasal maxillary region suggesting mucoperiosteal disease  4. Mild activity at the stomach suggesting free pertechnetate  10/14/23 CT CAP  1. Widespread osseous findings suggestive of extensive metastatic skeletal disease, to include expansile destructive soft tissue component at the anterior right 7th rib.  2. Enlarged intrathoracic lymph nodes could also be of metastatic etiology.  3. Diffuse ground-glass attenuation through both lungs is nonspecific, could be secondary to atypical infectious or inflammatory process.  However, possibility of metastatic involvement cannot be excluded.  12/7/23 CXR:   1. Hazy opacification lower lungs bilaterally suspicious for infiltrate and pleural reaction which has progressed since the prior exam  2. Cardiomegaly  3. Thoracic spondylosis  12/11/23 CXR:  1. Mild cardiomegaly  2. Persistent bibasilar infiltrate and or pleural reaction  3. Thoracic spondylosis with anterior wedging again evident at a lower thoracic vertebral body  12/13/23 ECHO: EF 50-55%  2/7/24 PET/CT:  1.  Interval improved appearance of lytic osseous lesions compared to previous exam with sub threshold uptake. The bones are demineralized with numerous tiny lytic foci at the axial and appendicular skeleton.  2. Subacute appearing bilateral rib and right clavicle fractures with mild FDG uptake.  3. Nonspecific patchy ground-glass and interstitial opacities with mild FDG uptake.  8/20/24 PET/CT:  1. No focal increased radiotracer uptake to suggest active multiple myeloma.  Background uptake within index osseous lesions.  2. Additional findings, as above  8/22/24 CT Cervical Spine:  1. Multiple 2-4 mm round defects scattered throughout the visualized vertebral bodies.  A neoplastic etiology must be considered  2. Cervical spondylosis  3. Findings and other details as above  4. MR examination would allow further evaluation if clinically indicated  9/4/24 MRI Cervical Spine:  1. Mild to moderate encroachment into the neural foramina and to a lesser extent the central spinal canal at C5-6 as described  2. Mild cervical spondylosis  3. Pinpoint posterior annular tear at C5-6       Free Light Chains  10/17//23 - Lambda , Kappa FLC 3.29, K/L Ratio .0044  11/22/23 - Lambda FLC 0.66, Kappa FLC 0.66, K/L Ratio 1.0  01/18/24 - lambda FLC 0.33, kappa FLC 0.39, K/L ratio 1.18      Pathology:  8/20/24 BMBx:  BONE MARROW, LEFT ILIAC CREST (ASPIRATE SMEAR, TOUCH IMPRINT, CLOT SECTION, AND CORE BIOPSY):   -- HYPOCELLULAR MARROW (20-30%) WITH TRILINEAGE HEMATOPOIESIS.   -- NO MORPHOLOGIC OR IMMUNOPHENOTYPIC EVIDENCE OF RESIDUAL/RELAPSED PLASMA CELL MYELOMA.   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 Displayed all CRAB criteria at time of diagnosis in October. Underwent 1st line Beatriz + RVD + Zometa. Transplant done 5/13/24      Plan:   Cycle 3, day 1 of Bortezomib today based on labs and physical assessment.Q 2 Weeks  Labs overall stable.   Cialis 2.5 mg daily sent  to pharmacy  Continue to hold zometa due to ONJ.   Continue follow-up with transplant, Dr. Crowell, no follow up at this time; plan for maintenance revlimid and velcade, pending office note completion   Nerve conduction test to evaluate right hand neuropathy 11/1/2024  Revlimid 10mg D1-21 Q28 day cycle.  Started today, 10/17/2024  Continue Acyclovir 800 mg PO BID.  RTC in 4 weeks with MD for FU/lab/treatment   Cbc, cmp, MM labs- 5 days prior @ [facility]      Addendum - Will plan to undergo maintenance therapy give high risk disease with Velcade Q2w and Revlimid 10mg D1-21 Q28 day cycle. Needs to remain on Aspirin and Acyclovir while on this therapy. Continue Bactrim until +D180 post SCT. Plan to start therapy on 9/19 with NP visit, labs same day. No chemo ed required.        **All blood products must be irradiated and CMV negative**    The patient is in agreement with today's plan of care.  All questions answered.  Patient is aware to contact our office for any problems or concerns prior to next visit.      Maren Ku, YOVANNYP-C  Oncology/Hematology  [facility]

## 2024-10-18 LAB
ALBUMIN % SPEP (OHS): 61.22 (ref 48.1–59.5)
ALBUMIN SERPL-MCNC: 3.4 G/DL (ref 3.5–5)
ALBUMIN/GLOB SERPL: 1.5 RATIO (ref 1.1–2)
ALPHA 1 GLOB (OHS): 0.22 GM/DL (ref 0–0.4)
ALPHA 1 GLOB% (OHS): 3.97 (ref 2.3–4.9)
ALPHA 2 GLOB % (OHS): 13.37 (ref 6.9–13)
ALPHA 2 GLOB (OHS): 0.75 GM/DL (ref 0.4–1)
BETA GLOB (OHS): 0.81 GM/DL (ref 0.7–1.3)
BETA GLOB% (OHS): 14.49 (ref 13.8–19.7)
GAMMA GLOBULIN % (OHS): 6.94 (ref 10.1–21.9)
GAMMA GLOBULIN (OHS): 0.39 GM/DL (ref 0.4–1.8)
GLOBULIN SER-MCNC: 2.2 GM/DL (ref 2.4–3.5)
KAPPA LC FREE SER NEPH-MCNC: 1.13 MG/DL (ref 0.33–1.94)
KAPPA LC FREE/LAMBDA FREE SER NEPH: 0.93 {RATIO} (ref 0.26–1.65)
LAMBDA LC FREE SERPL-MCNC: 1.22 MG/DL (ref 0.57–2.63)
M SPIKE % (OHS): ABNORMAL
M SPIKE (OHS): ABNORMAL
PATH REV: NORMAL
PROT SERPL-MCNC: 5.6 GM/DL (ref 6.4–8.3)

## 2024-10-18 RX ORDER — CALCITRIOL 0.5 UG/1
0.5 CAPSULE ORAL
Qty: 30 CAPSULE | Refills: 2 | Status: SHIPPED | OUTPATIENT
Start: 2024-10-18

## 2024-10-31 ENCOUNTER — INFUSION (OUTPATIENT)
Dept: INFUSION THERAPY | Facility: HOSPITAL | Age: 44
End: 2024-10-31
Attending: INTERNAL MEDICINE
Payer: COMMERCIAL

## 2024-10-31 ENCOUNTER — LAB VISIT (OUTPATIENT)
Dept: LAB | Facility: HOSPITAL | Age: 44
End: 2024-10-31
Payer: COMMERCIAL

## 2024-10-31 VITALS
HEIGHT: 69 IN | RESPIRATION RATE: 18 BRPM | SYSTOLIC BLOOD PRESSURE: 123 MMHG | HEART RATE: 62 BPM | OXYGEN SATURATION: 100 % | TEMPERATURE: 99 F | WEIGHT: 195 LBS | DIASTOLIC BLOOD PRESSURE: 79 MMHG | BODY MASS INDEX: 28.88 KG/M2

## 2024-10-31 DIAGNOSIS — C90.01 MULTIPLE MYELOMA IN REMISSION: ICD-10-CM

## 2024-10-31 DIAGNOSIS — C90.01 MULTIPLE MYELOMA IN REMISSION: Primary | ICD-10-CM

## 2024-10-31 LAB
ALBUMIN SERPL-MCNC: 3.7 G/DL (ref 3.5–5)
ALBUMIN/GLOB SERPL: 1.8 RATIO (ref 1.1–2)
ALP SERPL-CCNC: 41 UNIT/L (ref 40–150)
ALT SERPL-CCNC: 17 UNIT/L (ref 0–55)
ANION GAP SERPL CALC-SCNC: 4 MEQ/L
AST SERPL-CCNC: 16 UNIT/L (ref 5–34)
BASOPHILS # BLD AUTO: 0.05 X10(3)/MCL
BASOPHILS NFR BLD AUTO: 1.4 %
BILIRUB SERPL-MCNC: 0.8 MG/DL
BUN SERPL-MCNC: 20 MG/DL (ref 8.9–20.6)
CALCIUM SERPL-MCNC: 9 MG/DL (ref 8.4–10.2)
CHLORIDE SERPL-SCNC: 107 MMOL/L (ref 98–107)
CO2 SERPL-SCNC: 29 MMOL/L (ref 22–29)
CREAT SERPL-MCNC: 1.63 MG/DL (ref 0.72–1.25)
CREAT/UREA NIT SERPL: 12
EOSINOPHIL # BLD AUTO: 0.37 X10(3)/MCL (ref 0–0.9)
EOSINOPHIL NFR BLD AUTO: 10.3 %
ERYTHROCYTE [DISTWIDTH] IN BLOOD BY AUTOMATED COUNT: 13.6 % (ref 11.5–17)
GFR SERPLBLD CREATININE-BSD FMLA CKD-EPI: 52 ML/MIN/1.73/M2
GLOBULIN SER-MCNC: 2.1 GM/DL (ref 2.4–3.5)
GLUCOSE SERPL-MCNC: 77 MG/DL (ref 74–100)
HCT VFR BLD AUTO: 35.5 % (ref 42–52)
HGB BLD-MCNC: 11.8 G/DL (ref 14–18)
IMM GRANULOCYTES # BLD AUTO: 0.01 X10(3)/MCL (ref 0–0.04)
IMM GRANULOCYTES NFR BLD AUTO: 0.3 %
LYMPHOCYTES # BLD AUTO: 1.05 X10(3)/MCL (ref 0.6–4.6)
LYMPHOCYTES NFR BLD AUTO: 29.2 %
MCH RBC QN AUTO: 32.2 PG (ref 27–31)
MCHC RBC AUTO-ENTMCNC: 33.2 G/DL (ref 33–36)
MCV RBC AUTO: 96.7 FL (ref 80–94)
MONOCYTES # BLD AUTO: 0.51 X10(3)/MCL (ref 0.1–1.3)
MONOCYTES NFR BLD AUTO: 14.2 %
NEUTROPHILS # BLD AUTO: 1.6 X10(3)/MCL (ref 2.1–9.2)
NEUTROPHILS NFR BLD AUTO: 44.6 %
PLATELET # BLD AUTO: 132 X10(3)/MCL (ref 130–400)
PMV BLD AUTO: 9.9 FL (ref 7.4–10.4)
POTASSIUM SERPL-SCNC: 4 MMOL/L (ref 3.5–5.1)
PROT SERPL-MCNC: 5.8 GM/DL (ref 6.4–8.3)
RBC # BLD AUTO: 3.67 X10(6)/MCL (ref 4.7–6.1)
SODIUM SERPL-SCNC: 140 MMOL/L (ref 136–145)
WBC # BLD AUTO: 3.59 X10(3)/MCL (ref 4.5–11.5)

## 2024-10-31 PROCEDURE — 85025 COMPLETE CBC W/AUTO DIFF WBC: CPT

## 2024-10-31 PROCEDURE — 96401 CHEMO ANTI-NEOPL SQ/IM: CPT

## 2024-10-31 PROCEDURE — 63600175 PHARM REV CODE 636 W HCPCS: Mod: JZ,JG

## 2024-10-31 PROCEDURE — 80053 COMPREHEN METABOLIC PANEL: CPT

## 2024-10-31 PROCEDURE — 36415 COLL VENOUS BLD VENIPUNCTURE: CPT

## 2024-10-31 RX ORDER — PROCHLORPERAZINE EDISYLATE 5 MG/ML
10 INJECTION INTRAMUSCULAR; INTRAVENOUS ONCE AS NEEDED
Status: CANCELLED
Start: 2024-10-31

## 2024-10-31 RX ORDER — EPINEPHRINE 0.3 MG/.3ML
0.3 INJECTION SUBCUTANEOUS ONCE AS NEEDED
Status: CANCELLED | OUTPATIENT
Start: 2024-10-31

## 2024-10-31 RX ORDER — BORTEZOMIB 3.5 MG/1
1.3 INJECTION, POWDER, LYOPHILIZED, FOR SOLUTION INTRAVENOUS; SUBCUTANEOUS
Status: COMPLETED | OUTPATIENT
Start: 2024-10-31 | End: 2024-10-31

## 2024-10-31 RX ORDER — BORTEZOMIB 3.5 MG/1
1.3 INJECTION, POWDER, LYOPHILIZED, FOR SOLUTION INTRAVENOUS; SUBCUTANEOUS
Status: CANCELLED | OUTPATIENT
Start: 2024-10-31

## 2024-10-31 RX ORDER — DIPHENHYDRAMINE HYDROCHLORIDE 50 MG/ML
50 INJECTION INTRAMUSCULAR; INTRAVENOUS ONCE AS NEEDED
Status: CANCELLED | OUTPATIENT
Start: 2024-10-31

## 2024-10-31 RX ORDER — SODIUM CHLORIDE 0.9 % (FLUSH) 0.9 %
10 SYRINGE (ML) INJECTION
Status: CANCELLED | OUTPATIENT
Start: 2024-10-31

## 2024-10-31 RX ORDER — HEPARIN 100 UNIT/ML
500 SYRINGE INTRAVENOUS
Status: CANCELLED | OUTPATIENT
Start: 2024-10-31

## 2024-10-31 RX ADMIN — BORTEZOMIB 2.75 MG: 3.5 INJECTION, POWDER, LYOPHILIZED, FOR SOLUTION INTRAVENOUS; SUBCUTANEOUS at 01:10

## 2024-11-07 DIAGNOSIS — C90.01 MULTIPLE MYELOMA IN REMISSION: ICD-10-CM

## 2024-11-07 RX ORDER — LENALIDOMIDE 10 MG/1
10 CAPSULE ORAL DAILY
Qty: 21 EACH | Refills: 0 | Status: SHIPPED | OUTPATIENT
Start: 2024-11-07

## 2024-11-11 ENCOUNTER — INFUSION (OUTPATIENT)
Dept: INFUSION THERAPY | Facility: HOSPITAL | Age: 44
End: 2024-11-11
Attending: INTERNAL MEDICINE
Payer: COMMERCIAL

## 2024-11-11 VITALS
TEMPERATURE: 98 F | HEIGHT: 69 IN | HEART RATE: 67 BPM | BODY MASS INDEX: 28.88 KG/M2 | WEIGHT: 195 LBS | SYSTOLIC BLOOD PRESSURE: 144 MMHG | OXYGEN SATURATION: 99 % | RESPIRATION RATE: 18 BRPM | DIASTOLIC BLOOD PRESSURE: 90 MMHG

## 2024-11-11 DIAGNOSIS — C90.00 METASTATIC MULTIPLE MYELOMA TO BONE: Primary | ICD-10-CM

## 2024-11-11 PROCEDURE — 90636 HEP A/HEP B VACC ADULT IM: CPT | Performed by: INTERNAL MEDICINE

## 2024-11-11 PROCEDURE — 90677 PCV20 VACCINE IM: CPT | Performed by: INTERNAL MEDICINE

## 2024-11-11 PROCEDURE — 90472 IMMUNIZATION ADMIN EACH ADD: CPT | Performed by: INTERNAL MEDICINE

## 2024-11-11 PROCEDURE — 90698 DTAP-IPV/HIB VACCINE IM: CPT | Performed by: INTERNAL MEDICINE

## 2024-11-11 PROCEDURE — 96372 THER/PROPH/DIAG INJ SC/IM: CPT

## 2024-11-11 PROCEDURE — 63600175 PHARM REV CODE 636 W HCPCS: Performed by: INTERNAL MEDICINE

## 2024-11-11 PROCEDURE — 90734 MENACWYD/MENACWYCRM VACC IM: CPT | Performed by: INTERNAL MEDICINE

## 2024-11-11 PROCEDURE — 90750 HZV VACC RECOMBINANT IM: CPT | Performed by: INTERNAL MEDICINE

## 2024-11-11 PROCEDURE — 90471 IMMUNIZATION ADMIN: CPT | Performed by: INTERNAL MEDICINE

## 2024-11-11 RX ORDER — HEPARIN 100 UNIT/ML
500 SYRINGE INTRAVENOUS
OUTPATIENT
Start: 2024-11-11

## 2024-11-11 RX ORDER — SODIUM CHLORIDE 0.9 % (FLUSH) 0.9 %
10 SYRINGE (ML) INJECTION
OUTPATIENT
Start: 2024-11-11

## 2024-11-11 RX ADMIN — HEPATITIS A AND HEPATITIS B (RECOMBINANT) VACCINE 720 UNITS: 720; 20 INJECTION, SUSPENSION INTRAMUSCULAR at 01:11

## 2024-11-11 RX ADMIN — PNEUMOCOCCAL 20-VALENT CONJUGATE VACCINE 0.5 ML
2.2; 2.2; 2.2; 2.2; 2.2; 2.2; 2.2; 2.2; 2.2; 2.2; 2.2; 2.2; 2.2; 2.2; 2.2; 2.2; 4.4; 2.2; 2.2; 2.2 INJECTION, SUSPENSION INTRAMUSCULAR at 01:11

## 2024-11-11 RX ADMIN — Medication 0.5 ML: at 01:11

## 2024-11-11 RX ADMIN — Medication: at 01:11

## 2024-11-13 NOTE — PROGRESS NOTES
Subjective:       Patient ID: Pete Fernandez is a 44 y.o. male.    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 Beatriz+ KrD for cycle 2 on 11/30/23; Change at request of [facility]  Stopped after 1 dose due to severe pleural and pericardial effusion development   Switched back to BEATRIZ + RVD with increase of Rev to 25mg  2. Beatriz+ RVD - 11/2/23 x 1 cycle, plan to restart for C2 on 12/14 potentially  C1 - Rev 10mg dose reduction due to CrCl  C2 - Rev increased to 25mg due to renal function improvement  C3 - D22 held 1 week due to covid  C4-  D22 Rev decreased to 10mg due to decline in renal function  C5 - Hold Beatriz this cycle in preparation for upcoming transplant. Rev remains at 10mg due to renal function  Zometa 10/17/23 - 1/11/2024 (3 treatments total)  Currently on hold due to ONJ  3. Stem Cell Transplant 5/6/24    HPI  42yo M presented to [facility] on [facility]11 after 1 month of progressive weakness, abdominal pain, and intermittent confusion. At the time of his presentation, his labs which had been normal with PCP in 8/2023 were notable for new severe DOUG, transaminitis, and Ca of 18. He was admitted and started on IVF resuscitation.  Hypercalcemia workup was initiated and he was ultimately found to have numerous osseous lesions on 10/15/23 CT CAP, FLC - Kappa 3.29, Lambda 744, Ratio 226 , SPEP with Mspike 0.17 IgG lambda. Bone marrow biopsy done 10/18/23 revealed 95% plasma cells and FISH with TP53 deletion, FGFR3/IGH (or NSD2/IGH) fusion, usually representing a t(4;14), and a tetraploid subclone was observed. After correction of his hypercalcemia and improvement in his renal function he was discharged on 10/22/23.   He started C1 Beatriz + RVD + Zometa on 11/2/23. He was referred to [facility] for consideration of bone  marrow transplant, they recommended change to Beatriz + Krd, however patient developed life threatening pleural and pericardial edema several days after 1st infusion requiring hospitalization and placement of a pericardial window. Therefore he transitioned back to Beatriz - RVD for C2.     Interval History:   Patient returns to clinic for 4 week follow up and treatment clearance for C5 of Velcade Q2w and Revlimid 10mg D1-21 Q28 day.   He reports fatigue,throbbing pain to right side back in rib area. He states this pain is worse when getting up from laying position.   He takes Oxycodone 10 mg and states that this is helping.  He canceled appointment with Dr. White to evaluate right hand neuropathy as condition improved.  Denies any fever, chills, SOB, chest pain, cough, abdominal pain.  Labs reviewed with patient.        Past Medical History:   Diagnosis Date    Anxiety disorder, unspecified     Encounter for antineoplastic chemotherapy 9/19/2024    Hypercalcemia       Past Surgical History:   Procedure Laterality Date    BONE MARROW BIOPSY N/A 10/18/2023    Procedure: Biopsy-bone marrow;  Surgeon: Nhan Marte MD;  Location: [facility];  Service: General;  Laterality: N/A;    INSERTION OF TUNNELED CENTRAL VENOUS HEMODIALYSIS CATHETER Left 5/6/2024    Procedure: INSERTION, CATHETER, HEMODIALYSIS, DUAL LUMEN, left poss right, Bard 14.5 fr hemosplit catheter, model [id number];  Surgeon: Alvin White MD;  Location: [address];  Service: General;  Laterality: Left;    TONSILLECTOMY       Social History     Socioeconomic History    Marital status:      Spouse name: Rosalinda    Number of children: 2   Tobacco Use    Smoking status: Never    Smokeless tobacco: Never   Substance and Sexual Activity    Alcohol use: Yes    Drug use: Never    Sexual activity: Yes     Partners: Female     Social Drivers of Health     Financial Resource Strain: Medium Risk (11/15/2023)    Overall Financial Resource Strain (CARDIA)      Difficulty of Paying Living Expenses: Somewhat hard   Food Insecurity: Food Insecurity Present (11/15/2023)    Hunger Vital Sign     Worried About Running Out of Food in the Last Year: Sometimes true     Ran Out of Food in the Last Year: Never true   Transportation Needs: No Transportation Needs (11/15/2023)    PRAPARE - Transportation     Lack of Transportation (Medical): No     Lack of Transportation (Non-Medical): No   Physical Activity: Insufficiently Active (11/15/2023)    Exercise Vital Sign     Days of Exercise per Week: 3 days     Minutes of Exercise per Session: 30 min   Stress: Stress Concern Present (11/15/2023)    [employer] [place] [employer] - Occupational Stress Questionnaire     Feeling of Stress : To some extent   Housing Stability: Low Risk  (11/15/2023)    Housing Stability Vital Sign     Unable to Pay for Housing in the Last Year: No     Number of Places Lived in the Last Year: 1     Unstable Housing in the Last Year: No      Family History   Problem Relation Name Age of Onset    Breast cancer Mother      Pancreatic cancer Father        Review of patient's allergies indicates:  No Known Allergies   Review of Systems   Constitutional:  Positive for fatigue. Negative for activity change, appetite change, chills, fever and unexpected weight change.   HENT:  Negative for mouth sores and sore throat.    Eyes:  Negative for visual disturbance.   Respiratory:  Negative for cough and shortness of breath.    Cardiovascular:  Negative for chest pain.   Gastrointestinal:  Negative for abdominal pain, constipation, diarrhea, nausea and vomiting.   Endocrine: Negative for polyuria.   Genitourinary:  Positive for erectile dysfunction. Negative for dysuria and frequency.   Musculoskeletal:  Negative for back pain.   Integumentary:  Negative for rash.   Allergic/Immunologic: Negative for frequent infections.   Neurological:  Negative for weakness and headaches.   Hematological:  Negative for  adenopathy. Does not bruise/bleed easily.   Psychiatric/Behavioral:  The patient is not nervous/anxious.          Objective:      Vitals:    11/14/24 0956   BP: 123/75   Pulse: 71   Resp: 16   Temp: 98.3 °F (36.8 °C)             Physical Exam  Constitutional:       General: He is not in acute distress.     Appearance: Normal appearance. He is not ill-appearing.   HENT:      Head: Normocephalic and atraumatic.      Nose: Nose normal.      Mouth/Throat:      Mouth: Mucous membranes are moist.      Pharynx: Oropharynx is clear.   Eyes:      Extraocular Movements: Extraocular movements intact.      Conjunctiva/sclera: Conjunctivae normal.      Pupils: Pupils are equal, round, and reactive to light.   Cardiovascular:      Rate and Rhythm: Normal rate and regular rhythm.      Pulses: Normal pulses.      Heart sounds: Normal heart sounds. No murmur heard.  Pulmonary:      Effort: Pulmonary effort is normal. No respiratory distress.      Breath sounds: Normal breath sounds.   Abdominal:      General: There is no distension.      Palpations: Abdomen is soft.      Tenderness: There is no abdominal tenderness.   Musculoskeletal:         General: Normal range of motion.      Cervical back: Normal range of motion and neck supple.   Lymphadenopathy:      Cervical: No cervical adenopathy.   Skin:     General: Skin is warm and dry.      Findings: No rash.   Neurological:      General: No focal deficit present.      Mental Status: He is alert and oriented to person, place, and time.         LABS AND IMAGING REVIEWED IN EPIC  10/11/23 Xray Metastatic Survey  1. Scattered small defects at the calvarium measuring up to 5 mm too numerous to count.  2. Degene mild rative changes at the spine and extremities  3. MR exam and bone scan would allow further evaluation.  10/12/23 MRI Brain  1. Motion artifact  2. Mild scattered mucosal thickening throughout the paranasal sinuses  3. Somewhat limited evaluation of brain metastases without the  administration of IV contrast  4. Otherwise unremarkable MR exam of the head without the administration of IV contrast  10/12/23 NM Bone Scan  1. Abnormal intense increased activity throughout the lungs of uncertain etiology.  This may related to hyperparathyroidism, hematologic malignancy, and or metabolic abnormality such as renal failure, vasculitis, or pulmonary infection.  Chest x-ray 10/10/2023 reveals the lungs to be relatively clear and well aerated.  2. Suspect scattered mild degenerative changes throughout the joint spaces  3. Increased activity nasal maxillary region suggesting mucoperiosteal disease  4. Mild activity at the stomach suggesting free pertechnetate  10/14/23 CT CAP  1. Widespread osseous findings suggestive of extensive metastatic skeletal disease, to include expansile destructive soft tissue component at the anterior right 7th rib.  2. Enlarged intrathoracic lymph nodes could also be of metastatic etiology.  3. Diffuse ground-glass attenuation through both lungs is nonspecific, could be secondary to atypical infectious or inflammatory process.  However, possibility of metastatic involvement cannot be excluded.  12/7/23 CXR:   1. Hazy opacification lower lungs bilaterally suspicious for infiltrate and pleural reaction which has progressed since the prior exam  2. Cardiomegaly  3. Thoracic spondylosis  12/11/23 CXR:  1. Mild cardiomegaly  2. Persistent bibasilar infiltrate and or pleural reaction  3. Thoracic spondylosis with anterior wedging again evident at a lower thoracic vertebral body  12/13/23 ECHO: EF 50-55%  2/7/24 PET/CT:  1. Interval improved appearance of lytic osseous lesions compared to previous exam with sub threshold uptake. The bones are demineralized with numerous tiny lytic foci at the axial and appendicular skeleton.  2. Subacute appearing bilateral rib and right clavicle fractures with mild FDG uptake.  3. Nonspecific patchy ground-glass and interstitial opacities with  mild FDG uptake.  8/20/24 PET/CT:  1. No focal increased radiotracer uptake to suggest active multiple myeloma.  Background uptake within index osseous lesions.  2. Additional findings, as above  8/22/24 CT Cervical Spine:  1. Multiple 2-4 mm round defects scattered throughout the visualized vertebral bodies.  A neoplastic etiology must be considered  2. Cervical spondylosis  3. Findings and other details as above  4. MR examination would allow further evaluation if clinically indicated  9/4/24 MRI Cervical Spine:  1. Mild to moderate encroachment into the neural foramina and to a lesser extent the central spinal canal at C5-6 as described  2. Mild cervical spondylosis  3. Pinpoint posterior annular tear at C5-6       Free Light Chains  10/17//23 - Lambda , Kappa FLC 3.29, K/L Ratio .0044  11/22/23 - Lambda FLC 0.66, Kappa FLC 0.66, K/L Ratio 1.0  01/18/24 - lambda FLC 0.33, kappa FLC 0.39, K/L ratio 1.18      Pathology:  8/20/24 BMBx:  BONE MARROW, LEFT ILIAC CREST (ASPIRATE SMEAR, TOUCH IMPRINT, CLOT SECTION, AND CORE BIOPSY):   -- HYPOCELLULAR MARROW (20-30%) WITH TRILINEAGE HEMATOPOIESIS.   -- NO MORPHOLOGIC OR IMMUNOPHENOTYPIC EVIDENCE OF RESIDUAL/RELAPSED PLASMA CELL MYELOMA.   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 Displayed all CRAB criteria at time of diagnosis in October. Underwent 1st line Beatriz + RVD + Zometa. Transplant done 5/13/24  Addendum - Will plan to undergo maintenance therapy give high risk disease with Velcade Q2w and Revlimid 10mg D1-21 Q28 day cycle. Needs to remain on Aspirin and Acyclovir while on this therapy. Continue Bactrim until +D180 post SCT.       Plan:   - Toxicity reviewed, continue with C5 Velcade q2w today  - Revlimid 10mg D1-21 Q28 day cycle. Start 11/15/2024  - PET/CT due, ordered today  - Continue ASA 81 mg daily and acyclovir   - Continue to hold zometa due to ONJ.   - Continue follow-up with  transplant, Dr. Crowell, 12/5/24  .  - RTC in 4 weeks with MD for FU/lab/treatment   - Cbc, cmp, MM labs- 5 days prior @ [facility]     **All blood products must be irradiated and CMV negative**    The patient is in agreement with today's plan of care.  All questions answered.  Patient is aware to contact our office for any problems or concerns prior to next visit.    I spent a total of 40 minutes on the day of the visit.This includes face to face time and non-face to face time preparing to see the patient (eg, review of tests), obtaining and/or reviewing separately obtained history, documenting clinical information in the electronic or other health record, independently interpreting results and communicating results to the patient/family/caregiver, or care coordinator.        Elizabeth Lejeune MD  Hematology /Oncology  [facility]      Professional Services:  IClaudia LPN, acted solely as a scribe for and in the presence of Dr. Elizabeth Lejeune, who performed these services.

## 2024-11-14 ENCOUNTER — LAB VISIT (OUTPATIENT)
Dept: LAB | Facility: HOSPITAL | Age: 44
End: 2024-11-14
Payer: COMMERCIAL

## 2024-11-14 ENCOUNTER — OFFICE VISIT (OUTPATIENT)
Dept: HEMATOLOGY/ONCOLOGY | Facility: CLINIC | Age: 44
End: 2024-11-14
Payer: COMMERCIAL

## 2024-11-14 ENCOUNTER — INFUSION (OUTPATIENT)
Dept: INFUSION THERAPY | Facility: HOSPITAL | Age: 44
End: 2024-11-14
Attending: INTERNAL MEDICINE
Payer: COMMERCIAL

## 2024-11-14 VITALS
OXYGEN SATURATION: 100 % | DIASTOLIC BLOOD PRESSURE: 75 MMHG | WEIGHT: 194.81 LBS | BODY MASS INDEX: 28.85 KG/M2 | RESPIRATION RATE: 16 BRPM | TEMPERATURE: 98 F | SYSTOLIC BLOOD PRESSURE: 123 MMHG | HEART RATE: 71 BPM | HEIGHT: 69 IN

## 2024-11-14 DIAGNOSIS — C90.01 MULTIPLE MYELOMA IN REMISSION: ICD-10-CM

## 2024-11-14 DIAGNOSIS — C90.00 MULTIPLE MYELOMA, REMISSION STATUS UNSPECIFIED: ICD-10-CM

## 2024-11-14 DIAGNOSIS — C90.01 MULTIPLE MYELOMA IN REMISSION: Primary | ICD-10-CM

## 2024-11-14 DIAGNOSIS — D84.821 IMMUNODEFICIENCY DUE TO DRUG THERAPY: Primary | ICD-10-CM

## 2024-11-14 DIAGNOSIS — C90.00 METASTATIC MULTIPLE MYELOMA TO BONE: ICD-10-CM

## 2024-11-14 DIAGNOSIS — Z79.899 IMMUNODEFICIENCY DUE TO DRUG THERAPY: Primary | ICD-10-CM

## 2024-11-14 LAB
ALBUMIN SERPL-MCNC: 3.7 G/DL (ref 3.5–5)
ALBUMIN/GLOB SERPL: 1.6 RATIO (ref 1.1–2)
ALP SERPL-CCNC: 48 UNIT/L (ref 40–150)
ALT SERPL-CCNC: 22 UNIT/L (ref 0–55)
ANION GAP SERPL CALC-SCNC: 6 MEQ/L
AST SERPL-CCNC: 23 UNIT/L (ref 5–34)
BASOPHILS # BLD AUTO: 0.05 X10(3)/MCL
BASOPHILS NFR BLD AUTO: 1.7 %
BILIRUB SERPL-MCNC: 0.7 MG/DL
BUN SERPL-MCNC: 33 MG/DL (ref 8.9–20.6)
CALCIUM SERPL-MCNC: 9.1 MG/DL (ref 8.4–10.2)
CHLORIDE SERPL-SCNC: 109 MMOL/L (ref 98–107)
CO2 SERPL-SCNC: 26 MMOL/L (ref 22–29)
CREAT SERPL-MCNC: 1.65 MG/DL (ref 0.72–1.25)
CREAT/UREA NIT SERPL: 20
EOSINOPHIL # BLD AUTO: 0.18 X10(3)/MCL (ref 0–0.9)
EOSINOPHIL NFR BLD AUTO: 6 %
ERYTHROCYTE [DISTWIDTH] IN BLOOD BY AUTOMATED COUNT: 13.8 % (ref 11.5–17)
GFR SERPLBLD CREATININE-BSD FMLA CKD-EPI: 52 ML/MIN/1.73/M2
GLOBULIN SER-MCNC: 2.3 GM/DL (ref 2.4–3.5)
GLUCOSE SERPL-MCNC: 83 MG/DL (ref 74–100)
HCT VFR BLD AUTO: 34.4 % (ref 42–52)
HGB BLD-MCNC: 11.6 G/DL (ref 14–18)
IMM GRANULOCYTES # BLD AUTO: 0.01 X10(3)/MCL (ref 0–0.04)
IMM GRANULOCYTES NFR BLD AUTO: 0.3 %
LYMPHOCYTES # BLD AUTO: 0.85 X10(3)/MCL (ref 0.6–4.6)
LYMPHOCYTES NFR BLD AUTO: 28.1 %
MCH RBC QN AUTO: 32 PG (ref 27–31)
MCHC RBC AUTO-ENTMCNC: 33.7 G/DL (ref 33–36)
MCV RBC AUTO: 94.8 FL (ref 80–94)
MONOCYTES # BLD AUTO: 0.41 X10(3)/MCL (ref 0.1–1.3)
MONOCYTES NFR BLD AUTO: 13.6 %
NEUTROPHILS # BLD AUTO: 1.52 X10(3)/MCL (ref 2.1–9.2)
NEUTROPHILS NFR BLD AUTO: 50.3 %
PLATELET # BLD AUTO: 149 X10(3)/MCL (ref 130–400)
PMV BLD AUTO: 9.6 FL (ref 7.4–10.4)
POTASSIUM SERPL-SCNC: 4.3 MMOL/L (ref 3.5–5.1)
PROT SERPL-MCNC: 6 GM/DL (ref 6.4–8.3)
RBC # BLD AUTO: 3.63 X10(6)/MCL (ref 4.7–6.1)
SODIUM SERPL-SCNC: 141 MMOL/L (ref 136–145)
WBC # BLD AUTO: 3.02 X10(3)/MCL (ref 4.5–11.5)

## 2024-11-14 PROCEDURE — 80053 COMPREHEN METABOLIC PANEL: CPT

## 2024-11-14 PROCEDURE — 84165 PROTEIN E-PHORESIS SERUM: CPT

## 2024-11-14 PROCEDURE — 85025 COMPLETE CBC W/AUTO DIFF WBC: CPT

## 2024-11-14 PROCEDURE — 3074F SYST BP LT 130 MM HG: CPT | Mod: CPTII,S$GLB,, | Performed by: STUDENT IN AN ORGANIZED HEALTH CARE EDUCATION/TRAINING PROGRAM

## 2024-11-14 PROCEDURE — 36415 COLL VENOUS BLD VENIPUNCTURE: CPT

## 2024-11-14 PROCEDURE — 1159F MED LIST DOCD IN RCRD: CPT | Mod: CPTII,S$GLB,, | Performed by: STUDENT IN AN ORGANIZED HEALTH CARE EDUCATION/TRAINING PROGRAM

## 2024-11-14 PROCEDURE — 83521 IG LIGHT CHAINS FREE EACH: CPT

## 2024-11-14 PROCEDURE — 3078F DIAST BP <80 MM HG: CPT | Mod: CPTII,S$GLB,, | Performed by: STUDENT IN AN ORGANIZED HEALTH CARE EDUCATION/TRAINING PROGRAM

## 2024-11-14 PROCEDURE — 3008F BODY MASS INDEX DOCD: CPT | Mod: CPTII,S$GLB,, | Performed by: STUDENT IN AN ORGANIZED HEALTH CARE EDUCATION/TRAINING PROGRAM

## 2024-11-14 PROCEDURE — 99215 OFFICE O/P EST HI 40 MIN: CPT | Mod: S$GLB,,, | Performed by: STUDENT IN AN ORGANIZED HEALTH CARE EDUCATION/TRAINING PROGRAM

## 2024-11-14 PROCEDURE — 63600175 PHARM REV CODE 636 W HCPCS: Mod: JZ,JG | Performed by: STUDENT IN AN ORGANIZED HEALTH CARE EDUCATION/TRAINING PROGRAM

## 2024-11-14 PROCEDURE — 96401 CHEMO ANTI-NEOPL SQ/IM: CPT

## 2024-11-14 PROCEDURE — 1160F RVW MEDS BY RX/DR IN RCRD: CPT | Mod: CPTII,S$GLB,, | Performed by: STUDENT IN AN ORGANIZED HEALTH CARE EDUCATION/TRAINING PROGRAM

## 2024-11-14 PROCEDURE — 99999 PR PBB SHADOW E&M-EST. PATIENT-LVL V: CPT | Mod: PBBFAC,,, | Performed by: STUDENT IN AN ORGANIZED HEALTH CARE EDUCATION/TRAINING PROGRAM

## 2024-11-14 RX ORDER — OXYCODONE HYDROCHLORIDE 10 MG/1
10 TABLET ORAL EVERY 4 HOURS PRN
Qty: 90 TABLET | Refills: 0 | Status: SHIPPED | OUTPATIENT
Start: 2024-11-14

## 2024-11-14 RX ORDER — DIPHENHYDRAMINE HYDROCHLORIDE 50 MG/ML
50 INJECTION INTRAMUSCULAR; INTRAVENOUS ONCE AS NEEDED
Status: CANCELLED | OUTPATIENT
Start: 2024-11-14

## 2024-11-14 RX ORDER — PROCHLORPERAZINE EDISYLATE 5 MG/ML
10 INJECTION INTRAMUSCULAR; INTRAVENOUS ONCE AS NEEDED
Status: DISCONTINUED | OUTPATIENT
Start: 2024-11-14 | End: 2024-11-14 | Stop reason: HOSPADM

## 2024-11-14 RX ORDER — EPINEPHRINE 0.3 MG/.3ML
0.3 INJECTION SUBCUTANEOUS ONCE AS NEEDED
Status: CANCELLED | OUTPATIENT
Start: 2024-11-14

## 2024-11-14 RX ORDER — EPINEPHRINE 0.3 MG/.3ML
0.3 INJECTION SUBCUTANEOUS ONCE AS NEEDED
Status: DISCONTINUED | OUTPATIENT
Start: 2024-11-14 | End: 2024-11-14 | Stop reason: HOSPADM

## 2024-11-14 RX ORDER — BORTEZOMIB 3.5 MG/1
1.3 INJECTION, POWDER, LYOPHILIZED, FOR SOLUTION INTRAVENOUS; SUBCUTANEOUS
Status: CANCELLED | OUTPATIENT
Start: 2024-11-14

## 2024-11-14 RX ORDER — SODIUM CHLORIDE 0.9 % (FLUSH) 0.9 %
10 SYRINGE (ML) INJECTION
Status: CANCELLED | OUTPATIENT
Start: 2024-11-14

## 2024-11-14 RX ORDER — SODIUM CHLORIDE 0.9 % (FLUSH) 0.9 %
10 SYRINGE (ML) INJECTION
Status: DISCONTINUED | OUTPATIENT
Start: 2024-11-14 | End: 2024-11-14 | Stop reason: HOSPADM

## 2024-11-14 RX ORDER — HEPARIN 100 UNIT/ML
500 SYRINGE INTRAVENOUS
Status: CANCELLED | OUTPATIENT
Start: 2024-11-14

## 2024-11-14 RX ORDER — BORTEZOMIB 3.5 MG/1
1.3 INJECTION, POWDER, LYOPHILIZED, FOR SOLUTION INTRAVENOUS; SUBCUTANEOUS
Status: COMPLETED | OUTPATIENT
Start: 2024-11-14 | End: 2024-11-14

## 2024-11-14 RX ORDER — HEPARIN 100 UNIT/ML
500 SYRINGE INTRAVENOUS
Status: DISCONTINUED | OUTPATIENT
Start: 2024-11-14 | End: 2024-11-14 | Stop reason: HOSPADM

## 2024-11-14 RX ORDER — PROCHLORPERAZINE EDISYLATE 5 MG/ML
10 INJECTION INTRAMUSCULAR; INTRAVENOUS ONCE AS NEEDED
Status: CANCELLED
Start: 2024-11-14

## 2024-11-14 RX ORDER — DIPHENHYDRAMINE HYDROCHLORIDE 50 MG/ML
50 INJECTION INTRAMUSCULAR; INTRAVENOUS ONCE AS NEEDED
Status: DISCONTINUED | OUTPATIENT
Start: 2024-11-14 | End: 2024-11-14 | Stop reason: HOSPADM

## 2024-11-14 RX ADMIN — BORTEZOMIB 2.75 MG: 3.5 INJECTION, POWDER, LYOPHILIZED, FOR SOLUTION INTRAVENOUS; SUBCUTANEOUS at 10:11

## 2024-11-15 LAB
ALBUMIN % SPEP (OHS): 59.73 (ref 48.1–59.5)
ALBUMIN SERPL-MCNC: 3.3 G/DL (ref 3.5–5)
ALBUMIN/GLOB SERPL: 1.5 RATIO (ref 1.1–2)
ALPHA 1 GLOB (OHS): 0.28 GM/DL (ref 0–0.4)
ALPHA 1 GLOB% (OHS): 5.06 (ref 2.3–4.9)
ALPHA 2 GLOB % (OHS): 13.97 (ref 6.9–13)
ALPHA 2 GLOB (OHS): 0.77 GM/DL (ref 0.4–1)
BETA GLOB (OHS): 0.84 GM/DL (ref 0.7–1.3)
BETA GLOB% (OHS): 15.28 (ref 13.8–19.7)
GAMMA GLOBULIN % (OHS): 5.97 (ref 10.1–21.9)
GAMMA GLOBULIN (OHS): 0.33 GM/DL (ref 0.4–1.8)
GLOBULIN SER-MCNC: 2.2 GM/DL (ref 2.4–3.5)
M SPIKE % (OHS): ABNORMAL
M SPIKE (OHS): ABNORMAL
PATH REV: NORMAL
PROT SERPL-MCNC: 5.5 GM/DL (ref 6.4–8.3)

## 2024-11-18 DIAGNOSIS — C90.00 MULTIPLE MYELOMA, REMISSION STATUS UNSPECIFIED: ICD-10-CM

## 2024-11-18 LAB
KAPPA LC FREE SER NEPH-MCNC: 1.1 MG/DL (ref 0.33–1.94)
KAPPA LC FREE/LAMBDA FREE SER NEPH: 0.95 {RATIO} (ref 0.26–1.65)
LAMBDA LC FREE SERPL-MCNC: 1.16 MG/DL (ref 0.57–2.63)

## 2024-11-18 RX ORDER — OXYCODONE HYDROCHLORIDE 10 MG/1
10 TABLET ORAL EVERY 4 HOURS PRN
Qty: 90 TABLET | Refills: 0 | Status: SHIPPED | OUTPATIENT
Start: 2024-11-18

## 2024-11-29 ENCOUNTER — LAB VISIT (OUTPATIENT)
Dept: LAB | Facility: HOSPITAL | Age: 44
End: 2024-11-29
Attending: STUDENT IN AN ORGANIZED HEALTH CARE EDUCATION/TRAINING PROGRAM
Payer: COMMERCIAL

## 2024-11-29 ENCOUNTER — INFUSION (OUTPATIENT)
Dept: INFUSION THERAPY | Facility: HOSPITAL | Age: 44
End: 2024-11-29
Attending: INTERNAL MEDICINE
Payer: COMMERCIAL

## 2024-11-29 VITALS
BODY MASS INDEX: 29.27 KG/M2 | HEART RATE: 57 BPM | TEMPERATURE: 98 F | HEIGHT: 69 IN | DIASTOLIC BLOOD PRESSURE: 75 MMHG | SYSTOLIC BLOOD PRESSURE: 120 MMHG | WEIGHT: 197.63 LBS | OXYGEN SATURATION: 99 %

## 2024-11-29 DIAGNOSIS — C90.01 MULTIPLE MYELOMA IN REMISSION: ICD-10-CM

## 2024-11-29 DIAGNOSIS — C90.01 MULTIPLE MYELOMA IN REMISSION: Primary | ICD-10-CM

## 2024-11-29 LAB
ALBUMIN SERPL-MCNC: 3.9 G/DL (ref 3.5–5)
ALBUMIN/GLOB SERPL: 1.8 RATIO (ref 1.1–2)
ALP SERPL-CCNC: 50 UNIT/L (ref 40–150)
ALT SERPL-CCNC: 20 UNIT/L (ref 0–55)
ANION GAP SERPL CALC-SCNC: 8 MEQ/L
AST SERPL-CCNC: 20 UNIT/L (ref 5–34)
BASOPHILS # BLD AUTO: 0.05 X10(3)/MCL
BASOPHILS NFR BLD AUTO: 1.4 %
BILIRUB SERPL-MCNC: 1 MG/DL
BUN SERPL-MCNC: 22 MG/DL (ref 8.9–20.6)
CALCIUM SERPL-MCNC: 9.4 MG/DL (ref 8.4–10.2)
CHLORIDE SERPL-SCNC: 106 MMOL/L (ref 98–107)
CO2 SERPL-SCNC: 26 MMOL/L (ref 22–29)
CREAT SERPL-MCNC: 1.68 MG/DL (ref 0.72–1.25)
CREAT/UREA NIT SERPL: 13
EOSINOPHIL # BLD AUTO: 0.41 X10(3)/MCL (ref 0–0.9)
EOSINOPHIL NFR BLD AUTO: 11.7 %
ERYTHROCYTE [DISTWIDTH] IN BLOOD BY AUTOMATED COUNT: 14.1 % (ref 11.5–17)
GFR SERPLBLD CREATININE-BSD FMLA CKD-EPI: 50 ML/MIN/1.73/M2
GLOBULIN SER-MCNC: 2.2 GM/DL (ref 2.4–3.5)
GLUCOSE SERPL-MCNC: 92 MG/DL (ref 74–100)
HCT VFR BLD AUTO: 35.9 % (ref 42–52)
HGB BLD-MCNC: 12.1 G/DL (ref 14–18)
IGA SERPL-MCNC: 27 MG/DL (ref 63–484)
IGG SERPL-MCNC: 377 MG/DL (ref 540–1822)
IGM SERPL-MCNC: 17 MG/DL (ref 22–240)
IMM GRANULOCYTES # BLD AUTO: 0.02 X10(3)/MCL (ref 0–0.04)
IMM GRANULOCYTES NFR BLD AUTO: 0.6 %
LYMPHOCYTES # BLD AUTO: 0.87 X10(3)/MCL (ref 0.6–4.6)
LYMPHOCYTES NFR BLD AUTO: 24.9 %
MCH RBC QN AUTO: 32.4 PG (ref 27–31)
MCHC RBC AUTO-ENTMCNC: 33.7 G/DL (ref 33–36)
MCV RBC AUTO: 96 FL (ref 80–94)
MONOCYTES # BLD AUTO: 0.45 X10(3)/MCL (ref 0.1–1.3)
MONOCYTES NFR BLD AUTO: 12.9 %
NEUTROPHILS # BLD AUTO: 1.69 X10(3)/MCL (ref 2.1–9.2)
NEUTROPHILS NFR BLD AUTO: 48.5 %
PLATELET # BLD AUTO: 106 X10(3)/MCL (ref 130–400)
PMV BLD AUTO: 10.1 FL (ref 7.4–10.4)
POTASSIUM SERPL-SCNC: 3.8 MMOL/L (ref 3.5–5.1)
PROT SERPL-MCNC: 6.1 GM/DL (ref 6.4–8.3)
RBC # BLD AUTO: 3.74 X10(6)/MCL (ref 4.7–6.1)
SODIUM SERPL-SCNC: 140 MMOL/L (ref 136–145)
WBC # BLD AUTO: 3.49 X10(3)/MCL (ref 4.5–11.5)

## 2024-11-29 PROCEDURE — 80053 COMPREHEN METABOLIC PANEL: CPT

## 2024-11-29 PROCEDURE — 83521 IG LIGHT CHAINS FREE EACH: CPT

## 2024-11-29 PROCEDURE — 82784 ASSAY IGA/IGD/IGG/IGM EACH: CPT

## 2024-11-29 PROCEDURE — 84165 PROTEIN E-PHORESIS SERUM: CPT

## 2024-11-29 PROCEDURE — 63600175 PHARM REV CODE 636 W HCPCS: Mod: JZ,JG | Performed by: STUDENT IN AN ORGANIZED HEALTH CARE EDUCATION/TRAINING PROGRAM

## 2024-11-29 PROCEDURE — 96401 CHEMO ANTI-NEOPL SQ/IM: CPT

## 2024-11-29 PROCEDURE — 85025 COMPLETE CBC W/AUTO DIFF WBC: CPT

## 2024-11-29 PROCEDURE — 36415 COLL VENOUS BLD VENIPUNCTURE: CPT

## 2024-11-29 RX ORDER — DIPHENHYDRAMINE HYDROCHLORIDE 50 MG/ML
50 INJECTION INTRAMUSCULAR; INTRAVENOUS ONCE AS NEEDED
OUTPATIENT
Start: 2024-11-29

## 2024-11-29 RX ORDER — HEPARIN 100 UNIT/ML
500 SYRINGE INTRAVENOUS
OUTPATIENT
Start: 2024-11-29

## 2024-11-29 RX ORDER — BORTEZOMIB 3.5 MG/1
1.3 INJECTION, POWDER, LYOPHILIZED, FOR SOLUTION INTRAVENOUS; SUBCUTANEOUS
Status: COMPLETED | OUTPATIENT
Start: 2024-11-29 | End: 2024-11-29

## 2024-11-29 RX ORDER — SODIUM CHLORIDE 0.9 % (FLUSH) 0.9 %
10 SYRINGE (ML) INJECTION
OUTPATIENT
Start: 2024-11-29

## 2024-11-29 RX ORDER — BORTEZOMIB 3.5 MG/1
1.3 INJECTION, POWDER, LYOPHILIZED, FOR SOLUTION INTRAVENOUS; SUBCUTANEOUS
Status: CANCELLED | OUTPATIENT
Start: 2024-11-29

## 2024-11-29 RX ORDER — EPINEPHRINE 0.3 MG/.3ML
0.3 INJECTION SUBCUTANEOUS ONCE AS NEEDED
OUTPATIENT
Start: 2024-11-29

## 2024-11-29 RX ORDER — PROCHLORPERAZINE EDISYLATE 5 MG/ML
10 INJECTION INTRAMUSCULAR; INTRAVENOUS ONCE AS NEEDED
Start: 2024-11-29

## 2024-11-29 RX ADMIN — BORTEZOMIB 2.75 MG: 3.5 INJECTION, POWDER, LYOPHILIZED, FOR SOLUTION INTRAVENOUS; SUBCUTANEOUS at 11:11

## 2024-12-02 LAB
ALBUMIN % SPEP (OHS): 64.96 (ref 48.1–59.5)
ALBUMIN SERPL-MCNC: 3.6 G/DL (ref 3.5–5)
ALBUMIN/GLOB SERPL: 1.8 RATIO (ref 1.1–2)
ALPHA 1 GLOB (OHS): 0.22 GM/DL (ref 0–0.4)
ALPHA 1 GLOB% (OHS): 3.84 (ref 2.3–4.9)
ALPHA 2 GLOB % (OHS): 11.19 (ref 6.9–13)
ALPHA 2 GLOB (OHS): 0.63 GM/DL (ref 0.4–1)
BETA GLOB (OHS): 0.72 GM/DL (ref 0.7–1.3)
BETA GLOB% (OHS): 12.87 (ref 13.8–19.7)
GAMMA GLOBULIN % (OHS): 7.14 (ref 10.1–21.9)
GAMMA GLOBULIN (OHS): 0.4 GM/DL (ref 0.4–1.8)
GLOBULIN SER-MCNC: 2 GM/DL (ref 2.4–3.5)
KAPPA LC FREE SER NEPH-MCNC: 1.48 MG/DL (ref 0.33–1.94)
KAPPA LC FREE/LAMBDA FREE SER NEPH: 1.01 {RATIO} (ref 0.26–1.65)
LAMBDA LC FREE SERPL-MCNC: 1.46 MG/DL (ref 0.57–2.63)
M SPIKE % (OHS): ABNORMAL
M SPIKE (OHS): ABNORMAL
PATH REV: NORMAL
PROT SERPL-MCNC: 5.6 GM/DL (ref 6.4–8.3)

## 2024-12-03 DIAGNOSIS — C90.01 MULTIPLE MYELOMA IN REMISSION: ICD-10-CM

## 2024-12-03 RX ORDER — LENALIDOMIDE 10 MG/1
10 CAPSULE ORAL DAILY
Qty: 21 EACH | Refills: 0 | Status: SHIPPED | OUTPATIENT
Start: 2024-12-03

## 2024-12-05 ENCOUNTER — OFFICE VISIT (OUTPATIENT)
Dept: HEMATOLOGY/ONCOLOGY | Facility: CLINIC | Age: 44
End: 2024-12-05
Payer: COMMERCIAL

## 2024-12-05 ENCOUNTER — PATIENT MESSAGE (OUTPATIENT)
Dept: HEMATOLOGY/ONCOLOGY | Facility: CLINIC | Age: 44
End: 2024-12-05
Payer: COMMERCIAL

## 2024-12-05 DIAGNOSIS — C90.01 MULTIPLE MYELOMA IN REMISSION: Primary | ICD-10-CM

## 2024-12-05 DIAGNOSIS — Z79.899 IMMUNODEFICIENCY DUE TO DRUGS: ICD-10-CM

## 2024-12-05 DIAGNOSIS — Z94.84 HISTORY OF AUTO STEM CELL TRANSPLANT: ICD-10-CM

## 2024-12-05 DIAGNOSIS — D84.821 IMMUNODEFICIENCY DUE TO DRUGS: ICD-10-CM

## 2024-12-05 PROCEDURE — G2211 COMPLEX E/M VISIT ADD ON: HCPCS | Mod: 95,,, | Performed by: INTERNAL MEDICINE

## 2024-12-05 PROCEDURE — 99214 OFFICE O/P EST MOD 30 MIN: CPT | Mod: 95,,, | Performed by: INTERNAL MEDICINE

## 2024-12-05 NOTE — PROGRESS NOTES
The patient location is: home  The chief complaint leading to consultation is: MM/ sp SCT    Visit type: audiovisual    Face to Face time with patient: 15  30  minutes of total time spent on the encounter, which includes face to face time and non-face to face time preparing to see the patient (eg, review of tests), Obtaining and/or reviewing separately obtained history, Documenting clinical information in the electronic or other health record, Independently interpreting results (not separately reported) and communicating results to the patient/family/caregiver, or Care coordination (not separately reported).         Each patient to whom he or she provides medical services by telemedicine is:  (1) informed of the relationship between the physician and patient and the respective role of any other health care provider with respect to management of the patient; and (2) notified that he or she may decline to receive medical services by telemedicine and may withdraw from such care at any time.    Notes:      CC: 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, R-ISS III, stem cell transplant candidate; s/p stem cell transplant      Current Treatment:   Beatriz+ RVd - 11/2/23 - Present  Zometa 10/17/23 - Present          HPI  is a 44 y.o. M with no significant medical problems. He has been diagnosed with multiple myeloma. He is here for a virtual stem cell transplant consultation.   Per records,  presented to [facility] on 10/11/23 after 1 month of progressive weakness, abdominal pain, and intermittent confusion. At the time of his presentation, his labs, which had been normal  in 8/2023, were notable for new severe DOUG, transaminitis, and Ca of 18. He was admitted and started on IVF .  Hypercalcemia workup was initiated and he was ultimately found to have numerous osseous lesions on 10/15/23 CT CAP, FLC - Kappa 3.29, Lambda 744, Ratio 226 , SPEP with M spike 0.17, IgG lambda on sIFE. Bone marrow biopsy done  10/18/23 revealed 95% plasma cells and FISH with TP53 deletion, FGFR3/IGH (or NSD2/IGH) fusion, usually representing a t(4;14), and a tetraploid subclone was observed. After correction of his hypercalcemia and improvement in his renal function he was discharged on 10/22/23.   He was started on Beatriz + RVd on 11/2/23.      Interval History - 12/5/24     Day +206 post autoSCt.   Some fatigue but otherwise doing well and recovered from SCT.       Review of patient's allergies indicates:  No Known Allergies      Current Outpatient Medications   Medication Sig    acyclovir (ZOVIRAX) 800 MG Tab Take 1 tablet (800 mg total) by mouth 2 (two) times daily.    aspirin 81 MG Chew Take 1 tablet (81 mg total) by mouth every evening. Hold until instructed to resume by provider.    calcitRIOL (ROCALTROL) 0.5 MCG Cap TAKE 1 CAPSULE BY MOUTH ONCE DAILY    dextroamphetamine-amphetamine (ADDERALL) 20 mg tablet Take 1 tablet by mouth once daily.    febuxostat (ULORIC) 40 mg Tab Take 1 tablet (40 mg total) by mouth once daily.    hydrOXYzine HCL (ATARAX) 25 MG tablet Take 1 tablet (25 mg total) by mouth 3 (three) times daily as needed for Anxiety.    lenalidomide 10 mg Cap Take 1 capsule (10 mg total) by mouth once daily for 21 days, followed by 7 days off to complete a 28 day cycle.    melatonin (MELATIN) 3 mg tablet Take 3 tablets (9 mg total) by mouth nightly as needed for Insomnia.    metoprolol tartrate (LOPRESSOR) 25 MG tablet Take 12.5 mg by mouth 2 (two) times daily.    ondansetron (ZOFRAN) 8 MG tablet Take 1 tablet (8 mg total) by mouth every 8 (eight) hours as needed for Nausea.    oxyCODONE (ROXICODONE) 10 mg Tab immediate release tablet Take 1 tablet (10 mg total) by mouth every 4 (four) hours as needed for Pain.    pantoprazole (PROTONIX) 20 MG tablet TAKE 1 TABLET (20 MG TOTAL) BY MOUTH ONCE DAILY (Patient taking differently: Take 20 mg by mouth every evening.)    prochlorperazine (COMPAZINE) 5 MG tablet Take 1 tablet (5 mg  total) by mouth every 6 (six) hours as needed for Nausea.    sodium bicarbonate 650 MG tablet Take 650 mg by mouth 2 (two) times daily.    tadalafiL (CIALIS) 10 MG tablet Take 1 tablet (10 mg total) by mouth daily as needed for Erectile Dysfunction.    tamsulosin (FLOMAX) 0.4 mg Cap Take 1 capsule (0.4 mg total) by mouth every evening.     No current facility-administered medications for this visit.          See HPI       PS: ECOG 1 / KPS 80  Response at transplant: VGPR  Risk at transplant: low  HCT CI score: 3 ( 2 points for DLCO < 80%; 1 point for anxiety)        There were no vitals filed for this visit.    physical exam deferred due to telemed  Appears well on camera      Lab Results   Component Value Date    WBC 2.49 (L) 12/12/2024    HGB 11.8 (L) 12/12/2024    HCT 35.5 (L) 12/12/2024    MCV 96.7 (H) 12/12/2024     12/12/2024       Gran # (ANC)   Date Value Ref Range Status   08/30/2024 2.4 1.8 - 7.7 K/uL Final     Gran %   Date Value Ref Range Status   08/30/2024 59.1 38.0 - 73.0 % Final     CMP  Sodium   Date Value Ref Range Status   12/12/2024 141 136 - 145 mmol/L Final   08/30/2024 139 136 - 145 mmol/L Final     Potassium   Date Value Ref Range Status   12/12/2024 4.1 3.5 - 5.1 mmol/L Final   08/30/2024 4.5 3.5 - 5.1 mmol/L Final     Chloride   Date Value Ref Range Status   12/12/2024 108 (H) 98 - 107 mmol/L Final   08/30/2024 105 95 - 110 mmol/L Final     CO2   Date Value Ref Range Status   12/12/2024 28 22 - 29 mmol/L Final   08/30/2024 25 23 - 29 mmol/L Final     Glucose   Date Value Ref Range Status   08/30/2024 80 70 - 110 mg/dL Final     BUN   Date Value Ref Range Status   08/30/2024 26 (H) 6 - 20 mg/dL Final     Blood Urea Nitrogen   Date Value Ref Range Status   12/12/2024 22.0 (H) 8.9 - 20.6 mg/dL Final     Creatinine   Date Value Ref Range Status   12/12/2024 1.40 (H) 0.72 - 1.25 mg/dL Final   08/30/2024 1.6 (H) 0.5 - 1.4 mg/dL Final     Calcium   Date Value Ref Range Status   12/12/2024  9.0 8.4 - 10.2 mg/dL Final   08/30/2024 9.5 8.7 - 10.5 mg/dL Final     Total Protein   Date Value Ref Range Status   08/30/2024 6.1 6.0 - 8.4 g/dL Final     Albumin   Date Value Ref Range Status   12/12/2024 3.8 3.5 - 5.0 g/dL Final   12/12/2024 3.7 3.5 - 5.0 g/dL Final   08/30/2024 3.8 3.5 - 5.2 g/dL Final     Total Bilirubin   Date Value Ref Range Status   08/30/2024 0.3 0.1 - 1.0 mg/dL Final     Comment:     For infants and newborns, interpretation of results should be based  on gestational age, weight and in agreement with clinical  observations.    Premature Infant recommended reference ranges:  Up to 24 hours.............<8.0 mg/dL  Up to 48 hours............<12.0 mg/dL  3-5 days..................<15.0 mg/dL  6-29 days.................<15.0 mg/dL       Bilirubin Total   Date Value Ref Range Status   12/12/2024 0.6 <=1.5 mg/dL Final     Alkaline Phosphatase   Date Value Ref Range Status   08/30/2024 58 55 - 135 U/L Final     ALP   Date Value Ref Range Status   12/12/2024 49 40 - 150 unit/L Final     AST   Date Value Ref Range Status   12/12/2024 21 5 - 34 unit/L Final   08/30/2024 18 10 - 40 U/L Final     ALT   Date Value Ref Range Status   12/12/2024 20 0 - 55 unit/L Final   08/30/2024 14 10 - 44 U/L Final     Anion Gap   Date Value Ref Range Status   08/30/2024 9 8 - 16 mmol/L Final     eGFR   Date Value Ref Range Status   12/12/2024 >60 mL/min/1.73/m2 Final   08/30/2024 53.5 (A) >60 mL/min/1.73 m^2 Final     IgG   Date Value Ref Range Status   08/30/2024 223 (L) 650 - 1600 mg/dL Final     Comment:     IgG Cord Blood Reference Range: 650-1600 mg/dL.     IgA   Date Value Ref Range Status   08/30/2024 9 (L) 40 - 350 mg/dL Final     Comment:     IgA Cord Blood Reference Range: <5 mg/dL.     IgM   Date Value Ref Range Status   08/30/2024 16 (L) 50 - 300 mg/dL Final     Comment:     IgM Cord Blood Reference Range: <25 mg/dL.     IgM Level   Date Value Ref Range Status   12/12/2024 14.0 (L) 22.0 - 240.0 mg/dL Final      Kappa Free Light Chains   Date Value Ref Range Status   08/30/2024 0.37 0.33 - 1.94 mg/dL Final   04/16/2024 0.44 0.33 - 1.94 mg/dL Final     Lambda Free Light Chains   Date Value Ref Range Status   08/30/2024 0.43 (L) 0.57 - 2.63 mg/dL Final   04/16/2024 0.42 (L) 0.57 - 2.63 mg/dL Final     Kappa/Lambda FLC Ratio   Date Value Ref Range Status   08/30/2024 0.86 0.26 - 1.65 Final     Comment:     Undetected antigen excess is a rare event but cannot   be excluded. If these free light chain results do not   agree with other clinical or laboratory findings or   if the sample is from a patient that has previously   demonstrated antigen excess, discuss with the testing   laboratory.   Results should always be interpreted in conjunction   with other laboratory tests and clinical evidence.     04/16/2024 1.05 0.26 - 1.65 Final     Comment:     Undetected antigen excess is a rare event but cannot   be excluded. If these free light chain results do not   agree with other clinical or laboratory findings or   if the sample is from a patient that has previously   demonstrated antigen excess, discuss with the testing   laboratory.   Results should always be interpreted in conjunction   with other laboratory tests and clinical evidence.       Pathologist Interpretation SPE   Date Value Ref Range Status   08/30/2024 REVIEWED  Final     Comment:       Electronically reviewed and signed by:  Yaquelin Jim MD  Signed on 09/04/24 at 09:30  Decreased total protein.  Decreased gamma globulins.    No discrete paraprotein bands identified.     04/16/2024 REVIEWED  Final     Comment:       Electronically reviewed and signed by:  Bethanie Bains MD  Signed on 04/17/24 at 15:48  Decreased total protein.  A linear irregularity in near gamma approximately = 0.05 G/dL.       Pathologist Interpretation OSVALDO   Date Value Ref Range Status   08/30/2024 REVIEWED  Final     Comment:       Electronically reviewed and signed by:  Yaquelin Jim  MD  Signed on 09/04/24 at 09:30  No monoclonal peaks identified.        No results found for this or any previous visit (from the past 2160 hours).    Bone marrow biopsy - 8/20/24  Final Pathologic Diagnosis     BONE MARROW, LEFT ILIAC CREST (ASPIRATE SMEAR, TOUCH IMPRINT, CLOT SECTION, AND CORE BIOPSY):  -- HYPOCELLULAR MARROW (20-30%) WITH TRILINEAGE HEMATOPOIESIS.  -- NO MORPHOLOGIC OR IMMUNOPHENOTYPIC EVIDENCE OF RESIDUAL/RELAPSED PLASMA CELL MYELOMA.  -- INCREASED STORAGE IRON.  -- SEE COMMENT       Multiple Myeloma MRD by Flow, BM  Order: [id number]  Status: Final result       Visible to patient: Yes (seen)       Next appt: 10/03/2024 at 08:40 AM in Hematology and Oncology (Elizabeth K Lejeune, MD)       Dx: Multiple myeloma in remission; Histor...    0 Result Notes       Component Ref Range & Units 2 wk ago   Minimal Residual Disease Detection, Bone Marrow % 0.0000   PERCENT NORMAL PLASMA CELLS (OF TOTAL PC) % 100.0   NON-AGGREGATE EVENTS  [id number]   TOTAL PLASMA CELL EVENTS  290   POLY PLASMA CELL EVENTS  290   ABNORMAL PLASMA CELL EVENTS  0   % B-CELL PRECURSORS % 2.684   % MAST CELLS % 0.028   VALIDATED ASSAY SENSITIVITY x10(-6) 2.92   Comment: LOQ is calculated as a minimum of 20 abnormal PC events  divided by the actual number of non-aggregate events  collected for this sample.   LOWER LIMIT OF QUANTITATION (LLOQ) x10(-5) 1.0   PATIENT / SAMPLE THEORETICAL LOQ x10(-6) 2.0   Comment: LLOQ is defined as a minimum of 20 abnormal PC events  detected in 10 million non-aggregate events, based on  literature and internal validation data.   Final Diagnosis  SEE BELOW   Comment: Bone marrow, flow cytometric immunophenotyping:    No monotypic plasma cells identified (MRD-negative).    Reviewed by: Tin Paredes M.D.        Plan:    1. 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, R-ISS III; double hit FISH with tp53 mutation and t(4;14)  -sp DVRd induction and now joan autoSCT on 5/13/24  -attempt to made  early in induction to transition to D-KRd but did not tolerate kyprolis so transitioned back to velcade   -currently day +206  -please see dc summary for his relatively uncomplicated transplant course  -his day +100 restaging as above cw with MRD negative stringent Complete remission   -repeat biochemical studies on on 11/29/24 cw with continued biochemical remission   -cytopenias secondary to maintenance above dose/schedule adjustments  -in light of high risk FISH studies recommend continue  doublet maintenance with velcade q 2 weeks/revlimid 10mg day 1-21 of 28 day cycle; likely until intolerance or progression   -pt desires therapy locally  Dr. Lejeune (ccd on this progress note) ; only needs labs q 4 weeks   -supportive meds:  acyclovir while on PI, bactrim through day +180 post SCT (can stop), Ca+vit d, bisphosphonate  contraindicated due to hx of ONJ  -recommend monthly cbc, cmp, serum free light chains, quantitative immunoglobulins, serum electropheresis, serum immunofixation while on maintenance  -will fu with pt virtually in 3 months  -plan for 1 and 2 year post SCT restaging with MRD studies at [facility]     2. Immunosuppression   -acyc  as above  - referred to ID for post transplant vaccine counseling          FU:   -with Dr. Cleary as above   -virtual MD appt in 3 months (no labs at [facility] needed)

## 2024-12-05 NOTE — LETTER
December 16, 2024        Elizabeth Kennedy Lejeune, MD  [address]  [facility] [address]             Newborn Cancer [facility] - Hematology 5th Fl  [address]  [place] [address]  Phone: [phone]   Patient: Pete Fernandez   MR Number: [id number]   YOB: 1980   Date of Visit: 12/5/2024       Dear Dr. Lejeune:    Thank you for referring Pete Fernandez to me for evaluation. Below are the relevant portions of my assessment and plan of care.            If you have questions, please do not hesitate to call me. I look forward to following Pete along with you.    Sincerely,      Tulio Crowell MD           CC  No Recipients

## 2024-12-05 NOTE — Clinical Note
-virtual MD appt  with Dr. Arceo in 3 months ([place] [facility] [place])

## 2024-12-09 NOTE — PROGRESS NOTES
Subjective:       Patient ID: Pete Fernandez is a 44 y.o. male.    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 Beatriz+ KrD for cycle 2 on 11/30/23; Change at request of [facility]  Stopped after 1 dose due to severe pleural and pericardial effusion development   Switched back to BEATRIZ + RVD with increase of Rev to 25mg  2. Beatriz+ RVD - 11/2/23 x 1 cycle, plan to restart for C2 on 12/14 potentially  C1 - Rev 10mg dose reduction due to CrCl  C2 - Rev increased to 25mg due to renal function improvement  C3 - D22 held 1 week due to covid  C4-  D22 Rev decreased to 10mg due to decline in renal function  C5 - Hold Beatriz this cycle in preparation for upcoming transplant. Rev remains at 10mg due to renal function  Zometa 10/17/23 - 1/11/2024 (3 treatments total)  Currently on hold due to ONJ  3. Stem Cell Transplant 5/6/24    HPI  44yo M presented to [facility] on 10/11 after 1 month of progressive weakness, abdominal pain, and intermittent confusion. At the time of his presentation, his labs which had been normal with PCP in 8/2023 were notable for new severe DOUG, transaminitis, and Ca of 18. He was admitted and started on IVF resuscitation.  Hypercalcemia workup was initiated and he was ultimately found to have numerous osseous lesions on 10/15/23 CT CAP, FLC - Kappa 3.29, Lambda 744, Ratio 226 , SPEP with Mspike 0.17 IgG lambda. Bone marrow biopsy done 10/18/23 revealed 95% plasma cells and FISH with TP53 deletion, FGFR3/IGH (or NSD2/IGH) fusion, usually representing a t(4;14), and a tetraploid subclone was observed. After correction of his hypercalcemia and improvement in his renal function he was discharged on 10/22/23.   He started C1 Beatriz + RVD + Zometa on 11/2/23. He was referred to [facility] for consideration of bone  marrow transplant, they recommended change to Beatriz + Krd, however patient developed life threatening pleural and pericardial edema several days after 1st infusion requiring hospitalization and placement of a pericardial window. Therefore he transitioned back to Beatriz - RVD for C2.     Interval History:   Patient returns to clinic for 4 week follow up and treatment clearance for C7 of Velcade Q2w and Revlimid 10mg D1-21 Q28 day.  Due to start Revlimid on 12/13/24.   He reports fatigue, throbbing pain to right side back in rib area. Pain is stable today.   He takes Oxycodone 10 mg and states that this is helping.  He has requested PET/CT to be moved until after the first of the year,1/8/2025  Denies any fever, chills, SOB, chest pain, cough, abdominal pain, numbness and tingling in hands/feet.  Labs reviewed with patient.        Past Medical History:   Diagnosis Date    Anxiety disorder, unspecified     Encounter for antineoplastic chemotherapy 9/19/2024    Hypercalcemia       Past Surgical History:   Procedure Laterality Date    BONE MARROW BIOPSY N/A 10/18/2023    Procedure: Biopsy-bone marrow;  Surgeon: Nhan Marte MD;  Location: [facility];  Service: General;  Laterality: N/A;    INSERTION OF TUNNELED CENTRAL VENOUS HEMODIALYSIS CATHETER Left 5/6/2024    Procedure: INSERTION, CATHETER, HEMODIALYSIS, DUAL LUMEN, left poss right, Bard 14.5 fr hemosplit catheter, model [id number];  Surgeon: Alvin White MD;  Location: [address];  Service: General;  Laterality: Left;    TONSILLECTOMY       Social History     Socioeconomic History    Marital status:      Spouse name: Rosalinda    Number of children: 2   Tobacco Use    Smoking status: Never    Smokeless tobacco: Never   Substance and Sexual Activity    Alcohol use: Yes    Drug use: Never    Sexual activity: Yes     Partners: Female     Social Drivers of Health     Financial Resource Strain: Medium Risk (11/15/2023)    Overall Financial Resource Strain  (CARDIA)     Difficulty of Paying Living Expenses: Somewhat hard   Food Insecurity: Food Insecurity Present (11/15/2023)    Hunger Vital Sign     Worried About Running Out of Food in the Last Year: Sometimes true     Ran Out of Food in the Last Year: Never true   Transportation Needs: No Transportation Needs (11/15/2023)    PRAPARE - Transportation     Lack of Transportation (Medical): No     Lack of Transportation (Non-Medical): No   Physical Activity: Insufficiently Active (11/15/2023)    Exercise Vital Sign     Days of Exercise per Week: 3 days     Minutes of Exercise per Session: 30 min   Stress: Stress Concern Present (11/15/2023)    [employer] - Occupational Stress Questionnaire     Feeling of Stress : To some extent   Housing Stability: Low Risk  (11/15/2023)    Housing Stability Vital Sign     Unable to Pay for Housing in the Last Year: No     Number of Places Lived in the Last Year: 1     Unstable Housing in the Last Year: No      Family History   Problem Relation Name Age of Onset    Breast cancer Mother      Pancreatic cancer Father        Review of patient's allergies indicates:  No Known Allergies   Review of Systems   Constitutional:  Positive for fatigue. Negative for activity change, appetite change, chills, fever and unexpected weight change.   HENT:  Negative for mouth sores and sore throat.    Eyes:  Negative for visual disturbance.   Respiratory:  Negative for cough and shortness of breath.    Cardiovascular:  Negative for chest pain.   Gastrointestinal:  Negative for abdominal pain, constipation, diarrhea, nausea and vomiting.   Endocrine: Negative for polyuria.   Genitourinary:  Positive for erectile dysfunction. Negative for dysuria and frequency.   Musculoskeletal:  Negative for back pain.   Integumentary:  Negative for rash.   Allergic/Immunologic: Negative for frequent infections.   Neurological:  Negative for weakness and headaches.   Hematological:  Negative  for adenopathy. Does not bruise/bleed easily.   Psychiatric/Behavioral:  The patient is not nervous/anxious.          Objective:      Vitals:    12/12/24 0806   BP: 135/88   Pulse: 63   Resp: 16   Temp: 98.3 °F (36.8 °C)       Physical Exam  Constitutional:       General: He is not in acute distress.     Appearance: Normal appearance. He is not ill-appearing.   HENT:      Head: Normocephalic and atraumatic.      Nose: Nose normal.      Mouth/Throat:      Mouth: Mucous membranes are moist.      Pharynx: Oropharynx is clear.   Eyes:      Extraocular Movements: Extraocular movements intact.      Conjunctiva/sclera: Conjunctivae normal.      Pupils: Pupils are equal, round, and reactive to light.   Cardiovascular:      Rate and Rhythm: Normal rate and regular rhythm.      Pulses: Normal pulses.      Heart sounds: Normal heart sounds. No murmur heard.  Pulmonary:      Effort: Pulmonary effort is normal. No respiratory distress.      Breath sounds: Normal breath sounds.   Abdominal:      General: There is no distension.      Palpations: Abdomen is soft.      Tenderness: There is no abdominal tenderness.   Musculoskeletal:         General: Normal range of motion.      Cervical back: Normal range of motion and neck supple.   Lymphadenopathy:      Cervical: No cervical adenopathy.   Skin:     General: Skin is warm and dry.      Findings: No rash.   Neurological:      General: No focal deficit present.      Mental Status: He is alert and oriented to person, place, and time.         LABS AND IMAGING REVIEWED IN [facility]  9/4/24 MRI Cervical Spine:  1. Mild to moderate encroachment into the neural foramina and to a lesser extent the central spinal canal at C5-6 as described  2. Mild cervical spondylosis  3. Pinpoint posterior annular tear at C5-6       Free Light Chains  10/17//23 - Lambda , Kappa FLC 3.29, K/L Ratio .0044  11/22/23 - Lambda FLC 0.66, Kappa FLC 0.66, K/L Ratio 1.0  01/18/24 - lambda FLC 0.33, kappa FLC 0.39,  K/L ratio 1.18      Pathology:  8/20/24 BMBx:  BONE MARROW, LEFT ILIAC CREST (ASPIRATE SMEAR, TOUCH IMPRINT, CLOT SECTION, AND CORE BIOPSY):   -- HYPOCELLULAR MARROW (20-30%) WITH TRILINEAGE HEMATOPOIESIS.   -- NO MORPHOLOGIC OR IMMUNOPHENOTYPIC EVIDENCE OF RESIDUAL/RELAPSED PLASMA CELL MYELOMA.   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 Displayed all CRAB criteria at time of diagnosis in October. Underwent 1st line Beatriz + RVD + Zometa. Transplant done 5/13/24  Addendum - Will plan to undergo maintenance therapy give high risk disease with Velcade Q2w and Revlimid 10mg D1-21 Q28 day cycle. Needs to remain on Aspirin and Acyclovir while on this therapy. Continue Bactrim until +D180 post SCT.     Immunodeficiency due to Drug Therapy - Precautions discussed with patient. Continue to monitor for any signs of symptoms of infection. No prophylaxis needed at this time. Currently on growth factor.     Plan:   - Toxicity reviewed, continue with C7 Velcade q2w today  - Revlimid 10mg D1-21 Q28 day cycle. Start 12/13/2024  - PET/CT due, rescheduled to 1/8/2025 per patient's requested  - Continue ASA 81 mg daily and acyclovir   - Continue to hold zometa due to ONJ.   - Increase Cialis to 10 mg daily prn for ED, new RX sent  - RTC in 4 weeks with MD for FU/lab/treatment   - Cbc, cmp, MM labs- 5 days prior @ [facility]     **All blood products must be irradiated and CMV negative**    The patient is in agreement with today's plan of care.  All questions answered.  Patient is aware to contact our office for any problems or concerns prior to next visit.    I spent a total of 40 minutes on the day of the visit.This includes face to face time and non-face to face time preparing to see the patient (eg, review of tests), obtaining and/or reviewing separately obtained history, documenting clinical information in the electronic or other health record, independently interpreting  results and communicating results to the patient/family/caregiver, or care coordinator.      Elizabeth Lejeune MD  Hematology /Oncology  [facility]      Professional Services:  Claudia PATTERSON LPN, acted solely as a scribe for and in the presence of Dr. Elizabeth Lejeune, who performed these services.

## 2024-12-12 ENCOUNTER — OFFICE VISIT (OUTPATIENT)
Dept: HEMATOLOGY/ONCOLOGY | Facility: CLINIC | Age: 44
End: 2024-12-12
Payer: COMMERCIAL

## 2024-12-12 ENCOUNTER — INFUSION (OUTPATIENT)
Dept: INFUSION THERAPY | Facility: HOSPITAL | Age: 44
End: 2024-12-12
Attending: INTERNAL MEDICINE
Payer: COMMERCIAL

## 2024-12-12 ENCOUNTER — LAB VISIT (OUTPATIENT)
Dept: LAB | Facility: HOSPITAL | Age: 44
End: 2024-12-12
Payer: COMMERCIAL

## 2024-12-12 VITALS
BODY MASS INDEX: 29.65 KG/M2 | SYSTOLIC BLOOD PRESSURE: 135 MMHG | OXYGEN SATURATION: 100 % | TEMPERATURE: 98 F | DIASTOLIC BLOOD PRESSURE: 88 MMHG | RESPIRATION RATE: 16 BRPM | WEIGHT: 200.19 LBS | HEIGHT: 69 IN | HEART RATE: 63 BPM

## 2024-12-12 DIAGNOSIS — D84.821 IMMUNODEFICIENCY DUE TO DRUGS: ICD-10-CM

## 2024-12-12 DIAGNOSIS — C90.01 MULTIPLE MYELOMA IN REMISSION: ICD-10-CM

## 2024-12-12 DIAGNOSIS — C90.01 MULTIPLE MYELOMA IN REMISSION: Primary | ICD-10-CM

## 2024-12-12 DIAGNOSIS — Z79.899 IMMUNODEFICIENCY DUE TO DRUGS: ICD-10-CM

## 2024-12-12 DIAGNOSIS — Z51.11 ENCOUNTER FOR ANTINEOPLASTIC CHEMOTHERAPY: ICD-10-CM

## 2024-12-12 DIAGNOSIS — N52.9 ERECTILE DYSFUNCTION, UNSPECIFIED ERECTILE DYSFUNCTION TYPE: ICD-10-CM

## 2024-12-12 DIAGNOSIS — D84.821 IMMUNODEFICIENCY DUE TO DRUG THERAPY: ICD-10-CM

## 2024-12-12 DIAGNOSIS — Z94.84 HISTORY OF AUTO STEM CELL TRANSPLANT: ICD-10-CM

## 2024-12-12 DIAGNOSIS — Z79.899 IMMUNODEFICIENCY DUE TO DRUG THERAPY: ICD-10-CM

## 2024-12-12 DIAGNOSIS — N52.1 ERECTILE DYSFUNCTION DUE TO DISEASES CLASSIFIED ELSEWHERE: Primary | ICD-10-CM

## 2024-12-12 DIAGNOSIS — C90.00 METASTATIC MULTIPLE MYELOMA TO BONE: ICD-10-CM

## 2024-12-12 LAB
ALBUMIN SERPL-MCNC: 3.8 G/DL (ref 3.5–5)
ALBUMIN/GLOB SERPL: 1.7 RATIO (ref 1.1–2)
ALP SERPL-CCNC: 49 UNIT/L (ref 40–150)
ALT SERPL-CCNC: 20 UNIT/L (ref 0–55)
ANION GAP SERPL CALC-SCNC: 5 MEQ/L
AST SERPL-CCNC: 21 UNIT/L (ref 5–34)
BASOPHILS # BLD AUTO: 0.03 X10(3)/MCL
BASOPHILS NFR BLD AUTO: 1.2 %
BILIRUB SERPL-MCNC: 0.6 MG/DL
BUN SERPL-MCNC: 22 MG/DL (ref 8.9–20.6)
CALCIUM SERPL-MCNC: 9 MG/DL (ref 8.4–10.2)
CHLORIDE SERPL-SCNC: 108 MMOL/L (ref 98–107)
CO2 SERPL-SCNC: 28 MMOL/L (ref 22–29)
CREAT SERPL-MCNC: 1.4 MG/DL (ref 0.72–1.25)
CREAT/UREA NIT SERPL: 16
EOSINOPHIL # BLD AUTO: 0.18 X10(3)/MCL (ref 0–0.9)
EOSINOPHIL NFR BLD AUTO: 7.2 %
ERYTHROCYTE [DISTWIDTH] IN BLOOD BY AUTOMATED COUNT: 14.3 % (ref 11.5–17)
GFR SERPLBLD CREATININE-BSD FMLA CKD-EPI: >60 ML/MIN/1.73/M2
GLOBULIN SER-MCNC: 2.3 GM/DL (ref 2.4–3.5)
GLUCOSE SERPL-MCNC: 97 MG/DL (ref 74–100)
HCT VFR BLD AUTO: 35.5 % (ref 42–52)
HGB BLD-MCNC: 11.8 G/DL (ref 14–18)
IGA SERPL-MCNC: 28 MG/DL (ref 63–484)
IGG SERPL-MCNC: 402 MG/DL (ref 540–1822)
IGM SERPL-MCNC: 14 MG/DL (ref 22–240)
IMM GRANULOCYTES # BLD AUTO: 0 X10(3)/MCL (ref 0–0.04)
IMM GRANULOCYTES NFR BLD AUTO: 0 %
LYMPHOCYTES # BLD AUTO: 0.8 X10(3)/MCL (ref 0.6–4.6)
LYMPHOCYTES NFR BLD AUTO: 32.1 %
MCH RBC QN AUTO: 32.2 PG (ref 27–31)
MCHC RBC AUTO-ENTMCNC: 33.2 G/DL (ref 33–36)
MCV RBC AUTO: 96.7 FL (ref 80–94)
MONOCYTES # BLD AUTO: 0.31 X10(3)/MCL (ref 0.1–1.3)
MONOCYTES NFR BLD AUTO: 12.4 %
NEUTROPHILS # BLD AUTO: 1.17 X10(3)/MCL (ref 2.1–9.2)
NEUTROPHILS NFR BLD AUTO: 47.1 %
PLATELET # BLD AUTO: 130 X10(3)/MCL (ref 130–400)
PMV BLD AUTO: 9.5 FL (ref 7.4–10.4)
POTASSIUM SERPL-SCNC: 4.1 MMOL/L (ref 3.5–5.1)
PROT SERPL-MCNC: 6.1 GM/DL (ref 6.4–8.3)
RBC # BLD AUTO: 3.67 X10(6)/MCL (ref 4.7–6.1)
SODIUM SERPL-SCNC: 141 MMOL/L (ref 136–145)
WBC # BLD AUTO: 2.49 X10(3)/MCL (ref 4.5–11.5)

## 2024-12-12 PROCEDURE — 96401 CHEMO ANTI-NEOPL SQ/IM: CPT

## 2024-12-12 PROCEDURE — 86334 IMMUNOFIX E-PHORESIS SERUM: CPT

## 2024-12-12 PROCEDURE — 63600175 PHARM REV CODE 636 W HCPCS: Mod: JZ,JG | Performed by: STUDENT IN AN ORGANIZED HEALTH CARE EDUCATION/TRAINING PROGRAM

## 2024-12-12 PROCEDURE — 99999 PR PBB SHADOW E&M-EST. PATIENT-LVL IV: CPT | Mod: PBBFAC,,, | Performed by: STUDENT IN AN ORGANIZED HEALTH CARE EDUCATION/TRAINING PROGRAM

## 2024-12-12 PROCEDURE — 36415 COLL VENOUS BLD VENIPUNCTURE: CPT

## 2024-12-12 PROCEDURE — 82784 ASSAY IGA/IGD/IGG/IGM EACH: CPT

## 2024-12-12 PROCEDURE — 83521 IG LIGHT CHAINS FREE EACH: CPT

## 2024-12-12 PROCEDURE — 85025 COMPLETE CBC W/AUTO DIFF WBC: CPT

## 2024-12-12 PROCEDURE — 80053 COMPREHEN METABOLIC PANEL: CPT

## 2024-12-12 RX ORDER — DIPHENHYDRAMINE HYDROCHLORIDE 50 MG/ML
50 INJECTION INTRAMUSCULAR; INTRAVENOUS ONCE AS NEEDED
Status: CANCELLED | OUTPATIENT
Start: 2024-12-12

## 2024-12-12 RX ORDER — EPINEPHRINE 0.3 MG/.3ML
0.3 INJECTION SUBCUTANEOUS ONCE AS NEEDED
Status: CANCELLED | OUTPATIENT
Start: 2024-12-12

## 2024-12-12 RX ORDER — DIPHENHYDRAMINE HYDROCHLORIDE 50 MG/ML
50 INJECTION INTRAMUSCULAR; INTRAVENOUS ONCE AS NEEDED
Status: DISCONTINUED | OUTPATIENT
Start: 2024-12-12 | End: 2024-12-12 | Stop reason: HOSPADM

## 2024-12-12 RX ORDER — TADALAFIL 2.5 MG/1
5 TABLET ORAL DAILY PRN
Qty: 30 EACH | Refills: 0 | OUTPATIENT
Start: 2024-12-12 | End: 2025-01-11

## 2024-12-12 RX ORDER — HEPARIN 100 UNIT/ML
500 SYRINGE INTRAVENOUS
Status: CANCELLED | OUTPATIENT
Start: 2024-12-12

## 2024-12-12 RX ORDER — PROCHLORPERAZINE EDISYLATE 5 MG/ML
10 INJECTION INTRAMUSCULAR; INTRAVENOUS ONCE AS NEEDED
Status: DISCONTINUED | OUTPATIENT
Start: 2024-12-12 | End: 2024-12-12 | Stop reason: HOSPADM

## 2024-12-12 RX ORDER — SODIUM CHLORIDE 0.9 % (FLUSH) 0.9 %
10 SYRINGE (ML) INJECTION
Status: DISCONTINUED | OUTPATIENT
Start: 2024-12-12 | End: 2024-12-12 | Stop reason: HOSPADM

## 2024-12-12 RX ORDER — TADALAFIL 10 MG/1
10 TABLET ORAL DAILY PRN
Qty: 30 TABLET | Refills: 1 | Status: SHIPPED | OUTPATIENT
Start: 2024-12-12 | End: 2025-12-12

## 2024-12-12 RX ORDER — PROCHLORPERAZINE EDISYLATE 5 MG/ML
10 INJECTION INTRAMUSCULAR; INTRAVENOUS ONCE AS NEEDED
Status: CANCELLED
Start: 2024-12-12

## 2024-12-12 RX ORDER — BORTEZOMIB 3.5 MG/1
1.3 INJECTION, POWDER, LYOPHILIZED, FOR SOLUTION INTRAVENOUS; SUBCUTANEOUS
Status: COMPLETED | OUTPATIENT
Start: 2024-12-12 | End: 2024-12-12

## 2024-12-12 RX ORDER — EPINEPHRINE 0.3 MG/.3ML
0.3 INJECTION SUBCUTANEOUS ONCE AS NEEDED
Status: DISCONTINUED | OUTPATIENT
Start: 2024-12-12 | End: 2024-12-12 | Stop reason: HOSPADM

## 2024-12-12 RX ORDER — SODIUM CHLORIDE 0.9 % (FLUSH) 0.9 %
10 SYRINGE (ML) INJECTION
Status: CANCELLED | OUTPATIENT
Start: 2024-12-12

## 2024-12-12 RX ORDER — BORTEZOMIB 3.5 MG/1
1.3 INJECTION, POWDER, LYOPHILIZED, FOR SOLUTION INTRAVENOUS; SUBCUTANEOUS
Status: CANCELLED | OUTPATIENT
Start: 2024-12-12

## 2024-12-12 RX ORDER — HEPARIN 100 UNIT/ML
500 SYRINGE INTRAVENOUS
Status: DISCONTINUED | OUTPATIENT
Start: 2024-12-12 | End: 2024-12-12 | Stop reason: HOSPADM

## 2024-12-12 RX ADMIN — BORTEZOMIB 2.75 MG: 3.5 INJECTION, POWDER, LYOPHILIZED, FOR SOLUTION INTRAVENOUS; SUBCUTANEOUS at 09:12

## 2024-12-13 LAB
ALBUMIN % SPEP (OHS): 64.22 (ref 48.1–59.5)
ALBUMIN SERPL-MCNC: 3.7 G/DL (ref 3.5–5)
ALBUMIN/GLOB SERPL: 1.9 RATIO (ref 1.1–2)
ALPHA 1 GLOB (OHS): 0.21 GM/DL (ref 0–0.4)
ALPHA 1 GLOB% (OHS): 3.67 (ref 2.3–4.9)
ALPHA 2 GLOB % (OHS): 11.14 (ref 6.9–13)
ALPHA 2 GLOB (OHS): 0.63 GM/DL (ref 0.4–1)
BETA GLOB (OHS): 0.81 GM/DL (ref 0.7–1.3)
BETA GLOB% (OHS): 14.3 (ref 13.8–19.7)
GAMMA GLOBULIN % (OHS): 6.67 (ref 10.1–21.9)
GAMMA GLOBULIN (OHS): 0.38 GM/DL (ref 0.4–1.8)
GLOBULIN SER-MCNC: 2 GM/DL (ref 2.4–3.5)
KAPPA LC FREE SER NEPH-MCNC: 1.45 MG/DL (ref 0.33–1.94)
KAPPA LC FREE/LAMBDA FREE SER NEPH: 1.06 {RATIO} (ref 0.26–1.65)
LAMBDA LC FREE SERPL-MCNC: 1.37 MG/DL (ref 0.57–2.63)
M SPIKE % (OHS): ABNORMAL
M SPIKE (OHS): ABNORMAL
PATH REV: NORMAL
PROT SERPL-MCNC: 5.7 GM/DL (ref 6.4–8.3)

## 2024-12-16 DIAGNOSIS — C90.00 MULTIPLE MYELOMA, REMISSION STATUS UNSPECIFIED: ICD-10-CM

## 2024-12-16 RX ORDER — OXYCODONE HYDROCHLORIDE 10 MG/1
10 TABLET ORAL EVERY 4 HOURS PRN
Qty: 90 TABLET | Refills: 0 | Status: SHIPPED | OUTPATIENT
Start: 2024-12-16

## 2024-12-20 DIAGNOSIS — C90.01 MULTIPLE MYELOMA IN REMISSION: ICD-10-CM

## 2024-12-20 RX ORDER — CALCITRIOL 0.5 UG/1
0.5 CAPSULE ORAL
Qty: 30 CAPSULE | Refills: 2 | Status: SHIPPED | OUTPATIENT
Start: 2024-12-20

## 2024-12-26 ENCOUNTER — INFUSION (OUTPATIENT)
Dept: INFUSION THERAPY | Facility: HOSPITAL | Age: 44
End: 2024-12-26
Attending: INTERNAL MEDICINE
Payer: COMMERCIAL

## 2024-12-26 ENCOUNTER — LAB VISIT (OUTPATIENT)
Dept: LAB | Facility: HOSPITAL | Age: 44
End: 2024-12-26
Attending: STUDENT IN AN ORGANIZED HEALTH CARE EDUCATION/TRAINING PROGRAM
Payer: COMMERCIAL

## 2024-12-26 VITALS
HEART RATE: 59 BPM | OXYGEN SATURATION: 100 % | BODY MASS INDEX: 29.5 KG/M2 | TEMPERATURE: 99 F | RESPIRATION RATE: 16 BRPM | SYSTOLIC BLOOD PRESSURE: 128 MMHG | WEIGHT: 199.19 LBS | DIASTOLIC BLOOD PRESSURE: 80 MMHG | HEIGHT: 69 IN

## 2024-12-26 DIAGNOSIS — C90.01 MULTIPLE MYELOMA IN REMISSION: Primary | ICD-10-CM

## 2024-12-26 DIAGNOSIS — Z51.11 ENCOUNTER FOR ANTINEOPLASTIC CHEMOTHERAPY: ICD-10-CM

## 2024-12-26 DIAGNOSIS — D84.821 IMMUNODEFICIENCY DUE TO DRUGS: ICD-10-CM

## 2024-12-26 DIAGNOSIS — Z79.899 IMMUNODEFICIENCY DUE TO DRUGS: ICD-10-CM

## 2024-12-26 DIAGNOSIS — C90.00 METASTATIC MULTIPLE MYELOMA TO BONE: ICD-10-CM

## 2024-12-26 DIAGNOSIS — N52.9 ERECTILE DYSFUNCTION, UNSPECIFIED ERECTILE DYSFUNCTION TYPE: ICD-10-CM

## 2024-12-26 DIAGNOSIS — C90.01 MULTIPLE MYELOMA IN REMISSION: ICD-10-CM

## 2024-12-26 DIAGNOSIS — Z94.84 HISTORY OF AUTO STEM CELL TRANSPLANT: ICD-10-CM

## 2024-12-26 LAB
ALBUMIN SERPL-MCNC: 4 G/DL (ref 3.5–5)
ALBUMIN/GLOB SERPL: 1.8 RATIO (ref 1.1–2)
ALP SERPL-CCNC: 47 UNIT/L (ref 40–150)
ALT SERPL-CCNC: 21 UNIT/L (ref 0–55)
ANION GAP SERPL CALC-SCNC: 7 MEQ/L
AST SERPL-CCNC: 22 UNIT/L (ref 5–34)
BASOPHILS # BLD AUTO: 0.05 X10(3)/MCL
BASOPHILS NFR BLD AUTO: 1.3 %
BILIRUB SERPL-MCNC: 0.8 MG/DL
BUN SERPL-MCNC: 21 MG/DL (ref 8.9–20.6)
CALCIUM SERPL-MCNC: 9 MG/DL (ref 8.4–10.2)
CHLORIDE SERPL-SCNC: 104 MMOL/L (ref 98–107)
CO2 SERPL-SCNC: 29 MMOL/L (ref 22–29)
CREAT SERPL-MCNC: 1.68 MG/DL (ref 0.72–1.25)
CREAT/UREA NIT SERPL: 13
EOSINOPHIL # BLD AUTO: 0.31 X10(3)/MCL (ref 0–0.9)
EOSINOPHIL NFR BLD AUTO: 8.1 %
ERYTHROCYTE [DISTWIDTH] IN BLOOD BY AUTOMATED COUNT: 14.8 % (ref 11.5–17)
GFR SERPLBLD CREATININE-BSD FMLA CKD-EPI: 50 ML/MIN/1.73/M2
GLOBULIN SER-MCNC: 2.2 GM/DL (ref 2.4–3.5)
GLUCOSE SERPL-MCNC: 75 MG/DL (ref 74–100)
HCT VFR BLD AUTO: 36 % (ref 42–52)
HGB BLD-MCNC: 12.1 G/DL (ref 14–18)
IGA SERPL-MCNC: 19 MG/DL (ref 63–484)
IGG SERPL-MCNC: 401 MG/DL (ref 540–1822)
IGM SERPL-MCNC: 16 MG/DL (ref 22–240)
IMM GRANULOCYTES # BLD AUTO: 0.01 X10(3)/MCL (ref 0–0.04)
IMM GRANULOCYTES NFR BLD AUTO: 0.3 %
LYMPHOCYTES # BLD AUTO: 0.75 X10(3)/MCL (ref 0.6–4.6)
LYMPHOCYTES NFR BLD AUTO: 19.5 %
MAGNESIUM SERPL-MCNC: 2.2 MG/DL (ref 1.6–2.6)
MCH RBC QN AUTO: 32.7 PG (ref 27–31)
MCHC RBC AUTO-ENTMCNC: 33.6 G/DL (ref 33–36)
MCV RBC AUTO: 97.3 FL (ref 80–94)
MONOCYTES # BLD AUTO: 0.51 X10(3)/MCL (ref 0.1–1.3)
MONOCYTES NFR BLD AUTO: 13.2 %
NEUTROPHILS # BLD AUTO: 2.22 X10(3)/MCL (ref 2.1–9.2)
NEUTROPHILS NFR BLD AUTO: 57.6 %
PLATELET # BLD AUTO: 119 X10(3)/MCL (ref 130–400)
PMV BLD AUTO: 10.1 FL (ref 7.4–10.4)
POTASSIUM SERPL-SCNC: 4.3 MMOL/L (ref 3.5–5.1)
PROT SERPL-MCNC: 6.2 GM/DL (ref 6.4–8.3)
RBC # BLD AUTO: 3.7 X10(6)/MCL (ref 4.7–6.1)
SODIUM SERPL-SCNC: 140 MMOL/L (ref 136–145)
WBC # BLD AUTO: 3.85 X10(3)/MCL (ref 4.5–11.5)

## 2024-12-26 PROCEDURE — 36415 COLL VENOUS BLD VENIPUNCTURE: CPT | Mod: 91

## 2024-12-26 PROCEDURE — 83521 IG LIGHT CHAINS FREE EACH: CPT

## 2024-12-26 PROCEDURE — 85025 COMPLETE CBC W/AUTO DIFF WBC: CPT

## 2024-12-26 PROCEDURE — 80053 COMPREHEN METABOLIC PANEL: CPT

## 2024-12-26 PROCEDURE — 96401 CHEMO ANTI-NEOPL SQ/IM: CPT

## 2024-12-26 PROCEDURE — 63600175 PHARM REV CODE 636 W HCPCS: Mod: JG

## 2024-12-26 PROCEDURE — 82784 ASSAY IGA/IGD/IGG/IGM EACH: CPT

## 2024-12-26 PROCEDURE — 83735 ASSAY OF MAGNESIUM: CPT

## 2024-12-26 PROCEDURE — 36415 COLL VENOUS BLD VENIPUNCTURE: CPT

## 2024-12-26 RX ORDER — SODIUM CHLORIDE 0.9 % (FLUSH) 0.9 %
10 SYRINGE (ML) INJECTION
Status: CANCELLED | OUTPATIENT
Start: 2024-12-26

## 2024-12-26 RX ORDER — EPINEPHRINE 0.3 MG/.3ML
0.3 INJECTION SUBCUTANEOUS ONCE AS NEEDED
Status: CANCELLED | OUTPATIENT
Start: 2024-12-26

## 2024-12-26 RX ORDER — PROCHLORPERAZINE EDISYLATE 5 MG/ML
10 INJECTION INTRAMUSCULAR; INTRAVENOUS ONCE AS NEEDED
Status: CANCELLED
Start: 2024-12-26

## 2024-12-26 RX ORDER — HEPARIN 100 UNIT/ML
500 SYRINGE INTRAVENOUS
Status: CANCELLED | OUTPATIENT
Start: 2024-12-26

## 2024-12-26 RX ORDER — DIPHENHYDRAMINE HYDROCHLORIDE 50 MG/ML
50 INJECTION INTRAMUSCULAR; INTRAVENOUS ONCE AS NEEDED
Status: CANCELLED | OUTPATIENT
Start: 2024-12-26

## 2024-12-26 RX ORDER — BORTEZOMIB 3.5 MG/1
1.3 INJECTION, POWDER, LYOPHILIZED, FOR SOLUTION INTRAVENOUS; SUBCUTANEOUS
Status: COMPLETED | OUTPATIENT
Start: 2024-12-26 | End: 2024-12-26

## 2024-12-26 RX ORDER — BORTEZOMIB 3.5 MG/1
1.3 INJECTION, POWDER, LYOPHILIZED, FOR SOLUTION INTRAVENOUS; SUBCUTANEOUS
Status: CANCELLED | OUTPATIENT
Start: 2024-12-26

## 2024-12-26 RX ADMIN — BORTEZOMIB 2.75 MG: 3.5 INJECTION, POWDER, LYOPHILIZED, FOR SOLUTION INTRAVENOUS; SUBCUTANEOUS at 01:12

## 2024-12-27 LAB
KAPPA LC FREE SER NEPH-MCNC: 1.91 MG/DL (ref 0.33–1.94)
KAPPA LC FREE/LAMBDA FREE SER NEPH: 1.1 {RATIO} (ref 0.26–1.65)
LAMBDA LC FREE SERPL-MCNC: 1.74 MG/DL (ref 0.57–2.63)

## 2025-01-02 DIAGNOSIS — C90.01 MULTIPLE MYELOMA IN REMISSION: Primary | ICD-10-CM

## 2025-01-03 ENCOUNTER — LAB VISIT (OUTPATIENT)
Dept: LAB | Facility: HOSPITAL | Age: 45
End: 2025-01-03
Attending: STUDENT IN AN ORGANIZED HEALTH CARE EDUCATION/TRAINING PROGRAM
Payer: COMMERCIAL

## 2025-01-03 DIAGNOSIS — C90.01 MULTIPLE MYELOMA IN REMISSION: ICD-10-CM

## 2025-01-03 LAB
ABS NEUT CALC (OHS): 1.12 X10(3)/MCL (ref 2.1–9.2)
ALBUMIN SERPL-MCNC: 3.9 G/DL (ref 3.5–5)
ALBUMIN/GLOB SERPL: 1.8 RATIO (ref 1.1–2)
ALP SERPL-CCNC: 50 UNIT/L (ref 40–150)
ALT SERPL-CCNC: 25 UNIT/L (ref 0–55)
ANION GAP SERPL CALC-SCNC: 8 MEQ/L
AST SERPL-CCNC: 21 UNIT/L (ref 5–34)
BILIRUB SERPL-MCNC: 1 MG/DL
BUN SERPL-MCNC: 20 MG/DL (ref 8.9–20.6)
CALCIUM SERPL-MCNC: 9 MG/DL (ref 8.4–10.2)
CHLORIDE SERPL-SCNC: 107 MMOL/L (ref 98–107)
CO2 SERPL-SCNC: 24 MMOL/L (ref 22–29)
CREAT SERPL-MCNC: 1.42 MG/DL (ref 0.72–1.25)
CREAT/UREA NIT SERPL: 14
EOSINOPHIL NFR BLD MANUAL: 0.15 X10(3)/MCL (ref 0–0.9)
EOSINOPHIL NFR BLD MANUAL: 6 % (ref 0–8)
ERYTHROCYTE [DISTWIDTH] IN BLOOD BY AUTOMATED COUNT: 15 % (ref 11.5–17)
GFR SERPLBLD CREATININE-BSD FMLA CKD-EPI: >60 ML/MIN/1.73/M2
GLOBULIN SER-MCNC: 2.2 GM/DL (ref 2.4–3.5)
GLUCOSE SERPL-MCNC: 101 MG/DL (ref 74–100)
HCT VFR BLD AUTO: 34.5 % (ref 42–52)
HGB BLD-MCNC: 11.8 G/DL (ref 14–18)
LYMPHOCYTES NFR BLD MANUAL: 0.98 X10(3)/MCL
LYMPHOCYTES NFR BLD MANUAL: 40 % (ref 13–40)
MACROCYTES BLD QL SMEAR: ABNORMAL
MCH RBC QN AUTO: 33.1 PG (ref 27–31)
MCHC RBC AUTO-ENTMCNC: 34.2 G/DL (ref 33–36)
MCV RBC AUTO: 96.6 FL (ref 80–94)
METAMYELOCYTES NFR BLD MANUAL: 1 %
MICROCYTES BLD QL SMEAR: SLIGHT
MONOCYTES NFR BLD MANUAL: 0.17 X10(3)/MCL (ref 0.1–1.3)
MONOCYTES NFR BLD MANUAL: 7 % (ref 2–11)
NEUTROPHILS NFR BLD MANUAL: 45 % (ref 47–80)
NEUTS BAND NFR BLD MANUAL: 1 % (ref 0–11)
OVALOCYTES (OLG): SLIGHT
PLATELET # BLD AUTO: 119 X10(3)/MCL (ref 130–400)
PLATELET # BLD EST: ABNORMAL 10*3/UL
PMV BLD AUTO: 10.8 FL (ref 7.4–10.4)
POIKILOCYTOSIS BLD QL SMEAR: SLIGHT
POTASSIUM SERPL-SCNC: 4 MMOL/L (ref 3.5–5.1)
PROT SERPL-MCNC: 6.1 GM/DL (ref 6.4–8.3)
RBC # BLD AUTO: 3.57 X10(6)/MCL (ref 4.7–6.1)
SODIUM SERPL-SCNC: 139 MMOL/L (ref 136–145)
TARGETS BLD QL SMEAR: SLIGHT
WBC # BLD AUTO: 2.44 X10(3)/MCL (ref 4.5–11.5)

## 2025-01-03 PROCEDURE — 36415 COLL VENOUS BLD VENIPUNCTURE: CPT

## 2025-01-03 PROCEDURE — 80053 COMPREHEN METABOLIC PANEL: CPT

## 2025-01-03 PROCEDURE — 83521 IG LIGHT CHAINS FREE EACH: CPT

## 2025-01-03 PROCEDURE — 82784 ASSAY IGA/IGD/IGG/IGM EACH: CPT

## 2025-01-03 PROCEDURE — 85025 COMPLETE CBC W/AUTO DIFF WBC: CPT

## 2025-01-03 RX ORDER — LENALIDOMIDE 10 MG/1
10 CAPSULE ORAL DAILY
Qty: 21 EACH | Refills: 0 | Status: SHIPPED | OUTPATIENT
Start: 2025-01-03

## 2025-01-04 LAB
IGA SERPL-MCNC: 21 MG/DL (ref 63–484)
IGG SERPL-MCNC: 401 MG/DL (ref 540–1822)
IGM SERPL-MCNC: 13 MG/DL (ref 22–240)

## 2025-01-06 ENCOUNTER — INFUSION (OUTPATIENT)
Dept: INFUSION THERAPY | Facility: HOSPITAL | Age: 45
End: 2025-01-06
Attending: INTERNAL MEDICINE
Payer: COMMERCIAL

## 2025-01-06 VITALS
SYSTOLIC BLOOD PRESSURE: 132 MMHG | OXYGEN SATURATION: 100 % | DIASTOLIC BLOOD PRESSURE: 90 MMHG | HEIGHT: 69 IN | RESPIRATION RATE: 16 BRPM | WEIGHT: 202 LBS | BODY MASS INDEX: 29.92 KG/M2 | HEART RATE: 65 BPM | TEMPERATURE: 98 F

## 2025-01-06 DIAGNOSIS — C90.00 METASTATIC MULTIPLE MYELOMA TO BONE: ICD-10-CM

## 2025-01-06 PROCEDURE — 90471 IMMUNIZATION ADMIN: CPT | Performed by: INTERNAL MEDICINE

## 2025-01-06 PROCEDURE — 96372 THER/PROPH/DIAG INJ SC/IM: CPT

## 2025-01-06 PROCEDURE — 90636 HEP A/HEP B VACC ADULT IM: CPT | Performed by: INTERNAL MEDICINE

## 2025-01-06 PROCEDURE — 90472 IMMUNIZATION ADMIN EACH ADD: CPT | Performed by: INTERNAL MEDICINE

## 2025-01-06 PROCEDURE — 90750 HZV VACC RECOMBINANT IM: CPT | Performed by: INTERNAL MEDICINE

## 2025-01-06 PROCEDURE — 63600175 PHARM REV CODE 636 W HCPCS: Performed by: INTERNAL MEDICINE

## 2025-01-06 PROCEDURE — 90677 PCV20 VACCINE IM: CPT | Performed by: INTERNAL MEDICINE

## 2025-01-06 RX ADMIN — HEPATITIS A AND HEPATITIS B (RECOMBINANT) VACCINE 720 UNITS: 720; 20 INJECTION, SUSPENSION INTRAMUSCULAR at 01:01

## 2025-01-06 RX ADMIN — PNEUMOCOCCAL 20-VALENT CONJUGATE VACCINE 0.5 ML
2.2; 2.2; 2.2; 2.2; 2.2; 2.2; 2.2; 2.2; 2.2; 2.2; 2.2; 2.2; 2.2; 2.2; 2.2; 2.2; 4.4; 2.2; 2.2; 2.2 INJECTION, SUSPENSION INTRAMUSCULAR at 01:01

## 2025-01-06 RX ADMIN — Medication 0.5 ML: at 01:01

## 2025-01-06 NOTE — PLAN OF CARE
Patient will have proper knowledge on immunizations that were administered. Pt given VIS versions for appropriate vaccines

## 2025-01-07 LAB
KAPPA LC FREE SER NEPH-MCNC: 1.59 MG/DL (ref 0.33–1.94)
KAPPA LC FREE/LAMBDA FREE SER NEPH: 1.05 {RATIO} (ref 0.26–1.65)
LAMBDA LC FREE SERPL-MCNC: 1.51 MG/DL (ref 0.57–2.63)

## 2025-01-08 ENCOUNTER — HOSPITAL ENCOUNTER (OUTPATIENT)
Dept: RADIOLOGY | Facility: HOSPITAL | Age: 45
Discharge: HOME | End: 2025-01-08
Attending: STUDENT IN AN ORGANIZED HEALTH CARE EDUCATION/TRAINING PROGRAM
Payer: COMMERCIAL

## 2025-01-08 DIAGNOSIS — C90.01 MULTIPLE MYELOMA IN REMISSION: ICD-10-CM

## 2025-01-08 PROCEDURE — 78816 PET IMAGE W/CT FULL BODY: CPT | Mod: TC

## 2025-01-08 PROCEDURE — A9552 F18 FDG: HCPCS | Performed by: STUDENT IN AN ORGANIZED HEALTH CARE EDUCATION/TRAINING PROGRAM

## 2025-01-08 RX ORDER — FLUDEOXYGLUCOSE F18 500 MCI/ML
10 INJECTION INTRAVENOUS
Status: COMPLETED | OUTPATIENT
Start: 2025-01-08 | End: 2025-01-08

## 2025-01-08 RX ADMIN — FLUDEOXYGLUCOSE F-18 11 MILLICURIE: 500 INJECTION INTRAVENOUS at 08:01

## 2025-01-09 ENCOUNTER — OFFICE VISIT (OUTPATIENT)
Dept: HEMATOLOGY/ONCOLOGY | Facility: CLINIC | Age: 45
End: 2025-01-09
Payer: COMMERCIAL

## 2025-01-09 ENCOUNTER — INFUSION (OUTPATIENT)
Dept: INFUSION THERAPY | Facility: HOSPITAL | Age: 45
End: 2025-01-09
Attending: INTERNAL MEDICINE
Payer: COMMERCIAL

## 2025-01-09 VITALS
HEART RATE: 63 BPM | BODY MASS INDEX: 29.74 KG/M2 | SYSTOLIC BLOOD PRESSURE: 115 MMHG | RESPIRATION RATE: 16 BRPM | HEART RATE: 63 BPM | HEIGHT: 69 IN | WEIGHT: 200.81 LBS | SYSTOLIC BLOOD PRESSURE: 115 MMHG | RESPIRATION RATE: 16 BRPM | HEIGHT: 69 IN | WEIGHT: 200.81 LBS | OXYGEN SATURATION: 100 % | TEMPERATURE: 98 F | TEMPERATURE: 98 F | OXYGEN SATURATION: 100 % | DIASTOLIC BLOOD PRESSURE: 75 MMHG | BODY MASS INDEX: 29.74 KG/M2 | DIASTOLIC BLOOD PRESSURE: 75 MMHG

## 2025-01-09 DIAGNOSIS — Z79.899 IMMUNODEFICIENCY DUE TO DRUG THERAPY: ICD-10-CM

## 2025-01-09 DIAGNOSIS — C90.01 MULTIPLE MYELOMA IN REMISSION: Primary | ICD-10-CM

## 2025-01-09 DIAGNOSIS — C90.00 METASTATIC MULTIPLE MYELOMA TO BONE: ICD-10-CM

## 2025-01-09 DIAGNOSIS — D84.821 IMMUNODEFICIENCY DUE TO DRUG THERAPY: ICD-10-CM

## 2025-01-09 PROCEDURE — 63600175 PHARM REV CODE 636 W HCPCS: Performed by: STUDENT IN AN ORGANIZED HEALTH CARE EDUCATION/TRAINING PROGRAM

## 2025-01-09 PROCEDURE — 96401 CHEMO ANTI-NEOPL SQ/IM: CPT

## 2025-01-09 PROCEDURE — 3074F SYST BP LT 130 MM HG: CPT | Mod: CPTII,S$GLB,, | Performed by: STUDENT IN AN ORGANIZED HEALTH CARE EDUCATION/TRAINING PROGRAM

## 2025-01-09 PROCEDURE — 99215 OFFICE O/P EST HI 40 MIN: CPT | Mod: S$GLB,,, | Performed by: STUDENT IN AN ORGANIZED HEALTH CARE EDUCATION/TRAINING PROGRAM

## 2025-01-09 PROCEDURE — 1160F RVW MEDS BY RX/DR IN RCRD: CPT | Mod: CPTII,S$GLB,, | Performed by: STUDENT IN AN ORGANIZED HEALTH CARE EDUCATION/TRAINING PROGRAM

## 2025-01-09 PROCEDURE — 3078F DIAST BP <80 MM HG: CPT | Mod: CPTII,S$GLB,, | Performed by: STUDENT IN AN ORGANIZED HEALTH CARE EDUCATION/TRAINING PROGRAM

## 2025-01-09 PROCEDURE — 1159F MED LIST DOCD IN RCRD: CPT | Mod: CPTII,S$GLB,, | Performed by: STUDENT IN AN ORGANIZED HEALTH CARE EDUCATION/TRAINING PROGRAM

## 2025-01-09 PROCEDURE — 3008F BODY MASS INDEX DOCD: CPT | Mod: CPTII,S$GLB,, | Performed by: STUDENT IN AN ORGANIZED HEALTH CARE EDUCATION/TRAINING PROGRAM

## 2025-01-09 PROCEDURE — 99999 PR PBB SHADOW E&M-EST. PATIENT-LVL V: CPT | Mod: PBBFAC,,, | Performed by: STUDENT IN AN ORGANIZED HEALTH CARE EDUCATION/TRAINING PROGRAM

## 2025-01-09 RX ORDER — BORTEZOMIB 3.5 MG/1
1.3 INJECTION, POWDER, LYOPHILIZED, FOR SOLUTION INTRAVENOUS; SUBCUTANEOUS
Status: COMPLETED | OUTPATIENT
Start: 2025-01-09 | End: 2025-01-09

## 2025-01-09 RX ORDER — SODIUM CHLORIDE 0.9 % (FLUSH) 0.9 %
10 SYRINGE (ML) INJECTION
OUTPATIENT
Start: 2025-01-09

## 2025-01-09 RX ORDER — BORTEZOMIB 3.5 MG/1
1.3 INJECTION, POWDER, LYOPHILIZED, FOR SOLUTION INTRAVENOUS; SUBCUTANEOUS
Status: CANCELLED | OUTPATIENT
Start: 2025-01-09

## 2025-01-09 RX ORDER — PROCHLORPERAZINE EDISYLATE 5 MG/ML
10 INJECTION INTRAMUSCULAR; INTRAVENOUS ONCE AS NEEDED
Start: 2025-01-09

## 2025-01-09 RX ORDER — DIPHENHYDRAMINE HYDROCHLORIDE 50 MG/ML
50 INJECTION INTRAMUSCULAR; INTRAVENOUS ONCE AS NEEDED
OUTPATIENT
Start: 2025-01-09

## 2025-01-09 RX ORDER — EPINEPHRINE 0.3 MG/.3ML
0.3 INJECTION SUBCUTANEOUS ONCE AS NEEDED
OUTPATIENT
Start: 2025-01-09

## 2025-01-09 RX ORDER — HEPARIN 100 UNIT/ML
500 SYRINGE INTRAVENOUS
OUTPATIENT
Start: 2025-01-09

## 2025-01-09 RX ADMIN — BORTEZOMIB 2.75 MG: 3.5 INJECTION, POWDER, LYOPHILIZED, FOR SOLUTION INTRAVENOUS; SUBCUTANEOUS at 11:01
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
## 2025-01-13 ENCOUNTER — INFUSION (OUTPATIENT)
Dept: INFUSION THERAPY | Facility: HOSPITAL | Age: 45
End: 2025-01-13
Attending: INTERNAL MEDICINE
Payer: COMMERCIAL

## 2025-01-13 VITALS
OXYGEN SATURATION: 99 % | TEMPERATURE: 98 F | HEART RATE: 72 BPM | RESPIRATION RATE: 18 BRPM | HEIGHT: 69 IN | BODY MASS INDEX: 29.92 KG/M2 | DIASTOLIC BLOOD PRESSURE: 76 MMHG | SYSTOLIC BLOOD PRESSURE: 128 MMHG | WEIGHT: 202 LBS

## 2025-01-13 DIAGNOSIS — C90.00 METASTATIC MULTIPLE MYELOMA TO BONE: ICD-10-CM

## 2025-01-13 DIAGNOSIS — C90.00 METASTATIC MULTIPLE MYELOMA TO BONE: Primary | ICD-10-CM

## 2025-01-13 PROCEDURE — 90734 MENACWYD/MENACWYCRM VACC IM: CPT | Performed by: INTERNAL MEDICINE

## 2025-01-13 PROCEDURE — 96372 THER/PROPH/DIAG INJ SC/IM: CPT

## 2025-01-13 PROCEDURE — 90698 DTAP-IPV/HIB VACCINE IM: CPT | Performed by: INTERNAL MEDICINE

## 2025-01-13 PROCEDURE — 90472 IMMUNIZATION ADMIN EACH ADD: CPT | Performed by: INTERNAL MEDICINE

## 2025-01-13 PROCEDURE — 90698 DTAP-IPV/HIB VACCINE IM: CPT

## 2025-01-13 PROCEDURE — 63600175 PHARM REV CODE 636 W HCPCS: Performed by: INTERNAL MEDICINE

## 2025-01-13 PROCEDURE — 90471 IMMUNIZATION ADMIN: CPT | Performed by: INTERNAL MEDICINE

## 2025-01-13 PROCEDURE — 90734 MENACWYD/MENACWYCRM VACC IM: CPT

## 2025-01-13 PROCEDURE — 63600175 PHARM REV CODE 636 W HCPCS

## 2025-01-13 PROCEDURE — 90472 IMMUNIZATION ADMIN EACH ADD: CPT

## 2025-01-13 RX ORDER — HEPARIN 100 UNIT/ML
500 SYRINGE INTRAVENOUS
OUTPATIENT
Start: 2025-01-13

## 2025-01-13 RX ORDER — HEPARIN 100 UNIT/ML
500 SYRINGE INTRAVENOUS
Status: DISCONTINUED | OUTPATIENT
Start: 2025-01-13 | End: 2025-01-13 | Stop reason: HOSPADM

## 2025-01-13 RX ORDER — SODIUM CHLORIDE 0.9 % (FLUSH) 0.9 %
10 SYRINGE (ML) INJECTION
OUTPATIENT
Start: 2025-01-13

## 2025-01-13 RX ORDER — SODIUM CHLORIDE 0.9 % (FLUSH) 0.9 %
10 SYRINGE (ML) INJECTION
Status: DISCONTINUED | OUTPATIENT
Start: 2025-01-13 | End: 2025-01-13 | Stop reason: HOSPADM

## 2025-01-13 RX ADMIN — Medication 0.5 ML: at 12:01

## 2025-01-15 DIAGNOSIS — C90.00 MULTIPLE MYELOMA, REMISSION STATUS UNSPECIFIED: ICD-10-CM

## 2025-01-15 DIAGNOSIS — N52.1 ERECTILE DYSFUNCTION DUE TO DISEASES CLASSIFIED ELSEWHERE: ICD-10-CM

## 2025-01-15 RX ORDER — OXYCODONE HYDROCHLORIDE 10 MG/1
10 TABLET ORAL EVERY 4 HOURS PRN
Qty: 90 TABLET | Refills: 0 | Status: SHIPPED | OUTPATIENT
Start: 2025-01-15

## 2025-01-15 RX ORDER — TADALAFIL 10 MG/1
10 TABLET ORAL DAILY PRN
Qty: 30 TABLET | Refills: 1 | Status: SHIPPED | OUTPATIENT
Start: 2025-01-15 | End: 2026-01-15

## 2025-01-24 ENCOUNTER — INFUSION (OUTPATIENT)
Dept: INFUSION THERAPY | Facility: HOSPITAL | Age: 45
End: 2025-01-24
Attending: INTERNAL MEDICINE
Payer: COMMERCIAL

## 2025-01-24 ENCOUNTER — LAB VISIT (OUTPATIENT)
Dept: LAB | Facility: HOSPITAL | Age: 45
End: 2025-01-24
Attending: STUDENT IN AN ORGANIZED HEALTH CARE EDUCATION/TRAINING PROGRAM
Payer: COMMERCIAL

## 2025-01-24 VITALS
DIASTOLIC BLOOD PRESSURE: 80 MMHG | TEMPERATURE: 99 F | OXYGEN SATURATION: 100 % | BODY MASS INDEX: 29.47 KG/M2 | SYSTOLIC BLOOD PRESSURE: 145 MMHG | HEIGHT: 69 IN | RESPIRATION RATE: 18 BRPM | HEART RATE: 58 BPM | WEIGHT: 199 LBS

## 2025-01-24 DIAGNOSIS — E55.9 VITAMIN D DEFICIENCY: ICD-10-CM

## 2025-01-24 DIAGNOSIS — C90.01 MULTIPLE MYELOMA IN REMISSION: ICD-10-CM

## 2025-01-24 DIAGNOSIS — N18.31 CHRONIC KIDNEY DISEASE (CKD) STAGE G3A/A1, MODERATELY DECREASED GLOMERULAR FILTRATION RATE (GFR) BETWEEN 45-59 ML/MIN/1.73 SQUARE METER AND ALBUMINURIA CREATININE RATIO LESS THAN 30 MG/G: ICD-10-CM

## 2025-01-24 DIAGNOSIS — E83.52 HYPERCALCEMIA: ICD-10-CM

## 2025-01-24 DIAGNOSIS — R80.9 PROTEINURIA, UNSPECIFIED TYPE: ICD-10-CM

## 2025-01-24 DIAGNOSIS — N18.9 CHRONIC KIDNEY DISEASE, UNSPECIFIED: ICD-10-CM

## 2025-01-24 DIAGNOSIS — C90.00 MYELOMA ASSOCIATED AMYLOIDOSIS: Primary | ICD-10-CM

## 2025-01-24 DIAGNOSIS — E85.9 MYELOMA ASSOCIATED AMYLOIDOSIS: Primary | ICD-10-CM

## 2025-01-24 LAB
ALBUMIN SERPL-MCNC: 3.6 G/DL (ref 3.5–5)
ALBUMIN/GLOB SERPL: 1.5 RATIO (ref 1.1–2)
ALP SERPL-CCNC: 43 UNIT/L (ref 40–150)
ALT SERPL-CCNC: 37 UNIT/L (ref 0–55)
ANION GAP SERPL CALC-SCNC: 5 MEQ/L
AST SERPL-CCNC: 24 UNIT/L (ref 5–34)
BACTERIA #/AREA URNS AUTO: ABNORMAL /HPF
BASOPHILS # BLD AUTO: 0.01 X10(3)/MCL
BASOPHILS NFR BLD AUTO: 0.6 %
BILIRUB SERPL-MCNC: 0.8 MG/DL
BILIRUB UR QL STRIP.AUTO: NEGATIVE
BUN SERPL-MCNC: 14 MG/DL (ref 8.9–20.6)
CALCIUM SERPL-MCNC: 8.8 MG/DL (ref 8.4–10.2)
CHLORIDE SERPL-SCNC: 106 MMOL/L (ref 98–107)
CHOLEST SERPL-MCNC: 147 MG/DL
CHOLEST/HDLC SERPL: 3 {RATIO} (ref 0–5)
CLARITY UR: CLEAR
CO2 SERPL-SCNC: 27 MMOL/L (ref 22–29)
COLOR UR AUTO: YELLOW
CREAT SERPL-MCNC: 1.24 MG/DL (ref 0.72–1.25)
CREAT UR-MCNC: 114.3 MG/DL (ref 63–166)
CREAT UR-MCNC: 114.3 MG/DL (ref 63–166)
CREAT/UREA NIT SERPL: 11
EOSINOPHIL # BLD AUTO: 0.16 X10(3)/MCL (ref 0–0.9)
EOSINOPHIL NFR BLD AUTO: 9.2 %
ERYTHROCYTE [DISTWIDTH] IN BLOOD BY AUTOMATED COUNT: 14.8 % (ref 11.5–17)
EST. AVERAGE GLUCOSE BLD GHB EST-MCNC: 102.5 MG/DL
FERRITIN SERPL-MCNC: 611.92 NG/ML (ref 21.81–274.66)
GFR SERPLBLD CREATININE-BSD FMLA CKD-EPI: >60 ML/MIN/1.73/M2
GLOBULIN SER-MCNC: 2.4 GM/DL (ref 2.4–3.5)
GLUCOSE SERPL-MCNC: 93 MG/DL (ref 74–100)
GLUCOSE UR QL STRIP: ABNORMAL
HBA1C MFR BLD: 5.2 %
HCT VFR BLD AUTO: 34.2 % (ref 42–52)
HDLC SERPL-MCNC: 43 MG/DL (ref 35–60)
HGB BLD-MCNC: 11.6 G/DL (ref 14–18)
HGB UR QL STRIP: NEGATIVE
IMM GRANULOCYTES # BLD AUTO: 0 X10(3)/MCL (ref 0–0.04)
IMM GRANULOCYTES NFR BLD AUTO: 0 %
IRON SATN MFR SERPL: 61 % (ref 20–50)
IRON SERPL-MCNC: 127 UG/DL (ref 65–175)
KETONES UR QL STRIP: NEGATIVE
LDLC SERPL CALC-MCNC: 83 MG/DL (ref 50–140)
LEUKOCYTE ESTERASE UR QL STRIP: NEGATIVE
LYMPHOCYTES # BLD AUTO: 0.5 X10(3)/MCL (ref 0.6–4.6)
LYMPHOCYTES NFR BLD AUTO: 28.9 %
MCH RBC QN AUTO: 32.6 PG (ref 27–31)
MCHC RBC AUTO-ENTMCNC: 33.9 G/DL (ref 33–36)
MCV RBC AUTO: 96.1 FL (ref 80–94)
MICROALBUMIN UR-MCNC: 29 UG/ML
MICROALBUMIN/CREAT RATIO PNL UR: 25.4 MG/GM CR (ref 0–30)
MONOCYTES # BLD AUTO: 0.19 X10(3)/MCL (ref 0.1–1.3)
MONOCYTES NFR BLD AUTO: 11 %
NEUTROPHILS # BLD AUTO: 0.87 X10(3)/MCL (ref 2.1–9.2)
NEUTROPHILS NFR BLD AUTO: 50.3 %
NITRITE UR QL STRIP: NEGATIVE
NRBC BLD AUTO-RTO: 0 %
PH UR STRIP: 7 [PH]
PHOSPHATE SERPL-MCNC: 2.5 MG/DL (ref 2.3–4.7)
PLATELET # BLD AUTO: 93 X10(3)/MCL (ref 130–400)
PMV BLD AUTO: 11.9 FL (ref 7.4–10.4)
POTASSIUM SERPL-SCNC: 4 MMOL/L (ref 3.5–5.1)
PROT SERPL-MCNC: 6 GM/DL (ref 6.4–8.3)
PROT UR QL STRIP: ABNORMAL
PROT UR STRIP-MCNC: 20 MG/DL
PSA SERPL-MCNC: 0.52 NG/ML
PTH-INTACT SERPL-MCNC: 59.1 PG/ML (ref 8.7–77)
RBC # BLD AUTO: 3.56 X10(6)/MCL (ref 4.7–6.1)
RBC #/AREA URNS AUTO: ABNORMAL /HPF
SODIUM SERPL-SCNC: 138 MMOL/L (ref 136–145)
SP GR UR STRIP.AUTO: 1.02 (ref 1–1.03)
SQUAMOUS #/AREA URNS AUTO: ABNORMAL /HPF
TIBC SERPL-MCNC: 208 UG/DL (ref 250–450)
TIBC SERPL-MCNC: 81 UG/DL (ref 60–240)
TRIGL SERPL-MCNC: 104 MG/DL (ref 34–140)
TSH SERPL-ACNC: 0.47 UIU/ML (ref 0.35–4.94)
URINE PROTEIN/CREATININE RATIO (OLG): 0.2
UROBILINOGEN UR STRIP-ACNC: 0.2
VLDLC SERPL CALC-MCNC: 21 MG/DL
WBC # BLD AUTO: 1.73 X10(3)/MCL (ref 4.5–11.5)
WBC #/AREA URNS AUTO: ABNORMAL /HPF

## 2025-01-24 PROCEDURE — 83970 ASSAY OF PARATHORMONE: CPT

## 2025-01-24 PROCEDURE — 82570 ASSAY OF URINE CREATININE: CPT

## 2025-01-24 PROCEDURE — 84443 ASSAY THYROID STIM HORMONE: CPT

## 2025-01-24 PROCEDURE — 36415 COLL VENOUS BLD VENIPUNCTURE: CPT

## 2025-01-24 PROCEDURE — 84153 ASSAY OF PSA TOTAL: CPT

## 2025-01-24 PROCEDURE — 82784 ASSAY IGA/IGD/IGG/IGM EACH: CPT

## 2025-01-24 PROCEDURE — 82306 VITAMIN D 25 HYDROXY: CPT

## 2025-01-24 PROCEDURE — 83540 ASSAY OF IRON: CPT

## 2025-01-24 PROCEDURE — 84165 PROTEIN E-PHORESIS SERUM: CPT

## 2025-01-24 PROCEDURE — 80053 COMPREHEN METABOLIC PANEL: CPT

## 2025-01-24 PROCEDURE — 83521 IG LIGHT CHAINS FREE EACH: CPT

## 2025-01-24 PROCEDURE — 82728 ASSAY OF FERRITIN: CPT

## 2025-01-24 PROCEDURE — 83036 HEMOGLOBIN GLYCOSYLATED A1C: CPT

## 2025-01-24 PROCEDURE — 81003 URINALYSIS AUTO W/O SCOPE: CPT

## 2025-01-24 PROCEDURE — 84100 ASSAY OF PHOSPHORUS: CPT

## 2025-01-24 PROCEDURE — 80061 LIPID PANEL: CPT

## 2025-01-24 PROCEDURE — 85025 COMPLETE CBC W/AUTO DIFF WBC: CPT

## 2025-01-25 LAB
25(OH)D3+25(OH)D2 SERPL-MCNC: 49 NG/ML (ref 30–80)
IGA SERPL-MCNC: 31 MG/DL (ref 63–484)
IGG SERPL-MCNC: 513 MG/DL (ref 540–1822)
IGM SERPL-MCNC: 35 MG/DL (ref 22–240)

## 2025-01-27 LAB
ALBUMIN % SPEP (OHS): 61 (ref 48.1–59.5)
ALBUMIN SERPL-MCNC: 3.4 G/DL (ref 3.5–5)
ALBUMIN/GLOB SERPL: 1.5 RATIO (ref 1.1–2)
ALPHA 1 GLOB (OHS): 0.23 GM/DL (ref 0–0.4)
ALPHA 1 GLOB% (OHS): 4.13 (ref 2.3–4.9)
ALPHA 2 GLOB % (OHS): 12.73 (ref 6.9–13)
ALPHA 2 GLOB (OHS): 0.71 GM/DL (ref 0.4–1)
BETA GLOB (OHS): 0.72 GM/DL (ref 0.7–1.3)
BETA GLOB% (OHS): 12.78 (ref 13.8–19.7)
GAMMA GLOBULIN % (OHS): 9.36 (ref 10.1–21.9)
GAMMA GLOBULIN (OHS): 0.52 GM/DL (ref 0.4–1.8)
GLOBULIN SER-MCNC: 2.2 GM/DL (ref 2.4–3.5)
KAPPA LC FREE SER NEPH-MCNC: 3.2 MG/DL (ref 0.33–1.94)
KAPPA LC FREE/LAMBDA FREE SER NEPH: 1.29 {RATIO} (ref 0.26–1.65)
LAMBDA LC FREE SERPL-MCNC: 2.49 MG/DL (ref 0.57–2.63)
M SPIKE % (OHS): ABNORMAL
M SPIKE (OHS): ABNORMAL
PATH REV: NORMAL
PROT SERPL-MCNC: 5.6 GM/DL (ref 6.4–8.3)

## 2025-01-28 ENCOUNTER — PATIENT MESSAGE (OUTPATIENT)
Dept: INTERNAL MEDICINE | Facility: CLINIC | Age: 45
End: 2025-01-28
Payer: COMMERCIAL

## 2025-01-29 DIAGNOSIS — C90.00 MULTIPLE MYELOMA, REMISSION STATUS UNSPECIFIED: ICD-10-CM

## 2025-01-29 DIAGNOSIS — N52.1 ERECTILE DYSFUNCTION DUE TO DISEASES CLASSIFIED ELSEWHERE: Primary | ICD-10-CM

## 2025-01-29 LAB — MAYO GENERIC ORDERABLE RESULT: NORMAL

## 2025-02-03 DIAGNOSIS — C90.01 MULTIPLE MYELOMA IN REMISSION: ICD-10-CM

## 2025-02-03 RX ORDER — LENALIDOMIDE 10 MG/1
10 CAPSULE ORAL DAILY
Qty: 21 EACH | Refills: 0 | Status: SHIPPED | OUTPATIENT
Start: 2025-02-03

## 2025-02-06 ENCOUNTER — OFFICE VISIT (OUTPATIENT)
Dept: HEMATOLOGY/ONCOLOGY | Facility: CLINIC | Age: 45
End: 2025-02-06
Payer: COMMERCIAL

## 2025-02-06 ENCOUNTER — PATIENT MESSAGE (OUTPATIENT)
Dept: INTERNAL MEDICINE | Facility: CLINIC | Age: 45
End: 2025-02-06
Payer: COMMERCIAL

## 2025-02-06 ENCOUNTER — INFUSION (OUTPATIENT)
Dept: INFUSION THERAPY | Facility: HOSPITAL | Age: 45
End: 2025-02-06
Attending: INTERNAL MEDICINE
Payer: COMMERCIAL

## 2025-02-06 ENCOUNTER — LAB VISIT (OUTPATIENT)
Dept: LAB | Facility: HOSPITAL | Age: 45
End: 2025-02-06
Payer: COMMERCIAL

## 2025-02-06 VITALS
OXYGEN SATURATION: 100 % | RESPIRATION RATE: 18 BRPM | TEMPERATURE: 98 F | WEIGHT: 201.31 LBS | HEART RATE: 64 BPM | SYSTOLIC BLOOD PRESSURE: 119 MMHG | HEIGHT: 69 IN | DIASTOLIC BLOOD PRESSURE: 84 MMHG | BODY MASS INDEX: 29.82 KG/M2

## 2025-02-06 DIAGNOSIS — M87.9 OSTEONECROSIS OF JAW: ICD-10-CM

## 2025-02-06 DIAGNOSIS — M54.12 CERVICAL RADICULOPATHY: ICD-10-CM

## 2025-02-06 DIAGNOSIS — Z94.84 HISTORY OF AUTO STEM CELL TRANSPLANT: ICD-10-CM

## 2025-02-06 DIAGNOSIS — Z79.899 IMMUNODEFICIENCY DUE TO DRUGS: ICD-10-CM

## 2025-02-06 DIAGNOSIS — Z51.11 ENCOUNTER FOR ANTINEOPLASTIC CHEMOTHERAPY: ICD-10-CM

## 2025-02-06 DIAGNOSIS — R53.82 CHRONIC FATIGUE: ICD-10-CM

## 2025-02-06 DIAGNOSIS — C90.01 MULTIPLE MYELOMA IN REMISSION: Primary | ICD-10-CM

## 2025-02-06 DIAGNOSIS — D69.6 THROMBOCYTOPENIA: ICD-10-CM

## 2025-02-06 DIAGNOSIS — T45.1X5A CHEMOTHERAPY-INDUCED NEUTROPENIA: ICD-10-CM

## 2025-02-06 DIAGNOSIS — D84.821 IMMUNODEFICIENCY DUE TO DRUGS: ICD-10-CM

## 2025-02-06 DIAGNOSIS — C90.00 METASTATIC MULTIPLE MYELOMA TO BONE: ICD-10-CM

## 2025-02-06 DIAGNOSIS — D70.1 CHEMOTHERAPY-INDUCED NEUTROPENIA: ICD-10-CM

## 2025-02-06 DIAGNOSIS — D61.810 PANCYTOPENIA DUE TO ANTINEOPLASTIC CHEMOTHERAPY: ICD-10-CM

## 2025-02-06 DIAGNOSIS — D63.0 ANEMIA IN NEOPLASTIC DISEASE: ICD-10-CM

## 2025-02-06 DIAGNOSIS — C90.00 MULTIPLE MYELOMA, REMISSION STATUS UNSPECIFIED: ICD-10-CM

## 2025-02-06 DIAGNOSIS — T45.1X5A PANCYTOPENIA DUE TO ANTINEOPLASTIC CHEMOTHERAPY: ICD-10-CM

## 2025-02-06 LAB
IGA SERPL-MCNC: 30 MG/DL (ref 63–484)
IGG SERPL-MCNC: 477 MG/DL (ref 540–1822)
IGM SERPL-MCNC: 20 MG/DL (ref 22–240)

## 2025-02-06 PROCEDURE — 83521 IG LIGHT CHAINS FREE EACH: CPT

## 2025-02-06 PROCEDURE — 63600175 PHARM REV CODE 636 W HCPCS

## 2025-02-06 PROCEDURE — 96401 CHEMO ANTI-NEOPL SQ/IM: CPT

## 2025-02-06 PROCEDURE — 3074F SYST BP LT 130 MM HG: CPT | Mod: CPTII,S$GLB,,

## 2025-02-06 PROCEDURE — 3008F BODY MASS INDEX DOCD: CPT | Mod: CPTII,S$GLB,,

## 2025-02-06 PROCEDURE — 1160F RVW MEDS BY RX/DR IN RCRD: CPT | Mod: CPTII,S$GLB,,

## 2025-02-06 PROCEDURE — 36415 COLL VENOUS BLD VENIPUNCTURE: CPT

## 2025-02-06 PROCEDURE — 3061F NEG MICROALBUMINURIA REV: CPT | Mod: CPTII,S$GLB,,

## 2025-02-06 PROCEDURE — 99215 OFFICE O/P EST HI 40 MIN: CPT | Mod: S$GLB,,,

## 2025-02-06 PROCEDURE — 1159F MED LIST DOCD IN RCRD: CPT | Mod: CPTII,S$GLB,,

## 2025-02-06 PROCEDURE — 82784 ASSAY IGA/IGD/IGG/IGM EACH: CPT

## 2025-02-06 PROCEDURE — 3079F DIAST BP 80-89 MM HG: CPT | Mod: CPTII,S$GLB,,

## 2025-02-06 PROCEDURE — 84165 PROTEIN E-PHORESIS SERUM: CPT

## 2025-02-06 PROCEDURE — G2211 COMPLEX E/M VISIT ADD ON: HCPCS | Mod: S$GLB,,,

## 2025-02-06 PROCEDURE — 3044F HG A1C LEVEL LT 7.0%: CPT | Mod: CPTII,S$GLB,,

## 2025-02-06 PROCEDURE — 99999 PR PBB SHADOW E&M-EST. PATIENT-LVL V: CPT | Mod: PBBFAC,,,

## 2025-02-06 PROCEDURE — 3066F NEPHROPATHY DOC TX: CPT | Mod: CPTII,S$GLB,,

## 2025-02-06 RX ORDER — EPINEPHRINE 0.3 MG/.3ML
0.3 INJECTION SUBCUTANEOUS ONCE AS NEEDED
Status: DISCONTINUED | OUTPATIENT
Start: 2025-02-06 | End: 2025-02-06 | Stop reason: HOSPADM

## 2025-02-06 RX ORDER — EPINEPHRINE 0.3 MG/.3ML
0.3 INJECTION SUBCUTANEOUS ONCE AS NEEDED
Status: CANCELLED | OUTPATIENT
Start: 2025-02-06

## 2025-02-06 RX ORDER — DIPHENHYDRAMINE HYDROCHLORIDE 50 MG/ML
50 INJECTION INTRAMUSCULAR; INTRAVENOUS ONCE AS NEEDED
Status: DISCONTINUED | OUTPATIENT
Start: 2025-02-06 | End: 2025-02-06 | Stop reason: HOSPADM

## 2025-02-06 RX ORDER — DIPHENHYDRAMINE HYDROCHLORIDE 50 MG/ML
50 INJECTION INTRAMUSCULAR; INTRAVENOUS ONCE AS NEEDED
Status: CANCELLED | OUTPATIENT
Start: 2025-02-06

## 2025-02-06 RX ORDER — BORTEZOMIB 3.5 MG/1
1.3 INJECTION, POWDER, LYOPHILIZED, FOR SOLUTION INTRAVENOUS; SUBCUTANEOUS
Status: CANCELLED | OUTPATIENT
Start: 2025-02-06

## 2025-02-06 RX ORDER — SODIUM CHLORIDE 0.9 % (FLUSH) 0.9 %
10 SYRINGE (ML) INJECTION
Status: CANCELLED | OUTPATIENT
Start: 2025-02-06

## 2025-02-06 RX ORDER — PROCHLORPERAZINE EDISYLATE 5 MG/ML
10 INJECTION INTRAMUSCULAR; INTRAVENOUS ONCE AS NEEDED
Status: DISCONTINUED | OUTPATIENT
Start: 2025-02-06 | End: 2025-02-06 | Stop reason: HOSPADM

## 2025-02-06 RX ORDER — HEPARIN 100 UNIT/ML
500 SYRINGE INTRAVENOUS
Status: CANCELLED | OUTPATIENT
Start: 2025-02-06

## 2025-02-06 RX ORDER — HEPARIN 100 UNIT/ML
500 SYRINGE INTRAVENOUS
Status: DISCONTINUED | OUTPATIENT
Start: 2025-02-06 | End: 2025-02-06 | Stop reason: HOSPADM

## 2025-02-06 RX ORDER — BORTEZOMIB 3.5 MG/1
1.3 INJECTION, POWDER, LYOPHILIZED, FOR SOLUTION INTRAVENOUS; SUBCUTANEOUS
Status: COMPLETED | OUTPATIENT
Start: 2025-02-06 | End: 2025-02-06

## 2025-02-06 RX ORDER — SODIUM CHLORIDE 0.9 % (FLUSH) 0.9 %
10 SYRINGE (ML) INJECTION
Status: DISCONTINUED | OUTPATIENT
Start: 2025-02-06 | End: 2025-02-06 | Stop reason: HOSPADM

## 2025-02-06 RX ORDER — PROCHLORPERAZINE EDISYLATE 5 MG/ML
10 INJECTION INTRAMUSCULAR; INTRAVENOUS ONCE AS NEEDED
Status: CANCELLED
Start: 2025-02-06

## 2025-02-06 RX ADMIN — BORTEZOMIB 2.75 MG: 3.5 INJECTION, POWDER, LYOPHILIZED, FOR SOLUTION INTRAVENOUS; SUBCUTANEOUS at 10:02

## 2025-02-06 NOTE — PROGRESS NOTES
Subjective:       Patient ID: Pete Fernandez is a 44 y.o. male.    Chief Complaint:  Follow-up Multiple myeloma in remission (Pt c/o chronic fatigue.)       Diagnosis: 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    Current Treatment:   Maintenance therapy with Velcade Q2w and Revlimid 10mg D1-21 Q28 day cycle- 9/19/2024- present      Treatment History:  1. Beatriz+ KrD for cycle 2 on 11/30/23; Change at request of [facility]  Stopped after 1 dose due to severe pleural and pericardial effusion development   Switched back to BEATRIZ + RVD with increase of Rev to 25mg  2. Beatriz+ RVD - 11/2/23 x 1 cycle, plan to restart for C2 on 12/14 potentially  C1 - Rev 10mg dose reduction due to CrCl  C2 - Rev increased to 25mg due to renal function improvement  C3 - D22 held 1 week due to covid  C4-  D22 Rev decreased to 10mg due to decline in renal function  C5 - Hold Beatriz this cycle in preparation for upcoming transplant. Rev remains at 10mg due to renal function  Zometa 10/17/23 - 1/11/2024 (3 treatments total)  Currently on hold due to ONJ  3. Stem Cell Transplant 5/6/24    HPI  42yo M presented to [facility] on 10/11 after 1 month of progressive weakness, abdominal pain, and intermittent confusion. At the time of his presentation, his labs which had been normal with PCP in 8/2023 were notable for new severe DOUG, transaminitis, and Ca of 18. He was admitted and started on IVF resuscitation.  Hypercalcemia workup was initiated and he was ultimately found to have numerous osseous lesions on 10/15/23 CT CAP, FLC - Kappa 3.29, Lambda 744, Ratio 226 , SPEP with Mspike 0.17 IgG lambda. Bone marrow biopsy done 10/18/23 revealed 95% plasma cells and FISH with TP53 deletion, FGFR3/IGH (or NSD2/IGH) fusion, usually representing a t(4;14), and a tetraploid subclone was observed. After correction of his hypercalcemia and improvement in his renal function he was discharged on 10/22/23.   He started C1 Beatriz + RVD + Zometa on 11/2/23. He  was referred to [facility] for consideration of bone marrow transplant, they recommended change to Beatriz + Krd, however patient developed life threatening pleural and pericardial edema several days after 1st infusion requiring hospitalization and placement of a pericardial window. Therefore he transitioned back to Beatriz - RVD for C2.     Interval History:   Patient returns to clinic for 4 week follow up for scan results and treatment clearance for C10 of Velcade Q2w and Revlimid 10mg D1-21 Q28 day.  Due to start Revlimid on 2/7/25.   He continues with chronic fatigue and throbbing pain to right side back in rib area. Pain is stable today.   He takes Oxycodone 10 mg and states that this is helping.  Denies any fever, chills, SOB, chest pain, cough, abdominal pain, numbness and tingling in hands/feet.  Labs reviewed with patient.        Past Medical History:   Diagnosis Date    Anxiety disorder, unspecified     Encounter for antineoplastic chemotherapy 9/19/2024    Hypercalcemia       Past Surgical History:   Procedure Laterality Date    BONE MARROW BIOPSY N/A 10/18/2023    Procedure: Biopsy-bone marrow;  Surgeon: Nhan Marte MD;  Location: [facility];  Service: General;  Laterality: N/A;    INSERTION OF TUNNELED CENTRAL VENOUS HEMODIALYSIS CATHETER Left 5/6/2024    Procedure: INSERTION, CATHETER, HEMODIALYSIS, DUAL LUMEN, left poss right, Bard 14.5 fr hemosplit catheter, model [id number];  Surgeon: Alvin White MD;  Location: [address];  Service: General;  Laterality: Left;    TONSILLECTOMY       Social History     Socioeconomic History    Marital status:      Spouse name: Rosalinda    Number of children: 2   Tobacco Use    Smoking status: Never    Smokeless tobacco: Never   Substance and Sexual Activity    Alcohol use: Yes    Drug use: Never    Sexual activity: Yes     Partners: Female     Social Drivers of Health     Financial Resource Strain: Medium Risk (11/15/2023)    Overall Financial  Resource Strain (CARDIA)     Difficulty of Paying Living Expenses: Somewhat hard   Food Insecurity: Food Insecurity Present (11/15/2023)    Hunger Vital Sign     Worried About Running Out of Food in the Last Year: Sometimes true     Ran Out of Food in the Last Year: Never true   Transportation Needs: No Transportation Needs (11/15/2023)    PRAPARE - Transportation     Lack of Transportation (Medical): No     Lack of Transportation (Non-Medical): No   Physical Activity: Insufficiently Active (11/15/2023)    Exercise Vital Sign     Days of Exercise per Week: 3 days     Minutes of Exercise per Session: 30 min   Stress: Stress Concern Present (11/15/2023)    [employer] - Occupational Stress Questionnaire     Feeling of Stress : To some extent   Housing Stability: Low Risk  (11/15/2023)    Housing Stability Vital Sign     Unable to Pay for Housing in the Last Year: No     Number of Places Lived in the Last Year: 1     Unstable Housing in the Last Year: No      Family History   Problem Relation Name Age of Onset    Breast cancer Mother      Pancreatic cancer Father        Review of patient's allergies indicates:  No Known Allergies   Review of Systems   Constitutional:  Positive for fatigue. Negative for activity change, appetite change, chills, fever and unexpected weight change.   HENT:  Negative for mouth sores and sore throat.    Eyes:  Negative for visual disturbance.   Respiratory:  Negative for cough and shortness of breath.    Cardiovascular:  Negative for chest pain.   Gastrointestinal:  Negative for abdominal pain, constipation, diarrhea, nausea and vomiting.   Endocrine: Negative for polyuria.   Genitourinary:  Positive for erectile dysfunction. Negative for dysuria and frequency.   Musculoskeletal:  Negative for back pain.   Integumentary:  Negative for rash.   Allergic/Immunologic: Negative for frequent infections.   Neurological:  Negative for weakness and headaches.    Hematological:  Negative for adenopathy. Does not bruise/bleed easily.   Psychiatric/Behavioral:  The patient is not nervous/anxious.          Objective:      Vitals:    02/06/25 1002   BP: 119/84   Pulse: 64   Resp: 18   Temp: 97.9 °F (36.6 °C)           Physical Exam  Constitutional:       General: He is not in acute distress.     Appearance: Normal appearance. He is not ill-appearing.   HENT:      Head: Normocephalic and atraumatic.      Nose: Nose normal.      Mouth/Throat:      Mouth: Mucous membranes are moist.      Pharynx: Oropharynx is clear.   Eyes:      Extraocular Movements: Extraocular movements intact.      Conjunctiva/sclera: Conjunctivae normal.      Pupils: Pupils are equal, round, and reactive to light.   Cardiovascular:      Rate and Rhythm: Normal rate and regular rhythm.      Pulses: Normal pulses.      Heart sounds: Normal heart sounds. No murmur heard.  Pulmonary:      Effort: Pulmonary effort is normal. No respiratory distress.      Breath sounds: Normal breath sounds.   Abdominal:      General: There is no distension.      Palpations: Abdomen is soft.      Tenderness: There is no abdominal tenderness.   Musculoskeletal:         General: Normal range of motion.      Cervical back: Normal range of motion and neck supple.   Lymphadenopathy:      Cervical: No cervical adenopathy.   Skin:     General: Skin is warm and dry.      Findings: No rash.   Neurological:      General: No focal deficit present.      Mental Status: He is alert and oriented to person, place, and time.         LABS AND IMAGING REVIEWED IN EPIC  1/8/25 Whole Body PET/CT:  1. No evidence of FDG avid disease on this exam.  2. The non FDG avid lytic left iliac lesion and sclerotic right 7th rib lesion are stable in appearance.       Free Light Chains  10/17//23 - Lambda , Kappa FLC 3.29, K/L Ratio .0044  11/22/23 - Lambda FLC 0.66, Kappa FLC 0.66, K/L Ratio 1.0  01/18/24 - lambda FLC 0.33, kappa FLC 0.39, K/L ratio  1.18      Pathology:  8/20/24 BMBx:  BONE MARROW, LEFT ILIAC CREST (ASPIRATE SMEAR, TOUCH IMPRINT, CLOT SECTION, AND CORE BIOPSY):   -- HYPOCELLULAR MARROW (20-30%) WITH TRILINEAGE HEMATOPOIESIS.   -- NO MORPHOLOGIC OR IMMUNOPHENOTYPIC EVIDENCE OF RESIDUAL/RELAPSED PLASMA CELL MYELOMA.   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 Displayed all CRAB criteria at time of diagnosis in October. Underwent 1st line Beatriz + RVD + Zometa. Transplant done 5/13/24  Addendum - Will plan to undergo maintenance therapy give high risk disease with Velcade Q2w and Revlimid 10mg D1-21 Q28 day cycle. Needs to remain on Aspirin and Acyclovir while on this therapy. Continue Bactrim until +D180 post SCT.   Immunodeficiency due to Drug Therapy - Precautions discussed with patient. Continue to monitor for any signs of symptoms of infection. No prophylaxis needed at this time. Currently on growth factor.     Plan:   - Toxicity reviewed, continue with C10 Velcade q2w today  - Revlimid 10mg D1-21 Q28 day cycle. Due to start 2/7/2025, okay to start when received   - Lab and treat only in two weeks.   - Whole Body PET/CT q4m due, 5/2025  - Continue ASA 81 mg daily and acyclovir   - Continue to hold zometa due to ONJ.   - Continue Cialis to 10 mg daily prn for ED  - RTC in 4 weeks with NP for FU/lab/treatment  - Cbc, cmp, MM labs- @ [facility]. Pt would like to complete all lab work on wed prior to appts     **All blood products must be irradiated and CMV negative**    The patient is in agreement with today's plan of care.  All questions answered.  Patient is aware to contact our office for any problems or concerns prior to next visit.    I spent a total of 40 minutes on the day of the visit.This includes face to face time and non-face to face time preparing to see the patient (eg, review of tests), obtaining and/or reviewing separately obtained history, documenting clinical information  in the electronic or other health record, independently interpreting results and communicating results to the patient/family/caregiver, or care coordinator.      PAMELA Stephenson  Oncology/Hematology   [facility]     Visit today included increased complexity associated with the care of the episodic problem 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 addressed and managing the longitudinal care of the patient due to the serious and/or complex managed problem(s) 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.

## 2025-02-07 LAB
ALBUMIN % SPEP (OHS): 60.58 (ref 48.1–59.5)
ALBUMIN SERPL-MCNC: 3.4 G/DL (ref 3.5–5)
ALBUMIN/GLOB SERPL: 1.5 RATIO (ref 1.1–2)
ALPHA 1 GLOB (OHS): 0.23 GM/DL (ref 0–0.4)
ALPHA 1 GLOB% (OHS): 4.1 (ref 2.3–4.9)
ALPHA 2 GLOB % (OHS): 10.67 (ref 6.9–13)
ALPHA 2 GLOB (OHS): 0.6 GM/DL (ref 0.4–1)
BETA GLOB (OHS): 0.85 GM/DL (ref 0.7–1.3)
BETA GLOB% (OHS): 15.19 (ref 13.8–19.7)
GAMMA GLOBULIN % (OHS): 9.47 (ref 10.1–21.9)
GAMMA GLOBULIN (OHS): 0.53 GM/DL (ref 0.4–1.8)
GLOBULIN SER-MCNC: 2.2 GM/DL (ref 2.4–3.5)
KAPPA LC FREE SER NEPH-MCNC: 2 MG/DL (ref 0.33–1.94)
KAPPA LC FREE/LAMBDA FREE SER NEPH: 1.09 {RATIO} (ref 0.26–1.65)
LAMBDA LC FREE SERPL-MCNC: 1.84 MG/DL (ref 0.57–2.63)
M SPIKE % (OHS): ABNORMAL
M SPIKE (OHS): ABNORMAL
PATH REV: NORMAL
PROT SERPL-MCNC: 5.6 GM/DL (ref 6.4–8.3)

## 2025-02-11 DIAGNOSIS — C90.00 MULTIPLE MYELOMA, REMISSION STATUS UNSPECIFIED: ICD-10-CM

## 2025-02-11 RX ORDER — OXYCODONE HYDROCHLORIDE 10 MG/1
10 TABLET ORAL EVERY 4 HOURS PRN
Qty: 90 TABLET | Refills: 0 | Status: SHIPPED | OUTPATIENT
Start: 2025-02-11

## 2025-02-16 ENCOUNTER — PATIENT MESSAGE (OUTPATIENT)
Dept: INTERNAL MEDICINE | Facility: CLINIC | Age: 45
End: 2025-02-16
Payer: COMMERCIAL

## 2025-02-19 ENCOUNTER — LAB VISIT (OUTPATIENT)
Dept: LAB | Facility: HOSPITAL | Age: 45
End: 2025-02-19
Payer: COMMERCIAL

## 2025-02-19 DIAGNOSIS — C90.00 MULTIPLE MYELOMA, REMISSION STATUS UNSPECIFIED: ICD-10-CM

## 2025-02-19 DIAGNOSIS — C90.01 MULTIPLE MYELOMA IN REMISSION: ICD-10-CM

## 2025-02-19 LAB
ALBUMIN SERPL-MCNC: 3.9 G/DL (ref 3.5–5)
ALBUMIN/GLOB SERPL: 1.5 RATIO (ref 1.1–2)
ALP SERPL-CCNC: 54 UNIT/L (ref 40–150)
ALT SERPL-CCNC: 19 UNIT/L (ref 0–55)
ANION GAP SERPL CALC-SCNC: 4 MEQ/L
AST SERPL-CCNC: 21 UNIT/L (ref 5–34)
BASOPHILS # BLD AUTO: 0.03 X10(3)/MCL
BASOPHILS NFR BLD AUTO: 1.8 %
BILIRUB SERPL-MCNC: 0.7 MG/DL
BUN SERPL-MCNC: 25 MG/DL (ref 8.9–20.6)
CALCIUM SERPL-MCNC: 9 MG/DL (ref 8.4–10.2)
CHLORIDE SERPL-SCNC: 106 MMOL/L (ref 98–107)
CO2 SERPL-SCNC: 29 MMOL/L (ref 22–29)
CREAT SERPL-MCNC: 1.51 MG/DL (ref 0.72–1.25)
CREAT/UREA NIT SERPL: 17
EOSINOPHIL # BLD AUTO: 0.12 X10(3)/MCL (ref 0–0.9)
EOSINOPHIL NFR BLD AUTO: 7.1 %
ERYTHROCYTE [DISTWIDTH] IN BLOOD BY AUTOMATED COUNT: 15.9 % (ref 11.5–17)
GFR SERPLBLD CREATININE-BSD FMLA CKD-EPI: 57 ML/MIN/1.73/M2
GLOBULIN SER-MCNC: 2.6 GM/DL (ref 2.4–3.5)
GLUCOSE SERPL-MCNC: 75 MG/DL (ref 74–100)
HCT VFR BLD AUTO: 36 % (ref 42–52)
HGB BLD-MCNC: 11.9 G/DL (ref 14–18)
IGA SERPL-MCNC: 29 MG/DL (ref 63–484)
IGG SERPL-MCNC: 482 MG/DL (ref 540–1822)
IGM SERPL-MCNC: 18 MG/DL (ref 22–240)
IMM GRANULOCYTES # BLD AUTO: 0 X10(3)/MCL (ref 0–0.04)
IMM GRANULOCYTES NFR BLD AUTO: 0 %
LYMPHOCYTES # BLD AUTO: 0.49 X10(3)/MCL (ref 0.6–4.6)
LYMPHOCYTES NFR BLD AUTO: 28.8 %
MAGNESIUM SERPL-MCNC: 2.2 MG/DL (ref 1.6–2.6)
MCH RBC QN AUTO: 32.8 PG (ref 27–31)
MCHC RBC AUTO-ENTMCNC: 33.1 G/DL (ref 33–36)
MCV RBC AUTO: 99.2 FL (ref 80–94)
MONOCYTES # BLD AUTO: 0.2 X10(3)/MCL (ref 0.1–1.3)
MONOCYTES NFR BLD AUTO: 11.8 %
NEUTROPHILS # BLD AUTO: 0.86 X10(3)/MCL (ref 2.1–9.2)
NEUTROPHILS NFR BLD AUTO: 50.5 %
NRBC BLD AUTO-RTO: 0 %
PLATELET # BLD AUTO: 124 X10(3)/MCL (ref 130–400)
PMV BLD AUTO: 11.2 FL (ref 7.4–10.4)
POTASSIUM SERPL-SCNC: 4.2 MMOL/L (ref 3.5–5.1)
PROT SERPL-MCNC: 6.5 GM/DL (ref 6.4–8.3)
RBC # BLD AUTO: 3.63 X10(6)/MCL (ref 4.7–6.1)
SODIUM SERPL-SCNC: 139 MMOL/L (ref 136–145)
WBC # BLD AUTO: 1.7 X10(3)/MCL (ref 4.5–11.5)

## 2025-02-19 PROCEDURE — 84165 PROTEIN E-PHORESIS SERUM: CPT

## 2025-02-19 PROCEDURE — 85025 COMPLETE CBC W/AUTO DIFF WBC: CPT

## 2025-02-19 PROCEDURE — 80053 COMPREHEN METABOLIC PANEL: CPT

## 2025-02-19 PROCEDURE — 36415 COLL VENOUS BLD VENIPUNCTURE: CPT

## 2025-02-19 PROCEDURE — 82784 ASSAY IGA/IGD/IGG/IGM EACH: CPT

## 2025-02-19 PROCEDURE — 83521 IG LIGHT CHAINS FREE EACH: CPT

## 2025-02-19 PROCEDURE — 83735 ASSAY OF MAGNESIUM: CPT

## 2025-02-19 RX ORDER — BORTEZOMIB 3.5 MG/1
1.3 INJECTION, POWDER, LYOPHILIZED, FOR SOLUTION INTRAVENOUS; SUBCUTANEOUS
Status: CANCELLED | OUTPATIENT
Start: 2025-02-20

## 2025-02-19 RX ORDER — PROCHLORPERAZINE EDISYLATE 5 MG/ML
10 INJECTION INTRAMUSCULAR; INTRAVENOUS ONCE AS NEEDED
Status: CANCELLED
Start: 2025-02-20

## 2025-02-19 RX ORDER — DIPHENHYDRAMINE HYDROCHLORIDE 50 MG/ML
50 INJECTION INTRAMUSCULAR; INTRAVENOUS ONCE AS NEEDED
Status: CANCELLED | OUTPATIENT
Start: 2025-02-20

## 2025-02-19 RX ORDER — SODIUM CHLORIDE 0.9 % (FLUSH) 0.9 %
10 SYRINGE (ML) INJECTION
Status: CANCELLED | OUTPATIENT
Start: 2025-02-20

## 2025-02-19 RX ORDER — EPINEPHRINE 0.3 MG/.3ML
0.3 INJECTION SUBCUTANEOUS ONCE AS NEEDED
Status: CANCELLED | OUTPATIENT
Start: 2025-02-20

## 2025-02-19 RX ORDER — HEPARIN 100 UNIT/ML
500 SYRINGE INTRAVENOUS
Status: CANCELLED | OUTPATIENT
Start: 2025-02-20

## 2025-02-20 ENCOUNTER — INFUSION (OUTPATIENT)
Dept: INFUSION THERAPY | Facility: HOSPITAL | Age: 45
End: 2025-02-20
Attending: INTERNAL MEDICINE
Payer: COMMERCIAL

## 2025-02-20 VITALS
SYSTOLIC BLOOD PRESSURE: 119 MMHG | TEMPERATURE: 98 F | BODY MASS INDEX: 29.92 KG/M2 | HEART RATE: 73 BPM | HEIGHT: 69 IN | WEIGHT: 202 LBS | OXYGEN SATURATION: 99 % | DIASTOLIC BLOOD PRESSURE: 76 MMHG | RESPIRATION RATE: 18 BRPM

## 2025-02-20 DIAGNOSIS — C90.01 MULTIPLE MYELOMA IN REMISSION: Primary | ICD-10-CM

## 2025-02-20 PROCEDURE — 96401 CHEMO ANTI-NEOPL SQ/IM: CPT

## 2025-02-20 PROCEDURE — 63600175 PHARM REV CODE 636 W HCPCS

## 2025-02-20 RX ORDER — PROCHLORPERAZINE EDISYLATE 5 MG/ML
10 INJECTION INTRAMUSCULAR; INTRAVENOUS ONCE AS NEEDED
Status: DISCONTINUED | OUTPATIENT
Start: 2025-02-20 | End: 2025-02-20 | Stop reason: HOSPADM

## 2025-02-20 RX ORDER — SODIUM CHLORIDE 0.9 % (FLUSH) 0.9 %
10 SYRINGE (ML) INJECTION
Status: DISCONTINUED | OUTPATIENT
Start: 2025-02-20 | End: 2025-02-20 | Stop reason: HOSPADM

## 2025-02-20 RX ORDER — HEPARIN 100 UNIT/ML
500 SYRINGE INTRAVENOUS
Status: DISCONTINUED | OUTPATIENT
Start: 2025-02-20 | End: 2025-02-20 | Stop reason: HOSPADM

## 2025-02-20 RX ORDER — EPINEPHRINE 0.3 MG/.3ML
0.3 INJECTION SUBCUTANEOUS ONCE AS NEEDED
Status: DISCONTINUED | OUTPATIENT
Start: 2025-02-20 | End: 2025-02-20 | Stop reason: HOSPADM

## 2025-02-20 RX ORDER — BORTEZOMIB 3.5 MG/1
1.3 INJECTION, POWDER, LYOPHILIZED, FOR SOLUTION INTRAVENOUS; SUBCUTANEOUS
Status: COMPLETED | OUTPATIENT
Start: 2025-02-20 | End: 2025-02-20

## 2025-02-20 RX ORDER — DIPHENHYDRAMINE HYDROCHLORIDE 50 MG/ML
50 INJECTION INTRAMUSCULAR; INTRAVENOUS ONCE AS NEEDED
Status: DISCONTINUED | OUTPATIENT
Start: 2025-02-20 | End: 2025-02-20 | Stop reason: HOSPADM

## 2025-02-20 RX ADMIN — BORTEZOMIB 2.75 MG: 3.5 INJECTION, POWDER, LYOPHILIZED, FOR SOLUTION INTRAVENOUS; SUBCUTANEOUS at 10:02

## 2025-02-21 LAB
ALBUMIN % SPEP (OHS): 58.38 (ref 48.1–59.5)
ALBUMIN SERPL-MCNC: 3.4 G/DL (ref 3.5–5)
ALBUMIN/GLOB SERPL: 1.4 RATIO (ref 1.1–2)
ALPHA 1 GLOB (OHS): 0.28 GM/DL (ref 0–0.4)
ALPHA 1 GLOB% (OHS): 4.81 (ref 2.3–4.9)
ALPHA 2 GLOB % (OHS): 12.2 (ref 6.9–13)
ALPHA 2 GLOB (OHS): 0.72 GM/DL (ref 0.4–1)
BETA GLOB (OHS): 0.87 GM/DL (ref 0.7–1.3)
BETA GLOB% (OHS): 14.74 (ref 13.8–19.7)
GAMMA GLOBULIN % (OHS): 9.88 (ref 10.1–21.9)
GAMMA GLOBULIN (OHS): 0.58 GM/DL (ref 0.4–1.8)
GLOBULIN SER-MCNC: 2.5 GM/DL (ref 2.4–3.5)
M SPIKE % (OHS): ABNORMAL
M SPIKE (OHS): ABNORMAL
PATH REV: NORMAL
PROT SERPL-MCNC: 5.9 GM/DL (ref 6.4–8.3)

## 2025-02-25 ENCOUNTER — PATIENT MESSAGE (OUTPATIENT)
Dept: INTERNAL MEDICINE | Facility: CLINIC | Age: 45
End: 2025-02-25
Payer: COMMERCIAL

## 2025-02-25 LAB
KAPPA LC FREE SER NEPH-MCNC: 2.11 MG/DL (ref 0.33–1.94)
KAPPA LC FREE/LAMBDA FREE SER NEPH: 1.05 {RATIO} (ref 0.26–1.65)
LAMBDA LC FREE SERPL-MCNC: 2.01 MG/DL (ref 0.57–2.63)

## 2025-02-27 ENCOUNTER — PATIENT MESSAGE (OUTPATIENT)
Dept: INTERNAL MEDICINE | Facility: CLINIC | Age: 45
End: 2025-02-27
Payer: COMMERCIAL

## 2025-03-05 DIAGNOSIS — C90.01 MULTIPLE MYELOMA IN REMISSION: ICD-10-CM

## 2025-03-05 RX ORDER — LENALIDOMIDE 10 MG/1
CAPSULE ORAL
Qty: 21 CAPSULE | Refills: 0 | Status: SHIPPED | OUTPATIENT
Start: 2025-03-05 | End: 2025-03-05 | Stop reason: SDUPTHER

## 2025-03-05 RX ORDER — LENALIDOMIDE 10 MG/1
10 CAPSULE ORAL DAILY
Qty: 21 CAPSULE | Refills: 0 | Status: SHIPPED | OUTPATIENT
Start: 2025-03-05

## 2025-03-06 DIAGNOSIS — C90.00 MULTIPLE MYELOMA, REMISSION STATUS UNSPECIFIED: ICD-10-CM

## 2025-03-06 DIAGNOSIS — R35.1 BPH ASSOCIATED WITH NOCTURIA: ICD-10-CM

## 2025-03-06 DIAGNOSIS — N40.1 BPH ASSOCIATED WITH NOCTURIA: ICD-10-CM

## 2025-03-06 RX ORDER — TAMSULOSIN HYDROCHLORIDE 0.4 MG/1
0.4 CAPSULE ORAL NIGHTLY
Qty: 30 CAPSULE | Refills: 6 | Status: SHIPPED | OUTPATIENT
Start: 2025-03-06 | End: 2026-03-06

## 2025-03-06 RX ORDER — OXYCODONE HYDROCHLORIDE 10 MG/1
10 TABLET ORAL EVERY 4 HOURS PRN
Qty: 90 TABLET | Refills: 0 | Status: SHIPPED | OUTPATIENT
Start: 2025-03-06

## 2025-03-07 ENCOUNTER — PATIENT MESSAGE (OUTPATIENT)
Facility: CLINIC | Age: 45
End: 2025-03-07
Payer: COMMERCIAL

## 2025-03-07 DIAGNOSIS — R50.9 FEVER, UNSPECIFIED FEVER CAUSE: Primary | ICD-10-CM

## 2025-03-07 DIAGNOSIS — D70.9 NEUTROPENIA, UNSPECIFIED TYPE: ICD-10-CM

## 2025-03-07 RX ORDER — LEVOFLOXACIN 750 MG/1
750 TABLET ORAL DAILY
Qty: 7 TABLET | Refills: 0 | Status: SHIPPED | OUTPATIENT
Start: 2025-03-07 | End: 2025-03-14

## 2025-03-10 ENCOUNTER — HOSPITAL ENCOUNTER (OUTPATIENT)
Dept: RADIOLOGY | Facility: HOSPITAL | Age: 45
Discharge: HOME | End: 2025-03-10
Payer: COMMERCIAL

## 2025-03-10 ENCOUNTER — INFUSION (OUTPATIENT)
Facility: HOSPITAL | Age: 45
End: 2025-03-10
Attending: INTERNAL MEDICINE
Payer: COMMERCIAL

## 2025-03-10 VITALS
BODY MASS INDEX: 28.64 KG/M2 | DIASTOLIC BLOOD PRESSURE: 86 MMHG | HEIGHT: 69 IN | TEMPERATURE: 98 F | WEIGHT: 193.38 LBS | SYSTOLIC BLOOD PRESSURE: 125 MMHG | OXYGEN SATURATION: 100 % | HEART RATE: 81 BPM

## 2025-03-10 DIAGNOSIS — R06.02 SOB (SHORTNESS OF BREATH) ON EXERTION: Primary | ICD-10-CM

## 2025-03-10 DIAGNOSIS — R06.02 SOB (SHORTNESS OF BREATH) ON EXERTION: ICD-10-CM

## 2025-03-10 DIAGNOSIS — C90.00 METASTATIC MULTIPLE MYELOMA TO BONE: ICD-10-CM

## 2025-03-10 PROCEDURE — 90677 PCV20 VACCINE IM: CPT | Performed by: INTERNAL MEDICINE

## 2025-03-10 PROCEDURE — 90471 IMMUNIZATION ADMIN: CPT | Performed by: INTERNAL MEDICINE

## 2025-03-10 PROCEDURE — 90698 DTAP-IPV/HIB VACCINE IM: CPT | Performed by: INTERNAL MEDICINE

## 2025-03-10 PROCEDURE — 63600175 PHARM REV CODE 636 W HCPCS: Performed by: INTERNAL MEDICINE

## 2025-03-10 PROCEDURE — 71046 X-RAY EXAM CHEST 2 VIEWS: CPT | Mod: TC

## 2025-03-10 PROCEDURE — 90472 IMMUNIZATION ADMIN EACH ADD: CPT | Performed by: INTERNAL MEDICINE

## 2025-03-10 RX ORDER — BORTEZOMIB 3.5 MG/1
1.3 INJECTION, POWDER, LYOPHILIZED, FOR SOLUTION INTRAVENOUS; SUBCUTANEOUS
Status: CANCELLED | OUTPATIENT
Start: 2025-03-10

## 2025-03-10 RX ORDER — EPINEPHRINE 0.3 MG/.3ML
0.3 INJECTION SUBCUTANEOUS ONCE AS NEEDED
OUTPATIENT
Start: 2025-03-10

## 2025-03-10 RX ORDER — HEPARIN 100 UNIT/ML
500 SYRINGE INTRAVENOUS
OUTPATIENT
Start: 2025-03-10

## 2025-03-10 RX ORDER — SODIUM CHLORIDE 0.9 % (FLUSH) 0.9 %
10 SYRINGE (ML) INJECTION
OUTPATIENT
Start: 2025-03-10

## 2025-03-10 RX ORDER — DIPHENHYDRAMINE HYDROCHLORIDE 50 MG/ML
50 INJECTION, SOLUTION INTRAMUSCULAR; INTRAVENOUS ONCE AS NEEDED
OUTPATIENT
Start: 2025-03-10

## 2025-03-10 RX ORDER — PROCHLORPERAZINE EDISYLATE 5 MG/ML
10 INJECTION INTRAMUSCULAR; INTRAVENOUS ONCE AS NEEDED
Start: 2025-03-10

## 2025-03-10 RX ADMIN — DIPHTHERIA AND TETANUS TOXOIDS AND ACELLULAR PERTUSSIS ADSORBED, INACTIVATED POLIOVIRUS AND HAEMOPHILUS B CONJUGATE (TETANUS TOXOID CONJUGATE) VACCINE 0.5 ML: KIT at 11:03

## 2025-03-10 RX ADMIN — PNEUMOCOCCAL 20-VALENT CONJUGATE VACCINE 0.5 ML
2.2; 2.2; 2.2; 2.2; 2.2; 2.2; 2.2; 2.2; 2.2; 2.2; 2.2; 2.2; 2.2; 2.2; 2.2; 2.2; 4.4; 2.2; 2.2; 2.2 INJECTION, SUSPENSION INTRAMUSCULAR at 11:03

## 2025-03-10 NOTE — CONSULTS
"Consult note  Nephrology    Admit Date: 10/13/2023   LOS: 1 day     SUBJECTIVE:     CC:  DOUG / Hypercalcemia    HPI:  42 Y/O male admitted to outside facility due to severe hypercalcemia of 18 and DOUG with creatinin of 3.3  Work up for hypercalcemia started and some of them are pending   Tsh , vit D , PTH , were normal   Was started on calcitonin and Normal saline and also pamiodronate given and current ca is 12   Spep and other labs pendine       PMH:  anxiety    Allergy:  NKA    Social history:  No smoking or drinking     Family History:       Mother had breast CA  Father  had pancreatic CA    Review of Systems:  Constitutional: No fever or chills  Respiratory: No cough or shortness of breath  Cardiovascular: No chest pain or palpitations  Gastrointestinal: No nausea or vomiting  Neurological: No confusion or weakness    OBJECTIVE:     Vital Signs Range (Last 24H):  Vitals:    10/14/23 0806   BP: (!) 148/88   Pulse: 96   Resp:    Temp: 98.5 °F (36.9 °C)       Temp:  [97.8 °F (36.6 °C)-98.7 °F (37.1 °C)]   Pulse:  []   Resp:  [18-20]   BP: (142-152)/(84-90)   SpO2:  [90 %-94 %]     I & O (Last 24H):  Intake/Output Summary (Last 24 hours) at 10/14/2023 1214  Last data filed at 10/14/2023 0803  Gross per 24 hour   Intake 1444.56 ml   Output --   Net 1444.56 ml       Physical Exam:  Alert , oriented , in NAD  Head   normal   Neck   supple   Chest   symmetric   Lungs   clear   Heart   RRR  Abdomen   soft , non tender   Ext   no edema     Laboratory Data:    Recent Labs   Lab 10/14/23  0452      K 3.7   CO2 23   BUN 61.6*   CREATININE 4.75*   GLUCOSE 116*   CALCIUM 12.4*   PHOS 3.7     Lab Results   Component Value Date    PTH 8.2 (L) 10/10/2023    CALCIUM 12.4 (HH) 10/14/2023    PHOS 3.7 10/14/2023     No results found for: "IRON", "TIBC", "FERRITIN", "SATURATEDIRO"    Medications:  Medication list was reviewed and changes noted under Assessment/Plan.    Diagnostic Results:    Reviewed most recent " A1c normal again 8/2024, recheck annually due to elevated BMI.   CT/US/Echo/MRI    ASSESSMENT/PLAN:   1   DOUG   2   Severe hypercalcemia  3   proteinuria  of 5.3 grams   4   H/O Anxiety     Plan   continue current IV fluid             May need kidney biopsy  for proteinuria            Lasix 20 mg IVP daily

## 2025-03-11 ENCOUNTER — OFFICE VISIT (OUTPATIENT)
Dept: HEMATOLOGY/ONCOLOGY | Facility: CLINIC | Age: 45
End: 2025-03-11
Payer: COMMERCIAL

## 2025-03-11 DIAGNOSIS — C90.01 MULTIPLE MYELOMA IN REMISSION: Primary | ICD-10-CM

## 2025-03-11 DIAGNOSIS — Z94.84 HISTORY OF AUTO STEM CELL TRANSPLANT: ICD-10-CM

## 2025-03-11 NOTE — PROGRESS NOTES
The patient location is: [place]  The chief complaint leading to consultation is: Multiple Myeloma    Visit type: audiovisual    Face to Face time with patient: 10  30 minutes of total time spent on the encounter, which includes face to face time and non-face to face time preparing to see the patient (eg, review of tests), Obtaining and/or reviewing separately obtained history, Documenting clinical information in the electronic or other health record, Independently interpreting results (not separately reported) and communicating results to the patient/family/caregiver, or Care coordination (not separately reported).         Each patient to whom he or she provides medical services by telemedicine is:  (1) informed of the relationship between the physician and patient and the respective role of any other health care provider with respect to management of the patient; and (2) notified that he or she may decline to receive medical services by telemedicine and may withdraw from such care at any time.    Notes: Visit today included increased complexity associated with the care of the episodic problem remission and side effect monitoring addressed and managing the longitudinal care of the patient due to the serious and/or complex managed problem(s) multiple myeloma.     Subjective:       Patient ID: Pete Fernandez is a 44 y.o. male.    Chief Complaint: No chief complaint on file.    Hematology History    2023 October  Seen at [facility] by Dr. Elizabeth Lejeune for new DOUG,  hypercalcemia and weakness. Bone survey demonstrates calvarium defects. Follow-up bone scan and MRI negative for lytic lesions. Bone marrow biopsy with 80% plasma cells FOSH positive for p53 mutation, t(4;14) and tetraploid clone. SPEP with m spike of 0.17 IgG lambda. Lambda light chain of 744. Starts Zometa 10/17/2023.    November  Starts Beatriz-VRd induction 11/2/2023  Sees Dr. Cook in virtual consult for BMT consultation.      2024  May  Melphalan 200 autologous stem cell transplant. Day 0 5/13/2024.    September  Day 100 post-transplant staging MRD negative stringent CR. Due to high risk cytogenetics started maintenance with velcade/revlimid.    HPI  Currently seeing us in the transplant center every 3 months for follow-up post autologous stem cell transplant for multiple myeloma. Currently day +302. Myeloma serology completed yesterday is still pending. CBC shows mild cytopenias likely related to ongoing maintenance therapy. CMP is stable. Greatest complaint is level of fatigue since diagnosis, not improved since transplant. Also recent febrile illness with shortness of breath- COVID and flu negative.    Review of Systems   Constitutional:  Negative for appetite change and unexpected weight change.   HENT:  Negative for mouth sores.    Eyes:  Negative for visual disturbance.   Respiratory:  Positive for cough and shortness of breath.    Cardiovascular:  Negative for chest pain.   Gastrointestinal:  Positive for diarrhea. Negative for abdominal pain.   Genitourinary:  Positive for frequency.   Musculoskeletal:  Positive for back pain.   Integumentary:  Negative for rash.   Neurological:  Negative for headaches.   Hematological:  Negative for adenopathy.   Psychiatric/Behavioral:  The patient is nervous/anxious.          Objective:      Physical Exam No exam for telehealth visit    Current Medications[1]   Lab Results   Component Value Date    WBC 2.17 (L) 03/10/2025    HGB 11.2 (L) 03/10/2025    HCT 32.0 (L) 03/10/2025    MCV 93.8 03/10/2025     (L) 03/10/2025        CMP  Sodium   Date Value Ref Range Status   03/10/2025 137 136 - 145 mmol/L Final   08/30/2024 139 136 - 145 mmol/L Final     Potassium   Date Value Ref Range Status   03/10/2025 3.8 3.5 - 5.1 mmol/L Final   08/30/2024 4.5 3.5 - 5.1 mmol/L Final     Chloride   Date Value Ref Range Status   03/10/2025 100 98 - 107 mmol/L Final   08/30/2024 105 95 - 110 mmol/L Final      CO2   Date Value Ref Range Status   03/10/2025 27 22 - 29 mmol/L Final   08/30/2024 25 23 - 29 mmol/L Final     Glucose   Date Value Ref Range Status   08/30/2024 80 70 - 110 mg/dL Final     BUN   Date Value Ref Range Status   08/30/2024 26 (H) 6 - 20 mg/dL Final     Blood Urea Nitrogen   Date Value Ref Range Status   03/10/2025 26.0 (H) 8.9 - 20.6 mg/dL Final     Creatinine   Date Value Ref Range Status   03/10/2025 1.29 (H) 0.72 - 1.25 mg/dL Final   08/30/2024 1.6 (H) 0.5 - 1.4 mg/dL Final     Calcium   Date Value Ref Range Status   03/10/2025 9.3 8.4 - 10.2 mg/dL Final   08/30/2024 9.5 8.7 - 10.5 mg/dL Final     Total Protein   Date Value Ref Range Status   08/30/2024 6.1 6.0 - 8.4 g/dL Final     Albumin   Date Value Ref Range Status   03/10/2025 3.0 (L) 3.5 - 5.0 g/dL Final   08/30/2024 3.8 3.5 - 5.2 g/dL Final     Total Bilirubin   Date Value Ref Range Status   08/30/2024 0.3 0.1 - 1.0 mg/dL Final     Comment:     For infants and newborns, interpretation of results should be based  on gestational age, weight and in agreement with clinical  observations.    Premature Infant recommended reference ranges:  Up to 24 hours.............<8.0 mg/dL  Up to 48 hours............<12.0 mg/dL  3-5 days..................<15.0 mg/dL  6-29 days.................<15.0 mg/dL       Bilirubin Total   Date Value Ref Range Status   03/10/2025 0.7 <=1.5 mg/dL Final     Alkaline Phosphatase   Date Value Ref Range Status   08/30/2024 58 55 - 135 U/L Final     ALP   Date Value Ref Range Status   03/10/2025 81 40 - 150 unit/L Final     AST   Date Value Ref Range Status   03/10/2025 28 5 - 34 unit/L Final   08/30/2024 18 10 - 40 U/L Final     ALT   Date Value Ref Range Status   03/10/2025 50 0 - 55 unit/L Final   08/30/2024 14 10 - 44 U/L Final     Anion Gap   Date Value Ref Range Status   08/30/2024 9 8 - 16 mmol/L Final          Assessment:       Problem List Items Addressed This Visit    None      Plan:       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 Lambda MM,  RISS Stage III    Stringent MRD negative CR    Recommend continue current maintenance plan for Revlimid 10mg and Velcade every 2 weeks.  Continue to monitor CBC, CMP, SPEP, and light chains every 4 weeks.    Due for annual post-transplant staging. Discussed plan to come to [place] in 3 months for labs, marrow biopsy including MRD testing, and PET scan. Return virtual visit 4 months to review results.      BMT Chart Routing      Follow up with physician 4 months. virtual   Follow up with ROMY    Provider visit type    Infusion scheduling note    Injection scheduling note    Labs CBC, CMP, free light chains, immunoglobulins, immunofixation and SPEP   Scheduling:  Preferred lab:  Lab interval:  Needs labs, marrow biopsy in [facility], and PET scan same day - late morning/ early afternoon as travelling from [facility] PET scan      Pharmacy appointment    Other referrals     Schedule bone marrow biopsy                       [1]   Current Outpatient Medications   Medication Sig Dispense Refill    acyclovir (ZOVIRAX) 800 MG Tab Take 1 tablet (800 mg total) by mouth 2 (two) times daily. 60 tablet 11    aspirin 81 MG Chew Take 1 tablet (81 mg total) by mouth every evening. Hold until instructed to resume by provider.      calcitRIOL (ROCALTROL) 0.5 MCG Cap TAKE 1 CAPSULE BY MOUTH ONCE DAILY 30 capsule 2    dextroamphetamine-amphetamine (ADDERALL) 20 mg tablet Take 1 tablet by mouth once daily.      febuxostat (ULORIC) 40 mg Tab Take 1 tablet (40 mg total) by mouth once daily. 30 tablet 3    hydrOXYzine HCL (ATARAX) 25 MG tablet Take 1 tablet (25 mg total) by mouth 3 (three) times daily as needed for Anxiety. 30 tablet 3    lenalidomide (REVLIMID) 10 mg Cap Take 1 capsule (10 mg total) by mouth Daily FOR 21 DAYS, FOLLOWED BY 7 DAYS OFF TO COMPLETE A 28 DAY CYCLE.. 21 capsule 0    levoFLOXacin (LEVAQUIN) 750 MG tablet Take 1 tablet (750 mg total) by mouth once daily. for 7 days 7 tablet 0    melatonin (MELATIN) 3 mg  tablet Take 3 tablets (9 mg total) by mouth nightly as needed for Insomnia. 30 tablet 3    metoprolol tartrate (LOPRESSOR) 25 MG tablet Take 12.5 mg by mouth 2 (two) times daily.      ondansetron (ZOFRAN) 8 MG tablet Take 1 tablet (8 mg total) by mouth every 8 (eight) hours as needed for Nausea. 30 tablet 2    oxyCODONE (ROXICODONE) 10 mg Tab immediate release tablet Take 1 tablet (10 mg total) by mouth every 4 (four) hours as needed for Pain. 90 tablet 0    pantoprazole (PROTONIX) 20 MG tablet TAKE 1 TABLET (20 MG TOTAL) BY MOUTH ONCE DAILY (Patient taking differently: Take 20 mg by mouth every evening.) 30 tablet 1    prochlorperazine (COMPAZINE) 5 MG tablet Take 1 tablet (5 mg total) by mouth every 6 (six) hours as needed for Nausea. 30 tablet 2    sodium bicarbonate 650 MG tablet Take 650 mg by mouth 2 (two) times daily.      tadalafiL (CIALIS) 10 MG tablet Take 1 tablet (10 mg total) by mouth daily as needed for Erectile Dysfunction. 30 tablet 1    tamsulosin (FLOMAX) 0.4 mg Cap Take 1 capsule (0.4 mg total) by mouth every evening. 30 capsule 6     Current Facility-Administered Medications   Medication Dose Route Frequency Provider Last Rate Last Admin    ANNA Devries,W-135 vac,1 of 2(PF) SolR 0.5 mL  0.5 mL Intramuscular 1 time in Clinic/HOD Vivienne Tsai MD

## 2025-03-11 NOTE — PROGRESS NOTES
Subjective:       Patient ID: Pete Fernandez is a 44 y.o. male.    Chief Complaint:  Follow-up Multiple Myeloma (F/u with labs-- Pt reports decreased appetite. Weight loss of 10 lbs since last visit. SOBOE improving)       Diagnosis: 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    Current Treatment:   Maintenance therapy with Velcade Q2w and Revlimid 10mg D1-21 Q28 day cycle- 9/19/2024- present      Treatment History:  1. Beatriz+ KrD for cycle 2 on 11/30/23; Change at request of [facility]  Stopped after 1 dose due to severe pleural and pericardial effusion development   Switched back to BEATRIZ + RVD with increase of Rev to 25mg  2. Beatriz+ RVD - 11/2/23 x 1 cycle, plan to restart for C2 on 12/14 potentially  C1 - Rev 10mg dose reduction due to CrCl  C2 - Rev increased to 25mg due to renal function improvement  C3 - D22 held 1 week due to covid  C4-  D22 Rev decreased to 10mg due to decline in renal function  C5 - Hold Beatriz this cycle in preparation for upcoming transplant. Rev remains at 10mg due to renal function  Zometa 10/17/23 - 1/11/2024 (3 treatments total)  Currently on hold due to ONJ  3. Stem Cell Transplant 5/6/24    HPI  42yo M presented to [facility] on 10/11 after 1 month of progressive weakness, abdominal pain, and intermittent confusion. At the time of his presentation, his labs which had been normal with PCP in 8/2023 were notable for new severe DOUG, transaminitis, and Ca of 18. He was admitted and started on IVF resuscitation.  Hypercalcemia workup was initiated and he was ultimately found to have numerous osseous lesions on 10/15/23 CT CAP, FLC - Kappa 3.29, Lambda 744, Ratio 226 , SPEP with Mspike 0.17 IgG lambda. Bone marrow biopsy done 10/18/23 revealed 95% plasma cells and FISH with TP53 deletion, FGFR3/IGH (or NSD2/IGH) fusion, usually representing a t(4;14), and a tetraploid subclone was observed. After correction of his hypercalcemia and improvement in his renal function he was discharged  on 10/22/23.   He started C1 Beatriz + RVD + Zometa on 11/2/23. He was referred to [facility] for consideration of bone marrow transplant, they recommended change to Beatriz + Krd, however patient developed life threatening pleural and pericardial edema several days after 1st infusion requiring hospitalization and placement of a pericardial window. Therefore he transitioned back to Beatriz - RVD for C2.     Interval History:   Patient returns to clinic for 4 week follow up for scan results and treatment clearance for C12 of Velcade Q2w and Revlimid 10mg D1-21 Q28 day.  Due to start Revlimid today.  He did have fever with cough and SOB with last cycle. He was tested for covid and flu and and urgent care and placed on amoxicillin. He called the clinic a few days later with continued fever. He was placed on levaquin and now fever is no longer present. SOB is improving.   He was seen by Dr. Arceo yesterday, plan to continue current treatment with annual post-transplant staging BMBx and scans in three months.   He has lost 10lbs since last OV, he admits to not eating due to taste. Taste and appetite are now improving. Encouraged two ensures daily, will follow closely.   He continues with chronic fatigue and throbbing pain to right side back in rib area. Pain is stable today.   He takes Oxycodone 10 mg and states that this is helping.  Denies any chills, chest pain, cough, abdominal pain, numbness and tingling in hands/feet.  Labs reviewed with patient.        Past Medical History:   Diagnosis Date    Anxiety disorder, unspecified     Encounter for antineoplastic chemotherapy 9/19/2024    Hypercalcemia       Past Surgical History:   Procedure Laterality Date    BONE MARROW BIOPSY N/A 10/18/2023    Procedure: Biopsy-bone marrow;  Surgeon: Nhan Marte MD;  Location: [facility];  Service: General;  Laterality: N/A;    INSERTION OF TUNNELED CENTRAL VENOUS HEMODIALYSIS CATHETER Left 5/6/2024    Procedure: INSERTION, CATHETER,  HEMODIALYSIS, DUAL LUMEN, left poss right, Bard 14.5 fr hemosplit catheter, model [id number];  Surgeon: Alvin White MD;  Location: [facility] OR [address] [place] [address];  Service: General;  Laterality: Left;    TONSILLECTOMY       Social History     Socioeconomic History    Marital status:      Spouse name: Rosalinda    Number of children: 2   Tobacco Use    Smoking status: Never    Smokeless tobacco: Never   Substance and Sexual Activity    Alcohol use: Yes    Drug use: Never    Sexual activity: Yes     Partners: Female     Social Drivers of Health     Financial Resource Strain: Low Risk  (3/10/2025)    Overall Financial Resource Strain (CARDIA)     Difficulty of Paying Living Expenses: Not very hard   Food Insecurity: No Food Insecurity (3/10/2025)    Hunger Vital Sign     Worried About Running Out of Food in the Last Year: Never true     Ran Out of Food in the Last Year: Never true   Transportation Needs: No Transportation Needs (3/10/2025)    PRAPARE - Transportation     Lack of Transportation (Medical): No     Lack of Transportation (Non-Medical): No   Physical Activity: Inactive (3/10/2025)    Exercise Vital Sign     Days of Exercise per Week: 0 days     Minutes of Exercise per Session: 0 min   Stress: Stress Concern Present (3/10/2025)    [employer] - Occupational Stress Questionnaire     Feeling of Stress : To some extent   Housing Stability: Low Risk  (3/10/2025)    Housing Stability Vital Sign     Unable to Pay for Housing in the Last Year: No     Number of Times Moved in the Last Year: 0     Homeless in the Last Year: No      Family History   Problem Relation Name Age of Onset    Breast cancer Mother      Pancreatic cancer Father        Review of patient's allergies indicates:  No Known Allergies   Review of Systems   Constitutional:  Positive for fatigue. Negative for activity change, appetite change, chills, fever and unexpected weight change.   HENT:  Negative for mouth sores and  sore throat.    Eyes:  Negative for visual disturbance.   Respiratory:  Negative for cough and shortness of breath.    Cardiovascular:  Negative for chest pain.   Gastrointestinal:  Negative for abdominal pain, constipation, diarrhea, nausea and vomiting.   Endocrine: Negative for polyuria.   Genitourinary:  Positive for erectile dysfunction. Negative for dysuria and frequency.   Musculoskeletal:  Negative for back pain.   Integumentary:  Negative for rash.   Allergic/Immunologic: Negative for frequent infections.   Neurological:  Negative for weakness and headaches.   Hematological:  Negative for adenopathy. Does not bruise/bleed easily.   Psychiatric/Behavioral:  The patient is not nervous/anxious.          Objective:      Vitals:    03/12/25 1004   BP: 116/85   Pulse: 77   Resp: 20   Temp: 98.5 °F (36.9 °C)             Physical Exam  Constitutional:       General: He is not in acute distress.     Appearance: Normal appearance. He is not ill-appearing.   HENT:      Head: Normocephalic and atraumatic.      Nose: Nose normal.      Mouth/Throat:      Mouth: Mucous membranes are moist.      Pharynx: Oropharynx is clear.   Eyes:      Extraocular Movements: Extraocular movements intact.      Conjunctiva/sclera: Conjunctivae normal.      Pupils: Pupils are equal, round, and reactive to light.   Cardiovascular:      Rate and Rhythm: Normal rate and regular rhythm.      Pulses: Normal pulses.      Heart sounds: Normal heart sounds. No murmur heard.  Pulmonary:      Effort: Pulmonary effort is normal. No respiratory distress.      Breath sounds: Normal breath sounds.   Abdominal:      General: There is no distension.      Palpations: Abdomen is soft.      Tenderness: There is no abdominal tenderness.   Musculoskeletal:         General: Normal range of motion.      Cervical back: Normal range of motion and neck supple.   Lymphadenopathy:      Cervical: No cervical adenopathy.   Skin:     General: Skin is warm and dry.       Findings: No rash.   Neurological:      General: No focal deficit present.      Mental Status: He is alert and oriented to person, place, and time.       LABS AND IMAGING REVIEWED IN EPIC  1/8/25 Whole Body PET/CT:  1. No evidence of FDG avid disease on this exam.  2. The non FDG avid lytic left iliac lesion and sclerotic right 7th rib lesion are stable in appearance.       Free Light Chains  10/17//23 - Lambda , Kappa FLC 3.29, K/L Ratio .0044  11/22/23 - Lambda FLC 0.66, Kappa FLC 0.66, K/L Ratio 1.0  01/18/24 - lambda FLC 0.33, kappa FLC 0.39, K/L ratio 1.18      Pathology:  8/20/24 BMBx:  BONE MARROW, LEFT ILIAC CREST (ASPIRATE SMEAR, TOUCH IMPRINT, CLOT SECTION, AND CORE BIOPSY):   -- HYPOCELLULAR MARROW (20-30%) WITH TRILINEAGE HEMATOPOIESIS.   -- NO MORPHOLOGIC OR IMMUNOPHENOTYPIC EVIDENCE OF RESIDUAL/RELAPSED PLASMA CELL MYELOMA.   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 Displayed all CRAB criteria at time of diagnosis in October. Underwent 1st line Beatriz + RVD + Zometa. Transplant done 5/13/24  Addendum - Will plan to undergo maintenance therapy give high risk disease with Velcade Q2w and Revlimid 10mg D1-21 Q28 day cycle. Needs to remain on Aspirin and Acyclovir while on this therapy. Continue Bactrim until +D180 post SCT.   Immunodeficiency due to Drug Therapy - Precautions discussed with patient. Continue to monitor for any signs of symptoms of infection. No prophylaxis needed at this time. Currently completing levaquin     Plan:   - Toxicity reviewed, continue with C12 Velcade q2w today  - Revlimid 10mg D1-21 Q28 day cycle. Due to start today.  - Lab and treat only in two weeks.   - Continue ASA 81 mg daily and acyclovir   - Continue to hold zometa due to ONJ.   - Continue Cialis to 10 mg daily prn for ED  - DESIREE 6/2025 PET and BMBx with OV 7/2025  - RTC in 4 weeks with NP for FU/lab/treatment  - Cbc, cmp, MM labs- @ [facility]. Pt  would like to complete all lab work on wed prior to appts     **All blood products must be irradiated and CMV negative**    The patient is in agreement with today's plan of care.  All questions answered.  Patient is aware to contact our office for any problems or concerns prior to next visit.    I spent a total of 40 minutes on the day of the visit.This includes face to face time and non-face to face time preparing to see the patient (eg, review of tests), obtaining and/or reviewing separately obtained history, documenting clinical information in the electronic or other health record, independently interpreting results and communicating results to the patient/family/caregiver, or care coordinator.      JONN Stephenson-C  Oncology/Hematology   [facility]     Visit today included increased complexity associated with the care of the episodic problem 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 addressed and managing the longitudinal care of the patient due to the serious and/or complex managed problem(s) 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.

## 2025-03-12 ENCOUNTER — OFFICE VISIT (OUTPATIENT)
Facility: CLINIC | Age: 45
End: 2025-03-12
Payer: COMMERCIAL

## 2025-03-12 ENCOUNTER — INFUSION (OUTPATIENT)
Facility: HOSPITAL | Age: 45
End: 2025-03-12
Attending: INTERNAL MEDICINE
Payer: COMMERCIAL

## 2025-03-12 ENCOUNTER — LAB VISIT (OUTPATIENT)
Dept: LAB | Facility: HOSPITAL | Age: 45
End: 2025-03-12
Payer: COMMERCIAL

## 2025-03-12 VITALS
HEIGHT: 69 IN | TEMPERATURE: 98 F | BODY MASS INDEX: 28.56 KG/M2 | RESPIRATION RATE: 20 BRPM | SYSTOLIC BLOOD PRESSURE: 116 MMHG | HEIGHT: 69 IN | HEART RATE: 77 BPM | WEIGHT: 192.81 LBS | SYSTOLIC BLOOD PRESSURE: 116 MMHG | DIASTOLIC BLOOD PRESSURE: 85 MMHG | WEIGHT: 192.81 LBS | OXYGEN SATURATION: 100 % | RESPIRATION RATE: 20 BRPM | BODY MASS INDEX: 28.56 KG/M2 | HEART RATE: 77 BPM | DIASTOLIC BLOOD PRESSURE: 85 MMHG | TEMPERATURE: 99 F | OXYGEN SATURATION: 100 %

## 2025-03-12 DIAGNOSIS — C90.00 METASTATIC MULTIPLE MYELOMA TO BONE: ICD-10-CM

## 2025-03-12 DIAGNOSIS — C90.01 MULTIPLE MYELOMA IN REMISSION: Primary | ICD-10-CM

## 2025-03-12 DIAGNOSIS — R53.82 CHRONIC FATIGUE: ICD-10-CM

## 2025-03-12 DIAGNOSIS — N52.9 ERECTILE DYSFUNCTION, UNSPECIFIED ERECTILE DYSFUNCTION TYPE: ICD-10-CM

## 2025-03-12 DIAGNOSIS — D84.821 IMMUNODEFICIENCY DUE TO DRUGS: ICD-10-CM

## 2025-03-12 DIAGNOSIS — R06.02 SOB (SHORTNESS OF BREATH) ON EXERTION: ICD-10-CM

## 2025-03-12 DIAGNOSIS — D69.6 THROMBOCYTOPENIA: ICD-10-CM

## 2025-03-12 DIAGNOSIS — T45.1X5A CHEMOTHERAPY-INDUCED NEUTROPENIA: ICD-10-CM

## 2025-03-12 DIAGNOSIS — D61.810 PANCYTOPENIA DUE TO ANTINEOPLASTIC CHEMOTHERAPY: ICD-10-CM

## 2025-03-12 DIAGNOSIS — M54.12 CERVICAL RADICULOPATHY: ICD-10-CM

## 2025-03-12 DIAGNOSIS — C90.01 MULTIPLE MYELOMA IN REMISSION: ICD-10-CM

## 2025-03-12 DIAGNOSIS — Z79.899 IMMUNODEFICIENCY DUE TO DRUGS: ICD-10-CM

## 2025-03-12 DIAGNOSIS — N52.1 ERECTILE DYSFUNCTION DUE TO DISEASES CLASSIFIED ELSEWHERE: ICD-10-CM

## 2025-03-12 DIAGNOSIS — N40.1 BPH ASSOCIATED WITH NOCTURIA: ICD-10-CM

## 2025-03-12 DIAGNOSIS — T45.1X5A PANCYTOPENIA DUE TO ANTINEOPLASTIC CHEMOTHERAPY: ICD-10-CM

## 2025-03-12 DIAGNOSIS — Z94.84 HISTORY OF AUTO STEM CELL TRANSPLANT: ICD-10-CM

## 2025-03-12 DIAGNOSIS — M87.9 OSTEONECROSIS OF JAW: ICD-10-CM

## 2025-03-12 DIAGNOSIS — D63.0 ANEMIA IN NEOPLASTIC DISEASE: ICD-10-CM

## 2025-03-12 DIAGNOSIS — D70.9 NEUTROPENIA, UNSPECIFIED TYPE: ICD-10-CM

## 2025-03-12 DIAGNOSIS — C90.00 MULTIPLE MYELOMA, REMISSION STATUS UNSPECIFIED: ICD-10-CM

## 2025-03-12 DIAGNOSIS — D70.1 CHEMOTHERAPY-INDUCED NEUTROPENIA: ICD-10-CM

## 2025-03-12 DIAGNOSIS — R35.1 BPH ASSOCIATED WITH NOCTURIA: ICD-10-CM

## 2025-03-12 LAB
IGA SERPL-MCNC: 86 MG/DL (ref 63–484)
IGG SERPL-MCNC: 540 MG/DL (ref 540–1822)
IGM SERPL-MCNC: 41 MG/DL (ref 22–240)

## 2025-03-12 PROCEDURE — 3079F DIAST BP 80-89 MM HG: CPT | Mod: CPTII,S$GLB,,

## 2025-03-12 PROCEDURE — 96401 CHEMO ANTI-NEOPL SQ/IM: CPT

## 2025-03-12 PROCEDURE — 63600175 PHARM REV CODE 636 W HCPCS

## 2025-03-12 PROCEDURE — 3061F NEG MICROALBUMINURIA REV: CPT | Mod: CPTII,S$GLB,,

## 2025-03-12 PROCEDURE — G2211 COMPLEX E/M VISIT ADD ON: HCPCS | Mod: S$GLB,,,

## 2025-03-12 PROCEDURE — 82784 ASSAY IGA/IGD/IGG/IGM EACH: CPT

## 2025-03-12 PROCEDURE — 99215 OFFICE O/P EST HI 40 MIN: CPT | Mod: S$GLB,,,

## 2025-03-12 PROCEDURE — 83521 IG LIGHT CHAINS FREE EACH: CPT

## 2025-03-12 PROCEDURE — 3066F NEPHROPATHY DOC TX: CPT | Mod: CPTII,S$GLB,,

## 2025-03-12 PROCEDURE — 3044F HG A1C LEVEL LT 7.0%: CPT | Mod: CPTII,S$GLB,,

## 2025-03-12 PROCEDURE — 3008F BODY MASS INDEX DOCD: CPT | Mod: CPTII,S$GLB,,

## 2025-03-12 PROCEDURE — 3074F SYST BP LT 130 MM HG: CPT | Mod: CPTII,S$GLB,,

## 2025-03-12 PROCEDURE — 36415 COLL VENOUS BLD VENIPUNCTURE: CPT

## 2025-03-12 PROCEDURE — 99999 PR PBB SHADOW E&M-EST. PATIENT-LVL V: CPT | Mod: PBBFAC,,,

## 2025-03-12 PROCEDURE — 86334 IMMUNOFIX E-PHORESIS SERUM: CPT

## 2025-03-12 RX ORDER — BORTEZOMIB 3.5 MG/1
1.3 INJECTION, POWDER, LYOPHILIZED, FOR SOLUTION INTRAVENOUS; SUBCUTANEOUS
Status: COMPLETED | OUTPATIENT
Start: 2025-03-12 | End: 2025-03-12

## 2025-03-12 RX ADMIN — BORTEZOMIB 2.75 MG: 3.5 INJECTION, POWDER, LYOPHILIZED, FOR SOLUTION INTRAVENOUS; SUBCUTANEOUS at 11:03

## 2025-03-13 DIAGNOSIS — C90.01 MULTIPLE MYELOMA IN REMISSION: ICD-10-CM

## 2025-03-13 LAB
ALBUMIN % SPEP (OHS): 44.77 (ref 48.1–59.5)
ALBUMIN SERPL-MCNC: 2.4 G/DL (ref 3.5–5)
ALBUMIN/GLOB SERPL: 0.8 RATIO (ref 1.1–2)
ALPHA 1 GLOB (OHS): 0.49 GM/DL (ref 0–0.4)
ALPHA 1 GLOB% (OHS): 9.12 (ref 2.3–4.9)
ALPHA 2 GLOB % (OHS): 19.87 (ref 6.9–13)
ALPHA 2 GLOB (OHS): 1.07 GM/DL (ref 0.4–1)
BETA GLOB (OHS): 0.77 GM/DL (ref 0.7–1.3)
BETA GLOB% (OHS): 14.26 (ref 13.8–19.7)
GAMMA GLOBULIN % (OHS): 11.98 (ref 10.1–21.9)
GAMMA GLOBULIN (OHS): 0.65 GM/DL (ref 0.4–1.8)
GLOBULIN SER-MCNC: 3 GM/DL (ref 2.4–3.5)
KAPPA LC FREE SER NEPH-MCNC: 3.33 MG/DL (ref 0.33–1.94)
KAPPA LC FREE/LAMBDA FREE SER NEPH: 0.81 {RATIO} (ref 0.26–1.65)
LAMBDA LC FREE SERPL-MCNC: 4.09 MG/DL (ref 0.57–2.63)
M SPIKE % (OHS): ABNORMAL
M SPIKE (OHS): ABNORMAL
PATH REV: NORMAL
PROT SERPL-MCNC: 5.4 GM/DL (ref 6.4–8.3)

## 2025-03-13 RX ORDER — CALCITRIOL 0.5 UG/1
0.5 CAPSULE ORAL
Qty: 30 CAPSULE | Refills: 2 | Status: SHIPPED | OUTPATIENT
Start: 2025-03-13

## 2025-03-25 ENCOUNTER — LAB VISIT (OUTPATIENT)
Dept: LAB | Facility: HOSPITAL | Age: 45
End: 2025-03-25
Payer: COMMERCIAL

## 2025-03-25 DIAGNOSIS — C90.01 MULTIPLE MYELOMA IN REMISSION: ICD-10-CM

## 2025-03-25 DIAGNOSIS — C90.00 MULTIPLE MYELOMA NOT HAVING ACHIEVED REMISSION: ICD-10-CM

## 2025-03-25 DIAGNOSIS — Z76.82 STEM CELL TRANSPLANT CANDIDATE: ICD-10-CM

## 2025-03-25 LAB
ALBUMIN SERPL-MCNC: 3.1 G/DL (ref 3.5–5)
ALBUMIN/GLOB SERPL: 0.9 RATIO (ref 1.1–2)
ALP SERPL-CCNC: 48 UNIT/L (ref 40–150)
ALT SERPL-CCNC: 12 UNIT/L (ref 0–55)
ANION GAP SERPL CALC-SCNC: 8 MEQ/L
AST SERPL-CCNC: 13 UNIT/L (ref 11–45)
BASOPHILS # BLD AUTO: 0.01 X10(3)/MCL
BASOPHILS NFR BLD AUTO: 0.4 %
BILIRUB SERPL-MCNC: 0.5 MG/DL
BUN SERPL-MCNC: 17 MG/DL (ref 8.9–20.6)
CALCIUM SERPL-MCNC: 8.8 MG/DL (ref 8.4–10.2)
CHLORIDE SERPL-SCNC: 101 MMOL/L (ref 98–107)
CO2 SERPL-SCNC: 25 MMOL/L (ref 22–29)
CREAT SERPL-MCNC: 1.09 MG/DL (ref 0.72–1.25)
CREAT/UREA NIT SERPL: 16
EOSINOPHIL # BLD AUTO: 0.92 X10(3)/MCL (ref 0–0.9)
EOSINOPHIL NFR BLD AUTO: 33.1 %
ERYTHROCYTE [DISTWIDTH] IN BLOOD BY AUTOMATED COUNT: 14.8 % (ref 11.5–17)
GFR SERPLBLD CREATININE-BSD FMLA CKD-EPI: >60 ML/MIN/1.73/M2
GLOBULIN SER-MCNC: 3.5 GM/DL (ref 2.4–3.5)
GLUCOSE SERPL-MCNC: 106 MG/DL (ref 74–100)
HCT VFR BLD AUTO: 28.8 % (ref 42–52)
HGB BLD-MCNC: 9.6 G/DL (ref 14–18)
IMM GRANULOCYTES # BLD AUTO: 0.01 X10(3)/MCL (ref 0–0.04)
IMM GRANULOCYTES NFR BLD AUTO: 0.4 %
LYMPHOCYTES # BLD AUTO: 0.68 X10(3)/MCL (ref 0.6–4.6)
LYMPHOCYTES NFR BLD AUTO: 24.5 %
MAGNESIUM SERPL-MCNC: 1.9 MG/DL (ref 1.6–2.6)
MCH RBC QN AUTO: 32.7 PG (ref 27–31)
MCHC RBC AUTO-ENTMCNC: 33.3 G/DL (ref 33–36)
MCV RBC AUTO: 98 FL (ref 80–94)
MONOCYTES # BLD AUTO: 0.27 X10(3)/MCL (ref 0.1–1.3)
MONOCYTES NFR BLD AUTO: 9.7 %
NEUTROPHILS # BLD AUTO: 0.89 X10(3)/MCL (ref 2.1–9.2)
NEUTROPHILS NFR BLD AUTO: 31.9 %
NRBC BLD AUTO-RTO: 0 %
PLATELET # BLD AUTO: 161 X10(3)/MCL (ref 130–400)
PMV BLD AUTO: 11.3 FL (ref 7.4–10.4)
POTASSIUM SERPL-SCNC: 4.1 MMOL/L (ref 3.5–5.1)
PROT SERPL-MCNC: 6.6 GM/DL (ref 6.4–8.3)
RBC # BLD AUTO: 2.94 X10(6)/MCL (ref 4.7–6.1)
SODIUM SERPL-SCNC: 134 MMOL/L (ref 136–145)
WBC # BLD AUTO: 2.78 X10(3)/MCL (ref 4.5–11.5)

## 2025-03-25 PROCEDURE — 83735 ASSAY OF MAGNESIUM: CPT

## 2025-03-25 PROCEDURE — 80053 COMPREHEN METABOLIC PANEL: CPT

## 2025-03-25 PROCEDURE — 36415 COLL VENOUS BLD VENIPUNCTURE: CPT

## 2025-03-25 PROCEDURE — 85025 COMPLETE CBC W/AUTO DIFF WBC: CPT

## 2025-03-26 ENCOUNTER — INFUSION (OUTPATIENT)
Facility: HOSPITAL | Age: 45
End: 2025-03-26
Attending: INTERNAL MEDICINE
Payer: COMMERCIAL

## 2025-03-26 VITALS
HEART RATE: 67 BPM | DIASTOLIC BLOOD PRESSURE: 75 MMHG | TEMPERATURE: 98 F | OXYGEN SATURATION: 98 % | SYSTOLIC BLOOD PRESSURE: 115 MMHG | WEIGHT: 187.38 LBS | HEIGHT: 69 IN | BODY MASS INDEX: 27.75 KG/M2

## 2025-03-26 PROCEDURE — 25000003 PHARM REV CODE 250

## 2025-03-26 PROCEDURE — 96360 HYDRATION IV INFUSION INIT: CPT

## 2025-03-26 RX ORDER — HEPARIN 100 UNIT/ML
500 SYRINGE INTRAVENOUS
OUTPATIENT
Start: 2025-03-26

## 2025-03-26 RX ORDER — SODIUM CHLORIDE 9 MG/ML
INJECTION, SOLUTION INTRAVENOUS ONCE
Status: COMPLETED | OUTPATIENT
Start: 2025-03-26 | End: 2025-03-26

## 2025-03-26 RX ORDER — SODIUM CHLORIDE 0.9 % (FLUSH) 0.9 %
10 SYRINGE (ML) INJECTION
OUTPATIENT
Start: 2025-03-26

## 2025-03-26 RX ORDER — EPINEPHRINE 0.3 MG/.3ML
0.3 INJECTION SUBCUTANEOUS ONCE AS NEEDED
OUTPATIENT
Start: 2025-03-26

## 2025-03-26 RX ORDER — BORTEZOMIB 3.5 MG/1
1.3 INJECTION, POWDER, LYOPHILIZED, FOR SOLUTION INTRAVENOUS; SUBCUTANEOUS
OUTPATIENT
Start: 2025-03-26

## 2025-03-26 RX ORDER — DIPHENHYDRAMINE HYDROCHLORIDE 50 MG/ML
50 INJECTION, SOLUTION INTRAMUSCULAR; INTRAVENOUS ONCE AS NEEDED
OUTPATIENT
Start: 2025-03-26

## 2025-03-26 RX ORDER — PROCHLORPERAZINE EDISYLATE 5 MG/ML
10 INJECTION INTRAMUSCULAR; INTRAVENOUS ONCE AS NEEDED
Start: 2025-03-26

## 2025-03-26 RX ADMIN — SODIUM CHLORIDE: 9 INJECTION, SOLUTION INTRAVENOUS at 11:03

## 2025-03-31 DIAGNOSIS — C90.00 MULTIPLE MYELOMA, REMISSION STATUS UNSPECIFIED: ICD-10-CM

## 2025-03-31 DIAGNOSIS — N52.1 ERECTILE DYSFUNCTION DUE TO DISEASES CLASSIFIED ELSEWHERE: ICD-10-CM

## 2025-03-31 RX ORDER — TADALAFIL 10 MG/1
10 TABLET ORAL DAILY PRN
Qty: 30 TABLET | Refills: 1 | Status: SHIPPED | OUTPATIENT
Start: 2025-03-31 | End: 2026-03-31

## 2025-03-31 RX ORDER — OXYCODONE HYDROCHLORIDE 10 MG/1
10 TABLET ORAL EVERY 4 HOURS PRN
Qty: 90 TABLET | Refills: 0 | Status: SHIPPED | OUTPATIENT
Start: 2025-03-31

## 2025-04-01 DIAGNOSIS — C90.01 MULTIPLE MYELOMA IN REMISSION: ICD-10-CM

## 2025-04-03 DIAGNOSIS — C90.01 MULTIPLE MYELOMA IN REMISSION: ICD-10-CM

## 2025-04-03 RX ORDER — LENALIDOMIDE 10 MG/1
CAPSULE ORAL
Qty: 21 CAPSULE | Refills: 0 | Status: SHIPPED | OUTPATIENT
Start: 2025-04-03

## 2025-04-09 ENCOUNTER — LAB VISIT (OUTPATIENT)
Dept: LAB | Facility: HOSPITAL | Age: 45
End: 2025-04-09
Payer: COMMERCIAL

## 2025-04-09 DIAGNOSIS — C90.01 MULTIPLE MYELOMA IN REMISSION: ICD-10-CM

## 2025-04-09 LAB
ALBUMIN SERPL-MCNC: 3.5 G/DL (ref 3.5–5)
ALBUMIN/GLOB SERPL: 1.2 RATIO (ref 1.1–2)
ALP SERPL-CCNC: 50 UNIT/L (ref 40–150)
ALT SERPL-CCNC: 15 UNIT/L (ref 0–55)
ANION GAP SERPL CALC-SCNC: 8 MEQ/L
AST SERPL-CCNC: 17 UNIT/L (ref 11–45)
BASOPHILS # BLD AUTO: 0.08 X10(3)/MCL
BASOPHILS NFR BLD AUTO: 3 %
BILIRUB SERPL-MCNC: 0.6 MG/DL
BUN SERPL-MCNC: 18 MG/DL (ref 8.9–20.6)
CALCIUM SERPL-MCNC: 9 MG/DL (ref 8.4–10.2)
CHLORIDE SERPL-SCNC: 104 MMOL/L (ref 98–107)
CO2 SERPL-SCNC: 26 MMOL/L (ref 22–29)
CREAT SERPL-MCNC: 1.2 MG/DL (ref 0.72–1.25)
CREAT/UREA NIT SERPL: 15
EOSINOPHIL # BLD AUTO: 0.16 X10(3)/MCL (ref 0–0.9)
EOSINOPHIL NFR BLD AUTO: 6 %
ERYTHROCYTE [DISTWIDTH] IN BLOOD BY AUTOMATED COUNT: 15.9 % (ref 11.5–17)
GFR SERPLBLD CREATININE-BSD FMLA CKD-EPI: >60 ML/MIN/1.73/M2
GLOBULIN SER-MCNC: 2.9 GM/DL (ref 2.4–3.5)
GLUCOSE SERPL-MCNC: 101 MG/DL (ref 74–100)
HCT VFR BLD AUTO: 31 % (ref 42–52)
HGB BLD-MCNC: 10.2 G/DL (ref 14–18)
IGA SERPL-MCNC: 89 MG/DL (ref 63–484)
IGG SERPL-MCNC: 569 MG/DL (ref 540–1822)
IGM SERPL-MCNC: 17 MG/DL (ref 22–240)
IMM GRANULOCYTES # BLD AUTO: 0 X10(3)/MCL (ref 0–0.04)
IMM GRANULOCYTES NFR BLD AUTO: 0 %
LYMPHOCYTES # BLD AUTO: 0.65 X10(3)/MCL (ref 0.6–4.6)
LYMPHOCYTES NFR BLD AUTO: 24.5 %
MAGNESIUM SERPL-MCNC: 1.9 MG/DL (ref 1.6–2.6)
MCH RBC QN AUTO: 32.8 PG (ref 27–31)
MCHC RBC AUTO-ENTMCNC: 32.9 G/DL (ref 33–36)
MCV RBC AUTO: 99.7 FL (ref 80–94)
MONOCYTES # BLD AUTO: 0.35 X10(3)/MCL (ref 0.1–1.3)
MONOCYTES NFR BLD AUTO: 13.2 %
NEUTROPHILS # BLD AUTO: 1.41 X10(3)/MCL (ref 2.1–9.2)
NEUTROPHILS NFR BLD AUTO: 53.3 %
NRBC BLD AUTO-RTO: 0 %
PLATELET # BLD AUTO: 218 X10(3)/MCL (ref 130–400)
PMV BLD AUTO: 9.7 FL (ref 7.4–10.4)
POTASSIUM SERPL-SCNC: 4.2 MMOL/L (ref 3.5–5.1)
PROT SERPL-MCNC: 6.4 GM/DL (ref 6.4–8.3)
RBC # BLD AUTO: 3.11 X10(6)/MCL (ref 4.7–6.1)
SODIUM SERPL-SCNC: 138 MMOL/L (ref 136–145)
WBC # BLD AUTO: 2.65 X10(3)/MCL (ref 4.5–11.5)

## 2025-04-09 PROCEDURE — 85025 COMPLETE CBC W/AUTO DIFF WBC: CPT

## 2025-04-09 PROCEDURE — 83521 IG LIGHT CHAINS FREE EACH: CPT | Mod: 91

## 2025-04-09 PROCEDURE — 83735 ASSAY OF MAGNESIUM: CPT

## 2025-04-09 PROCEDURE — 80053 COMPREHEN METABOLIC PANEL: CPT

## 2025-04-09 PROCEDURE — 84165 PROTEIN E-PHORESIS SERUM: CPT

## 2025-04-09 PROCEDURE — 36415 COLL VENOUS BLD VENIPUNCTURE: CPT

## 2025-04-09 PROCEDURE — 82784 ASSAY IGA/IGD/IGG/IGM EACH: CPT | Mod: 59

## 2025-04-09 NOTE — PROGRESS NOTES
Subjective:       Patient ID: Pete Fernandez is a 44 y.o. male.    Chief Complaint:  Follow-up Multiple Myeloma (F/u with labs-- Pt reports constant nausea )       Diagnosis: 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    Current Treatment:   Maintenance therapy with Velcade Q2w and Revlimid 10mg D1-21 Q28 day cycle- 9/19/2024- present      Treatment History:  1. Beatriz+ KrD for cycle 2 on 11/30/23; Change at request of [facility]  Stopped after 1 dose due to severe pleural and pericardial effusion development   Switched back to BEATRIZ + RVD with increase of Rev to 25mg  2. Beatriz+ RVD - 11/2/23 x 1 cycle, plan to restart for C2 on 12/14 potentially  C1 - Rev 10mg dose reduction due to CrCl  C2 - Rev increased to 25mg due to renal function improvement  C3 - D22 held 1 week due to covid  C4-  D22 Rev decreased to 10mg due to decline in renal function  C5 - Hold Beatriz this cycle in preparation for upcoming transplant. Rev remains at 10mg due to renal function  Zometa 10/17/23 - 1/11/2024 (3 treatments total)  Currently on hold due to ONJ  3. Stem Cell Transplant 5/6/24    HPI  42yo M presented to [facility] on 10/11 after 1 month of progressive weakness, abdominal pain, and intermittent confusion. At the time of his presentation, his labs which had been normal with PCP in 8/2023 were notable for new severe DOUG, transaminitis, and Ca of 18. He was admitted and started on IVF resuscitation.  Hypercalcemia workup was initiated and he was ultimately found to have numerous osseous lesions on 10/15/23 CT CAP, FLC - Kappa 3.29, Lambda 744, Ratio 226 , SPEP with Mspike 0.17 IgG lambda. Bone marrow biopsy done 10/18/23 revealed 95% plasma cells and FISH with TP53 deletion, FGFR3/IGH (or NSD2/IGH) fusion, usually representing a t(4;14), and a tetraploid subclone was observed. After correction of his hypercalcemia and improvement in his renal function he was discharged on 10/22/23.   He started C1 Beatriz + RVD + Zometa on  11/2/23. He was referred to [facility] for consideration of bone marrow transplant, they recommended change to Beatriz + Krd, however patient developed life threatening pleural and pericardial edema several days after 1st infusion requiring hospitalization and placement of a pericardial window. Therefore he transitioned back to Beatriz - RVD for C2.     Interval History:   He returns to the clinic today for a follow and treatment clearance for C13 of Velcade Q2w and Revlimid 10mg D1-21 Q28 day.  Due to start Revlimid today.  He continues to report fatigue.   He does have nausea that is more prominent today, controlled with meds as needed.   Weight and appetite are stable today. He does drink two ensures daily.  He continues with chronic fatigue and throbbing pain to right side back in rib area. Pain is stable today.   He takes Oxycodone 10 mg and states that this is helping.  Denies any chills, chest pain, cough, abdominal pain, SOB, numbness and tingling in hands/feet.  Labs reviewed with patient and Dr. LeJeune.      Past Medical History:   Diagnosis Date    Anxiety disorder, unspecified     Encounter for antineoplastic chemotherapy 9/19/2024    Hypercalcemia       Past Surgical History:   Procedure Laterality Date    BONE MARROW BIOPSY N/A 10/18/2023    Procedure: Biopsy-bone marrow;  Surgeon: Nhan Marte MD;  Location: [facility];  Service: General;  Laterality: N/A;    INSERTION OF TUNNELED CENTRAL VENOUS HEMODIALYSIS CATHETER Left 5/6/2024    Procedure: INSERTION, CATHETER, HEMODIALYSIS, DUAL LUMEN, left poss right, Bard 14.5 fr hemosplit catheter, model [id number];  Surgeon: Alvin White MD;  Location: [address];  Service: General;  Laterality: Left;    TONSILLECTOMY       Social History     Socioeconomic History    Marital status:      Spouse name: Rosalinda    Number of children: 2   Tobacco Use    Smoking status: Never    Smokeless tobacco: Never   Substance and Sexual Activity     Alcohol use: Yes    Drug use: Never    Sexual activity: Yes     Partners: Female     Social Drivers of Health     Financial Resource Strain: Low Risk  (3/10/2025)    Overall Financial Resource Strain (CARDIA)     Difficulty of Paying Living Expenses: Not very hard   Food Insecurity: No Food Insecurity (3/10/2025)    Hunger Vital Sign     Worried About Running Out of Food in the Last Year: Never true     Ran Out of Food in the Last Year: Never true   Transportation Needs: No Transportation Needs (3/10/2025)    PRAPARE - Transportation     Lack of Transportation (Medical): No     Lack of Transportation (Non-Medical): No   Physical Activity: Inactive (3/10/2025)    Exercise Vital Sign     Days of Exercise per Week: 0 days     Minutes of Exercise per Session: 0 min   Stress: Stress Concern Present (3/10/2025)    [employer] - Occupational Stress Questionnaire     Feeling of Stress : To some extent   Housing Stability: Low Risk  (3/10/2025)    Housing Stability Vital Sign     Unable to Pay for Housing in the Last Year: No     Number of Times Moved in the Last Year: 0     Homeless in the Last Year: No      Family History   Problem Relation Name Age of Onset    Breast cancer Mother      Pancreatic cancer Father        Review of patient's allergies indicates:  No Known Allergies   Review of Systems   Constitutional:  Positive for fatigue. Negative for activity change, appetite change, chills, fever and unexpected weight change.   HENT:  Negative for mouth sores and sore throat.    Eyes:  Negative for visual disturbance.   Respiratory:  Negative for cough and shortness of breath.    Cardiovascular:  Negative for chest pain.   Gastrointestinal:  Positive for nausea. Negative for abdominal pain, constipation, diarrhea and vomiting.   Endocrine: Negative for polyuria.   Genitourinary:  Positive for erectile dysfunction. Negative for dysuria and frequency.   Musculoskeletal:  Negative for back pain.    Integumentary:  Negative for rash.   Allergic/Immunologic: Negative for frequent infections.   Neurological:  Negative for weakness and headaches.   Hematological:  Negative for adenopathy. Does not bruise/bleed easily.   Psychiatric/Behavioral:  The patient is not nervous/anxious.          Objective:      Vitals:    04/10/25 0929   BP: 119/74   Pulse: 71   Resp: 20   Temp: 97.8 °F (36.6 °C)               Physical Exam  Constitutional:       General: He is not in acute distress.     Appearance: Normal appearance. He is not ill-appearing.   HENT:      Head: Normocephalic and atraumatic.      Nose: Nose normal.      Mouth/Throat:      Mouth: Mucous membranes are moist.      Pharynx: Oropharynx is clear.   Eyes:      Extraocular Movements: Extraocular movements intact.      Conjunctiva/sclera: Conjunctivae normal.      Pupils: Pupils are equal, round, and reactive to light.   Cardiovascular:      Rate and Rhythm: Normal rate and regular rhythm.      Pulses: Normal pulses.      Heart sounds: Normal heart sounds. No murmur heard.  Pulmonary:      Effort: Pulmonary effort is normal. No respiratory distress.      Breath sounds: Normal breath sounds.   Abdominal:      General: There is no distension.      Palpations: Abdomen is soft.      Tenderness: There is no abdominal tenderness.   Musculoskeletal:         General: Normal range of motion.      Cervical back: Normal range of motion and neck supple.   Lymphadenopathy:      Cervical: No cervical adenopathy.   Skin:     General: Skin is warm and dry.      Findings: No rash.   Neurological:      General: No focal deficit present.      Mental Status: He is alert and oriented to person, place, and time.         LABS AND IMAGING REVIEWED IN EPIC  1/8/25 Whole Body PET/CT:  1. No evidence of FDG avid disease on this exam.  2. The non FDG avid lytic left iliac lesion and sclerotic right 7th rib lesion are stable in appearance.       Free Light Chains  10/17//23 - Lambda FLC  744, Kappa FLC 3.29, K/L Ratio .0044  11/22/23 - Lambda FLC 0.66, Kappa FLC 0.66, K/L Ratio 1.0  01/18/24 - lambda FLC 0.33, kappa FLC 0.39, K/L ratio 1.18      Pathology:  8/20/24 BMBx:  BONE MARROW, LEFT ILIAC CREST (ASPIRATE SMEAR, TOUCH IMPRINT, CLOT SECTION, AND CORE BIOPSY):   -- HYPOCELLULAR MARROW (20-30%) WITH TRILINEAGE HEMATOPOIESIS.   -- NO MORPHOLOGIC OR IMMUNOPHENOTYPIC EVIDENCE OF RESIDUAL/RELAPSED PLASMA CELL MYELOMA.   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 Displayed all CRAB criteria at time of diagnosis in October. Underwent 1st line Beatriz + RVD + Zometa. Transplant done 5/13/24  Will plan to undergo maintenance therapy give high risk disease with Velcade Q2w and Revlimid 10mg D1-21 Q28 day cycle. Needs to remain on Aspirin and Acyclovir while on this therapy. Continue Bactrim until +D180 post SCT.   Last OV with Dr. Arceo 3/2025  Lambda FLC slowly increasing (now at 4.09). IgG increasing as well, in normal range. Current labs pending.   He does have BMBx and PET planned in DESIREE 6/17/2025  Immunodeficiency due to Drug Therapy - Precautions discussed with patient. Continue to monitor for any signs of symptoms of infection. No prophylaxis needed at this time.     Plan:   - Toxicity reviewed, continue with C13 Velcade q2w today  - Revlimid 10mg D1-21 Q28 day cycle. Due to start today.  - Lab and treat only in two weeks.   - Continue ASA 81 mg daily and acyclovir   - Continue to hold zometa due to ONJ.   - Continue Cialis to 10 mg daily prn for ED  - DESIREE 6/17/2025 PET and BMBx with OV 6/30/2025  - RTC in 4 weeks with NP for FU/lab/treatment  - Cbc, cmp, MM labs- @ [facility]. Pt would like to complete all lab work on wed prior to appts     **All blood products must be irradiated and CMV negative**    The patient is in agreement with today's plan of care.  All questions answered.  Patient is aware to contact our office for any problems or  concerns prior to next visit.    I spent a total of 40 minutes on the day of the visit.This includes face to face time and non-face to face time preparing to see the patient (eg, review of tests), obtaining and/or reviewing separately obtained history, documenting clinical information in the electronic or other health record, independently interpreting results and communicating results to the patient/family/caregiver, or care coordinator.      PAMELA Stephenson  Oncology/Hematology   [facility]     Visit today included increased complexity associated with the care of the episodic problem 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 addressed and managing the longitudinal care of the patient due to the serious and/or complex managed problem(s) 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.

## 2025-04-10 ENCOUNTER — INFUSION (OUTPATIENT)
Facility: HOSPITAL | Age: 45
End: 2025-04-10
Attending: INTERNAL MEDICINE
Payer: COMMERCIAL

## 2025-04-10 ENCOUNTER — OFFICE VISIT (OUTPATIENT)
Facility: CLINIC | Age: 45
End: 2025-04-10
Payer: COMMERCIAL

## 2025-04-10 VITALS
SYSTOLIC BLOOD PRESSURE: 119 MMHG | BODY MASS INDEX: 28.51 KG/M2 | RESPIRATION RATE: 20 BRPM | HEIGHT: 69 IN | HEART RATE: 71 BPM | TEMPERATURE: 98 F | WEIGHT: 192.5 LBS | OXYGEN SATURATION: 100 % | DIASTOLIC BLOOD PRESSURE: 74 MMHG

## 2025-04-10 VITALS
WEIGHT: 192.5 LBS | DIASTOLIC BLOOD PRESSURE: 74 MMHG | RESPIRATION RATE: 20 BRPM | HEART RATE: 71 BPM | TEMPERATURE: 98 F | OXYGEN SATURATION: 100 % | HEIGHT: 69 IN | BODY MASS INDEX: 28.51 KG/M2 | SYSTOLIC BLOOD PRESSURE: 119 MMHG

## 2025-04-10 DIAGNOSIS — C90.00 MULTIPLE MYELOMA, REMISSION STATUS UNSPECIFIED: ICD-10-CM

## 2025-04-10 DIAGNOSIS — T45.1X5A PANCYTOPENIA DUE TO ANTINEOPLASTIC CHEMOTHERAPY: ICD-10-CM

## 2025-04-10 DIAGNOSIS — D70.1 CHEMOTHERAPY-INDUCED NEUTROPENIA: ICD-10-CM

## 2025-04-10 DIAGNOSIS — D69.6 THROMBOCYTOPENIA: ICD-10-CM

## 2025-04-10 DIAGNOSIS — N52.1 ERECTILE DYSFUNCTION DUE TO DISEASES CLASSIFIED ELSEWHERE: ICD-10-CM

## 2025-04-10 DIAGNOSIS — D84.821 IMMUNODEFICIENCY DUE TO DRUG THERAPY: ICD-10-CM

## 2025-04-10 DIAGNOSIS — D61.810 PANCYTOPENIA DUE TO ANTINEOPLASTIC CHEMOTHERAPY: ICD-10-CM

## 2025-04-10 DIAGNOSIS — Z79.899 IMMUNODEFICIENCY DUE TO DRUG THERAPY: ICD-10-CM

## 2025-04-10 DIAGNOSIS — M87.9 OSTEONECROSIS OF JAW: ICD-10-CM

## 2025-04-10 DIAGNOSIS — R11.0 NAUSEA: ICD-10-CM

## 2025-04-10 DIAGNOSIS — D64.9 ANEMIA, UNSPECIFIED TYPE: ICD-10-CM

## 2025-04-10 DIAGNOSIS — C90.00 METASTATIC MULTIPLE MYELOMA TO BONE: Primary | ICD-10-CM

## 2025-04-10 DIAGNOSIS — T45.1X5A CHEMOTHERAPY-INDUCED NEUTROPENIA: ICD-10-CM

## 2025-04-10 DIAGNOSIS — D84.821 IMMUNODEFICIENCY DUE TO DRUGS: ICD-10-CM

## 2025-04-10 DIAGNOSIS — D70.9 NEUTROPENIA, UNSPECIFIED TYPE: ICD-10-CM

## 2025-04-10 DIAGNOSIS — C90.01 MULTIPLE MYELOMA IN REMISSION: Primary | ICD-10-CM

## 2025-04-10 DIAGNOSIS — R53.82 CHRONIC FATIGUE: ICD-10-CM

## 2025-04-10 DIAGNOSIS — Z79.899 IMMUNODEFICIENCY DUE TO DRUGS: ICD-10-CM

## 2025-04-10 DIAGNOSIS — D63.0 ANEMIA IN NEOPLASTIC DISEASE: ICD-10-CM

## 2025-04-10 LAB
ALBUMIN % SPEP (OHS): 57.46 (ref 48.1–59.5)
ALBUMIN SERPL-MCNC: 3.6 G/DL (ref 3.5–5)
ALBUMIN/GLOB SERPL: 1.3 RATIO (ref 1.1–2)
ALPHA 1 GLOB (OHS): 0.34 GM/DL (ref 0–0.4)
ALPHA 1 GLOB% (OHS): 5.42 (ref 2.3–4.9)
ALPHA 2 GLOB % (OHS): 13.24 (ref 6.9–13)
ALPHA 2 GLOB (OHS): 0.83 GM/DL (ref 0.4–1)
BETA GLOB (OHS): 0.91 GM/DL (ref 0.7–1.3)
BETA GLOB% (OHS): 14.47 (ref 13.8–19.7)
GAMMA GLOBULIN % (OHS): 9.41 (ref 10.1–21.9)
GAMMA GLOBULIN (OHS): 0.59 GM/DL (ref 0.4–1.8)
GLOBULIN SER-MCNC: 2.7 GM/DL (ref 2.4–3.5)
KAPPA LC FREE SER NEPH-MCNC: 0.51 MG/DL (ref 0.33–1.94)
KAPPA LC FREE/LAMBDA FREE SER NEPH: 0.25 {RATIO} (ref 0.26–1.65)
LAMBDA LC FREE SERPL-MCNC: 2.02 MG/DL (ref 0.57–2.63)
M SPIKE % (OHS): ABNORMAL
M SPIKE (OHS): ABNORMAL
PATH REV: NORMAL
PROT SERPL-MCNC: 6.3 GM/DL (ref 6.4–8.3)

## 2025-04-10 PROCEDURE — 96401 CHEMO ANTI-NEOPL SQ/IM: CPT

## 2025-04-10 PROCEDURE — 3066F NEPHROPATHY DOC TX: CPT | Mod: CPTII,S$GLB,,

## 2025-04-10 PROCEDURE — 3078F DIAST BP <80 MM HG: CPT | Mod: CPTII,S$GLB,,

## 2025-04-10 PROCEDURE — 3061F NEG MICROALBUMINURIA REV: CPT | Mod: CPTII,S$GLB,,

## 2025-04-10 PROCEDURE — 99999 PR PBB SHADOW E&M-EST. PATIENT-LVL V: CPT | Mod: PBBFAC,,,

## 2025-04-10 PROCEDURE — 99215 OFFICE O/P EST HI 40 MIN: CPT | Mod: S$GLB,,,

## 2025-04-10 PROCEDURE — 3008F BODY MASS INDEX DOCD: CPT | Mod: CPTII,S$GLB,,

## 2025-04-10 PROCEDURE — 3074F SYST BP LT 130 MM HG: CPT | Mod: CPTII,S$GLB,,

## 2025-04-10 PROCEDURE — 63600175 PHARM REV CODE 636 W HCPCS

## 2025-04-10 PROCEDURE — G2211 COMPLEX E/M VISIT ADD ON: HCPCS | Mod: S$GLB,,,

## 2025-04-10 PROCEDURE — 3044F HG A1C LEVEL LT 7.0%: CPT | Mod: CPTII,S$GLB,,

## 2025-04-10 RX ORDER — ONDANSETRON HYDROCHLORIDE 8 MG/1
8 TABLET, FILM COATED ORAL EVERY 8 HOURS PRN
Qty: 30 TABLET | Refills: 4 | Status: SHIPPED | OUTPATIENT
Start: 2025-04-10 | End: 2026-04-10

## 2025-04-10 RX ORDER — BORTEZOMIB 3.5 MG/1
1.3 INJECTION, POWDER, LYOPHILIZED, FOR SOLUTION INTRAVENOUS; SUBCUTANEOUS
Status: COMPLETED | OUTPATIENT
Start: 2025-04-10 | End: 2025-04-10

## 2025-04-10 RX ORDER — PROCHLORPERAZINE MALEATE 5 MG
5 TABLET ORAL EVERY 6 HOURS PRN
Qty: 30 TABLET | Refills: 4 | Status: SHIPPED | OUTPATIENT
Start: 2025-04-10

## 2025-04-10 RX ADMIN — BORTEZOMIB 2.75 MG: 3.5 INJECTION, POWDER, LYOPHILIZED, FOR SOLUTION INTRAVENOUS; SUBCUTANEOUS at 10:04

## 2025-04-22 ENCOUNTER — LAB VISIT (OUTPATIENT)
Dept: LAB | Facility: HOSPITAL | Age: 45
End: 2025-04-22
Attending: NURSE PRACTITIONER
Payer: COMMERCIAL

## 2025-04-22 DIAGNOSIS — C90.00 METASTATIC MULTIPLE MYELOMA TO BONE: ICD-10-CM

## 2025-04-22 DIAGNOSIS — D64.9 ANEMIA, UNSPECIFIED TYPE: ICD-10-CM

## 2025-04-22 LAB
ALBUMIN SERPL-MCNC: 3.9 G/DL (ref 3.5–5)
ALBUMIN/GLOB SERPL: 1.4 RATIO (ref 1.1–2)
ALP SERPL-CCNC: 45 UNIT/L (ref 40–150)
ALT SERPL-CCNC: 14 UNIT/L (ref 0–55)
ANION GAP SERPL CALC-SCNC: 9 MEQ/L
AST SERPL-CCNC: 15 UNIT/L (ref 11–45)
BASOPHILS # BLD AUTO: 0.04 X10(3)/MCL
BASOPHILS NFR BLD AUTO: 1.1 %
BILIRUB SERPL-MCNC: 0.7 MG/DL
BUN SERPL-MCNC: 19 MG/DL (ref 8.9–20.6)
CALCIUM SERPL-MCNC: 9 MG/DL (ref 8.4–10.2)
CHLORIDE SERPL-SCNC: 104 MMOL/L (ref 98–107)
CO2 SERPL-SCNC: 25 MMOL/L (ref 22–29)
CREAT SERPL-MCNC: 1.31 MG/DL (ref 0.72–1.25)
CREAT/UREA NIT SERPL: 15
EOSINOPHIL # BLD AUTO: 0.45 X10(3)/MCL (ref 0–0.9)
EOSINOPHIL NFR BLD AUTO: 12.2 %
ERYTHROCYTE [DISTWIDTH] IN BLOOD BY AUTOMATED COUNT: 16.2 % (ref 11.5–17)
FERRITIN SERPL-MCNC: 419.15 NG/ML (ref 21.81–274.66)
GFR SERPLBLD CREATININE-BSD FMLA CKD-EPI: >60 ML/MIN/1.73/M2
GLOBULIN SER-MCNC: 2.8 GM/DL (ref 2.4–3.5)
GLUCOSE SERPL-MCNC: 70 MG/DL (ref 74–100)
HCT VFR BLD AUTO: 33.8 % (ref 42–52)
HGB BLD-MCNC: 10.9 G/DL (ref 14–18)
IMM GRANULOCYTES # BLD AUTO: 0.01 X10(3)/MCL (ref 0–0.04)
IMM GRANULOCYTES NFR BLD AUTO: 0.3 %
IRON SATN MFR SERPL: 33 % (ref 20–50)
IRON SERPL-MCNC: 81 UG/DL (ref 65–175)
LYMPHOCYTES # BLD AUTO: 0.81 X10(3)/MCL (ref 0.6–4.6)
LYMPHOCYTES NFR BLD AUTO: 21.9 %
MAGNESIUM SERPL-MCNC: 2 MG/DL (ref 1.6–2.6)
MCH RBC QN AUTO: 32.8 PG (ref 27–31)
MCHC RBC AUTO-ENTMCNC: 32.2 G/DL (ref 33–36)
MCV RBC AUTO: 101.8 FL (ref 80–94)
MONOCYTES # BLD AUTO: 0.5 X10(3)/MCL (ref 0.1–1.3)
MONOCYTES NFR BLD AUTO: 13.5 %
NEUTROPHILS # BLD AUTO: 1.89 X10(3)/MCL (ref 2.1–9.2)
NEUTROPHILS NFR BLD AUTO: 51 %
NRBC BLD AUTO-RTO: 0 %
PLATELET # BLD AUTO: 152 X10(3)/MCL (ref 130–400)
PMV BLD AUTO: 9.7 FL (ref 7.4–10.4)
POTASSIUM SERPL-SCNC: 3.9 MMOL/L (ref 3.5–5.1)
PROT SERPL-MCNC: 6.7 GM/DL (ref 6.4–8.3)
RBC # BLD AUTO: 3.32 X10(6)/MCL (ref 4.7–6.1)
SODIUM SERPL-SCNC: 138 MMOL/L (ref 136–145)
TIBC SERPL-MCNC: 165 UG/DL (ref 60–240)
TIBC SERPL-MCNC: 246 UG/DL (ref 250–450)
WBC # BLD AUTO: 3.7 X10(3)/MCL (ref 4.5–11.5)

## 2025-04-22 PROCEDURE — 80053 COMPREHEN METABOLIC PANEL: CPT

## 2025-04-22 PROCEDURE — 83540 ASSAY OF IRON: CPT

## 2025-04-22 PROCEDURE — 82746 ASSAY OF FOLIC ACID SERUM: CPT

## 2025-04-22 PROCEDURE — 82607 VITAMIN B-12: CPT

## 2025-04-22 PROCEDURE — 83735 ASSAY OF MAGNESIUM: CPT

## 2025-04-22 PROCEDURE — 36415 COLL VENOUS BLD VENIPUNCTURE: CPT

## 2025-04-22 PROCEDURE — 82728 ASSAY OF FERRITIN: CPT

## 2025-04-22 PROCEDURE — 85025 COMPLETE CBC W/AUTO DIFF WBC: CPT

## 2025-04-23 ENCOUNTER — INFUSION (OUTPATIENT)
Facility: HOSPITAL | Age: 45
End: 2025-04-23
Attending: INTERNAL MEDICINE
Payer: COMMERCIAL

## 2025-04-23 VITALS
HEART RATE: 81 BPM | WEIGHT: 194 LBS | DIASTOLIC BLOOD PRESSURE: 72 MMHG | OXYGEN SATURATION: 100 % | BODY MASS INDEX: 28.73 KG/M2 | SYSTOLIC BLOOD PRESSURE: 116 MMHG | RESPIRATION RATE: 20 BRPM | TEMPERATURE: 98 F | HEIGHT: 69 IN

## 2025-04-23 DIAGNOSIS — C90.01 MULTIPLE MYELOMA IN REMISSION: Primary | ICD-10-CM

## 2025-04-23 LAB
FOLATE SERPL-MCNC: 5.4 NG/ML (ref 7–31.4)
VIT B12 SERPL-MCNC: 442 PG/ML (ref 213–816)

## 2025-04-23 PROCEDURE — 63600175 PHARM REV CODE 636 W HCPCS: Performed by: NURSE PRACTITIONER

## 2025-04-23 PROCEDURE — 96401 CHEMO ANTI-NEOPL SQ/IM: CPT

## 2025-04-23 RX ORDER — BORTEZOMIB 3.5 MG/1
1.3 INJECTION, POWDER, LYOPHILIZED, FOR SOLUTION INTRAVENOUS; SUBCUTANEOUS
Status: COMPLETED | OUTPATIENT
Start: 2025-04-23 | End: 2025-04-23

## 2025-04-23 RX ORDER — HEPARIN 100 UNIT/ML
500 SYRINGE INTRAVENOUS
Status: CANCELLED | OUTPATIENT
Start: 2025-04-23

## 2025-04-23 RX ORDER — BORTEZOMIB 3.5 MG/1
1.3 INJECTION, POWDER, LYOPHILIZED, FOR SOLUTION INTRAVENOUS; SUBCUTANEOUS
Status: CANCELLED | OUTPATIENT
Start: 2025-04-23

## 2025-04-23 RX ORDER — DIPHENHYDRAMINE HYDROCHLORIDE 50 MG/ML
50 INJECTION, SOLUTION INTRAMUSCULAR; INTRAVENOUS ONCE AS NEEDED
Status: CANCELLED | OUTPATIENT
Start: 2025-04-23

## 2025-04-23 RX ORDER — SODIUM CHLORIDE 0.9 % (FLUSH) 0.9 %
10 SYRINGE (ML) INJECTION
Status: CANCELLED | OUTPATIENT
Start: 2025-04-23

## 2025-04-23 RX ORDER — EPINEPHRINE 0.3 MG/.3ML
0.3 INJECTION SUBCUTANEOUS ONCE AS NEEDED
Status: CANCELLED | OUTPATIENT
Start: 2025-04-23

## 2025-04-23 RX ORDER — PROCHLORPERAZINE EDISYLATE 5 MG/ML
10 INJECTION INTRAMUSCULAR; INTRAVENOUS ONCE AS NEEDED
Status: CANCELLED
Start: 2025-04-23

## 2025-04-23 RX ADMIN — BORTEZOMIB 2.75 MG: 3.5 INJECTION, POWDER, LYOPHILIZED, FOR SOLUTION INTRAVENOUS; SUBCUTANEOUS at 10:04

## 2025-04-25 DIAGNOSIS — C90.00 MULTIPLE MYELOMA, REMISSION STATUS UNSPECIFIED: ICD-10-CM

## 2025-04-28 RX ORDER — OXYCODONE HYDROCHLORIDE 10 MG/1
10 TABLET ORAL EVERY 4 HOURS PRN
Qty: 90 TABLET | Refills: 0 | Status: SHIPPED | OUTPATIENT
Start: 2025-04-28

## 2025-04-30 RX ORDER — LENALIDOMIDE 10 MG/1
10 CAPSULE ORAL DAILY
Qty: 21 CAPSULE | Refills: 0 | Status: SHIPPED | OUTPATIENT
Start: 2025-04-30

## 2025-05-02 ENCOUNTER — TELEPHONE (OUTPATIENT)
Dept: HEMATOLOGY/ONCOLOGY | Facility: CLINIC | Age: 45
End: 2025-05-02
Payer: COMMERCIAL

## 2025-05-02 NOTE — TELEPHONE ENCOUNTER
REMS that was sent in with revlimid rx on 4/30/25 is not valid; has already been used. [facility] is asking for new authorization #.

## 2025-05-05 DIAGNOSIS — C90.01 MULTIPLE MYELOMA IN REMISSION: ICD-10-CM

## 2025-05-05 RX ORDER — LENALIDOMIDE 10 MG/1
10 CAPSULE ORAL DAILY
Qty: 21 CAPSULE | Refills: 0 | Status: SHIPPED | OUTPATIENT
Start: 2025-05-05

## 2025-05-06 NOTE — PROGRESS NOTES
Subjective:       Patient ID: Pete Fernandez is a 44 y.o. male.    Chief Complaint:  Follow-up Multiple Myeloma (Patient c/o right rib pain that is relieved with pain medication. )       Diagnosis: 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    Current Treatment:   Maintenance therapy with Velcade Q2w and Revlimid 10mg D1-21 Q28 day cycle- 9/19/2024- present      Treatment History:  1. Ayde+ KrD for cycle 2 on 11/30/23; Change at request of [facility]  Stopped after 1 dose due to severe pleural and pericardial effusion development   Switched back to AYDE + RVD with increase of Rev to 25mg  2. Ayde+ RVD - 11/2/23 x 1 cycle, plan to restart for C2 on 12/14 potentially  C1 - Rev 10mg dose reduction due to CrCl  C2 - Rev increased to 25mg due to renal function improvement  C3 - D22 held 1 week due to covid  C4-  D22 Rev decreased to 10mg due to decline in renal function  C5 - Hold Ayde this cycle in preparation for upcoming transplant. Rev remains at 10mg due to renal function  Zometa 10/17/23 - 1/11/2024 (3 treatments total)  Currently on hold due to ONJ  3. Stem Cell Transplant 5/6/24    HPI  42yo M presented to [facility] on 10/11 after 1 month of progressive weakness, abdominal pain, and intermittent confusion. At the time of his presentation, his labs which had been normal with PCP in 8/2023 were notable for new severe DOUG, transaminitis, and Ca of 18. He was admitted and started on IVF resuscitation.  Hypercalcemia workup was initiated and he was ultimately found to have numerous osseous lesions on 10/15/23 CT CAP, FLC - Kappa 3.29, Lambda 744, Ratio 226 , SPEP with Mspike 0.17 IgG lambda. Bone marrow biopsy done 10/18/23 revealed 95% plasma cells and FISH with TP53 deletion, FGFR3/IGH (or NSD2/IGH) fusion, usually representing a t(4;14), and a tetraploid subclone was observed. After correction of his hypercalcemia and improvement in his renal function he was discharged on 10/22/23.   He started C1 Ayde +  RVD + Zometa on 11/2/23. He was referred to [facility] for consideration of bone marrow transplant, they recommended change to Beatriz + Krd, however patient developed life threatening pleural and pericardial edema several days after 1st infusion requiring hospitalization and placement of a pericardial window. Therefore he transitioned back to Beatriz - RVD for C2.     Interval History:   He returns to the clinic today for a follow and treatment clearance for C15 of Velcade Q2w and Revlimid 10mg D1-21 Q28 day.  He is unsure when he is due to start revlimid. He usually starts when he receives med. States he believes he finished on 5/4 or 5/5.  He continues to report fatigue.   Nausea controlled with meds as needed.   Weight improving. He does drink two ensures daily.  He continues with chronic fatigue and throbbing pain to right side back in rib area. Pain is stable today and controlled with Oxycodone 10 mg as needed.   He again noted numbness to bilateral hands when sleeping that resolves upon waking and standing. He was previously referred to neurology for this, but did not go because pain improved.   He states he will be going on vacation next week. Instructed on precautions. He verbalized understanding.   Denies any fever, chills, chest pain, cough, abdominal pain, SOB.  Labs reviewed with patient in detail.       Past Medical History:   Diagnosis Date    Anxiety disorder, unspecified     Encounter for antineoplastic chemotherapy 9/19/2024    Hypercalcemia       Past Surgical History:   Procedure Laterality Date    BONE MARROW BIOPSY N/A 10/18/2023    Procedure: Biopsy-bone marrow;  Surgeon: Nhan Marte MD;  Location: [facility];  Service: General;  Laterality: N/A;    INSERTION OF TUNNELED CENTRAL VENOUS HEMODIALYSIS CATHETER Left 5/6/2024    Procedure: INSERTION, CATHETER, HEMODIALYSIS, DUAL LUMEN, left poss right, Bard 14.5 fr hemosplit catheter, model [id number];  Surgeon: Alvin White MD;  Location:  [facility] OR 2ND FLR;  Service: General;  Laterality: Left;    TONSILLECTOMY       Social History     Socioeconomic History    Marital status:      Spouse name: Rosalinda    Number of children: 2   Tobacco Use    Smoking status: Never    Smokeless tobacco: Never   Substance and Sexual Activity    Alcohol use: Yes    Drug use: Never    Sexual activity: Yes     Partners: Female     Social Drivers of Health     Financial Resource Strain: Low Risk  (3/10/2025)    Overall Financial Resource Strain (CARDIA)     Difficulty of Paying Living Expenses: Not very hard   Food Insecurity: No Food Insecurity (3/10/2025)    Hunger Vital Sign     Worried About Running Out of Food in the Last Year: Never true     Ran Out of Food in the Last Year: Never true   Transportation Needs: No Transportation Needs (3/10/2025)    PRAPARE - Transportation     Lack of Transportation (Medical): No     Lack of Transportation (Non-Medical): No   Physical Activity: Inactive (3/10/2025)    Exercise Vital Sign     Days of Exercise per Week: 0 days     Minutes of Exercise per Session: 0 min   Stress: Stress Concern Present (3/10/2025)    [employer] - Occupational Stress Questionnaire     Feeling of Stress : To some extent   Housing Stability: Low Risk  (3/10/2025)    Housing Stability Vital Sign     Unable to Pay for Housing in the Last Year: No     Number of Times Moved in the Last Year: 0     Homeless in the Last Year: No      Family History   Problem Relation Name Age of Onset    Breast cancer Mother      Pancreatic cancer Father        Review of patient's allergies indicates:  No Known Allergies   Review of Systems   Constitutional:  Positive for fatigue. Negative for activity change, appetite change, chills, fever and unexpected weight change.   HENT:  Negative for mouth sores and sore throat.    Eyes:  Negative for visual disturbance.   Respiratory:  Negative for cough and shortness of breath.    Cardiovascular:   Negative for chest pain.   Gastrointestinal:  Negative for abdominal pain, constipation, diarrhea, nausea and vomiting.   Endocrine: Negative for polyuria.   Genitourinary:  Positive for erectile dysfunction. Negative for dysuria and frequency.   Musculoskeletal:  Negative for back pain.        Numbness/tingling in bilateral hands when sleeping. Resolves upon waking.    Integumentary:  Negative for rash.   Allergic/Immunologic: Negative for frequent infections.   Neurological:  Negative for weakness and headaches.   Hematological:  Negative for adenopathy. Does not bruise/bleed easily.   Psychiatric/Behavioral:  The patient is not nervous/anxious.          Objective:      Vitals:    05/07/25 0942   BP: 134/79   Pulse: 64   Resp: 18   Temp: 97.9 °F (36.6 °C)                 Physical Exam  Constitutional:       General: He is not in acute distress.     Appearance: Normal appearance. He is not ill-appearing.   HENT:      Head: Normocephalic and atraumatic.      Nose: Nose normal.      Mouth/Throat:      Mouth: Mucous membranes are moist.      Pharynx: Oropharynx is clear.   Eyes:      Extraocular Movements: Extraocular movements intact.      Conjunctiva/sclera: Conjunctivae normal.      Pupils: Pupils are equal, round, and reactive to light.   Cardiovascular:      Rate and Rhythm: Normal rate and regular rhythm.      Pulses: Normal pulses.      Heart sounds: Normal heart sounds. No murmur heard.  Pulmonary:      Effort: Pulmonary effort is normal. No respiratory distress.      Breath sounds: Normal breath sounds.   Abdominal:      General: There is no distension.      Palpations: Abdomen is soft.      Tenderness: There is no abdominal tenderness.   Musculoskeletal:         General: Normal range of motion.      Cervical back: Normal range of motion and neck supple.   Lymphadenopathy:      Cervical: No cervical adenopathy.   Skin:     General: Skin is warm and dry.      Findings: No rash.   Neurological:      General: No  focal deficit present.      Mental Status: He is alert and oriented to person, place, and time.         LABS AND IMAGING REVIEWED IN EPIC  1/8/25 Whole Body PET/CT:  1. No evidence of FDG avid disease on this exam.  2. The non FDG avid lytic left iliac lesion and sclerotic right 7th rib lesion are stable in appearance.       Free Light Chains  10/17//23 - Lambda , Kappa FLC 3.29, K/L Ratio .0044  11/22/23 - Lambda FLC 0.66, Kappa FLC 0.66, K/L Ratio 1.0  01/18/24 - lambda FLC 0.33, kappa FLC 0.39, K/L ratio 1.18      Pathology:  8/20/24 BMBx:  BONE MARROW, LEFT ILIAC CREST (ASPIRATE SMEAR, TOUCH IMPRINT, CLOT SECTION, AND CORE BIOPSY):   -- HYPOCELLULAR MARROW (20-30%) WITH TRILINEAGE HEMATOPOIESIS.   -- NO MORPHOLOGIC OR IMMUNOPHENOTYPIC EVIDENCE OF RESIDUAL/RELAPSED PLASMA CELL MYELOMA.   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 Displayed all CRAB criteria at time of diagnosis in October. Underwent 1st line Beatriz + RVD + Zometa. Transplant done 5/13/24  Will plan to undergo maintenance therapy give high risk disease with Velcade Q2w and Revlimid 10mg D1-21 Q28 day cycle. Needs to remain on Aspirin and Acyclovir while on this therapy. Continue Bactrim until +D180 post SCT.   Last OV with Dr. Arceo 3/2025  He does have BMBx and PET planned in [facility] 6/17/2025 with OV 6/20/2025  Immunodeficiency due to Drug Therapy - Precautions discussed with patient. Continue to monitor for any signs of symptoms of infection. No prophylaxis needed at this time.   Folate deficiency  Folic acid 5.4 4/22/2025. Folic acid 1mg daily sent to pharmacy  Bilateral hand pain   MRI C-spine completed 9/4/2024 showed Mild to moderate encroachment into the neural foramina and to a lesser extent the central spinal canal at C5-6 as described   Referred to neurology, but did not go as pain improved. Referral again placed today.     Plan:   - Toxicity reviewed, continue with C15  Velcade q2w today  - Revlimid 10mg D1-21 Q28 day cycle. Due to start when his medication arrives.   - Lab and treat only in two weeks.   - Continue ASA 81 mg daily and acyclovir   - Continue to hold zometa due to ONJ.   - Continue Cialis to 10 mg daily prn for ED  - [facility] 6/17/2025 PET and BMBx with OV 6/30/2025  - Referred to neurology again for bilateral hand pain.   - RTC in 4 weeks with NP for FU/lab/treatment  - Cbc, cmp, MM labs- @ [facility]. Pt would like to complete all lab work on wed prior to appts     **All blood products must be irradiated and CMV negative**    The patient is in agreement with today's plan of care.  All questions answered.  Patient is aware to contact our office for any problems or concerns prior to next visit.    I spent a total of 40 minutes on the day of the visit.This includes face to face time and non-face to face time preparing to see the patient (eg, review of tests), obtaining and/or reviewing separately obtained history, documenting clinical information in the electronic or other health record, independently interpreting results and communicating results to the patient/family/caregiver, or care coordinator.      PAMELA Stephenson  Oncology/Hematology   [facility]     Visit today included increased complexity associated with the care of the episodic problem 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 addressed and managing the longitudinal care of the patient due to the serious and/or complex managed problem(s) 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.

## 2025-05-07 ENCOUNTER — INFUSION (OUTPATIENT)
Facility: HOSPITAL | Age: 45
End: 2025-05-07
Attending: INTERNAL MEDICINE
Payer: COMMERCIAL

## 2025-05-07 ENCOUNTER — OFFICE VISIT (OUTPATIENT)
Facility: CLINIC | Age: 45
End: 2025-05-07
Payer: COMMERCIAL

## 2025-05-07 VITALS
DIASTOLIC BLOOD PRESSURE: 79 MMHG | WEIGHT: 198.31 LBS | HEART RATE: 64 BPM | HEIGHT: 69 IN | BODY MASS INDEX: 29.37 KG/M2 | RESPIRATION RATE: 18 BRPM | OXYGEN SATURATION: 99 % | SYSTOLIC BLOOD PRESSURE: 134 MMHG | TEMPERATURE: 98 F

## 2025-05-07 VITALS
SYSTOLIC BLOOD PRESSURE: 134 MMHG | HEART RATE: 64 BPM | OXYGEN SATURATION: 99 % | TEMPERATURE: 98 F | DIASTOLIC BLOOD PRESSURE: 79 MMHG | HEIGHT: 69 IN | BODY MASS INDEX: 29.05 KG/M2 | RESPIRATION RATE: 18 BRPM | WEIGHT: 196.13 LBS

## 2025-05-07 DIAGNOSIS — N52.1 ERECTILE DYSFUNCTION DUE TO DISEASES CLASSIFIED ELSEWHERE: ICD-10-CM

## 2025-05-07 DIAGNOSIS — C90.01 MULTIPLE MYELOMA IN REMISSION: Primary | ICD-10-CM

## 2025-05-07 DIAGNOSIS — E53.8 FOLATE DEFICIENCY: ICD-10-CM

## 2025-05-07 DIAGNOSIS — D63.0 ANEMIA IN NEOPLASTIC DISEASE: ICD-10-CM

## 2025-05-07 DIAGNOSIS — E53.8 FOLIC ACID DEFICIENCY: ICD-10-CM

## 2025-05-07 DIAGNOSIS — G62.9 NEUROPATHY: Primary | ICD-10-CM

## 2025-05-07 DIAGNOSIS — M79.2 NERVE PAIN: ICD-10-CM

## 2025-05-07 DIAGNOSIS — C90.00 METASTATIC MULTIPLE MYELOMA TO BONE: ICD-10-CM

## 2025-05-07 DIAGNOSIS — Z79.899 IMMUNODEFICIENCY DUE TO DRUGS: ICD-10-CM

## 2025-05-07 DIAGNOSIS — D84.821 IMMUNODEFICIENCY DUE TO DRUGS: ICD-10-CM

## 2025-05-07 DIAGNOSIS — M87.9 OSTEONECROSIS OF JAW: ICD-10-CM

## 2025-05-07 DIAGNOSIS — C90.01 MULTIPLE MYELOMA IN REMISSION: ICD-10-CM

## 2025-05-07 DIAGNOSIS — T45.1X5A CHEMOTHERAPY-INDUCED NEUTROPENIA: ICD-10-CM

## 2025-05-07 DIAGNOSIS — D70.1 CHEMOTHERAPY-INDUCED NEUTROPENIA: ICD-10-CM

## 2025-05-07 DIAGNOSIS — D70.9 NEUTROPENIA, UNSPECIFIED TYPE: ICD-10-CM

## 2025-05-07 PROCEDURE — 99999 PR PBB SHADOW E&M-EST. PATIENT-LVL V: CPT | Mod: PBBFAC,,,

## 2025-05-07 PROCEDURE — 63600175 PHARM REV CODE 636 W HCPCS

## 2025-05-07 PROCEDURE — 96401 CHEMO ANTI-NEOPL SQ/IM: CPT

## 2025-05-07 RX ORDER — SODIUM CHLORIDE 0.9 % (FLUSH) 0.9 %
10 SYRINGE (ML) INJECTION
Status: CANCELLED | OUTPATIENT
Start: 2025-05-07

## 2025-05-07 RX ORDER — PROCHLORPERAZINE EDISYLATE 5 MG/ML
10 INJECTION INTRAMUSCULAR; INTRAVENOUS ONCE AS NEEDED
Status: CANCELLED
Start: 2025-05-07

## 2025-05-07 RX ORDER — EPINEPHRINE 0.3 MG/.3ML
0.3 INJECTION SUBCUTANEOUS ONCE AS NEEDED
Status: CANCELLED | OUTPATIENT
Start: 2025-05-07

## 2025-05-07 RX ORDER — BORTEZOMIB 3.5 MG/1
1.3 INJECTION, POWDER, LYOPHILIZED, FOR SOLUTION INTRAVENOUS; SUBCUTANEOUS
Status: CANCELLED | OUTPATIENT
Start: 2025-05-07

## 2025-05-07 RX ORDER — LEVOFLOXACIN 750 MG/1
750 TABLET, FILM COATED ORAL DAILY
Qty: 7 TABLET | Refills: 0 | Status: SHIPPED | OUTPATIENT
Start: 2025-05-07

## 2025-05-07 RX ORDER — BORTEZOMIB 3.5 MG/1
1.3 INJECTION, POWDER, LYOPHILIZED, FOR SOLUTION INTRAVENOUS; SUBCUTANEOUS
Status: COMPLETED | OUTPATIENT
Start: 2025-05-07 | End: 2025-05-07

## 2025-05-07 RX ORDER — HEPARIN 100 UNIT/ML
500 SYRINGE INTRAVENOUS
Status: CANCELLED | OUTPATIENT
Start: 2025-05-07

## 2025-05-07 RX ORDER — DIPHENHYDRAMINE HYDROCHLORIDE 50 MG/ML
50 INJECTION, SOLUTION INTRAMUSCULAR; INTRAVENOUS ONCE AS NEEDED
Status: CANCELLED | OUTPATIENT
Start: 2025-05-07

## 2025-05-07 RX ORDER — FOLIC ACID 1 MG/1
1 TABLET ORAL DAILY
Qty: 30 TABLET | Refills: 11 | Status: SHIPPED | OUTPATIENT
Start: 2025-05-07 | End: 2026-05-07

## 2025-05-07 RX ADMIN — BORTEZOMIB 2.75 MG: 3.5 INJECTION, POWDER, LYOPHILIZED, FOR SOLUTION INTRAVENOUS; SUBCUTANEOUS at 10:05

## 2025-05-08 DIAGNOSIS — N52.1 ERECTILE DYSFUNCTION DUE TO DISEASES CLASSIFIED ELSEWHERE: ICD-10-CM

## 2025-05-08 RX ORDER — TADALAFIL 10 MG/1
10 TABLET ORAL DAILY PRN
Qty: 30 TABLET | Refills: 1 | Status: SHIPPED | OUTPATIENT
Start: 2025-05-08 | End: 2026-05-08

## 2025-05-16 DIAGNOSIS — G62.9 NEUROPATHY: Primary | ICD-10-CM

## 2025-05-17 NOTE — PROGRESS NOTES
[facility]  Hematology/Oncology  Consult Note    Patient Name: Pete Fernandez  MRN: [id number]  Admission Date: 10/13/2023  Hospital Length of Stay: 6 days  Attending Provider: Michele Jurado MD  Consulting Provider: Becca Hsu MD  Principal Problem:Hypercalcemia    Consults  Subjective:     HPI:   42yo M presented to [facility] on 10/11 for hypercalcemia and extreme weakness. Labs notable for new severe DOUG, transaminitis, and Ca of 18. He was admitted and started on IVF resuscitation.  Hypercalcemia workup was initiated, notable for PTH suppressed at 8.2.   Xray Survey was done which showed small 5mm calvarium defects. Follow up NM Bone scan and MRI Brain with no evidence of lytic lesions.   10/15/23 CT CAP with evidence of extensive osseous lesions.   Oncology was been consulted for further evaluation. An SPEP in currently in process. UPEP with evidence of Mspike, no quantification available at this time. Serum immunoglobulins with IgG low 381, IgA 158 (normal) and IgM 16.   Given Zometa 3mg IV on 10/17/23.    Interval History  Patient seen and examined this morning. Preliminary bone marrow biopsy with multiple myeloma 80% plasma cells, final results and NGS, cytogenetics pending.   I explained to patient and family in room usual course of chemotherapy 4-6 months followed by stem cell transplant. Renal function has improved only slightly, but calcium is improved.     Oncology Treatment Plan: TBD  [No matching plan found]    Medications:  Continuous Infusions:   sodium chloride 0.9% 250 mL/hr at 10/19/23 1051     Scheduled Meds:   chlordiazepoxide  5 mg Oral BID    folic acid  1 mg Oral Daily    magnesium sulfate IVPB  2 g Intravenous Once    pantoprazole  40 mg Oral Daily    potassium bicarbonate  25 mEq Oral Once    thiamine  100 mg Oral Daily    zolpidem  10 mg Oral QHS     PRN Meds:acetaminophen, butalbital-acetaminophen-caffeine -40 mg, cloNIDine, flumazeniL, LORazepam,  Pt discharged with written instructions.  Pt verbalized understanding.    melatonin, midazolam, ondansetron, polyethylene glycol, sodium chloride 0.9%     Review of patient's allergies indicates:  Not on File     Past Medical History:   Diagnosis Date    Anxiety disorder, unspecified     Hypercalcemia      Past Surgical History:   Procedure Laterality Date    BONE MARROW BIOPSY N/A 10/18/2023    Procedure: Biopsy-bone marrow;  Surgeon: Nhan Marte MD;  Location: [facility];  Service: General;  Laterality: N/A;    TONSILLECTOMY       Family History       Problem Relation (Age of Onset)    Breast cancer Mother    Pancreatic cancer Father          Tobacco Use    Smoking status: Never    Smokeless tobacco: Never   Substance and Sexual Activity    Alcohol use: Yes    Drug use: Never    Sexual activity: Not on file       Review of Systems   Constitutional:  Positive for activity change and fatigue. Negative for chills.   HENT:  Negative for sore throat.    Eyes:  Negative for visual disturbance.   Respiratory:  Negative for shortness of breath.    Cardiovascular:  Negative for chest pain.   Gastrointestinal:  Positive for abdominal pain, constipation and nausea. Negative for diarrhea and vomiting.   Endocrine: Negative for polyuria.   Genitourinary:  Negative for dysuria.   Musculoskeletal:  Negative for back pain.   Skin:  Negative for wound.   Neurological:  Negative for headaches.   Hematological:  Does not bruise/bleed easily.   Psychiatric/Behavioral:  Negative for confusion.      Objective:     Vital Signs (Most Recent):  Temp: 99.3 °F (37.4 °C) (10/19/23 0600)  Pulse: 97 (10/19/23 0600)  Resp: 18 (10/19/23 0600)  BP: (!) 146/86 (10/19/23 0600)  SpO2: 95 % (10/19/23 0600) Vital Signs (24h Range):  Temp:  [98.3 °F (36.8 °C)-99.6 °F (37.6 °C)] 99.3 °F (37.4 °C)  Pulse:  [89-97] 97  Resp:  [18-20] 18  SpO2:  [95 %-98 %] 95 %  BP: (137-161)/(81-90) 146/86     Weight: 109.7 kg (241 lb 12.8 oz)  Body mass index is 35.71 kg/m².  Body surface area is 2.31 meters squared.      Intake/Output  Summary (Last 24 hours) at 10/19/2023 1224  Last data filed at 10/19/2023 0609  Gross per 24 hour   Intake 4443.78 ml   Output 900 ml   Net 3543.78 ml         Physical Exam  Constitutional:       General: He is not in acute distress.     Appearance: Normal appearance. He is not ill-appearing.   HENT:      Head: Normocephalic and atraumatic.      Nose: Nose normal.      Mouth/Throat:      Mouth: Mucous membranes are moist.      Pharynx: Oropharynx is clear.   Eyes:      Extraocular Movements: Extraocular movements intact.      Conjunctiva/sclera: Conjunctivae normal.      Pupils: Pupils are equal, round, and reactive to light.   Cardiovascular:      Rate and Rhythm: Normal rate and regular rhythm.      Pulses: Normal pulses.      Heart sounds: Normal heart sounds. No murmur heard.  Pulmonary:      Effort: Pulmonary effort is normal. No respiratory distress.      Breath sounds: Normal breath sounds.   Abdominal:      General: There is no distension.      Palpations: Abdomen is soft.      Tenderness: There is no abdominal tenderness.   Musculoskeletal:         General: Normal range of motion.      Cervical back: Normal range of motion and neck supple.      Right lower leg: No edema.      Left lower leg: No edema.   Lymphadenopathy:      Cervical: No cervical adenopathy.   Skin:     General: Skin is warm and dry.   Neurological:      General: No focal deficit present.      Mental Status: He is alert and oriented to person, place, and time.         Significant Labs:   CBC:   Recent Labs   Lab 10/18/23  0405 10/19/23  0410   WBC 10.47 8.92  8.92   HGB 10.1* 9.7*   HCT 29.2* 27.4*    197      and CMP:   Recent Labs   Lab 10/18/23  0405 10/19/23  0410    138   K 3.5 3.4*   CO2 21* 20*   BUN 38.9* 35.9*   CREATININE 3.35* 3.32*   CALCIUM 11.0* 9.5   ALBUMIN 2.7* 2.6*   BILITOT 0.5 0.5   ALKPHOS 131 138   AST 50* 38*   * 72*         Diagnostic Results:    10/11/23 Xray Metastatic Survey  1. Scattered  small defects at the calvarium measuring up to 5 mm too numerous to count.  2. Degene mild rative changes at the spine and extremities  3. MR exam and bone scan would allow further evaluation.    10/12/23 MRI Brain  1. Motion artifact  2. Mild scattered mucosal thickening throughout the paranasal sinuses  3. Somewhat limited evaluation of brain metastases without the administration of IV contrast  4. Otherwise unremarkable MR exam of the head without the administration of IV contrast    10/12/23 NM Bone Scan  1. Abnormal intense increased activity throughout the lungs of uncertain etiology.  This may related to hyperparathyroidism, hematologic malignancy, and or metabolic abnormality such as renal failure, vasculitis, or pulmonary infection.  Chest x-ray 10/10/2023 reveals the lungs to be relatively clear and well aerated.  2. Suspect scattered mild degenerative changes throughout the joint spaces  3. Increased activity nasal maxillary region suggesting mucoperiosteal disease  4. Mild activity at the stomach suggesting free pertechnetate  Assessment/Plan:     Hypercalcemia of malignancy  Severe DOUG  Osseous lytic lesions  Multiple myeloma     - Preliminary bone marrow biopsy with 80% plasma cells, SPEP and serum light chains still pending; but likely appears to be light chain myeloma.   - Continue IVF administration.  - Okay to DC from oncology perspective once cleared by nephrology.  - Will need to arrange for outpatient follow up on discharge with Dr. Lejeune to cpmplete workup and begin myeloma therapy.  - patient and family deciding about whether he wants to go to [facility] for stem cell transplant evaluation vs. [facility].    Dr. Lejeune to return tomorrow.      Becca Hsu MD  Hematology/Oncology  [facility] - Oncology Acute

## 2025-05-20 ENCOUNTER — LAB VISIT (OUTPATIENT)
Dept: LAB | Facility: HOSPITAL | Age: 45
End: 2025-05-20
Payer: COMMERCIAL

## 2025-05-20 DIAGNOSIS — Z94.84 HISTORY OF AUTO STEM CELL TRANSPLANT: ICD-10-CM

## 2025-05-20 DIAGNOSIS — C90.01 MULTIPLE MYELOMA IN REMISSION: ICD-10-CM

## 2025-05-20 LAB
ALBUMIN SERPL-MCNC: 3.7 G/DL (ref 3.5–5)
ALBUMIN/GLOB SERPL: 1.4 RATIO (ref 1.1–2)
ALP SERPL-CCNC: 51 UNIT/L (ref 40–150)
ALT SERPL-CCNC: 15 UNIT/L (ref 0–55)
ANION GAP SERPL CALC-SCNC: 8 MEQ/L
AST SERPL-CCNC: 15 UNIT/L (ref 11–45)
BASOPHILS # BLD AUTO: 0.03 X10(3)/MCL
BASOPHILS NFR BLD AUTO: 1.1 %
BILIRUB SERPL-MCNC: 0.6 MG/DL
BUN SERPL-MCNC: 23 MG/DL (ref 8.9–20.6)
CALCIUM SERPL-MCNC: 8.7 MG/DL (ref 8.4–10.2)
CHLORIDE SERPL-SCNC: 105 MMOL/L (ref 98–107)
CO2 SERPL-SCNC: 26 MMOL/L (ref 22–29)
CREAT SERPL-MCNC: 1.32 MG/DL (ref 0.72–1.25)
CREAT/UREA NIT SERPL: 17
EOSINOPHIL # BLD AUTO: 0.21 X10(3)/MCL (ref 0–0.9)
EOSINOPHIL NFR BLD AUTO: 7.4 %
ERYTHROCYTE [DISTWIDTH] IN BLOOD BY AUTOMATED COUNT: 15.7 % (ref 11.5–17)
GFR SERPLBLD CREATININE-BSD FMLA CKD-EPI: >60 ML/MIN/1.73/M2
GLOBULIN SER-MCNC: 2.6 GM/DL (ref 2.4–3.5)
GLUCOSE SERPL-MCNC: 86 MG/DL (ref 74–100)
HCT VFR BLD AUTO: 34.3 % (ref 42–52)
HGB BLD-MCNC: 11.4 G/DL (ref 14–18)
IMM GRANULOCYTES # BLD AUTO: 0.01 X10(3)/MCL (ref 0–0.04)
IMM GRANULOCYTES NFR BLD AUTO: 0.4 %
LYMPHOCYTES # BLD AUTO: 0.66 X10(3)/MCL (ref 0.6–4.6)
LYMPHOCYTES NFR BLD AUTO: 23.4 %
MAGNESIUM SERPL-MCNC: 2 MG/DL (ref 1.6–2.6)
MCH RBC QN AUTO: 33.5 PG (ref 27–31)
MCHC RBC AUTO-ENTMCNC: 33.2 G/DL (ref 33–36)
MCV RBC AUTO: 100.9 FL (ref 80–94)
MONOCYTES # BLD AUTO: 0.26 X10(3)/MCL (ref 0.1–1.3)
MONOCYTES NFR BLD AUTO: 9.2 %
NEUTROPHILS # BLD AUTO: 1.65 X10(3)/MCL (ref 2.1–9.2)
NEUTROPHILS NFR BLD AUTO: 58.5 %
NRBC BLD AUTO-RTO: 0 %
PLATELET # BLD AUTO: 91 X10(3)/MCL (ref 130–400)
PMV BLD AUTO: 11.9 FL (ref 7.4–10.4)
POTASSIUM SERPL-SCNC: 4.1 MMOL/L (ref 3.5–5.1)
PROT SERPL-MCNC: 6.3 GM/DL (ref 6.4–8.3)
RBC # BLD AUTO: 3.4 X10(6)/MCL (ref 4.7–6.1)
SODIUM SERPL-SCNC: 139 MMOL/L (ref 136–145)
WBC # BLD AUTO: 2.82 X10(3)/MCL (ref 4.5–11.5)

## 2025-05-20 PROCEDURE — 80053 COMPREHEN METABOLIC PANEL: CPT

## 2025-05-20 PROCEDURE — 36415 COLL VENOUS BLD VENIPUNCTURE: CPT

## 2025-05-20 PROCEDURE — 83735 ASSAY OF MAGNESIUM: CPT

## 2025-05-20 PROCEDURE — 85025 COMPLETE CBC W/AUTO DIFF WBC: CPT

## 2025-05-20 RX ORDER — ACYCLOVIR 800 MG/1
800 TABLET ORAL 2 TIMES DAILY
Qty: 60 TABLET | Refills: 10 | Status: SHIPPED | OUTPATIENT
Start: 2025-05-20

## 2025-05-21 ENCOUNTER — INFUSION (OUTPATIENT)
Facility: HOSPITAL | Age: 45
End: 2025-05-21
Attending: INTERNAL MEDICINE
Payer: COMMERCIAL

## 2025-05-21 VITALS
HEART RATE: 69 BPM | WEIGHT: 201.19 LBS | HEIGHT: 69 IN | SYSTOLIC BLOOD PRESSURE: 117 MMHG | TEMPERATURE: 99 F | OXYGEN SATURATION: 99 % | DIASTOLIC BLOOD PRESSURE: 75 MMHG | BODY MASS INDEX: 29.8 KG/M2

## 2025-05-21 DIAGNOSIS — C90.01 MULTIPLE MYELOMA IN REMISSION: Primary | ICD-10-CM

## 2025-05-21 DIAGNOSIS — J32.9 SINUSITIS, UNSPECIFIED CHRONICITY, UNSPECIFIED LOCATION: Primary | ICD-10-CM

## 2025-05-21 PROCEDURE — 63600175 PHARM REV CODE 636 W HCPCS

## 2025-05-21 PROCEDURE — 96401 CHEMO ANTI-NEOPL SQ/IM: CPT

## 2025-05-21 RX ORDER — BORTEZOMIB 3.5 MG/1
1.3 INJECTION, POWDER, LYOPHILIZED, FOR SOLUTION INTRAVENOUS; SUBCUTANEOUS
Status: COMPLETED | OUTPATIENT
Start: 2025-05-21 | End: 2025-05-21

## 2025-05-21 RX ORDER — PROCHLORPERAZINE EDISYLATE 5 MG/ML
10 INJECTION INTRAMUSCULAR; INTRAVENOUS ONCE AS NEEDED
Start: 2025-05-21

## 2025-05-21 RX ORDER — AMOXICILLIN AND CLAVULANATE POTASSIUM 500; 125 MG/1; MG/1
1 TABLET, FILM COATED ORAL 2 TIMES DAILY
Qty: 14 TABLET | Refills: 0 | Status: SHIPPED | OUTPATIENT
Start: 2025-05-21 | End: 2025-05-28

## 2025-05-21 RX ORDER — EPINEPHRINE 0.3 MG/.3ML
0.3 INJECTION SUBCUTANEOUS ONCE AS NEEDED
OUTPATIENT
Start: 2025-05-21

## 2025-05-21 RX ORDER — DIPHENHYDRAMINE HYDROCHLORIDE 50 MG/ML
50 INJECTION, SOLUTION INTRAMUSCULAR; INTRAVENOUS ONCE AS NEEDED
OUTPATIENT
Start: 2025-05-21

## 2025-05-21 RX ORDER — BORTEZOMIB 3.5 MG/1
1.3 INJECTION, POWDER, LYOPHILIZED, FOR SOLUTION INTRAVENOUS; SUBCUTANEOUS
Status: CANCELLED | OUTPATIENT
Start: 2025-05-21

## 2025-05-21 RX ORDER — SODIUM CHLORIDE 0.9 % (FLUSH) 0.9 %
10 SYRINGE (ML) INJECTION
OUTPATIENT
Start: 2025-05-21

## 2025-05-21 RX ORDER — HEPARIN 100 UNIT/ML
500 SYRINGE INTRAVENOUS
OUTPATIENT
Start: 2025-05-21

## 2025-05-21 RX ADMIN — BORTEZOMIB 2.75 MG: 3.5 INJECTION, POWDER, LYOPHILIZED, FOR SOLUTION INTRAVENOUS; SUBCUTANEOUS at 09:05

## 2025-05-22 ENCOUNTER — DOCUMENTATION ONLY (OUTPATIENT)
Facility: CLINIC | Age: 45
End: 2025-05-22
Payer: COMMERCIAL

## 2025-05-26 DIAGNOSIS — C90.00 MULTIPLE MYELOMA, REMISSION STATUS UNSPECIFIED: ICD-10-CM

## 2025-05-27 ENCOUNTER — INFUSION (OUTPATIENT)
Facility: HOSPITAL | Age: 45
End: 2025-05-27
Attending: INTERNAL MEDICINE
Payer: COMMERCIAL

## 2025-05-27 VITALS
DIASTOLIC BLOOD PRESSURE: 75 MMHG | TEMPERATURE: 98 F | OXYGEN SATURATION: 99 % | HEART RATE: 67 BPM | SYSTOLIC BLOOD PRESSURE: 126 MMHG | HEIGHT: 69 IN | BODY MASS INDEX: 29.98 KG/M2 | WEIGHT: 202.38 LBS

## 2025-05-27 DIAGNOSIS — C90.00 METASTATIC MULTIPLE MYELOMA TO BONE: ICD-10-CM

## 2025-05-27 PROCEDURE — 63600175 PHARM REV CODE 636 W HCPCS: Performed by: INTERNAL MEDICINE

## 2025-05-27 PROCEDURE — 90636 HEP A/HEP B VACC ADULT IM: CPT | Performed by: INTERNAL MEDICINE

## 2025-05-27 PROCEDURE — 90471 IMMUNIZATION ADMIN: CPT | Performed by: INTERNAL MEDICINE

## 2025-05-27 PROCEDURE — 90677 PCV20 VACCINE IM: CPT | Performed by: INTERNAL MEDICINE

## 2025-05-27 PROCEDURE — 90472 IMMUNIZATION ADMIN EACH ADD: CPT | Performed by: INTERNAL MEDICINE

## 2025-05-27 RX ORDER — OXYCODONE HYDROCHLORIDE 10 MG/1
10 TABLET ORAL EVERY 4 HOURS PRN
Qty: 90 TABLET | Refills: 0 | Status: SHIPPED | OUTPATIENT
Start: 2025-05-27

## 2025-05-27 RX ADMIN — HEPATITIS A AND HEPATITIS B (RECOMBINANT) VACCINE 720 UNITS: 720; 20 INJECTION, SUSPENSION INTRAMUSCULAR at 12:05

## 2025-05-27 RX ADMIN — PNEUMOCOCCAL 20-VALENT CONJUGATE VACCINE 0.5 ML
2.2; 2.2; 2.2; 2.2; 2.2; 2.2; 2.2; 2.2; 2.2; 2.2; 2.2; 2.2; 2.2; 2.2; 2.2; 2.2; 4.4; 2.2; 2.2; 2.2 INJECTION, SUSPENSION INTRAMUSCULAR at 12:05

## 2025-05-27 NOTE — PLAN OF CARE
Patient will receive injection in a timely manor. Patient completed injection without difficulty   36.5

## 2025-05-29 DIAGNOSIS — C90.01 MULTIPLE MYELOMA IN REMISSION: ICD-10-CM

## 2025-05-29 RX ORDER — LENALIDOMIDE 10 MG/1
10 CAPSULE ORAL DAILY
Qty: 21 CAPSULE | Refills: 0 | Status: SHIPPED | OUTPATIENT
Start: 2025-05-29

## 2025-05-29 RX ORDER — LENALIDOMIDE 10 MG/1
CAPSULE ORAL
Qty: 21 CAPSULE | Refills: 0 | Status: SHIPPED | OUTPATIENT
Start: 2025-05-29 | End: 2025-05-29 | Stop reason: SDUPTHER

## 2025-06-04 ENCOUNTER — LAB VISIT (OUTPATIENT)
Dept: LAB | Facility: HOSPITAL | Age: 45
End: 2025-06-04
Payer: COMMERCIAL

## 2025-06-04 DIAGNOSIS — C90.01 MULTIPLE MYELOMA IN REMISSION: ICD-10-CM

## 2025-06-04 LAB
ABS NEUT CALC (OHS): 0.5 X10(3)/MCL (ref 2.1–9.2)
ALBUMIN SERPL-MCNC: 3.7 G/DL (ref 3.5–5)
ALBUMIN/GLOB SERPL: 1.3 RATIO (ref 1.1–2)
ALP SERPL-CCNC: 49 UNIT/L (ref 40–150)
ALT SERPL-CCNC: 21 UNIT/L (ref 0–55)
ANION GAP SERPL CALC-SCNC: 7 MEQ/L
ANISOCYTOSIS BLD QL SMEAR: ABNORMAL
AST SERPL-CCNC: 19 UNIT/L (ref 11–45)
BASOPHILS # BLD AUTO: 0.02 X10(3)/MCL
BASOPHILS NFR BLD AUTO: 1.2 %
BILIRUB SERPL-MCNC: 0.6 MG/DL
BUN SERPL-MCNC: 21 MG/DL (ref 8.9–20.6)
CALCIUM SERPL-MCNC: 8.6 MG/DL (ref 8.4–10.2)
CHLORIDE SERPL-SCNC: 106 MMOL/L (ref 98–107)
CO2 SERPL-SCNC: 24 MMOL/L (ref 22–29)
CREAT SERPL-MCNC: 1.44 MG/DL (ref 0.72–1.25)
CREAT/UREA NIT SERPL: 15
EOSINOPHIL # BLD AUTO: 0.11 X10(3)/MCL (ref 0–0.9)
EOSINOPHIL NFR BLD AUTO: 6.6 %
EOSINOPHIL NFR BLD MANUAL: 0.12 X10(3)/MCL (ref 0–0.9)
EOSINOPHIL NFR BLD MANUAL: 7 % (ref 0–8)
ERYTHROCYTE [DISTWIDTH] IN BLOOD BY AUTOMATED COUNT: 14.8 % (ref 11.5–17)
GFR SERPLBLD CREATININE-BSD FMLA CKD-EPI: 60 ML/MIN/1.73/M2
GLOBULIN SER-MCNC: 2.8 GM/DL (ref 2.4–3.5)
GLUCOSE SERPL-MCNC: 87 MG/DL (ref 74–100)
HCT VFR BLD AUTO: 34.3 % (ref 42–52)
HGB BLD-MCNC: 11.4 G/DL (ref 14–18)
IMM GRANULOCYTES # BLD AUTO: 0.01 X10(3)/MCL (ref 0–0.04)
IMM GRANULOCYTES NFR BLD AUTO: 0.6 %
LYMPHOCYTES # BLD AUTO: 0.85 X10(3)/MCL (ref 0.6–4.6)
LYMPHOCYTES NFR BLD AUTO: 51.2 %
LYMPHOCYTES NFR BLD MANUAL: 1.01 X10(3)/MCL (ref 0.6–4.6)
LYMPHOCYTES NFR BLD MANUAL: 61 % (ref 13–40)
MACROCYTES BLD QL SMEAR: ABNORMAL
MAGNESIUM SERPL-MCNC: 2 MG/DL (ref 1.6–2.6)
MCH RBC QN AUTO: 33.6 PG (ref 27–31)
MCHC RBC AUTO-ENTMCNC: 33.2 G/DL (ref 33–36)
MCV RBC AUTO: 101.2 FL (ref 80–94)
METAMYELOCYTES NFR BLD MANUAL: 2 %
MONOCYTES # BLD AUTO: 0.23 X10(3)/MCL (ref 0.1–1.3)
MONOCYTES NFR BLD AUTO: 13.9 %
NEUTROPHILS # BLD AUTO: 0.44 X10(3)/MCL (ref 2.1–9.2)
NEUTROPHILS NFR BLD AUTO: 26.5 %
NEUTROPHILS NFR BLD MANUAL: 30 % (ref 47–80)
NRBC BLD AUTO-RTO: 0 %
PLATELET # BLD AUTO: 83 X10(3)/MCL (ref 130–400)
PLATELET # BLD EST: ABNORMAL 10*3/UL
PMV BLD AUTO: 10.2 FL (ref 7.4–10.4)
POTASSIUM SERPL-SCNC: 3.5 MMOL/L (ref 3.5–5.1)
PROT SERPL-MCNC: 6.5 GM/DL (ref 6.4–8.3)
RBC # BLD AUTO: 3.39 X10(6)/MCL (ref 4.7–6.1)
RBC MORPH BLD: ABNORMAL
SODIUM SERPL-SCNC: 137 MMOL/L (ref 136–145)
WBC # BLD AUTO: 1.66 X10(3)/MCL (ref 4.5–11.5)

## 2025-06-04 PROCEDURE — 84165 PROTEIN E-PHORESIS SERUM: CPT

## 2025-06-04 PROCEDURE — 80053 COMPREHEN METABOLIC PANEL: CPT

## 2025-06-04 PROCEDURE — 36415 COLL VENOUS BLD VENIPUNCTURE: CPT

## 2025-06-04 PROCEDURE — 82784 ASSAY IGA/IGD/IGG/IGM EACH: CPT

## 2025-06-04 PROCEDURE — 83521 IG LIGHT CHAINS FREE EACH: CPT

## 2025-06-04 PROCEDURE — 83735 ASSAY OF MAGNESIUM: CPT

## 2025-06-04 PROCEDURE — 85025 COMPLETE CBC W/AUTO DIFF WBC: CPT

## 2025-06-05 ENCOUNTER — OFFICE VISIT (OUTPATIENT)
Facility: CLINIC | Age: 45
End: 2025-06-05
Payer: COMMERCIAL

## 2025-06-05 ENCOUNTER — DOCUMENTATION ONLY (OUTPATIENT)
Facility: CLINIC | Age: 45
End: 2025-06-05
Payer: COMMERCIAL

## 2025-06-05 VITALS
TEMPERATURE: 97 F | BODY MASS INDEX: 30.11 KG/M2 | WEIGHT: 203.31 LBS | HEART RATE: 65 BPM | SYSTOLIC BLOOD PRESSURE: 117 MMHG | HEIGHT: 69 IN | RESPIRATION RATE: 16 BRPM | OXYGEN SATURATION: 100 % | DIASTOLIC BLOOD PRESSURE: 79 MMHG

## 2025-06-05 DIAGNOSIS — D84.821 IMMUNODEFICIENCY DUE TO DRUG THERAPY: ICD-10-CM

## 2025-06-05 DIAGNOSIS — Z79.899 IMMUNODEFICIENCY DUE TO DRUG THERAPY: ICD-10-CM

## 2025-06-05 DIAGNOSIS — C90.01 MULTIPLE MYELOMA IN REMISSION: Primary | ICD-10-CM

## 2025-06-05 LAB
IGA SERPL-MCNC: 68 MG/DL (ref 63–484)
IGG SERPL-MCNC: 566 MG/DL (ref 540–1822)
IGM SERPL-MCNC: 18 MG/DL (ref 22–240)

## 2025-06-05 PROCEDURE — 1160F RVW MEDS BY RX/DR IN RCRD: CPT | Mod: CPTII,S$GLB,, | Performed by: STUDENT IN AN ORGANIZED HEALTH CARE EDUCATION/TRAINING PROGRAM

## 2025-06-05 PROCEDURE — 3074F SYST BP LT 130 MM HG: CPT | Mod: CPTII,S$GLB,, | Performed by: STUDENT IN AN ORGANIZED HEALTH CARE EDUCATION/TRAINING PROGRAM

## 2025-06-05 PROCEDURE — 3078F DIAST BP <80 MM HG: CPT | Mod: CPTII,S$GLB,, | Performed by: STUDENT IN AN ORGANIZED HEALTH CARE EDUCATION/TRAINING PROGRAM

## 2025-06-05 PROCEDURE — G2211 COMPLEX E/M VISIT ADD ON: HCPCS | Mod: S$GLB,,, | Performed by: STUDENT IN AN ORGANIZED HEALTH CARE EDUCATION/TRAINING PROGRAM

## 2025-06-05 PROCEDURE — 3061F NEG MICROALBUMINURIA REV: CPT | Mod: CPTII,S$GLB,, | Performed by: STUDENT IN AN ORGANIZED HEALTH CARE EDUCATION/TRAINING PROGRAM

## 2025-06-05 PROCEDURE — 99999 PR PBB SHADOW E&M-EST. PATIENT-LVL V: CPT | Mod: PBBFAC,,, | Performed by: STUDENT IN AN ORGANIZED HEALTH CARE EDUCATION/TRAINING PROGRAM

## 2025-06-05 PROCEDURE — 99215 OFFICE O/P EST HI 40 MIN: CPT | Mod: S$GLB,,, | Performed by: STUDENT IN AN ORGANIZED HEALTH CARE EDUCATION/TRAINING PROGRAM

## 2025-06-05 PROCEDURE — 3008F BODY MASS INDEX DOCD: CPT | Mod: CPTII,S$GLB,, | Performed by: STUDENT IN AN ORGANIZED HEALTH CARE EDUCATION/TRAINING PROGRAM

## 2025-06-05 PROCEDURE — 3044F HG A1C LEVEL LT 7.0%: CPT | Mod: CPTII,S$GLB,, | Performed by: STUDENT IN AN ORGANIZED HEALTH CARE EDUCATION/TRAINING PROGRAM

## 2025-06-05 PROCEDURE — 3066F NEPHROPATHY DOC TX: CPT | Mod: CPTII,S$GLB,, | Performed by: STUDENT IN AN ORGANIZED HEALTH CARE EDUCATION/TRAINING PROGRAM

## 2025-06-05 PROCEDURE — 1159F MED LIST DOCD IN RCRD: CPT | Mod: CPTII,S$GLB,, | Performed by: STUDENT IN AN ORGANIZED HEALTH CARE EDUCATION/TRAINING PROGRAM

## 2025-06-06 LAB
ALBUMIN % SPEP (OHS): 61.05 (ref 48.1–59.5)
ALBUMIN SERPL-MCNC: 3.6 G/DL (ref 3.5–5)
ALBUMIN/GLOB SERPL: 1.6 RATIO (ref 1.1–2)
ALPHA 1 GLOB (OHS): 0.22 GM/DL (ref 0–0.4)
ALPHA 1 GLOB% (OHS): 3.8 (ref 2.3–4.9)
ALPHA 2 GLOB % (OHS): 10.91 (ref 6.9–13)
ALPHA 2 GLOB (OHS): 0.64 GM/DL (ref 0.4–1)
BETA GLOB (OHS): 0.79 GM/DL (ref 0.7–1.3)
BETA GLOB% (OHS): 13.42 (ref 13.8–19.7)
GAMMA GLOBULIN % (OHS): 10.82 (ref 10.1–21.9)
GAMMA GLOBULIN (OHS): 0.64 GM/DL (ref 0.4–1.8)
GLOBULIN SER-MCNC: 2.3 GM/DL (ref 2.4–3.5)
KAPPA LC FREE SER NEPH-MCNC: 2.37 MG/DL (ref 0.33–1.94)
KAPPA LC FREE/LAMBDA FREE SER NEPH: 1.13 {RATIO} (ref 0.26–1.65)
LAMBDA LC FREE SERPL-MCNC: 2.09 MG/DL (ref 0.57–2.63)
M SPIKE % (OHS): ABNORMAL
M SPIKE (OHS): ABNORMAL
PATH REV: NORMAL
PROT SERPL-MCNC: 5.9 GM/DL (ref 6.4–8.3)

## 2025-06-11 ENCOUNTER — LAB VISIT (OUTPATIENT)
Dept: LAB | Facility: HOSPITAL | Age: 45
End: 2025-06-11
Payer: COMMERCIAL

## 2025-06-11 DIAGNOSIS — C90.01 MULTIPLE MYELOMA IN REMISSION: ICD-10-CM

## 2025-06-11 LAB
ALBUMIN SERPL-MCNC: 3.8 G/DL (ref 3.5–5)
ALBUMIN/GLOB SERPL: 1.3 RATIO (ref 1.1–2)
ALP SERPL-CCNC: 49 UNIT/L (ref 40–150)
ALT SERPL-CCNC: 17 UNIT/L (ref 0–55)
ANION GAP SERPL CALC-SCNC: 6 MEQ/L
AST SERPL-CCNC: 17 UNIT/L (ref 11–45)
BASOPHILS # BLD AUTO: 0.04 X10(3)/MCL
BASOPHILS NFR BLD AUTO: 2.1 %
BILIRUB SERPL-MCNC: 0.6 MG/DL
BUN SERPL-MCNC: 25 MG/DL (ref 8.9–20.6)
CALCIUM SERPL-MCNC: 9 MG/DL (ref 8.4–10.2)
CHLORIDE SERPL-SCNC: 106 MMOL/L (ref 98–107)
CO2 SERPL-SCNC: 25 MMOL/L (ref 22–29)
CREAT SERPL-MCNC: 1.36 MG/DL (ref 0.72–1.25)
CREAT/UREA NIT SERPL: 18
EOSINOPHIL # BLD AUTO: 0.04 X10(3)/MCL (ref 0–0.9)
EOSINOPHIL NFR BLD AUTO: 2.1 %
ERYTHROCYTE [DISTWIDTH] IN BLOOD BY AUTOMATED COUNT: 14.8 % (ref 11.5–17)
GFR SERPLBLD CREATININE-BSD FMLA CKD-EPI: >60 ML/MIN/1.73/M2
GLOBULIN SER-MCNC: 2.9 GM/DL (ref 2.4–3.5)
GLUCOSE SERPL-MCNC: 92 MG/DL (ref 74–100)
HCT VFR BLD AUTO: 34.8 % (ref 42–52)
HGB BLD-MCNC: 11.7 G/DL (ref 14–18)
IMM GRANULOCYTES # BLD AUTO: 0 X10(3)/MCL (ref 0–0.04)
IMM GRANULOCYTES NFR BLD AUTO: 0 %
LYMPHOCYTES # BLD AUTO: 0.9 X10(3)/MCL (ref 0.6–4.6)
LYMPHOCYTES NFR BLD AUTO: 46.2 %
MAGNESIUM SERPL-MCNC: 2.1 MG/DL (ref 1.6–2.6)
MCH RBC QN AUTO: 33.9 PG (ref 27–31)
MCHC RBC AUTO-ENTMCNC: 33.6 G/DL (ref 33–36)
MCV RBC AUTO: 100.9 FL (ref 80–94)
MONOCYTES # BLD AUTO: 0.3 X10(3)/MCL (ref 0.1–1.3)
MONOCYTES NFR BLD AUTO: 15.4 %
NEUTROPHILS # BLD AUTO: 0.67 X10(3)/MCL (ref 2.1–9.2)
NEUTROPHILS NFR BLD AUTO: 34.2 %
NRBC BLD AUTO-RTO: 0 %
PLATELET # BLD AUTO: 129 X10(3)/MCL (ref 130–400)
PMV BLD AUTO: 10.8 FL (ref 7.4–10.4)
POTASSIUM SERPL-SCNC: 4.5 MMOL/L (ref 3.5–5.1)
PROT SERPL-MCNC: 6.7 GM/DL (ref 6.4–8.3)
RBC # BLD AUTO: 3.45 X10(6)/MCL (ref 4.7–6.1)
SODIUM SERPL-SCNC: 137 MMOL/L (ref 136–145)
WBC # BLD AUTO: 1.95 X10(3)/MCL (ref 4.5–11.5)

## 2025-06-11 PROCEDURE — 83521 IG LIGHT CHAINS FREE EACH: CPT

## 2025-06-11 PROCEDURE — 83735 ASSAY OF MAGNESIUM: CPT

## 2025-06-11 PROCEDURE — 36415 COLL VENOUS BLD VENIPUNCTURE: CPT

## 2025-06-11 PROCEDURE — 85025 COMPLETE CBC W/AUTO DIFF WBC: CPT

## 2025-06-11 PROCEDURE — 86334 IMMUNOFIX E-PHORESIS SERUM: CPT

## 2025-06-11 PROCEDURE — 80053 COMPREHEN METABOLIC PANEL: CPT

## 2025-06-11 PROCEDURE — 82784 ASSAY IGA/IGD/IGG/IGM EACH: CPT

## 2025-06-12 ENCOUNTER — PATIENT MESSAGE (OUTPATIENT)
Dept: HEMATOLOGY/ONCOLOGY | Facility: CLINIC | Age: 45
End: 2025-06-12
Payer: COMMERCIAL

## 2025-06-12 DIAGNOSIS — C90.00 MULTIPLE MYELOMA, REMISSION STATUS UNSPECIFIED: ICD-10-CM

## 2025-06-12 DIAGNOSIS — N52.1 ERECTILE DYSFUNCTION DUE TO DISEASES CLASSIFIED ELSEWHERE: ICD-10-CM

## 2025-06-12 DIAGNOSIS — R11.0 NAUSEA: ICD-10-CM

## 2025-06-12 LAB
IGA SERPL-MCNC: 68 MG/DL (ref 63–484)
IGG SERPL-MCNC: 589 MG/DL (ref 540–1822)
IGM SERPL-MCNC: 19 MG/DL (ref 22–240)
KAPPA LC FREE SER NEPH-MCNC: 2.02 MG/DL (ref 0.33–1.94)
KAPPA LC FREE/LAMBDA FREE SER NEPH: 1.15 {RATIO} (ref 0.26–1.65)
LAMBDA LC FREE SERPL-MCNC: 1.75 MG/DL (ref 0.57–2.63)

## 2025-06-13 LAB
ALBUMIN % SPEP (OHS): 56.54 (ref 48.1–59.5)
ALBUMIN SERPL-MCNC: 3.5 G/DL (ref 3.5–5)
ALBUMIN/GLOB SERPL: 1.3 RATIO (ref 1.1–2)
ALPHA 1 GLOB (OHS): 0.28 GM/DL (ref 0–0.4)
ALPHA 1 GLOB% (OHS): 4.52 (ref 2.3–4.9)
ALPHA 2 GLOB % (OHS): 11.9 (ref 6.9–13)
ALPHA 2 GLOB (OHS): 0.74 GM/DL (ref 0.4–1)
BETA GLOB (OHS): 0.93 GM/DL (ref 0.7–1.3)
BETA GLOB% (OHS): 15 (ref 13.8–19.7)
GAMMA GLOBULIN % (OHS): 12.04 (ref 10.1–21.9)
GAMMA GLOBULIN (OHS): 0.75 GM/DL (ref 0.4–1.8)
GLOBULIN SER-MCNC: 2.7 GM/DL (ref 2.4–3.5)
M SPIKE % (OHS): ABNORMAL
M SPIKE (OHS): ABNORMAL
PATH REV: NORMAL
PROT SERPL-MCNC: 6.2 GM/DL (ref 6.4–8.3)

## 2025-06-13 RX ORDER — PROCHLORPERAZINE MALEATE 5 MG
5 TABLET ORAL EVERY 6 HOURS PRN
Qty: 30 TABLET | Refills: 4 | Status: SHIPPED | OUTPATIENT
Start: 2025-06-13

## 2025-06-13 RX ORDER — TADALAFIL 10 MG/1
10 TABLET ORAL DAILY PRN
Qty: 30 TABLET | Refills: 1 | Status: SHIPPED | OUTPATIENT
Start: 2025-06-13 | End: 2026-06-13

## 2025-06-13 RX ORDER — ONDANSETRON 8 MG/1
8 TABLET, FILM COATED ORAL EVERY 8 HOURS PRN
Qty: 30 TABLET | Refills: 4 | Status: SHIPPED | OUTPATIENT
Start: 2025-06-13 | End: 2026-06-13

## 2025-06-17 ENCOUNTER — HOSPITAL ENCOUNTER (OUTPATIENT)
Dept: RADIOLOGY | Facility: HOSPITAL | Age: 45
Discharge: HOME | End: 2025-06-17
Attending: INTERNAL MEDICINE
Payer: COMMERCIAL

## 2025-06-17 ENCOUNTER — PATIENT MESSAGE (OUTPATIENT)
Dept: INTERNAL MEDICINE | Facility: CLINIC | Age: 45
End: 2025-06-17
Payer: COMMERCIAL

## 2025-06-17 ENCOUNTER — PROCEDURE VISIT (OUTPATIENT)
Dept: HEMATOLOGY/ONCOLOGY | Facility: CLINIC | Age: 45
End: 2025-06-17
Payer: COMMERCIAL

## 2025-06-17 VITALS
SYSTOLIC BLOOD PRESSURE: 131 MMHG | HEIGHT: 68 IN | HEART RATE: 64 BPM | OXYGEN SATURATION: 99 % | BODY MASS INDEX: 30.8 KG/M2 | WEIGHT: 203.25 LBS | TEMPERATURE: 99 F | DIASTOLIC BLOOD PRESSURE: 83 MMHG

## 2025-06-17 DIAGNOSIS — Z94.84 HISTORY OF AUTO STEM CELL TRANSPLANT: ICD-10-CM

## 2025-06-17 DIAGNOSIS — C90.01 MULTIPLE MYELOMA IN REMISSION: ICD-10-CM

## 2025-06-17 DIAGNOSIS — C90.00 MULTIPLE MYELOMA, REMISSION STATUS UNSPECIFIED: Primary | ICD-10-CM

## 2025-06-17 LAB — POCT GLUCOSE: 81 MG/DL (ref 70–110)

## 2025-06-17 PROCEDURE — 88185 FLOWCYTOMETRY/TC ADD-ON: CPT | Mod: 59

## 2025-06-17 PROCEDURE — 78816 PET IMAGE W/CT FULL BODY: CPT | Mod: 26,PS,, | Performed by: NUCLEAR MEDICINE

## 2025-06-17 PROCEDURE — 88274 CYTOGENETICS 25-99: CPT

## 2025-06-17 PROCEDURE — 38222 DX BONE MARROW BX & ASPIR: CPT | Mod: LT,S$GLB,,

## 2025-06-17 PROCEDURE — 88342 IMHCHEM/IMCYTCHM 1ST ANTB: CPT | Mod: TC

## 2025-06-17 PROCEDURE — 88237 TISSUE CULTURE BONE MARROW: CPT

## 2025-06-17 PROCEDURE — 99499 UNLISTED E&M SERVICE: CPT | Mod: S$GLB,,,

## 2025-06-17 PROCEDURE — 88185 FLOWCYTOMETRY/TC ADD-ON: CPT

## 2025-06-17 PROCEDURE — A9552 F18 FDG: HCPCS | Performed by: INTERNAL MEDICINE

## 2025-06-17 PROCEDURE — 78816 PET IMAGE W/CT FULL BODY: CPT | Mod: TC

## 2025-06-17 PROCEDURE — 88184 FLOWCYTOMETRY/ TC 1 MARKER: CPT | Mod: 91

## 2025-06-17 RX ORDER — LIDOCAINE HYDROCHLORIDE 20 MG/ML
10 INJECTION, SOLUTION INFILTRATION; PERINEURAL
Status: COMPLETED | OUTPATIENT
Start: 2025-06-17 | End: 2025-06-17

## 2025-06-17 RX ORDER — FLUDEOXYGLUCOSE F18 500 MCI/ML
12 INJECTION INTRAVENOUS
Status: COMPLETED | OUTPATIENT
Start: 2025-06-17 | End: 2025-06-17

## 2025-06-17 RX ADMIN — LIDOCAINE HYDROCHLORIDE 10 ML: 20 INJECTION, SOLUTION INFILTRATION; PERINEURAL at 01:06

## 2025-06-17 RX ADMIN — FLUDEOXYGLUCOSE F-18 11.3 MILLICURIE: 500 INJECTION INTRAVENOUS at 10:06

## 2025-06-17 NOTE — PROCEDURES
PROCEDURE NOTE:  Date of Procedure: 06/17/2025   Bone Marrow Biopsy and Aspiration  Indication: MM  Consent: Informed consent was obtained from patient.  Timeout: Done and documented. Allergies reviewed.  Position: prone  Site: Left posterior illiac crest.  Prep: Betadine.  Needle used: 11 gauge Jamshidi needle.  Anesthetic: 2% lidocaine 8 cc.  Biopsy: The biopsy needle was introduced into the marrow cavity and an aspirate was obtained without complications and sent for flow cytometry ,cytogenetics, PCPD FISH, MM MRD. Core biopsy obtained without difficulty and sent for routine histologic examination.  Complications: None.  Disposition: The patient was placed supine for 15min following procedure. MA to assess bandaid for bleeding prior to discharge home. Patient aware to keep bandaid dry and intact for 24hrs and to call clinic for any bleeding, fevers, pain, or signs of infection.  Blood loss: Minimal.     Alicia Lowery PA-C  Malignant Hematology, Stem Cell Transplant, and Cellular Therapy  [facility]  [facility]

## 2025-06-18 ENCOUNTER — PATIENT MESSAGE (OUTPATIENT)
Dept: INTERNAL MEDICINE | Facility: CLINIC | Age: 45
End: 2025-06-18
Payer: COMMERCIAL

## 2025-06-18 DIAGNOSIS — C90.00 MULTIPLE MYELOMA, REMISSION STATUS UNSPECIFIED: Primary | ICD-10-CM

## 2025-06-18 LAB — M PCPDS PRE-ANALYSIS CELL SORT: NORMAL

## 2025-06-19 DIAGNOSIS — Z94.84 HISTORY OF AUTO STEM CELL TRANSPLANT: ICD-10-CM

## 2025-06-19 LAB — CLINICAL CYTOGENETICIST REVIEW: NORMAL

## 2025-06-19 RX ORDER — ACYCLOVIR 800 MG/1
800 TABLET ORAL 2 TIMES DAILY
Qty: 60 TABLET | Refills: 10 | Status: SHIPPED | OUTPATIENT
Start: 2025-06-19

## 2025-06-20 LAB
% B-CELL PRECURSORS: 0.28 %
% MAST CELLS: 0.04 %
ABNORMAL PLASMA CELL EVENTS: 0
DHEA SERPL-MCNC: NORMAL
ESTROGEN SERPL-MCNC: NORMAL PG/ML
INSULIN SERPL-ACNC: NORMAL U[IU]/ML
LAB AP CLINICAL INFORMATION: NORMAL
LAB AP GROSS DESCRIPTION: NORMAL
LAB AP PERFORMING LOCATION(S): NORMAL
LAB DEVICE DETECTION LIMIT: 2 X10(-6)
LAB FLOW CYTOMETRY ANTIBODIES ANALYZED (OHS): NORMAL
LAB FLOW CYTOMETRY COMMENT (OHS): NORMAL
LAB FLOW CYTOMETRY INTERPRETATION (OHS): NORMAL
LABORATORY COMMENT REPORT: NORMAL
LOWER LIMIT OF QUANTITATION (LLOQ): 1 X10(-5)
MINIMAL RESIDUAL DISEASE DETECTION (MRD), BONE MARROW: 0 %
PERCENT NORMAL PLASMA CELLS (OF TOTAL PC): 100 %
PLASMA CELLS ASSESS MAR: NORMAL
POLY PLASMA CELL EVENTS: 161
T3RU NFR SERPL: NORMAL %
TOTAL CELLS COUNTED MAR: NORMAL
TOTAL PLASMA CELL EVENTS: 161
VALIDATED ASSAY SENSITIVITY: 16.05 X10(-6)

## 2025-06-20 NOTE — PROGRESS NOTES
Subjective:       Patient ID: Pete Fernandez is a 45 y.o. male.    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 Beatriz+ KrD for cycle 2 on 11/30/23; Change at request of [facility]  Stopped after 1 dose due to severe pleural and pericardial effusion development   Switched back to BEATRIZ + RVD with increase of Rev to 25mg  2. Beatriz+ RVD - 11/2/23 x 1 cycle, plan to restart for C2 on 12/14 potentially  C1 - Rev 10mg dose reduction due to CrCl  C2 - Rev increased to 25mg due to renal function improvement  C3 - D22 held 1 week due to covid  C4-  D22 Rev decreased to 10mg due to decline in renal function  C5 - Hold Beatriz this cycle in preparation for upcoming transplant. Rev remains at 10mg due to renal function  Zometa 10/17/23 - 1/11/2024 (3 treatments total)  Currently on hold due to ONJ  3. Stem Cell Transplant 5/6/24    HPI  44yo M presented to [facility] on 10/11 after 1 month of progressive weakness, abdominal pain, and intermittent confusion. At the time of his presentation, his labs which had been normal with PCP in 8/2023 were notable for new severe DOUG, transaminitis, and Ca of 18. He was admitted and started on IVF resuscitation.  Hypercalcemia workup was initiated and he was ultimately found to have numerous osseous lesions on 10/15/23 CT CAP, FLC - Kappa 3.29, Lambda 744, Ratio 226 , SPEP with Mspike 0.17 IgG lambda. Bone marrow biopsy done 10/18/23 revealed 95% plasma cells and FISH with TP53 deletion, FGFR3/IGH (or NSD2/IGH) fusion, usually representing a t(4;14), and a tetraploid subclone was observed. After correction of his hypercalcemia and improvement in his renal function he was discharged on 10/22/23.   He started C1 Beatriz + RVD + Zometa on 11/2/23. He was referred to [facility] for consideration of bone  marrow transplant, they recommended change to Beatriz + Krd, however patient developed life threatening pleural and pericardial edema several days after 1st infusion requiring hospitalization and placement of a pericardial window. Therefore he transitioned back to Beatriz - RVD for C2. Rest of treatment history as above.     Currently on maintenance Velcade and Revlimid per post transplant protocol.     Interval History:   He returns to the clinic today for a MD follow up with BMBx results and treatment clearance for C17 of Velcade Q2w and Revlimid 10mg D1-21 Q28 day.  He has Revlimid on hand.  He continues to report fatigue that has been unchanged since holding therapy the last few weeks.   Denies any fever, chills, chest pain, cough, abdominal pain, SOB.  No recent infections, fevers, chills, NS, bleeding/bruising.   Labs reviewed with patient in detail.       Past Medical History:   Diagnosis Date    Anxiety disorder, unspecified     Encounter for antineoplastic chemotherapy 9/19/2024    Hypercalcemia       Past Surgical History:   Procedure Laterality Date    BONE MARROW BIOPSY N/A 10/18/2023    Procedure: Biopsy-bone marrow;  Surgeon: Nhan Marte MD;  Location: [facility];  Service: General;  Laterality: N/A;    INSERTION OF TUNNELED CENTRAL VENOUS HEMODIALYSIS CATHETER Left 5/6/2024    Procedure: INSERTION, CATHETER, HEMODIALYSIS, DUAL LUMEN, left poss right, Bard 14.5 fr hemosplit catheter, model [id number];  Surgeon: Alvin White MD;  Location: [facility] OR [address] [place] [address];  Service: General;  Laterality: Left;    TONSILLECTOMY       Social History     Socioeconomic History    Marital status:      Spouse name: Rosalinda    Number of children: 2   Tobacco Use    Smoking status: Never    Smokeless tobacco: Never   Substance and Sexual Activity    Alcohol use: Yes    Drug use: Never    Sexual activity: Yes     Partners: Female     Social Drivers of Health     Financial Resource Strain: Low Risk  (3/10/2025)    Overall  Financial Resource Strain (CARDIA)     Difficulty of Paying Living Expenses: Not very hard   Food Insecurity: No Food Insecurity (3/10/2025)    Hunger Vital Sign     Worried About Running Out of Food in the Last Year: Never true     Ran Out of Food in the Last Year: Never true   Transportation Needs: No Transportation Needs (3/10/2025)    PRAPARE - Transportation     Lack of Transportation (Medical): No     Lack of Transportation (Non-Medical): No   Physical Activity: Inactive (3/10/2025)    Exercise Vital Sign     Days of Exercise per Week: 0 days     Minutes of Exercise per Session: 0 min   Stress: Stress Concern Present (3/10/2025)    [employer] - Occupational Stress Questionnaire     Feeling of Stress : To some extent   Housing Stability: Low Risk  (3/10/2025)    Housing Stability Vital Sign     Unable to Pay for Housing in the Last Year: No     Number of Times Moved in the Last Year: 0     Homeless in the Last Year: No      Family History   Problem Relation Name Age of Onset    Breast cancer Mother      Pancreatic cancer Father        Review of patient's allergies indicates:  No Known Allergies   Review of Systems   Constitutional:  Positive for fatigue. Negative for activity change, appetite change, chills, fever and unexpected weight change.   HENT:  Negative for mouth sores and sore throat.    Eyes:  Negative for visual disturbance.   Respiratory:  Negative for cough and shortness of breath.    Cardiovascular:  Negative for chest pain.   Gastrointestinal:  Negative for abdominal pain, constipation, diarrhea, nausea and vomiting.   Endocrine: Negative for polyuria.   Genitourinary:  Positive for erectile dysfunction. Negative for dysuria and frequency.   Musculoskeletal:  Negative for back pain.        Numbness/tingling in bilateral hands when sleeping. Resolves upon waking.    Integumentary:  Negative for rash.   Allergic/Immunologic: Negative for frequent infections.    Neurological:  Negative for weakness and headaches.   Hematological:  Negative for adenopathy. Does not bruise/bleed easily.   Psychiatric/Behavioral:  The patient is not nervous/anxious.          Objective:      Vitals:    06/26/25 1057   BP: 119/78   Pulse: 61   Resp: 16   Temp: 97.6 °F (36.4 °C)         Physical Exam  Constitutional:       General: He is not in acute distress.     Appearance: Normal appearance. He is not ill-appearing.   HENT:      Head: Normocephalic and atraumatic.      Nose: Nose normal.      Mouth/Throat:      Mouth: Mucous membranes are moist.      Pharynx: Oropharynx is clear.   Eyes:      Extraocular Movements: Extraocular movements intact.      Conjunctiva/sclera: Conjunctivae normal.      Pupils: Pupils are equal, round, and reactive to light.   Cardiovascular:      Rate and Rhythm: Normal rate and regular rhythm.      Pulses: Normal pulses.      Heart sounds: Normal heart sounds. No murmur heard.  Pulmonary:      Effort: Pulmonary effort is normal. No respiratory distress.      Breath sounds: Normal breath sounds.   Abdominal:      General: There is no distension.      Palpations: Abdomen is soft.      Tenderness: There is no abdominal tenderness.   Musculoskeletal:         General: Normal range of motion.      Cervical back: Normal range of motion and neck supple.   Lymphadenopathy:      Cervical: No cervical adenopathy.   Skin:     General: Skin is warm and dry.      Findings: No rash.   Neurological:      General: No focal deficit present.      Mental Status: He is alert and oriented to person, place, and time.         LABS AND IMAGING REVIEWED IN EPIC  6/17/25 Whole Body PET/CT:  1. History of multiple myeloma status post autologous stem cell transplant and active medical therapy.  2. Stable sclerotic and lytic lesions, described in  detail above.  No new  or suspicious tracer avid osseous lesion.  3. Few pulmonary nodules, including new right lower lobe 6 mm nodule, which are  below threshold for characterization on PET.       Free Light Chains  10/17//23 - Lambda , Kappa FLC 3.29, K/L Ratio .0044  11/22/23 - Lambda FLC 0.66, Kappa FLC 0.66, K/L Ratio 1.0  01/18/24 - lambda FLC 0.33, kappa FLC 0.39, K/L ratio 1.18      Pathology:  8/20/24 BMBx:  BONE MARROW, LEFT ILIAC CREST (ASPIRATE SMEAR, TOUCH IMPRINT, CLOT SECTION, AND CORE BIOPSY):   -- HYPOCELLULAR MARROW (20-30%) WITH TRILINEAGE HEMATOPOIESIS.   -- NO MORPHOLOGIC OR IMMUNOPHENOTYPIC EVIDENCE OF RESIDUAL/RELAPSED PLASMA CELL MYELOMA.   -- INCREASED STORAGE IRON     6/17/25 BMBx:  -- Insufficient bone marrow aspiration and core biopsy.  -- Cortical bone with scant trilineage hematopoiesis and scattered plasma cells.  -- See comment.  Comments  Flow cytometric analysis of bone marrow shows populations of polyclonal B lymphocytes and T lymphocytes that are  immunophenotypically unremarkable. Rare plasma cells are detected. The blast gate is not increased.  Flow cytometric analysis for myeloma MRD was performed at [facility]. No monotypic plasma cells identified  (MRD -negative).  Immunohistochemical stain was are performed on the biopsy core for greater sensitivity and further architectural assessment  with adequate controls. Scattered -positive plasma cells are seen.  The specimen is insufficient. If clinically indicated, repeat bone marrow aspiration core biopsy is recommended.    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 Displayed all CRAB criteria at time of diagnosis in October. Underwent 1st line Beatriz + RVD + Zometa. Transplant done 5/13/24  Will plan to undergo maintenance therapy give high risk disease with Velcade Q2w and Revlimid 10mg D1-21 Q28 day cycle. Needs to remain on Aspirin and Acyclovir while on this therapy. Continue Bactrim until +D180 post SCT.     Immunodeficiency due to Drug Therapy - Precautions discussed with patient. Continue to monitor for any  signs of symptoms of infection. No prophylaxis needed at this time.   Folate deficiency - Continue folate supplementation  Bilateral hand pain   MRI C-spine completed 9/4/2024 showed Mild to moderate encroachment into the neural foramina and to a lesser extent the central spinal canal at C5-6 as described   Referred to neurology, but did not go as pain improved.     Plan:   - Toxicity reviewed, okay to continue C17 Velcade and Revlimid today  - Revlimid 10mg D1-21 Q28 day cycle, start today  - Labs and treat only in 2 weeks (cbc, cmp)  - Follow up with Dr. Janet Arceo via telemed 6/30  - Continue ASA 81 mg daily and acyclovir   - Continue to hold zometa due to ONJ.   - Continue Cialis to 10 mg daily prn for ED  - RTC in 4 weeks with NP for FU/labs/treat  - Cbc, cmp, MM labs- @ [facility]. Pt would like to complete all lab work on wed prior to appts     **All blood products must be irradiated and CMV negative**    The patient is in agreement with today's plan of care.  All questions answered.  Patient is aware to contact our office for any problems or concerns prior to next visit.    I spent a total of 40 minutes on the day of the visit.This includes face to face time and non-face to face time preparing to see the patient (eg, review of tests), obtaining and/or reviewing separately obtained history, documenting clinical information in the electronic or other health record, independently interpreting results and communicating results to the patient/family/caregiver, or care coordinator.    Patient requires or will require continuous, longitudinal, and active collaborative plan of care related to this patient's health condition, RISS Stage III Multiple Myeloma - the management of which requires the direction of a practitioner with specialized clinical knowledge, skill, and expertise.     Elizabeth Kennedy LeJeune, MD  Hematology/Oncology   [facility]    Professional Services:  IClaudia LPN, acted  solely as a scribe for and in the presence of Dr. Elizabeth Lejeune, who performed these services.

## 2025-06-23 ENCOUNTER — LAB VISIT (OUTPATIENT)
Dept: LAB | Facility: HOSPITAL | Age: 45
End: 2025-06-23
Attending: STUDENT IN AN ORGANIZED HEALTH CARE EDUCATION/TRAINING PROGRAM
Payer: COMMERCIAL

## 2025-06-23 DIAGNOSIS — R80.9 PROTEINURIA, UNSPECIFIED TYPE: Primary | ICD-10-CM

## 2025-06-23 DIAGNOSIS — C90.00 MYELOMA ASSOCIATED AMYLOIDOSIS: ICD-10-CM

## 2025-06-23 DIAGNOSIS — N18.31 CHRONIC KIDNEY DISEASE (CKD) STAGE G3A/A1, MODERATELY DECREASED GLOMERULAR FILTRATION RATE (GFR) BETWEEN 45-59 ML/MIN/1.73 SQUARE METER AND ALBUMINURIA CREATININE RATIO LESS THAN 30 MG/G: ICD-10-CM

## 2025-06-23 DIAGNOSIS — N18.9 CHRONIC KIDNEY DISEASE, UNSPECIFIED: ICD-10-CM

## 2025-06-23 DIAGNOSIS — E55.9 VITAMIN D DEFICIENCY: ICD-10-CM

## 2025-06-23 DIAGNOSIS — C90.01 MULTIPLE MYELOMA IN REMISSION: ICD-10-CM

## 2025-06-23 DIAGNOSIS — C90.00 MULTIPLE MYELOMA, REMISSION STATUS UNSPECIFIED: ICD-10-CM

## 2025-06-23 DIAGNOSIS — E83.52 HYPERCALCEMIA: ICD-10-CM

## 2025-06-23 DIAGNOSIS — E85.9 MYELOMA ASSOCIATED AMYLOIDOSIS: ICD-10-CM

## 2025-06-23 LAB
ALBUMIN SERPL-MCNC: 3.8 G/DL (ref 3.5–5)
ALBUMIN/CREAT UR: 11.3 MG/GM CR (ref 0–30)
ALBUMIN/GLOB SERPL: 1.4 RATIO (ref 1.1–2)
ALP SERPL-CCNC: 51 UNIT/L (ref 40–150)
ALT SERPL-CCNC: 15 UNIT/L (ref 0–55)
ANION GAP SERPL CALC-SCNC: 6 MEQ/L
AST SERPL-CCNC: 19 UNIT/L (ref 11–45)
BASOPHILS # BLD AUTO: 0.01 X10(3)/MCL
BASOPHILS NFR BLD AUTO: 0.4 %
BILIRUB SERPL-MCNC: 0.5 MG/DL
BILIRUB UR QL STRIP.AUTO: NEGATIVE
BUN SERPL-MCNC: 23 MG/DL (ref 8.9–20.6)
CALCIUM SERPL-MCNC: 8.7 MG/DL (ref 8.4–10.2)
CHLORIDE SERPL-SCNC: 108 MMOL/L (ref 98–107)
CLARITY UR: CLEAR
CO2 SERPL-SCNC: 24 MMOL/L (ref 22–29)
COLOR UR AUTO: YELLOW
CREAT SERPL-MCNC: 1.42 MG/DL (ref 0.72–1.25)
CREAT UR-MCNC: 64.8 MG/DL (ref 63–166)
CREAT UR-MCNC: 65 MG/DL (ref 63–166)
CREAT/UREA NIT SERPL: 16
EOSINOPHIL # BLD AUTO: 0.09 X10(3)/MCL (ref 0–0.9)
EOSINOPHIL NFR BLD AUTO: 3.7 %
ERYTHROCYTE [DISTWIDTH] IN BLOOD BY AUTOMATED COUNT: 15.3 % (ref 11.5–17)
FERRITIN SERPL-MCNC: 294.77 NG/ML (ref 21.81–274.66)
GFR SERPLBLD CREATININE-BSD FMLA CKD-EPI: >60 ML/MIN/1.73/M2
GLOBULIN SER-MCNC: 2.8 GM/DL (ref 2.4–3.5)
GLUCOSE SERPL-MCNC: 98 MG/DL (ref 74–100)
GLUCOSE UR QL STRIP: NEGATIVE
HCT VFR BLD AUTO: 34.8 % (ref 42–52)
HGB BLD-MCNC: 11.6 G/DL (ref 14–18)
HGB UR QL STRIP: NEGATIVE
IGA SERPL-MCNC: 63 MG/DL (ref 63–484)
IGG SERPL-MCNC: 559 MG/DL (ref 540–1822)
IGM SERPL-MCNC: <25 MG/DL (ref 22–240)
IMM GRANULOCYTES # BLD AUTO: 0 X10(3)/MCL (ref 0–0.04)
IMM GRANULOCYTES NFR BLD AUTO: 0 %
IRON SATN MFR SERPL: 29 % (ref 20–50)
IRON SERPL-MCNC: 64 UG/DL (ref 65–175)
KETONES UR QL STRIP: NEGATIVE
LEUKOCYTE ESTERASE UR QL STRIP: NEGATIVE
LYMPHOCYTES # BLD AUTO: 0.84 X10(3)/MCL (ref 0.6–4.6)
LYMPHOCYTES NFR BLD AUTO: 34.1 %
MAGNESIUM SERPL-MCNC: 2 MG/DL (ref 1.6–2.6)
MCH RBC QN AUTO: 33.8 PG (ref 27–31)
MCHC RBC AUTO-ENTMCNC: 33.3 G/DL (ref 33–36)
MCV RBC AUTO: 101.5 FL (ref 80–94)
MICROALBUMIN UR-MCNC: 7.3 UG/ML
MONOCYTES # BLD AUTO: 0.31 X10(3)/MCL (ref 0.1–1.3)
MONOCYTES NFR BLD AUTO: 12.6 %
NEUTROPHILS # BLD AUTO: 1.21 X10(3)/MCL (ref 2.1–9.2)
NEUTROPHILS NFR BLD AUTO: 49.2 %
NITRITE UR QL STRIP: NEGATIVE
NRBC BLD AUTO-RTO: 0 %
PH UR STRIP: 5.5 [PH]
PHOSPHATE SERPL-MCNC: 3 MG/DL (ref 2.3–4.7)
PLATELET # BLD AUTO: 165 X10(3)/MCL (ref 130–400)
PMV BLD AUTO: 10.1 FL (ref 7.4–10.4)
POTASSIUM SERPL-SCNC: 4.2 MMOL/L (ref 3.5–5.1)
PROT SERPL-MCNC: 6.6 GM/DL (ref 6.4–8.3)
PROT UR QL STRIP: NEGATIVE
PROT UR STRIP-MCNC: <6.8 MG/DL
PTH-INTACT SERPL-MCNC: 104 PG/ML (ref 8.7–77)
RBC # BLD AUTO: 3.43 X10(6)/MCL (ref 4.7–6.1)
SODIUM SERPL-SCNC: 138 MMOL/L (ref 136–145)
SP GR UR STRIP.AUTO: 1.02 (ref 1–1.03)
TIBC SERPL-MCNC: 158 UG/DL (ref 60–240)
TIBC SERPL-MCNC: 222 UG/DL (ref 250–450)
UROBILINOGEN UR STRIP-ACNC: 0.2
WBC # BLD AUTO: 2.46 X10(3)/MCL (ref 4.5–11.5)

## 2025-06-23 PROCEDURE — 82784 ASSAY IGA/IGD/IGG/IGM EACH: CPT

## 2025-06-23 PROCEDURE — 83540 ASSAY OF IRON: CPT

## 2025-06-23 PROCEDURE — 36415 COLL VENOUS BLD VENIPUNCTURE: CPT

## 2025-06-23 PROCEDURE — 82728 ASSAY OF FERRITIN: CPT

## 2025-06-23 PROCEDURE — 84100 ASSAY OF PHOSPHORUS: CPT

## 2025-06-23 PROCEDURE — 84156 ASSAY OF PROTEIN URINE: CPT

## 2025-06-23 PROCEDURE — 80053 COMPREHEN METABOLIC PANEL: CPT

## 2025-06-23 PROCEDURE — 85025 COMPLETE CBC W/AUTO DIFF WBC: CPT

## 2025-06-23 PROCEDURE — 83521 IG LIGHT CHAINS FREE EACH: CPT

## 2025-06-23 PROCEDURE — 83735 ASSAY OF MAGNESIUM: CPT

## 2025-06-23 PROCEDURE — 84165 PROTEIN E-PHORESIS SERUM: CPT

## 2025-06-23 PROCEDURE — 82043 UR ALBUMIN QUANTITATIVE: CPT

## 2025-06-23 PROCEDURE — 83970 ASSAY OF PARATHORMONE: CPT

## 2025-06-23 PROCEDURE — 81003 URINALYSIS AUTO W/O SCOPE: CPT

## 2025-06-23 PROCEDURE — 82747 ASSAY OF FOLIC ACID RBC: CPT

## 2025-06-24 LAB
KAPPA LC FREE SER NEPH-MCNC: 1.6 MG/DL (ref 0.33–1.94)
KAPPA LC FREE/LAMBDA FREE SER NEPH: 1.07 {RATIO} (ref 0.26–1.65)
LAMBDA LC FREE SERPL-MCNC: 1.5 MG/DL (ref 0.57–2.63)

## 2025-06-25 DIAGNOSIS — C90.00 MULTIPLE MYELOMA, REMISSION STATUS UNSPECIFIED: ICD-10-CM

## 2025-06-25 LAB
ALBUMIN % SPEP (OHS): 59.42 (ref 48.1–59.5)
ALBUMIN SERPL-MCNC: 3.7 G/DL (ref 3.5–5)
ALBUMIN/GLOB SERPL: 1.5 RATIO (ref 1.1–2)
ALPHA 1 GLOB (OHS): 0.26 GM/DL (ref 0–0.4)
ALPHA 1 GLOB% (OHS): 4.16 (ref 2.3–4.9)
ALPHA 2 GLOB % (OHS): 11.45 (ref 6.9–13)
ALPHA 2 GLOB (OHS): 0.71 GM/DL (ref 0.4–1)
BETA GLOB (OHS): 0.9 GM/DL (ref 0.7–1.3)
BETA GLOB% (OHS): 14.47 (ref 13.8–19.7)
GAMMA GLOBULIN % (OHS): 10.49 (ref 10.1–21.9)
GAMMA GLOBULIN (OHS): 0.65 GM/DL (ref 0.4–1.8)
GLOBULIN SER-MCNC: 2.5 GM/DL (ref 2.4–3.5)
M SPIKE % (OHS): ABNORMAL
M SPIKE (OHS): ABNORMAL
PATH REV: NORMAL
PROT SERPL-MCNC: 6.2 GM/DL (ref 6.4–8.3)

## 2025-06-26 ENCOUNTER — INFUSION (OUTPATIENT)
Facility: HOSPITAL | Age: 45
End: 2025-06-26
Attending: INTERNAL MEDICINE
Payer: COMMERCIAL

## 2025-06-26 ENCOUNTER — OFFICE VISIT (OUTPATIENT)
Facility: CLINIC | Age: 45
End: 2025-06-26
Payer: COMMERCIAL

## 2025-06-26 VITALS
TEMPERATURE: 98 F | HEART RATE: 61 BPM | WEIGHT: 203.81 LBS | OXYGEN SATURATION: 100 % | HEIGHT: 68 IN | SYSTOLIC BLOOD PRESSURE: 119 MMHG | BODY MASS INDEX: 30.89 KG/M2 | RESPIRATION RATE: 16 BRPM | DIASTOLIC BLOOD PRESSURE: 78 MMHG

## 2025-06-26 VITALS
TEMPERATURE: 98 F | DIASTOLIC BLOOD PRESSURE: 78 MMHG | OXYGEN SATURATION: 100 % | SYSTOLIC BLOOD PRESSURE: 119 MMHG | BODY MASS INDEX: 30.89 KG/M2 | HEIGHT: 68 IN | HEART RATE: 61 BPM | WEIGHT: 203.81 LBS | RESPIRATION RATE: 16 BRPM

## 2025-06-26 DIAGNOSIS — D84.821 IMMUNODEFICIENCY DUE TO DRUG THERAPY: ICD-10-CM

## 2025-06-26 DIAGNOSIS — C90.01 MULTIPLE MYELOMA IN REMISSION: ICD-10-CM

## 2025-06-26 DIAGNOSIS — C90.00 METASTATIC MULTIPLE MYELOMA TO BONE: ICD-10-CM

## 2025-06-26 DIAGNOSIS — Z79.899 IMMUNODEFICIENCY DUE TO DRUG THERAPY: ICD-10-CM

## 2025-06-26 DIAGNOSIS — C90.01 MULTIPLE MYELOMA IN REMISSION: Primary | ICD-10-CM

## 2025-06-26 DIAGNOSIS — C90.00 MULTIPLE MYELOMA, REMISSION STATUS UNSPECIFIED: Primary | ICD-10-CM

## 2025-06-26 PROCEDURE — 99215 OFFICE O/P EST HI 40 MIN: CPT | Mod: S$GLB,,, | Performed by: STUDENT IN AN ORGANIZED HEALTH CARE EDUCATION/TRAINING PROGRAM

## 2025-06-26 PROCEDURE — 3061F NEG MICROALBUMINURIA REV: CPT | Mod: CPTII,S$GLB,, | Performed by: STUDENT IN AN ORGANIZED HEALTH CARE EDUCATION/TRAINING PROGRAM

## 2025-06-26 PROCEDURE — 96401 CHEMO ANTI-NEOPL SQ/IM: CPT

## 2025-06-26 PROCEDURE — 3066F NEPHROPATHY DOC TX: CPT | Mod: CPTII,S$GLB,, | Performed by: STUDENT IN AN ORGANIZED HEALTH CARE EDUCATION/TRAINING PROGRAM

## 2025-06-26 PROCEDURE — 3008F BODY MASS INDEX DOCD: CPT | Mod: CPTII,S$GLB,, | Performed by: STUDENT IN AN ORGANIZED HEALTH CARE EDUCATION/TRAINING PROGRAM

## 2025-06-26 PROCEDURE — 3044F HG A1C LEVEL LT 7.0%: CPT | Mod: CPTII,S$GLB,, | Performed by: STUDENT IN AN ORGANIZED HEALTH CARE EDUCATION/TRAINING PROGRAM

## 2025-06-26 PROCEDURE — 3078F DIAST BP <80 MM HG: CPT | Mod: CPTII,S$GLB,, | Performed by: STUDENT IN AN ORGANIZED HEALTH CARE EDUCATION/TRAINING PROGRAM

## 2025-06-26 PROCEDURE — G2211 COMPLEX E/M VISIT ADD ON: HCPCS | Mod: S$GLB,,, | Performed by: STUDENT IN AN ORGANIZED HEALTH CARE EDUCATION/TRAINING PROGRAM

## 2025-06-26 PROCEDURE — 3074F SYST BP LT 130 MM HG: CPT | Mod: CPTII,S$GLB,, | Performed by: STUDENT IN AN ORGANIZED HEALTH CARE EDUCATION/TRAINING PROGRAM

## 2025-06-26 PROCEDURE — 99999 PR PBB SHADOW E&M-EST. PATIENT-LVL V: CPT | Mod: PBBFAC,,, | Performed by: STUDENT IN AN ORGANIZED HEALTH CARE EDUCATION/TRAINING PROGRAM

## 2025-06-26 PROCEDURE — 63600175 PHARM REV CODE 636 W HCPCS: Performed by: STUDENT IN AN ORGANIZED HEALTH CARE EDUCATION/TRAINING PROGRAM

## 2025-06-26 RX ORDER — OXYCODONE HYDROCHLORIDE 10 MG/1
10 TABLET ORAL EVERY 4 HOURS PRN
Qty: 90 TABLET | Refills: 0 | Status: SHIPPED | OUTPATIENT
Start: 2025-06-26

## 2025-06-26 RX ORDER — BORTEZOMIB 3.5 MG/1
1.3 INJECTION, POWDER, LYOPHILIZED, FOR SOLUTION INTRAVENOUS; SUBCUTANEOUS
Status: COMPLETED | OUTPATIENT
Start: 2025-06-26 | End: 2025-06-26

## 2025-06-26 RX ORDER — SODIUM CHLORIDE 0.9 % (FLUSH) 0.9 %
10 SYRINGE (ML) INJECTION
Status: CANCELLED | OUTPATIENT
Start: 2025-06-26

## 2025-06-26 RX ORDER — PROCHLORPERAZINE EDISYLATE 5 MG/ML
10 INJECTION INTRAMUSCULAR; INTRAVENOUS ONCE AS NEEDED
Status: CANCELLED
Start: 2025-06-26

## 2025-06-26 RX ORDER — EPINEPHRINE 0.3 MG/.3ML
0.3 INJECTION SUBCUTANEOUS ONCE AS NEEDED
Status: CANCELLED | OUTPATIENT
Start: 2025-06-26

## 2025-06-26 RX ORDER — DIPHENHYDRAMINE HYDROCHLORIDE 50 MG/ML
50 INJECTION, SOLUTION INTRAMUSCULAR; INTRAVENOUS ONCE AS NEEDED
Status: CANCELLED | OUTPATIENT
Start: 2025-06-26

## 2025-06-26 RX ORDER — HEPARIN 100 UNIT/ML
500 SYRINGE INTRAVENOUS
Status: CANCELLED | OUTPATIENT
Start: 2025-06-26

## 2025-06-26 RX ORDER — LENALIDOMIDE 10 MG/1
CAPSULE ORAL
Qty: 21 CAPSULE | Refills: 0 | Status: SHIPPED | OUTPATIENT
Start: 2025-06-26

## 2025-06-26 RX ORDER — BORTEZOMIB 3.5 MG/1
1.3 INJECTION, POWDER, LYOPHILIZED, FOR SOLUTION INTRAVENOUS; SUBCUTANEOUS
Status: CANCELLED | OUTPATIENT
Start: 2025-06-26

## 2025-06-26 RX ADMIN — BORTEXOMIB 2.75 MG: 3.5 INJECTION, POWDER, LYOPHILIZED, FOR SOLUTION INTRAVENOUS; SUBCUTANEOUS at 11:06

## 2025-06-27 LAB — BEAKER SEE SCANNED REPORT: NORMAL

## 2025-06-30 ENCOUNTER — OFFICE VISIT (OUTPATIENT)
Dept: HEMATOLOGY/ONCOLOGY | Facility: CLINIC | Age: 45
End: 2025-06-30
Payer: COMMERCIAL

## 2025-06-30 DIAGNOSIS — Z79.899 IMMUNODEFICIENCY DUE TO DRUG THERAPY: ICD-10-CM

## 2025-06-30 DIAGNOSIS — D84.821 IMMUNODEFICIENCY DUE TO DRUG THERAPY: ICD-10-CM

## 2025-06-30 DIAGNOSIS — C90.01 MULTIPLE MYELOMA IN REMISSION: Primary | ICD-10-CM

## 2025-06-30 DIAGNOSIS — Z94.84 HISTORY OF AUTO STEM CELL TRANSPLANT: ICD-10-CM

## 2025-06-30 PROCEDURE — 3044F HG A1C LEVEL LT 7.0%: CPT | Mod: CPTII,95,, | Performed by: INTERNAL MEDICINE

## 2025-06-30 PROCEDURE — 3066F NEPHROPATHY DOC TX: CPT | Mod: CPTII,95,, | Performed by: INTERNAL MEDICINE

## 2025-06-30 PROCEDURE — 98006 SYNCH AUDIO-VIDEO EST MOD 30: CPT | Mod: 95,,, | Performed by: INTERNAL MEDICINE

## 2025-06-30 PROCEDURE — 3061F NEG MICROALBUMINURIA REV: CPT | Mod: CPTII,95,, | Performed by: INTERNAL MEDICINE

## 2025-06-30 NOTE — PROGRESS NOTES
The patient location is: [place]  The chief complaint leading to consultation is: Multiple Myeloma    Visit type: audiovisual    Face to Face time with patient: 10  30 minutes of total time spent on the encounter, which includes face to face time and non-face to face time preparing to see the patient (eg, review of tests), Obtaining and/or reviewing separately obtained history, Documenting clinical information in the electronic or other health record, Independently interpreting results (not separately reported) and communicating results to the patient/family/caregiver, or Care coordination (not separately reported).         Each patient to whom he or she provides medical services by telemedicine is:  (1) informed of the relationship between the physician and patient and the respective role of any other health care provider with respect to management of the patient; and (2) notified that he or she may decline to receive medical services by telemedicine and may withdraw from such care at any time.    Notes: see below      Subjective:       Patient ID: Pete Fernandez is a 45 y.o. male.    Chief Complaint: Multiple Myeloma    Hematology History    2023 October  Seen at [facility] by Dr. Elizabeth Lejeune for new DOUG,  hypercalcemia and weakness. Bone survey demonstrates calvarium defects. Follow-up bone scan and MRI negative for lytic lesions. Bone marrow biopsy with 80% plasma cells FOSH positive for p53 mutation, t(4;14) and tetraploid clone. SPEP with m spike of 0.17 IgG lambda. Lambda light chain of 744. Starts Zometa 10/17/2023.    November  Starts Beatriz-VRd induction 11/2/2023  Sees Dr. Cook in virtual consult for BMT consultation.     2024  May  Melphalan 200 autologous stem cell transplant. Day 0 5/13/2024.    September  Day 100 post-transplant staging MRD negative stringent CR. Due to high risk cytogenetics started maintenance with velcade/revlimid.    2025 June  1 year staging shows complete remission  with normal serology, MRD negative flow on marrow biopsy, and no active lesions on PET scan.    HPI  Currently seeing us in the transplant center every 3 months for follow-up post autologous stem cell transplant for multiple myeloma. Currently 1 year 1 month. Full myeloma staging completed a few weeks ago with serology, marrow, and PET that show MRD negative complete remission. Remains on Velcade/Revlimid maintenance therapy. Had to hold Revlimid for about 4 weeks to manage cytopenias. Had diarrhea when he restarted, noticeable increase in fatigue when restarted. Notices increased heat intolerance.        Review of Systems   Constitutional:  Negative for appetite change and unexpected weight change.   HENT:  Negative for mouth sores.    Eyes:  Negative for visual disturbance.   Respiratory:  Positive for cough and shortness of breath.    Cardiovascular:  Negative for chest pain.   Gastrointestinal:  Positive for diarrhea. Negative for abdominal pain.   Genitourinary:  Positive for frequency.   Musculoskeletal:  Positive for back pain.   Integumentary:  Negative for rash.   Neurological:  Negative for headaches.   Hematological:  Negative for adenopathy.   Psychiatric/Behavioral:  The patient is nervous/anxious.          Objective:      Physical Exam No exam for telehealth visit    Current Medications[1]   Lab Results   Component Value Date    WBC 2.46 (L) 06/23/2025    HGB 11.6 (L) 06/23/2025    HCT 34.8 (L) 06/23/2025    .5 (H) 06/23/2025     06/23/2025        CMP  Sodium   Date Value Ref Range Status   06/23/2025 138 136 - 145 mmol/L Final   08/30/2024 139 136 - 145 mmol/L Final     Potassium   Date Value Ref Range Status   06/23/2025 4.2 3.5 - 5.1 mmol/L Final   08/30/2024 4.5 3.5 - 5.1 mmol/L Final     Chloride   Date Value Ref Range Status   06/23/2025 108 (H) 98 - 107 mmol/L Final   08/30/2024 105 95 - 110 mmol/L Final     CO2   Date Value Ref Range Status   06/23/2025 24 22 - 29 mmol/L Final    08/30/2024 25 23 - 29 mmol/L Final     Glucose   Date Value Ref Range Status   06/23/2025 98 74 - 100 mg/dL Final   08/30/2024 80 70 - 110 mg/dL Final     BUN   Date Value Ref Range Status   06/17/2025 23 (H) 6 - 20 mg/dL Final     Blood Urea Nitrogen   Date Value Ref Range Status   06/23/2025 23.0 (H) 8.9 - 20.6 mg/dL Final     Creatinine   Date Value Ref Range Status   06/23/2025 1.42 (H) 0.72 - 1.25 mg/dL Final   06/17/2025 1.5 (H) 0.5 - 1.4 mg/dL Final     Calcium   Date Value Ref Range Status   06/23/2025 8.7 8.4 - 10.2 mg/dL Final   08/30/2024 9.5 8.7 - 10.5 mg/dL Final     Protein Total   Date Value Ref Range Status   06/23/2025 6.2 (L) 6.4 - 8.3 gm/dL Final   06/23/2025 6.6 6.4 - 8.3 gm/dL Final     Total Protein   Date Value Ref Range Status   08/30/2024 6.1 6.0 - 8.4 g/dL Final     Albumin   Date Value Ref Range Status   06/23/2025 3.7 3.5 - 5.0 g/dL Final   06/23/2025 3.8 3.5 - 5.0 g/dL Final   08/30/2024 3.8 3.5 - 5.2 g/dL Final     Total Bilirubin   Date Value Ref Range Status   08/30/2024 0.3 0.1 - 1.0 mg/dL Final     Comment:     For infants and newborns, interpretation of results should be based  on gestational age, weight and in agreement with clinical  observations.    Premature Infant recommended reference ranges:  Up to 24 hours.............<8.0 mg/dL  Up to 48 hours............<12.0 mg/dL  3-5 days..................<15.0 mg/dL  6-29 days.................<15.0 mg/dL       Bilirubin Total   Date Value Ref Range Status   06/23/2025 0.5 <=1.5 mg/dL Final     Alkaline Phosphatase   Date Value Ref Range Status   08/30/2024 58 55 - 135 U/L Final     ALP   Date Value Ref Range Status   06/23/2025 51 40 - 150 unit/L Final     AST   Date Value Ref Range Status   06/23/2025 19 11 - 45 unit/L Final   08/30/2024 18 10 - 40 U/L Final     ALT   Date Value Ref Range Status   06/23/2025 15 0 - 55 unit/L Final   08/30/2024 14 10 - 44 U/L Final     Anion Gap   Date Value Ref Range Status   06/17/2025 5 (L) 8 -  16 mmol/L Final          Assessment:       Problem List Items Addressed This Visit          Immunology/Multi System    Immunodeficiency due to drug therapy    Relevant Orders    CBC w/ DIFF    Comprehensive Metabolic Panel    Immunoglobulin Free LT Chains Blood    OSVALDO    IMMUNOGLOBULINS (IGG, IGA, IGM) QUANTITATIVE    SPEP - Protein electrophoresis, serum       Oncology    Multiple myeloma in remission - Primary    Relevant Orders    CBC w/ DIFF    Comprehensive Metabolic Panel    Immunoglobulin Free LT Chains Blood    OSVALDO    IMMUNOGLOBULINS (IGG, IGA, IGM) QUANTITATIVE    SPEP - Protein electrophoresis, serum    History of auto stem cell transplant    Relevant Orders    CBC w/ DIFF    Comprehensive Metabolic Panel    Immunoglobulin Free LT Chains Blood    OSVALDO    IMMUNOGLOBULINS (IGG, IGA, IGM) QUANTITATIVE    SPEP - Protein electrophoresis, serum       Plan:       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  Continue to monitor CBC, CMP, SPEP, and light chains every 4 weeks.        BMT Chart Routing      Follow up with physician 4 months. virtual, multiple myeloma provider   Follow up with [facility]    Provider visit type Malignant hem   Infusion scheduling note    Injection scheduling note    Labs CBC, CMP, free light chains, immunofixation, immunoglobulins and SPEP   Scheduling:  Preferred lab:  Lab interval:  labs 1 week before return visit   Imaging    Pharmacy appointment    Other referrals                                                [1]   Current Outpatient Medications   Medication Sig Dispense Refill    acyclovir (ZOVIRAX) 800 MG Tab Take 1 tablet (800 mg total) by mouth 2 (two) times daily. 60 tablet 10    aspirin 81 MG Chew Take 1 tablet (81 mg total) by mouth every evening. Hold until instructed to resume by provider.       calcitRIOL (ROCALTROL) 0.5 MCG Cap TAKE 1 CAPSULE BY MOUTH ONCE DAILY 30 capsule 2    dextroamphetamine-amphetamine (ADDERALL) 20 mg tablet Take 1 tablet by mouth once daily.      febuxostat (ULORIC) 40 mg Tab Take 1 tablet (40 mg total) by mouth once daily. 30 tablet 3    folic acid (FOLVITE) 1 MG tablet Take 1 tablet (1 mg total) by mouth once daily. 30 tablet 11    hydrOXYzine HCL (ATARAX) 25 MG tablet Take 1 tablet (25 mg total) by mouth 3 (three) times daily as needed for Anxiety. 30 tablet 3    levoFLOXacin (LEVAQUIN) 750 MG tablet Take 1 tablet (750 mg total) by mouth once daily. 7 tablet 0    melatonin (MELATIN) 3 mg tablet Take 3 tablets (9 mg total) by mouth nightly as needed for Insomnia. 30 tablet 3    metoprolol tartrate (LOPRESSOR) 25 MG tablet Take 12.5 mg by mouth 2 (two) times daily.      ondansetron (ZOFRAN) 8 MG tablet Take 1 tablet (8 mg total) by mouth every 8 (eight) hours as needed for Nausea. 30 tablet 4    oxyCODONE (ROXICODONE) 10 mg Tab immediate release tablet Take 1 tablet (10 mg total) by mouth every 4 (four) hours as needed for Pain. 90 tablet 0    pantoprazole (PROTONIX) 20 MG tablet TAKE 1 TABLET (20 MG TOTAL) BY MOUTH ONCE DAILY (Patient taking differently: Take 20 mg by mouth every evening.) 30 tablet 1    prochlorperazine (COMPAZINE) 5 MG tablet Take 1 tablet (5 mg total) by mouth every 6 (six) hours as needed for Nausea. 30 tablet 4    REVLIMID 10 mg Cap TAKE 1 CAPSULE ONCE DAILY FOR 21 DAYS FOLLOWED BY 7 DAYS OFF, TO COMPLETE A 28 DAY CYCLE. 21 capsule 0    sodium bicarbonate 650 MG tablet Take 650 mg by mouth 2 (two) times daily.      tadalafiL (CIALIS) 10 MG tablet Take 1 tablet (10 mg total) by mouth daily as needed for Erectile Dysfunction. 30 tablet 1    tamsulosin (FLOMAX) 0.4 mg Cap Take 1 capsule (0.4 mg total) by mouth every evening. 30 capsule 6     Current Facility-Administered Medications   Medication Dose Route Frequency Provider Last Rate Last Admin    menin  C,Y,W-135 vac,1 of 2(PF) SolR 0.5 mL  0.5 mL Intramuscular 1 time in Clinic/HOD Vivienne Tsai MD

## 2025-07-06 ENCOUNTER — PATIENT MESSAGE (OUTPATIENT)
Dept: INTERNAL MEDICINE | Facility: CLINIC | Age: 45
End: 2025-07-06
Payer: COMMERCIAL

## 2025-07-07 ENCOUNTER — PATIENT MESSAGE (OUTPATIENT)
Dept: INTERNAL MEDICINE | Facility: CLINIC | Age: 45
End: 2025-07-07
Payer: COMMERCIAL

## 2025-07-07 DIAGNOSIS — C90.01 MULTIPLE MYELOMA IN REMISSION: ICD-10-CM

## 2025-07-07 RX ORDER — CALCITRIOL 0.5 UG/1
0.5 CAPSULE ORAL
Qty: 30 CAPSULE | Refills: 2 | Status: SHIPPED | OUTPATIENT
Start: 2025-07-07

## 2025-07-08 ENCOUNTER — LAB VISIT (OUTPATIENT)
Dept: LAB | Facility: HOSPITAL | Age: 45
End: 2025-07-08
Payer: COMMERCIAL

## 2025-07-08 DIAGNOSIS — C90.00 MULTIPLE MYELOMA, REMISSION STATUS UNSPECIFIED: ICD-10-CM

## 2025-07-08 LAB
ALBUMIN SERPL-MCNC: 3.8 G/DL (ref 3.5–5)
ALBUMIN/GLOB SERPL: 1.4 RATIO (ref 1.1–2)
ALP SERPL-CCNC: 51 UNIT/L (ref 40–150)
ALT SERPL-CCNC: 17 UNIT/L (ref 0–55)
ANION GAP SERPL CALC-SCNC: 7 MEQ/L
AST SERPL-CCNC: 21 UNIT/L (ref 11–45)
BASOPHILS # BLD AUTO: 0.02 X10(3)/MCL
BASOPHILS NFR BLD AUTO: 0.4 %
BILIRUB SERPL-MCNC: 0.6 MG/DL
BUN SERPL-MCNC: 22 MG/DL (ref 8.9–20.6)
CALCIUM SERPL-MCNC: 9.2 MG/DL (ref 8.4–10.2)
CHLORIDE SERPL-SCNC: 106 MMOL/L (ref 98–107)
CO2 SERPL-SCNC: 26 MMOL/L (ref 22–29)
CREAT SERPL-MCNC: 1.28 MG/DL (ref 0.72–1.25)
CREAT/UREA NIT SERPL: 17
EOSINOPHIL # BLD AUTO: 0.16 X10(3)/MCL (ref 0–0.9)
EOSINOPHIL NFR BLD AUTO: 3.4 %
ERYTHROCYTE [DISTWIDTH] IN BLOOD BY AUTOMATED COUNT: 15 % (ref 11.5–17)
GFR SERPLBLD CREATININE-BSD FMLA CKD-EPI: >60 ML/MIN/1.73/M2
GLOBULIN SER-MCNC: 2.8 GM/DL (ref 2.4–3.5)
GLUCOSE SERPL-MCNC: 88 MG/DL (ref 74–100)
HCT VFR BLD AUTO: 37.3 % (ref 42–52)
HGB BLD-MCNC: 12.2 G/DL (ref 14–18)
IMM GRANULOCYTES # BLD AUTO: 0.01 X10(3)/MCL (ref 0–0.04)
IMM GRANULOCYTES NFR BLD AUTO: 0.2 %
LYMPHOCYTES # BLD AUTO: 1.07 X10(3)/MCL (ref 0.6–4.6)
LYMPHOCYTES NFR BLD AUTO: 22.5 %
MCH RBC QN AUTO: 33.6 PG (ref 27–31)
MCHC RBC AUTO-ENTMCNC: 32.7 G/DL (ref 33–36)
MCV RBC AUTO: 102.8 FL (ref 80–94)
MONOCYTES # BLD AUTO: 0.65 X10(3)/MCL (ref 0.1–1.3)
MONOCYTES NFR BLD AUTO: 13.7 %
NEUTROPHILS # BLD AUTO: 2.84 X10(3)/MCL (ref 2.1–9.2)
NEUTROPHILS NFR BLD AUTO: 59.8 %
NRBC BLD AUTO-RTO: 0 %
PLATELET # BLD AUTO: 161 X10(3)/MCL (ref 130–400)
PMV BLD AUTO: 10.5 FL (ref 7.4–10.4)
POTASSIUM SERPL-SCNC: 3.6 MMOL/L (ref 3.5–5.1)
PROT SERPL-MCNC: 6.6 GM/DL (ref 6.4–8.3)
RBC # BLD AUTO: 3.63 X10(6)/MCL (ref 4.7–6.1)
SODIUM SERPL-SCNC: 139 MMOL/L (ref 136–145)
WBC # BLD AUTO: 4.75 X10(3)/MCL (ref 4.5–11.5)

## 2025-07-08 PROCEDURE — 36415 COLL VENOUS BLD VENIPUNCTURE: CPT

## 2025-07-08 PROCEDURE — 80053 COMPREHEN METABOLIC PANEL: CPT

## 2025-07-08 PROCEDURE — 85025 COMPLETE CBC W/AUTO DIFF WBC: CPT

## 2025-07-09 ENCOUNTER — INFUSION (OUTPATIENT)
Facility: HOSPITAL | Age: 45
End: 2025-07-09
Attending: INTERNAL MEDICINE
Payer: COMMERCIAL

## 2025-07-09 VITALS
SYSTOLIC BLOOD PRESSURE: 123 MMHG | HEART RATE: 65 BPM | RESPIRATION RATE: 18 BRPM | BODY MASS INDEX: 32.02 KG/M2 | OXYGEN SATURATION: 99 % | HEIGHT: 67 IN | TEMPERATURE: 98 F | WEIGHT: 204 LBS | DIASTOLIC BLOOD PRESSURE: 72 MMHG

## 2025-07-09 DIAGNOSIS — C90.01 MULTIPLE MYELOMA IN REMISSION: Primary | ICD-10-CM

## 2025-07-09 PROCEDURE — 63600175 PHARM REV CODE 636 W HCPCS

## 2025-07-09 PROCEDURE — 96401 CHEMO ANTI-NEOPL SQ/IM: CPT

## 2025-07-09 RX ORDER — PROCHLORPERAZINE EDISYLATE 5 MG/ML
10 INJECTION INTRAMUSCULAR; INTRAVENOUS ONCE AS NEEDED
Status: CANCELLED
Start: 2025-07-09

## 2025-07-09 RX ORDER — EPINEPHRINE 0.3 MG/.3ML
0.3 INJECTION SUBCUTANEOUS ONCE AS NEEDED
Status: DISCONTINUED | OUTPATIENT
Start: 2025-07-09 | End: 2025-07-09 | Stop reason: HOSPADM

## 2025-07-09 RX ORDER — SODIUM CHLORIDE 0.9 % (FLUSH) 0.9 %
10 SYRINGE (ML) INJECTION
Status: CANCELLED | OUTPATIENT
Start: 2025-07-09

## 2025-07-09 RX ORDER — BORTEZOMIB 3.5 MG/1
1.3 INJECTION, POWDER, LYOPHILIZED, FOR SOLUTION INTRAVENOUS; SUBCUTANEOUS
Status: CANCELLED | OUTPATIENT
Start: 2025-07-09

## 2025-07-09 RX ORDER — DIPHENHYDRAMINE HYDROCHLORIDE 50 MG/ML
50 INJECTION, SOLUTION INTRAMUSCULAR; INTRAVENOUS ONCE AS NEEDED
Status: CANCELLED | OUTPATIENT
Start: 2025-07-09

## 2025-07-09 RX ORDER — DIPHENHYDRAMINE HYDROCHLORIDE 50 MG/ML
50 INJECTION, SOLUTION INTRAMUSCULAR; INTRAVENOUS ONCE AS NEEDED
Status: DISCONTINUED | OUTPATIENT
Start: 2025-07-09 | End: 2025-07-09 | Stop reason: HOSPADM

## 2025-07-09 RX ORDER — SODIUM CHLORIDE 0.9 % (FLUSH) 0.9 %
10 SYRINGE (ML) INJECTION
Status: DISCONTINUED | OUTPATIENT
Start: 2025-07-09 | End: 2025-07-09 | Stop reason: HOSPADM

## 2025-07-09 RX ORDER — EPINEPHRINE 0.3 MG/.3ML
0.3 INJECTION SUBCUTANEOUS ONCE AS NEEDED
Status: CANCELLED | OUTPATIENT
Start: 2025-07-09

## 2025-07-09 RX ORDER — HEPARIN 100 UNIT/ML
500 SYRINGE INTRAVENOUS
Status: DISCONTINUED | OUTPATIENT
Start: 2025-07-09 | End: 2025-07-09 | Stop reason: HOSPADM

## 2025-07-09 RX ORDER — PROCHLORPERAZINE EDISYLATE 5 MG/ML
10 INJECTION INTRAMUSCULAR; INTRAVENOUS ONCE AS NEEDED
Status: DISCONTINUED | OUTPATIENT
Start: 2025-07-09 | End: 2025-07-09 | Stop reason: HOSPADM

## 2025-07-09 RX ORDER — BORTEZOMIB 3.5 MG/1
1.3 INJECTION, POWDER, LYOPHILIZED, FOR SOLUTION INTRAVENOUS; SUBCUTANEOUS
Status: COMPLETED | OUTPATIENT
Start: 2025-07-09 | End: 2025-07-09

## 2025-07-09 RX ORDER — HEPARIN 100 UNIT/ML
500 SYRINGE INTRAVENOUS
Status: CANCELLED | OUTPATIENT
Start: 2025-07-09

## 2025-07-09 RX ADMIN — BORTEZOMIB 2.75 MG: 3.5 INJECTION, POWDER, LYOPHILIZED, FOR SOLUTION INTRAVENOUS; SUBCUTANEOUS at 10:07

## 2025-07-15 DIAGNOSIS — C90.00 MULTIPLE MYELOMA, REMISSION STATUS UNSPECIFIED: ICD-10-CM

## 2025-07-15 DIAGNOSIS — C90.01 MULTIPLE MYELOMA IN REMISSION: Primary | ICD-10-CM

## 2025-07-22 DIAGNOSIS — N52.1 ERECTILE DYSFUNCTION DUE TO DISEASES CLASSIFIED ELSEWHERE: ICD-10-CM

## 2025-07-22 RX ORDER — TADALAFIL 10 MG/1
10 TABLET ORAL DAILY PRN
Qty: 30 TABLET | Refills: 1 | Status: SHIPPED | OUTPATIENT
Start: 2025-07-22 | End: 2026-07-22

## 2025-07-23 ENCOUNTER — LAB VISIT (OUTPATIENT)
Dept: LAB | Facility: HOSPITAL | Age: 45
End: 2025-07-23
Attending: STUDENT IN AN ORGANIZED HEALTH CARE EDUCATION/TRAINING PROGRAM
Payer: COMMERCIAL

## 2025-07-23 DIAGNOSIS — R11.0 NAUSEA: ICD-10-CM

## 2025-07-23 DIAGNOSIS — C90.00 MULTIPLE MYELOMA, REMISSION STATUS UNSPECIFIED: ICD-10-CM

## 2025-07-23 DIAGNOSIS — C90.01 MULTIPLE MYELOMA IN REMISSION: ICD-10-CM

## 2025-07-23 LAB
ALBUMIN SERPL-MCNC: 3.8 G/DL (ref 3.5–5)
ALBUMIN/GLOB SERPL: 1.5 RATIO (ref 1.1–2)
ALP SERPL-CCNC: 44 UNIT/L (ref 40–150)
ALT SERPL-CCNC: 22 UNIT/L (ref 0–55)
ANION GAP SERPL CALC-SCNC: 5 MEQ/L
AST SERPL-CCNC: 23 UNIT/L (ref 11–45)
BASOPHILS # BLD AUTO: 0.04 X10(3)/MCL
BASOPHILS NFR BLD AUTO: 1.3 %
BILIRUB SERPL-MCNC: 0.5 MG/DL
BUN SERPL-MCNC: 24 MG/DL (ref 8.9–20.6)
CALCIUM SERPL-MCNC: 8.9 MG/DL (ref 8.4–10.2)
CHLORIDE SERPL-SCNC: 107 MMOL/L (ref 98–107)
CO2 SERPL-SCNC: 26 MMOL/L (ref 22–29)
CREAT SERPL-MCNC: 1.41 MG/DL (ref 0.72–1.25)
CREAT/UREA NIT SERPL: 17
EOSINOPHIL # BLD AUTO: 0.05 X10(3)/MCL (ref 0–0.9)
EOSINOPHIL NFR BLD AUTO: 1.6 %
ERYTHROCYTE [DISTWIDTH] IN BLOOD BY AUTOMATED COUNT: 14.6 % (ref 11.5–17)
GFR SERPLBLD CREATININE-BSD FMLA CKD-EPI: >60 ML/MIN/1.73/M2
GLOBULIN SER-MCNC: 2.6 GM/DL (ref 2.4–3.5)
GLUCOSE SERPL-MCNC: 91 MG/DL (ref 74–100)
HCT VFR BLD AUTO: 36.8 % (ref 42–52)
HGB BLD-MCNC: 12.2 G/DL (ref 14–18)
IMM GRANULOCYTES # BLD AUTO: 0 X10(3)/MCL (ref 0–0.04)
IMM GRANULOCYTES NFR BLD AUTO: 0 %
LYMPHOCYTES # BLD AUTO: 1.19 X10(3)/MCL (ref 0.6–4.6)
LYMPHOCYTES NFR BLD AUTO: 38.8 %
MAGNESIUM SERPL-MCNC: 2.2 MG/DL (ref 1.6–2.6)
MCH RBC QN AUTO: 33.6 PG (ref 27–31)
MCHC RBC AUTO-ENTMCNC: 33.2 G/DL (ref 33–36)
MCV RBC AUTO: 101.4 FL (ref 80–94)
MONOCYTES # BLD AUTO: 0.4 X10(3)/MCL (ref 0.1–1.3)
MONOCYTES NFR BLD AUTO: 13 %
NEUTROPHILS # BLD AUTO: 1.39 X10(3)/MCL (ref 2.1–9.2)
NEUTROPHILS NFR BLD AUTO: 45.3 %
NRBC BLD AUTO-RTO: 0 %
PLATELET # BLD AUTO: 145 X10(3)/MCL (ref 130–400)
PMV BLD AUTO: 10.2 FL (ref 7.4–10.4)
POTASSIUM SERPL-SCNC: 4.4 MMOL/L (ref 3.5–5.1)
PROT SERPL-MCNC: 6.4 GM/DL (ref 6.4–8.3)
RBC # BLD AUTO: 3.63 X10(6)/MCL (ref 4.7–6.1)
SODIUM SERPL-SCNC: 138 MMOL/L (ref 136–145)
WBC # BLD AUTO: 3.07 X10(3)/MCL (ref 4.5–11.5)

## 2025-07-23 PROCEDURE — 85025 COMPLETE CBC W/AUTO DIFF WBC: CPT

## 2025-07-23 PROCEDURE — 83735 ASSAY OF MAGNESIUM: CPT

## 2025-07-23 PROCEDURE — 84165 PROTEIN E-PHORESIS SERUM: CPT

## 2025-07-23 PROCEDURE — 36415 COLL VENOUS BLD VENIPUNCTURE: CPT

## 2025-07-23 PROCEDURE — 83521 IG LIGHT CHAINS FREE EACH: CPT

## 2025-07-23 PROCEDURE — 80053 COMPREHEN METABOLIC PANEL: CPT

## 2025-07-23 PROCEDURE — 82784 ASSAY IGA/IGD/IGG/IGM EACH: CPT | Mod: 59

## 2025-07-23 RX ORDER — ONDANSETRON 8 MG/1
8 TABLET, FILM COATED ORAL EVERY 8 HOURS PRN
Qty: 30 TABLET | Refills: 4 | Status: SHIPPED | OUTPATIENT
Start: 2025-07-23 | End: 2026-07-23

## 2025-07-23 RX ORDER — PROCHLORPERAZINE MALEATE 5 MG
5 TABLET ORAL EVERY 6 HOURS PRN
Qty: 30 TABLET | Refills: 4 | Status: SHIPPED | OUTPATIENT
Start: 2025-07-23

## 2025-07-24 ENCOUNTER — OFFICE VISIT (OUTPATIENT)
Facility: CLINIC | Age: 45
End: 2025-07-24
Payer: COMMERCIAL

## 2025-07-24 ENCOUNTER — INFUSION (OUTPATIENT)
Facility: HOSPITAL | Age: 45
End: 2025-07-24
Attending: INTERNAL MEDICINE
Payer: COMMERCIAL

## 2025-07-24 VITALS
TEMPERATURE: 98 F | HEIGHT: 67 IN | BODY MASS INDEX: 32.75 KG/M2 | WEIGHT: 208.69 LBS | RESPIRATION RATE: 16 BRPM | SYSTOLIC BLOOD PRESSURE: 125 MMHG | DIASTOLIC BLOOD PRESSURE: 79 MMHG | OXYGEN SATURATION: 98 % | HEART RATE: 55 BPM

## 2025-07-24 VITALS
RESPIRATION RATE: 16 BRPM | HEIGHT: 67 IN | WEIGHT: 208.69 LBS | HEART RATE: 55 BPM | TEMPERATURE: 98 F | OXYGEN SATURATION: 98 % | SYSTOLIC BLOOD PRESSURE: 125 MMHG | BODY MASS INDEX: 32.75 KG/M2 | DIASTOLIC BLOOD PRESSURE: 79 MMHG

## 2025-07-24 DIAGNOSIS — C90.01 MULTIPLE MYELOMA IN REMISSION: ICD-10-CM

## 2025-07-24 DIAGNOSIS — C90.01 MULTIPLE MYELOMA IN REMISSION: Primary | ICD-10-CM

## 2025-07-24 DIAGNOSIS — C90.00 MULTIPLE MYELOMA, REMISSION STATUS UNSPECIFIED: ICD-10-CM

## 2025-07-24 LAB
ALBUMIN % SPEP (OHS): 57.79 (ref 48.1–59.5)
ALBUMIN SERPL-MCNC: 3.5 G/DL (ref 3.5–5)
ALBUMIN/GLOB SERPL: 1.3 RATIO (ref 1.1–2)
ALPHA 1 GLOB (OHS): 0.26 GM/DL (ref 0–0.4)
ALPHA 1 GLOB% (OHS): 4.22 (ref 2.3–4.9)
ALPHA 2 GLOB % (OHS): 12.13 (ref 6.9–13)
ALPHA 2 GLOB (OHS): 0.74 GM/DL (ref 0.4–1)
BETA GLOB (OHS): 0.88 GM/DL (ref 0.7–1.3)
BETA GLOB% (OHS): 14.5 (ref 13.8–19.7)
GAMMA GLOBULIN % (OHS): 11.35 (ref 10.1–21.9)
GAMMA GLOBULIN (OHS): 0.69 GM/DL (ref 0.4–1.8)
GLOBULIN SER-MCNC: 2.6 GM/DL (ref 2.4–3.5)
IGA SERPL-MCNC: 51 MG/DL (ref 63–484)
IGG SERPL-MCNC: 507 MG/DL (ref 540–1822)
IGM SERPL-MCNC: 21 MG/DL (ref 22–240)
M SPIKE % (OHS): ABNORMAL
M SPIKE (OHS): ABNORMAL
PATH REV: NORMAL
PROT SERPL-MCNC: 6.1 GM/DL (ref 6.4–8.3)

## 2025-07-24 PROCEDURE — 99999 PR PBB SHADOW E&M-EST. PATIENT-LVL V: CPT | Mod: PBBFAC,,, | Performed by: NURSE PRACTITIONER

## 2025-07-24 PROCEDURE — 96401 CHEMO ANTI-NEOPL SQ/IM: CPT

## 2025-07-24 PROCEDURE — 63600175 PHARM REV CODE 636 W HCPCS: Performed by: NURSE PRACTITIONER

## 2025-07-24 RX ORDER — PROCHLORPERAZINE EDISYLATE 5 MG/ML
10 INJECTION INTRAMUSCULAR; INTRAVENOUS ONCE AS NEEDED
Status: DISCONTINUED | OUTPATIENT
Start: 2025-07-24 | End: 2025-07-24 | Stop reason: HOSPADM

## 2025-07-24 RX ORDER — SODIUM CHLORIDE 0.9 % (FLUSH) 0.9 %
10 SYRINGE (ML) INJECTION
Status: DISCONTINUED | OUTPATIENT
Start: 2025-07-24 | End: 2025-07-24 | Stop reason: HOSPADM

## 2025-07-24 RX ORDER — EPINEPHRINE 0.3 MG/.3ML
0.3 INJECTION SUBCUTANEOUS ONCE AS NEEDED
Status: DISCONTINUED | OUTPATIENT
Start: 2025-07-24 | End: 2025-07-24 | Stop reason: HOSPADM

## 2025-07-24 RX ORDER — DIPHENHYDRAMINE HYDROCHLORIDE 50 MG/ML
50 INJECTION, SOLUTION INTRAMUSCULAR; INTRAVENOUS ONCE AS NEEDED
Status: CANCELLED | OUTPATIENT
Start: 2025-07-24

## 2025-07-24 RX ORDER — SODIUM CHLORIDE 0.9 % (FLUSH) 0.9 %
10 SYRINGE (ML) INJECTION
Status: CANCELLED | OUTPATIENT
Start: 2025-07-24

## 2025-07-24 RX ORDER — BORTEZOMIB 3.5 MG/1
1.3 INJECTION, POWDER, LYOPHILIZED, FOR SOLUTION INTRAVENOUS; SUBCUTANEOUS
Status: CANCELLED | OUTPATIENT
Start: 2025-07-24

## 2025-07-24 RX ORDER — OXYCODONE HYDROCHLORIDE 10 MG/1
10 TABLET ORAL EVERY 4 HOURS PRN
Qty: 90 TABLET | Refills: 0 | Status: SHIPPED | OUTPATIENT
Start: 2025-07-24

## 2025-07-24 RX ORDER — BORTEZOMIB 3.5 MG/1
1.3 INJECTION, POWDER, LYOPHILIZED, FOR SOLUTION INTRAVENOUS; SUBCUTANEOUS
Status: COMPLETED | OUTPATIENT
Start: 2025-07-24 | End: 2025-07-24

## 2025-07-24 RX ORDER — EPINEPHRINE 0.3 MG/.3ML
0.3 INJECTION SUBCUTANEOUS ONCE AS NEEDED
Status: CANCELLED | OUTPATIENT
Start: 2025-07-24

## 2025-07-24 RX ORDER — HEPARIN 100 UNIT/ML
500 SYRINGE INTRAVENOUS
Status: CANCELLED | OUTPATIENT
Start: 2025-07-24

## 2025-07-24 RX ORDER — HEPARIN 100 UNIT/ML
500 SYRINGE INTRAVENOUS
Status: DISCONTINUED | OUTPATIENT
Start: 2025-07-24 | End: 2025-07-24 | Stop reason: HOSPADM

## 2025-07-24 RX ORDER — PROCHLORPERAZINE EDISYLATE 5 MG/ML
10 INJECTION INTRAMUSCULAR; INTRAVENOUS ONCE AS NEEDED
Status: CANCELLED
Start: 2025-07-24

## 2025-07-24 RX ORDER — DIPHENHYDRAMINE HYDROCHLORIDE 50 MG/ML
50 INJECTION, SOLUTION INTRAMUSCULAR; INTRAVENOUS ONCE AS NEEDED
Status: DISCONTINUED | OUTPATIENT
Start: 2025-07-24 | End: 2025-07-24 | Stop reason: HOSPADM

## 2025-07-24 RX ADMIN — BORTEXOMIB 2.75 MG: 3.5 INJECTION, POWDER, LYOPHILIZED, FOR SOLUTION INTRAVENOUS; SUBCUTANEOUS at 11:07

## 2025-07-24 NOTE — PROGRESS NOTES
Subjective:       Patient ID: Pete Fernandez is a 45 y.o. male.    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 Beatriz+ KrD for cycle 2 on 11/30/23; Change at request of [facility]  Stopped after 1 dose due to severe pleural and pericardial effusion development   Switched back to BEATRIZ + RVD with increase of Rev to 25mg  2. Beatriz+ RVD - 11/2/23 x 1 cycle, plan to restart for C2 on 12/14 potentially  C1 - Rev 10mg dose reduction due to CrCl  C2 - Rev increased to 25mg due to renal function improvement  C3 - D22 held 1 week due to covid  C4-  D22 Rev decreased to 10mg due to decline in renal function  C5 - Hold Beatriz this cycle in preparation for upcoming transplant. Rev remains at 10mg due to renal function  Zometa 10/17/23 - 1/11/2024 (3 treatments total)  Currently on hold due to ONJ  3. Stem Cell Transplant 5/6/24    HPI  44yo M presented to [facility] on 10/11 after 1 month of progressive weakness, abdominal pain, and intermittent confusion. At the time of his presentation, his labs which had been normal with PCP in 8/2023 were notable for new severe DOUG, transaminitis, and Ca of 18. He was admitted and started on IVF resuscitation.  Hypercalcemia workup was initiated and he was ultimately found to have numerous osseous lesions on 10/15/23 CT CAP, FLC - Kappa 3.29, Lambda 744, Ratio 226 , SPEP with Mspike 0.17 IgG lambda. Bone marrow biopsy done 10/18/23 revealed 95% plasma cells and FISH with TP53 deletion, FGFR3/IGH (or NSD2/IGH) fusion, usually representing a t(4;14), and a tetraploid subclone was observed. After correction of his hypercalcemia and improvement in his renal function he was discharged on 10/22/23.   He started C1 Beatriz + RVD + Zometa on 11/2/23. He was referred to [facility] for consideration of bone  marrow transplant, they recommended change to Beatriz + Krd, however patient developed life threatening pleural and pericardial edema several days after 1st infusion requiring hospitalization and placement of a pericardial window. Therefore he transitioned back to Beatriz - RVD for C2. Rest of treatment history as above.     Currently on maintenance Velcade and Revlimid per post transplant protocol.     Interval History:   Patient presents to the visit today for a follow-up on multiple myeloma, which is currently in remission. The patient is on maintenance therapy with Revlimid and Velcade    The patient is status-post stem cell transplant for multiple myeloma, which is in complete remission based on a recent PET scan and bone marrow biopsy. A televisit with the transplant team in [place] on 06/26/2025 confirmed remission and recommended continuing current maintenance therapy. They plan to see the patient again in four months. The patient also sees a nephrologist every three to six months for chronic kidney issues, which have improved significantly since diagnosis.    The patient reports intermittent diarrhea, particularly during the last cycle of Revlimid, occurring a couple of times a day, especially after eating. This has resolved over the past week. Reports ongoing bone pain in the right hip area, which has been present since the beginning of the illness. The patient also reports erectile dysfunction, noting that Cialis helps but could be more effective.    The patient denies any new medications, bleeding, nausea, vomiting, or constipation.      Past Medical History:   Diagnosis Date    Anxiety disorder, unspecified     Encounter for antineoplastic chemotherapy 9/19/2024    Hypercalcemia       Past Surgical History:   Procedure Laterality Date    BONE MARROW BIOPSY N/A 10/18/2023    Procedure: Biopsy-bone marrow;  Surgeon: Nhan Marte MD;  Location: [facility];  Service: General;  Laterality: N/A;    INSERTION OF  TUNNELED CENTRAL VENOUS HEMODIALYSIS CATHETER Left 5/6/2024    Procedure: INSERTION, CATHETER, HEMODIALYSIS, DUAL LUMEN, left poss right, Bard 14.5 fr hemosplit catheter, model [id number];  Surgeon: Alvin White MD;  Location: [facility] OR [address] [place] [address];  Service: General;  Laterality: Left;    TONSILLECTOMY       Social History     Socioeconomic History    Marital status:      Spouse name: Rosalinda    Number of children: 2   Tobacco Use    Smoking status: Never    Smokeless tobacco: Never   Substance and Sexual Activity    Alcohol use: Yes    Drug use: Never    Sexual activity: Yes     Partners: Female     Social Drivers of Health     Financial Resource Strain: Low Risk  (3/10/2025)    Overall Financial Resource Strain (CARDIA)     Difficulty of Paying Living Expenses: Not very hard   Food Insecurity: No Food Insecurity (3/10/2025)    Hunger Vital Sign     Worried About Running Out of Food in the Last Year: Never true     Ran Out of Food in the Last Year: Never true   Transportation Needs: No Transportation Needs (3/10/2025)    PRAPARE - Transportation     Lack of Transportation (Medical): No     Lack of Transportation (Non-Medical): No   Physical Activity: Inactive (3/10/2025)    Exercise Vital Sign     Days of Exercise per Week: 0 days     Minutes of Exercise per Session: 0 min   Stress: Stress Concern Present (3/10/2025)    [employer] [place] [employer] - Occupational Stress Questionnaire     Feeling of Stress : To some extent   Housing Stability: Low Risk  (3/10/2025)    Housing Stability Vital Sign     Unable to Pay for Housing in the Last Year: No     Number of Times Moved in the Last Year: 0     Homeless in the Last Year: No      Family History   Problem Relation Name Age of Onset    Breast cancer Mother      Pancreatic cancer Father        Review of patient's allergies indicates:  No Known Allergies   Review of Systems   Constitutional:  Positive for fatigue. Negative for activity change,  appetite change, chills, fever and unexpected weight change.   HENT:  Negative for mouth sores and sore throat.    Eyes:  Negative for visual disturbance.   Respiratory:  Negative for cough and shortness of breath.    Cardiovascular:  Negative for chest pain.   Gastrointestinal:  Negative for abdominal pain, constipation, diarrhea, nausea and vomiting.   Endocrine: Negative for polyuria.   Genitourinary:  Positive for erectile dysfunction. Negative for dysuria and frequency.   Musculoskeletal:  Negative for back pain.        Numbness/tingling in bilateral hands when sleeping. Resolves upon waking.    Integumentary:  Negative for rash.   Allergic/Immunologic: Negative for frequent infections.   Neurological:  Negative for weakness and headaches.   Hematological:  Negative for adenopathy. Does not bruise/bleed easily.   Psychiatric/Behavioral:  The patient is not nervous/anxious.          Objective:      There were no vitals filed for this visit.        Physical Exam  Constitutional:       General: He is not in acute distress.     Appearance: Normal appearance. He is not ill-appearing.   HENT:      Head: Normocephalic and atraumatic.      Nose: Nose normal.      Mouth/Throat:      Mouth: Mucous membranes are moist.      Pharynx: Oropharynx is clear.   Eyes:      Extraocular Movements: Extraocular movements intact.      Conjunctiva/sclera: Conjunctivae normal.      Pupils: Pupils are equal, round, and reactive to light.   Cardiovascular:      Rate and Rhythm: Normal rate and regular rhythm.      Pulses: Normal pulses.      Heart sounds: Normal heart sounds. No murmur heard.  Pulmonary:      Effort: Pulmonary effort is normal. No respiratory distress.      Breath sounds: Normal breath sounds.   Abdominal:      General: There is no distension.      Palpations: Abdomen is soft.      Tenderness: There is no abdominal tenderness.   Musculoskeletal:         General: Normal range of motion.      Cervical back: Normal range of  motion and neck supple.   Lymphadenopathy:      Cervical: No cervical adenopathy.   Skin:     General: Skin is warm and dry.      Findings: No rash.   Neurological:      General: No focal deficit present.      Mental Status: He is alert and oriented to person, place, and time.         LABS AND IMAGING REVIEWED IN EPIC  6/17/25 Whole Body PET/CT:  1. History of multiple myeloma status post autologous stem cell transplant and active medical therapy.  2. Stable sclerotic and lytic lesions, described in  detail above.  No new  or suspicious tracer avid osseous lesion.  3. Few pulmonary nodules, including new right lower lobe 6 mm nodule, which are below threshold for characterization on PET.       Free Light Chains  10/17//23 - Lambda , Kappa FLC 3.29, K/L Ratio .0044  11/22/23 - Lambda FLC 0.66, Kappa FLC 0.66, K/L Ratio 1.0  01/18/24 - lambda FLC 0.33, kappa FLC 0.39, K/L ratio 1.18      Pathology:  8/20/24 BMBx:  BONE MARROW, LEFT ILIAC CREST (ASPIRATE SMEAR, TOUCH IMPRINT, CLOT SECTION, AND CORE BIOPSY):   -- HYPOCELLULAR MARROW (20-30%) WITH TRILINEAGE HEMATOPOIESIS.   -- NO MORPHOLOGIC OR IMMUNOPHENOTYPIC EVIDENCE OF RESIDUAL/RELAPSED PLASMA CELL MYELOMA.   -- INCREASED STORAGE IRON     6/17/25 BMBx:  -- Insufficient bone marrow aspiration and core biopsy.  -- Cortical bone with scant trilineage hematopoiesis and scattered plasma cells.  -- See comment.  Comments  Flow cytometric analysis of bone marrow shows populations of polyclonal B lymphocytes and T lymphocytes that are  immunophenotypically unremarkable. Rare plasma cells are detected. The blast gate is not increased.  Flow cytometric analysis for myeloma MRD was performed at [facility]. No monotypic plasma cells identified  (MRD -negative).  Immunohistochemical stain was are performed on the biopsy core for greater sensitivity and further architectural assessment  with adequate controls. Scattered -positive plasma cells are  seen.  The specimen is insufficient. If clinically indicated, repeat bone marrow aspiration core biopsy is recommended.    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 Displayed all CRAB criteria at time of diagnosis in October. Underwent 1st line Beatriz + RVD + Zometa. Transplant done 5/13/24  Will plan to undergo maintenance therapy give high risk disease with Velcade Q2w and Revlimid 10mg D1-21 Q28 day cycle. Needs to remain on Aspirin and Acyclovir while on this therapy. Continue Bactrim until +D180 post SCT.     Immunodeficiency due to Drug Therapy - Precautions discussed with patient. Continue to monitor for any signs of symptoms of infection. No prophylaxis needed at this time.   Folate deficiency - Continue folate supplementation  Bilateral hand pain   MRI C-spine completed 9/4/2024 showed Mild to moderate encroachment into the neural foramina and to a lesser extent the central spinal canal at C5-6 as described   Referred to neurology, but did not go as pain improved.     Plan:   - Reviewed recent lab results from 07/23/2025.  - Discussed the mildly low white blood cell count (3.0) and neutrophil count (1.3), which are not contraindications for treatment.  - Hemoglobin is stable at 12.2.  - Creatinine is slightly elevated at 1.4, but eGFR is normal.  - Myeloma labs are pending.  - Discussed erectile dysfunction and management.  - Proceed with Velcade injection today.  - Continue current maintenance therapy with Revlimid 10 mg D1-21 of 28 D cycle. Starts cycle tomorrow 7/25/2025.   - Increase Cialis dose to 20 mg (two 10 mg pills) as needed for improved efficacy. Will prescribe 20 mg tablets at the next refill.  - Provider will research alternative medications for erectile dysfunction and discuss them at the next visit.  - Continue to take Aspirin, and Acyclovir.  - Follow up in four weeks with repeat labs prior to the visit.     **All blood products must be irradiated and CMV  negative**          Patient instructions and visit orders.       -Follow-up:  F/U with NP 4 weeks  -Labs:  CBC, CMP and MM labs - 4 weeks  -Imaging:    -Other orders:      40 were spent on this encounter    Patient requires or will require continuous, longitudinal, and active collaborative plan of care related to this patient's health condition, RISS Stage III Multiple Myeloma - the management of which requires the direction of a practitioner with specialized clinical knowledge, skill, and expertise.     Guilherme Barnard MSN, FNP-BC, AOCNP  Department of Hematology/Oncology.

## 2025-07-25 LAB
KAPPA LC FREE SER NEPH-MCNC: 1.73 MG/DL (ref 0.33–1.94)
KAPPA LC FREE/LAMBDA FREE SER NEPH: 1.09 {RATIO} (ref 0.26–1.65)
LAMBDA LC FREE SERPL-MCNC: 1.58 MG/DL (ref 0.57–2.63)

## 2025-08-06 ENCOUNTER — LAB VISIT (OUTPATIENT)
Dept: LAB | Facility: HOSPITAL | Age: 45
End: 2025-08-06
Payer: COMMERCIAL

## 2025-08-06 DIAGNOSIS — Z94.84 HISTORY OF AUTO STEM CELL TRANSPLANT: ICD-10-CM

## 2025-08-06 DIAGNOSIS — C90.00 MULTIPLE MYELOMA, REMISSION STATUS UNSPECIFIED: ICD-10-CM

## 2025-08-06 DIAGNOSIS — C90.01 MULTIPLE MYELOMA IN REMISSION: ICD-10-CM

## 2025-08-06 LAB
ALBUMIN SERPL-MCNC: 4 G/DL (ref 3.5–5)
ALBUMIN/GLOB SERPL: 1.3 RATIO (ref 1.1–2)
ALP SERPL-CCNC: 50 UNIT/L (ref 40–150)
ALT SERPL-CCNC: 20 UNIT/L (ref 0–55)
ANION GAP SERPL CALC-SCNC: 9 MEQ/L
AST SERPL-CCNC: 21 UNIT/L (ref 11–45)
BASOPHILS # BLD AUTO: 0.03 X10(3)/MCL
BASOPHILS NFR BLD AUTO: 0.9 %
BILIRUB SERPL-MCNC: 0.8 MG/DL
BUN SERPL-MCNC: 22 MG/DL (ref 8.9–20.6)
CALCIUM SERPL-MCNC: 9.3 MG/DL (ref 8.4–10.2)
CHLORIDE SERPL-SCNC: 104 MMOL/L (ref 98–107)
CO2 SERPL-SCNC: 26 MMOL/L (ref 22–29)
CREAT SERPL-MCNC: 1.28 MG/DL (ref 0.72–1.25)
CREAT/UREA NIT SERPL: 17
EOSINOPHIL # BLD AUTO: 0.18 X10(3)/MCL (ref 0–0.9)
EOSINOPHIL NFR BLD AUTO: 5.5 %
ERYTHROCYTE [DISTWIDTH] IN BLOOD BY AUTOMATED COUNT: 14.7 % (ref 11.5–17)
GFR SERPLBLD CREATININE-BSD FMLA CKD-EPI: >60 ML/MIN/1.73/M2
GLOBULIN SER-MCNC: 3 GM/DL (ref 2.4–3.5)
GLUCOSE SERPL-MCNC: 60 MG/DL (ref 74–100)
HCT VFR BLD AUTO: 39.1 % (ref 42–52)
HGB BLD-MCNC: 12.9 G/DL (ref 14–18)
IMM GRANULOCYTES # BLD AUTO: 0.01 X10(3)/MCL (ref 0–0.04)
IMM GRANULOCYTES NFR BLD AUTO: 0.3 %
LYMPHOCYTES # BLD AUTO: 1 X10(3)/MCL (ref 0.6–4.6)
LYMPHOCYTES NFR BLD AUTO: 30.3 %
MAGNESIUM SERPL-MCNC: 2.1 MG/DL (ref 1.6–2.6)
MCH RBC QN AUTO: 33.9 PG (ref 27–31)
MCHC RBC AUTO-ENTMCNC: 33 G/DL (ref 33–36)
MCV RBC AUTO: 102.9 FL (ref 80–94)
MONOCYTES # BLD AUTO: 0.49 X10(3)/MCL (ref 0.1–1.3)
MONOCYTES NFR BLD AUTO: 14.8 %
NEUTROPHILS # BLD AUTO: 1.59 X10(3)/MCL (ref 2.1–9.2)
NEUTROPHILS NFR BLD AUTO: 48.2 %
NRBC BLD AUTO-RTO: 0 %
PLATELET # BLD AUTO: 117 X10(3)/MCL (ref 130–400)
PMV BLD AUTO: 11.2 FL (ref 7.4–10.4)
POTASSIUM SERPL-SCNC: 3.9 MMOL/L (ref 3.5–5.1)
PROT SERPL-MCNC: 7 GM/DL (ref 6.4–8.3)
RBC # BLD AUTO: 3.8 X10(6)/MCL (ref 4.7–6.1)
SODIUM SERPL-SCNC: 139 MMOL/L (ref 136–145)
WBC # BLD AUTO: 3.3 X10(3)/MCL (ref 4.5–11.5)

## 2025-08-06 PROCEDURE — 82784 ASSAY IGA/IGD/IGG/IGM EACH: CPT | Mod: 91

## 2025-08-06 PROCEDURE — 85025 COMPLETE CBC W/AUTO DIFF WBC: CPT

## 2025-08-06 PROCEDURE — 82784 ASSAY IGA/IGD/IGG/IGM EACH: CPT | Mod: 59

## 2025-08-06 PROCEDURE — 36415 COLL VENOUS BLD VENIPUNCTURE: CPT

## 2025-08-06 PROCEDURE — 86334 IMMUNOFIX E-PHORESIS SERUM: CPT

## 2025-08-06 PROCEDURE — 80053 COMPREHEN METABOLIC PANEL: CPT

## 2025-08-06 PROCEDURE — 83521 IG LIGHT CHAINS FREE EACH: CPT

## 2025-08-06 PROCEDURE — 83735 ASSAY OF MAGNESIUM: CPT

## 2025-08-07 ENCOUNTER — INFUSION (OUTPATIENT)
Facility: HOSPITAL | Age: 45
End: 2025-08-07
Attending: INTERNAL MEDICINE
Payer: COMMERCIAL

## 2025-08-07 VITALS
TEMPERATURE: 99 F | HEIGHT: 69 IN | SYSTOLIC BLOOD PRESSURE: 128 MMHG | WEIGHT: 207 LBS | DIASTOLIC BLOOD PRESSURE: 76 MMHG | OXYGEN SATURATION: 97 % | RESPIRATION RATE: 18 BRPM | HEART RATE: 66 BPM | BODY MASS INDEX: 30.66 KG/M2

## 2025-08-07 DIAGNOSIS — C90.01 MULTIPLE MYELOMA IN REMISSION: Primary | ICD-10-CM

## 2025-08-07 LAB
IGA SERPL-MCNC: 57 MG/DL (ref 63–484)
IGG SERPL-MCNC: 538 MG/DL (ref 540–1822)
IGM SERPL-MCNC: 22 MG/DL (ref 22–240)

## 2025-08-07 PROCEDURE — 63600175 PHARM REV CODE 636 W HCPCS

## 2025-08-07 PROCEDURE — 96401 CHEMO ANTI-NEOPL SQ/IM: CPT

## 2025-08-07 RX ORDER — PROCHLORPERAZINE EDISYLATE 5 MG/ML
10 INJECTION INTRAMUSCULAR; INTRAVENOUS ONCE AS NEEDED
Status: DISCONTINUED | OUTPATIENT
Start: 2025-08-07 | End: 2025-08-07 | Stop reason: HOSPADM

## 2025-08-07 RX ORDER — PROCHLORPERAZINE EDISYLATE 5 MG/ML
10 INJECTION INTRAMUSCULAR; INTRAVENOUS ONCE AS NEEDED
Status: CANCELLED
Start: 2025-08-07

## 2025-08-07 RX ORDER — DIPHENHYDRAMINE HYDROCHLORIDE 50 MG/ML
50 INJECTION, SOLUTION INTRAMUSCULAR; INTRAVENOUS ONCE AS NEEDED
Status: DISCONTINUED | OUTPATIENT
Start: 2025-08-07 | End: 2025-08-07 | Stop reason: HOSPADM

## 2025-08-07 RX ORDER — DIPHENHYDRAMINE HYDROCHLORIDE 50 MG/ML
50 INJECTION, SOLUTION INTRAMUSCULAR; INTRAVENOUS ONCE AS NEEDED
Status: CANCELLED | OUTPATIENT
Start: 2025-08-07

## 2025-08-07 RX ORDER — SODIUM CHLORIDE 0.9 % (FLUSH) 0.9 %
10 SYRINGE (ML) INJECTION
Status: DISCONTINUED | OUTPATIENT
Start: 2025-08-07 | End: 2025-08-07 | Stop reason: HOSPADM

## 2025-08-07 RX ORDER — BORTEZOMIB 3.5 MG/1
1.3 INJECTION, POWDER, LYOPHILIZED, FOR SOLUTION INTRAVENOUS; SUBCUTANEOUS
Status: CANCELLED | OUTPATIENT
Start: 2025-08-07

## 2025-08-07 RX ORDER — SODIUM CHLORIDE 0.9 % (FLUSH) 0.9 %
10 SYRINGE (ML) INJECTION
Status: CANCELLED | OUTPATIENT
Start: 2025-08-07

## 2025-08-07 RX ORDER — HEPARIN 100 UNIT/ML
500 SYRINGE INTRAVENOUS
Status: DISCONTINUED | OUTPATIENT
Start: 2025-08-07 | End: 2025-08-07 | Stop reason: HOSPADM

## 2025-08-07 RX ORDER — HEPARIN 100 UNIT/ML
500 SYRINGE INTRAVENOUS
Status: CANCELLED | OUTPATIENT
Start: 2025-08-07

## 2025-08-07 RX ORDER — EPINEPHRINE 0.3 MG/.3ML
0.3 INJECTION SUBCUTANEOUS ONCE AS NEEDED
Status: DISCONTINUED | OUTPATIENT
Start: 2025-08-07 | End: 2025-08-07 | Stop reason: HOSPADM

## 2025-08-07 RX ORDER — EPINEPHRINE 0.3 MG/.3ML
0.3 INJECTION SUBCUTANEOUS ONCE AS NEEDED
Status: CANCELLED | OUTPATIENT
Start: 2025-08-07

## 2025-08-07 RX ORDER — BORTEZOMIB 3.5 MG/1
1.3 INJECTION, POWDER, LYOPHILIZED, FOR SOLUTION INTRAVENOUS; SUBCUTANEOUS
Status: COMPLETED | OUTPATIENT
Start: 2025-08-07 | End: 2025-08-07

## 2025-08-07 RX ADMIN — BORTEXOMIB 2.75 MG: 3.5 INJECTION, POWDER, LYOPHILIZED, FOR SOLUTION INTRAVENOUS; SUBCUTANEOUS at 01:08

## 2025-08-08 LAB
ALBUMIN % SPEP (OHS): 56.33 (ref 48.1–59.5)
ALBUMIN SERPL-MCNC: 3.7 G/DL (ref 3.5–5)
ALBUMIN/GLOB SERPL: 1.3 RATIO (ref 1.1–2)
ALPHA 1 GLOB (OHS): 0.27 GM/DL (ref 0–0.4)
ALPHA 1 GLOB% (OHS): 4.21 (ref 2.3–4.9)
ALPHA 2 GLOB % (OHS): 11.84 (ref 6.9–13)
ALPHA 2 GLOB (OHS): 0.77 GM/DL (ref 0.4–1)
BETA GLOB (OHS): 1.05 GM/DL (ref 0.7–1.3)
BETA GLOB% (OHS): 16.12 (ref 13.8–19.7)
GAMMA GLOBULIN % (OHS): 11.49 (ref 10.1–21.9)
GAMMA GLOBULIN (OHS): 0.75 GM/DL (ref 0.4–1.8)
GLOBULIN SER-MCNC: 2.8 GM/DL (ref 2.4–3.5)
KAPPA LC FREE SER NEPH-MCNC: 2.14 MG/DL (ref 0.33–1.94)
KAPPA LC FREE/LAMBDA FREE SER NEPH: 0.9 {RATIO} (ref 0.26–1.65)
LAMBDA LC FREE SERPL-MCNC: 2.38 MG/DL (ref 0.57–2.63)
M SPIKE % (OHS): NORMAL
M SPIKE (OHS): NORMAL
PATH REV: NORMAL
PROT SERPL-MCNC: 6.5 GM/DL (ref 6.4–8.3)

## 2025-08-12 DIAGNOSIS — C90.01 MULTIPLE MYELOMA IN REMISSION: ICD-10-CM

## 2025-08-12 LAB
IGA SERPL-MCNC: 40 MG/DL (ref 61–356)
IGG SERPL-MCNC: 470 MG/DL (ref 767–1590)
IGM SERPL-MCNC: 22 MG/DL (ref 37–286)
IMMUNOLOGIST REVIEW: ABNORMAL
M PROTEIN SERPL QL: NEGATIVE
M THERAPEUTIC ANTIBODY ADMINISTERED?: ABNORMAL

## 2025-08-12 RX ORDER — LENALIDOMIDE 10 MG/1
10 CAPSULE ORAL DAILY
Qty: 21 CAPSULE | Refills: 0 | Status: SHIPPED | OUTPATIENT
Start: 2025-08-12

## 2025-08-13 DIAGNOSIS — C90.01 MULTIPLE MYELOMA IN REMISSION: Primary | ICD-10-CM

## 2025-08-20 ENCOUNTER — LAB VISIT (OUTPATIENT)
Dept: LAB | Facility: HOSPITAL | Age: 45
End: 2025-08-20
Payer: COMMERCIAL

## 2025-08-20 DIAGNOSIS — C90.01 MULTIPLE MYELOMA IN REMISSION: ICD-10-CM

## 2025-08-20 LAB
ALBUMIN SERPL-MCNC: 3.8 G/DL (ref 3.5–5)
ALBUMIN/GLOB SERPL: 1.6 RATIO (ref 1.1–2)
ALP SERPL-CCNC: 41 UNIT/L (ref 40–150)
ALT SERPL-CCNC: 17 UNIT/L (ref 0–55)
ANION GAP SERPL CALC-SCNC: 5 MEQ/L
AST SERPL-CCNC: 21 UNIT/L (ref 11–45)
BASOPHILS # BLD AUTO: 0.01 X10(3)/MCL
BASOPHILS NFR BLD AUTO: 0.5 %
BILIRUB SERPL-MCNC: 1 MG/DL
BUN SERPL-MCNC: 27 MG/DL (ref 8.9–20.6)
CALCIUM SERPL-MCNC: 8.8 MG/DL (ref 8.4–10.2)
CHLORIDE SERPL-SCNC: 105 MMOL/L (ref 98–107)
CO2 SERPL-SCNC: 27 MMOL/L (ref 22–29)
CREAT SERPL-MCNC: 1.39 MG/DL (ref 0.72–1.25)
CREAT/UREA NIT SERPL: 19
EOSINOPHIL # BLD AUTO: 0.07 X10(3)/MCL (ref 0–0.9)
EOSINOPHIL NFR BLD AUTO: 3.3 %
ERYTHROCYTE [DISTWIDTH] IN BLOOD BY AUTOMATED COUNT: 14.2 % (ref 11.5–17)
GFR SERPLBLD CREATININE-BSD FMLA CKD-EPI: >60 ML/MIN/1.73/M2
GLOBULIN SER-MCNC: 2.4 GM/DL (ref 2.4–3.5)
GLUCOSE SERPL-MCNC: 89 MG/DL (ref 74–100)
HCT VFR BLD AUTO: 35.3 % (ref 42–52)
HGB BLD-MCNC: 11.7 G/DL (ref 14–18)
IMM GRANULOCYTES # BLD AUTO: 0.01 X10(3)/MCL (ref 0–0.04)
IMM GRANULOCYTES NFR BLD AUTO: 0.5 %
LYMPHOCYTES # BLD AUTO: 0.91 X10(3)/MCL (ref 0.6–4.6)
LYMPHOCYTES NFR BLD AUTO: 43.1 %
MCH RBC QN AUTO: 33.7 PG (ref 27–31)
MCHC RBC AUTO-ENTMCNC: 33.1 G/DL (ref 33–36)
MCV RBC AUTO: 101.7 FL (ref 80–94)
MONOCYTES # BLD AUTO: 0.24 X10(3)/MCL (ref 0.1–1.3)
MONOCYTES NFR BLD AUTO: 11.4 %
NEUTROPHILS # BLD AUTO: 0.87 X10(3)/MCL (ref 2.1–9.2)
NEUTROPHILS NFR BLD AUTO: 41.2 %
PLATELET # BLD AUTO: 100 X10(3)/MCL (ref 130–400)
PMV BLD AUTO: 10.1 FL (ref 7.4–10.4)
POTASSIUM SERPL-SCNC: 4.6 MMOL/L (ref 3.5–5.1)
PROT SERPL-MCNC: 6.2 GM/DL (ref 6.4–8.3)
RBC # BLD AUTO: 3.47 X10(6)/MCL (ref 4.7–6.1)
SODIUM SERPL-SCNC: 137 MMOL/L (ref 136–145)
WBC # BLD AUTO: 2.11 X10(3)/MCL (ref 4.5–11.5)

## 2025-08-20 PROCEDURE — 85025 COMPLETE CBC W/AUTO DIFF WBC: CPT

## 2025-08-20 PROCEDURE — 80053 COMPREHEN METABOLIC PANEL: CPT

## 2025-08-20 PROCEDURE — 36415 COLL VENOUS BLD VENIPUNCTURE: CPT

## 2025-08-21 ENCOUNTER — OFFICE VISIT (OUTPATIENT)
Facility: CLINIC | Age: 45
End: 2025-08-21
Payer: COMMERCIAL

## 2025-08-21 VITALS
SYSTOLIC BLOOD PRESSURE: 145 MMHG | BODY MASS INDEX: 31.03 KG/M2 | OXYGEN SATURATION: 99 % | TEMPERATURE: 98 F | RESPIRATION RATE: 16 BRPM | DIASTOLIC BLOOD PRESSURE: 83 MMHG | WEIGHT: 209.5 LBS | HEIGHT: 69 IN | HEART RATE: 54 BPM

## 2025-08-21 DIAGNOSIS — N52.1 ERECTILE DYSFUNCTION DUE TO DISEASES CLASSIFIED ELSEWHERE: ICD-10-CM

## 2025-08-21 DIAGNOSIS — C90.01 MULTIPLE MYELOMA IN REMISSION: ICD-10-CM

## 2025-08-21 DIAGNOSIS — D70.1 CHEMOTHERAPY-INDUCED NEUTROPENIA: ICD-10-CM

## 2025-08-21 DIAGNOSIS — T45.1X5A CHEMOTHERAPY-INDUCED NEUTROPENIA: ICD-10-CM

## 2025-08-21 DIAGNOSIS — C90.00 MULTIPLE MYELOMA, REMISSION STATUS UNSPECIFIED: Primary | ICD-10-CM

## 2025-08-21 DIAGNOSIS — M79.2 NERVE PAIN: ICD-10-CM

## 2025-08-21 PROCEDURE — 99999 PR PBB SHADOW E&M-EST. PATIENT-LVL V: CPT | Mod: PBBFAC,,,

## 2025-08-21 RX ORDER — OXYCODONE HYDROCHLORIDE 10 MG/1
10 TABLET ORAL EVERY 4 HOURS PRN
Qty: 90 TABLET | Refills: 0 | Status: CANCELLED | OUTPATIENT
Start: 2025-08-21

## 2025-08-22 DIAGNOSIS — C90.00 MULTIPLE MYELOMA, REMISSION STATUS UNSPECIFIED: ICD-10-CM

## 2025-08-22 DIAGNOSIS — N52.1 ERECTILE DYSFUNCTION DUE TO DISEASES CLASSIFIED ELSEWHERE: ICD-10-CM

## 2025-08-22 RX ORDER — OXYCODONE HYDROCHLORIDE 10 MG/1
10 TABLET ORAL EVERY 4 HOURS PRN
Qty: 90 TABLET | Refills: 0 | Status: SHIPPED | OUTPATIENT
Start: 2025-08-22

## 2025-08-22 RX ORDER — TADALAFIL 20 MG/1
20 TABLET ORAL DAILY PRN
Qty: 30 TABLET | Refills: 1 | Status: SHIPPED | OUTPATIENT
Start: 2025-08-22 | End: 2026-08-22

## 2025-08-22 RX ORDER — OXYCODONE HYDROCHLORIDE 10 MG/1
10 TABLET ORAL EVERY 4 HOURS PRN
Qty: 90 TABLET | Refills: 0 | Status: CANCELLED | OUTPATIENT
Start: 2025-08-22

## 2025-08-25 ENCOUNTER — PATIENT MESSAGE (OUTPATIENT)
Dept: INTERNAL MEDICINE | Facility: CLINIC | Age: 45
End: 2025-08-25
Payer: COMMERCIAL

## 2025-08-27 ENCOUNTER — LAB VISIT (OUTPATIENT)
Dept: LAB | Facility: HOSPITAL | Age: 45
End: 2025-08-27
Payer: COMMERCIAL

## 2025-08-27 DIAGNOSIS — C90.00 MULTIPLE MYELOMA, REMISSION STATUS UNSPECIFIED: ICD-10-CM

## 2025-08-27 DIAGNOSIS — C90.01 MULTIPLE MYELOMA IN REMISSION: ICD-10-CM

## 2025-08-27 LAB
ALBUMIN SERPL-MCNC: 3.8 G/DL (ref 3.5–5)
ALBUMIN/GLOB SERPL: 1.5 RATIO (ref 1.1–2)
ALP SERPL-CCNC: 47 UNIT/L (ref 40–150)
ALT SERPL-CCNC: 16 UNIT/L (ref 0–55)
ANION GAP SERPL CALC-SCNC: 7 MEQ/L
AST SERPL-CCNC: 21 UNIT/L (ref 11–45)
BASOPHILS # BLD AUTO: 0.04 X10(3)/MCL
BASOPHILS NFR BLD AUTO: 1.8 %
BILIRUB SERPL-MCNC: 0.7 MG/DL
BUN SERPL-MCNC: 26 MG/DL (ref 8.9–20.6)
CALCIUM SERPL-MCNC: 8.8 MG/DL (ref 8.4–10.2)
CHLORIDE SERPL-SCNC: 107 MMOL/L (ref 98–107)
CO2 SERPL-SCNC: 24 MMOL/L (ref 22–29)
CREAT SERPL-MCNC: 1.49 MG/DL (ref 0.72–1.25)
CREAT/UREA NIT SERPL: 17
EOSINOPHIL # BLD AUTO: 0.09 X10(3)/MCL (ref 0–0.9)
EOSINOPHIL NFR BLD AUTO: 4.1 %
ERYTHROCYTE [DISTWIDTH] IN BLOOD BY AUTOMATED COUNT: 14.5 % (ref 11.5–17)
GFR SERPLBLD CREATININE-BSD FMLA CKD-EPI: 58 ML/MIN/1.73/M2
GLOBULIN SER-MCNC: 2.6 GM/DL (ref 2.4–3.5)
GLUCOSE SERPL-MCNC: 99 MG/DL (ref 74–100)
HCT VFR BLD AUTO: 35.5 % (ref 42–52)
HGB BLD-MCNC: 12 G/DL (ref 14–18)
IMM GRANULOCYTES # BLD AUTO: 0 X10(3)/MCL (ref 0–0.04)
IMM GRANULOCYTES NFR BLD AUTO: 0 %
LYMPHOCYTES # BLD AUTO: 0.82 X10(3)/MCL (ref 0.6–4.6)
LYMPHOCYTES NFR BLD AUTO: 37.6 %
MCH RBC QN AUTO: 34 PG (ref 27–31)
MCHC RBC AUTO-ENTMCNC: 33.8 G/DL (ref 33–36)
MCV RBC AUTO: 100.6 FL (ref 80–94)
MONOCYTES # BLD AUTO: 0.26 X10(3)/MCL (ref 0.1–1.3)
MONOCYTES NFR BLD AUTO: 11.9 %
NEUTROPHILS # BLD AUTO: 0.97 X10(3)/MCL (ref 2.1–9.2)
NEUTROPHILS NFR BLD AUTO: 44.6 %
NRBC BLD AUTO-RTO: 0 %
PLATELET # BLD AUTO: 121 X10(3)/MCL (ref 130–400)
PMV BLD AUTO: 10.4 FL (ref 7.4–10.4)
POTASSIUM SERPL-SCNC: 4.3 MMOL/L (ref 3.5–5.1)
PROT SERPL-MCNC: 6.4 GM/DL (ref 6.4–8.3)
RBC # BLD AUTO: 3.53 X10(6)/MCL (ref 4.7–6.1)
SODIUM SERPL-SCNC: 138 MMOL/L (ref 136–145)
WBC # BLD AUTO: 2.18 X10(3)/MCL (ref 4.5–11.5)

## 2025-08-27 PROCEDURE — 80053 COMPREHEN METABOLIC PANEL: CPT

## 2025-08-27 PROCEDURE — 36415 COLL VENOUS BLD VENIPUNCTURE: CPT

## 2025-08-27 PROCEDURE — 85025 COMPLETE CBC W/AUTO DIFF WBC: CPT

## 2025-09-02 ENCOUNTER — LAB VISIT (OUTPATIENT)
Dept: LAB | Facility: HOSPITAL | Age: 45
End: 2025-09-02
Attending: STUDENT IN AN ORGANIZED HEALTH CARE EDUCATION/TRAINING PROGRAM
Payer: COMMERCIAL

## 2025-09-02 DIAGNOSIS — C90.01 MULTIPLE MYELOMA IN REMISSION: ICD-10-CM

## 2025-09-02 DIAGNOSIS — C90.00 MULTIPLE MYELOMA, REMISSION STATUS UNSPECIFIED: ICD-10-CM

## 2025-09-02 LAB
ALBUMIN SERPL-MCNC: 3.8 G/DL (ref 3.5–5)
ALBUMIN/GLOB SERPL: 1.5 RATIO (ref 1.1–2)
ALP SERPL-CCNC: 45 UNIT/L (ref 40–150)
ALT SERPL-CCNC: 14 UNIT/L (ref 0–55)
ANION GAP SERPL CALC-SCNC: 6 MEQ/L
AST SERPL-CCNC: 16 UNIT/L (ref 11–45)
BASOPHILS # BLD AUTO: 0.03 X10(3)/MCL
BASOPHILS NFR BLD AUTO: 1.1 %
BILIRUB SERPL-MCNC: 0.7 MG/DL
BUN SERPL-MCNC: 24 MG/DL (ref 8.9–20.6)
CALCIUM SERPL-MCNC: 9.2 MG/DL (ref 8.4–10.2)
CHLORIDE SERPL-SCNC: 105 MMOL/L (ref 98–107)
CO2 SERPL-SCNC: 27 MMOL/L (ref 22–29)
CREAT SERPL-MCNC: 1.4 MG/DL (ref 0.72–1.25)
CREAT/UREA NIT SERPL: 17
EOSINOPHIL # BLD AUTO: 0.06 X10(3)/MCL (ref 0–0.9)
EOSINOPHIL NFR BLD AUTO: 2.2 %
ERYTHROCYTE [DISTWIDTH] IN BLOOD BY AUTOMATED COUNT: 14.5 % (ref 11.5–17)
GFR SERPLBLD CREATININE-BSD FMLA CKD-EPI: >60 ML/MIN/1.73/M2
GLOBULIN SER-MCNC: 2.5 GM/DL (ref 2.4–3.5)
GLUCOSE SERPL-MCNC: 99 MG/DL (ref 74–100)
HCT VFR BLD AUTO: 36.2 % (ref 42–52)
HGB BLD-MCNC: 12 G/DL (ref 14–18)
IMM GRANULOCYTES # BLD AUTO: 0.01 X10(3)/MCL (ref 0–0.04)
IMM GRANULOCYTES NFR BLD AUTO: 0.4 %
LYMPHOCYTES # BLD AUTO: 0.91 X10(3)/MCL (ref 0.6–4.6)
LYMPHOCYTES NFR BLD AUTO: 34 %
MCH RBC QN AUTO: 33.8 PG (ref 27–31)
MCHC RBC AUTO-ENTMCNC: 33.1 G/DL (ref 33–36)
MCV RBC AUTO: 102 FL (ref 80–94)
MONOCYTES # BLD AUTO: 0.26 X10(3)/MCL (ref 0.1–1.3)
MONOCYTES NFR BLD AUTO: 9.7 %
NEUTROPHILS # BLD AUTO: 1.41 X10(3)/MCL (ref 2.1–9.2)
NEUTROPHILS NFR BLD AUTO: 52.6 %
NRBC BLD AUTO-RTO: 0 %
PLATELET # BLD AUTO: 129 X10(3)/MCL (ref 130–400)
PMV BLD AUTO: 9.7 FL (ref 7.4–10.4)
POTASSIUM SERPL-SCNC: 4.7 MMOL/L (ref 3.5–5.1)
PROT SERPL-MCNC: 6.3 GM/DL (ref 6.4–8.3)
RBC # BLD AUTO: 3.55 X10(6)/MCL (ref 4.7–6.1)
SODIUM SERPL-SCNC: 138 MMOL/L (ref 136–145)
WBC # BLD AUTO: 2.68 X10(3)/MCL (ref 4.5–11.5)

## 2025-09-02 PROCEDURE — 36415 COLL VENOUS BLD VENIPUNCTURE: CPT

## 2025-09-02 PROCEDURE — 85025 COMPLETE CBC W/AUTO DIFF WBC: CPT

## 2025-09-02 PROCEDURE — 80053 COMPREHEN METABOLIC PANEL: CPT

## 2025-09-02 RX ORDER — HEPARIN 100 UNIT/ML
500 SYRINGE INTRAVENOUS
Status: CANCELLED | OUTPATIENT
Start: 2025-09-02

## 2025-09-02 RX ORDER — SODIUM CHLORIDE 0.9 % (FLUSH) 0.9 %
10 SYRINGE (ML) INJECTION
Status: CANCELLED | OUTPATIENT
Start: 2025-09-02

## 2025-09-02 RX ORDER — BORTEZOMIB 3.5 MG/1
1.3 INJECTION, POWDER, LYOPHILIZED, FOR SOLUTION INTRAVENOUS; SUBCUTANEOUS
Status: CANCELLED | OUTPATIENT
Start: 2025-09-02 | End: 2025-09-02

## 2025-09-02 RX ORDER — DIPHENHYDRAMINE HYDROCHLORIDE 50 MG/ML
50 INJECTION, SOLUTION INTRAMUSCULAR; INTRAVENOUS ONCE AS NEEDED
Status: CANCELLED | OUTPATIENT
Start: 2025-09-02 | End: 2037-01-29

## 2025-09-02 RX ORDER — EPINEPHRINE 0.3 MG/.3ML
0.3 INJECTION SUBCUTANEOUS ONCE AS NEEDED
Status: CANCELLED | OUTPATIENT
Start: 2025-09-02 | End: 2037-01-29

## 2025-09-02 RX ORDER — PROCHLORPERAZINE EDISYLATE 5 MG/ML
10 INJECTION INTRAMUSCULAR; INTRAVENOUS ONCE AS NEEDED
Status: CANCELLED | OUTPATIENT
Start: 2025-09-02

## 2025-09-03 ENCOUNTER — INFUSION (OUTPATIENT)
Facility: HOSPITAL | Age: 45
End: 2025-09-03
Attending: INTERNAL MEDICINE
Payer: COMMERCIAL

## 2025-09-03 VITALS
WEIGHT: 211 LBS | HEIGHT: 69 IN | SYSTOLIC BLOOD PRESSURE: 129 MMHG | TEMPERATURE: 98 F | DIASTOLIC BLOOD PRESSURE: 73 MMHG | RESPIRATION RATE: 18 BRPM | BODY MASS INDEX: 31.25 KG/M2 | OXYGEN SATURATION: 96 % | HEART RATE: 62 BPM

## 2025-09-03 DIAGNOSIS — C90.01 MULTIPLE MYELOMA IN REMISSION: Primary | ICD-10-CM

## 2025-09-03 PROCEDURE — 63600175 PHARM REV CODE 636 W HCPCS

## 2025-09-03 PROCEDURE — 96401 CHEMO ANTI-NEOPL SQ/IM: CPT

## 2025-09-03 RX ORDER — SODIUM CHLORIDE 0.9 % (FLUSH) 0.9 %
10 SYRINGE (ML) INJECTION
Status: DISCONTINUED | OUTPATIENT
Start: 2025-09-03 | End: 2025-09-03 | Stop reason: HOSPADM

## 2025-09-03 RX ORDER — PROCHLORPERAZINE EDISYLATE 5 MG/ML
10 INJECTION INTRAMUSCULAR; INTRAVENOUS ONCE AS NEEDED
Status: DISCONTINUED | OUTPATIENT
Start: 2025-09-03 | End: 2025-09-03 | Stop reason: HOSPADM

## 2025-09-03 RX ORDER — BORTEZOMIB 3.5 MG/1
1.3 INJECTION, POWDER, LYOPHILIZED, FOR SOLUTION INTRAVENOUS; SUBCUTANEOUS
Status: COMPLETED | OUTPATIENT
Start: 2025-09-03 | End: 2025-09-03

## 2025-09-03 RX ORDER — HEPARIN 100 UNIT/ML
500 SYRINGE INTRAVENOUS
Status: DISCONTINUED | OUTPATIENT
Start: 2025-09-03 | End: 2025-09-03 | Stop reason: HOSPADM

## 2025-09-03 RX ORDER — EPINEPHRINE 0.3 MG/.3ML
0.3 INJECTION SUBCUTANEOUS ONCE AS NEEDED
Status: DISCONTINUED | OUTPATIENT
Start: 2025-09-03 | End: 2025-09-03 | Stop reason: HOSPADM

## 2025-09-03 RX ORDER — DIPHENHYDRAMINE HYDROCHLORIDE 50 MG/ML
50 INJECTION, SOLUTION INTRAMUSCULAR; INTRAVENOUS ONCE AS NEEDED
Status: DISCONTINUED | OUTPATIENT
Start: 2025-09-03 | End: 2025-09-03 | Stop reason: HOSPADM

## 2025-09-03 RX ADMIN — BORTEZOMIB 2.75 MG: 3.5 INJECTION, POWDER, LYOPHILIZED, FOR SOLUTION INTRAVENOUS; SUBCUTANEOUS at 02:09

## (undated) DEVICE — ELECTRODE REM PLYHSV RETURN 9

## (undated) DEVICE — SOL NACL 0.9% INJ PF/50151

## (undated) DEVICE — NDL HYPO REG 25G X 1 1/2

## (undated) DEVICE — DRAPE T THYROID STERILE

## (undated) DEVICE — DECANTER FLUID TRNSF WHITE 9IN

## (undated) DEVICE — BLADE SURG CARBON STEEL SZ11

## (undated) DEVICE — ADHESIVE DERMABOND ADVANCED

## (undated) DEVICE — APPLICATOR CHLORAPREP ORN 26ML

## (undated) DEVICE — SUT PROLENE 2-0 30 SH

## (undated) DEVICE — SYR ONLY LUER LOCK 20CC

## (undated) DEVICE — DRESSING ANTIMICROBIAL 1 INCH

## (undated) DEVICE — CONTAINER SPECIMEN OR STER 4OZ

## (undated) DEVICE — TIP YANKAUERS BULB NO VENT

## (undated) DEVICE — SYR DISP LL 5CC

## (undated) DEVICE — DRAPE C-ARM ELAS CLIP 42X120IN

## (undated) DEVICE — SUT MCRYL PLUS 4-0 PS2 27IN

## (undated) DEVICE — TRAY MINOR GEN SURG OMC